# Patient Record
Sex: FEMALE | Race: WHITE | NOT HISPANIC OR LATINO | Employment: OTHER | URBAN - METROPOLITAN AREA
[De-identification: names, ages, dates, MRNs, and addresses within clinical notes are randomized per-mention and may not be internally consistent; named-entity substitution may affect disease eponyms.]

---

## 2023-04-17 LAB
ANION GAP IN SER/PLAS: 11 MMOL/L (ref 10–20)
CALCIDIOL (25 OH VITAMIN D3) (NG/ML) IN SER/PLAS: 23 NG/ML
CALCIUM (MG/DL) IN SER/PLAS: 9.4 MG/DL (ref 8.6–10.3)
CARBON DIOXIDE, TOTAL (MMOL/L) IN SER/PLAS: 30 MMOL/L (ref 21–32)
CHLORIDE (MMOL/L) IN SER/PLAS: 100 MMOL/L (ref 98–107)
CREATININE (MG/DL) IN SER/PLAS: 0.83 MG/DL (ref 0.5–1.05)
CREATININE (MG/DL) IN URINE: 53 MG/DL (ref 20–320)
GFR FEMALE: 77 ML/MIN/1.73M2
GLUCOSE (MG/DL) IN SER/PLAS: 182 MG/DL (ref 74–99)
PHOSPHATE (MG/DL) IN SER/PLAS: 3.9 MG/DL (ref 2.5–4.9)
POTASSIUM (MMOL/L) IN SER/PLAS: 4.3 MMOL/L (ref 3.5–5.3)
PROTEIN (MG/DL) IN URINE: 28 MG/DL (ref 5–24)
PROTEIN/CREATININE (MG/MG) IN URINE: 0.53 MG/MG CREAT (ref 0–0.17)
SODIUM (MMOL/L) IN SER/PLAS: 137 MMOL/L (ref 136–145)
UREA NITROGEN (MG/DL) IN SER/PLAS: 15 MG/DL (ref 6–23)

## 2023-04-18 LAB — PARATHYRIN INTACT (PG/ML) IN SER/PLAS: 101 PG/ML (ref 18.5–88)

## 2023-09-15 PROBLEM — E55.9 VITAMIN D DEFICIENCY: Status: ACTIVE | Noted: 2023-09-15

## 2023-09-15 PROBLEM — R80.9 PROTEINURIA: Status: ACTIVE | Noted: 2023-09-15

## 2023-09-15 PROBLEM — N18.2 CKD (CHRONIC KIDNEY DISEASE), STAGE II: Status: ACTIVE | Noted: 2023-09-15

## 2023-09-15 PROBLEM — E11.42 DIABETES, POLYNEUROPATHY (MULTI): Status: ACTIVE | Noted: 2023-09-15

## 2023-09-15 PROBLEM — F32.A DEPRESSION: Status: ACTIVE | Noted: 2023-09-15

## 2023-09-15 PROBLEM — E11.8 POORLY CONTROLLED TYPE 2 DIABETES MELLITUS WITH COMPLICATION (MULTI): Status: ACTIVE | Noted: 2023-09-15

## 2023-09-15 PROBLEM — B37.31 CANDIDIASIS OF VAGINA: Status: ACTIVE | Noted: 2023-09-15

## 2023-09-15 PROBLEM — E11.9 DIABETES MELLITUS (MULTI): Status: ACTIVE | Noted: 2023-09-15

## 2023-09-15 PROBLEM — Z91.199 NON-COMPLIANCE: Status: ACTIVE | Noted: 2023-09-15

## 2023-09-15 PROBLEM — I10 ESSENTIAL HYPERTENSION, BENIGN: Status: ACTIVE | Noted: 2023-09-15

## 2023-09-15 PROBLEM — R53.81 PHYSICAL DECONDITIONING: Status: ACTIVE | Noted: 2023-09-15

## 2023-09-15 PROBLEM — N18.30 CKD (CHRONIC KIDNEY DISEASE), STAGE III (MULTI): Status: ACTIVE | Noted: 2023-09-15

## 2023-09-15 PROBLEM — E66.01 MORBID OBESITY (MULTI): Status: ACTIVE | Noted: 2023-09-15

## 2023-09-15 PROBLEM — F48.9: Status: ACTIVE | Noted: 2023-09-15

## 2023-09-15 PROBLEM — E11.65 POORLY CONTROLLED TYPE 2 DIABETES MELLITUS WITH COMPLICATION (MULTI): Status: ACTIVE | Noted: 2023-09-15

## 2023-09-15 PROBLEM — M48.00 SPINAL STENOSIS, MULTILEVEL: Status: ACTIVE | Noted: 2023-09-15

## 2023-09-15 PROBLEM — E78.5 HYPERLIPIDEMIA: Status: ACTIVE | Noted: 2023-09-15

## 2023-09-15 PROBLEM — J45.909 ASTHMA (HHS-HCC): Status: ACTIVE | Noted: 2023-09-15

## 2023-09-15 PROBLEM — F31.9: Status: ACTIVE | Noted: 2023-09-15

## 2023-09-15 PROBLEM — K86.89 SECONDARY PANCREATIC INSUFFICIENCY (HHS-HCC): Status: ACTIVE | Noted: 2023-09-15

## 2023-09-15 PROBLEM — R30.0 DYSURIA: Status: ACTIVE | Noted: 2023-09-15

## 2023-09-15 RX ORDER — INSULIN LISPRO 100 [IU]/ML
15 INJECTION, SOLUTION INTRAVENOUS; SUBCUTANEOUS
COMMUNITY

## 2023-09-15 RX ORDER — SIMVASTATIN 20 MG/1
1 TABLET, FILM COATED ORAL DAILY
COMMUNITY
Start: 2022-07-18

## 2023-09-15 RX ORDER — CITALOPRAM 40 MG/1
1 TABLET, FILM COATED ORAL DAILY
COMMUNITY

## 2023-09-15 RX ORDER — LIDOCAINE 560 MG/1
PATCH PERCUTANEOUS; TOPICAL; TRANSDERMAL
COMMUNITY

## 2023-09-15 RX ORDER — MAGNESIUM CHLORIDE 64 MG
1 TABLET, DELAYED RELEASE (ENTERIC COATED) ORAL DAILY
COMMUNITY

## 2023-09-15 RX ORDER — ASPIRIN 81 MG/1
1 TABLET ORAL DAILY
COMMUNITY

## 2023-09-15 RX ORDER — PANCRELIPASE 30000; 6000; 19000 [USP'U]/1; [USP'U]/1; [USP'U]/1
2 CAPSULE, DELAYED RELEASE PELLETS ORAL
COMMUNITY

## 2023-09-15 RX ORDER — INSULIN GLARGINE 300 U/ML
90 INJECTION, SOLUTION SUBCUTANEOUS NIGHTLY
COMMUNITY

## 2023-09-15 RX ORDER — HYDROCODONE BITARTRATE AND ACETAMINOPHEN 5; 325 MG/1; MG/1
1 TABLET ORAL EVERY 6 HOURS
COMMUNITY
Start: 2020-12-04

## 2023-09-15 RX ORDER — LISINOPRIL 10 MG/1
1 TABLET ORAL DAILY
COMMUNITY

## 2023-09-15 RX ORDER — METOPROLOL TARTRATE 25 MG/1
1 TABLET, FILM COATED ORAL 2 TIMES DAILY
COMMUNITY
Start: 2021-11-30

## 2023-09-15 RX ORDER — ERGOCALCIFEROL 1.25 MG/1
1.25 CAPSULE ORAL
COMMUNITY

## 2023-09-15 RX ORDER — SPIRONOLACTONE 25 MG/1
1 TABLET ORAL DAILY
COMMUNITY
End: 2023-10-20

## 2023-10-17 ENCOUNTER — APPOINTMENT (OUTPATIENT)
Dept: NEPHROLOGY | Facility: CLINIC | Age: 68
End: 2023-10-17
Payer: MEDICARE

## 2023-10-20 DIAGNOSIS — I10 ESSENTIAL HYPERTENSION: Primary | ICD-10-CM

## 2023-10-20 RX ORDER — SPIRONOLACTONE 25 MG/1
25 TABLET ORAL DAILY
Qty: 90 TABLET | Refills: 0 | Status: SHIPPED | OUTPATIENT
Start: 2023-10-20 | End: 2024-05-10 | Stop reason: WASHOUT

## 2023-11-15 ENCOUNTER — APPOINTMENT (OUTPATIENT)
Dept: NEPHROLOGY | Facility: CLINIC | Age: 68
End: 2023-11-15
Payer: MEDICARE

## 2024-02-07 ENCOUNTER — TELEPHONE (OUTPATIENT)
Dept: NEPHROLOGY | Facility: CLINIC | Age: 69
End: 2024-02-07
Payer: MEDICARE

## 2024-02-07 DIAGNOSIS — I10 ESSENTIAL HYPERTENSION: Primary | ICD-10-CM

## 2024-02-07 DIAGNOSIS — E55.9 VITAMIN D DEFICIENCY: ICD-10-CM

## 2024-03-25 ENCOUNTER — LAB (OUTPATIENT)
Dept: LAB | Facility: LAB | Age: 69
End: 2024-03-25
Payer: MEDICARE

## 2024-03-25 DIAGNOSIS — I10 ESSENTIAL HYPERTENSION: ICD-10-CM

## 2024-03-25 DIAGNOSIS — E55.9 VITAMIN D DEFICIENCY: ICD-10-CM

## 2024-03-25 LAB
25(OH)D3 SERPL-MCNC: 13 NG/ML (ref 30–100)
ANION GAP SERPL CALC-SCNC: 16 MMOL/L (ref 10–20)
BUN SERPL-MCNC: 19 MG/DL (ref 6–23)
CALCIUM SERPL-MCNC: 9.5 MG/DL (ref 8.6–10.3)
CHLORIDE SERPL-SCNC: 99 MMOL/L (ref 98–107)
CO2 SERPL-SCNC: 26 MMOL/L (ref 21–32)
CREAT SERPL-MCNC: 1.05 MG/DL (ref 0.5–1.05)
EGFRCR SERPLBLD CKD-EPI 2021: 58 ML/MIN/1.73M*2
GLUCOSE SERPL-MCNC: 258 MG/DL (ref 74–99)
POTASSIUM SERPL-SCNC: 6.1 MMOL/L (ref 3.5–5.3)
PTH-INTACT SERPL-MCNC: 77.5 PG/ML (ref 18.5–88)
SODIUM SERPL-SCNC: 135 MMOL/L (ref 136–145)

## 2024-03-25 PROCEDURE — 83970 ASSAY OF PARATHORMONE: CPT

## 2024-03-25 PROCEDURE — 80048 BASIC METABOLIC PNL TOTAL CA: CPT

## 2024-03-25 PROCEDURE — 36415 COLL VENOUS BLD VENIPUNCTURE: CPT

## 2024-03-25 PROCEDURE — 82306 VITAMIN D 25 HYDROXY: CPT

## 2024-03-26 ENCOUNTER — TELEPHONE (OUTPATIENT)
Dept: PRIMARY CARE | Facility: CLINIC | Age: 69
End: 2024-03-26

## 2024-03-26 NOTE — TELEPHONE ENCOUNTER
----- Message from Fabian Restrepo MD sent at 3/26/2024  9:59 AM EDT -----  I was notified about high potassium level yesterday evening.  I called the patient twice and there was no answer.  Both times I left her message and I instructed her to go to emergency as soon as she gets the message and also call us if there is any question.  Please call the patient and make sure the patient got the message.

## 2024-03-26 NOTE — TELEPHONE ENCOUNTER
Spoke w/ pt and her  and advised to go to the ER, pt and  verbalized understanding and  stated he is taking her to [facility]

## 2024-03-27 ENCOUNTER — APPOINTMENT (OUTPATIENT)
Dept: CARDIOLOGY | Facility: HOSPITAL | Age: 69
End: 2024-03-27
Payer: MEDICARE

## 2024-03-27 ENCOUNTER — HOSPITAL ENCOUNTER (EMERGENCY)
Facility: HOSPITAL | Age: 69
Discharge: HOME | End: 2024-03-27
Attending: STUDENT IN AN ORGANIZED HEALTH CARE EDUCATION/TRAINING PROGRAM
Payer: MEDICARE

## 2024-03-27 VITALS
DIASTOLIC BLOOD PRESSURE: 109 MMHG | RESPIRATION RATE: 20 BRPM | TEMPERATURE: 97.5 F | SYSTOLIC BLOOD PRESSURE: 140 MMHG | WEIGHT: 260 LBS | OXYGEN SATURATION: 97 % | BODY MASS INDEX: 46.07 KG/M2 | HEART RATE: 60 BPM | HEIGHT: 63 IN

## 2024-03-27 DIAGNOSIS — N17.9 ACUTE KIDNEY INJURY (CMS-HCC): ICD-10-CM

## 2024-03-27 DIAGNOSIS — D72.829 LEUKOCYTOSIS, UNSPECIFIED TYPE: ICD-10-CM

## 2024-03-27 DIAGNOSIS — E87.1 HYPONATREMIA: ICD-10-CM

## 2024-03-27 DIAGNOSIS — E87.5 HYPERKALEMIA: Primary | ICD-10-CM

## 2024-03-27 LAB
ALBUMIN SERPL BCP-MCNC: 4 G/DL (ref 3.4–5)
ALP SERPL-CCNC: 85 U/L (ref 33–136)
ALT SERPL W P-5'-P-CCNC: 11 U/L (ref 7–45)
ANION GAP SERPL CALC-SCNC: 15 MMOL/L (ref 10–20)
AST SERPL W P-5'-P-CCNC: 13 U/L (ref 9–39)
BASOPHILS # BLD AUTO: 0.05 X10*3/UL (ref 0–0.1)
BASOPHILS NFR BLD AUTO: 0.4 %
BILIRUB SERPL-MCNC: 0.3 MG/DL (ref 0–1.2)
BUN SERPL-MCNC: 26 MG/DL (ref 6–23)
CALCIUM SERPL-MCNC: 9.4 MG/DL (ref 8.6–10.3)
CARDIAC TROPONIN I PNL SERPL HS: 5 NG/L (ref 0–13)
CARDIAC TROPONIN I PNL SERPL HS: 6 NG/L (ref 0–13)
CHLORIDE SERPL-SCNC: 98 MMOL/L (ref 98–107)
CO2 SERPL-SCNC: 23 MMOL/L (ref 21–32)
CREAT SERPL-MCNC: 1.2 MG/DL (ref 0.5–1.05)
EGFRCR SERPLBLD CKD-EPI 2021: 49 ML/MIN/1.73M*2
EOSINOPHIL # BLD AUTO: 0.39 X10*3/UL (ref 0–0.7)
EOSINOPHIL NFR BLD AUTO: 2.9 %
ERYTHROCYTE [DISTWIDTH] IN BLOOD BY AUTOMATED COUNT: 13.9 % (ref 11.5–14.5)
GLUCOSE SERPL-MCNC: 372 MG/DL (ref 74–99)
HCT VFR BLD AUTO: 42 % (ref 36–46)
HGB BLD-MCNC: 12.9 G/DL (ref 12–16)
IMM GRANULOCYTES # BLD AUTO: 0.07 X10*3/UL (ref 0–0.7)
IMM GRANULOCYTES NFR BLD AUTO: 0.5 % (ref 0–0.9)
LYMPHOCYTES # BLD AUTO: 3.16 X10*3/UL (ref 1.2–4.8)
LYMPHOCYTES NFR BLD AUTO: 23.9 %
MAGNESIUM SERPL-MCNC: 1.52 MG/DL (ref 1.6–2.4)
MCH RBC QN AUTO: 26.8 PG (ref 26–34)
MCHC RBC AUTO-ENTMCNC: 30.7 G/DL (ref 32–36)
MCV RBC AUTO: 87 FL (ref 80–100)
MONOCYTES # BLD AUTO: 0.68 X10*3/UL (ref 0.1–1)
MONOCYTES NFR BLD AUTO: 5.1 %
NEUTROPHILS # BLD AUTO: 8.89 X10*3/UL (ref 1.2–7.7)
NEUTROPHILS NFR BLD AUTO: 67.2 %
NRBC BLD-RTO: 0 /100 WBCS (ref 0–0)
PLATELET # BLD AUTO: 253 X10*3/UL (ref 150–450)
POTASSIUM SERPL-SCNC: 5.4 MMOL/L (ref 3.5–5.3)
PROT SERPL-MCNC: 7.2 G/DL (ref 6.4–8.2)
RBC # BLD AUTO: 4.81 X10*6/UL (ref 4–5.2)
SODIUM SERPL-SCNC: 131 MMOL/L (ref 136–145)
WBC # BLD AUTO: 13.2 X10*3/UL (ref 4.4–11.3)

## 2024-03-27 PROCEDURE — 83735 ASSAY OF MAGNESIUM: CPT | Performed by: STUDENT IN AN ORGANIZED HEALTH CARE EDUCATION/TRAINING PROGRAM

## 2024-03-27 PROCEDURE — 2500000004 HC RX 250 GENERAL PHARMACY W/ HCPCS (ALT 636 FOR OP/ED): Performed by: STUDENT IN AN ORGANIZED HEALTH CARE EDUCATION/TRAINING PROGRAM

## 2024-03-27 PROCEDURE — 85025 COMPLETE CBC W/AUTO DIFF WBC: CPT | Performed by: STUDENT IN AN ORGANIZED HEALTH CARE EDUCATION/TRAINING PROGRAM

## 2024-03-27 PROCEDURE — 93005 ELECTROCARDIOGRAM TRACING: CPT

## 2024-03-27 PROCEDURE — 84484 ASSAY OF TROPONIN QUANT: CPT | Performed by: STUDENT IN AN ORGANIZED HEALTH CARE EDUCATION/TRAINING PROGRAM

## 2024-03-27 PROCEDURE — 99285 EMERGENCY DEPT VISIT HI MDM: CPT | Performed by: STUDENT IN AN ORGANIZED HEALTH CARE EDUCATION/TRAINING PROGRAM

## 2024-03-27 PROCEDURE — 93010 ELECTROCARDIOGRAM REPORT: CPT | Performed by: STUDENT IN AN ORGANIZED HEALTH CARE EDUCATION/TRAINING PROGRAM

## 2024-03-27 PROCEDURE — 36415 COLL VENOUS BLD VENIPUNCTURE: CPT | Performed by: STUDENT IN AN ORGANIZED HEALTH CARE EDUCATION/TRAINING PROGRAM

## 2024-03-27 PROCEDURE — 96365 THER/PROPH/DIAG IV INF INIT: CPT

## 2024-03-27 PROCEDURE — 82374 ASSAY BLOOD CARBON DIOXIDE: CPT | Performed by: STUDENT IN AN ORGANIZED HEALTH CARE EDUCATION/TRAINING PROGRAM

## 2024-03-27 PROCEDURE — 99284 EMERGENCY DEPT VISIT MOD MDM: CPT | Mod: 25

## 2024-03-27 RX ORDER — MAGNESIUM SULFATE HEPTAHYDRATE 40 MG/ML
2 INJECTION, SOLUTION INTRAVENOUS ONCE
Status: COMPLETED | OUTPATIENT
Start: 2024-03-27 | End: 2024-03-27

## 2024-03-27 RX ADMIN — MAGNESIUM SULFATE HEPTAHYDRATE 2 G: 40 INJECTION, SOLUTION INTRAVENOUS at 18:30

## 2024-03-27 RX ADMIN — SODIUM CHLORIDE 1000 ML: 9 INJECTION, SOLUTION INTRAVENOUS at 18:30

## 2024-03-27 ASSESSMENT — PAIN - FUNCTIONAL ASSESSMENT
PAIN_FUNCTIONAL_ASSESSMENT: 0-10
PAIN_FUNCTIONAL_ASSESSMENT: 0-10

## 2024-03-27 ASSESSMENT — PAIN SCALES - GENERAL
PAINLEVEL_OUTOF10: 0 - NO PAIN

## 2024-03-27 ASSESSMENT — LIFESTYLE VARIABLES
TOTAL SCORE: 0
HAVE YOU EVER FELT YOU SHOULD CUT DOWN ON YOUR DRINKING: NO
HAVE PEOPLE ANNOYED YOU BY CRITICIZING YOUR DRINKING: NO
EVER HAD A DRINK FIRST THING IN THE MORNING TO STEADY YOUR NERVES TO GET RID OF A HANGOVER: NO
EVER FELT BAD OR GUILTY ABOUT YOUR DRINKING: NO

## 2024-03-27 ASSESSMENT — COLUMBIA-SUICIDE SEVERITY RATING SCALE - C-SSRS
1. IN THE PAST MONTH, HAVE YOU WISHED YOU WERE DEAD OR WISHED YOU COULD GO TO SLEEP AND NOT WAKE UP?: NO
6. HAVE YOU EVER DONE ANYTHING, STARTED TO DO ANYTHING, OR PREPARED TO DO ANYTHING TO END YOUR LIFE?: NO
2. HAVE YOU ACTUALLY HAD ANY THOUGHTS OF KILLING YOURSELF?: NO

## 2024-03-27 NOTE — ED PROVIDER NOTES
HPI   Chief Complaint   Patient presents with    abnormal labs       Patient is a 68-year-old female with PMH significant for OA, depression (s/p DBS), HTN, IDDM 2, asthma, bipolar, CKD 3, HLD.  Patient presents to [facility] ED for chief complaint of abnormal laboratory values where she was sent in by her PCP.  Patient stated that she is in her normal state of health and if not notified by her PCP she would not have presented to [facility] ED.   On chart review it is found that the patient's potassium on outpatient laboratory values was 6.1 and that was why she was into the ER.  She denies any fevers, chills, night sweats, chest pain, shortness breath, abdominal pain, nausea/vomiting.  States that in the past she was on spironolactone however this was discontinued a few months ago.    PMH: OA, depression (s/p DBS), HTN, IDDM 2, asthma, bipolar, CKD 3, HLD  Allergies: Adhesive, belladonna, ciprofloxacin, egg, iodinated contrast, sulfa, Tegaderm  FH: Noncontributory  SX H: DBS placement, knee surgery, cholecystectomy, hysterectomy, tonsillectomy  SH: Patient denies tobacco use, licit drug use, alcohol use, lives at home with , is retired      History provided by:  Patient   used: No                        [place] Coma Scale Score: 15                     Patient History   Past Medical History:   Diagnosis Date    Diabetes mellitus (CMS/HCC)     Hypertension      Past Surgical History:   Procedure Laterality Date    OTHER SURGICAL HISTORY  2021    Knee arthroscopy    OTHER SURGICAL HISTORY  2021    Deep brain stimulation    OTHER SURGICAL HISTORY  2021    Hysterectomy    OTHER SURGICAL HISTORY  2021    Cholecystectomy    OTHER SURGICAL HISTORY  10/14/2021    Knee surgery    OTHER SURGICAL HISTORY  10/14/2021    Tonsillectomy with adenoidectomy    OTHER SURGICAL HISTORY  10/14/2021    Vaginal sling procedure    OTHER SURGICAL HISTORY  10/19/2021     section     Family  History   Problem Relation Name Age of Onset    Diabetes Mother      Hypertension Mother      Thyroid cancer Mother      Heart attack Mother      Osteoporosis Mother      Stroke Father      Hypertension Father      Diabetes Brother      Hypertension Brother      Cancer Brother      Diabetes Other Grandparent         type 2, without complication, unspecified insulin use     Social History     Tobacco Use    Smoking status: Never    Smokeless tobacco: Never   Substance Use Topics    Alcohol use: Never    Drug use: Never       Physical Exam   ED Triage Vitals [03/27/24 1618]   Temperature Heart Rate Respirations BP   36.4 °C (97.5 °F) 68 20 134/57      Pulse Ox Temp Source Heart Rate Source Patient Position   95 % Temporal Monitor Sitting      BP Location FiO2 (%)     Right arm --       Physical Exam  Vitals and nursing note reviewed.   Constitutional:       General: She is not in acute distress.     Appearance: Normal appearance. She is not ill-appearing or toxic-appearing.   HENT:      Head: Normocephalic and atraumatic.      Nose: Nose normal.      Mouth/Throat:      Mouth: Mucous membranes are moist.      Pharynx: Oropharynx is clear.   Eyes:      Extraocular Movements: Extraocular movements intact.      Conjunctiva/sclera: Conjunctivae normal.   Cardiovascular:      Rate and Rhythm: Normal rate and regular rhythm.      Pulses: Normal pulses.      Heart sounds: Normal heart sounds.   Pulmonary:      Effort: Pulmonary effort is normal. No respiratory distress.      Breath sounds: Normal breath sounds.   Abdominal:      General: Bowel sounds are normal. There is no distension.      Palpations: Abdomen is soft.      Tenderness: There is no abdominal tenderness.   Musculoskeletal:         General: Normal range of motion.   Skin:     General: Skin is warm and dry.      Capillary Refill: Capillary refill takes less than 2 seconds.   Neurological:      General: No focal deficit present.      Mental Status: She is alert.  Mental status is at baseline.   Psychiatric:         Mood and Affect: Mood normal.         Behavior: Behavior normal.         Thought Content: Thought content normal.         Judgment: Judgment normal.         ED Course & MDM   ED Course as of 03/27/24 2023   Wed Mar 27, 2024   1817 EKG independently interpreted by attending physician    1626 hrs.: Normal sinus rhythm ventricular to 70 bpm.  QTc 440.  .  No acute injury pattern seen.    1808 hrs.: Sinus rhythm first-degree AV block with ventricular to 66 bpm.  QTc 434.  .  Nonspecific ST abnormality.  No acute injury pattern seen. [AI]      ED Course User Index  [AI] Nicol Dietz DO         Diagnoses as of 03/27/24 2023   Hyperkalemia   Acute kidney injury (CMS/HCC)   Hyponatremia   Leukocytosis, unspecified type       Medical Decision Making  Patient is a 68-year-old female who presents to [facility] ED with chief complaint of abnormal laboratory values markedly hyperkalemia.      Nontoxic appearing female, in no acute distress. To evaluate this, repeat lab values will be drawn here including CBC, CMP, troponin, magnesium, and an ECG will be obtained.  EKG showed no peaked T waves.    Lab work showed slight leukocytosis.  Patient has mild hyponatremia of 131 however in looking at electronic medical record, patient has had hyponatremia before.  Did have slight hyperkalemia with potassium 5.4.  Creatinine was slightly elevated 1.2.  Does have hyperglycemia.  I spoke with Dr. Sandoval (nephrology) to obtain her opinion and to set up outpatient appointment for the patient.  Dr. Sandoval said this is most likely due to poor glycemic control (which the patient admits to) and that she will add the patient onto the schedule for next Tuesday.  The patient will be given the office phone number to call to set up this appointment.  Of note the patient's magnesium was also mildly low so she will be given IV magnesium replacement.  Will also give the patient IVF normal  saline x 1 L.    The patient should follow-up with her PCP as soon as possible.  However, it was later discovered that the patient already has a nephrologist (Dr. Restrepo), and was sent in by the nephrologist himself not the PCP.  So the patient is to follow-up with her own nephrologist Dr. Restrepo as soon as possible at the earliest open appointment and to obtain repeat blood work.  Patient hemodynamically stable. Updated about results. All questions and concerns addressed. Patient discharged in stable condition.  Given strict return precautions.        Procedure  Procedures     Edouard Sher MD  Resident  03/27/24 1827       Edouard Sher MD  Resident  03/27/24 1859       Edouard Sher MD  Resident  03/27/24 1900    The patient was seen by the resident/fellow.  I have personally performed a substantive portion of the encounter.  I have seen and examined the patient; agree with the workup, evaluation, MDM, management and diagnosis.  The care plan has been discussed with the resident; I have reviewed the resident’s note and agree with the documented findings.           Nicol Dietz,   03/27/24 2024

## 2024-03-27 NOTE — DISCHARGE INSTRUCTIONS
Please follow-up with your primary care provider within 2 days for hospital follow-up.  Please call to make this appointment.  Please follow-up with your nephrologist Dr. Restrepo.  Please schedule an appointment within 2 days.  Please call to make this appointment.    Please take your medications as prescribed.  No new medications were added during this emergency room visit.    Please follow up with your primary care physician within 1-2 days.  If you have chest pain, difficulty breathing, or other concerning symptoms, please seek immediate medical attention and come back to the ER.    If you have a return or worsening of symptoms, please seek immediate medical attention.    If you have any new or worsening symptoms, seek medical attention.     Thank you for allowing us to participate in your medical care!    [place] [place]

## 2024-03-30 LAB
ATRIAL RATE: 66 BPM
ATRIAL RATE: 70 BPM
P AXIS: 65 DEGREES
P AXIS: 69 DEGREES
P OFFSET: 153 MS
P OFFSET: 158 MS
P ONSET: 101 MS
P ONSET: 106 MS
PR INTERVAL: 204 MS
PR INTERVAL: 216 MS
Q ONSET: 208 MS
Q ONSET: 209 MS
QRS COUNT: 11 BEATS
QRS COUNT: 11 BEATS
QRS DURATION: 110 MS
QRS DURATION: 112 MS
QT INTERVAL: 408 MS
QT INTERVAL: 414 MS
QTC CALCULATION(BAZETT): 434 MS
QTC CALCULATION(BAZETT): 440 MS
QTC FREDERICIA: 427 MS
QTC FREDERICIA: 429 MS
R AXIS: -53 DEGREES
R AXIS: -58 DEGREES
T AXIS: 2 DEGREES
T AXIS: 41 DEGREES
T OFFSET: 412 MS
T OFFSET: 416 MS
VENTRICULAR RATE: 66 BPM
VENTRICULAR RATE: 70 BPM

## 2024-04-03 ENCOUNTER — APPOINTMENT (OUTPATIENT)
Dept: NEPHROLOGY | Facility: CLINIC | Age: 69
End: 2024-04-03
Payer: MEDICARE

## 2024-05-10 ENCOUNTER — OFFICE VISIT (OUTPATIENT)
Dept: NEPHROLOGY | Facility: CLINIC | Age: 69
End: 2024-05-10
Payer: MEDICARE

## 2024-05-10 VITALS — SYSTOLIC BLOOD PRESSURE: 125 MMHG | DIASTOLIC BLOOD PRESSURE: 75 MMHG | HEART RATE: 97 BPM

## 2024-05-10 DIAGNOSIS — E08.22 DIABETES MELLITUS DUE TO UNDERLYING CONDITION WITH DIABETIC CHRONIC KIDNEY DISEASE, UNSPECIFIED CKD STAGE, UNSPECIFIED WHETHER LONG TERM INSULIN USE (MULTI): ICD-10-CM

## 2024-05-10 DIAGNOSIS — I10 ESSENTIAL HYPERTENSION: ICD-10-CM

## 2024-05-10 DIAGNOSIS — N18.31 STAGE 3A CHRONIC KIDNEY DISEASE (MULTI): Primary | ICD-10-CM

## 2024-05-10 PROCEDURE — 1159F MED LIST DOCD IN RCRD: CPT | Performed by: INTERNAL MEDICINE

## 2024-05-10 PROCEDURE — 1036F TOBACCO NON-USER: CPT | Performed by: INTERNAL MEDICINE

## 2024-05-10 PROCEDURE — 3078F DIAST BP <80 MM HG: CPT | Performed by: INTERNAL MEDICINE

## 2024-05-10 PROCEDURE — 4010F ACE/ARB THERAPY RXD/TAKEN: CPT | Performed by: INTERNAL MEDICINE

## 2024-05-10 PROCEDURE — 3074F SYST BP LT 130 MM HG: CPT | Performed by: INTERNAL MEDICINE

## 2024-05-10 PROCEDURE — 99214 OFFICE O/P EST MOD 30 MIN: CPT | Performed by: INTERNAL MEDICINE

## 2024-05-10 NOTE — PROGRESS NOTES
Kaylene Feliciano   68 y.o.    @@  [facility]/Room: [id number]/Room/bed info not found    Subjective:   The patient is being seen for a routine clinic follow-up of chronic kidney disease. Recently, the disease has been stable. Disease complications:  No hyperkalemia, no hypocalcemia, no hyperphosphatemia, no metabolic acidosis, no coagulopathy, no uremic encephalopathy, no neuropathy and no renal osteodystrophy. The patient is currently asymptomatic. No associated symptoms are reported.       Meds:   Current Outpatient Medications   Medication Sig Dispense Refill    spironolactone (Aldactone) 25 mg tablet TAKE ONE TABLET BY MOUTH DAILY 90 tablet 0    aspirin 81 mg EC tablet Take 1 tablet (81 mg) by mouth once daily.      citalopram (CeleXA) 40 mg tablet Take 1 tablet (40 mg) by mouth once daily.      CRANBERRY ORAL Take 1 capsule by mouth once daily.      ergocalciferol (Vitamin D-2) 1.25 MG (72953 UT) capsule Take 1 capsule (1,250 mcg) by mouth 1 (one) time per week.      HYDRALAZINE HCL, BULK, MISC Take by mouth.      HYDROcodone-acetaminophen (Norco) 5-325 mg tablet Take 1 tablet by mouth every 6 hours.      insulin glargine (Toujeo Max U-300 SoloStar) 300 unit/mL (3 mL) injection Inject 90 Units under the skin once daily at bedtime.      insulin lispro (HumaLOG KwikPen Insulin) 100 unit/mL injection Inject 15 Units under the skin 3 times a day with meals.      Lactobacillus acidophilus 1 billion cell capsule Take by mouth.      lidocaine 4 % patch Place on the skin.      lisinopril 10 mg tablet Take 1 tablet (10 mg) by mouth once daily.      magnesium chloride (Mag 64) 64 mg EC tablet Take 1 tablet (64 mg) by mouth once daily.      metoprolol tartrate (Lopressor) 25 mg tablet Take 1 tablet (25 mg) by mouth 2 times a day.      pancrelipase, Lip-Prot-Amyl, (Creon) 6,000-19,000 -30,000 unit capsule Take 2 capsules by mouth 3 times a day with meals.      simvastatin (Zocor) 20 mg tablet Take 1 tablet (20 mg) by mouth once  daily.       No current facility-administered medications for this visit.          ROS:  The patient is awake and oriented. No dizziness or lightheadedness. No chills and no fever. No headaches. No nausea and no vomiting. No shortness of breath. No cough. No sputum. No chest pain. No chest tightness. No abdominal pain. No diarrhea and no constipation. No hematemesis or hemoptysis. No hematuria. No rectal bleeding. No melena. No epistaxis. No urinary symptoms. No flank pain. No leg edema. No leg pain. No weakness. No itching. Overall, the rest of the review of systems is also negative.  12 point review of systems otherwise negative as stated in HPI.        Physical Examination:        Vitals:    05/10/24 1230   BP: 125/75   Pulse: 97     General: The patient is awake, oriented, and is not in any distress.  Head and Neck: Normocephalic. No periorbital edema.  Eyes: non-icteric  Respiratory: Symmetric air entry. Symmetric chest expansion.No respiratory distress.  Cardiovascular: Symmetric peripheral pulses.  Skin: No maculopapular rash.  Abdomen: soft, nt/nd  Musculoskeletal: No peripheral edema in both left and right upper extremities.  No edema in either left or right lower extremities.  Neuro Exam: Speech is fluent. Moves extremities.    Imaging:  === 06/07/21 ===    US RENAL COMPLETE    - Impression -  Mildly increased echogenicity of the renal parenchyma likely  reflective of medical renal disease. No hydronephrosis. Renal cysts  bilaterally.       Blood Labs:  No results found for this or any previous visit (from the past 24 hour(s)).   Lab Results   Component Value Date    PTH 77.5 03/25/2024    PROTUR 28 (H) 04/17/2023    PHOS 3.9 04/17/2023      Lab Results   Component Value Date    GLUCOSE 372 (H) 03/27/2024    CALCIUM 9.4 03/27/2024     (L) 03/27/2024    K 5.4 (H) 03/27/2024    CO2 23 03/27/2024    CL 98 03/27/2024    BUN 26 (H) 03/27/2024    CREATININE 1.20 (H) 03/27/2024         Assessment and  Plan:    #1 chronic kidney disease stage III. Last creatinine level is 1.2. Volume status is good. Blood pressure is under control.      2. Hypertension. Blood pressure is under control. Continue the current med.      3. Diabetes.      4. Dyslipidemia. She is on a statin and she is tolerating statin well.I will see her in about 4 months for follow-up     #5  Diabetes.  I asked for a spot urine protein to creatinine ratio.    6.  Per kalemia.  Back in March 2024 she was hyperkalemic when she presented to ED.  At that time she was taken off her spironolactone.  I asked for basic metabolic panel to evaluate her potassium and creatinine level.          I will see her in about 4 months for follow-up.         Fabian Restrepo MD  Senior Attending Physician  Director of Onco-Nephrology Program  Division of Nephrology & Hypertension  [facility]

## 2024-06-14 ENCOUNTER — HOSPITAL ENCOUNTER (INPATIENT)
Facility: HOSPITAL | Age: 69
End: 2024-06-14
Attending: STUDENT IN AN ORGANIZED HEALTH CARE EDUCATION/TRAINING PROGRAM | Admitting: INTERNAL MEDICINE
Payer: MEDICARE

## 2024-06-14 ENCOUNTER — APPOINTMENT (OUTPATIENT)
Dept: RADIOLOGY | Facility: HOSPITAL | Age: 69
End: 2024-06-14
Payer: MEDICARE

## 2024-06-14 ENCOUNTER — APPOINTMENT (OUTPATIENT)
Dept: CARDIOLOGY | Facility: HOSPITAL | Age: 69
End: 2024-06-14
Payer: MEDICARE

## 2024-06-14 DIAGNOSIS — R09.02 HYPOXIA: ICD-10-CM

## 2024-06-14 DIAGNOSIS — J44.9 CHRONIC OBSTRUCTIVE PULMONARY DISEASE, UNSPECIFIED COPD TYPE (MULTI): ICD-10-CM

## 2024-06-14 DIAGNOSIS — J96.01 ACUTE RESPIRATORY FAILURE WITH HYPOXIA (MULTI): ICD-10-CM

## 2024-06-14 DIAGNOSIS — I50.31 ACUTE DIASTOLIC CHF (CONGESTIVE HEART FAILURE) (MULTI): ICD-10-CM

## 2024-06-14 DIAGNOSIS — N18.2 CKD (CHRONIC KIDNEY DISEASE), STAGE II: ICD-10-CM

## 2024-06-14 DIAGNOSIS — R06.02 SHORTNESS OF BREATH: Primary | ICD-10-CM

## 2024-06-14 DIAGNOSIS — I10 ESSENTIAL HYPERTENSION, BENIGN: Chronic | ICD-10-CM

## 2024-06-14 LAB
ALBUMIN SERPL BCP-MCNC: 3.8 G/DL (ref 3.4–5)
ALP SERPL-CCNC: 87 U/L (ref 33–136)
ALT SERPL W P-5'-P-CCNC: 16 U/L (ref 7–45)
ANION GAP SERPL CALC-SCNC: 10 MMOL/L (ref 10–20)
AST SERPL W P-5'-P-CCNC: 16 U/L (ref 9–39)
BILIRUB SERPL-MCNC: 0.7 MG/DL (ref 0–1.2)
BNP SERPL-MCNC: 255 PG/ML (ref 0–99)
BUN SERPL-MCNC: 19 MG/DL (ref 6–23)
CALCIUM SERPL-MCNC: 9.1 MG/DL (ref 8.6–10.3)
CARDIAC TROPONIN I PNL SERPL HS: 7 NG/L (ref 0–13)
CARDIAC TROPONIN I PNL SERPL HS: 8 NG/L (ref 0–13)
CHLORIDE SERPL-SCNC: 98 MMOL/L (ref 98–107)
CO2 SERPL-SCNC: 29 MMOL/L (ref 21–32)
CREAT SERPL-MCNC: 0.88 MG/DL (ref 0.5–1.05)
D DIMER PPP FEU-MCNC: 746 NG/ML FEU
EGFRCR SERPLBLD CKD-EPI 2021: 71 ML/MIN/1.73M*2
ERYTHROCYTE [DISTWIDTH] IN BLOOD BY AUTOMATED COUNT: 14.7 % (ref 11.5–14.5)
GLUCOSE SERPL-MCNC: 173 MG/DL (ref 74–99)
HCT VFR BLD AUTO: 34.3 % (ref 36–46)
HGB BLD-MCNC: 10.6 G/DL (ref 12–16)
HOLD SPECIMEN: NORMAL
MCH RBC QN AUTO: 26.8 PG (ref 26–34)
MCHC RBC AUTO-ENTMCNC: 30.9 G/DL (ref 32–36)
MCV RBC AUTO: 87 FL (ref 80–100)
NRBC BLD-RTO: 0 /100 WBCS (ref 0–0)
PLATELET # BLD AUTO: 211 X10*3/UL (ref 150–450)
POTASSIUM SERPL-SCNC: 4.8 MMOL/L (ref 3.5–5.3)
PROT SERPL-MCNC: 7 G/DL (ref 6.4–8.2)
RBC # BLD AUTO: 3.96 X10*6/UL (ref 4–5.2)
SODIUM SERPL-SCNC: 132 MMOL/L (ref 136–145)
WBC # BLD AUTO: 14.7 X10*3/UL (ref 4.4–11.3)

## 2024-06-14 PROCEDURE — 71275 CT ANGIOGRAPHY CHEST: CPT | Mod: FOREIGN READ | Performed by: RADIOLOGY

## 2024-06-14 PROCEDURE — 2500000004 HC RX 250 GENERAL PHARMACY W/ HCPCS (ALT 636 FOR OP/ED): Performed by: STUDENT IN AN ORGANIZED HEALTH CARE EDUCATION/TRAINING PROGRAM

## 2024-06-14 PROCEDURE — 85379 FIBRIN DEGRADATION QUANT: CPT | Performed by: STUDENT IN AN ORGANIZED HEALTH CARE EDUCATION/TRAINING PROGRAM

## 2024-06-14 PROCEDURE — 84484 ASSAY OF TROPONIN QUANT: CPT | Mod: 91 | Performed by: STUDENT IN AN ORGANIZED HEALTH CARE EDUCATION/TRAINING PROGRAM

## 2024-06-14 PROCEDURE — 84484 ASSAY OF TROPONIN QUANT: CPT | Performed by: STUDENT IN AN ORGANIZED HEALTH CARE EDUCATION/TRAINING PROGRAM

## 2024-06-14 PROCEDURE — 83880 ASSAY OF NATRIURETIC PEPTIDE: CPT | Performed by: STUDENT IN AN ORGANIZED HEALTH CARE EDUCATION/TRAINING PROGRAM

## 2024-06-14 PROCEDURE — 2550000001 HC RX 255 CONTRASTS: Performed by: STUDENT IN AN ORGANIZED HEALTH CARE EDUCATION/TRAINING PROGRAM

## 2024-06-14 PROCEDURE — 2500000005 HC RX 250 GENERAL PHARMACY W/O HCPCS: Performed by: STUDENT IN AN ORGANIZED HEALTH CARE EDUCATION/TRAINING PROGRAM

## 2024-06-14 PROCEDURE — 71045 X-RAY EXAM CHEST 1 VIEW: CPT

## 2024-06-14 PROCEDURE — 80053 COMPREHEN METABOLIC PANEL: CPT | Performed by: STUDENT IN AN ORGANIZED HEALTH CARE EDUCATION/TRAINING PROGRAM

## 2024-06-14 PROCEDURE — 71275 CT ANGIOGRAPHY CHEST: CPT

## 2024-06-14 PROCEDURE — 96374 THER/PROPH/DIAG INJ IV PUSH: CPT

## 2024-06-14 PROCEDURE — 71045 X-RAY EXAM CHEST 1 VIEW: CPT | Performed by: RADIOLOGY

## 2024-06-14 PROCEDURE — 85025 COMPLETE CBC W/AUTO DIFF WBC: CPT | Performed by: STUDENT IN AN ORGANIZED HEALTH CARE EDUCATION/TRAINING PROGRAM

## 2024-06-14 PROCEDURE — 36415 COLL VENOUS BLD VENIPUNCTURE: CPT | Performed by: STUDENT IN AN ORGANIZED HEALTH CARE EDUCATION/TRAINING PROGRAM

## 2024-06-14 PROCEDURE — 93005 ELECTROCARDIOGRAM TRACING: CPT

## 2024-06-14 PROCEDURE — 2500000005 HC RX 250 GENERAL PHARMACY W/O HCPCS: Performed by: EMERGENCY MEDICINE

## 2024-06-14 PROCEDURE — 2500000004 HC RX 250 GENERAL PHARMACY W/ HCPCS (ALT 636 FOR OP/ED): Performed by: EMERGENCY MEDICINE

## 2024-06-14 PROCEDURE — 96375 TX/PRO/DX INJ NEW DRUG ADDON: CPT

## 2024-06-14 PROCEDURE — 99285 EMERGENCY DEPT VISIT HI MDM: CPT | Mod: 25

## 2024-06-14 RX ORDER — GABAPENTIN 300 MG/1
300 CAPSULE ORAL 2 TIMES DAILY
COMMUNITY

## 2024-06-14 RX ORDER — DIPHENHYDRAMINE HYDROCHLORIDE 50 MG/ML
12.5 INJECTION INTRAMUSCULAR; INTRAVENOUS ONCE
Status: COMPLETED | OUTPATIENT
Start: 2024-06-14 | End: 2024-06-14

## 2024-06-14 RX ORDER — FUROSEMIDE 10 MG/ML
40 INJECTION INTRAMUSCULAR; INTRAVENOUS ONCE
Status: COMPLETED | OUTPATIENT
Start: 2024-06-14 | End: 2024-06-14

## 2024-06-14 RX ORDER — ATORVASTATIN CALCIUM 10 MG/1
10 TABLET, FILM COATED ORAL NIGHTLY
COMMUNITY

## 2024-06-14 RX ORDER — METHOCARBAMOL 500 MG/1
500 TABLET, FILM COATED ORAL 2 TIMES DAILY
COMMUNITY

## 2024-06-14 RX ORDER — METOPROLOL SUCCINATE 25 MG/1
25 TABLET, EXTENDED RELEASE ORAL 2 TIMES DAILY
COMMUNITY

## 2024-06-14 RX ORDER — AMLODIPINE BESYLATE 5 MG/1
5 TABLET ORAL DAILY
COMMUNITY

## 2024-06-14 RX ORDER — INSULIN GLARGINE 100 [IU]/ML
50 INJECTION, SOLUTION SUBCUTANEOUS 2 TIMES DAILY
COMMUNITY

## 2024-06-14 RX ORDER — DIPHENHYDRAMINE HYDROCHLORIDE 50 MG/ML
37.5 INJECTION INTRAMUSCULAR; INTRAVENOUS ONCE
Status: COMPLETED | OUTPATIENT
Start: 2024-06-14 | End: 2024-06-14

## 2024-06-14 RX ADMIN — METHYLPREDNISOLONE SODIUM SUCCINATE 125 MG: 125 INJECTION, POWDER, FOR SOLUTION INTRAMUSCULAR; INTRAVENOUS at 18:19

## 2024-06-14 RX ADMIN — DIPHENHYDRAMINE HYDROCHLORIDE 37.5 MG: 50 INJECTION INTRAMUSCULAR; INTRAVENOUS at 21:29

## 2024-06-14 RX ADMIN — FUROSEMIDE 40 MG: 10 INJECTION, SOLUTION INTRAMUSCULAR; INTRAVENOUS at 23:00

## 2024-06-14 RX ADMIN — Medication 3 L/MIN: at 18:36

## 2024-06-14 RX ADMIN — DIPHENHYDRAMINE HYDROCHLORIDE 12.5 MG: 50 INJECTION, SOLUTION INTRAMUSCULAR; INTRAVENOUS at 18:19

## 2024-06-14 RX ADMIN — Medication 15 L/MIN: at 23:10

## 2024-06-14 RX ADMIN — IOHEXOL 60 ML: 350 INJECTION, SOLUTION INTRAVENOUS at 22:51

## 2024-06-14 RX ADMIN — Medication 3 L/MIN: at 21:40

## 2024-06-14 ASSESSMENT — PAIN - FUNCTIONAL ASSESSMENT: PAIN_FUNCTIONAL_ASSESSMENT: 0-10

## 2024-06-14 ASSESSMENT — COLUMBIA-SUICIDE SEVERITY RATING SCALE - C-SSRS
6. HAVE YOU EVER DONE ANYTHING, STARTED TO DO ANYTHING, OR PREPARED TO DO ANYTHING TO END YOUR LIFE?: NO
2. HAVE YOU ACTUALLY HAD ANY THOUGHTS OF KILLING YOURSELF?: NO
1. IN THE PAST MONTH, HAVE YOU WISHED YOU WERE DEAD OR WISHED YOU COULD GO TO SLEEP AND NOT WAKE UP?: NO

## 2024-06-14 ASSESSMENT — PAIN SCALES - GENERAL
PAINLEVEL_OUTOF10: 0 - NO PAIN
PAINLEVEL_OUTOF10: 0 - NO PAIN

## 2024-06-14 ASSESSMENT — LIFESTYLE VARIABLES
HAVE PEOPLE ANNOYED YOU BY CRITICIZING YOUR DRINKING: NO
TOTAL SCORE: 0
HAVE YOU EVER FELT YOU SHOULD CUT DOWN ON YOUR DRINKING: NO
EVER HAD A DRINK FIRST THING IN THE MORNING TO STEADY YOUR NERVES TO GET RID OF A HANGOVER: NO
EVER FELT BAD OR GUILTY ABOUT YOUR DRINKING: NO

## 2024-06-14 NOTE — ED PROVIDER NOTES
Pt Name: Kaylene Feliciano  MRN: [id number]  Birthdate 1955  Date of evaluation: 2024    Chief Complaint   Patient presents with    Shortness of Breath     SOA started X 3 days ago. Patient had oxygen at home and put herself on it. States she can not breath when laying on right side.      HPI     69-year-old female presenting for 4 days of shortness of breath, reports a history of CHF, unsure if she has had any weight gain weight loss.  She denies fevers chills productive cough chest pain pleuritic pain or any other symptoms other than difficulty laying flat.          Patient History   Past Medical History:   Diagnosis Date    Diabetes mellitus (Multi)     Hypertension      Past Surgical History:   Procedure Laterality Date    OTHER SURGICAL HISTORY  2021    Knee arthroscopy    OTHER SURGICAL HISTORY  2021    Deep brain stimulation    OTHER SURGICAL HISTORY  2021    Hysterectomy    OTHER SURGICAL HISTORY  2021    Cholecystectomy    OTHER SURGICAL HISTORY  10/14/2021    Knee surgery    OTHER SURGICAL HISTORY  10/14/2021    Tonsillectomy with adenoidectomy    OTHER SURGICAL HISTORY  10/14/2021    Vaginal sling procedure    OTHER SURGICAL HISTORY  10/19/2021     section     Family History   Problem Relation Name Age of Onset    Diabetes Mother      Hypertension Mother      Thyroid cancer Mother      Heart attack Mother      Osteoporosis Mother      Stroke Father      Hypertension Father      Diabetes Brother      Hypertension Brother      Cancer Brother      Diabetes Other Grandparent         type 2, without complication, unspecified insulin use     Social History     Tobacco Use    Smoking status: Never    Smokeless tobacco: Never   Substance Use Topics    Alcohol use: Never    Drug use: Never       Physical Exam   ED Triage Vitals [24 1508]   Temperature Heart Rate Respirations BP   36.8 °C (98.2 °F) 70 20 143/71      Pulse Ox Temp Source Heart Rate Source Patient Position    96 % Tympanic Monitor Lying      BP Location FiO2 (%)     Right arm --         Physical Exam    RESULTS:  LABS:  Labs Reviewed   CBC - Abnormal       Result Value    WBC 14.7 (*)     nRBC 0.0      RBC 3.96 (*)     Hemoglobin 10.6 (*)     Hematocrit 34.3 (*)     MCV 87      MCH 26.8      MCHC 30.9 (*)     RDW 14.7 (*)     Platelets 211     COMPREHENSIVE METABOLIC PANEL - Abnormal    Glucose 173 (*)     Sodium 132 (*)     Potassium 4.8      Chloride 98      Bicarbonate 29      Anion Gap 10      Urea Nitrogen 19      Creatinine 0.88      eGFR 71      Calcium 9.1      Albumin 3.8      Alkaline Phosphatase 87      Total Protein 7.0      AST 16      Bilirubin, Total 0.7      ALT 16     B-TYPE NATRIURETIC PEPTIDE - Abnormal     (*)     Narrative:        <100 pg/mL - Heart failure unlikely  100-299 pg/mL - Intermediate probability of acute heart                  failure exacerbation. Correlate with clinical                  context and patient history.    >=300 pg/mL - Heart Failure likely. Correlate with clinical                  context and patient history.    BNP testing is performed using different testing methodology at [facility] than at other [facility] hospitals. Direct result comparisons should only be made within the same method.      D-DIMER, NON VTE - Abnormal    D-Dimer Non VTE, Quant (ng/mL FEU) 746 (*)     Narrative:     The D-Dimer assay is reported in ng/mL Fibrinogen Equivalent Units (FEU). The results of this assay should NOT be used for the exclusion of Deep Vein Thrombosis and/or Pulmonary Embolism.   CBC WITH AUTO DIFFERENTIAL - Abnormal    WBC 14.7 (*)     nRBC 0.0      RBC 3.96 (*)     Hemoglobin 10.6 (*)     Hematocrit 34.3 (*)     MCV 87      MCH 26.8      MCHC 30.9 (*)     RDW 14.7 (*)     Platelets 211      Neutrophils % 80.7      Immature Granulocytes %, Automated 0.4      Lymphocytes % 11.6      Monocytes % 6.2      Eosinophils % 0.8      Basophils % 0.3      Neutrophils  Absolute 11.71 (*)     Immature Granulocytes Absolute, Automated 0.06      Lymphocytes Absolute 1.68      Monocytes Absolute 0.90      Eosinophils Absolute 0.11      Basophils Absolute 0.05     PROCALCITONIN - Abnormal    Procalcitonin 0.60 (*)     Narrative:     Procalcitonin (PCT) results measured serially can  aid in decision-making for antibiotic discontinuation in  patients with suspected or confirmed sepsis in conjunction  with additional clinical information. Antibiotic  discontinuation may be considered with a change in PCT of  >80% from the peak result or when PCT falls below 0.50 ng/mL.    Procalcitonin results should not be used in isolation but  should be interpreted in conjunction with additional clinical  and laboratory findings. Procalcitonin results should not be  used to guide the initiation of antibiotic therapy.    Falsely low PCT values in the presence of bacterial infection  may occur in early infection, with atypical pathogens,  localized infections, and subacute infectious endocarditis.    Falsely elevated results outside of severe bacterial  infection/sepsis may be seen in patients with renal failure  or insufficiency, severe trauma or burns, recent major  abdominal/cardiac surgery, acute multi-organ failure, rarely  in patients with medullary thyroid carcinoma and rare  neuroendocrine tumors, and non-specific interfering antibodies  (heterophile antibodies, rheumatoid factor, human anti-mouse  antibodies (HAMA), etc).    Performance of the PCT test in pediatric patients (<19yo),  pregnant women, immunocompromised patients, and patients on  immunomodulatory medications has not been evaluated.   RENAL FUNCTION PANEL - Abnormal    Glucose 374 (*)     Sodium 134 (*)     Potassium 4.8      Chloride 98      Bicarbonate 26      Anion Gap 15      Urea Nitrogen 27 (*)     Creatinine 1.04      eGFR 58 (*)     Calcium 9.0      Phosphorus 4.2      Albumin 3.7     CBC WITH AUTO DIFFERENTIAL - Abnormal     WBC 12.9 (*)     nRBC 0.0      RBC 3.98 (*)     Hemoglobin 10.6 (*)     Hematocrit 34.7 (*)     MCV 87      MCH 26.6      MCHC 30.5 (*)     RDW 14.4      Platelets 215      Neutrophils % 90.6      Immature Granulocytes %, Automated 0.9      Lymphocytes % 7.4      Monocytes % 0.9      Eosinophils % 0.0      Basophils % 0.2      Neutrophils Absolute 11.74 (*)     Immature Granulocytes Absolute, Automated 0.11      Lymphocytes Absolute 0.96 (*)     Monocytes Absolute 0.11      Eosinophils Absolute 0.00      Basophils Absolute 0.02     CBC - Abnormal    WBC 17.2 (*)     nRBC 0.0      RBC 3.84 (*)     Hemoglobin 10.1 (*)     Hematocrit 34.2 (*)     MCV 89      MCH 26.3      MCHC 29.5 (*)     RDW 14.7 (*)     Platelets 244     RENAL FUNCTION PANEL - Abnormal    Glucose 274 (*)     Sodium 133 (*)     Potassium 4.6      Chloride 97 (*)     Bicarbonate 26      Anion Gap 15      Urea Nitrogen 50 (*)     Creatinine 1.50 (*)     eGFR 38 (*)     Calcium 8.8      Phosphorus 4.6      Albumin 3.6     CBC - Abnormal    WBC 14.1 (*)     nRBC 0.0      RBC 3.79 (*)     Hemoglobin 9.9 (*)     Hematocrit 33.6 (*)     MCV 89      MCH 26.1      MCHC 29.5 (*)     RDW 14.7 (*)     Platelets 225     RENAL FUNCTION PANEL - Abnormal    Glucose 213 (*)     Sodium 135 (*)     Potassium 4.2      Chloride 100      Bicarbonate 27      Anion Gap 12      Urea Nitrogen 63 (*)     Creatinine 1.57 (*)     eGFR 36 (*)     Calcium 8.4 (*)     Phosphorus 3.9      Albumin 3.5     RENAL FUNCTION PANEL - Abnormal    Glucose 138 (*)     Sodium 136      Potassium 5.4 (*)     Chloride 101      Bicarbonate 30      Anion Gap 10      Urea Nitrogen 66 (*)     Creatinine 1.59 (*)     eGFR 35 (*)     Calcium 8.7      Phosphorus 4.3      Albumin 3.5     CBC WITH AUTO DIFFERENTIAL - Abnormal    WBC 10.3      nRBC 0.0      RBC 4.03      Hemoglobin 10.5 (*)     Hematocrit 35.8 (*)     MCV 89      MCH 26.1      MCHC 29.3 (*)     RDW 14.8 (*)     Platelets 218       Neutrophils % 74.4      Immature Granulocytes %, Automated 0.3      Lymphocytes % 12.3      Monocytes % 8.5      Eosinophils % 4.1      Basophils % 0.4      Neutrophils Absolute 7.68      Immature Granulocytes Absolute, Automated 0.03      Lymphocytes Absolute 1.27      Monocytes Absolute 0.88      Eosinophils Absolute 0.42      Basophils Absolute 0.04     RENAL FUNCTION PANEL - Abnormal    Glucose 156 (*)     Sodium 134 (*)     Potassium 4.8      Chloride 99      Bicarbonate 28      Anion Gap 12      Urea Nitrogen 67 (*)     Creatinine 1.52 (*)     eGFR 37 (*)     Calcium 8.8      Phosphorus 4.0      Albumin 3.6     CBC WITH AUTO DIFFERENTIAL - Abnormal    WBC 7.5      nRBC 0.0      RBC 3.87 (*)     Hemoglobin 10.0 (*)     Hematocrit 34.9 (*)     MCV 90      MCH 25.8 (*)     MCHC 28.7 (*)     RDW 14.7 (*)     Platelets 186      Neutrophils % 64.0      Immature Granulocytes %, Automated 0.5      Lymphocytes % 22.8      Monocytes % 11.1      Eosinophils % 1.5      Basophils % 0.1      Neutrophils Absolute 4.79      Immature Granulocytes Absolute, Automated 0.04      Lymphocytes Absolute 1.71      Monocytes Absolute 0.83      Eosinophils Absolute 0.11      Basophils Absolute 0.01     RENAL FUNCTION PANEL - Abnormal    Glucose 208 (*)     Sodium 134 (*)     Potassium 4.7      Chloride 101      Bicarbonate 24      Anion Gap 14      Urea Nitrogen 63 (*)     Creatinine 1.32 (*)     eGFR 44 (*)     Calcium 8.5 (*)     Phosphorus 3.2      Albumin 3.3 (*)    POCT GLUCOSE - Abnormal    POCT Glucose 230 (*)    POCT GLUCOSE - Abnormal    POCT Glucose 397 (*)    POCT GLUCOSE - Abnormal    POCT Glucose 382 (*)    POCT GLUCOSE - Abnormal    POCT Glucose 237 (*)    POCT GLUCOSE - Abnormal    POCT Glucose 203 (*)    POCT GLUCOSE - Abnormal    POCT Glucose 392 (*)    POCT GLUCOSE - Abnormal    POCT Glucose 379 (*)    POCT GLUCOSE - Abnormal    POCT Glucose 184 (*)    POCT GLUCOSE - Abnormal    POCT Glucose 151 (*)    POCT GLUCOSE -  Abnormal    POCT Glucose 183 (*)    POCT GLUCOSE - Abnormal    POCT Glucose 125 (*)    POCT GLUCOSE - Abnormal    POCT Glucose 174 (*)    POCT GLUCOSE - Abnormal    POCT Glucose 297 (*)    POCT GLUCOSE - Abnormal    POCT Glucose 268 (*)    POCT GLUCOSE - Abnormal    POCT Glucose 169 (*)    POCT GLUCOSE - Abnormal    POCT Glucose 150 (*)    POCT GLUCOSE - Abnormal    POCT Glucose 250 (*)    POCT GLUCOSE - Abnormal    POCT Glucose 245 (*)    STREPTOCOCCUS PNEUMONIAE ANTIGEN, URINE - Normal    Streptococcus pneumoniae Ag, Urine Negative     LEGIONELLA ANTIGEN, URINE - Normal    L. pneumophila Urine Ag Negative     SERIAL TROPONIN-INITIAL - Normal    Troponin I, High Sensitivity 8      Narrative:     Less than 99th percentile of normal range cutoff-  Female and children under 18 years old <14 ng/L; Male <21 ng/L: Negative  Repeat testing should be performed if clinically indicated.     Female and children under 18 years old 14-50 ng/L; Male 21-50 ng/L:  Consistent with possible cardiac damage and possible increased clinical   risk. Serial measurements may help to assess extent of myocardial damage.     >50 ng/L: Consistent with cardiac damage, increased clinical risk and  myocardial infarction. Serial measurements may help assess extent of   myocardial damage.      NOTE: Children less than 1 year old may have higher baseline troponin   levels and results should be interpreted in conjunction with the overall   clinical context.     NOTE: Troponin I testing is performed using a different   testing methodology at [facility] than at other   [facility]. Direct result comparisons should only   be made within the same method.   SERIAL TROPONIN, 1 HOUR - Normal    Troponin I, High Sensitivity 7      Narrative:     Less than 99th percentile of normal range cutoff-  Female and children under 18 years old <14 ng/L; Male <21 ng/L: Negative  Repeat testing should be performed if clinically indicated.     Female  and children under 18 years old 14-50 ng/L; Male 21-50 ng/L:  Consistent with possible cardiac damage and possible increased clinical   risk. Serial measurements may help to assess extent of myocardial damage.     >50 ng/L: Consistent with cardiac damage, increased clinical risk and  myocardial infarction. Serial measurements may help assess extent of   myocardial damage.      NOTE: Children less than 1 year old may have higher baseline troponin   levels and results should be interpreted in conjunction with the overall   clinical context.     NOTE: Troponin I testing is performed using a different   testing methodology at [facility] than at [facility]. Direct result comparisons should only   be made within the same method.   SARS-COV-2 PCR - Normal    Coronavirus 2019, PCR Not Detected      Narrative:     This assay has received FDA Emergency Use Authorization (EUA) and is only authorized for the duration of time that circumstances exist to justify the authorization of the emergency use of in vitro diagnostic tests for the detection of SARS-CoV-2 virus and/or diagnosis of COVID-19 infection under section 564(b)(1) of the Act, 21 U.S.C. 360bbb-3(b)(1). This assay is an in vitro diagnostic nucleic acid amplification test for the qualitative detection of SARS-CoV-2 from nasopharyngeal specimens and has been validated for use at [facility]. Negative results do not preclude COVID-19 infections and should not be used as the sole basis for diagnosis, treatment, or other management decisions.     INFLUENZA A AND B PCR - Normal    Flu A Result Not Detected      Flu B Result Not Detected      Narrative:     This assay is an in vitro diagnostic multiplex nucleic acid amplification test for the detection and discrimination of Influenza A & B from nasopharyngeal specimens, and has been validated for use at [facility]. Negative results do not preclude  Influenza A/B infections, and should not be used as the sole basis for diagnosis, treatment, or other management decisions. If Influenza A/B and RSV PCR results are negative, testing for Parainfluenza virus, Adenovirus and Metapneumovirus is routinely performed for [facility] pediatric oncology and intensive care inpatients, and is available on other patients by placing an add-on request.   MAGNESIUM - Normal    Magnesium 1.82     MAGNESIUM - Normal    Magnesium 1.93     MAGNESIUM - Normal    Magnesium 2.05     MAGNESIUM - Normal    Magnesium 2.03     POCT GLUCOSE - Normal    POCT Glucose 96     TROPONIN SERIES- (INITIAL, 1 HR)    Narrative:     The following orders were created for panel order Troponin Series, (0, 1 HR).  Procedure                               Abnormality         Status                     ---------                               -----------         ------                     Troponin I, High Sensiti...[[id number]]  Normal              Final result               Troponin, High Sensitivi...[[id number]]  Normal              Final result                 Please view results for these tests on the individual orders.     All other labs were within normal range or not returned as of this dictation.  Imaging  Transthoracic Echo (TTE) Complete   Final Result      CT angio chest for pulmonary embolism   Final Result   1. No evidence of pulmonary embolism.   2. Cardiomegaly with pulmonary vascular congestion and small left   pleural effusion.   3. Mild peripheral groundglass opacity and perihilar ill-defined   groundglass opacity. Suggest correlation for pneumonia.   Signed by Hao Esqueda MD      XR chest 1 view   Final Result   Limited study. Cardiomegaly and congestive heart failure.             Signed by: Nikolas Gonzalez 6/14/2024 4:41 PM   Dictation workstation:   [id number]           Procedures  Procedures    Medical Decision Making  Went to evaluate patient she was on 3 L, weaned her off of oxygen as staff  was unsure if she was hypoxic or not, she dropped down to 89% while sitting up, she was placed back on 2 L by me.  Low concern with PE due to her heart rate being in the 70s, suspect CHF exacerbation causing mild hypoxia but will wait for labs or come back prior to administering Lasix.        ED Course & MDM     ED Course as of 06/23/24 1248   Fri Jun 14, 2024 1952 Discussed with CT tech, due to radiology protocols will not be able to scan until 9:30 and needs 50 mg of IV benadryl.  [CR]   2014 Low concern for PE due to years criteria, discussed risk benefit with pt, will get CTA as BNP reassuring although pt laying flat causing symptoms and weight gain made me originally more suspicious for CHF exac with her reported history to me. [CR]   2303 Patient is signed out to me pending CT chest PE protocol results and admission.  Likely volume overloaded.  Will proceed with administering 40 mg of Lasix after she came back from the CT she was very short of breath and was placed on oxygen by nonrebreather.  Given the work of breathing will place the patient on BiPAP.  Will start to actively diurese the patient. [ME]   Sat Kenroy 15, 2024   0057 CT chest PE revealed no pulmonary embolism and the patient presentation is concerning for pulmonary edema.  She is on 15 L Oxymizer.  Patient is admitted to intermediate care unit to teaching service. [ME]      ED Course User Index  [CR] Carlos Schmitt MD  [ME] Sajan Chacko,          Diagnoses as of 06/23/24 1248   Shortness of breath   Acute respiratory failure with hypoxia (Multi)       ED Medications administered this visit:    Medications   oxymetazoline (Afrin) 0.05 % nasal spray 2 spray (has no administration in time range)   diphenhydrAMINE (BENADryl) injection 12.5 mg (12.5 mg intravenous Given 6/14/24 1819)   methylPREDNISolone sod succinate (SOLU-Medrol) injection 125 mg (125 mg intravenous Given 6/14/24 1819)   diphenhydrAMINE (BENADryl) injection 37.5 mg (37.5 mg  intravenous Given 6/14/24 2129)   iohexol (OMNIPaque) 350 mg iodine/mL solution 60 mL (60 mL intravenous Given 6/14/24 2251)   furosemide (Lasix) injection 40 mg (40 mg intravenous Given 6/14/24 2300)   perflutren lipid microspheres (Definity) injection 0.5-10 mL of dilution (10 mL of dilution intravenous Given 6/16/24 0834)   sodium chloride 0.9 % bolus 1,000 mL (0 mL intravenous Stopped 6/16/24 1517)   insulin lispro (HumaLOG) injection 12 Units (12 Units subcutaneous Given 6/16/24 2327)   predniSONE (Deltasone) tablet 30 mg (30 mg oral Given 6/19/24 0844)   lactated Ringer's bolus 500 mL (0 mL intravenous Stopped 6/18/24 1148)       New Prescriptions from this visit:    Discharge Medication List as of 6/19/2024  5:27 PM        START taking these medications    Details   predniSONE (Deltasone) 10 mg tablet Multiple Dosages:Starting Wed 6/19/2024, Until Wed 6/19/2024, THEN Starting Thu 6/20/2024, Until Fri 6/21/2024, THEN Starting Sat 6/22/2024, Until Sun 6/23/2024Take 3 tablets (30 mg) by mouth once daily for 1 day, THEN 2 tablets (20 mg) once daily fo r 2 days, THEN 1 tablet (10 mg) once daily for 2 days., Normal             Follow-up:  Linda Srivastava MD  [address] [facility] [address] [phone]              Final Impression:   1. Shortness of breath    2. Acute respiratory failure with hypoxia (Multi)    3. Hypoxia    4. Acute diastolic CHF (congestive heart failure) (Multi)    5. Essential hypertension, benign    6. CKD (chronic kidney disease), stage II    7. Chronic obstructive pulmonary disease, unspecified COPD type (Multi)          Carlos Schmitt MD  [facility]      Carlos Schmitt MD  06/14/24 7094       Carlos Schmitt MD  06/14/24 1646       Carlos Schmitt MD  06/23/24 7916

## 2024-06-14 NOTE — ED TRIAGE NOTES
"Patient endorses SOA x 3 days. Patient states she had oxygen at home from a previous encounter and has been using it. Patient endorses severe SOA when lying on her right side. 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

## 2024-06-15 ENCOUNTER — APPOINTMENT (OUTPATIENT)
Dept: CARDIOLOGY | Facility: HOSPITAL | Age: 69
End: 2024-06-15
Payer: MEDICARE

## 2024-06-15 PROBLEM — R09.02 HYPOXIA: Status: ACTIVE | Noted: 2024-06-15

## 2024-06-15 PROBLEM — R06.02 SHORTNESS OF BREATH: Status: ACTIVE | Noted: 2024-06-15

## 2024-06-15 PROBLEM — E11.42 DIABETES, POLYNEUROPATHY (MULTI): Chronic | Status: ACTIVE | Noted: 2023-09-15

## 2024-06-15 PROBLEM — E78.5 HYPERLIPIDEMIA: Chronic | Status: ACTIVE | Noted: 2023-09-15

## 2024-06-15 PROBLEM — E11.9 DIABETES MELLITUS (MULTI): Chronic | Status: ACTIVE | Noted: 2023-09-15

## 2024-06-15 PROBLEM — F32.A DEPRESSION: Chronic | Status: ACTIVE | Noted: 2023-09-15

## 2024-06-15 PROBLEM — I10 ESSENTIAL HYPERTENSION, BENIGN: Chronic | Status: ACTIVE | Noted: 2023-09-15

## 2024-06-15 PROBLEM — E66.01 MORBID OBESITY (MULTI): Chronic | Status: ACTIVE | Noted: 2023-09-15

## 2024-06-15 PROBLEM — J45.909 ASTHMA (HHS-HCC): Chronic | Status: ACTIVE | Noted: 2023-09-15

## 2024-06-15 PROBLEM — N18.30 CKD (CHRONIC KIDNEY DISEASE), STAGE III (MULTI): Chronic | Status: ACTIVE | Noted: 2023-09-15

## 2024-06-15 LAB
ALBUMIN SERPL BCP-MCNC: 3.7 G/DL (ref 3.4–5)
ANION GAP SERPL CALC-SCNC: 15 MMOL/L (ref 10–20)
ATRIAL RATE: 0 BPM
ATRIAL RATE: 18 BPM
BASOPHILS # BLD AUTO: 0.02 X10*3/UL (ref 0–0.1)
BASOPHILS # BLD AUTO: 0.05 X10*3/UL (ref 0–0.1)
BASOPHILS NFR BLD AUTO: 0.2 %
BASOPHILS NFR BLD AUTO: 0.3 %
BUN SERPL-MCNC: 27 MG/DL (ref 6–23)
CALCIUM SERPL-MCNC: 9 MG/DL (ref 8.6–10.3)
CHLORIDE SERPL-SCNC: 98 MMOL/L (ref 98–107)
CO2 SERPL-SCNC: 26 MMOL/L (ref 21–32)
CREAT SERPL-MCNC: 1.04 MG/DL (ref 0.5–1.05)
EGFRCR SERPLBLD CKD-EPI 2021: 58 ML/MIN/1.73M*2
EOSINOPHIL # BLD AUTO: 0 X10*3/UL (ref 0–0.7)
EOSINOPHIL # BLD AUTO: 0.11 X10*3/UL (ref 0–0.7)
EOSINOPHIL NFR BLD AUTO: 0 %
EOSINOPHIL NFR BLD AUTO: 0.8 %
ERYTHROCYTE [DISTWIDTH] IN BLOOD BY AUTOMATED COUNT: 14.4 % (ref 11.5–14.5)
ERYTHROCYTE [DISTWIDTH] IN BLOOD BY AUTOMATED COUNT: 14.7 % (ref 11.5–14.5)
FLUAV RNA RESP QL NAA+PROBE: NOT DETECTED
FLUBV RNA RESP QL NAA+PROBE: NOT DETECTED
GLUCOSE BLD MANUAL STRIP-MCNC: 230 MG/DL (ref 74–99)
GLUCOSE BLD MANUAL STRIP-MCNC: 382 MG/DL (ref 74–99)
GLUCOSE BLD MANUAL STRIP-MCNC: 397 MG/DL (ref 74–99)
GLUCOSE SERPL-MCNC: 374 MG/DL (ref 74–99)
HCT VFR BLD AUTO: 34.3 % (ref 36–46)
HCT VFR BLD AUTO: 34.7 % (ref 36–46)
HGB BLD-MCNC: 10.6 G/DL (ref 12–16)
HGB BLD-MCNC: 10.6 G/DL (ref 12–16)
IMM GRANULOCYTES # BLD AUTO: 0.06 X10*3/UL (ref 0–0.7)
IMM GRANULOCYTES # BLD AUTO: 0.11 X10*3/UL (ref 0–0.7)
IMM GRANULOCYTES NFR BLD AUTO: 0.4 % (ref 0–0.9)
IMM GRANULOCYTES NFR BLD AUTO: 0.9 % (ref 0–0.9)
LYMPHOCYTES # BLD AUTO: 0.96 X10*3/UL (ref 1.2–4.8)
LYMPHOCYTES # BLD AUTO: 1.68 X10*3/UL (ref 1.2–4.8)
LYMPHOCYTES NFR BLD AUTO: 11.6 %
LYMPHOCYTES NFR BLD AUTO: 7.4 %
MAGNESIUM SERPL-MCNC: 1.82 MG/DL (ref 1.6–2.4)
MCH RBC QN AUTO: 26.6 PG (ref 26–34)
MCH RBC QN AUTO: 26.8 PG (ref 26–34)
MCHC RBC AUTO-ENTMCNC: 30.5 G/DL (ref 32–36)
MCHC RBC AUTO-ENTMCNC: 30.9 G/DL (ref 32–36)
MCV RBC AUTO: 87 FL (ref 80–100)
MCV RBC AUTO: 87 FL (ref 80–100)
MONOCYTES # BLD AUTO: 0.11 X10*3/UL (ref 0.1–1)
MONOCYTES # BLD AUTO: 0.9 X10*3/UL (ref 0.1–1)
MONOCYTES NFR BLD AUTO: 0.9 %
MONOCYTES NFR BLD AUTO: 6.2 %
NEUTROPHILS # BLD AUTO: 11.71 X10*3/UL (ref 1.2–7.7)
NEUTROPHILS # BLD AUTO: 11.74 X10*3/UL (ref 1.2–7.7)
NEUTROPHILS NFR BLD AUTO: 80.7 %
NEUTROPHILS NFR BLD AUTO: 90.6 %
NRBC BLD-RTO: 0 /100 WBCS (ref 0–0)
NRBC BLD-RTO: 0 /100 WBCS (ref 0–0)
P OFFSET: 133 MS
P OFFSET: 135 MS
P ONSET: 113 MS
P ONSET: 115 MS
PHOSPHATE SERPL-MCNC: 4.2 MG/DL (ref 2.5–4.9)
PLATELET # BLD AUTO: 211 X10*3/UL (ref 150–450)
PLATELET # BLD AUTO: 215 X10*3/UL (ref 150–450)
POTASSIUM SERPL-SCNC: 4.8 MMOL/L (ref 3.5–5.3)
PROCALCITONIN SERPL-MCNC: 0.6 NG/ML
Q ONSET: 203 MS
Q ONSET: 209 MS
QRS COUNT: 11 BEATS
QRS COUNT: 12 BEATS
QRS DURATION: 100 MS
QRS DURATION: 104 MS
QT INTERVAL: 400 MS
QT INTERVAL: 432 MS
QTC CALCULATION(BAZETT): 438 MS
QTC CALCULATION(BAZETT): 462 MS
QTC FREDERICIA: 425 MS
QTC FREDERICIA: 452 MS
R AXIS: -49 DEGREES
R AXIS: -52 DEGREES
RBC # BLD AUTO: 3.96 X10*6/UL (ref 4–5.2)
RBC # BLD AUTO: 3.98 X10*6/UL (ref 4–5.2)
SARS-COV-2 RNA RESP QL NAA+PROBE: NOT DETECTED
SODIUM SERPL-SCNC: 134 MMOL/L (ref 136–145)
T AXIS: 19 DEGREES
T AXIS: 49 DEGREES
T OFFSET: 409 MS
T OFFSET: 419 MS
VENTRICULAR RATE: 69 BPM
VENTRICULAR RATE: 72 BPM
WBC # BLD AUTO: 12.9 X10*3/UL (ref 4.4–11.3)
WBC # BLD AUTO: 14.7 X10*3/UL (ref 4.4–11.3)

## 2024-06-15 PROCEDURE — 36415 COLL VENOUS BLD VENIPUNCTURE: CPT

## 2024-06-15 PROCEDURE — 94640 AIRWAY INHALATION TREATMENT: CPT | Mod: MUE

## 2024-06-15 PROCEDURE — 84145 PROCALCITONIN (PCT): CPT | Mod: STJLAB

## 2024-06-15 PROCEDURE — 87899 AGENT NOS ASSAY W/OPTIC: CPT | Mod: STJLAB

## 2024-06-15 PROCEDURE — 2500000002 HC RX 250 W HCPCS SELF ADMINISTERED DRUGS (ALT 637 FOR MEDICARE OP, ALT 636 FOR OP/ED)

## 2024-06-15 PROCEDURE — 2500000005 HC RX 250 GENERAL PHARMACY W/O HCPCS: Performed by: EMERGENCY MEDICINE

## 2024-06-15 PROCEDURE — 2060000001 HC INTERMEDIATE ICU ROOM DAILY

## 2024-06-15 PROCEDURE — 2500000005 HC RX 250 GENERAL PHARMACY W/O HCPCS: Performed by: INTERNAL MEDICINE

## 2024-06-15 PROCEDURE — 2500000005 HC RX 250 GENERAL PHARMACY W/O HCPCS: Performed by: STUDENT IN AN ORGANIZED HEALTH CARE EDUCATION/TRAINING PROGRAM

## 2024-06-15 PROCEDURE — 5A0935A ASSISTANCE WITH RESPIRATORY VENTILATION, LESS THAN 24 CONSECUTIVE HOURS, HIGH NASAL FLOW/VELOCITY: ICD-10-PCS | Performed by: INTERNAL MEDICINE

## 2024-06-15 PROCEDURE — 82947 ASSAY GLUCOSE BLOOD QUANT: CPT | Mod: 91

## 2024-06-15 PROCEDURE — 80069 RENAL FUNCTION PANEL: CPT

## 2024-06-15 PROCEDURE — 2500000001 HC RX 250 WO HCPCS SELF ADMINISTERED DRUGS (ALT 637 FOR MEDICARE OP)

## 2024-06-15 PROCEDURE — 2500000004 HC RX 250 GENERAL PHARMACY W/ HCPCS (ALT 636 FOR OP/ED)

## 2024-06-15 PROCEDURE — 5A09357 ASSISTANCE WITH RESPIRATORY VENTILATION, LESS THAN 24 CONSECUTIVE HOURS, CONTINUOUS POSITIVE AIRWAY PRESSURE: ICD-10-PCS | Performed by: INTERNAL MEDICINE

## 2024-06-15 PROCEDURE — 94660 CPAP INITIATION&MGMT: CPT

## 2024-06-15 PROCEDURE — 85025 COMPLETE CBC W/AUTO DIFF WBC: CPT

## 2024-06-15 PROCEDURE — 99223 1ST HOSP IP/OBS HIGH 75: CPT

## 2024-06-15 PROCEDURE — 87636 SARSCOV2 & INF A&B AMP PRB: CPT | Performed by: INTERNAL MEDICINE

## 2024-06-15 PROCEDURE — 83735 ASSAY OF MAGNESIUM: CPT

## 2024-06-15 PROCEDURE — 87449 NOS EACH ORGANISM AG IA: CPT | Mod: STJLAB

## 2024-06-15 RX ORDER — AMLODIPINE BESYLATE 5 MG/1
5 TABLET ORAL DAILY
Status: DISCONTINUED | OUTPATIENT
Start: 2024-06-15 | End: 2024-06-20 | Stop reason: HOSPADM

## 2024-06-15 RX ORDER — LISINOPRIL 40 MG/1
40 TABLET ORAL DAILY
Status: DISCONTINUED | OUTPATIENT
Start: 2024-06-15 | End: 2024-06-20 | Stop reason: HOSPADM

## 2024-06-15 RX ORDER — INSULIN GLARGINE 100 [IU]/ML
50 INJECTION, SOLUTION SUBCUTANEOUS 2 TIMES DAILY
Status: DISCONTINUED | OUTPATIENT
Start: 2024-06-15 | End: 2024-06-15

## 2024-06-15 RX ORDER — METOPROLOL SUCCINATE 25 MG/1
25 TABLET, EXTENDED RELEASE ORAL 2 TIMES DAILY
Status: DISCONTINUED | OUTPATIENT
Start: 2024-06-15 | End: 2024-06-20 | Stop reason: HOSPADM

## 2024-06-15 RX ORDER — METHOCARBAMOL 500 MG/1
500 TABLET, FILM COATED ORAL 2 TIMES DAILY
Status: DISCONTINUED | OUTPATIENT
Start: 2024-06-15 | End: 2024-06-20 | Stop reason: HOSPADM

## 2024-06-15 RX ORDER — IPRATROPIUM BROMIDE AND ALBUTEROL SULFATE 2.5; .5 MG/3ML; MG/3ML
3 SOLUTION RESPIRATORY (INHALATION) EVERY 2 HOUR PRN
Status: DISCONTINUED | OUTPATIENT
Start: 2024-06-15 | End: 2024-06-20 | Stop reason: HOSPADM

## 2024-06-15 RX ORDER — INSULIN LISPRO 100 [IU]/ML
0-20 INJECTION, SOLUTION INTRAVENOUS; SUBCUTANEOUS
Status: DISCONTINUED | OUTPATIENT
Start: 2024-06-15 | End: 2024-06-20 | Stop reason: HOSPADM

## 2024-06-15 RX ORDER — PREDNISONE 20 MG/1
40 TABLET ORAL DAILY
Status: DISCONTINUED | OUTPATIENT
Start: 2024-06-15 | End: 2024-06-17

## 2024-06-15 RX ORDER — CITALOPRAM 20 MG/1
40 TABLET, FILM COATED ORAL DAILY
Status: DISCONTINUED | OUTPATIENT
Start: 2024-06-15 | End: 2024-06-20 | Stop reason: HOSPADM

## 2024-06-15 RX ORDER — DEXTROSE 50 % IN WATER (D50W) INTRAVENOUS SYRINGE
12.5
Status: DISCONTINUED | OUTPATIENT
Start: 2024-06-15 | End: 2024-06-20 | Stop reason: HOSPADM

## 2024-06-15 RX ORDER — ASPIRIN 81 MG/1
81 TABLET ORAL DAILY
Status: DISCONTINUED | OUTPATIENT
Start: 2024-06-15 | End: 2024-06-20 | Stop reason: HOSPADM

## 2024-06-15 RX ORDER — INSULIN LISPRO 100 [IU]/ML
0-15 INJECTION, SOLUTION INTRAVENOUS; SUBCUTANEOUS
Status: DISCONTINUED | OUTPATIENT
Start: 2024-06-15 | End: 2024-06-15

## 2024-06-15 RX ORDER — GABAPENTIN 300 MG/1
300 CAPSULE ORAL 2 TIMES DAILY
Status: DISCONTINUED | OUTPATIENT
Start: 2024-06-15 | End: 2024-06-20 | Stop reason: HOSPADM

## 2024-06-15 RX ORDER — IPRATROPIUM BROMIDE AND ALBUTEROL SULFATE 2.5; .5 MG/3ML; MG/3ML
3 SOLUTION RESPIRATORY (INHALATION)
Status: DISCONTINUED | OUTPATIENT
Start: 2024-06-15 | End: 2024-06-20 | Stop reason: HOSPADM

## 2024-06-15 RX ORDER — INSULIN GLARGINE 100 [IU]/ML
40 INJECTION, SOLUTION SUBCUTANEOUS 2 TIMES DAILY
Status: DISCONTINUED | OUTPATIENT
Start: 2024-06-15 | End: 2024-06-16

## 2024-06-15 RX ORDER — FUROSEMIDE 10 MG/ML
40 INJECTION INTRAMUSCULAR; INTRAVENOUS DAILY
Status: DISCONTINUED | OUTPATIENT
Start: 2024-06-15 | End: 2024-06-16

## 2024-06-15 RX ORDER — ENOXAPARIN SODIUM 100 MG/ML
60 INJECTION SUBCUTANEOUS EVERY 12 HOURS SCHEDULED
Status: DISCONTINUED | OUTPATIENT
Start: 2024-06-15 | End: 2024-06-20 | Stop reason: HOSPADM

## 2024-06-15 RX ORDER — DEXTROSE 50 % IN WATER (D50W) INTRAVENOUS SYRINGE
25
Status: DISCONTINUED | OUTPATIENT
Start: 2024-06-15 | End: 2024-06-20 | Stop reason: HOSPADM

## 2024-06-15 RX ORDER — ATORVASTATIN CALCIUM 10 MG/1
10 TABLET, FILM COATED ORAL NIGHTLY
Status: DISCONTINUED | OUTPATIENT
Start: 2024-06-15 | End: 2024-06-20 | Stop reason: HOSPADM

## 2024-06-15 RX ORDER — ENOXAPARIN SODIUM 100 MG/ML
40 INJECTION SUBCUTANEOUS EVERY 12 HOURS SCHEDULED
Status: DISCONTINUED | OUTPATIENT
Start: 2024-06-15 | End: 2024-06-15

## 2024-06-15 RX ORDER — POLYETHYLENE GLYCOL 3350 17 G/17G
17 POWDER, FOR SOLUTION ORAL DAILY
Status: DISCONTINUED | OUTPATIENT
Start: 2024-06-15 | End: 2024-06-20 | Stop reason: HOSPADM

## 2024-06-15 RX ADMIN — INSULIN GLARGINE 50 UNITS: 100 INJECTION, SOLUTION SUBCUTANEOUS at 03:04

## 2024-06-15 RX ADMIN — INSULIN LISPRO 15 UNITS: 100 INJECTION, SOLUTION INTRAVENOUS; SUBCUTANEOUS at 07:25

## 2024-06-15 RX ADMIN — GABAPENTIN 300 MG: 300 CAPSULE ORAL at 03:06

## 2024-06-15 RX ADMIN — Medication 4 L/MIN: at 12:53

## 2024-06-15 RX ADMIN — PANCRELIPASE 1 CAPSULE: 60000; 12000; 38000 CAPSULE, DELAYED RELEASE PELLETS ORAL at 16:58

## 2024-06-15 RX ADMIN — ASPIRIN 81 MG: 81 TABLET, COATED ORAL at 11:51

## 2024-06-15 RX ADMIN — Medication 6 L/MIN: at 17:12

## 2024-06-15 RX ADMIN — IPRATROPIUM BROMIDE AND ALBUTEROL SULFATE 3 ML: 2.5; .5 SOLUTION RESPIRATORY (INHALATION) at 21:40

## 2024-06-15 RX ADMIN — Medication 40 PERCENT: at 08:00

## 2024-06-15 RX ADMIN — INSULIN GLARGINE 40 UNITS: 100 INJECTION, SOLUTION SUBCUTANEOUS at 20:55

## 2024-06-15 RX ADMIN — IPRATROPIUM BROMIDE AND ALBUTEROL SULFATE 3 ML: 2.5; .5 SOLUTION RESPIRATORY (INHALATION) at 12:53

## 2024-06-15 RX ADMIN — CEFTRIAXONE 2 G: 2 INJECTION, POWDER, FOR SOLUTION INTRAMUSCULAR; INTRAVENOUS at 03:39

## 2024-06-15 RX ADMIN — LISINOPRIL 40 MG: 40 TABLET ORAL at 11:51

## 2024-06-15 RX ADMIN — Medication 65 PERCENT: at 04:35

## 2024-06-15 RX ADMIN — IPRATROPIUM BROMIDE AND ALBUTEROL SULFATE 3 ML: 2.5; .5 SOLUTION RESPIRATORY (INHALATION) at 08:46

## 2024-06-15 RX ADMIN — IPRATROPIUM BROMIDE AND ALBUTEROL SULFATE 3 ML: 2.5; .5 SOLUTION RESPIRATORY (INHALATION) at 03:53

## 2024-06-15 RX ADMIN — ATORVASTATIN CALCIUM 10 MG: 10 TABLET, FILM COATED ORAL at 20:59

## 2024-06-15 RX ADMIN — IPRATROPIUM BROMIDE AND ALBUTEROL SULFATE 3 ML: 2.5; .5 SOLUTION RESPIRATORY (INHALATION) at 17:12

## 2024-06-15 RX ADMIN — METOPROLOL SUCCINATE 25 MG: 25 TABLET, EXTENDED RELEASE ORAL at 03:06

## 2024-06-15 RX ADMIN — ENOXAPARIN SODIUM 60 MG: 60 INJECTION SUBCUTANEOUS at 15:44

## 2024-06-15 RX ADMIN — METHOCARBAMOL 500 MG: 500 TABLET ORAL at 20:59

## 2024-06-15 RX ADMIN — ATORVASTATIN CALCIUM 10 MG: 10 TABLET, FILM COATED ORAL at 03:06

## 2024-06-15 RX ADMIN — AZITHROMYCIN MONOHYDRATE 500 MG: 500 INJECTION, POWDER, LYOPHILIZED, FOR SOLUTION INTRAVENOUS at 02:29

## 2024-06-15 RX ADMIN — PANCRELIPASE 1 CAPSULE: 60000; 12000; 38000 CAPSULE, DELAYED RELEASE PELLETS ORAL at 11:52

## 2024-06-15 RX ADMIN — ENOXAPARIN SODIUM 40 MG: 40 INJECTION SUBCUTANEOUS at 03:07

## 2024-06-15 RX ADMIN — INSULIN LISPRO 8 UNITS: 100 INJECTION, SOLUTION INTRAVENOUS; SUBCUTANEOUS at 12:54

## 2024-06-15 RX ADMIN — Medication 15 L/MIN: at 02:04

## 2024-06-15 RX ADMIN — METHOCARBAMOL 500 MG: 500 TABLET ORAL at 03:06

## 2024-06-15 RX ADMIN — PREDNISONE 40 MG: 20 TABLET ORAL at 11:52

## 2024-06-15 RX ADMIN — FUROSEMIDE 40 MG: 10 INJECTION, SOLUTION INTRAMUSCULAR; INTRAVENOUS at 08:47

## 2024-06-15 RX ADMIN — GABAPENTIN 300 MG: 300 CAPSULE ORAL at 20:59

## 2024-06-15 RX ADMIN — INSULIN LISPRO 20 UNITS: 100 INJECTION, SOLUTION INTRAVENOUS; SUBCUTANEOUS at 16:58

## 2024-06-15 RX ADMIN — CITALOPRAM HYDROBROMIDE 40 MG: 20 TABLET ORAL at 11:51

## 2024-06-15 RX ADMIN — GABAPENTIN 300 MG: 300 CAPSULE ORAL at 11:52

## 2024-06-15 RX ADMIN — AMLODIPINE BESYLATE 5 MG: 5 TABLET ORAL at 11:51

## 2024-06-15 RX ADMIN — METOPROLOL SUCCINATE 25 MG: 25 TABLET, EXTENDED RELEASE ORAL at 20:59

## 2024-06-15 SDOH — SOCIAL STABILITY: SOCIAL INSECURITY: DOES ANYONE TRY TO KEEP YOU FROM HAVING/CONTACTING OTHER FRIENDS OR DOING THINGS OUTSIDE YOUR HOME?: NO

## 2024-06-15 SDOH — SOCIAL STABILITY: SOCIAL INSECURITY: DO YOU FEEL UNSAFE GOING BACK TO THE PLACE WHERE YOU ARE LIVING?: NO

## 2024-06-15 SDOH — SOCIAL STABILITY: SOCIAL INSECURITY: HAVE YOU HAD ANY THOUGHTS OF HARMING ANYONE ELSE?: NO

## 2024-06-15 SDOH — SOCIAL STABILITY: SOCIAL INSECURITY: HAS ANYONE EVER THREATENED TO HURT YOUR FAMILY OR YOUR PETS?: NO

## 2024-06-15 SDOH — SOCIAL STABILITY: SOCIAL INSECURITY: DO YOU FEEL ANYONE HAS EXPLOITED OR TAKEN ADVANTAGE OF YOU FINANCIALLY OR OF YOUR PERSONAL PROPERTY?: NO

## 2024-06-15 SDOH — SOCIAL STABILITY: SOCIAL INSECURITY: ABUSE: ADULT

## 2024-06-15 SDOH — SOCIAL STABILITY: SOCIAL INSECURITY: ARE YOU OR HAVE YOU BEEN THREATENED OR ABUSED PHYSICALLY, EMOTIONALLY, OR SEXUALLY BY ANYONE?: NO

## 2024-06-15 SDOH — SOCIAL STABILITY: SOCIAL INSECURITY: HAVE YOU HAD THOUGHTS OF HARMING ANYONE ELSE?: NO

## 2024-06-15 SDOH — SOCIAL STABILITY: SOCIAL INSECURITY: WERE YOU ABLE TO COMPLETE ALL THE BEHAVIORAL HEALTH SCREENINGS?: YES

## 2024-06-15 SDOH — SOCIAL STABILITY: SOCIAL INSECURITY: ARE THERE ANY APPARENT SIGNS OF INJURIES/BEHAVIORS THAT COULD BE RELATED TO ABUSE/NEGLECT?: NO

## 2024-06-15 ASSESSMENT — COGNITIVE AND FUNCTIONAL STATUS - GENERAL
STANDING UP FROM CHAIR USING ARMS: A LOT
STANDING UP FROM CHAIR USING ARMS: A LITTLE
WALKING IN HOSPITAL ROOM: TOTAL
PATIENT BASELINE BEDBOUND: YES
TOILETING: A LITTLE
MOVING TO AND FROM BED TO CHAIR: A LITTLE
DAILY ACTIVITIY SCORE: 20
TURNING FROM BACK TO SIDE WHILE IN FLAT BAD: A LITTLE
DAILY ACTIVITIY SCORE: 17
DRESSING REGULAR UPPER BODY CLOTHING: A LITTLE
MOBILITY SCORE: 15
HELP NEEDED FOR BATHING: A LITTLE
DRESSING REGULAR LOWER BODY CLOTHING: TOTAL
MOVING TO AND FROM BED TO CHAIR: A LOT
TURNING FROM BACK TO SIDE WHILE IN FLAT BAD: A LITTLE
WALKING IN HOSPITAL ROOM: TOTAL
CLIMB 3 TO 5 STEPS WITH RAILING: TOTAL
MOBILITY SCORE: 13
DRESSING REGULAR LOWER BODY CLOTHING: A LITTLE
CLIMB 3 TO 5 STEPS WITH RAILING: TOTAL
HELP NEEDED FOR BATHING: A LITTLE
TOILETING: A LOT
DRESSING REGULAR UPPER BODY CLOTHING: A LITTLE

## 2024-06-15 ASSESSMENT — LIFESTYLE VARIABLES
HOW OFTEN DO YOU HAVE A DRINK CONTAINING ALCOHOL: NEVER
AUDIT-C TOTAL SCORE: 0
SKIP TO QUESTIONS 9-10: 1
HOW MANY STANDARD DRINKS CONTAINING ALCOHOL DO YOU HAVE ON A TYPICAL DAY: PATIENT DOES NOT DRINK
AUDIT-C TOTAL SCORE: 0
HOW OFTEN DO YOU HAVE 6 OR MORE DRINKS ON ONE OCCASION: NEVER

## 2024-06-15 ASSESSMENT — ACTIVITIES OF DAILY LIVING (ADL)
LACK_OF_TRANSPORTATION: NO
DRESSING YOURSELF: NEEDS ASSISTANCE
ASSISTIVE_DEVICE: OTHER (COMMENT)
TOILETING: DEPENDENT
FEEDING YOURSELF: NEEDS ASSISTANCE
PATIENT'S MEMORY ADEQUATE TO SAFELY COMPLETE DAILY ACTIVITIES?: YES
ADEQUATE_TO_COMPLETE_ADL: YES
GROOMING: NEEDS ASSISTANCE
WALKS IN HOME: DEPENDENT
ASSISTIVE_DEVICE: OTHER (COMMENT);WHEELCHAIR
HEARING - RIGHT EAR: FUNCTIONAL
JUDGMENT_ADEQUATE_SAFELY_COMPLETE_DAILY_ACTIVITIES: YES
BATHING: DEPENDENT
HEARING - LEFT EAR: FUNCTIONAL

## 2024-06-15 ASSESSMENT — PAIN SCALES - GENERAL
PAINLEVEL_OUTOF10: 0 - NO PAIN
PAINLEVEL_OUTOF10: 4
PAINLEVEL_OUTOF10: 0 - NO PAIN

## 2024-06-15 ASSESSMENT — PATIENT HEALTH QUESTIONNAIRE - PHQ9
1. LITTLE INTEREST OR PLEASURE IN DOING THINGS: NOT AT ALL
SUM OF ALL RESPONSES TO PHQ9 QUESTIONS 1 & 2: 0
2. FEELING DOWN, DEPRESSED OR HOPELESS: NOT AT ALL

## 2024-06-15 ASSESSMENT — PAIN - FUNCTIONAL ASSESSMENT
PAIN_FUNCTIONAL_ASSESSMENT: 0-10

## 2024-06-15 NOTE — CARE PLAN
Problem: Safety - Adult  Goal: Free from fall injury  Outcome: Progressing     Problem: Safety - Adult  Goal: Free from fall injury  Outcome: Progressing   The patient's goals for the shift include remain free from SOB.     The clinical goals for the shift include patient will maintain pulse ox >92% this shift

## 2024-06-15 NOTE — CARE PLAN
Problem: Diabetes  Goal: Maintain glucose levels >70mg/dl to <250mg/dl throughout shift  Outcome: Progressing     Problem: Pain - Adult  Goal: Verbalizes/displays adequate comfort level or baseline comfort level  Outcome: Progressing     Problem: Safety - Adult  Goal: Free from fall injury  Outcome: Progressing     Problem: Discharge Planning  Goal: Discharge to home or other facility with appropriate resources  Outcome: Progressing     Problem: Chronic Conditions and Co-morbidities  Goal: Patient's chronic conditions and co-morbidity symptoms are monitored and maintained or improved  Outcome: Progressing   The patient's goals for the shift include      The clinical goals for the shift include patient will maintain a pulse ox greater than 92% this shift  Patient weaned down to 6L nasal cannula.  Pulse ox remained above 92%.

## 2024-06-15 NOTE — NURSING NOTE
Pt did not wish to go on bipap. Pt placed on 15 lpm o2 via oximiser. Spo2 97% pt tolerating nasal o2 well at this time.

## 2024-06-15 NOTE — H&P
History Of Present Illness  Kaylene Feliciano is a 69 y.o. female with PMH significant for OA, ARCHIE (not on any therapy), depression (s/p DBS), HTN, IDDM 2, asthma (intermittently on 2 L via nasal cannula at home), bipolar, CKD 3 (baseline creatinine~0.9), HLD, diastolic CHF (last echo 7/2023, EF 75%, pulmonary artery hypertension 40-50 mmHg).  Patient presents to [facility] ED for chief complaint of orthopnea over the past 2 to 3 days which has worsened in severity over time.  This was preceded by flulike symptoms consisting of SOB, sore throat, runny nose, nonproductive cough.  Patient denies any sick contacts.  Patient denies nausea, vomiting, diarrhea, abdominal pain, constipation, dysuria, hematuria, melanotic stools, lightheadedness, dizziness, palpitations, chest pain.  Patient also denies missing any of her medications.  She also notes that she has been bedbound for approximately 1 year in duration after she fractured her femur.  She stated that she pulled her oxygen machine which had been on use for some time out of her bedroom and started to use 2 L via nasal cannula to help with the orthopnea.    In the ED:  -Vitals: Temp 98.2 F, HR 70, RR 20, /71, SpO2 93% on 4 L via NC-97% on 15 L via Oxymizer  -Labs: CMP: Sodium 132, , troponin 8-7, glucose 173             D-dimer: 746             CBC: WBC 14.7 (ANC 11.71), Hgb 10.6  -Imaging: CXR: Limited study.  Cardiomegaly and congestive heart failure.                   CT PE: No evidence of pulmonary embolism.  Cardiomegaly with pulmonary vascular congestion and small left pleural effusion.  Mild peripheral groundglass opacity and perihilar ill-defined groundglass opacity.  Suggest correlation for pneumonia.  -Intervention: 125 mg Solu-Medrol, Benadryl 50 mg, Lasix 40 mg    PMH: OA, ARCHIE (not on any therapy), depression (s/p DBS), HTN, IDDM 2, asthma (intermittently on 2 L via nasal cannula at home), bipolar, CKD 3 (baseline creatinine~0.9), HLD, diastolic CHF  (last echo 2023, EF 75%, pulmonary artery hypertension 40-50 mmHg  Allergies: Sulfa, belladonna, ciprofloxacin, egg, adhesive tapes, iodinated contrast, Tegaderm  FH: Noncontributory to this admission  SX H: Knee arthroscopy, deep brain stimulation, hysterectomy, cholecystectomy, knee surgery, tonsillectomy with adenoidectomy, vaginal sling procedure,   SH: Patient denies tobacco use, illicit drug use, endorses social EtOH use, lives at home with .    CODE STATUS: Full code      Past Medical History  Past Medical History:   Diagnosis Date    Diabetes mellitus (Multi)     Hypertension        Surgical History  Past Surgical History:   Procedure Laterality Date    OTHER SURGICAL HISTORY  2021    Knee arthroscopy    OTHER SURGICAL HISTORY  2021    Deep brain stimulation    OTHER SURGICAL HISTORY  2021    Hysterectomy    OTHER SURGICAL HISTORY  2021    Cholecystectomy    OTHER SURGICAL HISTORY  10/14/2021    Knee surgery    OTHER SURGICAL HISTORY  10/14/2021    Tonsillectomy with adenoidectomy    OTHER SURGICAL HISTORY  10/14/2021    Vaginal sling procedure    OTHER SURGICAL HISTORY  10/19/2021     section        Social History  She reports that she has never smoked. She has never used smokeless tobacco. She reports that she does not drink alcohol and does not use drugs.    Family History  Family History   Problem Relation Name Age of Onset    Diabetes Mother      Hypertension Mother      Thyroid cancer Mother      Heart attack Mother      Osteoporosis Mother      Stroke Father      Hypertension Father      Diabetes Brother      Hypertension Brother      Cancer Brother      Diabetes Other Grandparent         type 2, without complication, unspecified insulin use        Allergies  Sulfa (sulfonamide antibiotics), Belladonna, Ciprofloxacin, Egg, Adhesive tape-silicones, Iodinated contrast media, and Tegaderm ag mesh [silver]    Review of Systems  Patient denies all  "symptomatology pertaining to a 12 point ROS with exception of those listed in above HPI.    Physical Exam  General: Not in acute distress, A&O x 3, alert, cooperative, obese  HEENT: Normocephalic, atraumatic, EOMI, moist mucous membranes, Oxymizer in place  Neck: Neck supple, trachea midline, no evidence of trauma  Cardiovascular: RRR, S1 and S2 appreciated, no murmurs rubs gallops appreciated, distal pulses 2+ bilaterally (radial and dorsalis pedis)  Respiratory: Difficult to appreciate breath sounds due to body habitus, but small tidal volumes bilaterally, mild bibasilar crackles appreciated appreciated, mildly increased work of breathing on 15 L via Oxymizer, using accessory muscles  GI: Abdomen soft, nondistended, protuberant, nontender to palpation, bowel sounds present  Extremities: No edema appreciated in lower extremities bilaterally, no cyanosis  Neuro: A&O X3, no focal deficits, strength and sensation intact bilaterally  Skin: Warm and dry, without lesions or rashes    Last Recorded Vitals  Blood pressure 166/75, pulse 80, temperature 36.8 °C (98.2 °F), temperature source Tympanic, resp. rate 20, height 1.6 m (5' 3\"), weight 118 kg (260 lb), SpO2 97%.    Relevant Results  Scheduled medications  azithromycin, 500 mg, intravenous, q24h  cefTRIAXone, 2 g, intravenous, q24h  furosemide, 40 mg, intravenous, Daily  oxygen, , inhalation, Continuous - Inhalation      Continuous medications     PRN medications  PRN medications: oxygen  CT angio chest for pulmonary embolism    Result Date: 6/15/2024  STUDY: CT Angiogram of the Chest; 6/14/2024 at 10:52 p.m. INDICATION: Elevated D-Dimer.  Hypoxia. COMPARISON: None Available. ACCESSION NUMBER(S): [id number] ORDERING CLINICIAN: MARIANELA HARDWICK TECHNIQUE:  CTA of the chest was performed with intravenous contrast. Images are reviewed and processed at a workstation according to the CT angiogram protocol with 3-D and/or MIP post processing imaging generated.  Omnipaque 350 " 60 mL was administered intravenously. Automated mA/kV exposure control was utilized and patient examination was performed in strict accordance with principles of ALARA. FINDINGS: Pulmonary arteries are adequately opacified without acute or chronic filling defects.  The thoracic aorta is normal in course and caliber without dissection or aneurysm. There is mild cardiomegaly without pericardial effusion.  Thoracic lymph nodes are not enlarged. Small left pleural effusion seen.  There is no pleural thickening or pneumothorax.  The airways are patent. There is some subsegmental atelectasis of the left lower lobe.  There is pulmonary vascular congestion.  There is mild peripheral groundglass opacity and perihilar ill-defined groundglass opacity. Upper abdomen demonstrates no acute pathology. There are no acute fractures.  No suspicious bony lesions.    1. No evidence of pulmonary embolism. 2. Cardiomegaly with pulmonary vascular congestion and small left pleural effusion. 3. Mild peripheral groundglass opacity and perihilar ill-defined groundglass opacity. Suggest correlation for pneumonia. Signed by Hao Esqueda MD    XR chest 1 view    Result Date: 6/14/2024  Interpreted By:  Nikolas Gonzalez, STUDY: XR CHEST 1 VIEW; 6/14/2024 4:35 pm   INDICATION: Signs/Symptoms:Shortness of breath, pulmonary edema?  Focal infiltrate?   COMPARISON: None.   ACCESSION NUMBER(S): [id number]   ORDERING CLINICIAN: MARIANELA HARDWICK   TECHNIQUE: Number of films: 2 AP erect portable views were obtained   FINDINGS: There is limitation due to rotation, respiratory motion and overlying artifacts obscuring some detail.   The cardiac silhouette is enlarged, exaggerated by the technique. Bilateral interstitial and mild alveolar perihilar infiltrates are present. There are small effusions. No pneumothorax is seen. Degenerative changes involve the spine and shoulders. Two electronic devices are seen over the upper chest, with the wires directed  superiorly in the neck.       Limited study. Cardiomegaly and congestive heart failure.     Signed by: Nikolas Gonzalez 6/14/2024 4:41 PM Dictation workstation:   [id number]   Results for orders placed or performed during the hospital encounter of 06/14/24 (from the past 24 hour(s))   CBC   Result Value Ref Range    WBC 14.7 (H) 4.4 - 11.3 x10*3/uL    nRBC 0.0 0.0 - 0.0 /100 WBCs    RBC 3.96 (L) 4.00 - 5.20 x10*6/uL    Hemoglobin 10.6 (L) 12.0 - 16.0 g/dL    Hematocrit 34.3 (L) 36.0 - 46.0 %    MCV 87 80 - 100 fL    MCH 26.8 26.0 - 34.0 pg    MCHC 30.9 (L) 32.0 - 36.0 g/dL    RDW 14.7 (H) 11.5 - 14.5 %    Platelets 211 150 - 450 x10*3/uL   Comprehensive metabolic panel   Result Value Ref Range    Glucose 173 (H) 74 - 99 mg/dL    Sodium 132 (L) 136 - 145 mmol/L    Potassium 4.8 3.5 - 5.3 mmol/L    Chloride 98 98 - 107 mmol/L    Bicarbonate 29 21 - 32 mmol/L    Anion Gap 10 10 - 20 mmol/L    Urea Nitrogen 19 6 - 23 mg/dL    Creatinine 0.88 0.50 - 1.05 mg/dL    eGFR 71 >60 mL/min/1.73m*2    Calcium 9.1 8.6 - 10.3 mg/dL    Albumin 3.8 3.4 - 5.0 g/dL    Alkaline Phosphatase 87 33 - 136 U/L    Total Protein 7.0 6.4 - 8.2 g/dL    AST 16 9 - 39 U/L    Bilirubin, Total 0.7 0.0 - 1.2 mg/dL    ALT 16 7 - 45 U/L   B-type natriuretic peptide   Result Value Ref Range     (H) 0 - 99 pg/mL   Troponin I, High Sensitivity, Initial   Result Value Ref Range    Troponin I, High Sensitivity 8 0 - 13 ng/L   D-dimer, quantitative   Result Value Ref Range    D-Dimer Non VTE, Quant (ng/mL FEU) 746 (H) <=500 ng/mL FEU   Light Blue Top   Result Value Ref Range    Extra Tube Hold for add-ons.    CBC and Auto Differential   Result Value Ref Range    WBC 14.7 (H) 4.4 - 11.3 x10*3/uL    nRBC 0.0 0.0 - 0.0 /100 WBCs    RBC 3.96 (L) 4.00 - 5.20 x10*6/uL    Hemoglobin 10.6 (L) 12.0 - 16.0 g/dL    Hematocrit 34.3 (L) 36.0 - 46.0 %    MCV 87 80 - 100 fL    MCH 26.8 26.0 - 34.0 pg    MCHC 30.9 (L) 32.0 - 36.0 g/dL    RDW 14.7 (H) 11.5 - 14.5 %     Platelets 211 150 - 450 x10*3/uL    Neutrophils % 80.7 40.0 - 80.0 %    Immature Granulocytes %, Automated 0.4 0.0 - 0.9 %    Lymphocytes % 11.6 13.0 - 44.0 %    Monocytes % 6.2 2.0 - 10.0 %    Eosinophils % 0.8 0.0 - 6.0 %    Basophils % 0.3 0.0 - 2.0 %    Neutrophils Absolute 11.71 (H) 1.20 - 7.70 x10*3/uL    Immature Granulocytes Absolute, Automated 0.06 0.00 - 0.70 x10*3/uL    Lymphocytes Absolute 1.68 1.20 - 4.80 x10*3/uL    Monocytes Absolute 0.90 0.10 - 1.00 x10*3/uL    Eosinophils Absolute 0.11 0.00 - 0.70 x10*3/uL    Basophils Absolute 0.05 0.00 - 0.10 x10*3/uL   Troponin, High Sensitivity, 1 Hour   Result Value Ref Range    Troponin I, High Sensitivity 7 0 - 13 ng/L          Assessment/Plan   Principal Problem:    Hypoxia  Active Problems:    Asthma (HHS-HCC)    Depression    CKD (chronic kidney disease), stage III (Multi)    Diabetes, polyneuropathy (Multi)    Essential hypertension, benign    Hyperlipidemia    Morbid obesity (Multi)    Diabetes mellitus (Multi)    Shortness of breath  Patient is a 69-year-old female with PMH significant for OA, ARCHIE (not on any therapy), depression (s/p DBS), HTN, IDDM 2, asthma (intermittently on 2 L via nasal cannula at home), bipolar, CKD 3 (baseline creatinine~0.9), HLD, diastolic CHF (last echo 7/2023, EF 75%, pulmonary artery hypertension 40-50 mmHg).  Patient presents to [facility] ED for chief complaint of orthopnea over the past 2 to 3 days which has worsened in severity over time.  This was preceded by flulike symptoms.  Patient developed increasing oxygen needs in the emergency department up to 15 L via Oxymizer.  Patient was admitted to the internal medicine service for further evaluation and management of orthopnea with hypoxia.    AHRF 2/2 CHF exacerbation versus PNA causing COPD exacerbation (likely undiagnosed COPD in the setting of chronic asthma)  Chronic asthma requiring intermittent 2 L via nasal cannula (noncompliant)  Diastolic CHF: Last echo 07/2023: EF  75%, PAH 40-50 mmHg  ARCHIE, and OHS  Patient presents to [facility] for chief complaint of orthopnea preceded by flulike symptoms.  -Hypoxic on presentation requiring 4 L of nasal cannula  -CXR illustrating cardiomegaly and CHF  -CT PE illustrating mild peripheral groundglass opacities and perihilar ill-defined groundglass opacity suggest correlation for pneumonia.  Cardiomegaly with pulmonary vascular congestion and small left pleural effusion.  -Strep pneumo, and Legionella antigens ordered  -Procalcitonin ordered  -COVID, influenza PCR is ordered  -S/p 40 mg IV Lasix in the emergency department  --> Continue 40 mg Lasix daily as crackles were appreciated on physical exam  -WBC 14.7 (ANC 11.71)  --> In the setting of imaging findings being indeterminate we will begin ceftriaxone 2 g daily, azithromycin 500 mg to cover for pneumonia until procalcitonin returns  -Continue supplemental oxygen wean as tolerated currently on 15 L via Oxymizer  -Recommend NIPPV nightly  -S/p 125 mg Solu-Medrol in the emergency department, will defer steroids to day team  -Begin DuoNebs every 4 hours via RT, every 2 hours as needed    5.  IDDM 2 with neuropathy:  -Continue home glargine 50 units twice daily  -SSI #3  -Hypoglycemic protocol in place  -Continue home gabapentin 300 mg twice daily, home Robaxin 500 mg twice daily  -Continue home pancrelipase    HTN  HLD  Depression  -Continue home amlodipine, aspirin, atorvastatin, citalopram, lisinopril, metoprolol succinate    IVF: None at this time  Diet: Cardiac + carb controlled  DVT prophylaxis: Lovenox  Dispo: Anticipate 3 to 4 days hospital stay for evaluation of hypoxia diuresis versus antibiotic therapy  Consults: PT/OT    CODE STATUS: Full code    Patient to be staffed with attending physician.    Edouard Sher MD    Dayteam Addendum:  In short, patient is a 68 y/o F with PMHx significant for ARCHIE without CPAP, asthma with sporadic 2 L NC use, diastolic CHF (last ECHO 7/2023 showing EF  75%), HTN, IDDM2, CKD3, who initially presented with orthopnea and shortness of breath. Suspect that this is multifactorial in the setting of acute exacerbation of CHF as well as exacerbation of asthma with likely underlying COPD and possible obesity hypoventilation syndrome. Leukocytosis appears to be chronic and is downtrending even after steroids. Covid and flu swabs negative. Patient without productive cough or other typical symptoms of PNA. Will discontinue antibiotics at this time and resume if there are concerning signs/symptoms and procalcitonin returns as elevated. Continue 40 mg IV lasix with strict I/Os. Only 450 ml of urine was recorded after last lasix dose, will order bladder scan. Maintain BiPAP/CPAP at night as needed. Continue steroid burst with 40 mg prednisone for 4 additional days. Given steroids and underlying IDDM2, will reduce home lantus to 40 units BID and increase SSI from 0-15 to 0-20 but will aggressively titrate as needed. ECHO ordered to reassess heart function. Per the , her oxygen saturation typically does not increase above 90 even when wearing oxygen at home. Currently being weaned from 10 L oxymizer, continue to wean as tolerated to maintain SpO2 >90%.  -Travis Pimentel, DO PGY-1

## 2024-06-15 NOTE — CARE PLAN
The patient's goals for the shift include to remain free from sob.    The clinical goals for the shift include patient will maintain pulse ox >92% this shift

## 2024-06-16 ENCOUNTER — APPOINTMENT (OUTPATIENT)
Dept: CARDIOLOGY | Facility: HOSPITAL | Age: 69
End: 2024-06-16
Payer: MEDICARE

## 2024-06-16 VITALS
HEIGHT: 63 IN | RESPIRATION RATE: 24 BRPM | SYSTOLIC BLOOD PRESSURE: 121 MMHG | WEIGHT: 293 LBS | OXYGEN SATURATION: 94 % | DIASTOLIC BLOOD PRESSURE: 55 MMHG | BODY MASS INDEX: 51.91 KG/M2 | TEMPERATURE: 97.9 F | HEART RATE: 78 BPM

## 2024-06-16 LAB
ALBUMIN SERPL BCP-MCNC: 3.6 G/DL (ref 3.4–5)
ANION GAP SERPL CALC-SCNC: 15 MMOL/L (ref 10–20)
BODY SURFACE AREA: 2.48 M2
BUN SERPL-MCNC: 50 MG/DL (ref 6–23)
CALCIUM SERPL-MCNC: 8.8 MG/DL (ref 8.6–10.3)
CHLORIDE SERPL-SCNC: 97 MMOL/L (ref 98–107)
CO2 SERPL-SCNC: 26 MMOL/L (ref 21–32)
CREAT SERPL-MCNC: 1.5 MG/DL (ref 0.5–1.05)
EGFRCR SERPLBLD CKD-EPI 2021: 38 ML/MIN/1.73M*2
ERYTHROCYTE [DISTWIDTH] IN BLOOD BY AUTOMATED COUNT: 14.7 % (ref 11.5–14.5)
GLUCOSE BLD MANUAL STRIP-MCNC: 203 MG/DL (ref 74–99)
GLUCOSE BLD MANUAL STRIP-MCNC: 237 MG/DL (ref 74–99)
GLUCOSE BLD MANUAL STRIP-MCNC: 379 MG/DL (ref 74–99)
GLUCOSE BLD MANUAL STRIP-MCNC: 392 MG/DL (ref 74–99)
GLUCOSE SERPL-MCNC: 274 MG/DL (ref 74–99)
HCT VFR BLD AUTO: 34.2 % (ref 36–46)
HGB BLD-MCNC: 10.1 G/DL (ref 12–16)
LEGIONELLA AG UR QL: NEGATIVE
MAGNESIUM SERPL-MCNC: 1.93 MG/DL (ref 1.6–2.4)
MCH RBC QN AUTO: 26.3 PG (ref 26–34)
MCHC RBC AUTO-ENTMCNC: 29.5 G/DL (ref 32–36)
MCV RBC AUTO: 89 FL (ref 80–100)
NRBC BLD-RTO: 0 /100 WBCS (ref 0–0)
PHOSPHATE SERPL-MCNC: 4.6 MG/DL (ref 2.5–4.9)
PLATELET # BLD AUTO: 244 X10*3/UL (ref 150–450)
POTASSIUM SERPL-SCNC: 4.6 MMOL/L (ref 3.5–5.3)
RBC # BLD AUTO: 3.84 X10*6/UL (ref 4–5.2)
S PNEUM AG UR QL: NEGATIVE
SODIUM SERPL-SCNC: 133 MMOL/L (ref 136–145)
WBC # BLD AUTO: 17.2 X10*3/UL (ref 4.4–11.3)

## 2024-06-16 PROCEDURE — 2500000005 HC RX 250 GENERAL PHARMACY W/O HCPCS: Performed by: INTERNAL MEDICINE

## 2024-06-16 PROCEDURE — 85027 COMPLETE CBC AUTOMATED: CPT

## 2024-06-16 PROCEDURE — 93306 TTE W/DOPPLER COMPLETE: CPT

## 2024-06-16 PROCEDURE — 2500000004 HC RX 250 GENERAL PHARMACY W/ HCPCS (ALT 636 FOR OP/ED)

## 2024-06-16 PROCEDURE — 82947 ASSAY GLUCOSE BLOOD QUANT: CPT | Mod: 91

## 2024-06-16 PROCEDURE — 2060000001 HC INTERMEDIATE ICU ROOM DAILY

## 2024-06-16 PROCEDURE — 2500000002 HC RX 250 W HCPCS SELF ADMINISTERED DRUGS (ALT 637 FOR MEDICARE OP, ALT 636 FOR OP/ED)

## 2024-06-16 PROCEDURE — 83735 ASSAY OF MAGNESIUM: CPT

## 2024-06-16 PROCEDURE — 36415 COLL VENOUS BLD VENIPUNCTURE: CPT

## 2024-06-16 PROCEDURE — 82435 ASSAY OF BLOOD CHLORIDE: CPT

## 2024-06-16 PROCEDURE — 99233 SBSQ HOSP IP/OBS HIGH 50: CPT

## 2024-06-16 PROCEDURE — 94640 AIRWAY INHALATION TREATMENT: CPT | Mod: MUE

## 2024-06-16 PROCEDURE — 2500000001 HC RX 250 WO HCPCS SELF ADMINISTERED DRUGS (ALT 637 FOR MEDICARE OP)

## 2024-06-16 RX ORDER — INSULIN LISPRO 100 [IU]/ML
12 INJECTION, SOLUTION INTRAVENOUS; SUBCUTANEOUS ONCE
Status: COMPLETED | OUTPATIENT
Start: 2024-06-16 | End: 2024-06-16

## 2024-06-16 RX ORDER — FLUTICASONE PROPIONATE 50 MCG
2 SPRAY, SUSPENSION (ML) NASAL AS NEEDED
Status: DISCONTINUED | OUTPATIENT
Start: 2024-06-16 | End: 2024-06-20 | Stop reason: HOSPADM

## 2024-06-16 RX ORDER — INSULIN GLARGINE 100 [IU]/ML
50 INJECTION, SOLUTION SUBCUTANEOUS 2 TIMES DAILY
Status: DISCONTINUED | OUTPATIENT
Start: 2024-06-16 | End: 2024-06-17

## 2024-06-16 RX ORDER — INSULIN LISPRO 100 [IU]/ML
10 INJECTION, SOLUTION INTRAVENOUS; SUBCUTANEOUS
Status: DISCONTINUED | OUTPATIENT
Start: 2024-06-16 | End: 2024-06-17

## 2024-06-16 RX ADMIN — INSULIN LISPRO 12 UNITS: 100 INJECTION, SOLUTION INTRAVENOUS; SUBCUTANEOUS at 23:27

## 2024-06-16 RX ADMIN — IPRATROPIUM BROMIDE AND ALBUTEROL SULFATE 3 ML: 2.5; .5 SOLUTION RESPIRATORY (INHALATION) at 17:01

## 2024-06-16 RX ADMIN — INSULIN LISPRO 10 UNITS: 100 INJECTION, SOLUTION INTRAVENOUS; SUBCUTANEOUS at 13:51

## 2024-06-16 RX ADMIN — METHOCARBAMOL 500 MG: 500 TABLET ORAL at 21:36

## 2024-06-16 RX ADMIN — METOPROLOL SUCCINATE 25 MG: 25 TABLET, EXTENDED RELEASE ORAL at 09:20

## 2024-06-16 RX ADMIN — INSULIN LISPRO 10 UNITS: 100 INJECTION, SOLUTION INTRAVENOUS; SUBCUTANEOUS at 17:38

## 2024-06-16 RX ADMIN — ASPIRIN 81 MG: 81 TABLET, COATED ORAL at 09:23

## 2024-06-16 RX ADMIN — ENOXAPARIN SODIUM 60 MG: 60 INJECTION SUBCUTANEOUS at 17:37

## 2024-06-16 RX ADMIN — GABAPENTIN 300 MG: 300 CAPSULE ORAL at 09:21

## 2024-06-16 RX ADMIN — PANCRELIPASE 1 CAPSULE: 60000; 12000; 38000 CAPSULE, DELAYED RELEASE PELLETS ORAL at 17:37

## 2024-06-16 RX ADMIN — PREDNISONE 40 MG: 20 TABLET ORAL at 09:22

## 2024-06-16 RX ADMIN — INSULIN GLARGINE 50 UNITS: 100 INJECTION, SOLUTION SUBCUTANEOUS at 09:23

## 2024-06-16 RX ADMIN — SODIUM CHLORIDE 1000 ML: 9 INJECTION, SOLUTION INTRAVENOUS at 11:17

## 2024-06-16 RX ADMIN — ENOXAPARIN SODIUM 60 MG: 60 INJECTION SUBCUTANEOUS at 03:17

## 2024-06-16 RX ADMIN — CITALOPRAM HYDROBROMIDE 40 MG: 20 TABLET ORAL at 09:21

## 2024-06-16 RX ADMIN — ATORVASTATIN CALCIUM 10 MG: 10 TABLET, FILM COATED ORAL at 21:36

## 2024-06-16 RX ADMIN — INSULIN LISPRO 8 UNITS: 100 INJECTION, SOLUTION INTRAVENOUS; SUBCUTANEOUS at 09:23

## 2024-06-16 RX ADMIN — INSULIN LISPRO 20 UNITS: 100 INJECTION, SOLUTION INTRAVENOUS; SUBCUTANEOUS at 17:40

## 2024-06-16 RX ADMIN — INSULIN GLARGINE 50 UNITS: 100 INJECTION, SOLUTION SUBCUTANEOUS at 21:36

## 2024-06-16 RX ADMIN — METHOCARBAMOL 500 MG: 500 TABLET ORAL at 09:22

## 2024-06-16 RX ADMIN — PANCRELIPASE 1 CAPSULE: 60000; 12000; 38000 CAPSULE, DELAYED RELEASE PELLETS ORAL at 09:21

## 2024-06-16 RX ADMIN — Medication 4 L/MIN: at 21:07

## 2024-06-16 RX ADMIN — AMLODIPINE BESYLATE 5 MG: 5 TABLET ORAL at 09:22

## 2024-06-16 RX ADMIN — IPRATROPIUM BROMIDE AND ALBUTEROL SULFATE 3 ML: 2.5; .5 SOLUTION RESPIRATORY (INHALATION) at 12:29

## 2024-06-16 RX ADMIN — PERFLUTREN 10 ML OF DILUTION: 6.52 INJECTION, SUSPENSION INTRAVENOUS at 08:34

## 2024-06-16 RX ADMIN — IPRATROPIUM BROMIDE AND ALBUTEROL SULFATE 3 ML: 2.5; .5 SOLUTION RESPIRATORY (INHALATION) at 20:48

## 2024-06-16 RX ADMIN — IPRATROPIUM BROMIDE AND ALBUTEROL SULFATE 3 ML: 2.5; .5 SOLUTION RESPIRATORY (INHALATION) at 03:07

## 2024-06-16 RX ADMIN — METOPROLOL SUCCINATE 25 MG: 25 TABLET, EXTENDED RELEASE ORAL at 21:36

## 2024-06-16 RX ADMIN — GABAPENTIN 300 MG: 300 CAPSULE ORAL at 21:36

## 2024-06-16 RX ADMIN — PANCRELIPASE 1 CAPSULE: 60000; 12000; 38000 CAPSULE, DELAYED RELEASE PELLETS ORAL at 13:51

## 2024-06-16 RX ADMIN — INSULIN LISPRO 8 UNITS: 100 INJECTION, SOLUTION INTRAVENOUS; SUBCUTANEOUS at 13:54

## 2024-06-16 RX ADMIN — FLUTICASONE PROPIONATE 2 SPRAY: 50 SPRAY, METERED NASAL at 11:18

## 2024-06-16 ASSESSMENT — COGNITIVE AND FUNCTIONAL STATUS - GENERAL
HELP NEEDED FOR BATHING: A LITTLE
TURNING FROM BACK TO SIDE WHILE IN FLAT BAD: A LITTLE
DRESSING REGULAR UPPER BODY CLOTHING: A LOT
CLIMB 3 TO 5 STEPS WITH RAILING: TOTAL
WALKING IN HOSPITAL ROOM: TOTAL
MOBILITY SCORE: 13
DRESSING REGULAR LOWER BODY CLOTHING: A LOT
DRESSING REGULAR LOWER BODY CLOTHING: A LOT
MOBILITY SCORE: 13
TOILETING: A LOT
TURNING FROM BACK TO SIDE WHILE IN FLAT BAD: A LITTLE
MOVING TO AND FROM BED TO CHAIR: A LOT
STANDING UP FROM CHAIR USING ARMS: A LOT
STANDING UP FROM CHAIR USING ARMS: A LOT
DAILY ACTIVITIY SCORE: 17
WALKING IN HOSPITAL ROOM: TOTAL
DAILY ACTIVITIY SCORE: 17
DRESSING REGULAR UPPER BODY CLOTHING: A LOT
MOVING TO AND FROM BED TO CHAIR: A LOT
HELP NEEDED FOR BATHING: A LITTLE
TOILETING: A LOT
CLIMB 3 TO 5 STEPS WITH RAILING: TOTAL

## 2024-06-16 ASSESSMENT — PAIN - FUNCTIONAL ASSESSMENT
PAIN_FUNCTIONAL_ASSESSMENT: 0-10

## 2024-06-16 ASSESSMENT — PAIN SCALES - GENERAL
PAINLEVEL_OUTOF10: 0 - NO PAIN

## 2024-06-16 NOTE — PROGRESS NOTES
Kaylene Feliciano is a 69 y.o. female on day 1 of admission presenting with Hypoxia.    Subjective   NAEON. Patient seen and evaluated at bedside. She reports feeling slightly better than yesterday, and that breathing is improving. Denies any chest pain, fevers, chills, dysuria, or other complaints.    Objective     Last Recorded Vitals  /58 (BP Location: Right arm, Patient Position: Lying)   Pulse 64   Temp 36.1 °C (97 °F) (Temporal)   Resp 20   Wt 138 kg (304 lb 14.3 oz)   SpO2 93%   Intake/Output last 3 Shifts:    Intake/Output Summary (Last 24 hours) at 6/16/2024 1155  Last data filed at 6/16/2024 0600  Gross per 24 hour   Intake 720 ml   Output 100 ml   Net 620 ml     Admission Weight  Weight: 118 kg (260 lb) (06/14/24 1508)    Daily Weight  06/15/24 : 138 kg (304 lb 14.3 oz)    Image Results  Electrocardiogram, 12-lead PRN ACS symptoms  Accelerated Junctional rhythm  Left axis deviation  ST elevation, consider lateral injury or acute infarct  ** ** ACUTE MI ** **  Abnormal ECG  When compared with ECG of 14-JUN-2024 17:38, (unconfirmed)  ST no longer elevated in Lateral leads  ECG 12 lead  Accelerated Junctional rhythm  Left axis deviation  ST elevation, consider lateral injury or acute infarct  ** ** ACUTE MI ** **  Abnormal ECG  When compared with ECG of 27-MAR-2024 18:08,  Junctional rhythm has replaced Sinus rhythm  Left anterior fascicular block is no longer Present  ST more depressed in Inferior leads  ST elevation now present in Lateral leads  CT angio chest for pulmonary embolism  Narrative: STUDY:  CT Angiogram of the Chest; 6/14/2024 at 10:52 p.m.  INDICATION:  Elevated D-Dimer.  Hypoxia.  COMPARISON:  None Available.  ACCESSION NUMBER(S):  [id number]  ORDERING CLINICIAN:  MARIANELA HARDWICK  TECHNIQUE:  CTA of the chest was performed with intravenous contrast.   Images are reviewed and processed at a workstation according to the CT  angiogram protocol with 3-D and/or MIP post processing  imaging  generated.  Omnipaque 350 60 mL was administered intravenously.  Automated mA/kV exposure control was utilized and patient examination  was performed in strict accordance with principles of ALARA.  FINDINGS:  Pulmonary arteries are adequately opacified without acute or chronic  filling defects.  The thoracic aorta is normal in course and caliber  without dissection or aneurysm.  There is mild cardiomegaly without pericardial effusion.  Thoracic  lymph nodes are not enlarged.  Small left pleural effusion seen.  There is no pleural thickening or  pneumothorax.  The airways are patent.  There is some subsegmental atelectasis of the left lower lobe.  There  is pulmonary vascular congestion.  There is mild peripheral  groundglass opacity and perihilar ill-defined groundglass opacity.   Upper abdomen demonstrates no acute pathology.  There are no acute fractures.  No suspicious bony lesions.  Impression: 1. No evidence of pulmonary embolism.  2. Cardiomegaly with pulmonary vascular congestion and small left  pleural effusion.  3. Mild peripheral groundglass opacity and perihilar ill-defined  groundglass opacity. Suggest correlation for pneumonia.  Signed by Hao Esqueda MD    Physical Exam  Constitutional:       General: She is not in acute distress.     Appearance: She is obese. She is not toxic-appearing.   HENT:      Head: Normocephalic and atraumatic.   Eyes:      General: No scleral icterus.     Extraocular Movements: Extraocular movements intact.   Cardiovascular:      Rate and Rhythm: Normal rate and regular rhythm.      Pulses: Normal pulses.      Heart sounds: No murmur heard.     No gallop.   Pulmonary:      Effort: No respiratory distress.      Breath sounds: Wheezing (mild expiratory) present. No rales.   Abdominal:      General: Abdomen is flat. There is no distension.      Palpations: Abdomen is soft.      Tenderness: There is no abdominal tenderness. There is no guarding.   Musculoskeletal:          General: No swelling or tenderness.   Skin:     General: Skin is warm and dry.   Neurological:      Mental Status: She is alert and oriented to person, place, and time.   Psychiatric:         Mood and Affect: Mood normal.     Relevant Results  Scheduled medications  amLODIPine, 5 mg, oral, Daily  aspirin, 81 mg, oral, Daily  atorvastatin, 10 mg, oral, Nightly  citalopram, 40 mg, oral, Daily  enoxaparin, 60 mg, subcutaneous, q12h YARELI  gabapentin, 300 mg, oral, BID  insulin glargine, 50 Units, subcutaneous, BID  insulin lispro, 0-20 Units, subcutaneous, TID  insulin lispro, 10 Units, subcutaneous, TID  ipratropium-albuteroL, 3 mL, nebulization, q4h  [Held by provider] lisinopril, 40 mg, oral, Daily  methocarbamol, 500 mg, oral, BID  metoprolol succinate XL, 25 mg, oral, BID  pancrelipase (Lip-Prot-Amyl), 1 capsule, oral, TID  perflutren protein A microsphere, 0.5 mL, intravenous, Once in imaging  polyethylene glycol, 17 g, oral, Daily  predniSONE, 40 mg, oral, Daily  sodium chloride, 1,000 mL, intravenous, Once  sulfur hexafluoride microsphr, 2 mL, intravenous, Once in imaging    Continuous medications     PRN medications  PRN medications: dextrose, dextrose, fluticasone, glucagon, glucagon, ipratropium-albuteroL, oxygen, oxygen  Results for orders placed or performed during the hospital encounter of 06/14/24 (from the past 24 hour(s))   POCT GLUCOSE   Result Value Ref Range    POCT Glucose 230 (H) 74 - 99 mg/dL   POCT GLUCOSE   Result Value Ref Range    POCT Glucose 397 (H) 74 - 99 mg/dL   POCT GLUCOSE   Result Value Ref Range    POCT Glucose 382 (H) 74 - 99 mg/dL   CBC   Result Value Ref Range    WBC 17.2 (H) 4.4 - 11.3 x10*3/uL    nRBC 0.0 0.0 - 0.0 /100 WBCs    RBC 3.84 (L) 4.00 - 5.20 x10*6/uL    Hemoglobin 10.1 (L) 12.0 - 16.0 g/dL    Hematocrit 34.2 (L) 36.0 - 46.0 %    MCV 89 80 - 100 fL    MCH 26.3 26.0 - 34.0 pg    MCHC 29.5 (L) 32.0 - 36.0 g/dL    RDW 14.7 (H) 11.5 - 14.5 %    Platelets 244 150 - 450  x10*3/uL   Renal Function Panel   Result Value Ref Range    Glucose 274 (H) 74 - 99 mg/dL    Sodium 133 (L) 136 - 145 mmol/L    Potassium 4.6 3.5 - 5.3 mmol/L    Chloride 97 (L) 98 - 107 mmol/L    Bicarbonate 26 21 - 32 mmol/L    Anion Gap 15 10 - 20 mmol/L    Urea Nitrogen 50 (H) 6 - 23 mg/dL    Creatinine 1.50 (H) 0.50 - 1.05 mg/dL    eGFR 38 (L) >60 mL/min/1.73m*2    Calcium 8.8 8.6 - 10.3 mg/dL    Phosphorus 4.6 2.5 - 4.9 mg/dL    Albumin 3.6 3.4 - 5.0 g/dL   Magnesium   Result Value Ref Range    Magnesium 1.93 1.60 - 2.40 mg/dL   POCT GLUCOSE   Result Value Ref Range    POCT Glucose 237 (H) 74 - 99 mg/dL     Electrocardiogram, 12-lead PRN ACS symptoms    Result Date: 6/15/2024  Accelerated Junctional rhythm Left axis deviation ST elevation, consider lateral injury or acute infarct ** ** ACUTE MI ** ** Abnormal ECG When compared with ECG of 14-JUN-2024 17:38, (unconfirmed) ST no longer elevated in Lateral leads    ECG 12 lead    Result Date: 6/15/2024  Accelerated Junctional rhythm Left axis deviation ST elevation, consider lateral injury or acute infarct ** ** ACUTE MI ** ** Abnormal ECG When compared with ECG of 27-MAR-2024 18:08, Junctional rhythm has replaced Sinus rhythm Left anterior fascicular block is no longer Present ST more depressed in Inferior leads ST elevation now present in Lateral leads    CT angio chest for pulmonary embolism    Result Date: 6/15/2024  STUDY: CT Angiogram of the Chest; 6/14/2024 at 10:52 p.m. INDICATION: Elevated D-Dimer.  Hypoxia. COMPARISON: None Available. ACCESSION NUMBER(S): [id number] ORDERING CLINICIAN: MARIANELA HARDWICK TECHNIQUE:  CTA of the chest was performed with intravenous contrast. Images are reviewed and processed at a workstation according to the CT angiogram protocol with 3-D and/or MIP post processing imaging generated.  Omnipaque 350 60 mL was administered intravenously. Automated mA/kV exposure control was utilized and patient examination was performed in  strict accordance with principles of ALARA. FINDINGS: Pulmonary arteries are adequately opacified without acute or chronic filling defects.  The thoracic aorta is normal in course and caliber without dissection or aneurysm. There is mild cardiomegaly without pericardial effusion.  Thoracic lymph nodes are not enlarged. Small left pleural effusion seen.  There is no pleural thickening or pneumothorax.  The airways are patent. There is some subsegmental atelectasis of the left lower lobe.  There is pulmonary vascular congestion.  There is mild peripheral groundglass opacity and perihilar ill-defined groundglass opacity. Upper abdomen demonstrates no acute pathology. There are no acute fractures.  No suspicious bony lesions.    1. No evidence of pulmonary embolism. 2. Cardiomegaly with pulmonary vascular congestion and small left pleural effusion. 3. Mild peripheral groundglass opacity and perihilar ill-defined groundglass opacity. Suggest correlation for pneumonia. Signed by Hao Esqueda MD    XR chest 1 view    Result Date: 6/14/2024  Interpreted By:  Nikolas Gonzalez, STUDY: XR CHEST 1 VIEW; 6/14/2024 4:35 pm   INDICATION: Signs/Symptoms:Shortness of breath, pulmonary edema?  Focal infiltrate?   COMPARISON: None.   ACCESSION NUMBER(S): [id number]   ORDERING CLINICIAN: MARIANELA HARDWICK   TECHNIQUE: Number of films: 2 AP erect portable views were obtained   FINDINGS: There is limitation due to rotation, respiratory motion and overlying artifacts obscuring some detail.   The cardiac silhouette is enlarged, exaggerated by the technique. Bilateral interstitial and mild alveolar perihilar infiltrates are present. There are small effusions. No pneumothorax is seen. Degenerative changes involve the spine and shoulders. Two electronic devices are seen over the upper chest, with the wires directed superiorly in the neck.       Limited study. Cardiomegaly and congestive heart failure.     Signed by: Nikolas Gonzalez 6/14/2024  4:41 PM Dictation workstation:   [id number]     Assessment/Plan   Principal Problem:    Hypoxia  Active Problems:    Asthma (HHS-HCC)    Depression    CKD (chronic kidney disease), stage III (Multi)    Diabetes, polyneuropathy (Multi)    Essential hypertension, benign    Hyperlipidemia    Morbid obesity (Multi)    Diabetes mellitus (Multi)    Shortness of breath    Patient is a 68 y/o F with PMHx significant for OA, ARCHIE (not on any therapy), depression (s/p DBS), HTN, IDDM 2, asthma (intermittently on 2 L via nasal cannula at home), bipolar, CKD 3 (baseline creatinine~0.9), HLD, diastolic CHF (last echo 7/2023, EF 75%, pulmonary artery hypertension 40-50 mmHg), who initially presented for orthopnea.  Patient developed increasing oxygen needs in the emergency department up to 15 L via oxymizer, and was admitted to the general medicine service for further evaluation and management of orthopnea with hypoxia. Initially there was some concern for CHF exacerbation vs. COPD exacerbation, and both lasix and steroids were started. Clinically the patient is without obvious pitting edema, suspect that this is more likely asthma/COPD exacerbation and obesity hypoventilation syndrome.     # AHRF 2/2 COPD exacerbation (likely undiagnosed COPD in the setting of chronic asthma)  # ARCHIE and OHS  # Chronic asthma requiring intermittent 2 L via nasal cannula (noncompliant)  # Leukocytosis  -WBC on admission 14.7, suspect further leukocytosis is due to steroids  -CT PE illustrating mild peripheral groundglass opacities and perihilar ill-defined groundglass opacity. Cardiomegaly with pulmonary vascular congestion and small left pleural effusion.  -Procalcitonin 0.60, however there are a lack of clinical signs of pneumonia. Will hold antibiotics at this time.  -COVID and influenza PCR negative  -Steroid burst for 5 total days, s/p 125 mg solu medrol will continue prednisone 40 mg daily  -q4 Duonebs with q2 duonebs as needed  -Continue  supplemental to maintain SpO2 >90%, wean as tolerated currently on 6 L NC  -BiPAP at night  -Incentive spirometer  -Continuous telemetry  -PT/OT    # Diastolic CHF  # Orthopnea  Orthopnea may have initially been partially due to CHF exacerbation, however the patient was clinically euvolemic after 40 mg lasix in the ED. Additional 40 mg of lasix were administered without significant diuresis. Patient then developed CRISTOFER with significant increase in BUN likely in the setting of overdiuresis. Initially there was only 450 ml of urine recorded as output, however per nursing there was significant incontinence and bladder scan did not show urinary retention.  -S/p 40 mg IV Lasix in the emergency department, additional 40 mg IV lasix administered  -ECHO pending    # CRISTOFER likely 2/2 overdiuresis in the setting of above  -Will hold further diuresis and hold home lisinopril  -Avoid nephrotoxic agents  -1 L of LR ordered     # IDDM 2 c/b neuropathy  There is significant difficulty with hyperglycemia, likely exacerbated by steroid use. Will aggressively titrate lispro. Patient may ultimately benefit from transition from U100 to U500 to decrease need for refills of medications.  -Continue home glargine 50 units twice daily  -Aggressive SSI with 10 units lispro qAC  -Continue home gabapentin 300 mg twice daily, home Robaxin 500 mg twice daily  -Continue home pancrelipase     HTN  HLD  Depression  -Continue home amlodipine, aspirin, atorvastatin, citalopram, lisinopril, metoprolol succinate     Diet: Cardiac + carb controlled  Consults: PT/OT  DVT: Lovenox    Dispo: Improving with steroids, anticipate 2 additional midnights. Patient will most likely need SNF placement for strengthening.    The assessment and plan were discussed with the attending physician,  Travis Pimentel,  PGY-1

## 2024-06-17 LAB
ALBUMIN SERPL BCP-MCNC: 3.5 G/DL (ref 3.4–5)
ANION GAP SERPL CALC-SCNC: 12 MMOL/L (ref 10–20)
AORTIC VALVE MEAN GRADIENT: 8 MMHG
AORTIC VALVE PEAK VELOCITY: 2.05 M/S
AV PEAK GRADIENT: 16.8 MMHG
AVA (PEAK VEL): 3.4 CM2
AVA (VTI): 4.12 CM2
BUN SERPL-MCNC: 63 MG/DL (ref 6–23)
CALCIUM SERPL-MCNC: 8.4 MG/DL (ref 8.6–10.3)
CHLORIDE SERPL-SCNC: 100 MMOL/L (ref 98–107)
CO2 SERPL-SCNC: 27 MMOL/L (ref 21–32)
CREAT SERPL-MCNC: 1.57 MG/DL (ref 0.5–1.05)
EGFRCR SERPLBLD CKD-EPI 2021: 36 ML/MIN/1.73M*2
EJECTION FRACTION APICAL 4 CHAMBER: 52.9
ERYTHROCYTE [DISTWIDTH] IN BLOOD BY AUTOMATED COUNT: 14.7 % (ref 11.5–14.5)
GLUCOSE BLD MANUAL STRIP-MCNC: 151 MG/DL (ref 74–99)
GLUCOSE BLD MANUAL STRIP-MCNC: 183 MG/DL (ref 74–99)
GLUCOSE BLD MANUAL STRIP-MCNC: 184 MG/DL (ref 74–99)
GLUCOSE BLD MANUAL STRIP-MCNC: 96 MG/DL (ref 74–99)
GLUCOSE SERPL-MCNC: 213 MG/DL (ref 74–99)
HCT VFR BLD AUTO: 33.6 % (ref 36–46)
HGB BLD-MCNC: 9.9 G/DL (ref 12–16)
LEFT VENTRICLE INTERNAL DIMENSION DIASTOLE: 3.8 CM (ref 3.5–6)
LEFT VENTRICULAR OUTFLOW TRACT DIAMETER: 2.5 CM
MAGNESIUM SERPL-MCNC: 2.05 MG/DL (ref 1.6–2.4)
MCH RBC QN AUTO: 26.1 PG (ref 26–34)
MCHC RBC AUTO-ENTMCNC: 29.5 G/DL (ref 32–36)
MCV RBC AUTO: 89 FL (ref 80–100)
MITRAL VALVE E/A RATIO: 1.28
MITRAL VALVE E/E' RATIO: 21.23
NRBC BLD-RTO: 0 /100 WBCS (ref 0–0)
PHOSPHATE SERPL-MCNC: 3.9 MG/DL (ref 2.5–4.9)
PLATELET # BLD AUTO: 225 X10*3/UL (ref 150–450)
POTASSIUM SERPL-SCNC: 4.2 MMOL/L (ref 3.5–5.3)
RBC # BLD AUTO: 3.79 X10*6/UL (ref 4–5.2)
RIGHT VENTRICLE FREE WALL PEAK S': 12.8 CM/S
RIGHT VENTRICLE PEAK SYSTOLIC PRESSURE: 17 MMHG
SODIUM SERPL-SCNC: 135 MMOL/L (ref 136–145)
TRICUSPID ANNULAR PLANE SYSTOLIC EXCURSION: 1.8 CM
WBC # BLD AUTO: 14.1 X10*3/UL (ref 4.4–11.3)

## 2024-06-17 PROCEDURE — 97530 THERAPEUTIC ACTIVITIES: CPT | Mod: GP

## 2024-06-17 PROCEDURE — 2500000002 HC RX 250 W HCPCS SELF ADMINISTERED DRUGS (ALT 637 FOR MEDICARE OP, ALT 636 FOR OP/ED)

## 2024-06-17 PROCEDURE — 2500000004 HC RX 250 GENERAL PHARMACY W/ HCPCS (ALT 636 FOR OP/ED)

## 2024-06-17 PROCEDURE — 80069 RENAL FUNCTION PANEL: CPT

## 2024-06-17 PROCEDURE — 99233 SBSQ HOSP IP/OBS HIGH 50: CPT | Performed by: INTERNAL MEDICINE

## 2024-06-17 PROCEDURE — 85027 COMPLETE CBC AUTOMATED: CPT

## 2024-06-17 PROCEDURE — 94660 CPAP INITIATION&MGMT: CPT

## 2024-06-17 PROCEDURE — 82947 ASSAY GLUCOSE BLOOD QUANT: CPT | Mod: 91

## 2024-06-17 PROCEDURE — 83735 ASSAY OF MAGNESIUM: CPT

## 2024-06-17 PROCEDURE — 36415 COLL VENOUS BLD VENIPUNCTURE: CPT

## 2024-06-17 PROCEDURE — 97162 PT EVAL MOD COMPLEX 30 MIN: CPT | Mod: GP

## 2024-06-17 PROCEDURE — 2060000001 HC INTERMEDIATE ICU ROOM DAILY

## 2024-06-17 PROCEDURE — 94640 AIRWAY INHALATION TREATMENT: CPT | Mod: MUE

## 2024-06-17 PROCEDURE — 2500000005 HC RX 250 GENERAL PHARMACY W/O HCPCS: Performed by: INTERNAL MEDICINE

## 2024-06-17 PROCEDURE — 2500000001 HC RX 250 WO HCPCS SELF ADMINISTERED DRUGS (ALT 637 FOR MEDICARE OP)

## 2024-06-17 RX ORDER — OXYMETAZOLINE HCL 0.05 %
2 SPRAY, NON-AEROSOL (ML) NASAL EVERY 12 HOURS PRN
Status: ACTIVE | OUTPATIENT
Start: 2024-06-17 | End: 2024-06-19

## 2024-06-17 RX ORDER — INSULIN LISPRO 100 [IU]/ML
15 INJECTION, SOLUTION INTRAVENOUS; SUBCUTANEOUS
Status: DISCONTINUED | OUTPATIENT
Start: 2024-06-17 | End: 2024-06-20 | Stop reason: HOSPADM

## 2024-06-17 RX ORDER — INSULIN GLARGINE 100 [IU]/ML
60 INJECTION, SOLUTION SUBCUTANEOUS 2 TIMES DAILY
Status: DISCONTINUED | OUTPATIENT
Start: 2024-06-17 | End: 2024-06-20 | Stop reason: HOSPADM

## 2024-06-17 RX ORDER — GUAIFENESIN 600 MG/1
600 TABLET, EXTENDED RELEASE ORAL 2 TIMES DAILY PRN
Status: DISCONTINUED | OUTPATIENT
Start: 2024-06-17 | End: 2024-06-20 | Stop reason: HOSPADM

## 2024-06-17 RX ORDER — INSULIN GLARGINE 100 [IU]/ML
75 INJECTION, SOLUTION SUBCUTANEOUS 2 TIMES DAILY
Status: DISCONTINUED | OUTPATIENT
Start: 2024-06-17 | End: 2024-06-17

## 2024-06-17 RX ADMIN — ATORVASTATIN CALCIUM 10 MG: 10 TABLET, FILM COATED ORAL at 20:55

## 2024-06-17 RX ADMIN — IPRATROPIUM BROMIDE AND ALBUTEROL SULFATE 3 ML: 2.5; .5 SOLUTION RESPIRATORY (INHALATION) at 12:38

## 2024-06-17 RX ADMIN — PANCRELIPASE 1 CAPSULE: 60000; 12000; 38000 CAPSULE, DELAYED RELEASE PELLETS ORAL at 12:13

## 2024-06-17 RX ADMIN — CITALOPRAM HYDROBROMIDE 40 MG: 20 TABLET ORAL at 12:11

## 2024-06-17 RX ADMIN — INSULIN GLARGINE 60 UNITS: 100 INJECTION, SOLUTION SUBCUTANEOUS at 20:56

## 2024-06-17 RX ADMIN — Medication 40 PERCENT: at 05:55

## 2024-06-17 RX ADMIN — IPRATROPIUM BROMIDE AND ALBUTEROL SULFATE 3 ML: 2.5; .5 SOLUTION RESPIRATORY (INHALATION) at 16:48

## 2024-06-17 RX ADMIN — ASPIRIN 81 MG: 81 TABLET, COATED ORAL at 12:12

## 2024-06-17 RX ADMIN — GABAPENTIN 300 MG: 300 CAPSULE ORAL at 12:13

## 2024-06-17 RX ADMIN — IPRATROPIUM BROMIDE AND ALBUTEROL SULFATE 3 ML: 2.5; .5 SOLUTION RESPIRATORY (INHALATION) at 20:23

## 2024-06-17 RX ADMIN — Medication 40 PERCENT: at 04:41

## 2024-06-17 RX ADMIN — IPRATROPIUM BROMIDE AND ALBUTEROL SULFATE 3 ML: 2.5; .5 SOLUTION RESPIRATORY (INHALATION) at 02:01

## 2024-06-17 RX ADMIN — METOPROLOL SUCCINATE 25 MG: 25 TABLET, EXTENDED RELEASE ORAL at 20:56

## 2024-06-17 RX ADMIN — METHOCARBAMOL 500 MG: 500 TABLET ORAL at 12:14

## 2024-06-17 RX ADMIN — GABAPENTIN 300 MG: 300 CAPSULE ORAL at 20:55

## 2024-06-17 RX ADMIN — PANCRELIPASE 1 CAPSULE: 60000; 12000; 38000 CAPSULE, DELAYED RELEASE PELLETS ORAL at 18:01

## 2024-06-17 RX ADMIN — IPRATROPIUM BROMIDE AND ALBUTEROL SULFATE 3 ML: 2.5; .5 SOLUTION RESPIRATORY (INHALATION) at 08:34

## 2024-06-17 RX ADMIN — ENOXAPARIN SODIUM 60 MG: 60 INJECTION SUBCUTANEOUS at 04:35

## 2024-06-17 RX ADMIN — METOPROLOL SUCCINATE 25 MG: 25 TABLET, EXTENDED RELEASE ORAL at 12:12

## 2024-06-17 RX ADMIN — Medication 6 L/MIN: at 20:23

## 2024-06-17 RX ADMIN — Medication 6 L/MIN: at 16:48

## 2024-06-17 RX ADMIN — ENOXAPARIN SODIUM 60 MG: 60 INJECTION SUBCUTANEOUS at 15:20

## 2024-06-17 RX ADMIN — GUAIFENESIN 600 MG: 600 TABLET ORAL at 15:21

## 2024-06-17 RX ADMIN — AMLODIPINE BESYLATE 5 MG: 5 TABLET ORAL at 12:12

## 2024-06-17 RX ADMIN — INSULIN LISPRO 15 UNITS: 100 INJECTION, SOLUTION INTRAVENOUS; SUBCUTANEOUS at 18:02

## 2024-06-17 RX ADMIN — INSULIN LISPRO 15 UNITS: 100 INJECTION, SOLUTION INTRAVENOUS; SUBCUTANEOUS at 12:14

## 2024-06-17 RX ADMIN — METHOCARBAMOL 500 MG: 500 TABLET ORAL at 20:56

## 2024-06-17 RX ADMIN — IPRATROPIUM BROMIDE AND ALBUTEROL SULFATE 3 ML: 2.5; .5 SOLUTION RESPIRATORY (INHALATION) at 05:54

## 2024-06-17 RX ADMIN — Medication 40 PERCENT: at 01:55

## 2024-06-17 RX ADMIN — FLUTICASONE PROPIONATE 2 SPRAY: 50 SPRAY, METERED NASAL at 12:11

## 2024-06-17 ASSESSMENT — COGNITIVE AND FUNCTIONAL STATUS - GENERAL
WALKING IN HOSPITAL ROOM: TOTAL
DAILY ACTIVITIY SCORE: 15
DRESSING REGULAR UPPER BODY CLOTHING: A LOT
HELP NEEDED FOR BATHING: A LOT
WALKING IN HOSPITAL ROOM: TOTAL
MOBILITY SCORE: 12
MOVING TO AND FROM BED TO CHAIR: TOTAL
TURNING FROM BACK TO SIDE WHILE IN FLAT BAD: A LOT
CLIMB 3 TO 5 STEPS WITH RAILING: TOTAL
STANDING UP FROM CHAIR USING ARMS: A LOT
TURNING FROM BACK TO SIDE WHILE IN FLAT BAD: A LOT
MOVING TO AND FROM BED TO CHAIR: A LOT
MOBILITY SCORE: 12
TOILETING: A LOT
STANDING UP FROM CHAIR USING ARMS: A LITTLE
PERSONAL GROOMING: A LITTLE
DRESSING REGULAR LOWER BODY CLOTHING: A LOT
CLIMB 3 TO 5 STEPS WITH RAILING: TOTAL

## 2024-06-17 ASSESSMENT — PAIN - FUNCTIONAL ASSESSMENT
PAIN_FUNCTIONAL_ASSESSMENT: 0-10

## 2024-06-17 ASSESSMENT — PAIN SCALES - GENERAL
PAINLEVEL_OUTOF10: 0 - NO PAIN

## 2024-06-17 NOTE — CARE PLAN
Problem: Diabetes  Goal: Maintain glucose levels >70mg/dl to <250mg/dl throughout shift  Outcome: Progressing     Problem: Pain - Adult  Goal: Verbalizes/displays adequate comfort level or baseline comfort level  Outcome: Progressing     Problem: Safety - Adult  Goal: Free from fall injury  Outcome: Progressing     Problem: Discharge Planning  Goal: Discharge to home or other facility with appropriate resources  Outcome: Progressing     Problem: Chronic Conditions and Co-morbidities  Goal: Patient's chronic conditions and co-morbidity symptoms are monitored and maintained or improved  Outcome: Progressing     Problem: Respiratory  Goal: Clear secretions with interventions this shift  Outcome: Progressing  Goal: Minimize anxiety/maximize coping throughout shift  Outcome: Progressing  Goal: Minimal/no exertional discomfort or dyspnea this shift  Outcome: Progressing  Goal: No signs of respiratory distress (eg. Use of accessory muscles. Peds grunting)  Outcome: Progressing  Goal: Patent airway maintained this shift  Outcome: Progressing  Goal: Tolerate mechanical ventilation evidenced by VS/agitation level this shift  Outcome: Progressing  Goal: Tolerate pulmonary toileting this shift  Outcome: Progressing  Goal: Verbalize decreased shortness of breath this shift  Outcome: Progressing  Goal: Wean oxygen to maintain O2 saturation per order/standard this shift  Outcome: Progressing  Goal: Increase self care and/or family involvement in next 24 hours  Outcome: Progressing   The patient's goals for the shift include      The clinical goals for the shift include patient will maintain a pulse ox greater than 92% while weaning oxygen.    Over the shift, the patient did not make progress toward the following goals. Barriers to progression include ***. Recommendations to address these barriers include ***.

## 2024-06-17 NOTE — NURSING NOTE
Patient transitioned to 4L oximizer from bipap.  Bipap for HS.  Pulse ox 72-75% oxygen to 10L.  Pulse ox increased to 97%.  Will wean back down as tolerated.

## 2024-06-17 NOTE — PROGRESS NOTES
"ID:  Kaylene Feliciano is a 69 y.o. female on hospital day 2 of admission presenting with Hypoxia.    Subjective   The patient seen and examined this morning at bedside. No overnight events.  She tolerated overnight.  Also, she reported feeling congested but was not able to bring the phlegm up.  She denied chest pain, shortness of breath, nausea/vomiting or abdominal pain.  She reported 60% improvement in her symptoms since admission.      Objective     Visit Vitals  /77 (BP Location: Right arm, Patient Position: Lying)   Pulse 64   Temp 36.2 °C (97.2 °F) (Temporal)   Resp 26   Ht 1.6 m (5' 3\")   Wt 141 kg (311 lb 15.2 oz)   SpO2 97%   BMI 55.26 kg/m²   OB Status Postmenopausal   Smoking Status Never   BSA 2.51 m²      Physical Examination:  General: Morbidly obese, well nourished, A&Ox4, conversational, lying comfortably in bed, not in pain or distress.  10 L nasal cannula, mouth breather.  HEENT: Mucous membranes moist.  Head/Neck: Atraumatic, Normocephalic. Neck supple.   Respiratory: Limited due to her body habitus but no wheezing/crackles appreciated..  Cardiovascular: regular rhythm, normal S1 and S2. No added sounds or murmurs,  Gastrointestinal: soft, non-tender abdomen, bowel sounds present, no guarding or rebound.   Extremities: No edema in lower extremities.  Neurological:  no focal neurologic deficits.  Psychological: Appropriate mood, normal affect.      Laboratory Data:    Results from last 7 days   Lab Units 06/17/24  0449 06/16/24  0457 06/15/24  0412   WBC AUTO x10*3/uL 14.1* 17.2* 12.9*   RBC AUTO x10*6/uL 3.79* 3.84* 3.98*   HEMOGLOBIN g/dL 9.9* 10.1* 10.6*     Results from last 7 days   Lab Units 06/17/24  0449 06/16/24  0457 06/15/24  0412 06/14/24  1557   SODIUM mmol/L 135* 133* 134* 132*   POTASSIUM mmol/L 4.2 4.6 4.8 4.8   CHLORIDE mmol/L 100 97* 98 98   CO2 mmol/L 27 26 26 29   BUN mg/dL 63* 50* 27* 19   CREATININE mg/dL 1.57* 1.50* 1.04 0.88   CALCIUM mg/dL 8.4* 8.8 9.0 9.1   PHOSPHORUS " mg/dL 3.9 4.6 4.2  --    MAGNESIUM mg/dL 2.05 1.93 1.82  --    BILIRUBIN TOTAL mg/dL  --   --   --  0.7   ALT U/L  --   --   --  16   AST U/L  --   --   --  16       Imaging:  Transthoracic Echo (TTE) Complete    Result Date: 6/17/2024            [facility] [place] [id number] [facility] [id number]    Tel [phone] Fax [phone] TRANSTHORACIC ECHOCARDIOGRAM REPORT  Patient Name:      TEJINDER GONZALES          Pal Physician:    [id number] Jack Luna MD Study Date:        6/16/2024             Ordering Provider:    [id number] AMEENA JOHNSTON MRN/PID:           [id number]              Fellow: Accession#:        [id number]          Nurse:                She KOENIG Date of Birth/Age: 1955 / 69 years  Sonographer:          Marcella Person RDCS Gender:            F                     Additional Staff: Height:            160.00 cm             Admit Date: Weight:            137.89 kg             Admission Status:     Inpatient -                                                                Routine BSA / BMI:         2.31 m2 / 53.86 kg/m2 Department Location:  [facility] Blood Pressure: 156 /79 mmHg Study Type:    TRANSTHORACIC ECHO (TTE) COMPLETE Diagnosis/ICD: Hypoxemia-R09.02; Acute diastolic (congestive) heart failure                (CHF)-I50.31 Indication:    hypoxia; Acute diastolic CHF Patient History: Pertinent History: HTN, Hyperlipidemia and CHF. Study Detail: The following Echo studies were performed: 2D, M-Mode, Doppler and               color flow. Definity used as a contrast agent for endocardial               border definition. Total contrast used for this procedure was 2 mL               via IV push.  PHYSICIAN INTERPRETATION: Left Ventricle: Left ventricular systolic function is normal. There are no regional  wall motion abnormalities. The left ventricular cavity size is normal. There is mild concentric left ventricular hypertrophy. Spectral Doppler shows an impaired relaxation pattern of left ventricular diastolic filling. Left Atrium: The left atrium is mildly dilated. Right Ventricle: The right ventricle is normal in size. There is normal right ventricular global systolic function. Right Atrium: The right atrium is upper limits of normal in size. Aortic Valve: The aortic valve appears structurally normal. There is no evidence of aortic valve regurgitation. The peak instantaneous gradient of the aortic valve is 16.8 mmHg. The mean gradient of the aortic valve is 8.0 mmHg. Mitral Valve: The mitral valve is normal in structure. There is trace to mild mitral valve regurgitation. Tricuspid Valve: The tricuspid valve is structurally normal. There is trace tricuspid regurgitation. Pulmonic Valve: The pulmonic valve was not assessed. The pulmonic valve regurgitation was not assessed. Pericardium: There is no pericardial effusion noted. Aorta: The aortic root is normal.  CONCLUSIONS:  1. Left ventricular systolic function is normal.  2. Spectral Doppler shows an impaired relaxation pattern of left ventricular diastolic filling.  3. Mild LVH with normal EF of 55-60%.  4. Mildly increased pressure gradient LVOT and aortic valve, within acceptable limits at this time.  5. Diastolic dysfunction with mildly dilated atria. QUANTITATIVE DATA SUMMARY: 2D MEASUREMENTS:                          Normal Ranges: LAs:           4.10 cm   (2.7-4.0cm) IVSd:          1.50 cm   (0.6-1.1cm) LVPWd:         1.30 cm   (0.6-1.1cm) LVIDd:         3.80 cm   (3.9-5.9cm) LVIDs:         2.70 cm LV Mass Index: 84.0 g/m2 LV % FS        28.9 % LA VOLUME:                             Normal Ranges: LA Area A4C:     24.0 cm2 LA Area A2C:     21.0 cm2 LA Volume Index: 28.0 ml/m2 AORTA MEASUREMENTS:                    Normal Ranges: Asc Ao, d: 3.60 cm  (2.1-3.4cm) LV SYSTOLIC FUNCTION BY 2D PLANIMETRY (MOD):                     Normal Ranges: EF-A4C View: 52.9 % (>=55%) LV DIASTOLIC FUNCTION:                               Normal Ranges: MV Peak E:        1.38 m/s    (0.7-1.2 m/s) MV Peak A:        1.08 m/s    (0.42-0.7 m/s) E/A Ratio:        1.28        (1.0-2.2) MV e'             0.06 m/s    (>8.0) MV lateral e'     0.07 m/s MV medial e'      0.06 m/s E/e' Ratio:       21.23       (<8.0) PulmV Sys Rojelio:    72.30 cm/s PulmV Graham Rojelio:   58.80 cm/s PulmV S/D Rojelio:    1.20 PulmV A Revs Rojelio: 28.30 cm/s PulmV A Revs Dur: 169.00 msec MITRAL VALVE:                 Normal Ranges: MV DT: 363 msec (150-240msec) AORTIC VALVE:                                    Normal Ranges: AoV Vmax:                2.05 m/s  (<=1.7m/s) AoV Peak P.8 mmHg (<20mmHg) AoV Mean P.0 mmHg  (1.7-11.5mmHg) LVOT Max Rojelio:            1.42 m/s  (<=1.1m/s) AoV VTI:                 46.70 cm  (18-25cm) LVOT VTI:                39.20 cm LVOT Diameter:           2.50 cm   (1.8-2.4cm) AoV Area, VTI:           4.12 cm2  (2.5-5.5cm2) AoV Area,Vmax:           3.40 cm2  (2.5-4.5cm2) AoV Dimensionless Index: 0.84  RIGHT VENTRICLE: RV Basal 3.10 cm RV Mid   2.20 cm RV Major 7.3 cm TAPSE:   18.3 mm RV s'    0.13 m/s TRICUSPID VALVE/RVSP:                             Normal Ranges: Peak TR Velocity: 1.87 m/s RV Syst Pressure: 17.0 mmHg (< 30mmHg) Pulmonary Veins: PulmV A Revs Dur: 169.00 msec PulmV A Revs Rojelio: 28.30 cm/s PulmV Graham Rojelio:   58.80 cm/s PulmV S/D Rojelio:    1.20 PulmV Sys Rojelio:    72.30 cm/s  [id number] Jack Luna MD Electronically signed on 2024 at 7:49:29 AM  ** Final **       Medications:  Scheduled medications  amLODIPine, 5 mg, oral, Daily  aspirin, 81 mg, oral, Daily  atorvastatin, 10 mg, oral, Nightly  citalopram, 40 mg, oral, Daily  enoxaparin, 60 mg, subcutaneous, q12h YARELI  gabapentin, 300 mg, oral, BID  insulin glargine, 60 Units, subcutaneous, BID  insulin lispro,  0-20 Units, subcutaneous, TID  insulin lispro, 15 Units, subcutaneous, TID  ipratropium-albuteroL, 3 mL, nebulization, q4h  [Held by provider] lisinopril, 40 mg, oral, Daily  methocarbamol, 500 mg, oral, BID  metoprolol succinate XL, 25 mg, oral, BID  pancrelipase (Lip-Prot-Amyl), 1 capsule, oral, TID  perflutren protein A microsphere, 0.5 mL, intravenous, Once in imaging  polyethylene glycol, 17 g, oral, Daily  [START ON 6/18/2024] predniSONE, 30 mg, oral, Daily  sulfur hexafluoride microsphr, 2 mL, intravenous, Once in imaging      Continuous medications     PRN medications  PRN medications: dextrose, dextrose, fluticasone, glucagon, glucagon, guaiFENesin, ipratropium-albuteroL, oxygen, oxygen, oxymetazoline    Assessment    Assessment & Plan   Ms. Kaylene Feliciano is a 69 y.o. female with PMHx significant for OA, ARCHIE (not on any therapy), depression (s/p DBS), HTN, IDDM 2, asthma (intermittently on 2 L via nasal cannula at home), bipolar, CKD 3 (baseline creatinine~0.9), HLD, diastolic CHF (last echo 7/2023, EF 75%, pulmonary artery hypertension 40-50 mmHg), who initially presented for orthopnea.  Patient developed increasing oxygen needs in the emergency department up to 15 L via oxymizer, and was admitted to the general medicine service for further evaluation and management of orthopnea with hypoxia. Initially there was some concern for CHF exacerbation vs. COPD exacerbation, and both lasix and steroids were started. Clinically the patient is without obvious pitting edema, suspect that this is more likely asthma/COPD exacerbation and obesity hypoventilation syndrome.    Principal Problem:    Hypoxia  Active Problems:    Asthma (HHS-HCC)    Depression    CKD (chronic kidney disease), stage III (Multi)    Diabetes, polyneuropathy (Multi)    Essential hypertension, benign    Hyperlipidemia    Morbid obesity (Multi)    Diabetes mellitus (Multi)    Shortness of breath     # AHRF 2/2 COPD exacerbation   # ARCHIE and OHS  #  Chronic asthma requiring intermittent 2 L via nasal cannula (noncompliant)  # Leukocytosis; downtrending  -WBC on admission 14.7, suspect further leukocytosis is due to steroids  -CT PE illustrating mild peripheral groundglass opacities and perihilar ill-defined groundglass opacity. Cardiomegaly with pulmonary vascular congestion and small left pleural effusion.  -Procalcitonin 0.60, however there are a lack of clinical signs of pneumonia. Will hold antibiotics at this time.  -COVID and influenza PCR negative  -Given subjective improvement and no wheezing on examination, will decrease prednisone from 40 mg to 30 mg daily with tapering over a week.  -q4 Duonebs with q2 duonebs as needed  -Continue supplemental to maintain SpO2 >90%, wean as tolerated currently on 10 L NC  -BiPAP at night  -Ended Afrin (oxymetazoline) as needed for 2 days.  -And Mucinex for congestion.  -Incentive spirometer  -Bronchial hygiene per RT  -Continuous telemetry  -PT/OT     # Diastolic CHF  # Orthopnea  -Orthopnea may have initially been partially due to CHF exacerbation, however the patient was clinically euvolemic after 40 mg lasix in the ED. Additional 40 mg of lasix were administered without significant diuresis. Patient then developed CRISTOFER with significant increase in BUN likely in the setting of overdiuresis. Initially there was only 450 ml of urine recorded as output, however per nursing there was significant incontinence and bladder scan did not show urinary retention.  -S/p 40 mg IV Lasix in the emergency department, additional 40 mg IV lasix administered  -ECHO did show LVEF 55-60% with diastolic dysfunction (E/e' Ratio: 21).     # CRISTOFER likely 2/2 overdiuresis in the setting of above  -Baseline creatinine around 1.0.  Today's creatinine 1.57.  -Continue holding hold further diuresis and hold home lisinopril  -Avoid nephrotoxic agents  - s/p 1 L of LR yesterday  -Encourage increased oral intake     # IDDM 2 c/b neuropathy  -There is  significant difficulty with hyperglycemia, likely exacerbated by steroid use. Will aggressively titrate lispro.  Her inpatient glucose 200s to 300s.  -Patient may ultimately benefit from transition from U100 to U500 to decrease need for refills of medications.  -Increase home dose of glargine from 50 units to 60 units twice daily  -Increase lispro with meals from 10 units to 15 units to decrease the need for sliding scale.  -Continue sliding scale.  -Continue home gabapentin 300 mg twice daily, home Robaxin 500 mg twice daily  -Continue home pancrelipase     HTN  HLD  Depression  -Continue home amlodipine, aspirin, atorvastatin, citalopram, lisinopril, metoprolol succinate    Global Plan of Care  Fluid: PRN  Electrolytes: PRN  Nutrition: Cardiac  DVT Prophylaxis: Lovenox  GI Prophylaxis: None  Disposition: Anticipate discharge to SNF likely tomorrow pending improvement in respiratory status, PT/OT evaluation.  Code Status: Full Code     Emergency Contact: Extended Emergency Contact Information  Primary Emergency Contact: Travis Feliciano Phone: [phone]  Relation: Spouse     Patient seen and discussed with the attending.  Signature: Paradise Petersen MD, PGY II Internal medicine  Date: June 17, 2024     Please excuse any misspellings or unintended errors related to the Dragon speech recognition software used to dictate this note.        Agree with above.  Patient seen and examined.  Patient doing better from a breathing standpoint.  Plan for PT and OT to see her and placement in skilled nursing facility.  Patient seems to be a mouth breather.  Will ask for respiratory therapy to place her on a Ventimask to see how much oxygen she is requiring.  I highly doubt she is actually requiring 10 L currently.  Can also offer her Afrin as part of this has to do with her nose being congested.  Her blood glucose continues to remain elevated.  Will increase glargine as mentioned above from 50 units to 60 units twice  daily.  Will also increase Humalog from 10 units to 15 units 3 times daily AC.  Patient may be ready to be discharged in the next day or 2 depending on her actual oxygen requirement and blood glucose control.

## 2024-06-17 NOTE — DOCUMENTATION CLARIFICATION NOTE
"    PATIENT:               TEJINDER GONZALES  ACCT #:                  [id number]  MRN:                       [id number]  :                       1955  ADMIT DATE:       2024 2:59 PM  DISCH DATE:  RESPONDING PROVIDER #:        [id number]          PROVIDER RESPONSE TEXT:    Chronic Diastolic Congestive Heart Failure    CDI QUERY TEXT:    Clarification    Instruction:    Based on your assessment of the patient and the clinical information, please provide the requested documentation by clicking on the appropriate radio button and enter any additional information if prompted.    Question: Please further clarify the type and acuity of congestive heart failure    When answering this query, please exercise your independent professional judgment. The fact that a question is being asked, does not imply that any particular answer is desired or expected.    The patient's clinical indicators include:  Clinical Information: 68 y/o F admitted with COPD exacerbation and acute on chronic respiratory failure    Clinical Indicators:    BNP : 255    ED note  Dr. Schmitt: \"presenting for 4 days of shortness of breath, reports a history of CHF, unsure if she has had any weight gain weight loss. she was on 3 L, weaned her off of oxygen as staff was unsure if she was hypoxic or not, she dropped down to 89% while sitting up, she was placed back on 2 L by me.  suspect CHF exacerbation causing mild hypoxia.  SOB, acute respiratory failure with hypoxia\"    hospital course 6/15 Dr. Escudero: \"?New CHF,  but BMI 46..given 40IV lasix-CXR: congestion and sm L effusion.  picture fits more with CHF\"    H/P 6/15 Dr. Pimentel/Sherie: \"diastolic CHF (EF 75%), ARCHIE, OHS.  She has not diuresed much since she was given more Lasix this morning.  She is certainly not fluid overloaded on physical exam.  She also denies a productive cough.  This makes pneumonia and heart failure less likely and reactive airway disease is the most likely cause " "of her acute on chronic respiratory failure with hypoxia\"    MD PN 6/16 Dr. Pimentel/Sherie: \"Diastolic CHF, orthopnea.  Orthopnea may have initially been partially due to CHF exacerbation, however the patient was clinically euvolemic after 40 mg lasix in the ED. Additional 40 mg of lasix were administered without significant diuresis. \"    Treatment: BNP, supplemental oxygen, CXR, echocardiogram, IV lasix 40mg x1 in ED, IV lasix 40mg daily    Risk Factors: SOB, hypoxia, acute on chronic hypoxic respiratory failure, h/o diastolic CHF  Options provided:  -- Acute Diastolic Congestive Heart Failure  -- Acute on Chronic Diastolic Congestive Heart Failure  -- Chronic Diastolic Congestive Heart Failure  -- Other - I will add my own diagnosis  -- Refer to Clinical Documentation Reviewer    Query created by: Elsa Cunha on 6/17/2024 10:19 AM      Electronically signed by:  AMEENA JOHNSTON MD 6/17/2024 10:22 AM          "

## 2024-06-17 NOTE — CARE PLAN
The patient's goals for the shift include  to be able to breath better    The clinical goals for the shift include The patient will report feeling less short of breath and maintain a pulse ox of 94% or greater on 4 liters n/c by 6/17 0700        Problem: Respiratory  Goal: No signs of respiratory distress (eg. Use of accessory muscles. Peds grunting)  Outcome: Progressing     Problem: Respiratory  Goal: Tolerate mechanical ventilation evidenced by VS/agitation level this shift  Outcome: Progressing     Problem: Respiratory  Goal: Verbalize decreased shortness of breath this shift  Outcome: Progressing

## 2024-06-17 NOTE — PROGRESS NOTES
"Physical Therapy    Physical Therapy Evaluation & Treatment    Patient Name: Kaylene Feliciano  MRN: [id number]  Today's Date: 6/17/2024   Time Calculation  Start Time: 1053  Stop Time: 1117  Time Calculation (min): 24 min    Assessment/Plan   PT Assessment  PT Assessment Results: Decreased strength, Decreased range of motion, Decreased endurance, Impaired balance, Decreased mobility, Pain, Orthopedic restrictions  Rehab Prognosis: Good  Evaluation/Treatment Tolerance: Patient limited by fatigue  Medical Staff Made Aware: Yes  End of Session Communication: Bedside nurse  Assessment Comment: Pt's impairments include generalized weakness, impaired balance, RLE weakness, decreased ROM R knee flex/extension, and decreased activity tolerance. Pt's functional limitations include bed mobility, transfers, gait and elevations. Pt would benefit from continued acute care PT during hospital LOS and upon discharge at a high or moderate level intensity.  End of Session Patient Position: Up in chair, Alarm off, caregiver present (Nursing aware.)   IP OR SWING BED PT PLAN  Inpatient or Swing Bed: Inpatient  PT Plan  Treatment/Interventions: Bed mobility, Transfer training, Gait training, Stair training, Balance training, Neuromuscular re-education, Strengthening, Endurance training, Range of motion, Therapeutic exercise, Therapeutic activity, Home exercise program, Positioning, Postural re-education  PT Plan: Ongoing PT  PT Frequency: 3 times per week  PT Discharge Recommendations: High intensity level of continued care, Moderate intensity level of continued care  PT Recommended Transfer Status: Assist x2, Other (comment) (Milly Steady bed to chair)  PT - OK to Discharge: Yes (Pt is ok to discharge from acute care PT to next level of care once cleared by medical team.)      Subjective     General Visit Information:  General  Reason for Referral: Per physician note, \"69-year-old female presenting for 4 days of shortness of breath, reports a " "history of CHF, unsure if she has had any weight gain weight loss.  She denies fevers chills productive cough chest pain pleuritic pain or any other symptoms other than difficulty laying flat.\"  Referred By: Patrick Rehman MD  Past Medical History Relevant to Rehab:   Past Medical History:   Diagnosis Date    Diabetes mellitus (Multi)     Hypertension      Family/Caregiver Present: No  Prior to Session Communication: Bedside nurse  Patient Position Received: Bed, 3 rail up, Alarm on  Preferred Learning Style: verbal  Home Living:  Home Living  Type of Home: Apartment  Lives With: Spouse  Home Adaptive Equipment: Walker rolling or standard, Wheelchair-manual, Wheelchair-power (Slide board; drop arm wheelchairs)  Home Layout: One level (19th floor with elevator access, no RUSLAN building.)  Bathroom Shower/Tub: Tub/shower unit  Bathroom Equipment: Tub transfer bench  Prior Level of Function:  Prior Function Per Pt/Caregiver Report  Prior Function Comments: Pt reports spouse provides assistance as necessary for mobility and ADLs. Pt reports some assistance to exit bed. Pt reports she is able t ostand with walker x30 seconds. Spouse places slide board and holds slide board in place for transfers to wheelchair. Pt reports she is able to scoot on slide board without assist from spouse. Pt is IND with upper body dressing, assist for lower body dressing. Pt has been non-ambulatory since fall/RLE femur fracture in July 2023.  Precautions:  Precautions  Medical Precautions: Fall precautions (RLE femur fracture from 7/2023, deep brain stimulator)  Vital Signs:  Vital Signs  Heart Rate: 68  Heart Rate Source: Monitor  Patient Position: Sitting    Objective   Pain:  Pain Assessment  Pain Assessment: 0-10  Pain Score: 0 - No pain  Cognition:  Cognition  Overall Cognitive Status: Within Functional Limits (AOx4)    General Assessments:  Activity Tolerance  Endurance: Decreased tolerance for upright activites (SOB observed after " each task. Pt requires 1-2 min to recover. Cues for pursed lip breathing. Pt remains on non-rebreather supp O2 mask during mobility.)    Static Sitting Balance  Static Sitting-Comment/Number of Minutes: SBA at EOB, midline posture  Dynamic Sitting Balance  Dynamic Sitting-Comments: SBA to scoot fowards and to R x1-2', SBAx1.    Static Standing Balance  Static Standing-Comment/Number of Minutes: CGA-MinAx1 with FWW, heavy UE support on walker frame. Pt tolerates standing x30 seconds with walker.  Functional Assessments:  Bed Mobility  Bed Mobility: Yes  Bed Mobility 1  Bed Mobility 1: Supine to sitting  Level of Assistance 1: Contact guard  Bed Mobility Comments 1: Increased time and effort to perform. Minimal cues for safety. Pt able to scoot laterally to R, SBAx1. Pt appears SOB but recovers within 1 min.    Transfers  Transfer: Yes  Transfer 1  Technique 1: Sit to stand, Stand to sit  Transfer Device 1: Walker  Transfer Level of Assistance 1: Minimum assistance  Trials/Comments 1: x1 trial STS from EOB, MinAx1 to steady with FWW. Cues for hand placement and safety. Pt pulls on walker to rise to stand, therapist stabilizes. Pt able to remain standing SBA-CGAx1 with FWW x30 seconds. Pt denies dizziness but reports fatigue and needs to return to sitting. Pt appears SOB on non-rebreather mask to supp O2. Pt recovers within 1-2 min seated rest at EOB.  Transfers 2  Transfer From 2: Bed to  Transfer to 2: Chair with arms  Transfer Device 2: Milly Ware  Transfer Level of Assistance 2: Minimum assistance  Trials/Comments 2: Verbal cues for hand placement, anterior weight shift and upright posture until seat is in place. Cues for upright posture/full stand to remove seat. Cues for hand placement and controlled descent to chair. Pt appears SOB after transfer. Pt remained on non-rebreather supp O2. Pt recovers within 1-2 minutes seated break.  Extremity/Trunk Assessments:  RLE   RLE : Exceptions to WFL (R knee flexion  limited to ~80 degrees, R knee extension limited ~40 degrees.)  Strength RLE  R Hip Flexion: 3/5  R Knee Flexion: 2+/5  R Knee Extension: 2+/5  R Ankle Dorsiflexion: 5/5  LLE   LLE : Exceptions to WFL  Strength LLE  L Hip Flexion: 3+/5  L Knee Extension: 3+/5  L Ankle Dorsiflexion: 5/5    Outcome Measures:  [facility] Basic Mobility  Turning from your back to your side while in a flat bed without using bedrails: None  Moving from lying on your back to sitting on the side of a flat bed without using bedrails: A lot  Moving to and from bed to chair (including a wheelchair): Total  Standing up from a chair using your arms (e.g. wheelchair or bedside chair): A little  To walk in hospital room: Total  Climbing 3-5 steps with railing: Total  Basic Mobility - Total Score: 12    Encounter Problems       Encounter Problems (Active)       PT Problem       Bed mobility (Progressing)       Start:  06/17/24    Expected End:  07/08/24       Pt will perform supine<>sit with HOB flat, EDWIN.           Transfers (Progressing)       Start:  06/17/24    Expected End:  07/08/24       Pt will perform all transfers with LRAD, SBAx1.            Gait (Progressing)       Start:  06/17/24    Expected End:  07/08/24       Pt will amb 25' with LRAD, ModAx1 with chair follow, with reciprocal gait, upright posture and improved activity tolerance as demonstrated by vitals.           Balance (Progressing)       Start:  06/17/24    Expected End:  07/08/24       Pt will tolerate standing x1 min with BUE support, SBAx1.         Strength (Progressing)       Start:  06/17/24    Expected End:  07/08/24       Pt will improve gross RLE strength to 3+/5.            Pain - Adult              Education Documentation  Mobility Training, taught by Nargis Tellez, PT at 6/17/2024  1:32 PM.  Learner: Patient  Readiness: Acceptance  Method: Explanation  Response: Verbalizes Understanding, Demonstrated Understanding    Education Comments  No comments found.

## 2024-06-17 NOTE — PROGRESS NOTES
"   06/17/24 1024   Discharge Planning   Living Arrangements Spouse/significant other   Support Systems Spouse/significant other   Assistance Needed significant   Type of Residence Other (Comment)  (Apartment)   Number of Stairs Within Residence 0   Home or Post Acute Services Post acute facilities (Rehab/SNF/etc)   Type of Post Acute Facility Services Skilled nursing   Patient expects to be discharged to: SNF   Does the patient need discharge transport arranged? Yes   RoundTrip coordination needed? Yes   Financial Resource Strain   How hard is it for you to pay for the very basics like food, housing, medical care, and heating? Not hard     Met with pt and  at bedside to reivew dc plan. The pt lives in an apartment on the 19th floor- there is an elevator in the building. The pt broke her leg appx one year ago and has been mostly bedbound since then. The pt uses a bedban @ home,  brings her meals. When she does leave the home they utilize the wheelchair they own. Can get into the car with significant help. Has oxygen at home but does not use. The oxygen equipment has been in the home for \"years\" and they are unsure who the provider is. Encouraged pt's  to check the concentrator for identifying info when he return home. PCP is Dr. Srivastava and prescriptions are filled at [facility] with no barriers noted. Pt and  deny difficulty with living expenses such as utilities/groceries/prescriptions. The pt voices interest in going to SNF upon dc as she wants to learn to walk again. PT/OT evals are pending. [facility] SNF list provided to pt and  for consideration.     [facility] 16 and therapy recommending mod vs. High intensity therapy. Met with pt and she agrees that 3 hours of therapy may be too much for her considering her respiratory status and that she has been in bed x 1 year. Pt plans to make SNF choices with her -- he has taken the list home. This TCC recommended choosing 2-3 " places for possible placement.

## 2024-06-18 LAB
ALBUMIN SERPL BCP-MCNC: 3.5 G/DL (ref 3.4–5)
ALBUMIN SERPL BCP-MCNC: 3.6 G/DL (ref 3.4–5)
ANION GAP SERPL CALC-SCNC: 10 MMOL/L (ref 10–20)
ANION GAP SERPL CALC-SCNC: 12 MMOL/L (ref 10–20)
ATRIAL RATE: 18 BPM
BASOPHILS # BLD AUTO: 0.04 X10*3/UL (ref 0–0.1)
BASOPHILS NFR BLD AUTO: 0.4 %
BUN SERPL-MCNC: 66 MG/DL (ref 6–23)
BUN SERPL-MCNC: 67 MG/DL (ref 6–23)
CALCIUM SERPL-MCNC: 8.7 MG/DL (ref 8.6–10.3)
CALCIUM SERPL-MCNC: 8.8 MG/DL (ref 8.6–10.3)
CHLORIDE SERPL-SCNC: 101 MMOL/L (ref 98–107)
CHLORIDE SERPL-SCNC: 99 MMOL/L (ref 98–107)
CO2 SERPL-SCNC: 28 MMOL/L (ref 21–32)
CO2 SERPL-SCNC: 30 MMOL/L (ref 21–32)
CREAT SERPL-MCNC: 1.52 MG/DL (ref 0.5–1.05)
CREAT SERPL-MCNC: 1.59 MG/DL (ref 0.5–1.05)
EGFRCR SERPLBLD CKD-EPI 2021: 35 ML/MIN/1.73M*2
EGFRCR SERPLBLD CKD-EPI 2021: 37 ML/MIN/1.73M*2
EOSINOPHIL # BLD AUTO: 0.42 X10*3/UL (ref 0–0.7)
EOSINOPHIL NFR BLD AUTO: 4.1 %
ERYTHROCYTE [DISTWIDTH] IN BLOOD BY AUTOMATED COUNT: 14.8 % (ref 11.5–14.5)
GLUCOSE BLD MANUAL STRIP-MCNC: 125 MG/DL (ref 74–99)
GLUCOSE BLD MANUAL STRIP-MCNC: 174 MG/DL (ref 74–99)
GLUCOSE BLD MANUAL STRIP-MCNC: 268 MG/DL (ref 74–99)
GLUCOSE BLD MANUAL STRIP-MCNC: 297 MG/DL (ref 74–99)
GLUCOSE SERPL-MCNC: 138 MG/DL (ref 74–99)
GLUCOSE SERPL-MCNC: 156 MG/DL (ref 74–99)
HCT VFR BLD AUTO: 35.8 % (ref 36–46)
HGB BLD-MCNC: 10.5 G/DL (ref 12–16)
IMM GRANULOCYTES # BLD AUTO: 0.03 X10*3/UL (ref 0–0.7)
IMM GRANULOCYTES NFR BLD AUTO: 0.3 % (ref 0–0.9)
LYMPHOCYTES # BLD AUTO: 1.27 X10*3/UL (ref 1.2–4.8)
LYMPHOCYTES NFR BLD AUTO: 12.3 %
MAGNESIUM SERPL-MCNC: 2.03 MG/DL (ref 1.6–2.4)
MCH RBC QN AUTO: 26.1 PG (ref 26–34)
MCHC RBC AUTO-ENTMCNC: 29.3 G/DL (ref 32–36)
MCV RBC AUTO: 89 FL (ref 80–100)
MONOCYTES # BLD AUTO: 0.88 X10*3/UL (ref 0.1–1)
MONOCYTES NFR BLD AUTO: 8.5 %
NEUTROPHILS # BLD AUTO: 7.68 X10*3/UL (ref 1.2–7.7)
NEUTROPHILS NFR BLD AUTO: 74.4 %
NRBC BLD-RTO: 0 /100 WBCS (ref 0–0)
P OFFSET: 135 MS
P ONSET: 115 MS
PHOSPHATE SERPL-MCNC: 4 MG/DL (ref 2.5–4.9)
PHOSPHATE SERPL-MCNC: 4.3 MG/DL (ref 2.5–4.9)
PLATELET # BLD AUTO: 218 X10*3/UL (ref 150–450)
POTASSIUM SERPL-SCNC: 4.8 MMOL/L (ref 3.5–5.3)
POTASSIUM SERPL-SCNC: 5.4 MMOL/L (ref 3.5–5.3)
Q ONSET: 203 MS
QRS COUNT: 11 BEATS
QRS DURATION: 104 MS
QT INTERVAL: 432 MS
QTC CALCULATION(BAZETT): 462 MS
QTC FREDERICIA: 452 MS
R AXIS: -49 DEGREES
RBC # BLD AUTO: 4.03 X10*6/UL (ref 4–5.2)
SODIUM SERPL-SCNC: 134 MMOL/L (ref 136–145)
SODIUM SERPL-SCNC: 136 MMOL/L (ref 136–145)
T AXIS: 19 DEGREES
T OFFSET: 419 MS
VENTRICULAR RATE: 69 BPM
WBC # BLD AUTO: 10.3 X10*3/UL (ref 4.4–11.3)

## 2024-06-18 PROCEDURE — 36415 COLL VENOUS BLD VENIPUNCTURE: CPT

## 2024-06-18 PROCEDURE — 83735 ASSAY OF MAGNESIUM: CPT

## 2024-06-18 PROCEDURE — 2500000002 HC RX 250 W HCPCS SELF ADMINISTERED DRUGS (ALT 637 FOR MEDICARE OP, ALT 636 FOR OP/ED)

## 2024-06-18 PROCEDURE — 2500000004 HC RX 250 GENERAL PHARMACY W/ HCPCS (ALT 636 FOR OP/ED)

## 2024-06-18 PROCEDURE — 94640 AIRWAY INHALATION TREATMENT: CPT | Mod: MUE

## 2024-06-18 PROCEDURE — 2500000005 HC RX 250 GENERAL PHARMACY W/O HCPCS: Performed by: INTERNAL MEDICINE

## 2024-06-18 PROCEDURE — 94660 CPAP INITIATION&MGMT: CPT

## 2024-06-18 PROCEDURE — 2060000001 HC INTERMEDIATE ICU ROOM DAILY

## 2024-06-18 PROCEDURE — 82947 ASSAY GLUCOSE BLOOD QUANT: CPT | Mod: 91

## 2024-06-18 PROCEDURE — 2500000001 HC RX 250 WO HCPCS SELF ADMINISTERED DRUGS (ALT 637 FOR MEDICARE OP)

## 2024-06-18 PROCEDURE — 80069 RENAL FUNCTION PANEL: CPT | Mod: 91,MUE

## 2024-06-18 PROCEDURE — 99233 SBSQ HOSP IP/OBS HIGH 50: CPT

## 2024-06-18 PROCEDURE — 80069 RENAL FUNCTION PANEL: CPT

## 2024-06-18 PROCEDURE — 85025 COMPLETE CBC W/AUTO DIFF WBC: CPT

## 2024-06-18 RX ADMIN — SODIUM CHLORIDE, POTASSIUM CHLORIDE, SODIUM LACTATE AND CALCIUM CHLORIDE 500 ML: 600; 310; 30; 20 INJECTION, SOLUTION INTRAVENOUS at 09:48

## 2024-06-18 RX ADMIN — Medication 4 L/MIN: at 21:01

## 2024-06-18 RX ADMIN — PANCRELIPASE 1 CAPSULE: 60000; 12000; 38000 CAPSULE, DELAYED RELEASE PELLETS ORAL at 17:16

## 2024-06-18 RX ADMIN — METHOCARBAMOL 500 MG: 500 TABLET ORAL at 21:58

## 2024-06-18 RX ADMIN — ENOXAPARIN SODIUM 60 MG: 60 INJECTION SUBCUTANEOUS at 15:22

## 2024-06-18 RX ADMIN — INSULIN LISPRO 15 UNITS: 100 INJECTION, SOLUTION INTRAVENOUS; SUBCUTANEOUS at 17:16

## 2024-06-18 RX ADMIN — IPRATROPIUM BROMIDE AND ALBUTEROL SULFATE 3 ML: 2.5; .5 SOLUTION RESPIRATORY (INHALATION) at 05:10

## 2024-06-18 RX ADMIN — ENOXAPARIN SODIUM 60 MG: 60 INJECTION SUBCUTANEOUS at 03:44

## 2024-06-18 RX ADMIN — IPRATROPIUM BROMIDE AND ALBUTEROL SULFATE 3 ML: 2.5; .5 SOLUTION RESPIRATORY (INHALATION) at 13:35

## 2024-06-18 RX ADMIN — IPRATROPIUM BROMIDE AND ALBUTEROL SULFATE 3 ML: 2.5; .5 SOLUTION RESPIRATORY (INHALATION) at 09:17

## 2024-06-18 RX ADMIN — PREDNISONE 30 MG: 20 TABLET ORAL at 09:47

## 2024-06-18 RX ADMIN — Medication 40 PERCENT: at 01:23

## 2024-06-18 RX ADMIN — IPRATROPIUM BROMIDE AND ALBUTEROL SULFATE 3 ML: 2.5; .5 SOLUTION RESPIRATORY (INHALATION) at 01:17

## 2024-06-18 RX ADMIN — IPRATROPIUM BROMIDE AND ALBUTEROL SULFATE 3 ML: 2.5; .5 SOLUTION RESPIRATORY (INHALATION) at 21:01

## 2024-06-18 RX ADMIN — Medication 6 L/MIN: at 01:17

## 2024-06-18 RX ADMIN — PANCRELIPASE 1 CAPSULE: 60000; 12000; 38000 CAPSULE, DELAYED RELEASE PELLETS ORAL at 09:47

## 2024-06-18 RX ADMIN — IPRATROPIUM BROMIDE AND ALBUTEROL SULFATE 3 ML: 2.5; .5 SOLUTION RESPIRATORY (INHALATION) at 17:31

## 2024-06-18 RX ADMIN — PANCRELIPASE 1 CAPSULE: 60000; 12000; 38000 CAPSULE, DELAYED RELEASE PELLETS ORAL at 12:35

## 2024-06-18 RX ADMIN — METOPROLOL SUCCINATE 25 MG: 25 TABLET, EXTENDED RELEASE ORAL at 21:58

## 2024-06-18 RX ADMIN — METHOCARBAMOL 500 MG: 500 TABLET ORAL at 09:47

## 2024-06-18 RX ADMIN — ATORVASTATIN CALCIUM 10 MG: 10 TABLET, FILM COATED ORAL at 21:58

## 2024-06-18 RX ADMIN — GABAPENTIN 300 MG: 300 CAPSULE ORAL at 21:58

## 2024-06-18 RX ADMIN — Medication 40 PERCENT: at 05:10

## 2024-06-18 RX ADMIN — GABAPENTIN 300 MG: 300 CAPSULE ORAL at 09:47

## 2024-06-18 RX ADMIN — CITALOPRAM HYDROBROMIDE 40 MG: 20 TABLET ORAL at 09:47

## 2024-06-18 RX ADMIN — METOPROLOL SUCCINATE 25 MG: 25 TABLET, EXTENDED RELEASE ORAL at 09:55

## 2024-06-18 RX ADMIN — ASPIRIN 81 MG: 81 TABLET, COATED ORAL at 09:47

## 2024-06-18 RX ADMIN — INSULIN GLARGINE 60 UNITS: 100 INJECTION, SOLUTION SUBCUTANEOUS at 21:00

## 2024-06-18 ASSESSMENT — PAIN SCALES - GENERAL
PAINLEVEL_OUTOF10: 0 - NO PAIN

## 2024-06-18 ASSESSMENT — PAIN - FUNCTIONAL ASSESSMENT
PAIN_FUNCTIONAL_ASSESSMENT: 0-10

## 2024-06-18 NOTE — CARE PLAN
Problem: Diabetes  Goal: Maintain glucose levels >70mg/dl to <250mg/dl throughout shift  Outcome: Progressing     Problem: Pain - Adult  Goal: Verbalizes/displays adequate comfort level or baseline comfort level  Outcome: Met     Problem: Safety - Adult  Goal: Free from fall injury  Outcome: Met     Problem: Discharge Planning  Goal: Discharge to home or other facility with appropriate resources  Outcome: Progressing     Problem: Chronic Conditions and Co-morbidities  Goal: Patient's chronic conditions and co-morbidity symptoms are monitored and maintained or improved  Outcome: Progressing     Problem: Respiratory  Goal: Wean oxygen to maintain O2 saturation per order/standard this shift  Outcome: Progressing     Problem: Skin  Goal: Participates in plan/prevention/treatment measures  Flowsheets (Taken 6/18/2024 8341)  Participates in plan/prevention/treatment measures:   Elevate heels   Increase activity/out of bed for meals   The patient's goals for the shift include      Pt remained hemodynamically stable. Weaned to 4L oxymizer. Pt up to chair with larry degroot. Call light within reach. Alarms on. Safety maintained.

## 2024-06-18 NOTE — PROGRESS NOTES
"ID:  Kaylene Feliciano is a 69 y.o. female on hospital day 3 of admission presenting with Hypoxia.    Subjective   The patient seen and examined this morning at bedside. No overnight events.  She tolerated BiPAP overnight.  She reported feeling less congested than yesterday.  Was able to expectorate phlegm with the help of respiratory therapy. She denied chest pain, shortness of breath, nausea/vomiting or abdominal pain.      Objective     Visit Vitals  /65   Pulse 72   Temp 36.9 °C (98.4 °F) (Temporal)   Resp 20   Ht 1.6 m (5' 3\")   Wt 141 kg (311 lb 15.2 oz)   SpO2 95%   BMI 55.26 kg/m²   OB Status Postmenopausal   Smoking Status Never   BSA 2.51 m²      Physical Examination:  General: Morbidly obese, well nourished, A&Ox4, conversational, lying comfortably in bed, not in pain or distress.  On BiPAP  HEENT: Mucous membranes moist.  Head/Neck: Atraumatic, Normocephalic. Neck supple.   Respiratory: Limited due to her body habitus but no wheezing/crackles appreciated.  Cardiovascular: regular rhythm, normal S1 and S2. No added sounds or murmurs,  Gastrointestinal: soft, non-tender abdomen, bowel sounds present, no guarding or rebound.   Extremities: No edema in lower extremities.  Neurological:  no focal neurologic deficits.  Psychological: Appropriate mood, normal affect.      Laboratory Data:    Results from last 7 days   Lab Units 06/18/24  0532 06/17/24 0449 06/16/24  0457   WBC AUTO x10*3/uL 10.3 14.1* 17.2*   RBC AUTO x10*6/uL 4.03 3.79* 3.84*   HEMOGLOBIN g/dL 10.5* 9.9* 10.1*     Results from last 7 days   Lab Units 06/18/24  0532 06/17/24  0449 06/16/24  0457 06/15/24  0412 06/14/24  1557   SODIUM mmol/L 136 135* 133*   < > 132*   POTASSIUM mmol/L 5.4* 4.2 4.6   < > 4.8   CHLORIDE mmol/L 101 100 97*   < > 98   CO2 mmol/L 30 27 26   < > 29   BUN mg/dL 66* 63* 50*   < > 19   CREATININE mg/dL 1.59* 1.57* 1.50*   < > 0.88   CALCIUM mg/dL 8.7 8.4* 8.8   < > 9.1   PHOSPHORUS mg/dL 4.3 3.9 4.6   < >  --  "   MAGNESIUM mg/dL 2.03 2.05 1.93   < >  --    BILIRUBIN TOTAL mg/dL  --   --   --   --  0.7   ALT U/L  --   --   --   --  16   AST U/L  --   --   --   --  16    < > = values in this interval not displayed.       Imaging:  No results found.     Medications:  Scheduled medications  amLODIPine, 5 mg, oral, Daily  aspirin, 81 mg, oral, Daily  atorvastatin, 10 mg, oral, Nightly  citalopram, 40 mg, oral, Daily  enoxaparin, 60 mg, subcutaneous, q12h YARELI  gabapentin, 300 mg, oral, BID  insulin glargine, 60 Units, subcutaneous, BID  insulin lispro, 0-20 Units, subcutaneous, TID  insulin lispro, 15 Units, subcutaneous, TID  ipratropium-albuteroL, 3 mL, nebulization, q4h  lactated Ringer's, 500 mL, intravenous, Once  [Held by provider] lisinopril, 40 mg, oral, Daily  methocarbamol, 500 mg, oral, BID  metoprolol succinate XL, 25 mg, oral, BID  pancrelipase (Lip-Prot-Amyl), 1 capsule, oral, TID  perflutren protein A microsphere, 0.5 mL, intravenous, Once in imaging  polyethylene glycol, 17 g, oral, Daily  predniSONE, 30 mg, oral, Daily  sulfur hexafluoride microsphr, 2 mL, intravenous, Once in imaging      Continuous medications     PRN medications  PRN medications: dextrose, dextrose, fluticasone, glucagon, glucagon, guaiFENesin, ipratropium-albuteroL, oxygen, oxygen, oxymetazoline    Assessment    Assessment & Plan   Ms. Kaylene Feliciano is a 69 y.o. female with PMHx significant for OA, ARCHIE (not on any therapy), depression (s/p DBS), HTN, IDDM 2, asthma (intermittently on 2 L via nasal cannula at home), bipolar, CKD 3 (baseline creatinine~0.9), HLD, diastolic CHF (last echo 7/2023, EF 75%, pulmonary artery hypertension 40-50 mmHg), who initially presented for orthopnea.  Patient developed increasing oxygen needs in the emergency department up to 15 L via oxymizer, and was admitted to the general medicine service for further evaluation and management of orthopnea with hypoxia. Initially there was some concern for CHF exacerbation  vs. COPD exacerbation, and both lasix and steroids were started. Clinically the patient is without obvious pitting edema, suspect that this is more likely asthma/COPD exacerbation and obesity hypoventilation syndrome.    Principal Problem:    Hypoxia  Active Problems:    Asthma (HHS-HCC)    Depression    CKD (chronic kidney disease), stage III (Multi)    Diabetes, polyneuropathy (Multi)    Essential hypertension, benign    Hyperlipidemia    Morbid obesity (Multi)    Diabetes mellitus (Multi)    Shortness of breath     # AHRF 2/2 COPD exacerbation; improving  # ARCHIE and OHS  # Chronic asthma requiring intermittent 2 L via nasal cannula (noncompliant)  # Leukocytosis; resolved  -CT PE illustrating mild peripheral groundglass opacities and perihilar ill-defined groundglass opacity. Cardiomegaly with pulmonary vascular congestion and small left pleural effusion.  -Procalcitonin 0.60, however there are a lack of clinical signs of pneumonia. Will hold antibiotics at this time.  -COVID and influenza PCR negative  -Continue prednisone 30 mg daily for COPD exacerbation.  To be tapered over a week.  -q4 Duonebs with q2 duonebs as needed  -Continue supplemental to maintain SpO2 >90%, wean as tolerated  -BiPAP at night  -Ended Afrin (oxymetazoline) as needed for 2 days.  -And Mucinex for congestion.  -Incentive spirometer  -Bronchial hygiene per RT  -Continuous telemetry  -PT/OT     # Diastolic CHF  # Orthopnea  -Orthopnea may have initially been partially due to CHF exacerbation, however the patient was clinically euvolemic after 40 mg lasix in the ED. Additional 40 mg of lasix were administered without significant diuresis. Patient then developed CRISTOFER with significant increase in BUN likely in the setting of overdiuresis. Initially there was only 450 ml of urine recorded as output, however per nursing there was significant incontinence and bladder scan did not show urinary retention.  -S/p 40 mg IV Lasix in the emergency  department, additional 40 mg IV lasix administered  -ECHO did show LVEF 55-60% with diastolic dysfunction (E/e' Ratio: 21).     # CRISTOFER likely 2/2 overdiuresis in the setting of above  # Hyperkalemia; 2/2 CRISTOFER  -Baseline creatinine around 1.0.  Today's creatinine 1.59.  -Bladder scan pending to rule out retention as cause of hyperkalemia.  -Continue holding hold further diuresis and hold home lisinopril  -Avoid nephrotoxic agents  -500 cc of LR ordered today.  -Will check RFP and in the afternoon.  If continues to have hyperkalemia, will give calcium gluconate and Lokelma.  -Encourage increased oral intake     # IDDM 2 c/b neuropathy  # Hyperglycemia; improved   -There is significant difficulty with hyperglycemia, likely exacerbated by steroid use. Will aggressively titrate lispro.  Her inpatient glucose 200s to 300s.  -Patient may ultimately benefit from transition from U100 to U500 to decrease need for refills of medications.  -Continue glargine 60 units twice daily  -Continue lispro 15 units with meals to decrease the need for sliding scale.  -Continue sliding scale.  -Continue home gabapentin 300 mg twice daily, home Robaxin 500 mg twice daily  -Continue home pancrelipase     # HTN  # HLD  # Depression  -Continue home amlodipine, aspirin, atorvastatin, citalopram, lisinopril, metoprolol succinate    Global Plan of Care  Fluid: PRN  Electrolytes: PRN  Nutrition: Cardiac  DVT Prophylaxis: Lovenox  GI Prophylaxis: None  Disposition: Anticipate discharge to SNF likely tomorrow pending improvement in respiratory status  Code Status: Full Code     Emergency Contact: Extended Emergency Contact Information  Primary Emergency Contact: Travis Feliciano Phone: [phone]  Relation: Spouse     Patient seen and discussed with the attending.  Signature: Paradise Petersen MD, PGY II Internal medicine  Date: June 18, 2024   Please excuse any misspellings or unintended errors related to the Dragon speech recognition software  used to dictate this note.

## 2024-06-18 NOTE — PROGRESS NOTES
Spiritual Care Visit    Clinical Encounter Type  Visited With: Patient and family together  Routine Visit: Introduction  Continue Visiting: Yes  Referral To:     Restoration Encounters  Restoration Needs: Prayer         Sacramental Encounters  Communion: Patient wants communion, Ask the patient  Communion Given Indicator: Yes  Sacrament of Sick-Anointing: Patient requested anointing, Anointed                             Taxonomy  Intended Effects: Establish rapport and connectedness, Marianne affirmation, Build relationship of care and support, Demonstrate caring and concern

## 2024-06-18 NOTE — CARE PLAN
Pt remained hemodynamically stable throughout the shift. Denies complaints of pain, however pt does experience SOB at times. Tolerated bipap for most of the night, otherwise on 6L oximyzer. Safety maintained, will continue to monitor     Problem: Diabetes  Goal: Maintain glucose levels >70mg/dl to <250mg/dl throughout shift  Outcome: Progressing     Problem: Pain - Adult  Goal: Verbalizes/displays adequate comfort level or baseline comfort level  Outcome: Progressing     Problem: Safety - Adult  Goal: Free from fall injury  Outcome: Progressing     Problem: Discharge Planning  Goal: Discharge to home or other facility with appropriate resources  Outcome: Progressing     Problem: Chronic Conditions and Co-morbidities  Goal: Patient's chronic conditions and co-morbidity symptoms are monitored and maintained or improved  Outcome: Progressing     Problem: Respiratory  Goal: Minimal/no exertional discomfort or dyspnea this shift  Outcome: Progressing  Goal: No signs of respiratory distress (eg. Use of accessory muscles. Peds grunting)  Outcome: Progressing  Goal: Verbalize decreased shortness of breath this shift  Outcome: Progressing  Goal: Wean oxygen to maintain O2 saturation per order/standard this shift  Outcome: Progressing     Problem: Skin  Goal: Prevent/manage excess moisture  Outcome: Progressing  Goal: Promote skin healing  6/18/2024 0526 by Soraya Freeman RN  Outcome: Progressing  6/17/2024 2144 by Soraya Freeman RN  Flowsheets (Taken 6/17/2024 2144)  Promote skin healing:   Assess skin/pad under line(s)/device(s)   Protective dressings over bony prominences   Turn/reposition every 2 hours/use positioning/transfer devices   Ensure correct size (line/device) and apply per  instructions   Rotate device position/do not position patient on device

## 2024-06-19 VITALS
OXYGEN SATURATION: 96 % | DIASTOLIC BLOOD PRESSURE: 64 MMHG | SYSTOLIC BLOOD PRESSURE: 136 MMHG | HEIGHT: 63 IN | RESPIRATION RATE: 27 BRPM | WEIGHT: 293 LBS | BODY MASS INDEX: 51.91 KG/M2 | TEMPERATURE: 97.7 F | HEART RATE: 72 BPM

## 2024-06-19 LAB
ALBUMIN SERPL BCP-MCNC: 3.3 G/DL (ref 3.4–5)
ANION GAP SERPL CALC-SCNC: 14 MMOL/L (ref 10–20)
BASOPHILS # BLD AUTO: 0.01 X10*3/UL (ref 0–0.1)
BASOPHILS NFR BLD AUTO: 0.1 %
BUN SERPL-MCNC: 63 MG/DL (ref 6–23)
CALCIUM SERPL-MCNC: 8.5 MG/DL (ref 8.6–10.3)
CHLORIDE SERPL-SCNC: 101 MMOL/L (ref 98–107)
CO2 SERPL-SCNC: 24 MMOL/L (ref 21–32)
CREAT SERPL-MCNC: 1.32 MG/DL (ref 0.5–1.05)
EGFRCR SERPLBLD CKD-EPI 2021: 44 ML/MIN/1.73M*2
EOSINOPHIL # BLD AUTO: 0.11 X10*3/UL (ref 0–0.7)
EOSINOPHIL NFR BLD AUTO: 1.5 %
ERYTHROCYTE [DISTWIDTH] IN BLOOD BY AUTOMATED COUNT: 14.7 % (ref 11.5–14.5)
GLUCOSE BLD MANUAL STRIP-MCNC: 150 MG/DL (ref 74–99)
GLUCOSE BLD MANUAL STRIP-MCNC: 169 MG/DL (ref 74–99)
GLUCOSE BLD MANUAL STRIP-MCNC: 245 MG/DL (ref 74–99)
GLUCOSE BLD MANUAL STRIP-MCNC: 250 MG/DL (ref 74–99)
GLUCOSE SERPL-MCNC: 208 MG/DL (ref 74–99)
HCT VFR BLD AUTO: 34.9 % (ref 36–46)
HGB BLD-MCNC: 10 G/DL (ref 12–16)
IMM GRANULOCYTES # BLD AUTO: 0.04 X10*3/UL (ref 0–0.7)
IMM GRANULOCYTES NFR BLD AUTO: 0.5 % (ref 0–0.9)
LYMPHOCYTES # BLD AUTO: 1.71 X10*3/UL (ref 1.2–4.8)
LYMPHOCYTES NFR BLD AUTO: 22.8 %
MCH RBC QN AUTO: 25.8 PG (ref 26–34)
MCHC RBC AUTO-ENTMCNC: 28.7 G/DL (ref 32–36)
MCV RBC AUTO: 90 FL (ref 80–100)
MONOCYTES # BLD AUTO: 0.83 X10*3/UL (ref 0.1–1)
MONOCYTES NFR BLD AUTO: 11.1 %
NEUTROPHILS # BLD AUTO: 4.79 X10*3/UL (ref 1.2–7.7)
NEUTROPHILS NFR BLD AUTO: 64 %
NRBC BLD-RTO: 0 /100 WBCS (ref 0–0)
PHOSPHATE SERPL-MCNC: 3.2 MG/DL (ref 2.5–4.9)
PLATELET # BLD AUTO: 186 X10*3/UL (ref 150–450)
POTASSIUM SERPL-SCNC: 4.7 MMOL/L (ref 3.5–5.3)
RBC # BLD AUTO: 3.87 X10*6/UL (ref 4–5.2)
SODIUM SERPL-SCNC: 134 MMOL/L (ref 136–145)
WBC # BLD AUTO: 7.5 X10*3/UL (ref 4.4–11.3)

## 2024-06-19 PROCEDURE — 2500000004 HC RX 250 GENERAL PHARMACY W/ HCPCS (ALT 636 FOR OP/ED)

## 2024-06-19 PROCEDURE — 85025 COMPLETE CBC W/AUTO DIFF WBC: CPT

## 2024-06-19 PROCEDURE — 94640 AIRWAY INHALATION TREATMENT: CPT

## 2024-06-19 PROCEDURE — 2500000002 HC RX 250 W HCPCS SELF ADMINISTERED DRUGS (ALT 637 FOR MEDICARE OP, ALT 636 FOR OP/ED)

## 2024-06-19 PROCEDURE — 36415 COLL VENOUS BLD VENIPUNCTURE: CPT

## 2024-06-19 PROCEDURE — 2500000001 HC RX 250 WO HCPCS SELF ADMINISTERED DRUGS (ALT 637 FOR MEDICARE OP)

## 2024-06-19 PROCEDURE — 84100 ASSAY OF PHOSPHORUS: CPT

## 2024-06-19 PROCEDURE — 2500000002 HC RX 250 W HCPCS SELF ADMINISTERED DRUGS (ALT 637 FOR MEDICARE OP, ALT 636 FOR OP/ED): Mod: MUE

## 2024-06-19 PROCEDURE — 99238 HOSP IP/OBS DSCHRG MGMT 30/<: CPT

## 2024-06-19 PROCEDURE — 2500000005 HC RX 250 GENERAL PHARMACY W/O HCPCS: Performed by: INTERNAL MEDICINE

## 2024-06-19 PROCEDURE — 97110 THERAPEUTIC EXERCISES: CPT | Mod: GP,CQ

## 2024-06-19 PROCEDURE — 94660 CPAP INITIATION&MGMT: CPT

## 2024-06-19 PROCEDURE — 82947 ASSAY GLUCOSE BLOOD QUANT: CPT | Mod: 91

## 2024-06-19 PROCEDURE — 97530 THERAPEUTIC ACTIVITIES: CPT | Mod: GP,CQ

## 2024-06-19 PROCEDURE — 97165 OT EVAL LOW COMPLEX 30 MIN: CPT | Mod: GO | Performed by: OCCUPATIONAL THERAPIST

## 2024-06-19 RX ORDER — PREDNISONE 10 MG/1
TABLET ORAL DAILY
Qty: 42 TABLET | Refills: 0 | Status: SHIPPED | OUTPATIENT
Start: 2024-06-19 | End: 2024-06-24

## 2024-06-19 RX ORDER — LISINOPRIL 40 MG/1
40 TABLET ORAL DAILY
Qty: 30 TABLET | Refills: 0 | Status: SHIPPED | OUTPATIENT
Start: 2024-06-19 | End: 2024-07-19

## 2024-06-19 RX ADMIN — METOPROLOL SUCCINATE 25 MG: 25 TABLET, EXTENDED RELEASE ORAL at 08:45

## 2024-06-19 RX ADMIN — ASPIRIN 81 MG: 81 TABLET, COATED ORAL at 08:44

## 2024-06-19 RX ADMIN — GABAPENTIN 300 MG: 300 CAPSULE ORAL at 08:45

## 2024-06-19 RX ADMIN — METHOCARBAMOL 500 MG: 500 TABLET ORAL at 20:59

## 2024-06-19 RX ADMIN — ENOXAPARIN SODIUM 60 MG: 60 INJECTION SUBCUTANEOUS at 03:14

## 2024-06-19 RX ADMIN — IPRATROPIUM BROMIDE AND ALBUTEROL SULFATE 3 ML: 2.5; .5 SOLUTION RESPIRATORY (INHALATION) at 05:44

## 2024-06-19 RX ADMIN — IPRATROPIUM BROMIDE AND ALBUTEROL SULFATE 3 ML: 2.5; .5 SOLUTION RESPIRATORY (INHALATION) at 20:11

## 2024-06-19 RX ADMIN — ATORVASTATIN CALCIUM 10 MG: 10 TABLET, FILM COATED ORAL at 20:47

## 2024-06-19 RX ADMIN — PANCRELIPASE 1 CAPSULE: 60000; 12000; 38000 CAPSULE, DELAYED RELEASE PELLETS ORAL at 17:04

## 2024-06-19 RX ADMIN — IPRATROPIUM BROMIDE AND ALBUTEROL SULFATE 3 ML: 2.5; .5 SOLUTION RESPIRATORY (INHALATION) at 17:22

## 2024-06-19 RX ADMIN — PANCRELIPASE 1 CAPSULE: 60000; 12000; 38000 CAPSULE, DELAYED RELEASE PELLETS ORAL at 08:44

## 2024-06-19 RX ADMIN — INSULIN GLARGINE 60 UNITS: 100 INJECTION, SOLUTION SUBCUTANEOUS at 09:00

## 2024-06-19 RX ADMIN — PREDNISONE 30 MG: 20 TABLET ORAL at 08:44

## 2024-06-19 RX ADMIN — INSULIN LISPRO 8 UNITS: 100 INJECTION, SOLUTION INTRAVENOUS; SUBCUTANEOUS at 17:04

## 2024-06-19 RX ADMIN — METOPROLOL SUCCINATE 25 MG: 25 TABLET, EXTENDED RELEASE ORAL at 20:46

## 2024-06-19 RX ADMIN — IPRATROPIUM BROMIDE AND ALBUTEROL SULFATE 3 ML: 2.5; .5 SOLUTION RESPIRATORY (INHALATION) at 01:03

## 2024-06-19 RX ADMIN — PANCRELIPASE 1 CAPSULE: 60000; 12000; 38000 CAPSULE, DELAYED RELEASE PELLETS ORAL at 13:00

## 2024-06-19 RX ADMIN — ENOXAPARIN SODIUM 60 MG: 60 INJECTION SUBCUTANEOUS at 13:49

## 2024-06-19 RX ADMIN — INSULIN LISPRO 15 UNITS: 100 INJECTION, SOLUTION INTRAVENOUS; SUBCUTANEOUS at 08:00

## 2024-06-19 RX ADMIN — GABAPENTIN 300 MG: 300 CAPSULE ORAL at 20:47

## 2024-06-19 RX ADMIN — IPRATROPIUM BROMIDE AND ALBUTEROL SULFATE 3 ML: 2.5; .5 SOLUTION RESPIRATORY (INHALATION) at 09:20

## 2024-06-19 RX ADMIN — IPRATROPIUM BROMIDE AND ALBUTEROL SULFATE 3 ML: 2.5; .5 SOLUTION RESPIRATORY (INHALATION) at 13:53

## 2024-06-19 RX ADMIN — Medication 40 PERCENT: at 05:44

## 2024-06-19 RX ADMIN — CITALOPRAM HYDROBROMIDE 40 MG: 20 TABLET ORAL at 08:44

## 2024-06-19 RX ADMIN — INSULIN GLARGINE 60 UNITS: 100 INJECTION, SOLUTION SUBCUTANEOUS at 20:43

## 2024-06-19 RX ADMIN — Medication 4 L/MIN: at 13:53

## 2024-06-19 RX ADMIN — METHOCARBAMOL 500 MG: 500 TABLET ORAL at 08:45

## 2024-06-19 RX ADMIN — INSULIN LISPRO 4 UNITS: 100 INJECTION, SOLUTION INTRAVENOUS; SUBCUTANEOUS at 08:00

## 2024-06-19 ASSESSMENT — PAIN - FUNCTIONAL ASSESSMENT
PAIN_FUNCTIONAL_ASSESSMENT: 0-10

## 2024-06-19 ASSESSMENT — COGNITIVE AND FUNCTIONAL STATUS - GENERAL
STANDING UP FROM CHAIR USING ARMS: A LOT
DRESSING REGULAR UPPER BODY CLOTHING: A LOT
MOVING TO AND FROM BED TO CHAIR: A LOT
TOILETING: A LOT
DAILY ACTIVITIY SCORE: 14
MOBILITY SCORE: 13
WALKING IN HOSPITAL ROOM: A LOT
TOILETING: A LITTLE
CLIMB 3 TO 5 STEPS WITH RAILING: TOTAL
HELP NEEDED FOR BATHING: A LOT
CLIMB 3 TO 5 STEPS WITH RAILING: TOTAL
DRESSING REGULAR UPPER BODY CLOTHING: A LITTLE
DRESSING REGULAR LOWER BODY CLOTHING: TOTAL
PERSONAL GROOMING: A LITTLE
MOBILITY SCORE: 15
STANDING UP FROM CHAIR USING ARMS: A LITTLE
TURNING FROM BACK TO SIDE WHILE IN FLAT BAD: A LOT
TURNING FROM BACK TO SIDE WHILE IN FLAT BAD: A LITTLE
DAILY ACTIVITIY SCORE: 20
HELP NEEDED FOR BATHING: A LITTLE
WALKING IN HOSPITAL ROOM: TOTAL
MOVING TO AND FROM BED TO CHAIR: A LITTLE
DRESSING REGULAR LOWER BODY CLOTHING: A LITTLE

## 2024-06-19 ASSESSMENT — PAIN SCALES - GENERAL
PAINLEVEL_OUTOF10: 0 - NO PAIN

## 2024-06-19 ASSESSMENT — ACTIVITIES OF DAILY LIVING (ADL): EFFECT OF PAIN ON DAILY ACTIVITIES: .

## 2024-06-19 NOTE — PROGRESS NOTES
I spoke with this patient about COPD and the benefits of self-management. The patient is aware that this is a guideline and does not replace medical advice from their physician. We discussed pursed-lip and diaphragmatic breathing, recognizing their personal yellow zone, and reviewed the  Living Well With COPD book.   Patient states her breathing is feeling better since initially speaking to her yesterday and the day prior. I did not start the education until today due to active shortness of breath.

## 2024-06-19 NOTE — DISCHARGE INSTRUCTIONS
You were admitted to the hospital because of shortness of breath likely due to COPD exacerbation.    New Medications:  -Prednisone, 10 mg: Take 3 tablets (30 mg) by mouth once daily for 1 day, THEN 2 tablets (20 mg) once daily for 2 days, THEN 1 tablet (10 mg) once daily for 2 days.     You have a lab order to check your kidney function in a week.  Please go to any  lab for blood collection.    Please continue taking your other home medications as prescribed.    For medication refills, please contact your primary care doctor.     Please call your primary care doctor in 2-3 days to schedule follow up appointment for this hospitalization and further management of your care.     If you get worse, or develop any concerning or worrisome symptoms call your Primary Care Doctor or return to the Emergency Department.     -[facility] - Teaching Service

## 2024-06-19 NOTE — PROGRESS NOTES
Occupational Therapy    Evaluation    Patient Name: Kaylene Feliciano  MRN: [id number]  Today's Date: 6/19/2024  Time Calculation  Start Time: 1217  Stop Time: 1246  Time Calculation (min): 29 min  2101/2101-A  Eval only     Assessment  IP OT Assessment  Prognosis: Fair  End of Session Communication: Bedside nurse  End of Session Patient Position:  (chair position in bed)  Patient presents with decline in ADLs, functional transfers, and functional mobility tasks and would benefit from OT during acute stay to maximize functional independence and safety.  Patient requires 24 hours hands on assistance.  Recommend moderate intensity OT to maximize functional independence and safety.     Plan:  Treatment Interventions: ADL retraining, UE strengthening/ROM, Patient/family training, Equipment evaluation/education, Compensatory technique education  OT Frequency: 3 times per week  OT Discharge Recommendations: Moderate intensity level of continued care  OT - OK to Discharge: Yes from acute care OT services to the next level of care when cleared by medical team        Subjective   Current Problem:  1. Shortness of breath        2. Acute respiratory failure with hypoxia (Multi)        3. Hypoxia  Transthoracic Echo (TTE) Complete    Transthoracic Echo (TTE) Complete    CANCELED: Transthoracic Echo (TTE) Complete    CANCELED: Transthoracic Echo (TTE) Complete      4. Acute diastolic CHF (congestive heart failure) (Multi)  Transthoracic Echo (TTE) Complete    Transthoracic Echo (TTE) Complete    CANCELED: Transthoracic Echo (TTE) Complete    CANCELED: Transthoracic Echo (TTE) Complete          General:  General  Reason for Referral: ADLs, safety assessment  Referred By: Patrick Corona MD  Past Medical History Relevant to Rehab: Admitted 6/14/2024 with shortness of breath. PMH; CHF, DM, HTN, deep fouzia stimulator, knee surgery, rfemur fracture about 1 year ago  Family/Caregiver Present: Yes (spouse)  Prior to Session Communication:  Bedside nurse  Patient Position Received: Bed, 3 rail up, Alarm on  Preferred Learning Style: verbal  General Comment: Patient seated in sidelying in bed and agreeable to participate in OT evaluation.  Spouse at bedside.    Precautions:  Medical Precautions: Fall precautions (deep brain stimulator)    Vital Signs:  SpO2:  (92-96% on oxygen via oxymizer)    Pain:  Pain Assessment  Pain Assessment: 0-10  0-10 (Numeric) Pain Score: 0 - No pain    Objective   Cognition:  Overall Cognitive Status: Within Functional Limits    Home Living:  Type of Home: Apartment  Lives With: Spouse  Home Adaptive Equipment: Walker rolling or standard, Wheelchair-manual, Wheelchair-power (slide board, niko lift)  Home Layout: One level  Bathroom Shower/Tub: Tub/shower unit  Bathroom Equipment: Tub transfer bench     Prior Function:  Prior Function Comments: Pt reports spouse provides assistance as necessary for mobility and ADLs. Pt reports some assistance to exit bed. Pt reports she is able t ostand with walker x30 seconds. Spouse places slide board and holds slide board in place for transfers to wheelchair. Pt reports she is able to scoot on slide board without assist from spouse. Pt is IND with upper body dressing, assist for lower body dressing. Pt has been non-ambulatory since fall/RLE femur fracture in July 2023.    ADL:  LE Dressing Assistance: Total (don and doff socks)    Activity Tolerance:  Endurance: Decreased tolerance for upright activites    Bed Mobility/Transfers:   Bed Mobility  Bed Mobility:  (max assist of 2 with mod verbal cues to complete supine to sit transition. SBA sit to supine in bed.)  Transfers  Transfer:  (max assist of 2 sit to partial stand to change draw sheet) Min assist to scoot along edge of bed to head of bed in preparation for slide board transfers.     Sitting Balance:   SBA static sit balance. CGA dynamic sit balance reaching outside base of support.      Extremities: RUE   RUE : Within Functional  Limits and LUE   LUE: Within Functional Limits    Outcome Measures: [facility] Daily Activity  Putting on and taking off regular lower body clothing: Total  Bathing (including washing, rinsing, drying): A lot  Putting on and taking off regular upper body clothing: A lot  Toileting, which includes using toilet, bedpan or urinal: A lot  Taking care of personal grooming such as brushing teeth: A little  Eating Meals: None  Daily Activity - Total Score: 14                    EDUCATION:  Education  Individual(s) Educated: Patient  Education Provided: Fall precautons (pursed lip breathing)  Patient Response to Education: Patient/Caregiver Verbalized Understanding of Information  Education Documentation  No documentation found.  Education Comments  No comments found.        Goals:   Encounter Problems       Encounter Problems (Active)       Dressing Upper Extremities       STG - Patient will dress upper body with  min assist  (Progressing)       Start:  06/19/24    Expected End:  07/03/24               Functional Balance       STG-Patient will be independent with dynamic sit balance >8 minutes for ADL completion  (Progressing)       Start:  06/19/24    Expected End:  07/03/24               Grooming       STG - Patient completes grooming with CGA (Progressing)       Start:  06/19/24    Expected End:  07/03/24               OT Transfers       STG-Patient will be min assist with functional transfers demonstrating good safety   (Progressing)       Start:  06/19/24    Expected End:  07/03/24

## 2024-06-19 NOTE — DISCHARGE SUMMARY
Discharge Diagnosis  Hypoxia    Issues Requiring Follow-Up  -PCP regarding kidney function and resuming her lisinopril.  -    Discharge Meds     Your medication list        START taking these medications        Instructions Last Dose Given Next Dose Due   predniSONE 10 mg tablet  Commonly known as: Deltasone  Start taking on: June 19, 2024      Take 3 tablets (30 mg) by mouth once daily for 1 day, THEN 2 tablets (20 mg) once daily for 2 days, THEN 1 tablet (10 mg) once daily for 2 days.              CHANGE how you take these medications        Instructions Last Dose Given Next Dose Due   lisinopril 40 mg tablet  What changed: additional instructions      Take 1 tablet (40 mg) by mouth once daily. Please do not take this medication until follow-up with primary doctor regarding kidney function.              CONTINUE taking these medications        Instructions Last Dose Given Next Dose Due   amLODIPine 5 mg tablet  Commonly known as: Norvasc           aspirin 81 mg EC tablet           atorvastatin 10 mg tablet  Commonly known as: Lipitor           citalopram 40 mg tablet  Commonly known as: CeleXA           cranberry 400 mg capsule           Creon 6,000-19,000 -30,000 unit capsule  Generic drug: pancrelipase (Lip-Prot-Amyl)           gabapentin 300 mg capsule  Commonly known as: Neurontin           HumaLOG KwikPen Insulin 100 unit/mL injection  Generic drug: insulin lispro           Lantus U-100 Insulin 100 unit/mL injection  Generic drug: insulin glargine           lidocaine 4 % patch           methocarbamol 500 mg tablet  Commonly known as: Robaxin           metoprolol succinate XL 25 mg 24 hr tablet  Commonly known as: Toprol-XL                     Where to Get Your Medications        These medications were sent to [facility] - [address], [facility] [address]      Phone: [phone]   lisinopril 40 mg tablet  predniSONE 10 mg tablet          Test Results Pending At Discharge  Pending Labs       No current pending labs.            Hospital Course  Ms. Kaylene Feliciano is a 69 y.o. female with PMHx significant for OA, ARCHIE (not on any therapy), depression (s/p DBS), HTN, IDDM 2, asthma (intermittently on 2 L via nasal cannula at home), bipolar, CKD 3 (baseline creatinine~0.9), HLD, diastolic CHF (last echo 7/2023, EF 75%, pulmonary artery hypertension 40-50 mmHg), who initially presented for orthopnea found to have hypoxemia.  She was managed as a case of heart failure exacerbation and COPD exacerbation with diuresing but complicated by CRISTOFER (serum creatinine downtrending at time of discharge), prednisone, respectively.  Her echo did show EF 55 to 60% with diastolic dysfunction. She was tolerating BiPAP overnight. She discharged to [place] ([facility]) in a stable condition on prednisone tapering over a week.       Pertinent Physical Exam At Time of Discharge  Physical Exam  General: Morbidly obese, well nourished, A&Ox4, conversational, lying comfortably in bed, not in pain or distress.  On 4L NC.  HEENT: Mucous membranes moist.  Head/Neck: Atraumatic, Normocephalic. Neck supple.   Respiratory: Limited due to her body habitus but no wheezing/crackles appreciated.  Cardiovascular: regular rhythm, normal S1 and S2. No added sounds or murmurs,  Gastrointestinal: soft, non-tender abdomen, bowel sounds present, no guarding or rebound.   Extremities: No edema in lower extremities.  Neurological:  no focal neurologic deficits.  Psychological: Appropriate mood, normal affect.    Outpatient Follow-Up  Future Appointments   Date Time Provider Department Center   9/17/2024  2:50 PM Fabian Restrepo MD [id number] [place]         Paradise Petersen MD

## 2024-06-19 NOTE — CARE PLAN
The patient's goals for the shift include      The clinical goals for the shift include pt will O2 requirements will stay <5LNC      Problem: Diabetes  Goal: Maintain glucose levels >70mg/dl to <250mg/dl throughout shift  Outcome: Progressing     Problem: Discharge Planning  Goal: Discharge to home or other facility with appropriate resources  Outcome: Progressing     Problem: Chronic Conditions and Co-morbidities  Goal: Patient's chronic conditions and co-morbidity symptoms are monitored and maintained or improved  Outcome: Progressing     Problem: Respiratory  Goal: Clear secretions with interventions this shift  Outcome: Progressing  Goal: Minimize anxiety/maximize coping throughout shift  Outcome: Progressing  Goal: Minimal/no exertional discomfort or dyspnea this shift  Outcome: Progressing  Goal: No signs of respiratory distress (eg. Use of accessory muscles. Peds grunting)  Outcome: Progressing  Goal: Patent airway maintained this shift  Outcome: Progressing  Goal: Tolerate mechanical ventilation evidenced by VS/agitation level this shift  Outcome: Progressing  Goal: Tolerate pulmonary toileting this shift  Outcome: Progressing  Goal: Verbalize decreased shortness of breath this shift  Outcome: Progressing  Goal: Wean oxygen to maintain O2 saturation per order/standard this shift  Outcome: Progressing  Goal: Increase self care and/or family involvement in next 24 hours  Outcome: Progressing     Problem: Skin  Goal: Decreased wound size/increased tissue granulation at next dressing change  Outcome: Progressing  Flowsheets (Taken 6/19/2024 1109)  Decreased wound size/increased tissue granulation at next dressing change:   Promote sleep for wound healing   Utilize specialty bed per algorithm   Protective dressings over bony prominences  Goal: Participates in plan/prevention/treatment measures  Outcome: Progressing  Goal: Prevent/manage excess moisture  Outcome: Progressing  Goal: Prevent/minimize sheer/friction  injuries  Outcome: Progressing  Goal: Promote/optimize nutrition  Outcome: Progressing  Goal: Promote skin healing  Outcome: Progressing

## 2024-06-19 NOTE — PROGRESS NOTES
"Physical Therapy    Physical Therapy    Physical Therapy Treatment    Patient Name: Kaylene Feliciano  MRN: [id number]  Today's Date: 6/19/2024  Time Calculation  Start Time: 1218  Stop Time: 1246  Time Calculation (min): 28 min     2101/2101-A       06/19/24 1218   PT  Visit   PT Received On 06/19/24   Response to Previous Treatment Patient with no complaints from previous session.   General   Reason for Referral Per physician note, \"69-year-old female presenting for 4 days of shortness of breath, reports a history of CHF, unsure if she has had any weight gain weight loss.  She denies fevers chills productive cough chest pain pleuritic pain or any other symptoms other than difficulty laying flat.\"   Referred By Patrick Rehman MD   Prior to Session Communication Bedside nurse   Patient Position Received Bed, 3 rail up;Alarm on   General Comment Pt agreeable to therapy and cleared for participation   Precautions   Medical Precautions Fall precautions  (deep brain stimulator)   Vital Signs   SpO2   (92-96)   Pain Assessment   Pain Assessment 0-10   0-10 (Numeric) Pain Score 0 - No pain   Effect of Pain on Daily Activities .   Cognition   Overall Cognitive Status WFL   Therapeutic Exercise   Therapeutic Exercise Performed Yes   Therapeutic Exercise Activity 1 AP/LAQ/marching/abd with manual resistance/add x10   Bed Mobility   Bed Mobility Yes   Bed Mobility 1   Bed Mobility 1 Supine to sitting   Level of Assistance 1 Maximum assistance;Moderate verbal cues;+2   Bed Mobility Comments 1 Increased time and effort to complete   Bed Mobility 2   Bed Mobility  2 Sitting to supine   Level of Assistance 2 Contact guard;Minimal verbal cues   Bed Mobility 3   Bed Mobility 3 Scooting   Level of Assistance 3 Close supervision   Transfers   Transfer Yes   Transfer 1   Transfer From 1 Bed to   Transfer to 1 Sit;Stand   Technique 1 Sit to stand;Stand to sit   Transfer Level of Assistance 1 Maximum assistance;Maximum verbal cues;+2 "   Trials/Comments 1 x3 trials for mini STS transfers. Good effort by pt on third trial to change wet pad underneath pt.   Other Activity   Other Activity Performed Yes   Other Activity 1 seated activities including reaching outsie JENY, close SBA for safety. Pt seated EOB 15+  minutes with SBA.   Activity Tolerance   Endurance Tolerates 10 - 20 min exercise with multiple rests   PT Assessment   PT Assessment Results Decreased strength;Decreased endurance;Impaired balance;Decreased mobility   Rehab Prognosis Good   End of Session Communication Bedside nurse   Assessment Comment Pt puts forth good effort. MaxAx2 for sup>sit and mini stands. CGA for sup>sit. Pt able to sit increased amount of time at EOB with SBA.   End of Session Patient Position Up in chair;Alarm on  (bed in chair mode)       Outcome Measures:  [facility] Basic Mobility  Turning from your back to your side while in a flat bed without using bedrails: None  Moving from lying on your back to sitting on the side of a flat bed without using bedrails: A lot  Moving to and from bed to chair (including a wheelchair): A lot  Standing up from a chair using your arms (e.g. wheelchair or bedside chair): A lot  To walk in hospital room: A lot  Climbing 3-5 steps with railing: Total  Basic Mobility - Total Score: 13                             EDUCATION:  Outpatient Education  Individual(s) Educated: Patient  Education Provided: Fall Risk  Education Documentation  Mobility Training, taught by Ce Malin PTA at 6/19/2024  1:05 PM.  Learner: Family, Patient  Readiness: Acceptance  Method: Explanation, Demonstration  Response: Verbalizes Understanding, Demonstrated Understanding, Needs Reinforcement    Education Comments  No comments found.        GOALS:  Encounter Problems       Encounter Problems (Active)       PT Problem       Bed mobility (Progressing)       Start:  06/17/24    Expected End:  07/08/24       Pt will perform supine<>sit with HOB flat, EDWIN.            Transfers (Progressing)       Start:  06/17/24    Expected End:  07/08/24       Pt will perform all transfers with LRAD, SBAx1.            Gait (Progressing)       Start:  06/17/24    Expected End:  07/08/24       Pt will amb 25' with LRAD, ModAx1 with chair follow, with reciprocal gait, upright posture and improved activity tolerance as demonstrated by vitals.           Balance (Progressing)       Start:  06/17/24    Expected End:  07/08/24       Pt will tolerate standing x1 min with BUE support, SBAx1.         Strength (Progressing)       Start:  06/17/24    Expected End:  07/08/24       Pt will improve gross RLE strength to 3+/5.            Pain - Adult

## 2024-06-20 ENCOUNTER — APPOINTMENT (OUTPATIENT)
Dept: CARDIOLOGY | Facility: HOSPITAL | Age: 69
End: 2024-06-20
Payer: MEDICARE

## 2024-06-20 ENCOUNTER — HOSPITAL ENCOUNTER (EMERGENCY)
Facility: HOSPITAL | Age: 69
Discharge: HOME | End: 2024-06-20
Attending: EMERGENCY MEDICINE
Payer: MEDICARE

## 2024-06-20 ENCOUNTER — APPOINTMENT (OUTPATIENT)
Dept: RADIOLOGY | Facility: HOSPITAL | Age: 69
End: 2024-06-20
Payer: MEDICARE

## 2024-06-20 VITALS
SYSTOLIC BLOOD PRESSURE: 142 MMHG | WEIGHT: 293 LBS | BODY MASS INDEX: 51.91 KG/M2 | RESPIRATION RATE: 14 BRPM | HEART RATE: 60 BPM | TEMPERATURE: 97.6 F | OXYGEN SATURATION: 99 % | DIASTOLIC BLOOD PRESSURE: 60 MMHG | HEIGHT: 63 IN

## 2024-06-20 DIAGNOSIS — R06.02 SHORTNESS OF BREATH: Primary | ICD-10-CM

## 2024-06-20 LAB
ALBUMIN SERPL BCP-MCNC: 3.6 G/DL (ref 3.4–5)
ALP SERPL-CCNC: 94 U/L (ref 33–136)
ALT SERPL W P-5'-P-CCNC: 86 U/L (ref 7–45)
ANION GAP SERPL CALC-SCNC: 11 MMOL/L (ref 10–20)
AST SERPL W P-5'-P-CCNC: 98 U/L (ref 9–39)
ATRIAL RATE: 63 BPM
BASOPHILS # BLD AUTO: 0.02 X10*3/UL (ref 0–0.1)
BASOPHILS NFR BLD AUTO: 0.2 %
BILIRUB SERPL-MCNC: 0.5 MG/DL (ref 0–1.2)
BNP SERPL-MCNC: 465 PG/ML (ref 0–99)
BUN SERPL-MCNC: 44 MG/DL (ref 6–23)
CALCIUM SERPL-MCNC: 8.9 MG/DL (ref 8.6–10.3)
CARDIAC TROPONIN I PNL SERPL HS: 8 NG/L (ref 0–13)
CARDIAC TROPONIN I PNL SERPL HS: 8 NG/L (ref 0–13)
CHLORIDE SERPL-SCNC: 101 MMOL/L (ref 98–107)
CO2 SERPL-SCNC: 29 MMOL/L (ref 21–32)
CREAT SERPL-MCNC: 1.04 MG/DL (ref 0.5–1.05)
EGFRCR SERPLBLD CKD-EPI 2021: 58 ML/MIN/1.73M*2
EOSINOPHIL # BLD AUTO: 0.15 X10*3/UL (ref 0–0.7)
EOSINOPHIL NFR BLD AUTO: 1.8 %
ERYTHROCYTE [DISTWIDTH] IN BLOOD BY AUTOMATED COUNT: 14.6 % (ref 11.5–14.5)
GLUCOSE SERPL-MCNC: 201 MG/DL (ref 74–99)
HCT VFR BLD AUTO: 35.2 % (ref 36–46)
HGB BLD-MCNC: 10.5 G/DL (ref 12–16)
IMM GRANULOCYTES # BLD AUTO: 0.06 X10*3/UL (ref 0–0.7)
IMM GRANULOCYTES NFR BLD AUTO: 0.7 % (ref 0–0.9)
LYMPHOCYTES # BLD AUTO: 1.31 X10*3/UL (ref 1.2–4.8)
LYMPHOCYTES NFR BLD AUTO: 15.4 %
MAGNESIUM SERPL-MCNC: 2.27 MG/DL (ref 1.6–2.4)
MCH RBC QN AUTO: 26.4 PG (ref 26–34)
MCHC RBC AUTO-ENTMCNC: 29.8 G/DL (ref 32–36)
MCV RBC AUTO: 89 FL (ref 80–100)
MONOCYTES # BLD AUTO: 0.37 X10*3/UL (ref 0.1–1)
MONOCYTES NFR BLD AUTO: 4.4 %
NEUTROPHILS # BLD AUTO: 6.57 X10*3/UL (ref 1.2–7.7)
NEUTROPHILS NFR BLD AUTO: 77.5 %
NRBC BLD-RTO: 0 /100 WBCS (ref 0–0)
P AXIS: -15 DEGREES
P OFFSET: 152 MS
P ONSET: 132 MS
PLATELET # BLD AUTO: 214 X10*3/UL (ref 150–450)
POTASSIUM SERPL-SCNC: 5.4 MMOL/L (ref 3.5–5.3)
POTASSIUM SERPL-SCNC: 5.8 MMOL/L (ref 3.5–5.3)
PR INTERVAL: 208 MS
PROT SERPL-MCNC: 6.8 G/DL (ref 6.4–8.2)
Q ONSET: 218 MS
QRS COUNT: 10 BEATS
QRS DURATION: 104 MS
QT INTERVAL: 412 MS
QTC CALCULATION(BAZETT): 421 MS
QTC FREDERICIA: 418 MS
R AXIS: -26 DEGREES
RBC # BLD AUTO: 3.97 X10*6/UL (ref 4–5.2)
SARS-COV-2 RNA RESP QL NAA+PROBE: NOT DETECTED
SODIUM SERPL-SCNC: 135 MMOL/L (ref 136–145)
T AXIS: 10 DEGREES
T OFFSET: 424 MS
VENTRICULAR RATE: 63 BPM
WBC # BLD AUTO: 8.5 X10*3/UL (ref 4.4–11.3)

## 2024-06-20 PROCEDURE — 84075 ASSAY ALKALINE PHOSPHATASE: CPT | Performed by: EMERGENCY MEDICINE

## 2024-06-20 PROCEDURE — 84484 ASSAY OF TROPONIN QUANT: CPT | Performed by: EMERGENCY MEDICINE

## 2024-06-20 PROCEDURE — 94640 AIRWAY INHALATION TREATMENT: CPT

## 2024-06-20 PROCEDURE — 84132 ASSAY OF SERUM POTASSIUM: CPT | Mod: 59 | Performed by: EMERGENCY MEDICINE

## 2024-06-20 PROCEDURE — 93005 ELECTROCARDIOGRAM TRACING: CPT

## 2024-06-20 PROCEDURE — 71045 X-RAY EXAM CHEST 1 VIEW: CPT

## 2024-06-20 PROCEDURE — 83735 ASSAY OF MAGNESIUM: CPT | Performed by: EMERGENCY MEDICINE

## 2024-06-20 PROCEDURE — 2500000002 HC RX 250 W HCPCS SELF ADMINISTERED DRUGS (ALT 637 FOR MEDICARE OP, ALT 636 FOR OP/ED): Mod: MUE | Performed by: EMERGENCY MEDICINE

## 2024-06-20 PROCEDURE — 99284 EMERGENCY DEPT VISIT MOD MDM: CPT | Mod: 25,CS

## 2024-06-20 PROCEDURE — 36415 COLL VENOUS BLD VENIPUNCTURE: CPT | Performed by: EMERGENCY MEDICINE

## 2024-06-20 PROCEDURE — 83880 ASSAY OF NATRIURETIC PEPTIDE: CPT | Performed by: EMERGENCY MEDICINE

## 2024-06-20 PROCEDURE — 2500000005 HC RX 250 GENERAL PHARMACY W/O HCPCS: Performed by: EMERGENCY MEDICINE

## 2024-06-20 PROCEDURE — 87635 SARS-COV-2 COVID-19 AMP PRB: CPT | Performed by: EMERGENCY MEDICINE

## 2024-06-20 PROCEDURE — 71045 X-RAY EXAM CHEST 1 VIEW: CPT | Performed by: RADIOLOGY

## 2024-06-20 PROCEDURE — 99285 EMERGENCY DEPT VISIT HI MDM: CPT | Performed by: EMERGENCY MEDICINE

## 2024-06-20 PROCEDURE — 85025 COMPLETE CBC W/AUTO DIFF WBC: CPT | Performed by: EMERGENCY MEDICINE

## 2024-06-20 PROCEDURE — 84484 ASSAY OF TROPONIN QUANT: CPT | Mod: 91 | Performed by: EMERGENCY MEDICINE

## 2024-06-20 RX ORDER — FLUTICASONE PROPIONATE 50 MCG
2 SPRAY, SUSPENSION (ML) NASAL DAILY
Status: DISCONTINUED | OUTPATIENT
Start: 2024-06-20 | End: 2024-06-21 | Stop reason: HOSPADM

## 2024-06-20 RX ORDER — IPRATROPIUM BROMIDE AND ALBUTEROL SULFATE 2.5; .5 MG/3ML; MG/3ML
3 SOLUTION RESPIRATORY (INHALATION) EVERY 4 HOURS
Status: DISCONTINUED | OUTPATIENT
Start: 2024-06-20 | End: 2024-06-21 | Stop reason: HOSPADM

## 2024-06-20 ASSESSMENT — PAIN - FUNCTIONAL ASSESSMENT
PAIN_FUNCTIONAL_ASSESSMENT: 0-10
PAIN_FUNCTIONAL_ASSESSMENT: 0-10

## 2024-06-20 ASSESSMENT — LIFESTYLE VARIABLES
HAVE PEOPLE ANNOYED YOU BY CRITICIZING YOUR DRINKING: NO
TOTAL SCORE: 0
EVER HAD A DRINK FIRST THING IN THE MORNING TO STEADY YOUR NERVES TO GET RID OF A HANGOVER: NO
HAVE YOU EVER FELT YOU SHOULD CUT DOWN ON YOUR DRINKING: NO
EVER FELT BAD OR GUILTY ABOUT YOUR DRINKING: NO

## 2024-06-20 ASSESSMENT — PAIN SCALES - GENERAL
PAINLEVEL_OUTOF10: 0 - NO PAIN
PAINLEVEL_OUTOF10: 0 - NO PAIN

## 2024-06-20 NOTE — DISCHARGE INSTRUCTIONS
Follow up with your doctor(s) in one to two days.  Follow up and review all labs and imaging results with your doctor(s)    Please return to this or the nearest Emergency Medical facility for any new or worsening symptoms.    If you were given a prescription medication, you should review all risks and side effects on the package insert and discuss these and the medication with your pharmacist    Please make sure the patient is utilizing DuoNeb every 4 hours as ordered and needs BiPAP nightly.  He may utilize supportive care measures for runny nose.  Please monitor for any signs of hypoxia.

## 2024-06-20 NOTE — ED PROVIDER NOTES
HPI   Chief Complaint   Patient presents with    Shortness of Breath     Patient at [place]. Wears 4L home 02. 02 fell off and 02 sat dropped into 70s. NC placed back on patient and 02 sats came back up to normal limits       69-year-old female presented to the emergency room for evaluation of shortness of breath after her O2 nasal cannula accidentally was removed when she was attempting to wipe her nose that she had a runny nose earlier today.  States it was off approximately 5 minutes and called for help at her nursing facility.  They noted her SpO2 did drop into the high 70s/low 80s on SpO2.  It was reapplied and initially turned to 10 L with rapid improvement of her shortness of breath complaint and hypoxia.  She has since been returned back to her home 4 L which is her baseline currently.  She was recently hospitalization and discharged yesterday for volume overload, COPD exacerbation.  Currently on a prednisone wean.  She denies any other shortness of breath or last 24 hours, change in cough, fever or chills.  Denies any chest pain, vomiting or diarrhea.  Denies any abdominal pain.  No other intervention such as beta agonist breathing treatments applied or attempted.  Patient now states that she feels at her baseline with no acute complaints.                          [place] Coma Scale Score: 15                     Patient History   Past Medical History:   Diagnosis Date    Diabetes mellitus (Multi)     Hypertension      Past Surgical History:   Procedure Laterality Date    OTHER SURGICAL HISTORY  01/29/2021    Knee arthroscopy    OTHER SURGICAL HISTORY  01/29/2021    Deep brain stimulation    OTHER SURGICAL HISTORY  01/29/2021    Hysterectomy    OTHER SURGICAL HISTORY  01/29/2021    Cholecystectomy    OTHER SURGICAL HISTORY  10/14/2021    Knee surgery    OTHER SURGICAL HISTORY  10/14/2021    Tonsillectomy with adenoidectomy    OTHER SURGICAL HISTORY  10/14/2021    Vaginal sling procedure    OTHER SURGICAL  HISTORY  10/19/2021     section     Family History   Problem Relation Name Age of Onset    Diabetes Mother      Hypertension Mother      Thyroid cancer Mother      Heart attack Mother      Osteoporosis Mother      Stroke Father      Hypertension Father      Diabetes Brother      Hypertension Brother      Cancer Brother      Diabetes Other Grandparent         type 2, without complication, unspecified insulin use     Social History     Tobacco Use    Smoking status: Never    Smokeless tobacco: Never   Substance Use Topics    Alcohol use: Never    Drug use: Never       Physical Exam   ED Triage Vitals [24 1325]   Temperature Heart Rate Respirations BP   36.8 °C (98.2 °F) 68 20 152/57      Pulse Ox Temp Source Heart Rate Source Patient Position   97 % Tympanic Monitor Lying      BP Location FiO2 (%)     Right arm --       Physical Exam  Vitals and nursing note reviewed.   Constitutional:       General: She is not in acute distress.     Appearance: She is well-developed. She is obese. She is not ill-appearing.   HENT:      Head: Normocephalic and atraumatic.      Nose: No congestion or rhinorrhea.      Mouth/Throat:      Mouth: Mucous membranes are moist.      Pharynx: No oropharyngeal exudate or posterior oropharyngeal erythema.   Eyes:      Conjunctiva/sclera: Conjunctivae normal.   Cardiovascular:      Rate and Rhythm: Normal rate and regular rhythm.      Pulses: Normal pulses.      Heart sounds: No murmur heard.     No gallop.   Pulmonary:      Effort: Pulmonary effort is normal. No respiratory distress.      Breath sounds: Normal breath sounds. No stridor. No wheezing, rhonchi or rales.   Abdominal:      General: Bowel sounds are normal. There is no distension.      Palpations: Abdomen is soft.      Tenderness: There is no abdominal tenderness. There is no guarding or rebound.   Musculoskeletal:         General: No swelling.      Cervical back: Neck supple.   Skin:     General: Skin is warm and dry.       Capillary Refill: Capillary refill takes less than 2 seconds.      Findings: No rash.   Neurological:      General: No focal deficit present.      Mental Status: She is alert and oriented to person, place, and time.      Cranial Nerves: No cranial nerve deficit.      Sensory: No sensory deficit.      Gait: Gait normal.   Psychiatric:         Mood and Affect: Mood normal.         Behavior: Behavior normal.         Thought Content: Thought content normal.         ED Course & [facility]   ED Course as of 06/20/24 2040   Thu Jun 20, 2024   1432 Brain atretic peptide is significantly increased compared to prior.  Patient with elevated potassium although mild hemolysis detected with fairly reassuring kidney function.  Will send repeat potassium level at this time. [TL]   1432 Mild stable anemia appreciated. [TL]   1447 IMPRESSION:  1.  Probable slight improvement of left mid and lower lung airspace  disease, and mild congestion.   [TL]   1449 Patient appears hemodynamically stable with negative troponin.  Will consult the internal medicine team will discharge patient in the last 24 hours to get their impression on whether patient appears clinically stable from time of discharge or if she appears to be clinically worsening requiring hospitalization.  Given there multiple day course of taking care of her this will help determine best disposition for this patient. [TL]   1457 Spoke to internal medicine team at this time will call back once they have had a chance to review the patient chart to discuss patient consult. [TL]   1535 Per IM consultant patient actually looks improved on examination and laboratory evaluation compared to time of discharge.  Will order DuoNeb every 4 hours while here.  Flonase to be ordered as well for rhinorrhea.  Plan for discharge in stable condition at this time. [TL]      ED Course User Index  [TL] Christophe Snider DO         Diagnoses as of 06/20/24 2040   Shortness of breath       Medical Decision  Making  69-year-old female presenting to the emergency department for evaluation of shortness of breath that occurred after accidentally removing her pulse oximetry.  Due to body habitus she was not able to reapply it and had to call for help from her nursing facility aides.  They noticed her to be moderately hypoxic and reapplied nasal cannula with improvement in symptoms and oxygenation.  Her lungs are clear for this provider.  Based on report from EMS and patient does not seem that there was a prolonged period of hypoxia more than a few minutes.  However will assess for any sign of endorgan damage given recent hospitalization with significant endorgan damage including CRISTOFER.  Recent echocardiogram noted with diastolic dysfunction, patient with prior need for BiPAP.  Currently saturating well on home 4 L with no increased work of breathing or respiratory complaints.  She has no conversational dyspnea.  No wheezing to suggest acute COPD exacerbation in relation to today's events.  Will obtain COVID swab, basic laboratory studies, brain atretic peptide and serial troponin.  EKG to be obtained.  Chest x-ray ordered for comparison from prior.        Procedure  Procedures     Christophe Snider DO  06/20/24 2040

## 2024-06-20 NOTE — SIGNIFICANT EVENT
Called by ED given patient re-presented to [facility]. Patient removed nasal canula and desaturated. Evaluated patient in the ED and patient seems to be at her baseline and actually improved when she was seen in the hospital. She feels her respiratory status is improving each day. States nose is still clogged and that is why she removed her O2 temporarily and desaturated. O2 came up with re-application of nasal canula. Can offer her fluticasone, sudafed, nasal saline and nasal suction to help with O2 compliance. Otherwise no changes in medical management nor need for observation or admission here. Would reinforce with facility if possible she should be wearing BiPAP nocturnally and getting breathing treatments QID scheduled.

## 2024-06-21 ENCOUNTER — NURSING HOME VISIT (OUTPATIENT)
Dept: POST ACUTE CARE | Facility: EXTERNAL LOCATION | Age: 69
End: 2024-06-21
Payer: MEDICARE

## 2024-06-21 VITALS
SYSTOLIC BLOOD PRESSURE: 151 MMHG | RESPIRATION RATE: 16 BRPM | HEART RATE: 68 BPM | TEMPERATURE: 97.9 F | DIASTOLIC BLOOD PRESSURE: 76 MMHG | OXYGEN SATURATION: 96 %

## 2024-06-21 DIAGNOSIS — R53.81 PHYSICAL DECONDITIONING: ICD-10-CM

## 2024-06-21 DIAGNOSIS — M48.00 SPINAL STENOSIS, MULTILEVEL: ICD-10-CM

## 2024-06-21 DIAGNOSIS — F31.9 CHRONIC BIPOLAR AFFECTIVE DISORDER (MULTI): ICD-10-CM

## 2024-06-21 DIAGNOSIS — E11.42 DIABETIC POLYNEUROPATHY ASSOCIATED WITH TYPE 2 DIABETES MELLITUS (MULTI): Chronic | ICD-10-CM

## 2024-06-21 DIAGNOSIS — R09.02 HYPOXIA: ICD-10-CM

## 2024-06-21 DIAGNOSIS — R06.02 SHORTNESS OF BREATH: Primary | ICD-10-CM

## 2024-06-21 DIAGNOSIS — E66.01 MORBID OBESITY (MULTI): Chronic | ICD-10-CM

## 2024-06-21 DIAGNOSIS — E11.65 POORLY CONTROLLED TYPE 2 DIABETES MELLITUS WITH COMPLICATION (MULTI): ICD-10-CM

## 2024-06-21 DIAGNOSIS — E11.8 POORLY CONTROLLED TYPE 2 DIABETES MELLITUS WITH COMPLICATION (MULTI): ICD-10-CM

## 2024-06-21 LAB
ATRIAL RATE: 44 BPM
P OFFSET: 157 MS
P ONSET: 137 MS
Q ONSET: 184 MS
QRS COUNT: 10 BEATS
QRS DURATION: 162 MS
QT INTERVAL: 490 MS
QTC CALCULATION(BAZETT): 505 MS
QTC FREDERICIA: 500 MS
R AXIS: 160 DEGREES
T AXIS: 126 DEGREES
T OFFSET: 429 MS
VENTRICULAR RATE: 64 BPM

## 2024-06-21 PROCEDURE — 99306 1ST NF CARE HIGH MDM 50: CPT | Performed by: INTERNAL MEDICINE

## 2024-06-21 NOTE — LETTER
Patient: Kaylene Feliciano  : 1955    Encounter Date: 2024    Subjective  Patient ID: Kaylene Feliciano is a 69 y.o. female who is acute skilled care being seen and evaluated for multiple medical problems.    HPI   This 69-year-old female patient has history of heart failure preserved ejection fraction, diabetes mellitus, hypertension, pulmonary hypertension, schizoaffective disorder bipolar type, depression, sleep apnea not on therapy, degenerative joint disease, chronic kidney disease stage III, morbid obesity, and chronic hypoxic respiratory failure.  The patient presented to the hospital with orthopnea.  She is apparently been bedbound since femur fracture 1 year ago.  The patient was placed on BiPAP in the hospital and improved dramatically.  She is discharged from the hospital without BiPAP and on nasal cannula alone.  The patient continues to complain of shortness of breath especially while lying flat.    Current medications:  Amlodipine  Aspirin  Lipitor  Celexa  Gabapentin  Insulin  Robaxin  Lisinopril  DuoNeb  Metoprolol  Oxygen  Creon  Prednisone taper    Laboratory examination from 2024:    Magnesium 2.27  Potassium 5 point 8 repeat 5.4  Bicarbonate 29  Creatinine 1.04  Albumin 3.6  AST 98  ALT 86  Bilirubin 0.5  Alkaline phosphatase 94  White blood cell 8.5  Hemoglobin 10.5  Platelets 214    Last ABG in the system was from 2023 showing 7.3 2/35/120 with serum bicarbonate 18 consistent with metabolic acidosis and incomplete respiratory compensation    Incredibly an ABG was not done this hospitalization despite the patient requiring BiPAP therapy in the hospital.    Review of Systems   Constitutional:  Negative for chills, diaphoresis and fever.   Respiratory:  Negative for cough and shortness of breath.         Per HPI   Cardiovascular:  Negative for chest pain and leg swelling.   Gastrointestinal:  Negative for constipation, diarrhea, nausea and vomiting.   Musculoskeletal:   Negative for joint swelling and myalgias.       Objective  /76   Pulse 68   Temp 36.6 °C (97.9 °F)   Resp 16   SpO2 96%     Physical Exam  Vitals reviewed.   Constitutional:       General: She is not in acute distress.     Appearance: She is obese. She is not ill-appearing.   Cardiovascular:      Rate and Rhythm: Normal rate and regular rhythm.      Pulses: Normal pulses.      Heart sounds:      No gallop.   Pulmonary:      Breath sounds: Normal breath sounds. No wheezing, rhonchi or rales.   Abdominal:      General: Abdomen is flat. Bowel sounds are normal.      Palpations: Abdomen is soft.      Tenderness: There is no guarding or rebound.   Musculoskeletal:      Right lower leg: No edema.      Left lower leg: No edema.         Assessment/Plan  Problem List Items Addressed This Visit             ICD-10-CM    Chronic bipolar affective disorder (Multi) F31.9    Diabetes, polyneuropathy (Multi) (Chronic) E11.42    Morbid obesity (Multi) (Chronic) E66.01    Physical deconditioning R53.81    Poorly controlled type 2 diabetes mellitus with complication (Multi) E11.8, E11.65    Spinal stenosis, multilevel M48.00    Hypoxia R09.02    Shortness of breath - Primary R06.02     8.  We will continue with rehabilitative restorative and supportive care as the patient tolerates    B.  Will ask for urgent pulmonary evaluation so that we may start BiPAP on the patient here in the facility as the wait for sleep study will likely be months.  She will smoke certainly in the back in the hospital if BiPAP is not reinstituted at least at night here in the facility    C.  Laboratory examinations will continue to be monitored on an ongoing as-needed basis and she should have workup for anemia if it has not been done in the recent past    D.  Disposition will be determined pending response to rehabilitation overall assessment patient safety awareness    E.  The patient's prognosis is guarded.        Electronically Signed By:  Mohamud Vazquez MD   6/25/24 10:51 AM

## 2024-06-21 NOTE — PROGRESS NOTES
Subjective   Patient ID: Kaylene Feliciano is a 69 y.o. female who is acute skilled care being seen and evaluated for multiple medical problems.    HPI   This 69-year-old female patient has history of heart failure preserved ejection fraction, diabetes mellitus, hypertension, pulmonary hypertension, schizoaffective disorder bipolar type, depression, sleep apnea not on therapy, degenerative joint disease, chronic kidney disease stage III, morbid obesity, and chronic hypoxic respiratory failure.  The patient presented to the hospital with orthopnea.  She is apparently been bedbound since femur fracture 1 year ago.  The patient was placed on BiPAP in the hospital and improved dramatically.  She is discharged from the hospital without BiPAP and on nasal cannula alone.  The patient continues to complain of shortness of breath especially while lying flat.    Current medications:  Amlodipine  Aspirin  Lipitor  Celexa  Gabapentin  Insulin  Robaxin  Lisinopril  DuoNeb  Metoprolol  Oxygen  Creon  Prednisone taper    Laboratory examination from January 20, 2024:    Magnesium 2.27  Potassium 5 point 8 repeat 5.4  Bicarbonate 29  Creatinine 1.04  Albumin 3.6  AST 98  ALT 86  Bilirubin 0.5  Alkaline phosphatase 94  White blood cell 8.5  Hemoglobin 10.5  Platelets 214    Last ABG in the system was from July 2023 showing 7.3 2/35/120 with serum bicarbonate 18 consistent with metabolic acidosis and incomplete respiratory compensation    Incredibly an ABG was not done this hospitalization despite the patient requiring BiPAP therapy in the hospital.    Review of Systems   Constitutional:  Negative for chills, diaphoresis and fever.   Respiratory:  Negative for cough and shortness of breath.         Per HPI   Cardiovascular:  Negative for chest pain and leg swelling.   Gastrointestinal:  Negative for constipation, diarrhea, nausea and vomiting.   Musculoskeletal:  Negative for joint swelling and myalgias.       Objective   /76    Pulse 68   Temp 36.6 °C (97.9 °F)   Resp 16   SpO2 96%     Physical Exam  Vitals reviewed.   Constitutional:       General: She is not in acute distress.     Appearance: She is obese. She is not ill-appearing.   Cardiovascular:      Rate and Rhythm: Normal rate and regular rhythm.      Pulses: Normal pulses.      Heart sounds:      No gallop.   Pulmonary:      Breath sounds: Normal breath sounds. No wheezing, rhonchi or rales.   Abdominal:      General: Abdomen is flat. Bowel sounds are normal.      Palpations: Abdomen is soft.      Tenderness: There is no guarding or rebound.   Musculoskeletal:      Right lower leg: No edema.      Left lower leg: No edema.         Assessment/Plan   Problem List Items Addressed This Visit             ICD-10-CM    Chronic bipolar affective disorder (Multi) F31.9    Diabetes, polyneuropathy (Multi) (Chronic) E11.42    Morbid obesity (Multi) (Chronic) E66.01    Physical deconditioning R53.81    Poorly controlled type 2 diabetes mellitus with complication (Multi) E11.8, E11.65    Spinal stenosis, multilevel M48.00    Hypoxia R09.02    Shortness of breath - Primary R06.02     8.  We will continue with rehabilitative restorative and supportive care as the patient tolerates    B.  Will ask for urgent pulmonary evaluation so that we may start BiPAP on the patient here in the facility as the wait for sleep study will likely be months.  She will smoke certainly in the back in the hospital if BiPAP is not reinstituted at least at night here in the facility    C.  Laboratory examinations will continue to be monitored on an ongoing as-needed basis and she should have workup for anemia if it has not been done in the recent past    D.  Disposition will be determined pending response to rehabilitation overall assessment patient safety awareness    E.  The patient's prognosis is guarded.

## 2024-06-25 ENCOUNTER — NURSING HOME VISIT (OUTPATIENT)
Dept: POST ACUTE CARE | Facility: EXTERNAL LOCATION | Age: 69
End: 2024-06-25
Payer: MEDICARE

## 2024-06-25 VITALS
RESPIRATION RATE: 16 BRPM | BODY MASS INDEX: 52.79 KG/M2 | SYSTOLIC BLOOD PRESSURE: 112 MMHG | DIASTOLIC BLOOD PRESSURE: 70 MMHG | HEART RATE: 64 BPM | WEIGHT: 293 LBS | OXYGEN SATURATION: 93 % | TEMPERATURE: 98.2 F

## 2024-06-25 DIAGNOSIS — R53.81 PHYSICAL DECONDITIONING: ICD-10-CM

## 2024-06-25 DIAGNOSIS — F32.5 MAJOR DEPRESSIVE DISORDER IN REMISSION, UNSPECIFIED WHETHER RECURRENT (CMS-HCC): Chronic | ICD-10-CM

## 2024-06-25 DIAGNOSIS — I10 ESSENTIAL HYPERTENSION, BENIGN: Chronic | ICD-10-CM

## 2024-06-25 DIAGNOSIS — N18.31 TYPE 2 DIABETES MELLITUS WITH STAGE 3A CHRONIC KIDNEY DISEASE, WITH LONG-TERM CURRENT USE OF INSULIN (MULTI): Chronic | ICD-10-CM

## 2024-06-25 DIAGNOSIS — E11.22 TYPE 2 DIABETES MELLITUS WITH STAGE 3A CHRONIC KIDNEY DISEASE, WITH LONG-TERM CURRENT USE OF INSULIN (MULTI): Chronic | ICD-10-CM

## 2024-06-25 DIAGNOSIS — E66.2 OBESITY HYPOVENTILATION SYNDROME (MULTI): Primary | ICD-10-CM

## 2024-06-25 DIAGNOSIS — Z79.4 TYPE 2 DIABETES MELLITUS WITH STAGE 3A CHRONIC KIDNEY DISEASE, WITH LONG-TERM CURRENT USE OF INSULIN (MULTI): Chronic | ICD-10-CM

## 2024-06-25 PROCEDURE — 99309 SBSQ NF CARE MODERATE MDM 30: CPT | Performed by: NURSE PRACTITIONER

## 2024-06-25 ASSESSMENT — ENCOUNTER SYMPTOMS
MYALGIAS: 0
COUGH: 0
DIAPHORESIS: 0
SHORTNESS OF BREATH: 0
COUGH: 0
SHORTNESS OF BREATH: 0
NAUSEA: 0
CHILLS: 0
JOINT SWELLING: 0
VOMITING: 0
MYALGIAS: 0
VOMITING: 0
NAUSEA: 0
CHILLS: 0
JOINT SWELLING: 0
FEVER: 0
CONSTIPATION: 0
FEVER: 0
DIAPHORESIS: 0
DIARRHEA: 0
CONSTIPATION: 0
DIARRHEA: 0

## 2024-06-25 NOTE — ASSESSMENT & PLAN NOTE
On metoprolol and amlodipine.  She had 1 elevated reading this morning, all others have been acceptable, repeat reading this morning was improved.  Continue to monitor and adjust meds based on clinical course.

## 2024-06-25 NOTE — LETTER
Patient: Kaylene Feliciano  : 1955    Encounter Date: 2024    Subjective  Kaylene Feliciano is a 69 y.o. female Here for weekly skilled visit.  HPI   Health problems reviewed and no acute concerns.  Participating in therapy as able, remains weak and deconditioned.  Respiratory status has been stable on oxygen and has an appt with pulmonary  for evaluation of bipap.  She has been having frequent hypoglycemia episodes per staff, a few documented 60-70's.   Eating and drinking well.  she denies any complaints of pain.  No concerns per staff.       Review of Systems   Constitutional:  Negative for chills, diaphoresis and fever.   Respiratory:  Negative for cough and shortness of breath.         Becomes sob with exertion, recovers fairly quickly   Cardiovascular:  Negative for chest pain and leg swelling.   Gastrointestinal:  Negative for constipation, diarrhea, nausea and vomiting.   Musculoskeletal:  Negative for joint swelling and myalgias.       Objective  /70   Pulse 64   Temp 36.8 °C (98.2 °F)   Resp 16   Wt 135 kg (298 lb)   SpO2 93%   BMI 52.79 kg/m²     Physical Exam  Vitals reviewed.   Constitutional:       General: She is not in acute distress.     Appearance: She is obese. She is not ill-appearing.   Cardiovascular:      Rate and Rhythm: Normal rate and regular rhythm.      Pulses: Normal pulses.      Heart sounds:      No gallop.   Pulmonary:      Breath sounds: Normal breath sounds. No wheezing, rhonchi or rales.   Abdominal:      General: Abdomen is flat. Bowel sounds are normal.      Palpations: Abdomen is soft.      Tenderness: There is no guarding or rebound.   Musculoskeletal:      Right lower leg: No edema.      Left lower leg: No edema.         Assessment/Plan  Problem List Items Addressed This Visit       Depression (Chronic)     Has deep brain stimulator and states she is well controlled with current regimen. Also takes celexa.          Essential hypertension, benign (Chronic)      On metoprolol and amlodipine.  She had 1 elevated reading this morning, all others have been acceptable, repeat reading this morning was improved.  Continue to monitor and adjust meds based on clinical course.           Physical deconditioning     She was minimally ambulatory prior to admission,  is primary caregiver.  Her goal is to regain enough strength to use walker in the apartment safely.  continue with therapy as able.           Diabetes mellitus (Multi) (Chronic)     She has had a few hypoglycemic episodes, 60-70's and symptomatic.  Her lantus was increased from 50 units BID to 60 units BID,  will decreased to 55 units BID and will continue to monitor.   She is also on routine premeal and ss coverage.          Obesity hypoventilation syndrome (Multi) - Primary     She is scheduled to see pulmonary 7/18 and likely needs Bipap at night.  Will await further determination by pulmonary        labs/meds/orders reviewed  staff to monitor and notify for any changes.  continue with therapy as able.  Recheck BMP 6/27 per discharge orders  Staff to closely monitor respiratory status  Decrease lantus due to hypoglycemia      Electronically Signed By: KELLI Slaughter   6/25/24 11:33 AM

## 2024-06-25 NOTE — ASSESSMENT & PLAN NOTE
She has had a few hypoglycemic episodes, 60-70's and symptomatic.  Her lantus was increased from 50 units BID to 60 units BID,  will decreased to 55 units BID and will continue to monitor.   She is also on routine premeal and ss coverage.

## 2024-06-25 NOTE — PROGRESS NOTES
Subjective   Kaylene Feliciano is a 69 y.o. female Here for weekly skilled visit.  HPI   Health problems reviewed and no acute concerns.  Participating in therapy as able, remains weak and deconditioned.  Respiratory status has been stable on oxygen and has an appt with pulmonary 7/18 for evaluation of bipap.  She has been having frequent hypoglycemia episodes per staff, a few documented 60-70's.   Eating and drinking well.  she denies any complaints of pain.  No concerns per staff.       Review of Systems   Constitutional:  Negative for chills, diaphoresis and fever.   Respiratory:  Negative for cough and shortness of breath.         Becomes sob with exertion, recovers fairly quickly   Cardiovascular:  Negative for chest pain and leg swelling.   Gastrointestinal:  Negative for constipation, diarrhea, nausea and vomiting.   Musculoskeletal:  Negative for joint swelling and myalgias.       Objective   /70   Pulse 64   Temp 36.8 °C (98.2 °F)   Resp 16   Wt 135 kg (298 lb)   SpO2 93%   BMI 52.79 kg/m²     Physical Exam  Vitals reviewed.   Constitutional:       General: She is not in acute distress.     Appearance: She is obese. She is not ill-appearing.   Cardiovascular:      Rate and Rhythm: Normal rate and regular rhythm.      Pulses: Normal pulses.      Heart sounds:      No gallop.   Pulmonary:      Breath sounds: Normal breath sounds. No wheezing, rhonchi or rales.   Abdominal:      General: Abdomen is flat. Bowel sounds are normal.      Palpations: Abdomen is soft.      Tenderness: There is no guarding or rebound.   Musculoskeletal:      Right lower leg: No edema.      Left lower leg: No edema.         Assessment/Plan   Problem List Items Addressed This Visit       Depression (Chronic)     Has deep brain stimulator and states she is well controlled with current regimen. Also takes celexa.          Essential hypertension, benign (Chronic)     On metoprolol and amlodipine.  She had 1 elevated reading this  morning, all others have been acceptable, repeat reading this morning was improved.  Continue to monitor and adjust meds based on clinical course.           Physical deconditioning     She was minimally ambulatory prior to admission,  is primary caregiver.  Her goal is to regain enough strength to use walker in the apartment safely.  continue with therapy as able.           Diabetes mellitus (Multi) (Chronic)     She has had a few hypoglycemic episodes, 60-70's and symptomatic.  Her lantus was increased from 50 units BID to 60 units BID,  will decreased to 55 units BID and will continue to monitor.   She is also on routine premeal and ss coverage.          Obesity hypoventilation syndrome (Multi) - Primary     She is scheduled to see pulmonary 7/18 and likely needs Bipap at night.  Will await further determination by pulmonary        labs/meds/orders reviewed  staff to monitor and notify for any changes.  continue with therapy as able.  Recheck BMP 6/27 per discharge orders  Staff to closely monitor respiratory status  Decrease lantus due to hypoglycemia

## 2024-06-25 NOTE — ASSESSMENT & PLAN NOTE
She is scheduled to see pulmonary 7/18 and likely needs Bipap at night.  Will await further determination by pulmonary

## 2024-06-25 NOTE — ASSESSMENT & PLAN NOTE
Has deep brain stimulator and states she is well controlled with current regimen. Also takes celexa.

## 2024-06-25 NOTE — ASSESSMENT & PLAN NOTE
She was minimally ambulatory prior to admission,  is primary caregiver.  Her goal is to regain enough strength to use walker in the apartment safely.  continue with therapy as able.

## 2024-07-01 ENCOUNTER — APPOINTMENT (OUTPATIENT)
Dept: RADIOLOGY | Facility: HOSPITAL | Age: 69
DRG: 871 | End: 2024-07-01
Payer: MEDICARE

## 2024-07-01 ENCOUNTER — APPOINTMENT (OUTPATIENT)
Dept: CARDIOLOGY | Facility: HOSPITAL | Age: 69
DRG: 871 | End: 2024-07-01
Payer: MEDICARE

## 2024-07-01 ENCOUNTER — HOSPITAL ENCOUNTER (INPATIENT)
Facility: HOSPITAL | Age: 69
DRG: 871 | End: 2024-07-01
Attending: STUDENT IN AN ORGANIZED HEALTH CARE EDUCATION/TRAINING PROGRAM | Admitting: INTERNAL MEDICINE
Payer: MEDICARE

## 2024-07-01 DIAGNOSIS — J96.22 ACUTE ON CHRONIC RESPIRATORY FAILURE WITH HYPOXIA AND HYPERCAPNIA (MULTI): Primary | ICD-10-CM

## 2024-07-01 DIAGNOSIS — I10 ESSENTIAL HYPERTENSION, BENIGN: Chronic | ICD-10-CM

## 2024-07-01 DIAGNOSIS — J96.21 ACUTE ON CHRONIC RESPIRATORY FAILURE WITH HYPOXIA AND HYPERCAPNIA (MULTI): Primary | ICD-10-CM

## 2024-07-01 DIAGNOSIS — E66.2 OBESITY HYPOVENTILATION SYNDROME (MULTI): ICD-10-CM

## 2024-07-01 DIAGNOSIS — E66.01 MORBID OBESITY (MULTI): Chronic | ICD-10-CM

## 2024-07-01 LAB
ALBUMIN SERPL BCP-MCNC: 3.6 G/DL (ref 3.4–5)
ALBUMIN SERPL BCP-MCNC: 3.8 G/DL (ref 3.4–5)
ALBUMIN SERPL BCP-MCNC: 3.9 G/DL (ref 3.4–5)
ALP SERPL-CCNC: 163 U/L (ref 33–136)
ALP SERPL-CCNC: 166 U/L (ref 33–136)
ALT SERPL W P-5'-P-CCNC: 1132 U/L (ref 7–45)
ALT SERPL W P-5'-P-CCNC: 863 U/L (ref 7–45)
ANION GAP BLDA CALCULATED.4IONS-SCNC: 8 MMO/L (ref 10–25)
ANION GAP SERPL CALC-SCNC: 11 MMOL/L (ref 10–20)
ANION GAP SERPL CALC-SCNC: 12 MMOL/L (ref 10–20)
ANION GAP SERPL CALC-SCNC: 12 MMOL/L (ref 10–20)
APPARATUS: ABNORMAL
AST SERPL W P-5'-P-CCNC: 1367 U/L (ref 9–39)
AST SERPL W P-5'-P-CCNC: 915 U/L (ref 9–39)
ATRIAL RATE: 42 BPM
BASE EXCESS BLDA CALC-SCNC: 2.1 MMOL/L (ref -2–3)
BASE EXCESS BLDA CALC-SCNC: 2.2 MMOL/L (ref -2–3)
BASE EXCESS BLDA CALC-SCNC: 4.6 MMOL/L (ref -2–3)
BASOPHILS # BLD AUTO: 0.08 X10*3/UL (ref 0–0.1)
BASOPHILS NFR BLD AUTO: 0.4 %
BILIRUB SERPL-MCNC: 1.2 MG/DL (ref 0–1.2)
BILIRUB SERPL-MCNC: 1.5 MG/DL (ref 0–1.2)
BNP SERPL-MCNC: 556 PG/ML (ref 0–99)
BODY TEMPERATURE: ABNORMAL
BUN SERPL-MCNC: 24 MG/DL (ref 6–23)
BUN SERPL-MCNC: 25 MG/DL (ref 6–23)
BUN SERPL-MCNC: 32 MG/DL (ref 6–23)
CA-I BLDA-SCNC: 1.13 MMOL/L (ref 1.1–1.33)
CA-I BLDA-SCNC: 1.14 MMOL/L (ref 1.1–1.33)
CA-I BLDA-SCNC: 1.2 MMOL/L (ref 1.1–1.33)
CALCIUM SERPL-MCNC: 8.7 MG/DL (ref 8.6–10.3)
CALCIUM SERPL-MCNC: 8.8 MG/DL (ref 8.6–10.3)
CALCIUM SERPL-MCNC: 9.2 MG/DL (ref 8.6–10.3)
CARDIAC TROPONIN I PNL SERPL HS: 64 NG/L (ref 0–13)
CARDIAC TROPONIN I PNL SERPL HS: 81 NG/L (ref 0–13)
CHLORIDE BLDA-SCNC: 98 MMOL/L (ref 98–107)
CHLORIDE SERPL-SCNC: 94 MMOL/L (ref 98–107)
CHLORIDE SERPL-SCNC: 94 MMOL/L (ref 98–107)
CHLORIDE SERPL-SCNC: 95 MMOL/L (ref 98–107)
CO2 SERPL-SCNC: 32 MMOL/L (ref 21–32)
CO2 SERPL-SCNC: 33 MMOL/L (ref 21–32)
CO2 SERPL-SCNC: 33 MMOL/L (ref 21–32)
CREAT SERPL-MCNC: 2.21 MG/DL (ref 0.5–1.05)
CREAT SERPL-MCNC: 2.46 MG/DL (ref 0.5–1.05)
CREAT SERPL-MCNC: 2.49 MG/DL (ref 0.5–1.05)
CRITICAL CALL TIME: 1427
CRITICAL CALL TIME: 935
CRITICAL CALLED BY: ABNORMAL
CRITICAL CALLED BY: ABNORMAL
CRITICAL CALLED TO: ABNORMAL
CRITICAL CALLED TO: ABNORMAL
CRITICAL READ BACK: ABNORMAL
CRITICAL READ BACK: ABNORMAL
EGFRCR SERPLBLD CKD-EPI 2021: 20 ML/MIN/1.73M*2
EGFRCR SERPLBLD CKD-EPI 2021: 21 ML/MIN/1.73M*2
EGFRCR SERPLBLD CKD-EPI 2021: 24 ML/MIN/1.73M*2
EOSINOPHIL # BLD AUTO: 0.01 X10*3/UL (ref 0–0.7)
EOSINOPHIL NFR BLD AUTO: 0.1 %
EPAP CMH2O: 8 CM H2O
EPAP CMH2O: 8 CM H2O
ERYTHROCYTE [DISTWIDTH] IN BLOOD BY AUTOMATED COUNT: 15 % (ref 11.5–14.5)
GLUCOSE BLD MANUAL STRIP-MCNC: 195 MG/DL (ref 74–99)
GLUCOSE BLD MANUAL STRIP-MCNC: 202 MG/DL (ref 74–99)
GLUCOSE BLDA-MCNC: 148 MG/DL (ref 74–99)
GLUCOSE BLDA-MCNC: 176 MG/DL (ref 74–99)
GLUCOSE BLDA-MCNC: 224 MG/DL (ref 74–99)
GLUCOSE SERPL-MCNC: 147 MG/DL (ref 74–99)
GLUCOSE SERPL-MCNC: 164 MG/DL (ref 74–99)
GLUCOSE SERPL-MCNC: 212 MG/DL (ref 74–99)
HCO3 BLDA-SCNC: 30.1 MMOL/L (ref 22–26)
HCO3 BLDA-SCNC: 30.3 MMOL/L (ref 22–26)
HCO3 BLDA-SCNC: 31.1 MMOL/L (ref 22–26)
HCT VFR BLD AUTO: 36.3 % (ref 36–46)
HCT VFR BLD EST: 32 % (ref 36–46)
HCT VFR BLD EST: 32 % (ref 36–46)
HCT VFR BLD EST: 33 % (ref 36–46)
HGB BLD-MCNC: 10.6 G/DL (ref 12–16)
HGB BLDA-MCNC: 10.6 G/DL (ref 12–16)
HGB BLDA-MCNC: 10.7 G/DL (ref 12–16)
HGB BLDA-MCNC: 10.9 G/DL (ref 12–16)
IMM GRANULOCYTES # BLD AUTO: 0.43 X10*3/UL (ref 0–0.7)
IMM GRANULOCYTES NFR BLD AUTO: 2.2 % (ref 0–0.9)
INHALED O2 CONCENTRATION: 100 %
INHALED O2 CONCENTRATION: 40 %
INHALED O2 CONCENTRATION: 40 %
INR PPP: 1.4 (ref 0.9–1.1)
IPAP CMH2O: 16 CM H2O
IPAP CMH2O: 16 CM H2O
LACTATE BLDA-SCNC: 0.8 MMOL/L (ref 0.4–2)
LACTATE BLDA-SCNC: 1 MMOL/L (ref 0.4–2)
LACTATE BLDA-SCNC: 1 MMOL/L (ref 0.4–2)
LACTATE SERPL-SCNC: 1.2 MMOL/L (ref 0.4–2)
LYMPHOCYTES # BLD AUTO: 2.21 X10*3/UL (ref 1.2–4.8)
LYMPHOCYTES NFR BLD AUTO: 11.4 %
MAGNESIUM SERPL-MCNC: 1.72 MG/DL (ref 1.6–2.4)
MAGNESIUM SERPL-MCNC: 1.97 MG/DL (ref 1.6–2.4)
MCH RBC QN AUTO: 26.3 PG (ref 26–34)
MCHC RBC AUTO-ENTMCNC: 29.2 G/DL (ref 32–36)
MCV RBC AUTO: 90 FL (ref 80–100)
MONOCYTES # BLD AUTO: 1.68 X10*3/UL (ref 0.1–1)
MONOCYTES NFR BLD AUTO: 8.6 %
MRSA DNA SPEC QL NAA+PROBE: NOT DETECTED
NEUTROPHILS # BLD AUTO: 15.02 X10*3/UL (ref 1.2–7.7)
NEUTROPHILS NFR BLD AUTO: 77.3 %
NRBC BLD-RTO: 0.6 /100 WBCS (ref 0–0)
OXYHGB MFR BLDA: 94.2 % (ref 94–98)
OXYHGB MFR BLDA: 96.7 % (ref 94–98)
OXYHGB MFR BLDA: 97.6 % (ref 94–98)
P OFFSET: 136 MS
P ONSET: 116 MS
PCO2 BLDA: 55 MM HG (ref 38–42)
PCO2 BLDA: 64 MM HG (ref 38–42)
PCO2 BLDA: 66 MM HG (ref 38–42)
PH BLDA: 7.27 PH (ref 7.38–7.42)
PH BLDA: 7.28 PH (ref 7.38–7.42)
PH BLDA: 7.36 PH (ref 7.38–7.42)
PHOSPHATE SERPL-MCNC: 3.8 MG/DL (ref 2.5–4.9)
PLATELET # BLD AUTO: 237 X10*3/UL (ref 150–450)
PO2 BLDA: 107 MM HG (ref 85–95)
PO2 BLDA: 133 MM HG (ref 85–95)
PO2 BLDA: 78 MM HG (ref 85–95)
POTASSIUM BLDA-SCNC: 5.6 MMOL/L (ref 3.5–5.3)
POTASSIUM BLDA-SCNC: 6.1 MMOL/L (ref 3.5–5.3)
POTASSIUM BLDA-SCNC: 6.2 MMOL/L (ref 3.5–5.3)
POTASSIUM SERPL-SCNC: 5.5 MMOL/L (ref 3.5–5.3)
POTASSIUM SERPL-SCNC: 6 MMOL/L (ref 3.5–5.3)
POTASSIUM SERPL-SCNC: 7.5 MMOL/L (ref 3.5–5.3)
PROT SERPL-MCNC: 6.8 G/DL (ref 6.4–8.2)
PROT SERPL-MCNC: 7 G/DL (ref 6.4–8.2)
PROTHROMBIN TIME: 16 SECONDS (ref 9.8–12.8)
Q ONSET: 159 MS
QRS COUNT: 12 BEATS
QRS DURATION: 180 MS
QT INTERVAL: 496 MS
QTC CALCULATION(BAZETT): 531 MS
QTC FREDERICIA: 519 MS
R AXIS: -81 DEGREES
RBC # BLD AUTO: 4.03 X10*6/UL (ref 4–5.2)
SAO2 % BLDA: 100 % (ref 94–100)
SAO2 % BLDA: 96 % (ref 94–100)
SAO2 % BLDA: 99 % (ref 94–100)
SODIUM BLDA-SCNC: 130 MMOL/L (ref 136–145)
SODIUM BLDA-SCNC: 130 MMOL/L (ref 136–145)
SODIUM BLDA-SCNC: 131 MMOL/L (ref 136–145)
SODIUM SERPL-SCNC: 132 MMOL/L (ref 136–145)
T AXIS: 138 DEGREES
T OFFSET: 407 MS
TEST COMMENT: ABNORMAL
VENTILATOR MODE: ABNORMAL
VENTRICULAR RATE: 69 BPM
WBC # BLD AUTO: 19.4 X10*3/UL (ref 4.4–11.3)

## 2024-07-01 PROCEDURE — 36415 COLL VENOUS BLD VENIPUNCTURE: CPT | Performed by: STUDENT IN AN ORGANIZED HEALTH CARE EDUCATION/TRAINING PROGRAM

## 2024-07-01 PROCEDURE — 2500000005 HC RX 250 GENERAL PHARMACY W/O HCPCS

## 2024-07-01 PROCEDURE — 83735 ASSAY OF MAGNESIUM: CPT

## 2024-07-01 PROCEDURE — 94640 AIRWAY INHALATION TREATMENT: CPT

## 2024-07-01 PROCEDURE — 2500000004 HC RX 250 GENERAL PHARMACY W/ HCPCS (ALT 636 FOR OP/ED): Performed by: STUDENT IN AN ORGANIZED HEALTH CARE EDUCATION/TRAINING PROGRAM

## 2024-07-01 PROCEDURE — 5A09357 ASSISTANCE WITH RESPIRATORY VENTILATION, LESS THAN 24 CONSECUTIVE HOURS, CONTINUOUS POSITIVE AIRWAY PRESSURE: ICD-10-PCS

## 2024-07-01 PROCEDURE — 94660 CPAP INITIATION&MGMT: CPT

## 2024-07-01 PROCEDURE — 2500000005 HC RX 250 GENERAL PHARMACY W/O HCPCS: Performed by: INTERNAL MEDICINE

## 2024-07-01 PROCEDURE — 36600 WITHDRAWAL OF ARTERIAL BLOOD: CPT

## 2024-07-01 PROCEDURE — 2020000001 HC ICU ROOM DAILY

## 2024-07-01 PROCEDURE — 96375 TX/PRO/DX INJ NEW DRUG ADDON: CPT

## 2024-07-01 PROCEDURE — 93005 ELECTROCARDIOGRAM TRACING: CPT

## 2024-07-01 PROCEDURE — 2500000002 HC RX 250 W HCPCS SELF ADMINISTERED DRUGS (ALT 637 FOR MEDICARE OP, ALT 636 FOR OP/ED): Performed by: STUDENT IN AN ORGANIZED HEALTH CARE EDUCATION/TRAINING PROGRAM

## 2024-07-01 PROCEDURE — 2500000004 HC RX 250 GENERAL PHARMACY W/ HCPCS (ALT 636 FOR OP/ED)

## 2024-07-01 PROCEDURE — 71045 X-RAY EXAM CHEST 1 VIEW: CPT | Performed by: RADIOLOGY

## 2024-07-01 PROCEDURE — 96365 THER/PROPH/DIAG IV INF INIT: CPT

## 2024-07-01 PROCEDURE — 84132 ASSAY OF SERUM POTASSIUM: CPT

## 2024-07-01 PROCEDURE — 96367 TX/PROPH/DG ADDL SEQ IV INF: CPT

## 2024-07-01 PROCEDURE — 85610 PROTHROMBIN TIME: CPT | Performed by: STUDENT IN AN ORGANIZED HEALTH CARE EDUCATION/TRAINING PROGRAM

## 2024-07-01 PROCEDURE — 84484 ASSAY OF TROPONIN QUANT: CPT | Performed by: STUDENT IN AN ORGANIZED HEALTH CARE EDUCATION/TRAINING PROGRAM

## 2024-07-01 PROCEDURE — 99291 CRITICAL CARE FIRST HOUR: CPT | Performed by: STUDENT IN AN ORGANIZED HEALTH CARE EDUCATION/TRAINING PROGRAM

## 2024-07-01 PROCEDURE — 71250 CT THORAX DX C-: CPT | Performed by: RADIOLOGY

## 2024-07-01 PROCEDURE — 86381 MITOCHONDRIAL ANTIBODY EACH: CPT | Mod: STJLAB | Performed by: NURSE PRACTITIONER

## 2024-07-01 PROCEDURE — 82947 ASSAY GLUCOSE BLOOD QUANT: CPT

## 2024-07-01 PROCEDURE — 71045 X-RAY EXAM CHEST 1 VIEW: CPT

## 2024-07-01 PROCEDURE — 84132 ASSAY OF SERUM POTASSIUM: CPT | Performed by: STUDENT IN AN ORGANIZED HEALTH CARE EDUCATION/TRAINING PROGRAM

## 2024-07-01 PROCEDURE — 83735 ASSAY OF MAGNESIUM: CPT | Performed by: STUDENT IN AN ORGANIZED HEALTH CARE EDUCATION/TRAINING PROGRAM

## 2024-07-01 PROCEDURE — 87040 BLOOD CULTURE FOR BACTERIA: CPT | Mod: STJLAB | Performed by: STUDENT IN AN ORGANIZED HEALTH CARE EDUCATION/TRAINING PROGRAM

## 2024-07-01 PROCEDURE — 83605 ASSAY OF LACTIC ACID: CPT | Performed by: STUDENT IN AN ORGANIZED HEALTH CARE EDUCATION/TRAINING PROGRAM

## 2024-07-01 PROCEDURE — 94799 UNLISTED PULMONARY SVC/PX: CPT

## 2024-07-01 PROCEDURE — 2500000002 HC RX 250 W HCPCS SELF ADMINISTERED DRUGS (ALT 637 FOR MEDICARE OP, ALT 636 FOR OP/ED)

## 2024-07-01 PROCEDURE — 71250 CT THORAX DX C-: CPT

## 2024-07-01 PROCEDURE — 2500000001 HC RX 250 WO HCPCS SELF ADMINISTERED DRUGS (ALT 637 FOR MEDICARE OP)

## 2024-07-01 PROCEDURE — 99291 CRITICAL CARE FIRST HOUR: CPT

## 2024-07-01 PROCEDURE — 82103 ALPHA-1-ANTITRYPSIN TOTAL: CPT | Mod: STJLAB | Performed by: NURSE PRACTITIONER

## 2024-07-01 PROCEDURE — 96361 HYDRATE IV INFUSION ADD-ON: CPT

## 2024-07-01 PROCEDURE — 2500000005 HC RX 250 GENERAL PHARMACY W/O HCPCS: Performed by: STUDENT IN AN ORGANIZED HEALTH CARE EDUCATION/TRAINING PROGRAM

## 2024-07-01 PROCEDURE — 87641 MR-STAPH DNA AMP PROBE: CPT | Performed by: STUDENT IN AN ORGANIZED HEALTH CARE EDUCATION/TRAINING PROGRAM

## 2024-07-01 PROCEDURE — 99292 CRITICAL CARE ADDL 30 MIN: CPT | Performed by: STUDENT IN AN ORGANIZED HEALTH CARE EDUCATION/TRAINING PROGRAM

## 2024-07-01 PROCEDURE — 85025 COMPLETE CBC W/AUTO DIFF WBC: CPT | Performed by: STUDENT IN AN ORGANIZED HEALTH CARE EDUCATION/TRAINING PROGRAM

## 2024-07-01 PROCEDURE — 86705 HEP B CORE ANTIBODY IGM: CPT | Mod: STJLAB

## 2024-07-01 PROCEDURE — 86015 ACTIN ANTIBODY EACH: CPT | Mod: STJLAB | Performed by: NURSE PRACTITIONER

## 2024-07-01 PROCEDURE — 83880 ASSAY OF NATRIURETIC PEPTIDE: CPT | Performed by: STUDENT IN AN ORGANIZED HEALTH CARE EDUCATION/TRAINING PROGRAM

## 2024-07-01 RX ORDER — MAGNESIUM SULFATE HEPTAHYDRATE 40 MG/ML
2 INJECTION, SOLUTION INTRAVENOUS EVERY 6 HOURS PRN
Status: DISCONTINUED | OUTPATIENT
Start: 2024-07-01 | End: 2024-07-03

## 2024-07-01 RX ORDER — NAPROXEN SODIUM 220 MG/1
81 TABLET, FILM COATED ORAL DAILY
Status: DISCONTINUED | OUTPATIENT
Start: 2024-07-01 | End: 2024-07-08 | Stop reason: HOSPADM

## 2024-07-01 RX ORDER — NYSTATIN 100000 [USP'U]/G
1 POWDER TOPICAL 2 TIMES DAILY
Status: DISCONTINUED | OUTPATIENT
Start: 2024-07-01 | End: 2024-07-08 | Stop reason: HOSPADM

## 2024-07-01 RX ORDER — IPRATROPIUM BROMIDE AND ALBUTEROL SULFATE 2.5; .5 MG/3ML; MG/3ML
9 SOLUTION RESPIRATORY (INHALATION) ONCE
Status: COMPLETED | OUTPATIENT
Start: 2024-07-01 | End: 2024-07-01

## 2024-07-01 RX ORDER — HEPARIN SODIUM 5000 [USP'U]/ML
7500 INJECTION, SOLUTION INTRAVENOUS; SUBCUTANEOUS EVERY 8 HOURS SCHEDULED
Status: DISCONTINUED | OUTPATIENT
Start: 2024-07-01 | End: 2024-07-08 | Stop reason: HOSPADM

## 2024-07-01 RX ORDER — INSULIN LISPRO 100 [IU]/ML
0-5 INJECTION, SOLUTION INTRAVENOUS; SUBCUTANEOUS EVERY 4 HOURS
Status: DISCONTINUED | OUTPATIENT
Start: 2024-07-01 | End: 2024-07-02

## 2024-07-01 RX ORDER — NYSTATIN 100000 [USP'U]/G
1 POWDER TOPICAL 2 TIMES DAILY
COMMUNITY
End: 2024-07-08 | Stop reason: HOSPADM

## 2024-07-01 RX ORDER — DEXTROSE 50 % IN WATER (D50W) INTRAVENOUS SYRINGE
12.5
Status: DISCONTINUED | OUTPATIENT
Start: 2024-07-01 | End: 2024-07-08 | Stop reason: HOSPADM

## 2024-07-01 RX ORDER — MAGNESIUM SULFATE HEPTAHYDRATE 40 MG/ML
4 INJECTION, SOLUTION INTRAVENOUS EVERY 6 HOURS PRN
Status: DISCONTINUED | OUTPATIENT
Start: 2024-07-01 | End: 2024-07-03

## 2024-07-01 RX ORDER — IPRATROPIUM BROMIDE AND ALBUTEROL SULFATE 2.5; .5 MG/3ML; MG/3ML
3 SOLUTION RESPIRATORY (INHALATION)
Status: DISCONTINUED | OUTPATIENT
Start: 2024-07-01 | End: 2024-07-01

## 2024-07-01 RX ORDER — SODIUM BICARBONATE 1 MEQ/ML
50 SYRINGE (ML) INTRAVENOUS ONCE
Status: COMPLETED | OUTPATIENT
Start: 2024-07-01 | End: 2024-07-01

## 2024-07-01 RX ORDER — VANCOMYCIN 2 GRAM/500 ML IN 0.9 % SODIUM CHLORIDE INTRAVENOUS
2 ONCE
Status: DISCONTINUED | OUTPATIENT
Start: 2024-07-01 | End: 2024-07-01

## 2024-07-01 RX ORDER — ASPIRIN 325 MG
325 TABLET ORAL ONCE
Status: DISCONTINUED | OUTPATIENT
Start: 2024-07-01 | End: 2024-07-01

## 2024-07-01 RX ORDER — ALBUTEROL SULFATE 0.83 MG/ML
2.5 SOLUTION RESPIRATORY (INHALATION) EVERY 4 HOURS PRN
Status: DISCONTINUED | OUTPATIENT
Start: 2024-07-01 | End: 2024-07-08 | Stop reason: HOSPADM

## 2024-07-01 RX ORDER — DEXTROSE 50 % IN WATER (D50W) INTRAVENOUS SYRINGE
25 ONCE
Status: COMPLETED | OUTPATIENT
Start: 2024-07-01 | End: 2024-07-01

## 2024-07-01 RX ORDER — IPRATROPIUM BROMIDE AND ALBUTEROL SULFATE 2.5; .5 MG/3ML; MG/3ML
3 SOLUTION RESPIRATORY (INHALATION)
COMMUNITY

## 2024-07-01 RX ORDER — IPRATROPIUM BROMIDE AND ALBUTEROL SULFATE 2.5; .5 MG/3ML; MG/3ML
3 SOLUTION RESPIRATORY (INHALATION)
Status: DISCONTINUED | OUTPATIENT
Start: 2024-07-02 | End: 2024-07-04

## 2024-07-01 RX ORDER — DEXTROSE 50 % IN WATER (D50W) INTRAVENOUS SYRINGE
25
Status: DISCONTINUED | OUTPATIENT
Start: 2024-07-01 | End: 2024-07-08 | Stop reason: HOSPADM

## 2024-07-01 RX ORDER — PANTOPRAZOLE SODIUM 40 MG/1
40 TABLET, DELAYED RELEASE ORAL DAILY
Status: DISCONTINUED | OUTPATIENT
Start: 2024-07-01 | End: 2024-07-08 | Stop reason: HOSPADM

## 2024-07-01 RX ORDER — HYDRALAZINE HYDROCHLORIDE 20 MG/ML
10 INJECTION INTRAMUSCULAR; INTRAVENOUS EVERY 6 HOURS PRN
Status: DISCONTINUED | OUTPATIENT
Start: 2024-07-01 | End: 2024-07-03

## 2024-07-01 RX ORDER — INSULIN LISPRO 100 [IU]/ML
3-15 INJECTION, SOLUTION INTRAVENOUS; SUBCUTANEOUS
COMMUNITY
End: 2024-07-01 | Stop reason: WASHOUT

## 2024-07-01 RX ORDER — LABETALOL HYDROCHLORIDE 5 MG/ML
20 INJECTION, SOLUTION INTRAVENOUS EVERY 6 HOURS PRN
Status: DISCONTINUED | OUTPATIENT
Start: 2024-07-01 | End: 2024-07-03

## 2024-07-01 RX ORDER — POLYETHYLENE GLYCOL 3350 17 G/17G
17 POWDER, FOR SOLUTION ORAL DAILY
Status: DISCONTINUED | OUTPATIENT
Start: 2024-07-02 | End: 2024-07-08 | Stop reason: HOSPADM

## 2024-07-01 RX ORDER — ALBUTEROL SULFATE 0.83 MG/ML
10 SOLUTION RESPIRATORY (INHALATION) ONCE
Status: DISCONTINUED | OUTPATIENT
Start: 2024-07-01 | End: 2024-07-01

## 2024-07-01 RX ORDER — CALCIUM GLUCONATE 20 MG/ML
2 INJECTION, SOLUTION INTRAVENOUS ONCE
Status: COMPLETED | OUTPATIENT
Start: 2024-07-01 | End: 2024-07-01

## 2024-07-01 RX ORDER — ESOMEPRAZOLE MAGNESIUM 40 MG/1
40 GRANULE, DELAYED RELEASE ORAL DAILY
Status: DISCONTINUED | OUTPATIENT
Start: 2024-07-01 | End: 2024-07-03

## 2024-07-01 RX ORDER — PANTOPRAZOLE SODIUM 40 MG/10ML
40 INJECTION, POWDER, LYOPHILIZED, FOR SOLUTION INTRAVENOUS DAILY
Status: DISCONTINUED | OUTPATIENT
Start: 2024-07-01 | End: 2024-07-03

## 2024-07-01 RX ORDER — FENTANYL CITRATE 50 UG/ML
25 INJECTION, SOLUTION INTRAMUSCULAR; INTRAVENOUS ONCE
Status: COMPLETED | OUTPATIENT
Start: 2024-07-01 | End: 2024-07-01

## 2024-07-01 SDOH — SOCIAL STABILITY: SOCIAL INSECURITY: ABUSE: ADULT

## 2024-07-01 SDOH — SOCIAL STABILITY: SOCIAL NETWORK: HOW OFTEN DO YOU GET TOGETHER WITH FRIENDS OR RELATIVES?: PATIENT DECLINED

## 2024-07-01 SDOH — ECONOMIC STABILITY: HOUSING INSECURITY
IN THE LAST 12 MONTHS, WAS THERE A TIME WHEN YOU DID NOT HAVE A STEADY PLACE TO SLEEP OR SLEPT IN A SHELTER (INCLUDING NOW)?: NO

## 2024-07-01 SDOH — ECONOMIC STABILITY: FOOD INSECURITY: WITHIN THE PAST 12 MONTHS, YOU WORRIED THAT YOUR FOOD WOULD RUN OUT BEFORE YOU GOT MONEY TO BUY MORE.: NEVER TRUE

## 2024-07-01 SDOH — HEALTH STABILITY: MENTAL HEALTH: HOW OFTEN DO YOU HAVE A DRINK CONTAINING ALCOHOL?: NEVER

## 2024-07-01 SDOH — ECONOMIC STABILITY: FOOD INSECURITY: WITHIN THE PAST 12 MONTHS, THE FOOD YOU BOUGHT JUST DIDN'T LAST AND YOU DIDN'T HAVE MONEY TO GET MORE.: NEVER TRUE

## 2024-07-01 SDOH — SOCIAL STABILITY: SOCIAL NETWORK: HOW OFTEN DO YOU ATTENT MEETINGS OF THE CLUB OR ORGANIZATION YOU BELONG TO?: PATIENT DECLINED

## 2024-07-01 SDOH — HEALTH STABILITY: MENTAL HEALTH: HOW OFTEN DO YOU HAVE 6 OR MORE DRINKS ON ONE OCCASION?: NEVER

## 2024-07-01 SDOH — SOCIAL STABILITY: SOCIAL INSECURITY: ARE YOU OR HAVE YOU BEEN THREATENED OR ABUSED PHYSICALLY, EMOTIONALLY, OR SEXUALLY BY ANYONE?: NO

## 2024-07-01 SDOH — SOCIAL STABILITY: SOCIAL INSECURITY: ARE THERE ANY APPARENT SIGNS OF INJURIES/BEHAVIORS THAT COULD BE RELATED TO ABUSE/NEGLECT?: NO

## 2024-07-01 SDOH — SOCIAL STABILITY: SOCIAL INSECURITY: WERE YOU ABLE TO COMPLETE ALL THE BEHAVIORAL HEALTH SCREENINGS?: YES

## 2024-07-01 SDOH — ECONOMIC STABILITY: INCOME INSECURITY: IN THE LAST 12 MONTHS, WAS THERE A TIME WHEN YOU WERE NOT ABLE TO PAY THE MORTGAGE OR RENT ON TIME?: NO

## 2024-07-01 SDOH — ECONOMIC STABILITY: HOUSING INSECURITY: IN THE LAST 12 MONTHS, HOW MANY PLACES HAVE YOU LIVED?: 1

## 2024-07-01 SDOH — ECONOMIC STABILITY: INCOME INSECURITY: IN THE PAST 12 MONTHS, HAS THE ELECTRIC, GAS, OIL, OR WATER COMPANY THREATENED TO SHUT OFF SERVICE IN YOUR HOME?: NO

## 2024-07-01 SDOH — ECONOMIC STABILITY: TRANSPORTATION INSECURITY
IN THE PAST 12 MONTHS, HAS LACK OF TRANSPORTATION KEPT YOU FROM MEETINGS, WORK, OR FROM GETTING THINGS NEEDED FOR DAILY LIVING?: NO

## 2024-07-01 SDOH — SOCIAL STABILITY: SOCIAL INSECURITY: DO YOU FEEL UNSAFE GOING BACK TO THE PLACE WHERE YOU ARE LIVING?: NO

## 2024-07-01 SDOH — SOCIAL STABILITY: SOCIAL INSECURITY: HAVE YOU HAD ANY THOUGHTS OF HARMING ANYONE ELSE?: NO

## 2024-07-01 SDOH — ECONOMIC STABILITY: TRANSPORTATION INSECURITY
IN THE PAST 12 MONTHS, HAS THE LACK OF TRANSPORTATION KEPT YOU FROM MEDICAL APPOINTMENTS OR FROM GETTING MEDICATIONS?: NO

## 2024-07-01 SDOH — HEALTH STABILITY: MENTAL HEALTH: HOW MANY STANDARD DRINKS CONTAINING ALCOHOL DO YOU HAVE ON A TYPICAL DAY?: PATIENT DOES NOT DRINK

## 2024-07-01 SDOH — SOCIAL STABILITY: SOCIAL INSECURITY: DOES ANYONE TRY TO KEEP YOU FROM HAVING/CONTACTING OTHER FRIENDS OR DOING THINGS OUTSIDE YOUR HOME?: NO

## 2024-07-01 SDOH — SOCIAL STABILITY: SOCIAL NETWORK: HOW OFTEN DO YOU ATTEND CHURCH OR RELIGIOUS SERVICES?: PATIENT DECLINED

## 2024-07-01 SDOH — SOCIAL STABILITY: SOCIAL INSECURITY: HAS ANYONE EVER THREATENED TO HURT YOUR FAMILY OR YOUR PETS?: NO

## 2024-07-01 SDOH — HEALTH STABILITY: PHYSICAL HEALTH: ON AVERAGE, HOW MANY DAYS PER WEEK DO YOU ENGAGE IN MODERATE TO STRENUOUS EXERCISE (LIKE A BRISK WALK)?: 0 DAYS

## 2024-07-01 SDOH — SOCIAL STABILITY: SOCIAL NETWORK: IN A TYPICAL WEEK, HOW MANY TIMES DO YOU TALK ON THE PHONE WITH FAMILY, FRIENDS, OR NEIGHBORS?: PATIENT DECLINED

## 2024-07-01 SDOH — ECONOMIC STABILITY: INCOME INSECURITY: HOW HARD IS IT FOR YOU TO PAY FOR THE VERY BASICS LIKE FOOD, HOUSING, MEDICAL CARE, AND HEATING?: NOT HARD AT ALL

## 2024-07-01 SDOH — SOCIAL STABILITY: SOCIAL INSECURITY: HAVE YOU HAD THOUGHTS OF HARMING ANYONE ELSE?: NO

## 2024-07-01 SDOH — SOCIAL STABILITY: SOCIAL NETWORK
DO YOU BELONG TO ANY CLUBS OR ORGANIZATIONS SUCH AS CHURCH GROUPS UNIONS, FRATERNAL OR ATHLETIC GROUPS, OR SCHOOL GROUPS?: PATIENT DECLINED

## 2024-07-01 SDOH — HEALTH STABILITY: MENTAL HEALTH
STRESS IS WHEN SOMEONE FEELS TENSE, NERVOUS, ANXIOUS, OR CAN'T SLEEP AT NIGHT BECAUSE THEIR MIND IS TROUBLED. HOW STRESSED ARE YOU?: PATIENT DECLINED

## 2024-07-01 SDOH — SOCIAL STABILITY: SOCIAL INSECURITY: DO YOU FEEL ANYONE HAS EXPLOITED OR TAKEN ADVANTAGE OF YOU FINANCIALLY OR OF YOUR PERSONAL PROPERTY?: NO

## 2024-07-01 SDOH — SOCIAL STABILITY: SOCIAL NETWORK: ARE YOU MARRIED, WIDOWED, DIVORCED, SEPARATED, NEVER MARRIED, OR LIVING WITH A PARTNER?: PATIENT DECLINED

## 2024-07-01 ASSESSMENT — LIFESTYLE VARIABLES
SKIP TO QUESTIONS 9-10: 1
SUBSTANCE_ABUSE_PAST_12_MONTHS: NO
HAVE PEOPLE ANNOYED YOU BY CRITICIZING YOUR DRINKING: NO
PRESCIPTION_ABUSE_PAST_12_MONTHS: NO
AUDIT-C TOTAL SCORE: 0
EVER FELT BAD OR GUILTY ABOUT YOUR DRINKING: NO
EVER HAD A DRINK FIRST THING IN THE MORNING TO STEADY YOUR NERVES TO GET RID OF A HANGOVER: NO
HAVE YOU EVER FELT YOU SHOULD CUT DOWN ON YOUR DRINKING: NO
TOTAL SCORE: 0

## 2024-07-01 ASSESSMENT — COGNITIVE AND FUNCTIONAL STATUS - GENERAL
STANDING UP FROM CHAIR USING ARMS: A LOT
DRESSING REGULAR UPPER BODY CLOTHING: A LOT
TOILETING: A LOT
WALKING IN HOSPITAL ROOM: A LOT
DAILY ACTIVITIY SCORE: 12
MOVING FROM LYING ON BACK TO SITTING ON SIDE OF FLAT BED WITH BEDRAILS: A LOT
EATING MEALS: A LOT
HELP NEEDED FOR BATHING: A LOT
TURNING FROM BACK TO SIDE WHILE IN FLAT BAD: A LOT
PATIENT BASELINE BEDBOUND: NO
MOVING TO AND FROM BED TO CHAIR: A LOT
MOBILITY SCORE: 12
DRESSING REGULAR LOWER BODY CLOTHING: A LOT
CLIMB 3 TO 5 STEPS WITH RAILING: A LOT
PERSONAL GROOMING: A LOT

## 2024-07-01 ASSESSMENT — ACTIVITIES OF DAILY LIVING (ADL)
ASSISTIVE_DEVICE: OTHER (COMMENT);WHEELCHAIR
HEARING - LEFT EAR: FUNCTIONAL
ADEQUATE_TO_COMPLETE_ADL: YES
JUDGMENT_ADEQUATE_SAFELY_COMPLETE_DAILY_ACTIVITIES: YES
GROOMING: NEEDS ASSISTANCE
TOILETING: DEPENDENT
WALKS IN HOME: DEPENDENT
BATHING: DEPENDENT
DRESSING YOURSELF: NEEDS ASSISTANCE
PATIENT'S MEMORY ADEQUATE TO SAFELY COMPLETE DAILY ACTIVITIES?: YES
FEEDING YOURSELF: NEEDS ASSISTANCE
HEARING - RIGHT EAR: FUNCTIONAL

## 2024-07-01 ASSESSMENT — PATIENT HEALTH QUESTIONNAIRE - PHQ9
2. FEELING DOWN, DEPRESSED OR HOPELESS: NOT AT ALL
1. LITTLE INTEREST OR PLEASURE IN DOING THINGS: NOT AT ALL
SUM OF ALL RESPONSES TO PHQ9 QUESTIONS 1 & 2: 0

## 2024-07-01 ASSESSMENT — PAIN SCALES - GENERAL
PAINLEVEL_OUTOF10: 3
PAINLEVEL_OUTOF10: 0 - NO PAIN
PAINLEVEL_OUTOF10: 3
PAINLEVEL_OUTOF10: 0 - NO PAIN
PAINLEVEL_OUTOF10: 6
PAINLEVEL_OUTOF10: 0 - NO PAIN

## 2024-07-01 ASSESSMENT — PAIN - FUNCTIONAL ASSESSMENT
PAIN_FUNCTIONAL_ASSESSMENT: 0-10

## 2024-07-01 NOTE — NURSING NOTE
1730: Patient admitted to the ICU for acute on chronic respiratory failure. Patient A&O x4 on arrival and on bipap. Patient comes from [facility] where she has been since 6/20 for rehab. She had previously been living at home. Patients  Jose A ([phone]) called and updated on patients condition.

## 2024-07-01 NOTE — H&P
Critical Care Medicine History and Physical        Subjective   Patient is a 69 y.o. female admitted on 7/1/2024  9:19 AM to the ICU for Acute on chronic hypercapneic respiratory failure.    Kaylene Feliciano is a 69 y.o. female presenting with hx significant for OA, ARCHIE (not on any therapy), femur fracture, depression (s/p DBS), HTN, IDDM 2, asthma, CKD 3, HLD, diastolic CHF (last echo 7/2023, EF 75%, pulmonary artery hypertension 40-50 mmHg).  Presents to emergency department from nursing facility with shortness of breath, hypoxic with altered mental status.      Presenting vital signs: Temp 36.6, pulse 74, respiratory rate 24, /71, SpO2 100% on nonrebreather mask.  Pertinent labs remarkable for potassium 7.5 (hemolyzed), sodium 132, BUN 24, creatinine 2.49, alk phos 163, ALT 1132, AST 1367, bilirubin 1.5, , troponin 64, WBC 19.4.  ABG 7.28/64/133/30.1.  Patient was placed on BiPAP. CXR reveals enlarged heart, marked progression of disease with near-complete opacification of the left hemithorax. Reticulonodular interstitial pattern right lung. 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 Chest CT without IV contrast shows persistent dense consolidation at the left lung base.  She was treated with albuterol nebulizer, hyperkalemia cocktail, doxycycline, Zosyn and methylprednisolone.     Upon presentation to the ICU, she is awake and alert.  She is oriented x 3.  Denies pain/chest pain.  States her breathing feels much improved since admission.  States she does not wear BiPAP at the facility but has an upcoming appointment with pulmonologist regarding sleep study.      Past Medical History:   Diagnosis Date    Diabetes mellitus (Multi)     Hypertension      Past Surgical History:   Procedure Laterality Date    OTHER SURGICAL HISTORY  01/29/2021    Knee arthroscopy    OTHER SURGICAL HISTORY  01/29/2021    Deep brain stimulation    OTHER SURGICAL HISTORY  01/29/2021    Hysterectomy     OTHER SURGICAL HISTORY  2021    Cholecystectomy    OTHER SURGICAL HISTORY  10/14/2021    Knee surgery    OTHER SURGICAL HISTORY  10/14/2021    Tonsillectomy with adenoidectomy    OTHER SURGICAL HISTORY  10/14/2021    Vaginal sling procedure    OTHER SURGICAL HISTORY  10/19/2021     section     Medications Prior to Admission   Medication Sig Dispense Refill Last Dose    amLODIPine (Norvasc) 5 mg tablet Take 1 tablet (5 mg) by mouth once daily.   2024    aspirin 81 mg chewable tablet Chew 1 tablet (81 mg) once daily.   2024    atorvastatin (Lipitor) 10 mg tablet Take 1 tablet (10 mg) by mouth once daily at bedtime.   2024    citalopram (CeleXA) 40 mg tablet Take 1 tablet (40 mg) by mouth once daily.   2024    cranberry 400 mg capsule Take 1 capsule by mouth once daily.   2024    gabapentin (Neurontin) 100 mg capsule Take 3 capsules (300 mg) by mouth 2 times a day.   2024    insulin glargine (Lantus U-100 Insulin) 100 unit/mL injection Inject 55 Units under the skin 2 times a day.   2024    ipratropium-albuteroL (Duo-Neb) 0.5-2.5 mg/3 mL nebulizer solution Take 3 mL by nebulization every 4 hours.   2024    lidocaine 4 % patch Place 1 patch on the skin once daily as needed for moderate pain (4 - 6).   2024    methocarbamol (Robaxin) 500 mg tablet Take 1 tablet (500 mg) by mouth 2 times a day.   2024    metoprolol succinate XL (Toprol-XL) 25 mg 24 hr tablet Take 1 tablet (25 mg) by mouth 2 times a day. Hold for SBP<110 or HR <70   2024    nystatin (Mycostatin) 100,000 unit/gram powder Apply 1 Application topically 2 times a day. Left arm pit   2024    pancrelipase, Lip-Prot-Amyl, (Creon) 6,000-19,000 -30,000 unit capsule Take 2 capsules by mouth 3 times a day before meals.   2024    lisinopril 40 mg tablet Take 1 tablet (40 mg) by mouth once daily. Please do not take this medication until follow-up with primary doctor regarding kidney  function. 30 tablet 0     predniSONE (Deltasone) 10 mg tablet Take 3 tablets (30 mg) by mouth once daily for 1 day, THEN 2 tablets (20 mg) once daily for 2 days, THEN 1 tablet (10 mg) once daily for 2 days. 42 tablet 0      Sulfa (sulfonamide antibiotics), Belladonna, Ciprofloxacin, Egg, Adhesive tape-silicones, Iodinated contrast media, and Tegaderm ag mesh [silver]  Social History     Tobacco Use    Smoking status: Never    Smokeless tobacco: Never   Substance Use Topics    Alcohol use: Never    Drug use: Never     Family History   Problem Relation Name Age of Onset    Diabetes Mother      Hypertension Mother      Thyroid cancer Mother      Heart attack Mother      Osteoporosis Mother      Stroke Father      Hypertension Father      Diabetes Brother      Hypertension Brother      Cancer Brother      Diabetes Other Grandparent         type 2, without complication, unspecified insulin use       Scheduled Medications:   aspirin, 81 mg, oral, Daily  [START ON 7/2/2024] doxycycline, 100 mg, intravenous, q12h  pantoprazole, 40 mg, oral, Daily   Or  esomeprazole, 40 mg, nasoduodenal tube, Daily   Or  pantoprazole, 40 mg, intravenous, Daily  heparin (porcine), 7,500 Units, subcutaneous, q8h YARELI  insulin lispro, 0-5 Units, subcutaneous, q4h  [START ON 7/2/2024] ipratropium-albuteroL, 3 mL, nebulization, q6h  methylPREDNISolone sodium succinate (PF), 40 mg, intravenous, q8h  nystatin, 1 Application, Topical, BID  oxygen, , inhalation, Continuous - Inhalation  [START ON 7/2/2024] pancrelipase (Lip-Prot-Amyl), 1 capsule, oral, TID AC  piperacillin-tazobactam, 3.375 g, intravenous, q8h  [START ON 7/2/2024] polyethylene glycol, 17 g, oral, Daily         Continuous Medications:         PRN Medications:   PRN medications: albuterol, calcium chloride, calcium chloride, dextrose, dextrose, glucagon, glucagon, hydrALAZINE, labetaloL, magnesium sulfate, magnesium sulfate, oxygen    Constitutional: negative  Eyes: negative  Ears,  nose, mouth, throat, and face: negative  Respiratory: positive for asthma, pneumonia, and wheezing  Cardiovascular: negative  Integument/breasts: negative  Gastrointestinal: positive for constipation  Genitourinary: oliguria  Musculoskeletal:negative  Neurological: negative  Behavioral/Psych: negative  Hematologic/lymphatic: negative  Endocrine: negative  Allergic/Immunologic: negative    Objective   Vitals:  24hr Min/Max:  Temp  Min: 36.6 °C (97.9 °F)  Max: 36.7 °C (98.1 °F)  Pulse  Min: 63  Max: 74  BP  Min: 118/56  Max: 172/77  Resp  Min: 15  Max: 30  SpO2  Min: 95 %  Max: 100 %    Physical exam:    Physical Exam  Constitutional:       General: She is awake.      Appearance: She is obese.   HENT:      Head: Normocephalic and atraumatic.   Eyes:      Extraocular Movements: Extraocular movements intact.      Pupils: Pupils are equal, round, and reactive to light.   Cardiovascular:      Rate and Rhythm: Normal rate and regular rhythm.      Pulses: Normal pulses.      Heart sounds: Normal heart sounds.   Pulmonary:      Effort: Pulmonary effort is normal.      Breath sounds: Normal breath sounds. Decreased air movement present.   Abdominal:      General: Bowel sounds are normal.      Palpations: Abdomen is soft.      Tenderness: There is abdominal tenderness in the right upper quadrant.   Musculoskeletal:         General: Normal range of motion.      Cervical back: Normal range of motion and neck supple.      Right lower leg: No edema.      Left lower leg: No edema.   Skin:     General: Skin is warm and dry.   Neurological:      Mental Status: She is alert, oriented to person, place, and time and easily aroused.      Sensory: Sensation is intact.   Psychiatric:         Attention and Perception: Attention and perception normal.         Mood and Affect: Mood and affect normal.         Behavior: Behavior normal. Behavior is cooperative.         Thought Content: Thought content normal.         Judgment: Judgment normal.        Assessment/Plan  #Acute on chronic hypercapnic respiratory failure.  #Elevated liver enzymes  #CRISTOFER on CKD 3  #DM  #Possible pneumonia    Neuro:   -No acute issues  -Promote sleep/wake cycle.  - History: Anxiety, bipolar  - Home meds: Celexa, gabapentin  - Pain: 2/10  - Sedation: none  - CAM ICU    Cardiovascular:   -No acute issues  -Continuous cardiac monitoring.  -Vitals per protocol.  -PRN antihypertensives on profile.  - History: Essential HTN, HLD   - Goals: MAP>65  - Home meds: norvasc, asa, metoprolol succinate xl  - Last echo: Last echo 7/2023, EF 75%, pulmonary artery hypertension 40-50 mmHg)     Pulmonary:   #Acute on chronic hypercapnic respiratory failure.  -Patient placed on Bipap secondary to elevated C02.  FiO2 (%):  [40 %-100 %] 40 %  S RR:  [12] 12   - History: Asthma  - Home meds: Albuterol  -Continuous pulse oximetry   -O2 PRN to maintain SpO2 > 92%, wean as tolerated  -Breathing treatments scheduled and PRN ordered.  -Wean 02 as tolearated.  - Imaging:    -Chest CT 7/1:  Impression:     Limited resolution.      Persistent dense consolidation at the left lung base. Follow-up to  resolution is suggested.      Heart appears somewhat enlarged       Gastrointestinal:   #Elevated liver enzymes  -Pending hepatitis panel/liver US/Urine toxicology.  Results from last 7 days   Lab Units 07/01/24  1320 07/01/24  0944   ALK PHOS U/L 163* 166*   AST U/L 1,367* 915*   ALT U/L 1,132* 863*   -History: Pancreatic insufficiency  -Home meds: creon  - Diet: npo  - Prophylaxis: protonix  - Bowel regimen: miralax  - Last BM:      Renal:   #CRISTOFER on CKD 3  #Hyperkalemia  -Urine electrolytes ordered for further workup of CRISTOFER.  -K-cocktail administered in the ED.  Continue to trend RFP's.  Results from last 7 days   Lab Units 07/01/24  1320 07/01/24  0944   SODIUM mmol/L 132* 132*   POTASSIUM mmol/L 6.0* 7.5*   MAGNESIUM mg/dL  --  1.97   BUN mg/dL 25* 24*   CREATININE mg/dL 2.46* 2.49*    Net IO Since Admission: 50  mL [24]  - Daily CMP,Mg,Phos  -I/O's per ICU protocol.  - History: CKD3  - Home meds: none  - Electrolytes: Replete per protocol, goal K>4 Phos >3 Mg >2    Endocrine:   #DM  Results from last 7 days   Lab Units 24  1756 24  1320 24  0944   POCT GLUCOSE mg/dL 195*  --   --    GLUCOSE mg/dL  --  164* 147*    -History: DM  -Home meds: Lantus 55 units BID  -SSI and glucose checks per protocol.  -BG < 180 goal    Hematology:   -No acute issues  Results from last 7 days   Lab Units 24  0944   HEMOGLOBIN g/dL 10.6*   HEMATOCRIT % 36.3   PLATELETS AUTO x10*3/uL 237   - Daily CBC  -History: vitamin D deficiency  -Home meds: none  - DVT Prophylaxis: heparin and scd's.    Infectious Disease:   #Possible pneumonia  Results from last 7 days   Lab Units 24  0944   WBC AUTO x10*3/uL 19.4*   Temp (24hrs), Av.7 °C (98 °F), Min:36.6 °C (97.9 °F), Max:36.7 °C (98.1 °F)  -History: none  -Home meds: none  -Cultures:   Collected Updated Procedure Result Status    2024 1201 2024 1339 MRSA Surveillance for Vancomycin De-escalation, PCR [[id number]]    Swab from Anterior Nares    Final result Component Value   MRSA PCR Not Detected          2024 1040 2024 1901 Blood Culture [[id number]]   Blood culture from Peripheral Venipuncture    Preliminary result Component Value   Blood Culture Loaded on Instrument - Culture in progress P          2024 1040 2024 1901 Blood Culture [[id number]]   Blood culture from Peripheral Venipuncture    Preliminary result Component Value   Blood Culture Loaded on Instrument - Culture in progress P          06/15/2024 0312 2024 1131 Streptococcus pneumoniae Antigen, Urine [[id number]]   Urine    Final result Component Value   Streptococcus pneumoniae Ag, Urine Negative          06/15/2024 0312 2024 1131 Legionella Antigen, Urine [[id number]]   Urine    Final result Component Value   L. pneumophila Urine Ag Negative           -Abx: Doxy and zosyn    Musculoskeletal:   -No acute issues  -History: muscle spasms  -Home meds: Robaxin  - PT to see   - OT to see     Integumentary:   -No acute issues  -History: none  -Home meds: none    Lines/Drains:PIV         CODE STATUS: Full Code    Disposition: Will remain in the ICU      ABCDEF Checklist  Analgesia: Spontaneous awakening trial to be pursued if clinically appropriate. RASS goal reviewed  Breathing: Spontaneous breathing trial to be pursued if clinically appropriate. Mechanical power of assisted ventilation reviewed  Choice of analgesia/sedation: Analgesic and sedative agents adjusted per clinical context.   Delirium assessed by CAM, will avoid exacerbating factors  Early mobility and exercise: Physical and occupational therapy engaged  Family: Plan of care, overall trajectory of patient shared with family. Questions elicited and answered as appropriate.     Due to the high probability of life threatening clinical decompensation, the patient required critical care time evaluating and managing this patient.  Critical care time included obtaining a history, examining the patient, ordering and reviewing studies, discussing, developing, and implementing a management plan, evaluating the patient's response to treatment, and discussion with other care team providers. I saw and evaluated the patient myself.  Critical care time was performed exclusive of billable procedures.    Critical care time: 45      Zafar Hyde, MELQUIADES-CNP

## 2024-07-01 NOTE — ED PROVIDER NOTES
HPI   Chief Complaint   Patient presents with    Shortness of Breath       Patient is a 69-year-old female with history of CKD, COPD, type 2 diabetes, hypertension, obesity hypoventilation syndrome presenting to [facility] ED by EMS for shortness of breath.  Patient was brought in from facility requiring 15 L O2 nonrebreather.  Patient had slight altered mentation on arrival, was not following commands, however was awake.  GCS of 14.  Remaining review of systems, limited due to initial mental status.                          Dee Dee Coma Scale Score: 14                     Patient History   Past Medical History:   Diagnosis Date    Diabetes mellitus (Multi)     Hypertension      Past Surgical History:   Procedure Laterality Date    OTHER SURGICAL HISTORY  2021    Knee arthroscopy    OTHER SURGICAL HISTORY  2021    Deep brain stimulation    OTHER SURGICAL HISTORY  2021    Hysterectomy    OTHER SURGICAL HISTORY  2021    Cholecystectomy    OTHER SURGICAL HISTORY  10/14/2021    Knee surgery    OTHER SURGICAL HISTORY  10/14/2021    Tonsillectomy with adenoidectomy    OTHER SURGICAL HISTORY  10/14/2021    Vaginal sling procedure    OTHER SURGICAL HISTORY  10/19/2021     section     Family History   Problem Relation Name Age of Onset    Diabetes Mother      Hypertension Mother      Thyroid cancer Mother      Heart attack Mother      Osteoporosis Mother      Stroke Father      Hypertension Father      Diabetes Brother      Hypertension Brother      Cancer Brother      Diabetes Other Grandparent         type 2, without complication, unspecified insulin use     Social History     Tobacco Use    Smoking status: Never    Smokeless tobacco: Never   Substance Use Topics    Alcohol use: Never    Drug use: Never       Physical Exam   ED Triage Vitals [24 0926]   Temperature Heart Rate Respirations BP   36.6 °C (97.9 °F) 74 (!) 24 146/71      Pulse Ox Temp src Heart Rate Source Patient Position   100  % -- -- --      BP Location FiO2 (%)     -- --       Physical Exam  Constitutional:       Appearance: Normal appearance. She is normal weight.   HENT:      Head: Normocephalic and atraumatic.      Nose: Nose normal.      Mouth/Throat:      Mouth: Mucous membranes are moist.      Pharynx: Oropharynx is clear.   Eyes:      Extraocular Movements: Extraocular movements intact.      Conjunctiva/sclera: Conjunctivae normal.      Pupils: Pupils are equal, round, and reactive to light.   Cardiovascular:      Rate and Rhythm: Normal rate and regular rhythm.      Pulses: Normal pulses.      Heart sounds: Normal heart sounds.   Pulmonary:      Effort: Pulmonary effort is normal.      Breath sounds: Examination of the right-upper field reveals rales. Examination of the left-upper field reveals decreased breath sounds and rales. Examination of the right-middle field reveals rales. Examination of the left-middle field reveals decreased breath sounds and rales. Examination of the right-lower field reveals rales. Examination of the left-lower field reveals decreased breath sounds and rales. Decreased breath sounds and rales present.   Abdominal:      General: Abdomen is flat. Bowel sounds are normal.      Palpations: Abdomen is soft.   Musculoskeletal:         General: Normal range of motion.      Cervical back: Normal range of motion and neck supple.   Skin:     General: Skin is warm and dry.      Capillary Refill: Capillary refill takes less than 2 seconds.   Neurological:      General: No focal deficit present.      Mental Status: She is alert and oriented to person, place, and time. Mental status is at baseline.   Psychiatric:         Mood and Affect: Mood normal.         Behavior: Behavior normal.         ED Course & [facility]   ED Course as of 07/01/24 1537   Mon Jul 01, 2024   0925 Upon arrival patient is ill-appearing.  On 15 L saturating 100% with very poor respiratory effort, poor lung volumes, poor mental status.  Respiratory  called stat to bedside.  IV O2 monitor pulse ox, breathing treatments steroids.  Suspect hypercapnic respiratory failure given her obesity hypoventilation, COPD, poor mentation. [JK]   1027 Patient with a leukocytosis of 20, tachypnea, meeting SIRS criteria, empiric antibiotics ordered at this time. [JK]      ED Course User Index  [JK] Jaycob Cortes DO         Diagnoses as of 07/01/24 1537   Acute on chronic respiratory failure with hypoxia and hypercapnia (Multi)       Medical Decision Making  Patient is a 69 y.o. female who presents to [facility] ED for Shortness of Breath. On initial ED evaluation, patient found to be in no acute distress. Per HPI, concern to evaluate and treat for possible COPD exacerbation, CHF, pneumonia, aspiration.  Obtaining septic lab workup.  Obtain chest x-ray.  Initial VBG was notable for acidosis 7.27, CO2 of 66, leukocytosis of 19.4.  Patient started on BiPAP.  Chest x-ray was notable for left-sided consolidation.  Patient treated for pneumonia with IV Zosyn and doxycycline.  Patient CMP also notable for CRISTOFER, patient bolused with 1 L IV LR fluids.  CMP also notable for initial potassium of 6.0 with breathing status, patient also given breathing treatments and methylprednisolone.  Repeat blood gas showed similar respiratory acidosis of 7.28, pCO2 of 64.  With continuous BiPAP needs, patient to be admitted to ICU for further evaluation and management acute on chronic hypercapnic, hypoxic respiratory failure.              Procedure  Procedures     Xochitl Hyde MD  Resident  07/01/24 1821       Xochitl Hyde MD  Resident  07/01/24 6705

## 2024-07-02 ENCOUNTER — APPOINTMENT (OUTPATIENT)
Dept: RADIOLOGY | Facility: HOSPITAL | Age: 69
DRG: 871 | End: 2024-07-02
Payer: MEDICARE

## 2024-07-02 LAB
A1AT SERPL NEPH-MCNC: 219 MG/DL (ref 84–218)
ALBUMIN SERPL BCP-MCNC: 3.3 G/DL (ref 3.4–5)
ALBUMIN SERPL BCP-MCNC: 3.4 G/DL (ref 3.4–5)
ALP SERPL-CCNC: 144 U/L (ref 33–136)
ALT SERPL W P-5'-P-CCNC: 921 U/L (ref 7–45)
AMPHETAMINES UR QL SCN: NORMAL
ANION GAP SERPL CALC-SCNC: 14 MMOL/L (ref 10–20)
APAP SERPL-MCNC: <10 UG/ML
APPEARANCE UR: CLEAR
AST SERPL W P-5'-P-CCNC: 792 U/L (ref 9–39)
ATRIAL RATE: 0 BPM
BARBITURATES UR QL SCN: NORMAL
BASOPHILS # BLD AUTO: 0.02 X10*3/UL (ref 0–0.1)
BASOPHILS NFR BLD AUTO: 0.1 %
BENZODIAZ UR QL SCN: NORMAL
BILIRUB DIRECT SERPL-MCNC: 0.3 MG/DL (ref 0–0.3)
BILIRUB SERPL-MCNC: 0.7 MG/DL (ref 0–1.2)
BILIRUB UR STRIP.AUTO-MCNC: NEGATIVE MG/DL
BUN SERPL-MCNC: 37 MG/DL (ref 6–23)
BZE UR QL SCN: NORMAL
CALCIUM SERPL-MCNC: 8.8 MG/DL (ref 8.6–10.3)
CANNABINOIDS UR QL SCN: NORMAL
CHLORIDE SERPL-SCNC: 95 MMOL/L (ref 98–107)
CHLORIDE UR-SCNC: 45 MMOL/L
CHLORIDE/CREATININE (MMOL/G) IN URINE: 23 MMOL/G CREAT (ref 38–318)
CO2 SERPL-SCNC: 29 MMOL/L (ref 21–32)
COLOR UR: YELLOW
CREAT SERPL-MCNC: 2.04 MG/DL (ref 0.5–1.05)
CREAT UR-MCNC: 193.2 MG/DL (ref 20–320)
EGFRCR SERPLBLD CKD-EPI 2021: 26 ML/MIN/1.73M*2
EOSINOPHIL # BLD AUTO: 0.01 X10*3/UL (ref 0–0.7)
EOSINOPHIL NFR BLD AUTO: 0.1 %
ERYTHROCYTE [DISTWIDTH] IN BLOOD BY AUTOMATED COUNT: 14.6 % (ref 11.5–14.5)
FENTANYL+NORFENTANYL UR QL SCN: NORMAL
FERRITIN SERPL-MCNC: 2605 NG/ML (ref 8–150)
GLUCOSE BLD MANUAL STRIP-MCNC: 211 MG/DL (ref 74–99)
GLUCOSE BLD MANUAL STRIP-MCNC: 249 MG/DL (ref 74–99)
GLUCOSE BLD MANUAL STRIP-MCNC: 278 MG/DL (ref 74–99)
GLUCOSE BLD MANUAL STRIP-MCNC: 314 MG/DL (ref 74–99)
GLUCOSE SERPL-MCNC: 209 MG/DL (ref 74–99)
GLUCOSE UR STRIP.AUTO-MCNC: NORMAL MG/DL
HAV IGM SER QL: NONREACTIVE
HBV CORE IGM SER QL: NONREACTIVE
HBV SURFACE AG SERPL QL IA: NONREACTIVE
HCT VFR BLD AUTO: 31.3 % (ref 36–46)
HCV AB SER QL: NONREACTIVE
HGB BLD-MCNC: 9.6 G/DL (ref 12–16)
HOLD SPECIMEN: NORMAL
HYALINE CASTS #/AREA URNS AUTO: ABNORMAL /LPF
IMM GRANULOCYTES # BLD AUTO: 0.32 X10*3/UL (ref 0–0.7)
IMM GRANULOCYTES NFR BLD AUTO: 1.8 % (ref 0–0.9)
IRON SATN MFR SERPL: 23 % (ref 25–45)
IRON SERPL-MCNC: 54 UG/DL (ref 35–150)
KETONES UR STRIP.AUTO-MCNC: ABNORMAL MG/DL
LEUKOCYTE ESTERASE UR QL STRIP.AUTO: NEGATIVE
LYMPHOCYTES # BLD AUTO: 1.91 X10*3/UL (ref 1.2–4.8)
LYMPHOCYTES NFR BLD AUTO: 10.8 %
MAGNESIUM SERPL-MCNC: 2.08 MG/DL (ref 1.6–2.4)
MCH RBC QN AUTO: 26.3 PG (ref 26–34)
MCHC RBC AUTO-ENTMCNC: 30.7 G/DL (ref 32–36)
MCV RBC AUTO: 86 FL (ref 80–100)
METHADONE UR QL SCN: NORMAL
MONOCYTES # BLD AUTO: 0.48 X10*3/UL (ref 0.1–1)
MONOCYTES NFR BLD AUTO: 2.7 %
MUCOUS THREADS #/AREA URNS AUTO: ABNORMAL /LPF
NEUTROPHILS # BLD AUTO: 14.87 X10*3/UL (ref 1.2–7.7)
NEUTROPHILS NFR BLD AUTO: 84.5 %
NITRITE UR QL STRIP.AUTO: NEGATIVE
NRBC BLD-RTO: 0.3 /100 WBCS (ref 0–0)
OPIATES UR QL SCN: NORMAL
OXYCODONE+OXYMORPHONE UR QL SCN: NORMAL
P OFFSET: 128 MS
P ONSET: 108 MS
PCP UR QL SCN: NORMAL
PH UR STRIP.AUTO: 5.5 [PH]
PHOSPHATE SERPL-MCNC: 3.5 MG/DL (ref 2.5–4.9)
PLATELET # BLD AUTO: 200 X10*3/UL (ref 150–450)
POTASSIUM SERPL-SCNC: 5.1 MMOL/L (ref 3.5–5.3)
POTASSIUM UR-SCNC: 64 MMOL/L
POTASSIUM/CREAT UR-RTO: 33 MMOL/G CREAT
PROT SERPL-MCNC: 5.9 G/DL (ref 6.4–8.2)
PROT UR STRIP.AUTO-MCNC: ABNORMAL MG/DL
Q ONSET: 206 MS
QRS COUNT: 12 BEATS
QRS DURATION: 108 MS
QT INTERVAL: 396 MS
QTC CALCULATION(BAZETT): 433 MS
QTC FREDERICIA: 421 MS
R AXIS: -82 DEGREES
RBC # BLD AUTO: 3.65 X10*6/UL (ref 4–5.2)
RBC # UR STRIP.AUTO: NEGATIVE /UL
RBC #/AREA URNS AUTO: ABNORMAL /HPF
SODIUM SERPL-SCNC: 133 MMOL/L (ref 136–145)
SODIUM UR-SCNC: 34 MMOL/L
SODIUM/CREAT UR-RTO: 18 MMOL/G CREAT
SP GR UR STRIP.AUTO: 1.02
T AXIS: 146 DEGREES
T OFFSET: 404 MS
TIBC SERPL-MCNC: 240 UG/DL (ref 240–445)
UIBC SERPL-MCNC: 186 UG/DL (ref 110–370)
UROBILINOGEN UR STRIP.AUTO-MCNC: NORMAL MG/DL
VENTRICULAR RATE: 72 BPM
WBC # BLD AUTO: 17.6 X10*3/UL (ref 4.4–11.3)
WBC #/AREA URNS AUTO: ABNORMAL /HPF

## 2024-07-02 PROCEDURE — 2500000005 HC RX 250 GENERAL PHARMACY W/O HCPCS: Performed by: INTERNAL MEDICINE

## 2024-07-02 PROCEDURE — 36415 COLL VENOUS BLD VENIPUNCTURE: CPT

## 2024-07-02 PROCEDURE — 76705 ECHO EXAM OF ABDOMEN: CPT | Performed by: RADIOLOGY

## 2024-07-02 PROCEDURE — 2500000005 HC RX 250 GENERAL PHARMACY W/O HCPCS

## 2024-07-02 PROCEDURE — 82728 ASSAY OF FERRITIN: CPT | Performed by: NURSE PRACTITIONER

## 2024-07-02 PROCEDURE — 82436 ASSAY OF URINE CHLORIDE: CPT

## 2024-07-02 PROCEDURE — 2500000001 HC RX 250 WO HCPCS SELF ADMINISTERED DRUGS (ALT 637 FOR MEDICARE OP)

## 2024-07-02 PROCEDURE — 2060000001 HC INTERMEDIATE ICU ROOM DAILY

## 2024-07-02 PROCEDURE — 51701 INSERT BLADDER CATHETER: CPT

## 2024-07-02 PROCEDURE — 83540 ASSAY OF IRON: CPT | Performed by: NURSE PRACTITIONER

## 2024-07-02 PROCEDURE — 97166 OT EVAL MOD COMPLEX 45 MIN: CPT | Mod: GO | Performed by: OCCUPATIONAL THERAPIST

## 2024-07-02 PROCEDURE — 80307 DRUG TEST PRSMV CHEM ANLYZR: CPT

## 2024-07-02 PROCEDURE — 94660 CPAP INITIATION&MGMT: CPT

## 2024-07-02 PROCEDURE — 2500000004 HC RX 250 GENERAL PHARMACY W/ HCPCS (ALT 636 FOR OP/ED)

## 2024-07-02 PROCEDURE — 84075 ASSAY ALKALINE PHOSPHATASE: CPT

## 2024-07-02 PROCEDURE — 97162 PT EVAL MOD COMPLEX 30 MIN: CPT | Mod: GP

## 2024-07-02 PROCEDURE — 94640 AIRWAY INHALATION TREATMENT: CPT

## 2024-07-02 PROCEDURE — 82947 ASSAY GLUCOSE BLOOD QUANT: CPT

## 2024-07-02 PROCEDURE — 99291 CRITICAL CARE FIRST HOUR: CPT

## 2024-07-02 PROCEDURE — 83735 ASSAY OF MAGNESIUM: CPT

## 2024-07-02 PROCEDURE — 87449 NOS EACH ORGANISM AG IA: CPT | Mod: STJLAB | Performed by: INTERNAL MEDICINE

## 2024-07-02 PROCEDURE — 99222 1ST HOSP IP/OBS MODERATE 55: CPT | Performed by: NURSE PRACTITIONER

## 2024-07-02 PROCEDURE — 80143 DRUG ASSAY ACETAMINOPHEN: CPT | Performed by: NURSE PRACTITIONER

## 2024-07-02 PROCEDURE — 76705 ECHO EXAM OF ABDOMEN: CPT

## 2024-07-02 PROCEDURE — 81001 URINALYSIS AUTO W/SCOPE: CPT | Performed by: STUDENT IN AN ORGANIZED HEALTH CARE EDUCATION/TRAINING PROGRAM

## 2024-07-02 PROCEDURE — 80069 RENAL FUNCTION PANEL: CPT

## 2024-07-02 PROCEDURE — 83615 LACTATE (LD) (LDH) ENZYME: CPT

## 2024-07-02 PROCEDURE — 85025 COMPLETE CBC W/AUTO DIFF WBC: CPT

## 2024-07-02 PROCEDURE — 2500000002 HC RX 250 W HCPCS SELF ADMINISTERED DRUGS (ALT 637 FOR MEDICARE OP, ALT 636 FOR OP/ED)

## 2024-07-02 PROCEDURE — 87899 AGENT NOS ASSAY W/OPTIC: CPT | Mod: STJLAB | Performed by: INTERNAL MEDICINE

## 2024-07-02 RX ORDER — INSULIN LISPRO 100 [IU]/ML
0-10 INJECTION, SOLUTION INTRAVENOUS; SUBCUTANEOUS EVERY 4 HOURS
Status: DISCONTINUED | OUTPATIENT
Start: 2024-07-02 | End: 2024-07-06

## 2024-07-02 ASSESSMENT — PAIN SCALES - GENERAL
PAINLEVEL_OUTOF10: 2
PAINLEVEL_OUTOF10: 0 - NO PAIN

## 2024-07-02 ASSESSMENT — COGNITIVE AND FUNCTIONAL STATUS - GENERAL
CLIMB 3 TO 5 STEPS WITH RAILING: TOTAL
STANDING UP FROM CHAIR USING ARMS: A LOT
EATING MEALS: A LOT
DAILY ACTIVITIY SCORE: 12
MOVING TO AND FROM BED TO CHAIR: A LOT
DRESSING REGULAR UPPER BODY CLOTHING: A LOT
MOBILITY SCORE: 11
TURNING FROM BACK TO SIDE WHILE IN FLAT BAD: A LOT
WALKING IN HOSPITAL ROOM: TOTAL
MOVING FROM LYING ON BACK TO SITTING ON SIDE OF FLAT BED WITH BEDRAILS: A LOT
CLIMB 3 TO 5 STEPS WITH RAILING: A LOT
STANDING UP FROM CHAIR USING ARMS: A LOT
TOILETING: A LOT
HELP NEEDED FOR BATHING: A LOT
WALKING IN HOSPITAL ROOM: A LOT
MOVING TO AND FROM BED TO CHAIR: TOTAL
TOILETING: A LOT
DAILY ACTIVITIY SCORE: 15
MOBILITY SCORE: 12
DRESSING REGULAR UPPER BODY CLOTHING: A LOT
MOVING FROM LYING ON BACK TO SITTING ON SIDE OF FLAT BED WITH BEDRAILS: A LITTLE
DRESSING REGULAR LOWER BODY CLOTHING: A LOT
TURNING FROM BACK TO SIDE WHILE IN FLAT BAD: A LITTLE
PERSONAL GROOMING: A LOT
PERSONAL GROOMING: A LITTLE
HELP NEEDED FOR BATHING: A LOT
DRESSING REGULAR LOWER BODY CLOTHING: A LOT

## 2024-07-02 ASSESSMENT — ACTIVITIES OF DAILY LIVING (ADL): ADL_ASSISTANCE: NEEDS ASSISTANCE

## 2024-07-02 ASSESSMENT — PAIN - FUNCTIONAL ASSESSMENT
PAIN_FUNCTIONAL_ASSESSMENT: 0-10

## 2024-07-02 NOTE — PROGRESS NOTES
Spiritual Care Visit    Clinical Encounter Type  Visited With: Patient  Routine Visit: Introduction  Continue Visiting: Yes    Sikhism Encounters  Sikhism Needs: Prayer, Literature         Sacramental Encounters  Communion: Patient wants communion  Communion Given Indicator: No  Sacrament of Sick-Anointing: Anointed, Patient requested anointing                             Taxonomy  Intended Effects: Establish rapport and connectedness, Marianne affirmation, Build relationship of care and support, Demonstrate caring and concern

## 2024-07-02 NOTE — SIGNIFICANT EVENT
Patient is a 69-year-old female with PMH significant for asthma (on 4 L via nasal cannula at home) OA, ARCHIE (not on any therapy), femur fracture, depression (s/p DBS), HTN, IDDM 2, asthma, CKD 3, HLD, diastolic CHF (last echo 7/2023, EF 75%, pulmonary artery hypertension 40-50 mmHg).  Patient presented to [facility] emergency department from nursing facility on 7/1/2024 with shortness of breath, hypoxic with altered mental status.  Patient arrived to the emergency department on 15 L via Oxymizer.  In the emergency department patient had been vitally stable, labs are significant for potassium of 7.5 (hemolyzed), sodium 132, BUN 24, creatinine 2.49, alk phos 163, ALT 1132, AST 1367, bilirubin 1.5, BNP for 56, troponin 64, WBC 19.4, initial ABG 7.28/64/133/30.1 with repeat 7.27/66/78/30.3 after being on BiPAP for approximately 6 hours in duration.  Imaging at that time was significant for marked progression of disease with near complete opacification of left hemithorax, reticulonodular pattern in the right lung, findings consistent with mixed alveolar interstitial pneumonia.  CT imaging at that time showed persistent dense consolidation at the left lung base.  She was treated with albuterol nebulizer hyperkalemia cocktail, doxycycline and Zosyn as well as methylprednisolone.  At this time patient was admitted to the ICU for significant acidosis and acute hypoxemic respiratory on chronic hypoxemic hypercapnic failure on continuous BiPAP.  While in the ICU, patient was subsequently weaned down to 3 L via nasal cannula today.  GI was consulted for elevated LFTs, and recommended continue supportive care diet as tolerated and continue to trend liver enzymes while avoiding hepatotoxic drugs.    General: Not in acute distress, A&O x 3, alert, cooperative, obese  HEENT: Normocephalic, atraumatic, EOMI, moist mucous membranes, NC in place  Neck: Neck supple, trachea midline, no evidence of trauma  Cardiovascular: RRR, S1 and S2  appreciated, no murmurs rubs gallops appreciated, distal pulses 2+ bilaterally (radial and dorsalis pedis)  Respiratory: Diminished breath sounds appreciated bilaterally, bibasilar crackles and rhonchi appreciated throughout left lung, no increased work of breathing, on 3 L via NC  GI: Abdomen soft, nondistended, nontender to palpation, bowel sounds present  Extremities: No edema appreciated in lower extremities bilaterally, no cyanosis  Neuro: A&O X3, no focal deficits, strength and sensation intact bilaterally  Skin: Warm and dry, without lesions or rashes    Labs today significant for: Sodium 133, potassium 5.1, creatinine 2.04 from 2.21, alk phos 144 from 163, ALT//792 from 1132/1361, ferritin 2605, WBC 17.6 (ANC 14.87) from 19.4 (ANC 15.02), hemoglobin 9.6 from 10.6, hepatitis panel nonreactive, most recent ABG 7.36/55/107/31.1.    Patient will remain under ICU level of care for additional 24 hours from 3:50 PM or until patient physically leaves the intensive care unit.    Plan:    Sepsis  2/2 pneumonia with endorgan dysfunction consistent with elevated troponin, elevated transaminases, CRISTOFER on CKD3  AHRF on chronic hypoxemic hypercapnic respiratory failure 2/2 pneumonia:  Leukocytosis  Diastolic CHF with PAH  Patient [facility] to [facility] for chief complaint of hypoxia and altered mental status.  -CXR: Marked progression of disease with near complete opacification of left hemithorax, reticulonodular pattern in the right lung, findings consistent with mixed alveolar interstitial pneumonia.  CT imaging at that time showed persistent dense consolidation at the left lung base.  -Last echo 7/2023, EF 75%, pulmonary artery hypertension 40-50 mmHg)   -Continue supplemental oxygen, wean as seen fit  -Continue NIPPV nightly and as needed  -Pulmonology consult placed for obtaining NIPPV outpatient on discharge  -Continue Solu-Medrol 40 mg every 24 hours  -Continue doxycycline and Zosyn for antibiotic coverage of  pneumonia.  -Continue scheduled DuoNebs as well as as needed  -Creatinine today 2.04 from 2.21 yesterday, will continue to monitor with RFP  -Transaminases today ALT//792 from 1132/1361  --> GI consulted continue to appreciate recommendations, will continue to trend  -Liver ultrasound: Unable to identify the pancreas, previous cholecystectomy, Pado megaly, fatty infiltration, trace RUQ ascites    5.  IDDM 2  -Continue sliding scale  -Hypoglycemia protocol in place  -Insulin glargine 55 units twice daily not ordered, can reassess gluclose control once under our service    Bipolar disorder  Anxiety  HTN  HLD  Depression (s/p DBS)  -Continue home ASA 81, nystatin, pancrelipase  -Home meds not ordered: Prednisone 10 mg, lisinopril 40 mg, metoprolol succinate 25 mg, atorvastatin 10 mg, citalopram 40 mg, gabapentin 100 mg, insulin glargine 55 units twice daily, amlodipine 5 mg    IVF: None at this time  DVT prophylaxis: Heparin  Diet: Carb controlled  Dispo: Anticipate additional 3 to 4 days hospital stay for resolution of symptoms, tapering of antibiotics, and placement  Consults: GI, pulmonology    CODE STATUS: Full code    Patient to be staffed with attending physician.    Edouard Sher M.D.  Internal Medicine PGY-2

## 2024-07-02 NOTE — PROGRESS NOTES
"Nutrition Initial Assessment:   Nutrition Assessment    Reason for Assessment: Provider consult order  Nutrition Note:  Kaylene Feliciano is a 69 y.o. female presenting from [facility], 7/1, SOB and AMS; work up revealing acute on chronic hypercapnic respiratory failure, likely COPD exac, with elevated liver enzymes, and CRISTOFER on CKD3.  GI consulted due to elevated LFTs.     Past Medical History  6/14-6/20 [facility] stay with COPD and CHF exacerbation complicated by CRISTOFER.  OA, ARCHIE (not on any therapy), femur fracture (2023), depression (s/p DBS), HTN, IDDM 2, asthma, CKD 3, HLD, diastolic CHF (last echo 7/2023, EF 75%, pulmonary artery hypertension 40-50 mmHg).   Surgical History   has a past surgical history that includes Other surgical history (01/29/2021); Other surgical history (01/29/2021); Other surgical history (01/29/2021); Other surgical history (01/29/2021); Other surgical history (10/14/2021); Other surgical history (10/14/2021); Other surgical history (10/14/2021); and Other surgical history (10/19/2021).     Nutrition History:  Energy Intake: Good > 75 %  Food and Nutrient History: 7/2: Pt from [facility]--there x2 weeks otherwise from home with . Diet per [facility] as no added salt/no concentrated sweets with regular foods and thin liquids. Per Marce at [facility], pt tolerated diet well and usually ate % of all meals; pt fed self.  Vitamin/Herbal Supplement Use: home meds include cranberry, Lantus, creon with meals, prednisone  Food Allergies/Intolerances:   pt states intolerance to 'certain types of eggs cause diarrhea otherwise \"I can eat eggs cooked in foods; I just can't eat certain types/brands of egg without having diarrhea.\"  GI Symptoms: None  Oral Problems: None       Anthropometrics:  Height: 161 cm (5' 3.39\")   Weight: 139 kg (306 lb)   BMI (Calculated): 53.55  Admit measured weight 140.5kg  IBW:  52.3kg         0-10 (Numeric) Pain Score: 0 - No pain     Weight History:   6/26/2024 weight at [facility] " 136kg  6/15/2024: 138kg  3/2024: 118kg ? Accuracy  7/2023: 132.4kg  Weight Change %:   0-10 (Numeric) Pain Score: 0 - No pain     Nutrition Focused Physical Exam Findings:  defer: pt appears WFL  Subcutaneous Fat Loss:      Muscle Wasting:     Edema:  Edema: none  Physical Findings:  Skin: Positive (pale; buttocks stage II)    Nutrition Significant Labs:  BMP Trend:   Results from last 7 days   Lab Units 07/02/24 0320 07/01/24 2153 07/01/24 1320 07/01/24  0944   GLUCOSE mg/dL 209* 212* 164* 147*   CALCIUM mg/dL 8.8 9.2 8.7 8.8   SODIUM mmol/L 133* 132* 132* 132*   POTASSIUM mmol/L 5.1 5.5* 6.0* 7.5*   CO2 mmol/L 29 32 33* 33*   CHLORIDE mmol/L 95* 94* 94* 95*   BUN mg/dL 37* 32* 25* 24*   CREATININE mg/dL 2.04* 2.21* 2.46* 2.49*    , A1C:  Lab Results   Component Value Date    HGBA1C 8.8 (H) 08/07/2023   , BG POCT trend:   Results from last 7 days   Lab Units 07/02/24  1134 07/02/24  0735 07/02/24  0455 07/02/24  0005 07/01/24 2007   POCT GLUCOSE mg/dL 211* 211* 249* 211* 202*    , Liver Function Trend:   Results from last 7 days   Lab Units 07/02/24 0320 07/01/24 1320 07/01/24  0944   ALK PHOS U/L 144* 163* 166*   AST U/L 792* 1,367* 915*   ALT U/L 921* 1,132* 863*   BILIRUBIN TOTAL mg/dL 0.7 1.2 1.5*    , Renal Lab Trend:   Results from last 7 days   Lab Units 07/02/24 0320 07/01/24 2153 07/01/24 1320 07/01/24  1320 07/01/24  0944   POTASSIUM mmol/L 5.1 5.5*  --  6.0* 7.5*   PHOSPHORUS mg/dL 3.5 3.8   < >  --   --    SODIUM mmol/L 133* 132*  --  132* 132*   MAGNESIUM mg/dL 2.08 1.72  --   --  1.97   EGFR mL/min/1.73m*2 26* 24*  --  21* 20*   BUN mg/dL 37* 32*  --  25* 24*   CREATININE mg/dL 2.04* 2.21*  --  2.46* 2.49*    < > = values in this interval not displayed.    , Lipid Panel:   Lab Results   Component Value Date    CHOL 145 10/15/2021    HDL 36.0 (A) 10/15/2021    CHHDL 4.0 10/15/2021    LDLF 43 10/15/2021    VLDL 66 (H) 10/15/2021    TRIG 331 (H) 10/15/2021    , Vit D:   Lab Results   Component  Value Date    VITD25 13 (L) 03/25/2024    , Vit B12:   Lab Results   Component Value Date    SUTJMIYL10 452 05/11/2021    , Iron Panel:   Lab Results   Component Value Date    IRON 23 (L) 12/11/2020    TIBC 225 (L) 12/11/2020    FERRITIN 256 (H) 12/11/2020        Nutrition Specific Medications:  Scheduled medications  aspirin, 81 mg, oral, Daily  doxycycline, 100 mg, intravenous, q12h  pantoprazole, 40 mg, oral, Daily   Or  esomeprazole, 40 mg, nasoduodenal tube, Daily   Or  pantoprazole, 40 mg, intravenous, Daily  heparin (porcine), 7,500 Units, subcutaneous, q8h YARELI  insulin lispro, 0-10 Units, subcutaneous, q4h  ipratropium-albuteroL, 3 mL, nebulization, q6h  [START ON 7/3/2024] methylPREDNISolone sodium succinate (PF), 40 mg, intravenous, q24h  nystatin, 1 Application, Topical, BID  oxygen, , inhalation, Continuous - Inhalation  pancrelipase (Lip-Prot-Amyl), 1 capsule, oral, TID AC  piperacillin-tazobactam, 4.5 g, intravenous, q8h  polyethylene glycol, 17 g, oral, Daily      Continuous medications     PRN medications  PRN medications: albuterol, calcium chloride, calcium chloride, dextrose, dextrose, glucagon, glucagon, hydrALAZINE, labetaloL, magnesium sulfate, magnesium sulfate, oxygen     I/O:   Last BM Date: 06/30/24;      Dietary Orders (From admission, onward)       Start     Ordered    07/02/24 1148  Adult diet Regular, Carb Controlled; 60 gram carb/meal, 30 gram Carb evening snack  Diet effective now        Question Answer Comment   Diet type Regular    Diet type Carb Controlled    Carb diet selection: 60 gram carb/meal, 30 gram Carb evening snack        07/02/24 1149                     Estimated Needs:   Total Energy Estimated Needs (kCal):  (1500-1900kcal (11-14kcal/kg of SNF wt of 136kg))     Total Protein Estimated Needs (g):  (73-95g (1.4-1.8g/kg of IBW 52.3kg))     Total Fluid Estimated Needs (mL):  (1mL/kcal/d or as per physician)           Nutrition Diagnosis   Malnutrition Diagnosis  Patient  "has Malnutrition Diagnosis: No    Nutrition Diagnosis  Patient has Nutrition Diagnosis: Yes  Diagnosis Status (1): New  Nutrition Diagnosis 1: Increased nutrient needs  Related to (1): acute on chronic metabolic stress  As Evidenced by (1): acute on chronic COPD with reshospitalization 6/14-6/20/2024.  Additional Assessment Information (1): 7/2: Case discussed with nurse. Pt usually from home  with  whom is pt's primary however at SNF for the past 2 weeks. Diet advanced during CCM rounds and pt ordering own meals.       Nutrition Interventions/Recommendations         Nutrition Prescription:  Individualized Nutrition Prescription Provided for : consistent 60g CHO.        Nutrition Interventions:   Interventions: Meals and snacks  Meals and Snacks: Carbohydrate-modified diet  Goal: consistent 60g CHO.    Collaboration and Referral of Nutrition Care: Collaboration by nutrition professional with other providers, Referral by nutrition professional to other providers  Goal: RN Tin and CCM team during pt care rounds.    Nutrition Education:   7/2: Met with pt at bedside. Pt endorses facility prepares all of her meals and NFKA. Pt denies need for further MNT intervention at present time. Clarified egg allergy with pt--relates pt more intolerant to \"certain types of egg (I.e, no eggs to eat otherwise it upsets my stomach;  I can tolerate eggs cooked in foods like pancakes, cookies, muffins just no eggs to eat.\" Pt agreeable to have this writer discontinue the egg allergy in the system.       Nutrition Monitoring and Evaluation   Food/Nutrient Related History Monitoring  Monitoring and Evaluation Plan: Energy intake  Energy Intake: Estimated energy intake  Criteria: >75% of meals and PRN snacks.    Body Composition/Growth/Weight History  Monitoring and Evaluation Plan: Weight  Weight: Measured weight  Criteria: daily weight    Biochemical Data, Medical Tests and Procedures  Monitoring and Evaluation Plan: " Electrolyte/renal panel, Glucose/endocrine profile  Electrolyte and Renal Panel: BUN, Creatinine, Magnesium, Phosphorus, Potassium, Sodium  Criteria: labs WNR  Glucose/Endocrine Profile: Glucose, fasting, Glucose, casual  Criteria: BG 70-180mg/dL              Time Spent (min): 60 minutes

## 2024-07-02 NOTE — PROGRESS NOTES
Physical Therapy    Physical Therapy Evaluation    Patient Name: Kaylene Feliciano  MRN: [id number]  Today's Date: 7/2/2024   Time Calculation  Start Time: 1042  Stop Time: 1102  Time Calculation (min): 20 min  2124/2124-A    Assessment/Plan   PT Assessment  PT Assessment Results: Decreased strength, Decreased range of motion, Decreased endurance, Impaired balance, Decreased mobility  Rehab Prognosis: Good  Evaluation/Treatment Tolerance: Patient tolerated treatment well  Medical Staff Made Aware: Yes  Strengths: Access to adaptive/assistive products, Housing layout, Support of Caregivers, Support and attitude of living partners, Support of extended family/friends  End of Session Communication: Bedside nurse  Assessment Comment: Pt is a 68 y/o female admitted for acute on chronic respiratory failure with hypoxia and hypercapnia. Pt presents with decreased strength, endurance and balance. Pt able to tolerate bed mobility and transfers with larry degroot. She is functioning below baseline level of function and will benefit from skilled therapy during stay to improve overall functional mobility and safety awareness. Upon discharge pt will benefit from moderate intensity therapy for continued improvement in functional mobility.     End of Session Patient Position: Up in chair, Alarm on (call light within reach)  IP OR SWING BED PT PLAN  Inpatient or Swing Bed: Inpatient  PT Plan  Treatment/Interventions: Bed mobility, Transfer training, Gait training, Stair training, Balance training, Neuromuscular re-education, Strengthening, Endurance training, Range of motion, Therapeutic exercise, Therapeutic activity, Home exercise program, Positioning, Postural re-education, Wheelchair management  PT Plan: Ongoing PT  PT Frequency: 3 times per week  PT Discharge Recommendations: Moderate intensity level of continued care  PT Recommended Transfer Status: Assist x2 (mod A with larry degroot)  PT - OK to Discharge: Yes (Once medically cleared to  next level of care.)    Subjective     Current Problem:  1. Acute on chronic respiratory failure with hypoxia and hypercapnia (Multi)          Patient Active Problem List   Diagnosis    Asthma (HHS-HCC)    Candidiasis of vagina    Chronic bipolar affective disorder (Multi)    Depression    CKD (chronic kidney disease), stage III (Multi)    Diabetes, polyneuropathy (Multi)    Dysuria    Essential hypertension, benign    Hyperlipidemia    Mental problems    Morbid obesity (Multi)    Non-compliance    Physical deconditioning    Diabetes mellitus (Multi)    Poorly controlled type 2 diabetes mellitus with complication (Multi)    Proteinuria    Secondary pancreatic insufficiency (HHS-HCC)    Spinal stenosis, multilevel    Vitamin D deficiency    BMI 45.0-49.9, adult (Multi)    CKD (chronic kidney disease), stage II    Hypoxia    Shortness of breath    Obesity hypoventilation syndrome (Multi)    Acute on chronic respiratory failure with hypoxia and hypercapnia (Multi)     General Visit Information:  General  Reason for Referral: P/w SOB and not following commands. Pt admitted for acute on chronic respiratory failure with hypoxia and hypercapnia.  Referred By: Dr. Lai  Past Medical History Relevant to Rehab: CKD, COPD, obesity hypoventilation syndrome, OA, ARCHIE (not on any therapy), femur fracture, depression (s/p DBS), HTN, IDDM 2, asthma, CKD 3, HLD, diastolic CHF (last echo 7/2023, EF 75%, pulmonary artery hypertension 40-50 mmHg).  Family/Caregiver Present: No  Co-Treatment: OT  Co-Treatment Reason: To maximize pt safety with functional mobility and for discharge planning.  Prior to Session Communication: Bedside nurse  Patient Position Received: Bed, 3 rail up, Alarm on  Preferred Learning Style: verbal, visual  General Comment: Pt pleasant and agreeable to work with therapy. Pt with no complaints at this time.    Home Living:  Home Living  Type of Home: Apartment (19th floor with elevator access, no  stairs)  Lives With: Spouse  Home Adaptive Equipment: Walker rolling or standard, Wheelchair-manual, Wheelchair-power, Hospital bed (manual niko lift, slide board)  Home Layout: One level  Home Access: Elevator, Level entry  Bathroom Shower/Tub: Tub/shower unit  Bathroom Equipment: Grab bars in shower, Tub transfer bench  Home Living Comments: Pt admitted from [facility] and has been there since 6/20 for rehab. Pt reports she was able to  parallel bars with assistance and stood for ~40 sec.    Prior Level of Function:  Prior Function Per Pt/Caregiver Report  Level of [place]: Needs assistance with homemaking, Needs assistance with functional transfers  Receives Help From: Family  ADL Assistance: Needs assistance (Required assist with stand pivot transfers with use of slide board)  Homemaking Assistance: Needs assistance  Ambulatory Assistance:  (Pt has been non-ambulatory since femur fx July 2023)  Prior Function Comments: Pt reports one fall at home where pt's  had to use manual niko lift to pick pt off floor    Precautions:  Precautions  Medical Precautions: Fall precautions, Oxygen therapy device and L/min (3L O2 via NC)    Vital Signs:  Vital Signs  Heart Rate: 68 (Post tx: 72)  Heart Rate Source: Monitor  Resp: (!) 30 (Post tx: 30)  SpO2: 95 % (Post tx: 97% on 3L O2 via NC)  BP: (!) 119/48 (Post tx: 146/61 sitting)  BP Location: Right arm  BP Method: Automatic  Patient Position: Lying  Objective     Pain:  Pain Assessment  Pain Assessment: 0-10  0-10 (Numeric) Pain Score: 0 - No pain    Cognition:  Cognition  Overall Cognitive Status: Within Functional Limits  Orientation Level: Oriented X4    General Assessments:      Activity Tolerance  Endurance: Tolerates 10 - 20 min exercise with multiple rests  Sensation  Light Touch: No apparent deficits  Sensation Comment: reports n/t in BL feet, neuropathy at baseline     Static Sitting Balance  Static Sitting-Balance Support: Bilateral upper  extremity supported, Feet supported  Static Sitting-Level of Assistance: Contact guard  Dynamic Sitting Balance  Dynamic Sitting-Balance Support: Bilateral upper extremity supported, Feet supported  Dynamic Sitting-Comments: CGA scooting glutes towards EOB  Static Standing Balance  Static Standing-Balance Support: Bilateral upper extremity supported  Static Standing-Level of Assistance: Moderate assistance (x2)  Static Standing-Comment/Number of Minutes: Pt able to  larry degroot with BUE support achieving only 50% of stand; signficantly flexed at trunk/hip    Functional Assessments:     Bed Mobility  Bed Mobility: Yes  Bed Mobility 1  Bed Mobility 1: Supine to sitting, Scooting  Level of Assistance 1: Contact guard  Bed Mobility Comments 1: HOB elevated and use of bed HR.    Transfers  Transfer: Yes  Transfer 1  Transfer From 1: Bed to  Transfer to 1: Stand  Technique 1: Sit to stand  Transfer Device 1: Larry Stedy, Gait belt  Transfer Level of Assistance 1: Moderate assistance, Moderate verbal cues, +2  Trials/Comments 1: VCs for hand placement on larry stedy  Transfers 2  Transfer Device 2: Larry Stedy, Gait belt  Transfer Level of Assistance 2: Dependent  Trials/Comments 2: dependent transfer in larry degroot from bed to chair  Transfers 3  Transfer From 3: Stand to  Transfer to 3: Chair with arms  Technique 3: Stand to sit  Transfer Device 3: Larry Stedy, Gait belt  Transfer Level of Assistance 3: Moderate assistance, Moderate verbal cues, +2    Ambulation/Gait Training  Ambulation/Gait Training Performed: No (Pt has been non-ambulatory since July of 2023)     Extremity/Trunk Assessments:     RLE   RLE :  (Limited hip/knee AROM (could be 2/2 body habitus); strength not formally assessed this date)  LLE   LLE :  (Limited hip/knee AROM (could be 2/2 body habitus); strength not formally assessed this date)    Outcome Measures:     [facility] Basic Mobility  Turning from your back to your side while in a flat bed without  using bedrails: A little  Moving from lying on your back to sitting on the side of a flat bed without using bedrails: A little  Moving to and from bed to chair (including a wheelchair): Total  Standing up from a chair using your arms (e.g. wheelchair or bedside chair): A lot  To walk in hospital room: Total  Climbing 3-5 steps with railing: Total  Basic Mobility - Total Score: 11     Goals:  Encounter Problems       Encounter Problems (Active)       Balance       Pt will demonstrate static/dynamic standing balance for >/= 3 min SBA without evidence of instability or LOB with functional mobility tasks.         Start:  07/02/24    Expected End:  07/16/24               Mobility       Pt will complete supine, seated and standing exercises to maintain/improve overall strength with minimal verbal cues.         Start:  07/02/24    Expected End:  07/16/24               PT Transfers       Pt will be able to complete all bed mobility tasks with use of bed HR mod I.        Start:  07/02/24    Expected End:  07/16/24            Pt will be able to complete all transfers with least restrictive device min A demonstrating good safety awareness and proper body mechanics.        Start:  07/02/24    Expected End:  07/16/24                 Education Documentation  Precautions, taught by Jacquelyn Abrams PT at 7/2/2024  2:04 PM.  Learner: Patient  Readiness: Acceptance  Method: Explanation  Response: Verbalizes Understanding, Demonstrated Understanding, Needs Reinforcement    Body Mechanics, taught by Jacquelyn Abrams PT at 7/2/2024  2:04 PM.  Learner: Patient  Readiness: Acceptance  Method: Explanation  Response: Verbalizes Understanding, Demonstrated Understanding, Needs Reinforcement    Home Exercise Program, taught by Jacquelyn Abrams PT at 7/2/2024  2:04 PM.  Learner: Patient  Readiness: Acceptance  Method: Explanation  Response: Verbalizes Understanding, Demonstrated Understanding, Needs Reinforcement    Mobility  Training, taught by Jacquelyn Abrams, PT at 7/2/2024  2:04 PM.  Learner: Patient  Readiness: Acceptance  Method: Explanation  Response: Verbalizes Understanding, Demonstrated Understanding, Needs Reinforcement    Education Comments  No comments found.

## 2024-07-02 NOTE — PROGRESS NOTES
07/02/24 1514   Discharge Planning   Living Arrangements Spouse/significant other   Support Systems Spouse/significant other   Assistance Needed Needs help with most ADL's   Type of Residence Private residence   Number of Stairs to Enter Residence 0   Number of Stairs Within Residence 0   Do you have animals or pets at home? No   Who is requesting discharge planning? Provider   Home or Post Acute Services Post acute facilities (Rehab/SNF/etc)   Type of Post Acute Facility Services Skilled nursing   Patient expects to be discharged to: Would like to return to [facility] for continued therapy   Does the patient need discharge transport arranged? Yes   RoundTrip coordination needed? Yes   Has discharge transport been arranged? No     Met with patient at bedside. Verified address and PCP. Uses an electric wheelchair to get around due to not being able to ambulate after a fx'd leg. When not at SNF, patient is able to manage own medications and can shower with some assistance. Spouse does transport patient when needed, cook meals together and patient needs some assistance with ADL's. Patient would like to return to [facility] for continued rehab. Will have [facility] place referral for patient to return. TCC team will follow patient for all discharge needs.

## 2024-07-02 NOTE — CONSULTS
Reason for consult  Elevated LFTs    HPI  Kaylene Feliciano is a 69 y.o. female presenting with SOB.  PMH of HTN, CKD, COPD, T2DM, obesity hypoventilation syndrome (BMI 53.5).  Patient presenting from nursing facility with increased O2 requirements to 15 L nonrebreather with change in mental status.  She also had an ER visit 6/20/2022 for orthopnea which was treated with Sudafed, fluticasone.  She also had been started on a prednisone taper.  Patient is unaware of any other new medications, supplements, antibiotics.  She is currently admitted for acute on chronic hypercapnic respiratory failure and is noted to have elevated liver enzymes.  ALT and AST initially >1 K though downtrending.  Bilirubin WNL.  INR 1.4.  Leukocytosis improved to 17.6.  Acetaminophen <10.  Acute hepatitis panel nonreactive.  At time of consult, patient ambulating in her room with physical therapy.  Mental status is back to baseline.  Oxygen requirements improved to 3 L N/C.  She does not recall previous liver issues.  Patient has no abdominal pain, nausea or vomiting though she does endorse intermittent mid abdominal discomfort across her belly.  She reports that she has pancreatic deficiency secondary to her diabetes for which she takes Creon.  In review of records, values began rising on 6/20/2024 labs.  She was hospitalized at that time for orthopnea.  Current RUQ US noting no biliary dilation.     PMH  HTN, CKD, COPD, T2DM with neuropathy, HFpEF, pulmonary HTN obesity hypoventilation syndrome (BMI 53.5), bipolar disorder, schizoaffective disorder, spinal stenosis, DJD    PSH  Cholecystectomy    Family  Family History   Problem Relation Name Age of Onset    Diabetes Mother      Hypertension Mother      Thyroid cancer Mother      Heart attack Mother      Osteoporosis Mother      Stroke Father      Hypertension Father      Diabetes Brother      Hypertension Brother      Cancer Brother      Diabetes Other Grandparent         type 2, without  "complication, unspecified insulin use       Social  She reports that she has never smoked. She has never used smokeless tobacco. She reports that she does not drink alcohol and does not use drugs.      Review of Systems  ROS negative unless stated otherwise in HPI     Objective  /62   Pulse 66   Temp 36.8 °C (98.2 °F)   Resp 18   Ht 1.61 m (5' 3.39\")   Wt 139 kg (306 lb)   SpO2 97%   BMI 53.55 kg/m²     Physical Exam  Constitutional: Alert, pleasant and interactive morbidly obese female, , in NAD  Eyes: PERRL, sclera clear, no conjunctival injection  Skin: Warm and dry, no rash or ecchymosis  ENMT: Mucous membranes moist, no lesions noted  Resp: CTAB, even and unlabored  CV: RRR, normal S1, S2, no m,r,g  GI: +BS, soft, round, NT, no rebound tenderness or guarding, no palpable masses or organomegaly  Extremities: Extremities warm, no edema, contusions, wounds or cyanosis  Neuro: Alert and oriented x3  Psych: Appropriate mood and behavior    Medications  Scheduled medications  aspirin, 81 mg, oral, Daily  doxycycline, 100 mg, intravenous, q12h  pantoprazole, 40 mg, oral, Daily   Or  esomeprazole, 40 mg, nasoduodenal tube, Daily   Or  pantoprazole, 40 mg, intravenous, Daily  heparin (porcine), 7,500 Units, subcutaneous, q8h YARELI  insulin lispro, 0-10 Units, subcutaneous, q4h  ipratropium-albuteroL, 3 mL, nebulization, q6h  methylPREDNISolone sodium succinate (PF), 40 mg, intravenous, q8h  nystatin, 1 Application, Topical, BID  oxygen, , inhalation, Continuous - Inhalation  pancrelipase (Lip-Prot-Amyl), 1 capsule, oral, TID AC  piperacillin-tazobactam, 3.375 g, intravenous, q8h  polyethylene glycol, 17 g, oral, Daily      Continuous medications     PRN medications  PRN medications: albuterol, calcium chloride, calcium chloride, dextrose, dextrose, glucagon, glucagon, hydrALAZINE, labetaloL, magnesium sulfate, magnesium sulfate, oxygen     Labs  Lab Results   Component Value Date    WBC 17.6 (H) 07/02/2024    " HGB 9.6 (L) 07/02/2024    HCT 31.3 (L) 07/02/2024    MCV 86 07/02/2024     07/02/2024     Lab Results   Component Value Date    GLUCOSE 209 (H) 07/02/2024    CALCIUM 8.8 07/02/2024     (L) 07/02/2024    K 5.1 07/02/2024    CO2 29 07/02/2024    CL 95 (L) 07/02/2024    BUN 37 (H) 07/02/2024    CREATININE 2.04 (H) 07/02/2024     Lab Results   Component Value Date     (H) 07/02/2024     (H) 07/02/2024    ALKPHOS 144 (H) 07/02/2024    BILITOT 0.7 07/02/2024     Lab Results   Component Value Date    IRON 23 (L) 12/11/2020    TIBC 225 (L) 12/11/2020    FERRITIN 256 (H) 12/11/2020     Lab Results   Component Value Date    INR 1.4 (H) 07/01/2024    PROTIME 16.0 (H) 07/01/2024       Radiology  Abdominal US 7/2/2024 noting:  IMPRESSION:  Unable to identify the pancreas in this exam. Previous  cholecystectomy.      Hepatomegaly.  Nonspecific increased echogenicity throughout the  liver, most likely fatty infiltration. .      Mid to lower pole right renal cyst.      Trace right upper quadrant ascites.      Signed by: Elmer So 7/2/2024 9:06 AM  Dictation workstation:   [id number]    Assessment:  Kaylene Feliciano is a 69 y.o. female presenting with SOB.  PMH of COPD, CKD, obesity hypoventilation syndrome.  Recent admission 10 days ago for orthopnea for which she was stated treated with steroids, Sudafed.  Liver enzymes now noted to be elevated since previous admission.  ALT/ALT >1 K though downtrending significantly.  Bilirubin normal.  Acetaminophen level <10.  No acute findings on US.  Acute hepatitis panel nonreactive.    # transaminitis DILI v infection v ischemia- downtrending- suspect ischemia  # leukocytosis  # CKD  # acute on chronic hypercapnic respiratory failure    Plan:  - continue supportive care  - diet as tolerated  - continue to trend liver enzymes daily  - avoid hepatotoxic drugs  - follow up liver work up    Plan has been discussed with Dr. Ugarte. GI will continue to follow.      Terri Cotton, APRN/CNP

## 2024-07-02 NOTE — ED PROCEDURE NOTE
Procedure  Critical Care    Performed by: Jaycob Cortes DO  Authorized by: Jaycob Cortes DO    Critical care provider statement:     Critical care time (minutes):  90    Critical care was necessary to treat or prevent imminent or life-threatening deterioration of the following conditions:  CNS failure or compromise and respiratory failure    Critical care was time spent personally by me on the following activities:  Blood draw for specimens, evaluation of patient's response to treatment, examination of patient, ordering and performing treatments and interventions, ordering and review of laboratory studies, ordering and review of radiographic studies, pulse oximetry, re-evaluation of patient's condition and review of old charts    Care discussed with: admitting provider                 Jaycob Cortes DO  07/02/24 0793

## 2024-07-02 NOTE — PROGRESS NOTES
Occupational Therapy  Evaluation    Patient Name: Kaylene Feliciano  MRN: [id number]  Today's Date: 7/2/2024  Time Calculation  Start Time: 1042  Stop Time: 1103  Time Calculation (min): 21 min  2124/2124-A    Assessment  IP OT Assessment  OT Assessment: Patient demonstrates decreased independence with self care, decreased independence with functional transfers, decreased endurance, decreased balance and decreased strength.  Patient will benefit from skilled OT to address deficits.  Anticipate patient will benefit from moderate intensity therapy post hospital stay.  Prognosis: Good  Barriers to Discharge: None  Evaluation/Treatment Tolerance: Patient tolerated treatment well  Medical Staff Made Aware: Yes  End of Session Communication: Bedside nurse  End of Session Patient Position: Up in chair, Alarm on    Plan:  Treatment Interventions: ADL retraining, Functional transfer training, UE strengthening/ROM, Endurance training, Patient/family training  OT Frequency: 3 times per week  OT Discharge Recommendations: Moderate intensity level of continued care  Equipment Recommended upon Discharge: Wheeled walker, Wheelchair (larry zane)  OT Recommended Transfer Status: Assist of 2, Moderate assist, Assistive equipment (Comment) (larry degroot)  OT - OK to Discharge: Yes (to next level of care when medically cleared by physician)    Subjective     Current Problem:  1. Acute on chronic respiratory failure with hypoxia and hypercapnia (Multi)            General:  General  Reason for Referral: impaired self care  Referred By: Dr. Lai  Past Medical History Relevant to Rehab: OA, ARCHIE, femur fracture, CKD, depression, HTN, DM, asthma, CKD, HLD, CHF  Family/Caregiver Present: No  Co-Treatment: PT  Co-Treatment Reason: for discharge planning  Prior to Session Communication: Bedside nurse  Patient Position Received: Bed, 3 rail up, Alarm on  Preferred Learning Style: verbal, visual  General Comment: Patient pleasant and cooperative.   Patient agreeable to therapy.    Precautions:  Medical Precautions: Fall precautions      Pain:  Pain Assessment  Pain Assessment: 0-10  0-10 (Numeric) Pain Score: 0 - No pain    Objective     Cognition:  Orientation Level: Oriented X4        Home Living:  Type of Home: Apartment (19th floor)  Lives With: Spouse  Home Adaptive Equipment: Hospital bed, Walker rolling or standard, Wheelchair-manual  Home Layout: One level  Home Access: Elevator  Bathroom Shower/Tub: Tub/shower unit     Prior Function:  Level of [place]: Needs assistance with ADLs, Needs assistance with functional transfers, Needs assistance with homemaking  Prior Function Comments: Patient most recently working on standing in parallel bars with therapy.  Has niko lift at home if needed as well as hospital bed.      ADL:  LE Dressing Assistance: Maximal    Activity Tolerance:  Endurance: Tolerates 10 - 20 min exercise with multiple rests    Bed Mobility/Transfers:   Bed Mobility  Bed Mobility: Yes  Bed Mobility 1  Bed Mobility 1: Supine to sitting  Level of Assistance 1: Contact guard  Transfers  Transfer: Yes  Transfer 1  Transfer From 1: Sit to  Transfer to 1: Stand  Technique 1: Sit to stand, Stand to sit  Transfer Device 1: Milly Stedy  Transfer Level of Assistance 1: +2, Moderate assistance       Outcome Measures: [facility] Daily Activity  Putting on and taking off regular lower body clothing: A lot  Bathing (including washing, rinsing, drying): A lot  Putting on and taking off regular upper body clothing: A lot  Toileting, which includes using toilet, bedpan or urinal: A lot  Taking care of personal grooming such as brushing teeth: A little  Eating Meals: None  Daily Activity - Total Score: 15        EDUCATION:  Education  Individual(s) Educated: Patient  Education Provided: Fall precautons, Risk and benefits of OT discussed with patient or other  Plan of Care Discussed and Agreed Upon: yes  Patient Response to Education: Patient/Caregiver  Verbalized Understanding of Information    Goals:   Encounter Problems       Encounter Problems (Active)       OT Goals       Patient will complete functional transfers with min A using larry degroot. (Progressing)       Start:  07/02/24    Expected End:  07/16/24            Patient will complete toileting with mod A. (Progressing)       Start:  07/02/24    Expected End:  07/16/24            Patient will complete grooming seated with set up. (Progressing)       Start:  07/02/24    Expected End:  07/16/24

## 2024-07-02 NOTE — PROGRESS NOTES
Critical Care Daily Progress Note        Subjective   Patient is a 69 y.o. female admitted on 7/1/2024  9:19 AM with the following indication(s) for ICU care acute on chronic hypercapnic respiratory failure.     Kaylene Feliciano is a 69 y.o. female presenting with hx significant for OA, ARCHIE (not on any therapy), femur fracture, depression (s/p DBS), HTN, IDDM 2, asthma, CKD 3, HLD, diastolic CHF (last echo 7/2023, EF 75%, pulmonary artery hypertension 40-50 mmHg). Presents to emergency department from nursing facility with shortness of breath, hypoxic with altered mental status.     Interval History: Patient weaned off of BiPAP to 3L NC this am. Potassium levels normalized overnight, currently k=5.1.  SSI advanced to scale #2 and diet advanced.  Renal function has improved overnight with urine qettrk=618.  Improvement seen on hepatic panel this am.  GI on consult.      Complaints: has none..     Scheduled Medications:   aspirin, 81 mg, oral, Daily  doxycycline, 100 mg, intravenous, q12h  pantoprazole, 40 mg, oral, Daily   Or  esomeprazole, 40 mg, nasoduodenal tube, Daily   Or  pantoprazole, 40 mg, intravenous, Daily  heparin (porcine), 7,500 Units, subcutaneous, q8h YARELI  insulin lispro, 0-10 Units, subcutaneous, q4h  ipratropium-albuteroL, 3 mL, nebulization, q6h  [START ON 7/3/2024] methylPREDNISolone sodium succinate (PF), 40 mg, intravenous, q24h  nystatin, 1 Application, Topical, BID  oxygen, , inhalation, Continuous - Inhalation  pancrelipase (Lip-Prot-Amyl), 1 capsule, oral, TID AC  piperacillin-tazobactam, 4.5 g, intravenous, q8h  polyethylene glycol, 17 g, oral, Daily         Continuous Medications:         PRN Medications:   PRN medications: albuterol, calcium chloride, calcium chloride, dextrose, dextrose, glucagon, glucagon, hydrALAZINE, labetaloL, magnesium sulfate, magnesium sulfate, oxygen    Objective   Vitals:  Most Recent:  Vitals:    07/02/24 1000   BP: (!) 119/48   Pulse: 68   Resp: (!) 27   Temp:     SpO2: 95%       24hr Min/Max:  Temp  Min: 36.5 °C (97.7 °F)  Max: 36.8 °C (98.2 °F)  Pulse  Min: 60  Max: 70  BP  Min: 110/62  Max: 172/77  Resp  Min: 14  Max: 30  SpO2  Min: 94 %  Max: 100 %    LDA:  External Urinary Catheter Female (Active)   Placement Date/Time: 07/02/24 0236   Placed by: Zee oliveira RN  Hand Hygiene Completed: Yes  External Catheter Type: Female   Number of days: 0         Vent settings:  FiO2 (%):  [40 %] 40 %  S RR:  [12] 12    Hemodynamic parameters for last 24 hours:       I/O:    Intake/Output Summary (Last 24 hours) at 7/2/2024 1151  Last data filed at 7/2/2024 0644  Gross per 24 hour   Intake 200 ml   Output 700 ml   Net -500 ml     Physical Exam  Constitutional:       General: She is awake.      Appearance: She is obese.   HENT:      Head: Normocephalic and atraumatic.   Eyes:      Extraocular Movements: Extraocular movements intact.      Pupils: Pupils are equal, round, and reactive to light.   Cardiovascular:      Rate and Rhythm: Normal rate and regular rhythm.      Pulses: Normal pulses.      Heart sounds: Normal heart sounds.   Pulmonary:      Effort: Pulmonary effort is normal.      Breath sounds: Examination of the right-upper field reveals wheezing. Examination of the left-upper field reveals wheezing. Examination of the right-middle field reveals wheezing. Examination of the left-middle field reveals wheezing. Wheezing present.   Abdominal:      Tenderness: There is abdominal tenderness in the right upper quadrant.   Musculoskeletal:      Cervical back: Normal range of motion.      Right lower leg: No edema.      Left lower leg: No edema.   Skin:     General: Skin is warm and dry.   Neurological:      Mental Status: She is alert, oriented to person, place, and time and easily aroused.   Psychiatric:         Attention and Perception: Attention and perception normal.         Mood and Affect: Mood and affect normal.         Speech: Speech normal.         Behavior: Behavior  normal. Behavior is cooperative.         Thought Content: Thought content normal.         Cognition and Memory: Cognition and memory normal.         Judgment: Judgment normal.         Lab/Radiology/Diagnostic Review:  Results for orders placed or performed during the hospital encounter of 07/01/24 (from the past 24 hour(s))   MRSA Surveillance for Vancomycin De-escalation, PCR    Specimen: Anterior Nares; Swab   Result Value Ref Range    MRSA PCR Not Detected Not Detected   Comprehensive metabolic panel   Result Value Ref Range    Glucose 164 (H) 74 - 99 mg/dL    Sodium 132 (L) 136 - 145 mmol/L    Potassium 6.0 (H) 3.5 - 5.3 mmol/L    Chloride 94 (L) 98 - 107 mmol/L    Bicarbonate 33 (H) 21 - 32 mmol/L    Anion Gap 11 10 - 20 mmol/L    Urea Nitrogen 25 (H) 6 - 23 mg/dL    Creatinine 2.46 (H) 0.50 - 1.05 mg/dL    eGFR 21 (L) >60 mL/min/1.73m*2    Calcium 8.7 8.6 - 10.3 mg/dL    Albumin 3.9 3.4 - 5.0 g/dL    Alkaline Phosphatase 163 (H) 33 - 136 U/L    Total Protein 6.8 6.4 - 8.2 g/dL    AST 1,367 (H) 9 - 39 U/L    Bilirubin, Total 1.2 0.0 - 1.2 mg/dL    ALT 1,132 (H) 7 - 45 U/L   BLOOD GAS ARTERIAL FULL PANEL   Result Value Ref Range    POCT pH, Arterial 7.27 (L) 7.38 - 7.42 pH    POCT pCO2, Arterial 66 (H) 38 - 42 mm Hg    POCT pO2, Arterial 78 (L) 85 - 95 mm Hg    POCT SO2, Arterial 96 94 - 100 %    POCT Oxy Hemoglobin, Arterial 94.2 94.0 - 98.0 %    POCT Hematocrit Calculated, Arterial 33.0 (L) 36.0 - 46.0 %    POCT Sodium, Arterial 130 (L) 136 - 145 mmol/L    POCT Potassium, Arterial 6.1 (HH) 3.5 - 5.3 mmol/L    POCT Chloride, Arterial 98 98 - 107 mmol/L    POCT Ionized Calcium, Arterial 1.13 1.10 - 1.33 mmol/L    POCT Glucose, Arterial 176 (H) 74 - 99 mg/dL    POCT Lactate, Arterial 0.8 0.4 - 2.0 mmol/L    POCT Base Excess, Arterial 2.1 -2.0 - 3.0 mmol/L    POCT HCO3 Calculated, Arterial 30.3 (H) 22.0 - 26.0 mmol/L    POCT Hemoglobin, Arterial 10.9 (L) 12.0 - 16.0 g/dL    POCT Anion Gap, Arterial 8 (L) 10 - 25  mmo/L    Patient Temperature      FiO2 40 %    Ipap CMH2O 16.0 cm H2O    Epap CMH2O 8.0 cm H2O    Critical Called By LUCA     Critical Called To DR MEJIA     Critical Call Time 1427     Critical Read Back Y    POCT GLUCOSE   Result Value Ref Range    POCT Glucose 195 (H) 74 - 99 mg/dL   Hepatitis panel, acute   Result Value Ref Range    Hepatitis B Surface AG Nonreactive Nonreactive    Hepatitis A  AB- IgM Nonreactive Nonreactive    Hepatitis B Core AB; IgM Nonreactive Nonreactive    Hepatitis C AB Nonreactive Nonreactive   POCT GLUCOSE   Result Value Ref Range    POCT Glucose 202 (H) 74 - 99 mg/dL   Blood Gas Arterial Full Panel   Result Value Ref Range    POCT pH, Arterial 7.36 (L) 7.38 - 7.42 pH    POCT pCO2, Arterial 55 (H) 38 - 42 mm Hg    POCT pO2, Arterial 107 (H) 85 - 95 mm Hg    POCT SO2, Arterial 99 94 - 100 %    POCT Oxy Hemoglobin, Arterial 96.7 94.0 - 98.0 %    POCT Hematocrit Calculated, Arterial 32.0 (L) 36.0 - 46.0 %    POCT Sodium, Arterial 131 (L) 136 - 145 mmol/L    POCT Potassium, Arterial 5.6 (H) 3.5 - 5.3 mmol/L    POCT Chloride, Arterial 98 98 - 107 mmol/L    POCT Ionized Calcium, Arterial 1.20 1.10 - 1.33 mmol/L    POCT Glucose, Arterial 224 (H) 74 - 99 mg/dL    POCT Lactate, Arterial 1.0 0.4 - 2.0 mmol/L    POCT Base Excess, Arterial 4.6 (H) -2.0 - 3.0 mmol/L    POCT HCO3 Calculated, Arterial 31.1 (H) 22.0 - 26.0 mmol/L    POCT Hemoglobin, Arterial 10.7 (L) 12.0 - 16.0 g/dL    POCT Anion Gap, Arterial 8 (L) 10 - 25 mmo/L    Patient Temperature      FiO2 40 %    Ventilator Mode BiPAP     Ipap CMH2O 16.0 cm H2O    Epap CMH2O 8.0 cm H2O   Renal function panel   Result Value Ref Range    Glucose 212 (H) 74 - 99 mg/dL    Sodium 132 (L) 136 - 145 mmol/L    Potassium 5.5 (H) 3.5 - 5.3 mmol/L    Chloride 94 (L) 98 - 107 mmol/L    Bicarbonate 32 21 - 32 mmol/L    Anion Gap 12 10 - 20 mmol/L    Urea Nitrogen 32 (H) 6 - 23 mg/dL    Creatinine 2.21 (H) 0.50 - 1.05 mg/dL    eGFR 24 (L) >60 mL/min/1.73m*2     Calcium 9.2 8.6 - 10.3 mg/dL    Phosphorus 3.8 2.5 - 4.9 mg/dL    Albumin 3.6 3.4 - 5.0 g/dL   Magnesium   Result Value Ref Range    Magnesium 1.72 1.60 - 2.40 mg/dL   Protime-INR   Result Value Ref Range    Protime 16.0 (H) 9.8 - 12.8 seconds    INR 1.4 (H) 0.9 - 1.1   POCT GLUCOSE   Result Value Ref Range    POCT Glucose 211 (H) 74 - 99 mg/dL   Urinalysis with Reflex Culture and Microscopic   Result Value Ref Range    Color, Urine Yellow Light-Yellow, Yellow, Dark-Yellow    Appearance, Urine Clear Clear    Specific Gravity, Urine 1.022 1.005 - 1.035    pH, Urine 5.5 5.0, 5.5, 6.0, 6.5, 7.0, 7.5, 8.0    Protein, Urine 70 (1+) (A) NEGATIVE, 10 (TRACE), 20 (TRACE) mg/dL    Glucose, Urine Normal Normal mg/dL    Blood, Urine NEGATIVE NEGATIVE    Ketones, Urine TRACE (A) NEGATIVE mg/dL    Bilirubin, Urine NEGATIVE NEGATIVE    Urobilinogen, Urine Normal Normal mg/dL    Nitrite, Urine NEGATIVE NEGATIVE    Leukocyte Esterase, Urine NEGATIVE NEGATIVE   Drug Screen, Urine   Result Value Ref Range    Amphetamine Screen, Urine Presumptive Negative Presumptive Negative    Barbiturate Screen, Urine Presumptive Negative Presumptive Negative    Benzodiazepines Screen, Urine Presumptive Negative Presumptive Negative    Cannabinoid Screen, Urine Presumptive Negative Presumptive Negative    Cocaine Metabolite Screen, Urine Presumptive Negative Presumptive Negative    Fentanyl Screen, Urine Presumptive Negative Presumptive Negative    Opiate Screen, Urine Presumptive Negative Presumptive Negative    Oxycodone Screen, Urine Presumptive Negative Presumptive Negative    PCP Screen, Urine Presumptive Negative Presumptive Negative    Methadone Screen, Urine Presumptive Negative Presumptive Negative   Urine electrolytes   Result Value Ref Range    Sodium, Urine Random 34 mmol/L    Sodium/Creatinine Ratio 18 Not established. mmol/g Creat    Potassium, Urine Random 64 mmol/L    Potassium/Creatinine Ratio 33 Not established mmol/g Creat     Chloride, Urine Random 45 mmol/L    Chloride/Creatinine Ratio 23 (L) 38 - 318 mmol/g creat    Creatinine, Urine Random 193.2 20.0 - 320.0 mg/dL   Urinalysis Microscopic   Result Value Ref Range    WBC, Urine 1-5 1-5, NONE /HPF    RBC, Urine 3-5 NONE, 1-2, 3-5 /HPF    Mucus, Urine FEW Reference range not established. /LPF    Hyaline Casts, Urine 2+ (A) NONE /LPF   CBC and Auto Differential   Result Value Ref Range    WBC 17.6 (H) 4.4 - 11.3 x10*3/uL    nRBC 0.3 (H) 0.0 - 0.0 /100 WBCs    RBC 3.65 (L) 4.00 - 5.20 x10*6/uL    Hemoglobin 9.6 (L) 12.0 - 16.0 g/dL    Hematocrit 31.3 (L) 36.0 - 46.0 %    MCV 86 80 - 100 fL    MCH 26.3 26.0 - 34.0 pg    MCHC 30.7 (L) 32.0 - 36.0 g/dL    RDW 14.6 (H) 11.5 - 14.5 %    Platelets 200 150 - 450 x10*3/uL    Neutrophils % 84.5 40.0 - 80.0 %    Immature Granulocytes %, Automated 1.8 (H) 0.0 - 0.9 %    Lymphocytes % 10.8 13.0 - 44.0 %    Monocytes % 2.7 2.0 - 10.0 %    Eosinophils % 0.1 0.0 - 6.0 %    Basophils % 0.1 0.0 - 2.0 %    Neutrophils Absolute 14.87 (H) 1.20 - 7.70 x10*3/uL    Immature Granulocytes Absolute, Automated 0.32 0.00 - 0.70 x10*3/uL    Lymphocytes Absolute 1.91 1.20 - 4.80 x10*3/uL    Monocytes Absolute 0.48 0.10 - 1.00 x10*3/uL    Eosinophils Absolute 0.01 0.00 - 0.70 x10*3/uL    Basophils Absolute 0.02 0.00 - 0.10 x10*3/uL   Renal Function Panel   Result Value Ref Range    Glucose 209 (H) 74 - 99 mg/dL    Sodium 133 (L) 136 - 145 mmol/L    Potassium 5.1 3.5 - 5.3 mmol/L    Chloride 95 (L) 98 - 107 mmol/L    Bicarbonate 29 21 - 32 mmol/L    Anion Gap 14 10 - 20 mmol/L    Urea Nitrogen 37 (H) 6 - 23 mg/dL    Creatinine 2.04 (H) 0.50 - 1.05 mg/dL    eGFR 26 (L) >60 mL/min/1.73m*2    Calcium 8.8 8.6 - 10.3 mg/dL    Phosphorus 3.5 2.5 - 4.9 mg/dL    Albumin 3.3 (L) 3.4 - 5.0 g/dL   Magnesium   Result Value Ref Range    Magnesium 2.08 1.60 - 2.40 mg/dL   Hepatic function panel   Result Value Ref Range    Albumin 3.4 3.4 - 5.0 g/dL    Bilirubin, Total 0.7 0.0 - 1.2  mg/dL    Bilirubin, Direct 0.3 0.0 - 0.3 mg/dL    Alkaline Phosphatase 144 (H) 33 - 136 U/L     (H) 7 - 45 U/L     (H) 9 - 39 U/L    Total Protein 5.9 (L) 6.4 - 8.2 g/dL   POCT GLUCOSE   Result Value Ref Range    POCT Glucose 249 (H) 74 - 99 mg/dL   POCT GLUCOSE   Result Value Ref Range    POCT Glucose 211 (H) 74 - 99 mg/dL   POCT GLUCOSE   Result Value Ref Range    POCT Glucose 211 (H) 74 - 99 mg/dL     ECG 12 lead    Result Date: 7/2/2024  Accelerated Junctional rhythm Left axis deviation Septal infarct , age undetermined ST & T wave abnormality, consider inferolateral ischemia Abnormal ECG When compared with ECG of 20-JUN-2024 15:59, (unconfirmed) Junctional rhythm has replaced Wide QRS rhythm    ECG 12 lead    Result Date: 7/1/2024  Marked sinus bradycardia with AV dissociation and Wide QRS rhythm Left axis deviation Nonspecific intraventricular block Lateral infarct , age undetermined Inferior infarct , age undetermined Abnormal ECG When compared with ECG of 20-JUN-2024 15:59, (unconfirmed) Sinus rhythm is now with AV dissociation    CT chest wo IV contrast    Result Date: 7/1/2024  Interpreted By:  Alma Laguna, STUDY: CT CHEST WO IV CONTRAST;  7/1/2024 12:51 pm   INDICATION: Signs/Symptoms:Respiratory failure, L lung consolidation vs effusion.   COMPARISON: 06/14/2024   ACCESSION NUMBER(S): [id number]   ORDERING CLINICIAN: WILLARD MARTINEZ   TECHNIQUE: Serial axial CT images of the chest obtained without intravenous contrast. Sagittal and coronal reconstructions were generated.   FINDINGS: Resolution is limited due to the patient's size.   VESSELS: There are atherosclerotic changes of the aorta. The main pulmonary artery appears enlarged. The cava is unremarkable.   HEART:  The heart appears enlarged.   MEDIASTINUM AND ARAMIS: There are multiple slightly prominent mediastinal lymph nodes.   LUNG, PLEURA, AND LARGE AIRWAYS: There is dense consolidation at the left lung base. There is perhaps  minimal left pleural fluid.   CHEST WALL AND LOWER NECK: There is a calcified thyroid nodule on the left.   BONES: There are degenerative changes of the spine.   UPPER ABDOMEN: The patient is status post cholecystectomy.   COMPARISON TO THE PRIOR EXAM: The chest similar.       Limited resolution.   Persistent dense consolidation at the left lung base. Follow-up to resolution is suggested.   Heart appears somewhat enlarged.   Signed by: Alma Laguna 7/1/2024 1:11 PM Dictation workstation:   [id number]    XR chest 1 view    Result Date: 7/1/2024  Interpreted By:  Tanika Dean, STUDY: XR CHEST 1 VIEW;  7/1/2024 10:11 am   INDICATION: Signs/Symptoms:Chest Pain.   COMPARISON: 06/20/2024   ACCESSION NUMBER(S): [id number]   ORDERING CLINICIAN: WILLARD MARTINEZ   FINDINGS: CARDIOMEDIASTINAL SILHOUETTE: The heart is enlarged     LUNGS: There is extensive progressive consolidation at the left lung with near complete opacification. There is a reticulonodular interstitial pattern involving the right lung. 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   ABDOMEN: No remarkable upper abdominal findings.     BONES: No acute osseous changes. There are neural stimulators extending along the right and left sides of the neck superiorly.       1. Enlarged heart. 2. Marked progression of disease with near-complete opacification of the left hemithorax. 3. Reticulonodular interstitial pattern right lung. 4. 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   MACRO: None   Signed by: Tanika Dean 7/1/2024 10:27 AM Dictation workstation:   [id number]    ECG 12 lead    Result Date: 6/21/2024  Wide QRS rhythm with Premature ventricular complexes or Fusion complexes Nonspecific intraventricular block Abnormal ECG When compared with ECG of 20-JUN-2024 14:06, (unconfirmed) Wide QRS rhythm has replaced Sinus rhythm    XR chest 1 view    Result Date: 6/20/2024  Interpreted By:  Becki  Neil, STUDY: XR CHEST 1 VIEW;  6/20/2024 1:55 pm   INDICATION: Signs/Symptoms:SOB after removing oxygen at facility, feels improved after reapplied.   COMPARISON: 06/14/2024   ACCESSION NUMBER(S): [id number]   ORDERING CLINICIAN: ANNE JUNIOR   FINDINGS: There is opacification of the left lower lung laterally probably from effusion with consolidated infiltrate. This appears slightly improved from the prior exam. This obscures the left cardiac margin, as well as been lower hilum. There is interstitial prominence of the right hemithorax. This is probably exacerbated with increased appearance compared to the prior exam from a shallower inspiration, but probably with superimposed interstitial edema. Left suprahilar opacity may be from superimposed vasculature and/or atelectasis.   ABDOMEN AND OTHER FINDINGS: No remarkable upper abdominal findings.   BONES: No acute osseous changes.       1.  Probable slight improvement of left mid and lower lung airspace disease, and mild congestion.   Signed by: Neil Connell 6/20/2024 2:36 PM Dictation workstation:   [id number]    ECG 12 lead    Result Date: 6/20/2024  Normal sinus rhythm Incomplete right bundle branch block ST elevation, consider lateral injury or acute infarct ** ** ACUTE MI ** ** Abnormal ECG When compared with ECG of 14-JUN-2024 17:45, (unconfirmed) Sinus rhythm has replaced Junctional rhythm Inverted T waves have replaced nonspecific T wave abnormality in Inferior leads T wave inversion no longer evident in Lateral leads    ECG 12 lead    Result Date: 6/18/2024  Accelerated Junctional rhythm Left axis deviation ST elevation, consider lateral injury or acute infarct ** ** ACUTE MI ** ** Abnormal ECG When compared with ECG of 27-MAR-2024 18:08, Junctional rhythm has replaced Sinus rhythm Left anterior fascicular block is no longer Present ST more depressed in Inferior leads ST elevation now present in Lateral leads    Transthoracic Echo (TTE)  Complete    Result Date: 6/17/2024            [facility] [address], [place] [address]    Tel [phone] Fax [phone] TRANSTHORACIC ECHOCARDIOGRAM REPORT  Patient Name:      TEJINDER GONZALES          Pal Physician:    [id number] Jack Luna MD Study Date:        6/16/2024             Ordering Provider:    [id number] AMEENA JOHNSTON MRN/PID:           [id number]              Fellow: Accession#:        [id number]          Nurse:                She KOENIG Date of Birth/Age: 1955 / 69 years  Sonographer:          Marcella Person RDCS Gender:            F                     Additional Staff: Height:            160.00 cm             Admit Date: Weight:            137.89 kg             Admission Status:     Inpatient -                                                                Routine BSA / BMI:         2.31 m2 / 53.86 kg/m2 Department Location:  [facility] CCU Blood Pressure: 156 /79 mmHg Study Type:    TRANSTHORACIC ECHO (TTE) COMPLETE Diagnosis/ICD: Hypoxemia-R09.02; Acute diastolic (congestive) heart failure                (CHF)-I50.31 Indication:    hypoxia; Acute diastolic CHF Patient History: Pertinent History: HTN, Hyperlipidemia and CHF. Study Detail: The following Echo studies were performed: 2D, M-Mode, Doppler and               color flow. Definity used as a contrast agent for endocardial               border definition. Total contrast used for this procedure was 2 mL               via IV push.  PHYSICIAN INTERPRETATION: Left Ventricle: Left ventricular systolic function is normal. There are no regional wall motion abnormalities. The left ventricular cavity size is normal. There is mild concentric left ventricular hypertrophy. Spectral Doppler shows an impaired relaxation pattern of left ventricular  diastolic filling. Left Atrium: The left atrium is mildly dilated. Right Ventricle: The right ventricle is normal in size. There is normal right ventricular global systolic function. Right Atrium: The right atrium is upper limits of normal in size. Aortic Valve: The aortic valve appears structurally normal. There is no evidence of aortic valve regurgitation. The peak instantaneous gradient of the aortic valve is 16.8 mmHg. The mean gradient of the aortic valve is 8.0 mmHg. Mitral Valve: The mitral valve is normal in structure. There is trace to mild mitral valve regurgitation. Tricuspid Valve: The tricuspid valve is structurally normal. There is trace tricuspid regurgitation. Pulmonic Valve: The pulmonic valve was not assessed. The pulmonic valve regurgitation was not assessed. Pericardium: There is no pericardial effusion noted. Aorta: The aortic root is normal.  CONCLUSIONS:  1. Left ventricular systolic function is normal.  2. Spectral Doppler shows an impaired relaxation pattern of left ventricular diastolic filling.  3. Mild LVH with normal EF of 55-60%.  4. Mildly increased pressure gradient LVOT and aortic valve, within acceptable limits at this time.  5. Diastolic dysfunction with mildly dilated atria. QUANTITATIVE DATA SUMMARY: 2D MEASUREMENTS:                          Normal Ranges: LAs:           4.10 cm   (2.7-4.0cm) IVSd:          1.50 cm   (0.6-1.1cm) LVPWd:         1.30 cm   (0.6-1.1cm) LVIDd:         3.80 cm   (3.9-5.9cm) LVIDs:         2.70 cm LV Mass Index: 84.0 g/m2 LV % FS        28.9 % LA VOLUME:                             Normal Ranges: LA Area A4C:     24.0 cm2 LA Area A2C:     21.0 cm2 LA Volume Index: 28.0 ml/m2 AORTA MEASUREMENTS:                    Normal Ranges: Asc Ao, d: 3.60 cm (2.1-3.4cm) LV SYSTOLIC FUNCTION BY 2D PLANIMETRY (MOD):                     Normal Ranges: EF-A4C View: 52.9 % (>=55%) LV DIASTOLIC FUNCTION:                               Normal Ranges: MV Peak E:        1.38  m/s    (0.7-1.2 m/s) MV Peak A:        1.08 m/s    (0.42-0.7 m/s) E/A Ratio:        1.28        (1.0-2.2) MV e'             0.06 m/s    (>8.0) MV lateral e'     0.07 m/s MV medial e'      0.06 m/s E/e' Ratio:       21.23       (<8.0) PulmV Sys Rojelio:    72.30 cm/s PulmV Graham Rojelio:   58.80 cm/s PulmV S/D Rojelio:    1.20 PulmV A Revs Rojelio: 28.30 cm/s PulmV A Revs Dur: 169.00 msec MITRAL VALVE:                 Normal Ranges: MV DT: 363 msec (150-240msec) AORTIC VALVE:                                    Normal Ranges: AoV Vmax:                2.05 m/s  (<=1.7m/s) AoV Peak P.8 mmHg (<20mmHg) AoV Mean P.0 mmHg  (1.7-11.5mmHg) LVOT Max Rojelio:            1.42 m/s  (<=1.1m/s) AoV VTI:                 46.70 cm  (18-25cm) LVOT VTI:                39.20 cm LVOT Diameter:           2.50 cm   (1.8-2.4cm) AoV Area, VTI:           4.12 cm2  (2.5-5.5cm2) AoV Area,Vmax:           3.40 cm2  (2.5-4.5cm2) AoV Dimensionless Index: 0.84  RIGHT VENTRICLE: RV Basal 3.10 cm RV Mid   2.20 cm RV Major 7.3 cm TAPSE:   18.3 mm RV s'    0.13 m/s TRICUSPID VALVE/RVSP:                             Normal Ranges: Peak TR Velocity: 1.87 m/s RV Syst Pressure: 17.0 mmHg (< 30mmHg) Pulmonary Veins: PulmV A Revs Dur: 169.00 msec PulmV A Revs Rojelio: 28.30 cm/s PulmV Graham Rojelio:   58.80 cm/s PulmV S/D Rojelio:    1.20 PulmV Sys Rojelio:    72.30 cm/s  [id number] Jack Luna MD Electronically signed on 2024 at 7:49:29 AM  ** Final **     Electrocardiogram, 12-lead PRN ACS symptoms    Result Date: 6/15/2024  Accelerated Junctional rhythm Left axis deviation ST elevation, consider lateral injury or acute infarct ** ** ACUTE MI ** ** Abnormal ECG When compared with ECG of 2024 17:38, (unconfirmed) ST no longer elevated in Lateral leads    CT angio chest for pulmonary embolism    Result Date: 6/15/2024  STUDY: CT Angiogram of the Chest; 2024 at 10:52 p.m. INDICATION: Elevated D-Dimer.  Hypoxia. COMPARISON: None Available. ACCESSION  NUMBER(S): [id number] ORDERING CLINICIAN: MARIANELA HARDWICK TECHNIQUE:  CTA of the chest was performed with intravenous contrast. Images are reviewed and processed at a workstation according to the CT angiogram protocol with 3-D and/or MIP post processing imaging generated.  Omnipaque 350 60 mL was administered intravenously. Automated mA/kV exposure control was utilized and patient examination was performed in strict accordance with principles of ALARA. FINDINGS: Pulmonary arteries are adequately opacified without acute or chronic filling defects.  The thoracic aorta is normal in course and caliber without dissection or aneurysm. There is mild cardiomegaly without pericardial effusion.  Thoracic lymph nodes are not enlarged. Small left pleural effusion seen.  There is no pleural thickening or pneumothorax.  The airways are patent. There is some subsegmental atelectasis of the left lower lobe.  There is pulmonary vascular congestion.  There is mild peripheral groundglass opacity and perihilar ill-defined groundglass opacity. Upper abdomen demonstrates no acute pathology. There are no acute fractures.  No suspicious bony lesions.    1. No evidence of pulmonary embolism. 2. Cardiomegaly with pulmonary vascular congestion and small left pleural effusion. 3. Mild peripheral groundglass opacity and perihilar ill-defined groundglass opacity. Suggest correlation for pneumonia. Signed by Hao Esqueda MD    XR chest 1 view    Result Date: 6/14/2024  Interpreted By:  Nikolas Gonzalez, STUDY: XR CHEST 1 VIEW; 6/14/2024 4:35 pm   INDICATION: Signs/Symptoms:Shortness of breath, pulmonary edema?  Focal infiltrate?   COMPARISON: None.   ACCESSION NUMBER(S): [id number]   ORDERING CLINICIAN: MARIANELA HARDWICK   TECHNIQUE: Number of films: 2 AP erect portable views were obtained   FINDINGS: There is limitation due to rotation, respiratory motion and overlying artifacts obscuring some detail.   The cardiac silhouette is enlarged,  exaggerated by the technique. Bilateral interstitial and mild alveolar perihilar infiltrates are present. There are small effusions. No pneumothorax is seen. Degenerative changes involve the spine and shoulders. Two electronic devices are seen over the upper chest, with the wires directed superiorly in the neck.       Limited study. Cardiomegaly and congestive heart failure.     Signed by: Nikolas Gonzalez 6/14/2024 4:41 PM Dictation workstation:   [id number]      Assessment/Plan  #Acute on chronic hypercapnic respiratory failure (resolved)  #Elevated liver enzymes (trending down)  #CRISTOFER on CKD 3 (trending down)  #DM  #Possible pneumonia  #Leukocytosis    Neuro:   -No acute issues  -Promote sleep/wake cycle.  - History: Anxiety, bipolar  - Home meds: Celexa, gabapentin  - Pain: 0/10  - Sedation: none  - CAM ICU    Cardiovascular:   -No acute issues  -Continuous cardiac monitoring.  -Vitals per protocol.  -PRN antihypertensives on profile.  - History: Essential HTN, HLD   - Goals: MAP>65  - Home meds: norvasc, asa, metoprolol succinate xl  - Last echo: Last echo 7/2023, EF 75%, pulmonary artery hypertension 40-50 mmHg)     Pulmonary:   #Acute on chronic hypercapnic respiratory failure(resolved)  -Patient is currently on 3L NC.  Bipap ordered PRN and HS.  -Patient scheduled for PFT on 7/18/24(scheduled prior to admission).  -Solu-medrol decreased to 40mg Q24H.  - History: Asthma  - Home meds: Albuterol  -Continuous pulse oximetry   -O2 PRN to maintain SpO2 > 92%, wean as tolerated  -Breathing treatments scheduled and PRN ordered.  -Wean 02 as tolearated.  - Imaging:               -Chest CT 7/1:  Impression:     Limited resolution.      Persistent dense consolidation at the left lung base. Follow-up to  resolution is suggested.      Heart appears somewhat enlarged     Gastrointestinal:   #Elevated liver enzymes  -Downtrend noted in liver enzymes on A.M. labs.  -Hepatic panel negative.  -GI on consult, appreciate  recommendations.  -Liver US 2024:   IMPRESSION:  -Unable to identify the pancreas in this exam. Previous  cholecystectomy.  -Hepatomegaly.  Nonspecific increased echogenicity throughout the   Liver, most likely fatty infiltration. .  -Mid to lower pole right renal cyst.  -Trace right upper quadrant ascites.  -History: Pancreatic insufficiency  -Home meds: creon  - Diet: Carb consistent diet  - Prophylaxis: protonix  - Bowel regimen: miralax  -Last BM: Last BM Date: 24    Renal:   #CRISTOFER on CKD 3  #Hyperkalemia  -Downtrending CRISTOFER.  -Morning K=5.1  Net IO Since Admission: -500 mL [24 1151]  Results from last 72 hours   Lab Units 24  03224  2153   BUN mg/dL 37* 32*   CREATININE mg/dL 2.04* 2.21*   -Electrolytes: Replete per protocol, goal K>4 Phos >3 Mg >2    Endocrine:   #DM  -SSI increased to scale 2.  -Home dose of long acting still on hold.  Consider advancing once PO intake is adequate.   Results from last 7 days   Lab Units 24  1134 24  0735 24  0455 24  0320 24  0005 24  2153 24  1756   POCT GLUCOSE mg/dL 211* 211* 249*  --  211*  --  202* 195*   GLUCOSE mg/dL  --   --   --  209*  --  212*  --   --     -History: DM  -Home meds: Lantus 55 units BID  -SSI and glucose checks per protocol.  -BG < 180 goal    Hematology:   -No acute issues  Results from last 7 days   Lab Units 24  03224  0944   HEMOGLOBIN g/dL 9.6* 10.6*   HEMATOCRIT % 31.3* 36.3   PLATELETS AUTO x10*3/uL 200 237   - Daily CBC  -History: vitamin D deficiency  -Home meds: none  - DVT Prophylaxis: heparin and scd's.    Infectious Disease:   #Possible pneumonia   #Leukocytosis  -Downtrending leukocytosis.  Results from last 7 days   Lab Units 24  03224  0944   WBC AUTO x10*3/uL 17.6* 19.4*   Temp (24hrs), Av.7 °C (98 °F), Min:36.5 °C (97.7 °F), Max:36.8 °C (98.2 °F)  -History: none  -Home meds: none  -Abx: Doxy and zosyn   -Cultures:    Collected Updated Procedure Result Status    07/02/2024 0118 07/02/2024 1116 Legionella Antigen, Urine [[id number]]   Urine    In process Component Value   No component results          07/02/2024 0118 07/02/2024 1116 Streptococcus pneumoniae Antigen, Urine [[id number]]   Urine    In process Component Value   No component results          07/01/2024 1201 07/01/2024 1339 MRSA Surveillance for Vancomycin De-escalation, PCR [[id number]]    Swab from Anterior Nares    Final result Component Value   MRSA PCR Not Detected          07/01/2024 1040 07/01/2024 1901 Blood Culture [[id number]]   Blood culture from Peripheral Venipuncture    Preliminary result Component Value   Blood Culture Loaded on Instrument - Culture in progress P          07/01/2024 1040 07/01/2024 1901 Blood Culture [[id number]]   Blood culture from Peripheral Venipuncture    Preliminary result Component Value   Blood Culture Loaded on Instrument - Culture in progress P          06/15/2024 0312 06/16/2024 1131 Streptococcus pneumoniae Antigen, Urine [[id number]]   Urine    Final result Component Value   Streptococcus pneumoniae Ag, Urine Negative          06/15/2024 0312 06/16/2024 1131 Legionella Antigen, Urine [[id number]]   Urine    Final result Component Value   L. pneumophila Urine Ag Negative        Musculoskeletal:   -No acute issues  -History: muscle spasms  -Home meds: Robaxin  - PT to see   - OT to see      Integumentary:   -No acute issues  -History: none  -Home meds: none     Lines/Drains:PIV    CODE STATUS: Full Code    Disposition: Probable downgrade today    ABCDEF Checklist  Analgesia: Spontaneous awakening trial to be pursued if clinically appropriate. RASS goal reviewed  Breathing: Spontaneous breathing trial to be pursued if clinically appropriate. Mechanical power of assisted ventilation reviewed  Choice of analgesia/sedation: Analgesic and sedative agents adjusted per clinical context.   Delirium assessed by CAM, will avoid exacerbating  factors  Early mobility and exercise: Physical and occupational therapy engaged  Family: Plan of care, overall trajectory of patient shared with family. Questions elicited and answered as appropriate.       Due to the high probability of life threatening clinical decompensation, the patient required critical care time evaluating and managing this patient.  Critical care time included obtaining a history, examining the patient, ordering and reviewing studies, discussing, developing, and implementing a management plan, evaluating the patient's response to treatment, and discussion with other care team providers. I saw and evaluated the patient myself.  Critical care time was performed exclusive of billable procedures.    Critical care time: 30        MELQUIADES Poole-CNP  Critical Care Medicine    I spent 30 minutes in the professional and overall care of this patient.

## 2024-07-03 LAB
ALBUMIN SERPL BCP-MCNC: 3.4 G/DL (ref 3.4–5)
ALP SERPL-CCNC: 124 U/L (ref 33–136)
ALT SERPL W P-5'-P-CCNC: 605 U/L (ref 7–45)
ANION GAP SERPL CALC-SCNC: 11 MMOL/L (ref 10–20)
AST SERPL W P-5'-P-CCNC: 177 U/L (ref 9–39)
BASOPHILS # BLD AUTO: 0.01 X10*3/UL (ref 0–0.1)
BASOPHILS NFR BLD AUTO: 0.1 %
BILIRUB SERPL-MCNC: 0.7 MG/DL (ref 0–1.2)
BUN SERPL-MCNC: 47 MG/DL (ref 6–23)
CALCIUM SERPL-MCNC: 8.7 MG/DL (ref 8.6–10.3)
CHLORIDE SERPL-SCNC: 96 MMOL/L (ref 98–107)
CO2 SERPL-SCNC: 31 MMOL/L (ref 21–32)
CREAT SERPL-MCNC: 1.54 MG/DL (ref 0.5–1.05)
EGFRCR SERPLBLD CKD-EPI 2021: 36 ML/MIN/1.73M*2
EOSINOPHIL # BLD AUTO: 0.01 X10*3/UL (ref 0–0.7)
EOSINOPHIL NFR BLD AUTO: 0.1 %
ERYTHROCYTE [DISTWIDTH] IN BLOOD BY AUTOMATED COUNT: 15.2 % (ref 11.5–14.5)
GLUCOSE BLD MANUAL STRIP-MCNC: 196 MG/DL (ref 74–99)
GLUCOSE BLD MANUAL STRIP-MCNC: 232 MG/DL (ref 74–99)
GLUCOSE BLD MANUAL STRIP-MCNC: 288 MG/DL (ref 74–99)
GLUCOSE BLD MANUAL STRIP-MCNC: 312 MG/DL (ref 74–99)
GLUCOSE BLD MANUAL STRIP-MCNC: 355 MG/DL (ref 74–99)
GLUCOSE BLD MANUAL STRIP-MCNC: 379 MG/DL (ref 74–99)
GLUCOSE SERPL-MCNC: 239 MG/DL (ref 74–99)
HCT VFR BLD AUTO: 31.7 % (ref 36–46)
HGB BLD-MCNC: 9.6 G/DL (ref 12–16)
IMM GRANULOCYTES # BLD AUTO: 0.17 X10*3/UL (ref 0–0.7)
IMM GRANULOCYTES NFR BLD AUTO: 1 % (ref 0–0.9)
LDH SERPL L TO P-CCNC: 309 U/L (ref 84–246)
LEGIONELLA AG UR QL: NEGATIVE
LYMPHOCYTES # BLD AUTO: 1.49 X10*3/UL (ref 1.2–4.8)
LYMPHOCYTES NFR BLD AUTO: 9.1 %
MAGNESIUM SERPL-MCNC: 2.1 MG/DL (ref 1.6–2.4)
MCH RBC QN AUTO: 25.9 PG (ref 26–34)
MCHC RBC AUTO-ENTMCNC: 30.3 G/DL (ref 32–36)
MCV RBC AUTO: 86 FL (ref 80–100)
MONOCYTES # BLD AUTO: 0.88 X10*3/UL (ref 0.1–1)
MONOCYTES NFR BLD AUTO: 5.4 %
NEUTROPHILS # BLD AUTO: 13.73 X10*3/UL (ref 1.2–7.7)
NEUTROPHILS NFR BLD AUTO: 84.3 %
NRBC BLD-RTO: 0.2 /100 WBCS (ref 0–0)
PLATELET # BLD AUTO: 217 X10*3/UL (ref 150–450)
POTASSIUM SERPL-SCNC: 4.8 MMOL/L (ref 3.5–5.3)
PROT SERPL-MCNC: 6.1 G/DL (ref 6.4–8.2)
RBC # BLD AUTO: 3.7 X10*6/UL (ref 4–5.2)
S PNEUM AG UR QL: NEGATIVE
SODIUM SERPL-SCNC: 133 MMOL/L (ref 136–145)
WBC # BLD AUTO: 16.3 X10*3/UL (ref 4.4–11.3)

## 2024-07-03 PROCEDURE — 97530 THERAPEUTIC ACTIVITIES: CPT | Mod: GP,CQ

## 2024-07-03 PROCEDURE — 2500000002 HC RX 250 W HCPCS SELF ADMINISTERED DRUGS (ALT 637 FOR MEDICARE OP, ALT 636 FOR OP/ED)

## 2024-07-03 PROCEDURE — 85025 COMPLETE CBC W/AUTO DIFF WBC: CPT

## 2024-07-03 PROCEDURE — 2500000004 HC RX 250 GENERAL PHARMACY W/ HCPCS (ALT 636 FOR OP/ED)

## 2024-07-03 PROCEDURE — 97530 THERAPEUTIC ACTIVITIES: CPT | Mod: GO,CO

## 2024-07-03 PROCEDURE — 97112 NEUROMUSCULAR REEDUCATION: CPT | Mod: GP,CQ

## 2024-07-03 PROCEDURE — 83735 ASSAY OF MAGNESIUM: CPT

## 2024-07-03 PROCEDURE — 2500000001 HC RX 250 WO HCPCS SELF ADMINISTERED DRUGS (ALT 637 FOR MEDICARE OP)

## 2024-07-03 PROCEDURE — 99233 SBSQ HOSP IP/OBS HIGH 50: CPT

## 2024-07-03 PROCEDURE — 2500000005 HC RX 250 GENERAL PHARMACY W/O HCPCS

## 2024-07-03 PROCEDURE — 99232 SBSQ HOSP IP/OBS MODERATE 35: CPT | Performed by: NURSE PRACTITIONER

## 2024-07-03 PROCEDURE — 99233 SBSQ HOSP IP/OBS HIGH 50: CPT | Performed by: INTERNAL MEDICINE

## 2024-07-03 PROCEDURE — 2500000005 HC RX 250 GENERAL PHARMACY W/O HCPCS: Performed by: STUDENT IN AN ORGANIZED HEALTH CARE EDUCATION/TRAINING PROGRAM

## 2024-07-03 PROCEDURE — 80053 COMPREHEN METABOLIC PANEL: CPT

## 2024-07-03 PROCEDURE — 87385 HISTOPLASMA CAPSUL AG IA: CPT

## 2024-07-03 PROCEDURE — 94640 AIRWAY INHALATION TREATMENT: CPT

## 2024-07-03 PROCEDURE — 97535 SELF CARE MNGMENT TRAINING: CPT | Mod: GO,CO

## 2024-07-03 PROCEDURE — 36415 COLL VENOUS BLD VENIPUNCTURE: CPT

## 2024-07-03 PROCEDURE — 2060000001 HC INTERMEDIATE ICU ROOM DAILY

## 2024-07-03 PROCEDURE — 87449 NOS EACH ORGANISM AG IA: CPT

## 2024-07-03 PROCEDURE — 82947 ASSAY GLUCOSE BLOOD QUANT: CPT

## 2024-07-03 PROCEDURE — 94660 CPAP INITIATION&MGMT: CPT

## 2024-07-03 PROCEDURE — 87305 ASPERGILLUS AG IA: CPT

## 2024-07-03 RX ORDER — INSULIN GLARGINE 100 [IU]/ML
30 INJECTION, SOLUTION SUBCUTANEOUS 2 TIMES DAILY
Status: DISCONTINUED | OUTPATIENT
Start: 2024-07-03 | End: 2024-07-04

## 2024-07-03 RX ORDER — AMLODIPINE BESYLATE 5 MG/1
5 TABLET ORAL DAILY
Status: DISCONTINUED | OUTPATIENT
Start: 2024-07-03 | End: 2024-07-08 | Stop reason: HOSPADM

## 2024-07-03 RX ORDER — SODIUM CHLORIDE 9 MG/ML
INJECTION, SOLUTION INTRAMUSCULAR; INTRAVENOUS; SUBCUTANEOUS
Status: DISPENSED
Start: 2024-07-03 | End: 2024-07-03

## 2024-07-03 RX ORDER — GABAPENTIN 300 MG/1
300 CAPSULE ORAL 2 TIMES DAILY
Status: DISCONTINUED | OUTPATIENT
Start: 2024-07-03 | End: 2024-07-08 | Stop reason: HOSPADM

## 2024-07-03 RX ORDER — PREDNISONE 20 MG/1
40 TABLET ORAL DAILY
Status: DISCONTINUED | OUTPATIENT
Start: 2024-07-03 | End: 2024-07-05

## 2024-07-03 RX ORDER — DOXYCYCLINE 100 MG/1
100 CAPSULE ORAL EVERY 12 HOURS SCHEDULED
Status: DISCONTINUED | OUTPATIENT
Start: 2024-07-03 | End: 2024-07-04

## 2024-07-03 ASSESSMENT — COGNITIVE AND FUNCTIONAL STATUS - GENERAL
MOBILITY SCORE: 13
DRESSING REGULAR LOWER BODY CLOTHING: A LOT
STANDING UP FROM CHAIR USING ARMS: A LOT
WALKING IN HOSPITAL ROOM: A LOT
DRESSING REGULAR UPPER BODY CLOTHING: A LITTLE
TURNING FROM BACK TO SIDE WHILE IN FLAT BAD: A LITTLE
PERSONAL GROOMING: A LITTLE
DAILY ACTIVITIY SCORE: 16
TOILETING: A LOT
MOVING FROM LYING ON BACK TO SITTING ON SIDE OF FLAT BED WITH BEDRAILS: A LITTLE
CLIMB 3 TO 5 STEPS WITH RAILING: TOTAL
MOVING TO AND FROM BED TO CHAIR: A LOT
HELP NEEDED FOR BATHING: A LOT

## 2024-07-03 ASSESSMENT — PAIN SCALES - GENERAL
PAINLEVEL_OUTOF10: 0 - NO PAIN

## 2024-07-03 ASSESSMENT — ACTIVITIES OF DAILY LIVING (ADL)
BATHING_LEVEL_OF_ASSISTANCE: MAXIMUM ASSISTANCE
EFFECT OF PAIN ON DAILY ACTIVITIES: .
HOME_MANAGEMENT_TIME_ENTRY: 25
BATHING_WHERE_ASSESSED: EDGE OF BED

## 2024-07-03 ASSESSMENT — PAIN - FUNCTIONAL ASSESSMENT
PAIN_FUNCTIONAL_ASSESSMENT: 0-10

## 2024-07-03 NOTE — PROGRESS NOTES
Physical Therapy    Physical Therapy    Physical Therapy Treatment    Patient Name: Kaylene Feliciano  MRN: [id number]  Today's Date: 7/3/2024  Time Calculation  Start Time: 0930  Stop Time: 1009  Time Calculation (min): 39 min     2111/2111-A       07/03/24 0930   PT  Visit   PT Received On 07/03/24   Response to Previous Treatment Patient with no complaints from previous session.   General   Reason for Referral impaired self care   Referred By Dr. Lai   Past Medical History Relevant to Rehab OA, ARCHIE, femur fracture, CKD, depression, HTN, DM, asthma, CKD, HLD, CHF   Prior to Session Communication Bedside nurse   Patient Position Received Bed, 3 rail up;Alarm on   General Comment Patient pleasant and cooperative.  Patient agreeable to therapy and cleared for participation   Precautions   Medical Precautions Fall precautions   Vital Signs   SpO2 96 %  (4L)   /77   Pain Assessment   Pain Assessment 0-10   0-10 (Numeric) Pain Score 0 - No pain   Effect of Pain on Daily Activities .   Cognition   Overall Cognitive Status WFL   Orientation Level Oriented X4   Therapeutic Exercise   Therapeutic Exercise Performed Yes   Therapeutic Exercise Activity 1 AP/GS/LAQ/marching/heel-toe raises x10   Balance/Neuromuscular Re-Education   Balance/Neuromuscular Re-Education Activity Performed Yes   Balance/Neuromuscular Re-Education Activity 1 Pt sat EOB 25 minutes performing seated activities including reaching outside JENY with SUP.   Bed Mobility   Bed Mobility Yes   Bed Mobility 1   Bed Mobility 1 Supine to sitting   Level of Assistance 1 Minimum assistance;Moderate assistance   Bed Mobility Comments 1 HOB elevated, Increased time and effort with min cues   Bed Mobility 2   Bed Mobility  2 Sitting to supine   Level of Assistance 2 Minimum assistance   Bed Mobility 3   Bed Mobility 3 Scooting   Level of Assistance 3 Contact guard   Transfers   Transfer Yes   Transfer 1   Transfer From 1 Bed to   Transfer to 1 Stand;Sit    Technique 1 Sit to stand;Stand to sit   Transfer Device 1 Walker;Gait belt   Transfer Level of Assistance 1 Moderate assistance;Moderate verbal cues;+2   Trials/Comments 1 VC's for safe UE placement and sequencing. Forward flexed posture upon standing with cues to facilitate upright posture, good follow through. POt fatigues quicklyy requiring seated rest break   Activity Tolerance   Endurance Tolerates 10 - 20 min exercise with multiple rests   PT Assessment   PT Assessment Results Decreased strength;Decreased endurance;Impaired balance;Decreased mobility   Rehab Prognosis Good   End of Session Communication Bedside nurse   Assessment Comment Pt puts forth good effort. Increased time and effort to complete activities. Seated activities with one STS performed.   End of Session Patient Position Up in chair;Alarm on  (bed in chair mode)     Outcome Measures:  [facility] Basic Mobility  Turning from your back to your side while in a flat bed without using bedrails: A little  Moving from lying on your back to sitting on the side of a flat bed without using bedrails: A little  Moving to and from bed to chair (including a wheelchair): A lot  Standing up from a chair using your arms (e.g. wheelchair or bedside chair): A lot  To walk in hospital room: A lot  Climbing 3-5 steps with railing: Total  Basic Mobility - Total Score: 13                             EDUCATION:  Outpatient Education  Individual(s) Educated: Patient  Education Provided: Fall Risk  Education Documentation  Handouts, taught by Ce Malin PTA at 7/3/2024 11:03 AM.  Learner: Patient  Readiness: Acceptance  Method: Explanation, Demonstration  Response: Verbalizes Understanding, Demonstrated Understanding    Precautions, taught by Ce Malin PTA at 7/3/2024 11:03 AM.  Learner: Patient  Readiness: Acceptance  Method: Explanation, Demonstration  Response: Verbalizes Understanding, Demonstrated Understanding    Body Mechanics, taught by Ce Malin PTA  at 7/3/2024 11:03 AM.  Learner: Patient  Readiness: Acceptance  Method: Explanation, Demonstration  Response: Verbalizes Understanding, Demonstrated Understanding    Home Exercise Program, taught by Ce Malin PTA at 7/3/2024 11:03 AM.  Learner: Patient  Readiness: Acceptance  Method: Explanation, Demonstration  Response: Verbalizes Understanding, Demonstrated Understanding    Mobility Training, taught by Ce Malin PTA at 7/3/2024 11:03 AM.  Learner: Patient  Readiness: Acceptance  Method: Explanation, Demonstration  Response: Verbalizes Understanding, Demonstrated Understanding    Education Comments  No comments found.        GOALS:  Encounter Problems       Encounter Problems (Active)       Balance       Pt will demonstrate static/dynamic standing balance for >/= 3 min SBA without evidence of instability or LOB with functional mobility tasks.         Start:  07/02/24    Expected End:  07/16/24               Mobility       Pt will complete supine, seated and standing exercises to maintain/improve overall strength with minimal verbal cues.         Start:  07/02/24    Expected End:  07/16/24               PT Transfers       Pt will be able to complete all bed mobility tasks with use of bed HR mod I.        Start:  07/02/24    Expected End:  07/16/24            Pt will be able to complete all transfers with least restrictive device min A demonstrating good safety awareness and proper body mechanics.        Start:  07/02/24    Expected End:  07/16/24

## 2024-07-03 NOTE — PROGRESS NOTES
"Subjective  Patient sitting up in bed in NAD.  Continues to have moist productive cough.  Liver enzymes downtrending.  Likely elevated secondary to ischemia, acidosis, hypoxemia.  Plan to continue treatment for pneumonia, monitor LFTs daily.    Objective  Blood pressure 161/65, pulse 70, temperature 36.3 °C (97.3 °F), temperature source Temporal, resp. rate 24, height 1.61 m (5' 3.39\"), weight 139 kg (307 lb 1.6 oz), SpO2 96%.    Physical Exam  Constitutional: Alert, pleasant and interactive, in NAD  Eyes: PERRL, sclera clear, no conjunctival injection  Skin: Warm and dry, no rash or ecchymosis  ENMT: Mucous membranes moist, no lesions noted  Resp: Coarse rhonchi, even and unlabored, moist productive cough  CV: RRR, normal S1, S2, no m,r,g  GI: +BS, soft, round, NT, no rebound tenderness or guarding, no palpable masses or organomegaly  Extremities: Extremities warm, no edema, contusions, wounds or cyanosis  Neuro: Alert and oriented x3  Psych: Appropriate mood and behavior    Medications  Scheduled medications  amLODIPine, 5 mg, oral, Daily  aspirin, 81 mg, oral, Daily  doxycycline, 100 mg, oral, q12h YARELI  pantoprazole, 40 mg, oral, Daily   Or  esomeprazole, 40 mg, nasoduodenal tube, Daily   Or  pantoprazole, 40 mg, intravenous, Daily  heparin (porcine), 7,500 Units, subcutaneous, q8h YARELI  insulin glargine, 30 Units, subcutaneous, BID  insulin lispro, 0-10 Units, subcutaneous, q4h  ipratropium-albuteroL, 3 mL, nebulization, q6h  nystatin, 1 Application, Topical, BID  pancrelipase (Lip-Prot-Amyl), 1 capsule, oral, TID AC  piperacillin-tazobactam, 4.5 g, intravenous, q8h  polyethylene glycol, 17 g, oral, Daily  predniSONE, 40 mg, oral, Daily  sodium chloride (PF) 0.9%, , ,       Continuous medications     PRN medications  PRN medications: albuterol, calcium chloride, calcium chloride, dextrose, dextrose, glucagon, glucagon, hydrALAZINE, labetaloL, magnesium sulfate, magnesium sulfate, oxygen, oxygen, sodium chloride " (PF) 0.9%     Labs  Lab Results   Component Value Date    WBC 16.3 (H) 07/03/2024    HGB 9.6 (L) 07/03/2024    HCT 31.7 (L) 07/03/2024    MCV 86 07/03/2024     07/03/2024     Lab Results   Component Value Date    GLUCOSE 239 (H) 07/03/2024    CALCIUM 8.7 07/03/2024     (L) 07/03/2024    K 4.8 07/03/2024    CO2 31 07/03/2024    CL 96 (L) 07/03/2024    BUN 47 (H) 07/03/2024    CREATININE 1.54 (H) 07/03/2024     Lab Results   Component Value Date     (H) 07/03/2024     (H) 07/03/2024    ALKPHOS 124 07/03/2024    BILITOT 0.7 07/03/2024     Lab Results   Component Value Date    IRON 54 07/02/2024    TIBC 240 07/02/2024    FERRITIN 2,605 (H) 07/02/2024     Lab Results   Component Value Date    INR 1.4 (H) 07/01/2024    PROTIME 16.0 (H) 07/01/2024     Radiology  Abdominal US 7/2/2024 noting:  IMPRESSION:  Unable to identify the pancreas in this exam. Previous  cholecystectomy.      Hepatomegaly.  Nonspecific increased echogenicity throughout the  liver, most likely fatty infiltration. .      Mid to lower pole right renal cyst.      Trace right upper quadrant ascites.      Signed by: Elmer So 7/2/2024 9:06 AM  Dictation workstation:   [id number]     Assessment:  Kaylene Feliciano is a 69 y.o. female presenting with SOB.  PMH of COPD, CKD, obesity hypoventilation syndrome.  Recent admission 10 days ago for orthopnea for which she was stated treated with steroids, Sudafed.  Liver enzymes now noted to be elevated since previous admission.  ALT/ALT >1 K though downtrending significantly.  Bilirubin normal.  Acetaminophen level <10.  No acute findings on US.  Acute hepatitis panel nonreactive.     # transaminitis DILI v infection v ischemia- downtrending- suspect ischemia-downtrending  # leukocytosis  # CKD-downtrending  # acute on chronic hypercapnic respiratory failure-improved     Plan:  - continue supportive care  - diet as tolerated  - continue to trend liver enzymes daily  - avoid hepatotoxic  drugs  - follow up remaining liver work up     Plan has been discussed with Dr. Ugarte. GI will sign off.      Terri Cotton, APRN/CNP

## 2024-07-03 NOTE — PROGRESS NOTES
Occupational Therapy    OT Treatment    Patient Name: Kaylene Feliciano  MRN: [id number]  Today's Date: 7/3/2024  Time Calculation  Start Time: 0931  Stop Time: 1009  Time Calculation (min): 38 min       2111/2111-A    Assessment:  OT Assessment: Patient demonstrates decreased independence with self care, decreased independence with functional transfers, decreased endurance, decreased balance and decreased strength.  Patient will benefit from skilled OT to address deficits.  Anticipate patient will benefit from moderate intensity therapy post hospital sta  Prognosis: Good  Evaluation/Treatment Tolerance: Patient tolerated treatment well  End of Session Communication: Bedside nurse  End of Session Patient Position: Bed, 3 rail up, Alarm on  OT Assessment Results: Decreased ADL status, Decreased endurance, Decreased functional mobility  Prognosis: Good  Evaluation/Treatment Tolerance: Patient tolerated treatment well    Plan:  Treatment Interventions: ADL retraining, Functional transfer training  OT Frequency: 3 times per week  OT Discharge Recommendations: Moderate intensity level of continued care  Equipment Recommended upon Discharge: Wheeled walker, Wheelchair  Treatment Interventions: ADL retraining, Functional transfer training  Subjective     Outcome Measures:[facility] Daily Activity  Putting on and taking off regular lower body clothing: A lot  Bathing (including washing, rinsing, drying): A lot  Putting on and taking off regular upper body clothing: A little  Toileting, which includes using toilet, bedpan or urinal: A lot  Taking care of personal grooming such as brushing teeth: A little  Eating Meals: None  Daily Activity - Total Score: 16         07/03/24 0931   OT Last Visit   OT Received On 07/03/24   General   Reason for Referral impaired self care   Prior to Session Communication Bedside nurse   Patient Position Received Bed, 3 rail up;Alarm on   Preferred Learning Style verbal;visual   General Comment Patient  pleasant and cooperative.  Patient agreeable to therapy and cleared for participation   Precautions   Medical Precautions Fall precautions   Vital Signs   SpO2 96 %  (4L)   Pain Assessment   Pain Assessment 0-10   0-10 (Numeric) Pain Score 0 - No pain   Cognition   Orientation Level Oriented X4   Grooming   Grooming Level of Assistance Setup   Grooming Where Assessed Edge of bed   Grooming Comments washed face and brushed teeth   UE Bathing   UE Bathing Level of Assistance Setup   UE Bathing Where Assessed Edge of bed   UE Bathing Comments washed under arms   LE Bathing   LE Bathing Level of Assistance Maximum assistance   LE Bathing Where Assessed Edge of bed   UE Dressing   UE Dressing Level of Assistance Minimum assistance   UE Dressing Where Assessed Edge of bed   UE Dressing Comments changed gown   LE Dressing   LE Dressing   (max A to don/doff socks)   Toileting   Toileting Level of Assistance Dependent   Where Assessed Bed level   Toileting Comments pt using pure wick and soiled with BM. Pt stood alongside EOB and therapist completed posterior laura care.   Bed Mobility   Bed Mobility   (min A to get in and OOB, HOB elevated)   Transfers   Transfer   (STS at mod A x 2)   Dynamic Sitting Balance   Dynamic Sitting-Comments Pt completed seated scooting alongsdie EOB at min A   IP OT Assessment   OT Assessment Patient demonstrates decreased independence with self care, decreased independence with functional transfers, decreased endurance, decreased balance and decreased strength.  Patient will benefit from skilled OT to address deficits.  Anticipate patient will benefit from moderate intensity therapy post hospital sta   Prognosis Good   Evaluation/Treatment Tolerance Patient tolerated treatment well   End of Session Communication Bedside nurse   End of Session Patient Position Bed, 3 rail up;Alarm on   OT Assessment   OT Assessment Results Decreased ADL status;Decreased endurance;Decreased functional mobility    Education   Individual(s) Educated Patient   Education Provided Orthotics and/or prosthetic trainging   Patient Response to Education Patient/Caregiver Verbalized Understanding of Information   Education Comment Pt would benefit from continued reinforcement   Inpatient Plan   Treatment Interventions ADL retraining;Functional transfer training   OT Frequency 3 times per week   OT Discharge Recommendations Moderate intensity level of continued care   Equipment Recommended upon Discharge Wheeled walker;Wheelchair         Goals:  Encounter Problems       Encounter Problems (Active)       OT Goals       Patient will complete functional transfers with min A using larry stedy. (Progressing)       Start:  07/02/24    Expected End:  07/16/24            Patient will complete toileting with mod A. (Progressing)       Start:  07/02/24    Expected End:  07/16/24            Patient will complete grooming seated with set up. (Progressing)       Start:  07/02/24    Expected End:  07/16/24

## 2024-07-03 NOTE — PROGRESS NOTES
"Department of Medicine  Division of Pulmonary, Critical Care, and Sleep Medicine    Kaylene Feliciano is a 69 y.o. female with PMHx of ARCHIE (not on CPAP currently), Obesity hypoventilation syndrome with BMI 53.5, Diastolic CHF (last Echo 7/2023 showing EF 75% and PAH 40-50 mmHg), IDDM2, CKD Stage III, HTN, and HLD, on day 2 of admission. She initially presented with SOB, cough, and altered mental status and was subsequently admitted for acute on chronic hypoxic and hypercapnic respiratory failure likely 2/2 ARCHIE/OHS and PNA. Pulmonary team was consulted for BiPAP management.    Subjective   The patient was seen and examined at bedside this morning. The patient states she is doing well on 4L O2 NC. She says she is still coughing but can't bring anything up.     In terms of her ARCHIE, patient had 2 sleep studies done in 2006 and 2011 at [facility]. 1st sleep study showed mild ARCHIE with AHI 10.7 but \"may have been underestimated since pt did not sleep on her back and had no REM sleep during the study.\" During the study, they said that pt had a low baseline SpO2 87% when asleep and awake and though this was related to OHS. When she was first diagnosed with ARCHIE, she was initially treated with nocturnal oxygen and did not require any CPAP. Pt stated that she was on nocturnal oxygen for 1 year, and the therapy was discontinued since she improved. She also lost some weight at that time, which helped with her breathing. In 2011, when the patient had a vagal nerve stimulators placed for refractory depression, they were concerned that this may exacerbate her ARCHIE. So, another sleep study was done. However, I cannot find the results in Care Everywhere and any records that indicate she was given CPAP. She hasn't had any problems for many years till 3 weeks ago in which she was having shortness of breath and apneic episodes. She says she is normally not on oxygen supplementation at home.    Objective     Vital Signs      7/3/2024     " 1:00 AM 7/3/2024     1:15 AM 7/3/2024     4:45 AM 7/3/2024     6:00 AM 7/3/2024     8:53 AM 7/3/2024     9:30 AM 7/3/2024    12:00 PM   Vitals   Systolic   142  166 166 161   Diastolic   64  77 77 65   Heart Rate 62 68 58  66  70   Temp   36 °C (96.8 °F)  36.4 °C (97.5 °F)  36.3 °C (97.3 °F)   Resp 13 36 13  24  24   Weight (lb)   307.1 307.1      BMI   53.74 kg/m2 53.74 kg/m2      BSA (m2)   2.49 m2 2.49 m2         Physical Exam  Vitals and nursing note reviewed.   Constitutional:       General: No acute distress.     Appearance: Ill appearance.   HENT:      Head: Normocephalic and atraumatic.      Mouth/Throat:      Mouth: Clear oropharynx with dry mucous membranes.   Eyes:      Conjunctiva/sclera: Conjunctivae normal.   Cardiovascular:      Rate and Rhythm: Normal rate and regular rhythm.   Pulmonary:      Effort: Satting well on 4L O2 NC. No respiratory distress.      Breath sounds: Diminished breath sounds bilaterally. No stridor. No wheezing, rales, or rhonchi.   Musculoskeletal:         General: Normal range of motion.      Cervical back: Normal range of motion and neck supple.      Extremities: No bilateral calf tenderness or edema.  Skin:     General: Skin is warm and dry.      Coloration: Skin is not jaundiced.   Neurological:      General: No focal deficit present.      Mental Status: Alert and oriented to person, place, and time. Mental status is at baseline.   Psychiatric:         Mood and Affect: Mood normal.         Behavior: Behavior normal.         Judgment: Judgment normal.     Labs:  Lab Results   Component Value Date    WBC 16.3 (H) 07/03/2024    HGB 9.6 (L) 07/03/2024    HCT 31.7 (L) 07/03/2024    MCV 86 07/03/2024     07/03/2024      Lab Results   Component Value Date    GLUCOSE 239 (H) 07/03/2024    CALCIUM 8.7 07/03/2024     (L) 07/03/2024    K 4.8 07/03/2024    CO2 31 07/03/2024    CL 96 (L) 07/03/2024    BUN 47 (H) 07/03/2024    CREATININE 1.54 (H) 07/03/2024      Lab Results    Component Value Date     (H) 07/03/2024     (H) 07/03/2024    ALKPHOS 124 07/03/2024    BILITOT 0.7 07/03/2024        Oxygen Therapy  SpO2: 96 %  Medical Gas Therapy: Supplemental oxygen  O2 Delivery Method: Nasal cannula (4 lpml)  FiO2 (%):  [28 %-40 %] 36 %  S RR:  [12] 12    Intake/Output last 3 Shifts:  I/O last 3 completed shifts:  In: 1170 (8.4 mL/kg) [P.O.:720; I.V.:50 (0.4 mL/kg); IV Piggyback:400]  Out: 700 (5 mL/kg) [Urine:700 (0.1 mL/kg/hr)]  Weight: 139.3 kg     Medications   Scheduled medications  amLODIPine, 5 mg, oral, Daily  aspirin, 81 mg, oral, Daily  doxycycline, 100 mg, oral, q12h YARELI  pantoprazole, 40 mg, oral, Daily   Or  esomeprazole, 40 mg, nasoduodenal tube, Daily   Or  pantoprazole, 40 mg, intravenous, Daily  heparin (porcine), 7,500 Units, subcutaneous, q8h YARELI  insulin glargine, 30 Units, subcutaneous, BID  insulin lispro, 0-10 Units, subcutaneous, q4h  ipratropium-albuteroL, 3 mL, nebulization, q6h  nystatin, 1 Application, Topical, BID  pancrelipase (Lip-Prot-Amyl), 1 capsule, oral, TID AC  piperacillin-tazobactam, 4.5 g, intravenous, q8h  polyethylene glycol, 17 g, oral, Daily  predniSONE, 40 mg, oral, Daily  sodium chloride (PF) 0.9%, , ,       Continuous medications     PRN medications  PRN medications: albuterol, calcium chloride, calcium chloride, dextrose, dextrose, glucagon, glucagon, hydrALAZINE, labetaloL, magnesium sulfate, magnesium sulfate, oxygen, oxygen, sodium chloride (PF) 0.9%     Allergies  Sulfa (sulfonamide antibiotics), Belladonna, Ciprofloxacin, Adhesive tape-silicones, Iodinated contrast media, and Tegaderm ag mesh [silver]    Chest Radiograph   CT chest wo IV contrast 07/01/2024    Narrative  Interpreted By:  Alma Laguna,  STUDY:  CT CHEST WO IV CONTRAST;  7/1/2024 12:51 pm    INDICATION:  Signs/Symptoms:Respiratory failure, L lung consolidation vs effusion.    COMPARISON:  06/14/2024    ACCESSION NUMBER(S):  [id number]    ORDERING  CLINICIAN:  WILLARD MARTINEZ    TECHNIQUE:  Serial axial CT images of the chest obtained without intravenous  contrast. Sagittal and coronal reconstructions were generated.    FINDINGS:  Resolution is limited due to the patient's size.    VESSELS: There are atherosclerotic changes of the aorta. The main  pulmonary artery appears enlarged. The cava is unremarkable.    HEART:  The heart appears enlarged.    MEDIASTINUM AND ARAMIS: There are multiple slightly prominent  mediastinal lymph nodes.    LUNG, PLEURA, AND LARGE AIRWAYS: There is dense consolidation at the  left lung base. There is perhaps minimal left pleural fluid.    CHEST WALL AND LOWER NECK: There is a calcified thyroid nodule on the  left.    BONES: There are degenerative changes of the spine.    UPPER ABDOMEN: The patient is status post cholecystectomy.    COMPARISON TO THE PRIOR EXAM:  The chest similar.    Impression  Limited resolution.    Persistent dense consolidation at the left lung base. Follow-up to  resolution is suggested.    Heart appears somewhat enlarged.    Signed by: Alma Laguna 7/1/2024 1:11 PM  Dictation workstation:   [id number]       XR chest 1 view 07/01/2024    Narrative  Interpreted By:  Tanika Dean,  STUDY:  XR CHEST 1 VIEW;  7/1/2024 10:11 am    INDICATION:  Signs/Symptoms:Chest Pain.    COMPARISON:  06/20/2024    ACCESSION NUMBER(S):  [id number]    ORDERING CLINICIAN:  WILLARD MARTINEZ    FINDINGS:  CARDIOMEDIASTINAL SILHOUETTE:  The heart is enlarged      LUNGS:  There is extensive progressive consolidation at the left lung with  near complete opacification. There is a reticulonodular interstitial  pattern involving the right lung. 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    ABDOMEN:  No remarkable upper abdominal findings.      BONES:  No acute osseous changes.  There are neural stimulators extending along the right and left sides  of the neck superiorly.    Impression  1.  Enlarged heart.  2. Marked progression of disease with near-complete opacification of  the left hemithorax.  3. Reticulonodular interstitial pattern right lung.  4. 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    MACRO:  None    Signed by: Tanika Dean 7/1/2024 10:27 AM  Dictation workstation:   [id number]     Assessment and Plan / Recommendations   Assessment/Plan     This is a 70 y/o female with PMHx of ARCHIE (not on CPAP currently), Obesity hypoventilation syndrome with BMI 53.5, Diastolic CHF (last Echo 6/2024 showed diastolic dysfunction with normal EF of 55-60%), IDDM2, CKD Stage III, HTN, and HLD, who was admitted for acute on chronic hypoxic and hypercapnic respiratory failure likely 2/2 ARCHIE/OHS and PNA.     Plan:  - Continue with antibiotics regimen for PNA   - Continue with nocturnal BiPAP   - Work with  regarding getting the patient BiPAP prior to discharge  - Would like pt to follow up with sleep medicine in the next 2 weeks for sleep study    Luda Koehler, DO  Internal Medicine PGY-1    Date: 07/03/24 Time: 3:29 PM  Please excuse any misspellings or unintended errors related to the Dragon speech recognition software used to dictate this note.

## 2024-07-03 NOTE — PROGRESS NOTES
Kaylene Feliciano is a 69 y.o. female on day 2 of admission presenting with Acute on chronic respiratory failure with hypoxia and hypercapnia (Multi).      Subjective   Patient was seen and examined today in the morning at bedside.  Overnight she was placed on CPAP.  In the morning she reports that she feels much better than yesterday and she endorses intermittent cough and her breathing's feels much better.  She denies any cough, chest pain, palpitation or changes to her urinary habits or bowel habit.    Objective     Last Recorded Vitals  /65 (BP Location: Right arm, Patient Position: Lying)   Pulse 70   Temp 36.3 °C (97.3 °F) (Temporal)   Resp 24   Wt 139 kg (307 lb 1.6 oz)   SpO2 96%   Intake/Output last 3 Shifts:    Intake/Output Summary (Last 24 hours) at 7/3/2024 1613  Last data filed at 7/2/2024 1900  Gross per 24 hour   Intake 580 ml   Output --   Net 580 ml       Admission Weight  Weight: 136 kg (300 lb) (07/01/24 0926)    Daily Weight  07/03/24 : 139 kg (307 lb 1.6 oz)      Physical Exam:  General: Not in acute distress, A&O x 3, alert, cooperative, obese on 4 L NC  HEENT: Normocephalic, atraumatic, EOMI, moist mucous membranes, NC in place  Neck: Neck supple, trachea midline, no evidence of trauma  Cardiovascular: RRR, S1 and S2 appreciated, no murmurs rubs gallops appreciated, distal pulses 2+ bilaterally (radial and dorsalis pedis)  Respiratory: Decreased air entry on the left side with scattered wheezes or rhonchi.  With no increased work of breathing and she is on 4 L NC.  GI: Abdomen soft, nondistended, nontender to palpation, bowel sounds present  Extremities: No edema appreciated in lower extremities bilaterally, no cyanosis  Neuro: A&O X3, no focal deficits, strength and sensation intact bilaterally  Skin: Warm and dry, without lesions or rashes    Relevant Results  Scheduled medications  amLODIPine, 5 mg, oral, Daily  aspirin, 81 mg, oral, Daily  doxycycline, 100 mg, oral, q12h  YARELI  pantoprazole, 40 mg, oral, Daily   Or  esomeprazole, 40 mg, nasoduodenal tube, Daily   Or  pantoprazole, 40 mg, intravenous, Daily  heparin (porcine), 7,500 Units, subcutaneous, q8h YARELI  insulin glargine, 30 Units, subcutaneous, BID  insulin lispro, 0-10 Units, subcutaneous, q4h  ipratropium-albuteroL, 3 mL, nebulization, q6h  nystatin, 1 Application, Topical, BID  pancrelipase (Lip-Prot-Amyl), 1 capsule, oral, TID AC  piperacillin-tazobactam, 4.5 g, intravenous, q8h  polyethylene glycol, 17 g, oral, Daily  predniSONE, 40 mg, oral, Daily  sodium chloride (PF) 0.9%, , ,       Continuous medications     PRN medications  PRN medications: albuterol, calcium chloride, calcium chloride, dextrose, dextrose, glucagon, glucagon, hydrALAZINE, labetaloL, magnesium sulfate, magnesium sulfate, oxygen, oxygen, sodium chloride (PF) 0.9%     Results for orders placed or performed during the hospital encounter of 07/01/24 (from the past 24 hour(s))   POCT GLUCOSE   Result Value Ref Range    POCT Glucose 278 (H) 74 - 99 mg/dL   POCT GLUCOSE   Result Value Ref Range    POCT Glucose 314 (H) 74 - 99 mg/dL   POCT GLUCOSE   Result Value Ref Range    POCT Glucose 288 (H) 74 - 99 mg/dL   POCT GLUCOSE   Result Value Ref Range    POCT Glucose 196 (H) 74 - 99 mg/dL   CBC and Auto Differential   Result Value Ref Range    WBC 16.3 (H) 4.4 - 11.3 x10*3/uL    nRBC 0.2 (H) 0.0 - 0.0 /100 WBCs    RBC 3.70 (L) 4.00 - 5.20 x10*6/uL    Hemoglobin 9.6 (L) 12.0 - 16.0 g/dL    Hematocrit 31.7 (L) 36.0 - 46.0 %    MCV 86 80 - 100 fL    MCH 25.9 (L) 26.0 - 34.0 pg    MCHC 30.3 (L) 32.0 - 36.0 g/dL    RDW 15.2 (H) 11.5 - 14.5 %    Platelets 217 150 - 450 x10*3/uL    Neutrophils % 84.3 40.0 - 80.0 %    Immature Granulocytes %, Automated 1.0 (H) 0.0 - 0.9 %    Lymphocytes % 9.1 13.0 - 44.0 %    Monocytes % 5.4 2.0 - 10.0 %    Eosinophils % 0.1 0.0 - 6.0 %    Basophils % 0.1 0.0 - 2.0 %    Neutrophils Absolute 13.73 (H) 1.20 - 7.70 x10*3/uL    Immature  Granulocytes Absolute, Automated 0.17 0.00 - 0.70 x10*3/uL    Lymphocytes Absolute 1.49 1.20 - 4.80 x10*3/uL    Monocytes Absolute 0.88 0.10 - 1.00 x10*3/uL    Eosinophils Absolute 0.01 0.00 - 0.70 x10*3/uL    Basophils Absolute 0.01 0.00 - 0.10 x10*3/uL   Comprehensive Metabolic Panel   Result Value Ref Range    Glucose 239 (H) 74 - 99 mg/dL    Sodium 133 (L) 136 - 145 mmol/L    Potassium 4.8 3.5 - 5.3 mmol/L    Chloride 96 (L) 98 - 107 mmol/L    Bicarbonate 31 21 - 32 mmol/L    Anion Gap 11 10 - 20 mmol/L    Urea Nitrogen 47 (H) 6 - 23 mg/dL    Creatinine 1.54 (H) 0.50 - 1.05 mg/dL    eGFR 36 (L) >60 mL/min/1.73m*2    Calcium 8.7 8.6 - 10.3 mg/dL    Albumin 3.4 3.4 - 5.0 g/dL    Alkaline Phosphatase 124 33 - 136 U/L    Total Protein 6.1 (L) 6.4 - 8.2 g/dL     (H) 9 - 39 U/L    Bilirubin, Total 0.7 0.0 - 1.2 mg/dL     (H) 7 - 45 U/L   Magnesium   Result Value Ref Range    Magnesium 2.10 1.60 - 2.40 mg/dL   POCT GLUCOSE   Result Value Ref Range    POCT Glucose 232 (H) 74 - 99 mg/dL   POCT GLUCOSE   Result Value Ref Range    POCT Glucose 312 (H) 74 - 99 mg/dL             US abdomen limited liver    Result Date: 7/2/2024  Interpreted By:  Elmer So, STUDY: US ABDOMEN LIMITED LIVER  7/2/2024 8:32 am   INDICATION: 68 y/o   F with  Signs/Symptoms:RUQ pain..   COMPARISON: Correlation with CT scan chest from 07/01/2024.   ACCESSION NUMBER(S): [id number]   ORDERING CLINICIAN: JACKLYN REGALADO   TECHNIQUE: Portable ultrasound of the right upper quadrant was performed using grayscale imaging, color Doppler, and spectral Doppler.   FINDINGS: LIVER: Craniocaudal length: 22.5 cm. This is  abnormally enlarged Echogenicity:  Diffusely increased. Mass:  None   GALLBLADDER: Surgically absent.   BILE DUCTS: No intrahepatic biliary ductal dilatation. Common bile duct measured .39 cm in diameter. This is within the limits of normal.   PANCREAS: The pancreas was obscured by bowel gas..   RIGHT KIDNEY: Craniocaudal  length:  11.4 cm, Within normal limits of size for age. Echogenicity was normal. No hydronephrosis, shadowing stone, or perinephric edema. No gross right renal mass. There was a mid to lower pole simple cyst measuring 35 x 35 x 33 mm.   PERITONEAL FLUID: Trace right upper quadrant ascites.         Unable to identify the pancreas in this exam. Previous cholecystectomy.   Hepatomegaly.  Nonspecific increased echogenicity throughout the liver, most likely fatty infiltration. .   Mid to lower pole right renal cyst.   Trace right upper quadrant ascites.   Signed by: Elmer So 7/2/2024 9:06 AM Dictation workstation:   [id number]    ECG 12 lead    Result Date: 7/2/2024  Accelerated Junctional rhythm Left axis deviation Septal infarct , age undetermined ST & T wave abnormality, consider inferolateral ischemia Abnormal ECG When compared with ECG of 20-JUN-2024 15:59, (unconfirmed) Junctional rhythm has replaced Wide QRS rhythm    ECG 12 lead    Result Date: 7/1/2024  Marked sinus bradycardia with AV dissociation and Wide QRS rhythm Left axis deviation Nonspecific intraventricular block Lateral infarct , age undetermined Inferior infarct , age undetermined Abnormal ECG When compared with ECG of 20-JUN-2024 15:59, (unconfirmed) Sinus rhythm is now with AV dissociation    CT chest wo IV contrast    Result Date: 7/1/2024  Interpreted By:  Alma Laguna, STUDY: CT CHEST WO IV CONTRAST;  7/1/2024 12:51 pm   INDICATION: Signs/Symptoms:Respiratory failure, L lung consolidation vs effusion.   COMPARISON: 06/14/2024   ACCESSION NUMBER(S): [id number]   ORDERING CLINICIAN: WILLARD MARTINEZ   TECHNIQUE: Serial axial CT images of the chest obtained without intravenous contrast. Sagittal and coronal reconstructions were generated.   FINDINGS: Resolution is limited due to the patient's size.   VESSELS: There are atherosclerotic changes of the aorta. The main pulmonary artery appears enlarged. The cava is unremarkable.   HEART:  The  heart appears enlarged.   MEDIASTINUM AND ARAMIS: There are multiple slightly prominent mediastinal lymph nodes.   LUNG, PLEURA, AND LARGE AIRWAYS: There is dense consolidation at the left lung base. There is perhaps minimal left pleural fluid.   CHEST WALL AND LOWER NECK: There is a calcified thyroid nodule on the left.   BONES: There are degenerative changes of the spine.   UPPER ABDOMEN: The patient is status post cholecystectomy.   COMPARISON TO THE PRIOR EXAM: The chest similar.       Limited resolution.   Persistent dense consolidation at the left lung base. Follow-up to resolution is suggested.   Heart appears somewhat enlarged.   Signed by: Alma Laguna 7/1/2024 1:11 PM Dictation workstation:   [id number]    XR chest 1 view    Result Date: 7/1/2024  Interpreted By:  Tanika Dean, STUDY: XR CHEST 1 VIEW;  7/1/2024 10:11 am   INDICATION: Signs/Symptoms:Chest Pain.   COMPARISON: 06/20/2024   ACCESSION NUMBER(S): [id number]   ORDERING CLINICIAN: WILLARD MARTINEZ   FINDINGS: CARDIOMEDIASTINAL SILHOUETTE: The heart is enlarged     LUNGS: There is extensive progressive consolidation at the left lung with near complete opacification. There is a reticulonodular interstitial pattern involving the right lung. 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   ABDOMEN: No remarkable upper abdominal findings.     BONES: No acute osseous changes. There are neural stimulators extending along the right and left sides of the neck superiorly.       1. Enlarged heart. 2. Marked progression of disease with near-complete opacification of the left hemithorax. 3. Reticulonodular interstitial pattern right lung. 4. 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   MACRO: None   Signed by: Tanika Dean 7/1/2024 10:27 AM Dictation workstation:   [id number]    ECG 12 lead    Result Date: 6/21/2024  Wide QRS rhythm with Premature ventricular complexes or Fusion complexes  Nonspecific intraventricular block Abnormal ECG When compared with ECG of 20-JUN-2024 14:06, (unconfirmed) Wide QRS rhythm has replaced Sinus rhythm    XR chest 1 view    Result Date: 6/20/2024  Interpreted By:  Neil Connell, STUDY: XR CHEST 1 VIEW;  6/20/2024 1:55 pm   INDICATION: Signs/Symptoms:SOB after removing oxygen at facility, feels improved after reapplied.   COMPARISON: 06/14/2024   ACCESSION NUMBER(S): [id number]   ORDERING CLINICIAN: ANNE JUNIOR   FINDINGS: There is opacification of the left lower lung laterally probably from effusion with consolidated infiltrate. This appears slightly improved from the prior exam. This obscures the left cardiac margin, as well as been lower hilum. There is interstitial prominence of the right hemithorax. This is probably exacerbated with increased appearance compared to the prior exam from a shallower inspiration, but probably with superimposed interstitial edema. Left suprahilar opacity may be from superimposed vasculature and/or atelectasis.   ABDOMEN AND OTHER FINDINGS: No remarkable upper abdominal findings.   BONES: No acute osseous changes.       1.  Probable slight improvement of left mid and lower lung airspace disease, and mild congestion.   Signed by: Neil Connell 6/20/2024 2:36 PM Dictation workstation:   [id number]    ECG 12 lead    Result Date: 6/20/2024  Normal sinus rhythm Incomplete right bundle branch block ST elevation, consider lateral injury or acute infarct ** ** ACUTE MI ** ** Abnormal ECG When compared with ECG of 14-JUN-2024 17:45, (unconfirmed) Sinus rhythm has replaced Junctional rhythm Inverted T waves have replaced nonspecific T wave abnormality in Inferior leads T wave inversion no longer evident in Lateral leads    ECG 12 lead    Result Date: 6/18/2024  Accelerated Junctional rhythm Left axis deviation ST elevation, consider lateral injury or acute infarct ** ** ACUTE MI ** ** Abnormal ECG When compared with ECG of 27-MAR-2024 18:08,  Junctional rhythm has replaced Sinus rhythm Left anterior fascicular block is no longer Present ST more depressed in Inferior leads ST elevation now present in Lateral leads    Transthoracic Echo (TTE) Complete    Result Date: 6/17/2024            [facility] [address] [facility] [address]    Tel [phone] Fax [phone] TRANSTHORACIC ECHOCARDIOGRAM REPORT  Patient Name:      TEJINDER GONZALES          Reading Physician:    [id number] Jack Luna MD Study Date:        6/16/2024             Ordering Provider:    [id number] AMEENA JOHNSTON MRN/PID:           [id number]              Fellow: Accession#:        [id number]          Nurse:                She KOENIG Date of Birth/Age: 1955 / 69 years  Sonographer:          Marcella Person RDCS Gender:            F                     Additional Staff: Height:            160.00 cm             Admit Date: Weight:            137.89 kg             Admission Status:     Inpatient -                                                                Routine BSA / BMI:         2.31 m2 / 53.86 kg/m2 Department Location:  [facility] Blood Pressure: 156 /79 mmHg Study Type:    TRANSTHORACIC ECHO (TTE) COMPLETE Diagnosis/ICD: Hypoxemia-R09.02; Acute diastolic (congestive) heart failure                (CHF)-I50.31 Indication:    hypoxia; Acute diastolic CHF Patient History: Pertinent History: HTN, Hyperlipidemia and CHF. Study Detail: The following Echo studies were performed: 2D, M-Mode, Doppler and               color flow. Definity used as a contrast agent for endocardial               border definition. Total contrast used for this procedure was 2 mL               via IV push.  PHYSICIAN INTERPRETATION: Left Ventricle: Left ventricular systolic function is normal. There are no regional  wall motion abnormalities. The left ventricular cavity size is normal. There is mild concentric left ventricular hypertrophy. Spectral Doppler shows an impaired relaxation pattern of left ventricular diastolic filling. Left Atrium: The left atrium is mildly dilated. Right Ventricle: The right ventricle is normal in size. There is normal right ventricular global systolic function. Right Atrium: The right atrium is upper limits of normal in size. Aortic Valve: The aortic valve appears structurally normal. There is no evidence of aortic valve regurgitation. The peak instantaneous gradient of the aortic valve is 16.8 mmHg. The mean gradient of the aortic valve is 8.0 mmHg. Mitral Valve: The mitral valve is normal in structure. There is trace to mild mitral valve regurgitation. Tricuspid Valve: The tricuspid valve is structurally normal. There is trace tricuspid regurgitation. Pulmonic Valve: The pulmonic valve was not assessed. The pulmonic valve regurgitation was not assessed. Pericardium: There is no pericardial effusion noted. Aorta: The aortic root is normal.  CONCLUSIONS:  1. Left ventricular systolic function is normal.  2. Spectral Doppler shows an impaired relaxation pattern of left ventricular diastolic filling.  3. Mild LVH with normal EF of 55-60%.  4. Mildly increased pressure gradient LVOT and aortic valve, within acceptable limits at this time.  5. Diastolic dysfunction with mildly dilated atria. QUANTITATIVE DATA SUMMARY: 2D MEASUREMENTS:                          Normal Ranges: LAs:           4.10 cm   (2.7-4.0cm) IVSd:          1.50 cm   (0.6-1.1cm) LVPWd:         1.30 cm   (0.6-1.1cm) LVIDd:         3.80 cm   (3.9-5.9cm) LVIDs:         2.70 cm LV Mass Index: 84.0 g/m2 LV % FS        28.9 % LA VOLUME:                             Normal Ranges: LA Area A4C:     24.0 cm2 LA Area A2C:     21.0 cm2 LA Volume Index: 28.0 ml/m2 AORTA MEASUREMENTS:                    Normal Ranges: Asc Ao, d: 3.60 cm  (2.1-3.4cm) LV SYSTOLIC FUNCTION BY 2D PLANIMETRY (MOD):                     Normal Ranges: EF-A4C View: 52.9 % (>=55%) LV DIASTOLIC FUNCTION:                               Normal Ranges: MV Peak E:        1.38 m/s    (0.7-1.2 m/s) MV Peak A:        1.08 m/s    (0.42-0.7 m/s) E/A Ratio:        1.28        (1.0-2.2) MV e'             0.06 m/s    (>8.0) MV lateral e'     0.07 m/s MV medial e'      0.06 m/s E/e' Ratio:       21.23       (<8.0) PulmV Sys Rojelio:    72.30 cm/s PulmV Graham Rojelio:   58.80 cm/s PulmV S/D Rojelio:    1.20 PulmV A Revs Rojelio: 28.30 cm/s PulmV A Revs Dur: 169.00 msec MITRAL VALVE:                 Normal Ranges: MV DT: 363 msec (150-240msec) AORTIC VALVE:                                    Normal Ranges: AoV Vmax:                2.05 m/s  (<=1.7m/s) AoV Peak P.8 mmHg (<20mmHg) AoV Mean P.0 mmHg  (1.7-11.5mmHg) LVOT Max Rojelio:            1.42 m/s  (<=1.1m/s) AoV VTI:                 46.70 cm  (18-25cm) LVOT VTI:                39.20 cm LVOT Diameter:           2.50 cm   (1.8-2.4cm) AoV Area, VTI:           4.12 cm2  (2.5-5.5cm2) AoV Area,Vmax:           3.40 cm2  (2.5-4.5cm2) AoV Dimensionless Index: 0.84  RIGHT VENTRICLE: RV Basal 3.10 cm RV Mid   2.20 cm RV Major 7.3 cm TAPSE:   18.3 mm RV s'    0.13 m/s TRICUSPID VALVE/RVSP:                             Normal Ranges: Peak TR Velocity: 1.87 m/s RV Syst Pressure: 17.0 mmHg (< 30mmHg) Pulmonary Veins: PulmV A Revs Dur: 169.00 msec PulmV A Revs Rojelio: 28.30 cm/s PulmV Graham Rojelio:   58.80 cm/s PulmV S/D Rojelio:    1.20 PulmV Sys Rojelio:    72.30 cm/s  [id number] Jack Luna MD Electronically signed on 2024 at 7:49:29 AM  ** Final **     Electrocardiogram, 12-lead PRN ACS symptoms    Result Date: 6/15/2024  Accelerated Junctional rhythm Left axis deviation ST elevation, consider lateral injury or acute infarct ** ** ACUTE MI ** ** Abnormal ECG When compared with ECG of 2024 17:38, (unconfirmed) ST no longer elevated in Lateral  leads    CT angio chest for pulmonary embolism    Result Date: 6/15/2024  STUDY: CT Angiogram of the Chest; 6/14/2024 at 10:52 p.m. INDICATION: Elevated D-Dimer.  Hypoxia. COMPARISON: None Available. ACCESSION NUMBER(S): [id number] ORDERING CLINICIAN: MARIANELA HARDWICK TECHNIQUE:  CTA of the chest was performed with intravenous contrast. Images are reviewed and processed at a workstation according to the CT angiogram protocol with 3-D and/or MIP post processing imaging generated.  Omnipaque 350 60 mL was administered intravenously. Automated mA/kV exposure control was utilized and patient examination was performed in strict accordance with principles of ALARA. FINDINGS: Pulmonary arteries are adequately opacified without acute or chronic filling defects.  The thoracic aorta is normal in course and caliber without dissection or aneurysm. There is mild cardiomegaly without pericardial effusion.  Thoracic lymph nodes are not enlarged. Small left pleural effusion seen.  There is no pleural thickening or pneumothorax.  The airways are patent. There is some subsegmental atelectasis of the left lower lobe.  There is pulmonary vascular congestion.  There is mild peripheral groundglass opacity and perihilar ill-defined groundglass opacity. Upper abdomen demonstrates no acute pathology. There are no acute fractures.  No suspicious bony lesions.    1. No evidence of pulmonary embolism. 2. Cardiomegaly with pulmonary vascular congestion and small left pleural effusion. 3. Mild peripheral groundglass opacity and perihilar ill-defined groundglass opacity. Suggest correlation for pneumonia. Signed by Hao Esqueda MD    XR chest 1 view    Result Date: 6/14/2024  Interpreted By:  Nikolas Gonzalez, STUDY: XR CHEST 1 VIEW; 6/14/2024 4:35 pm   INDICATION: Signs/Symptoms:Shortness of breath, pulmonary edema?  Focal infiltrate?   COMPARISON: None.   ACCESSION NUMBER(S): [id number]   ORDERING CLINICIAN: MARIANELA HARDWICK   TECHNIQUE: Number  of films: 2 AP erect portable views were obtained   FINDINGS: There is limitation due to rotation, respiratory motion and overlying artifacts obscuring some detail.   The cardiac silhouette is enlarged, exaggerated by the technique. Bilateral interstitial and mild alveolar perihilar infiltrates are present. There are small effusions. No pneumothorax is seen. Degenerative changes involve the spine and shoulders. Two electronic devices are seen over the upper chest, with the wires directed superiorly in the neck.       Limited study. Cardiomegaly and congestive heart failure.     Signed by: Nikolas Gonzalez 6/14/2024 4:41 PM Dictation workstation:   [id number]     Assessment/Plan   Principal Problem:    Acute on chronic respiratory failure with hypoxia and hypercapnia (Multi)    This is 69-year-old female with PMH significant for asthma (on 4 L via nasal cannula at home) OA, ARCHIE (not on any therapy), femur fracture, depression (s/p DBS), HTN, IDDM 2, asthma, CKD 3, HLD, diastolic CHF (last echo 7/2023, EF 75%, pulmonary artery hypertension 40-50 mmHg).  Patient presented to [facility] emergency department from nursing facility on 7/1/2024 with shortness of breath, hypoxic with altered mental status.  Patient arrived to the emergency department on 15 L via Oxymizer.  In the emergency department patient had been vitally stable, labs are significant for potassium of 7.5 (hemolyzed), sodium 132, BUN 24, creatinine 2.49, alk phos 163, ALT 1132, AST 1367, bilirubin 1.5, BNP for 56, troponin 64, WBC 19.4, initial ABG 7.28/64/133/30.1 with repeat 7.27/66/78/30.3 after being on BiPAP for approximately 6 hours in duration.  Imaging at that time was significant for marked progression of disease with near complete opacification of left hemithorax, reticulonodular pattern in the right lung, findings consistent with mixed alveolar interstitial pneumonia.  CT imaging at that time showed persistent dense consolidation at the left lung  base.  She was treated with albuterol nebulizer hyperkalemia cocktail, doxycycline and Zosyn as well as methylprednisolone.  At this time patient was admitted to the ICU for significant acidosis and acute hypoxemic respiratory on chronic hypoxemic hypercapnic failure on continuous BiPAP.  While in the ICU, patient was subsequently weaned down to 3 L via nasal cannula today.  GI was consulted for elevated LFTs, and recommended continue supportive care diet as tolerated and continue to trend liver enzymes while avoiding hepatotoxic drugs.     AHRF on chronic hypoxemic hypercapnic respiratory failure 2/2 interstitial pneumonia with possible HAP  Sepsis  2/2 pneumonia with endorgan dysfunction consistent with elevated troponin, elevated transaminases,   Leukocytosis downtrending  Diastolic CHF with PAH    Patient presented to [facility] with chief complaints of hypoxia and altered mental status. Over the past month, she had multiple ED visits and was recently admitted for CHF exacerbation or undiagnosed COPD. Her CXR revealed significant disease progression, with nearly complete opacification of the left hemithorax and a reticulonodular pattern in the right lung, consistent with mixed alveolar-interstitial pneumonia. CT imaging at that time showed persistent dense consolidation at the left lung base. Her last echocardiogram in July 2023 demonstrated an ejection fraction of 75% and pulmonary artery hypertension with pressures ranging from 40 to 50 mmHg.  WBC downtrending from 19> 17>16.3.     Plan   -Incentive spirometry  -Telemetry  -Continue supplemental oxygen, wean as seen fit  -Continue NIPPV nightly and as needed  -Pulmonology consult placed for obtaining NIPPV outpatient on discharge  -DC Solu-Medrol 40 mg and order prednisone 40 mg tablets daily for total course of 5 days.  -Continue Zosyn to cover for HAP and switch IV doxycycline to oral to cover atypical. 7/3   -Continue scheduled DuoNebs as well as as  needed  -Chest physiotherapy  -Lab sent for opportunistic lung infections, pneumonia lab, pending results  -PT OT    CRISTOFER on CKD3 improving  - Downtrending creatinine today 2.04 >2.21> 1.54.    - Etiology most likely prerenal versus renal  - Continue to monitor with RFP  - Avoid nephrotoxic agent  - Renal adjustment of medications.     Transaminitis  -Transaminases today ALT//792 from 1132/1361  -LFTs more than 20 of the upper limit with suggest an etiology of viral versus drug-induced versus ischemic versus autoimmune.  -Liver ultrasound: Unable to identify the pancreas, previous cholecystectomy, hepatomegaly, fatty infiltration, trace RUQ ascites  -Autoimmune panel for prior biliary cholangitis and autoimmune hepatitis ordered, pending result  -GI consulted, appreciate recommendation.     IDDM 2  -Continue sliding scale  -Hypoglycemia protocol in place  -Starting home dose Lantus at reduced dose 30 units at bedtime  -Accu-Cheks    Bipolar disorder  Anxiety  HTN  HLD  Depression (s/p DBS)  -Continue home ASA 81, nystatin, pancrelipase  -Home meds not ordered: Prednisone 10 mg, lisinopril 40 mg, metoprolol succinate 25 mg, atorvastatin 10 mg, citalopram 40 mg, gabapentin 100 mg, insulin glargine 55 units twice daily, amlodipine 5 mg     IVF: None at this time  DVT prophylaxis: Heparin  Diet: Carb controlled  Dispo: Anticipate additional 2-3 hospital stay for resolution of symptoms, tapering of antibiotics, and placement  Consults: GI, pulmonology     CODE STATUS: Full code    Assessment and plan discussed with my attending.   Vazquez Pereira MD   Internal Medicine, PGY-1 .

## 2024-07-03 NOTE — NURSING NOTE
Visited with pt. She did not seem SOB at rest with O2 per cannula. She will be going to a rehab when she is discharge then returning home with her . She tells  me she is feeling better. Sepsis home going instructions reviewed.

## 2024-07-04 LAB
ALBUMIN SERPL BCP-MCNC: 3.3 G/DL (ref 3.4–5)
ALP SERPL-CCNC: 105 U/L (ref 33–136)
ALT SERPL W P-5'-P-CCNC: 421 U/L (ref 7–45)
ANION GAP SERPL CALC-SCNC: 14 MMOL/L (ref 10–20)
AST SERPL W P-5'-P-CCNC: 66 U/L (ref 9–39)
BILIRUB SERPL-MCNC: 0.7 MG/DL (ref 0–1.2)
BUN SERPL-MCNC: 41 MG/DL (ref 6–23)
CALCIUM SERPL-MCNC: 8.8 MG/DL (ref 8.6–10.3)
CHLORIDE SERPL-SCNC: 96 MMOL/L (ref 98–107)
CO2 SERPL-SCNC: 29 MMOL/L (ref 21–32)
CREAT SERPL-MCNC: 1.28 MG/DL (ref 0.5–1.05)
EGFRCR SERPLBLD CKD-EPI 2021: 45 ML/MIN/1.73M*2
ERYTHROCYTE [DISTWIDTH] IN BLOOD BY AUTOMATED COUNT: 15.5 % (ref 11.5–14.5)
GLUCOSE BLD MANUAL STRIP-MCNC: 228 MG/DL (ref 74–99)
GLUCOSE BLD MANUAL STRIP-MCNC: 273 MG/DL (ref 74–99)
GLUCOSE BLD MANUAL STRIP-MCNC: 281 MG/DL (ref 74–99)
GLUCOSE BLD MANUAL STRIP-MCNC: 286 MG/DL (ref 74–99)
GLUCOSE BLD MANUAL STRIP-MCNC: 325 MG/DL (ref 74–99)
GLUCOSE BLD MANUAL STRIP-MCNC: 395 MG/DL (ref 74–99)
GLUCOSE SERPL-MCNC: 274 MG/DL (ref 74–99)
HCT VFR BLD AUTO: 30.7 % (ref 36–46)
HGB BLD-MCNC: 9.4 G/DL (ref 12–16)
MAGNESIUM SERPL-MCNC: 1.98 MG/DL (ref 1.6–2.4)
MCH RBC QN AUTO: 26.3 PG (ref 26–34)
MCHC RBC AUTO-ENTMCNC: 30.6 G/DL (ref 32–36)
MCV RBC AUTO: 86 FL (ref 80–100)
NRBC BLD-RTO: 0 /100 WBCS (ref 0–0)
PLATELET # BLD AUTO: 214 X10*3/UL (ref 150–450)
POTASSIUM SERPL-SCNC: 4.5 MMOL/L (ref 3.5–5.3)
PROT SERPL-MCNC: 5.8 G/DL (ref 6.4–8.2)
RBC # BLD AUTO: 3.58 X10*6/UL (ref 4–5.2)
SODIUM SERPL-SCNC: 134 MMOL/L (ref 136–145)
WBC # BLD AUTO: 12.3 X10*3/UL (ref 4.4–11.3)

## 2024-07-04 PROCEDURE — 83735 ASSAY OF MAGNESIUM: CPT

## 2024-07-04 PROCEDURE — 2500000005 HC RX 250 GENERAL PHARMACY W/O HCPCS: Performed by: STUDENT IN AN ORGANIZED HEALTH CARE EDUCATION/TRAINING PROGRAM

## 2024-07-04 PROCEDURE — 2500000002 HC RX 250 W HCPCS SELF ADMINISTERED DRUGS (ALT 637 FOR MEDICARE OP, ALT 636 FOR OP/ED)

## 2024-07-04 PROCEDURE — 82947 ASSAY GLUCOSE BLOOD QUANT: CPT

## 2024-07-04 PROCEDURE — 2500000001 HC RX 250 WO HCPCS SELF ADMINISTERED DRUGS (ALT 637 FOR MEDICARE OP)

## 2024-07-04 PROCEDURE — 99233 SBSQ HOSP IP/OBS HIGH 50: CPT

## 2024-07-04 PROCEDURE — 2500000004 HC RX 250 GENERAL PHARMACY W/ HCPCS (ALT 636 FOR OP/ED)

## 2024-07-04 PROCEDURE — 94640 AIRWAY INHALATION TREATMENT: CPT

## 2024-07-04 PROCEDURE — 36415 COLL VENOUS BLD VENIPUNCTURE: CPT

## 2024-07-04 PROCEDURE — 85027 COMPLETE CBC AUTOMATED: CPT

## 2024-07-04 PROCEDURE — 94660 CPAP INITIATION&MGMT: CPT

## 2024-07-04 PROCEDURE — 2500000002 HC RX 250 W HCPCS SELF ADMINISTERED DRUGS (ALT 637 FOR MEDICARE OP, ALT 636 FOR OP/ED): Performed by: STUDENT IN AN ORGANIZED HEALTH CARE EDUCATION/TRAINING PROGRAM

## 2024-07-04 PROCEDURE — 84075 ASSAY ALKALINE PHOSPHATASE: CPT

## 2024-07-04 PROCEDURE — 76937 US GUIDE VASCULAR ACCESS: CPT

## 2024-07-04 PROCEDURE — 2060000001 HC INTERMEDIATE ICU ROOM DAILY

## 2024-07-04 RX ORDER — AMOXICILLIN AND CLAVULANATE POTASSIUM 875; 125 MG/1; MG/1
1 TABLET, FILM COATED ORAL ONCE
Status: COMPLETED | OUTPATIENT
Start: 2024-07-04 | End: 2024-07-04

## 2024-07-04 RX ORDER — IPRATROPIUM BROMIDE AND ALBUTEROL SULFATE 2.5; .5 MG/3ML; MG/3ML
3 SOLUTION RESPIRATORY (INHALATION) 3 TIMES DAILY
Status: DISCONTINUED | OUTPATIENT
Start: 2024-07-04 | End: 2024-07-08 | Stop reason: HOSPADM

## 2024-07-04 RX ORDER — AMOXICILLIN AND CLAVULANATE POTASSIUM 875; 125 MG/1; MG/1
1 TABLET, FILM COATED ORAL EVERY 12 HOURS SCHEDULED
Status: COMPLETED | OUTPATIENT
Start: 2024-07-04 | End: 2024-07-05

## 2024-07-04 RX ORDER — INSULIN GLARGINE 100 [IU]/ML
45 INJECTION, SOLUTION SUBCUTANEOUS 2 TIMES DAILY
Status: DISCONTINUED | OUTPATIENT
Start: 2024-07-04 | End: 2024-07-08 | Stop reason: HOSPADM

## 2024-07-04 ASSESSMENT — COGNITIVE AND FUNCTIONAL STATUS - GENERAL
MOBILITY SCORE: 11
MOBILITY SCORE: 13
TURNING FROM BACK TO SIDE WHILE IN FLAT BAD: A LITTLE
HELP NEEDED FOR BATHING: A LOT
STANDING UP FROM CHAIR USING ARMS: A LOT
CLIMB 3 TO 5 STEPS WITH RAILING: TOTAL
DRESSING REGULAR UPPER BODY CLOTHING: A LITTLE
WALKING IN HOSPITAL ROOM: TOTAL
DRESSING REGULAR LOWER BODY CLOTHING: A LOT
TURNING FROM BACK TO SIDE WHILE IN FLAT BAD: A LOT
DRESSING REGULAR UPPER BODY CLOTHING: A LITTLE
DAILY ACTIVITIY SCORE: 16
DRESSING REGULAR LOWER BODY CLOTHING: A LOT
WALKING IN HOSPITAL ROOM: A LOT
STANDING UP FROM CHAIR USING ARMS: A LOT
DAILY ACTIVITIY SCORE: 16
MOVING FROM LYING ON BACK TO SITTING ON SIDE OF FLAT BED WITH BEDRAILS: A LITTLE
MOVING TO AND FROM BED TO CHAIR: A LOT
PERSONAL GROOMING: A LITTLE
MOVING TO AND FROM BED TO CHAIR: A LOT
CLIMB 3 TO 5 STEPS WITH RAILING: TOTAL
HELP NEEDED FOR BATHING: A LOT
TOILETING: A LOT
MOVING FROM LYING ON BACK TO SITTING ON SIDE OF FLAT BED WITH BEDRAILS: A LITTLE
PERSONAL GROOMING: A LITTLE
TOILETING: A LOT

## 2024-07-04 ASSESSMENT — PAIN - FUNCTIONAL ASSESSMENT
PAIN_FUNCTIONAL_ASSESSMENT: 0-10

## 2024-07-04 ASSESSMENT — PAIN SCALES - GENERAL
PAINLEVEL_OUTOF10: 0 - NO PAIN

## 2024-07-04 NOTE — CARE PLAN
Problem: Skin  Goal: Participates in plan/prevention/treatment measures  Outcome: Progressing  Flowsheets (Taken 7/4/2024 1818)  Participates in plan/prevention/treatment measures: Elevate heels     Problem: Skin  Goal: Prevent/manage excess moisture  7/4/2024 1819 by Ramila Joshi RN  Flowsheets (Taken 7/4/2024 1819)  Prevent/manage excess moisture: Cleanse incontinence/protect with barrier cream  7/4/2024 1818 by Ramila Joshi RN  Outcome: Progressing     Problem: Skin  Goal: Prevent/minimize sheer/friction injuries  7/4/2024 1819 by Ramila Joshi RN  Flowsheets (Taken 7/4/2024 1819)  Prevent/minimize sheer/friction injuries: Use pull sheet  7/4/2024 1818 by Ramila Joshi RN  Outcome: Progressing     Problem: Diabetes  Goal: Maintain glucose levels >70mg/dl to <250mg/dl throughout shift  Outcome: Not Progressing  Flowsheets (Taken 7/4/2024 1820)  Maintain glucose levels >70mg/dl to <250mg/dl throughout shift: Med administration/monitoring of effect   The patient's goals for the shift include      The clinical goals for the shift include Patient will have Spo2 92% or greater through out this shift    Patient continued on 4l nc during the day and bipap at night. SP02 remains above 92%. No respiratory distress noted.    Problem: Diabetes  Goal: Maintain glucose levels >70mg/dl to <250mg/dl throughout shift  Outcome: Not Progressing  Flowsheets (Taken 7/4/2024 1820)  Maintain glucose levels >70mg/dl to <250mg/dl throughout shift: Med administration/monitoring of effect

## 2024-07-04 NOTE — PROGRESS NOTES
Kaylene Feliicano is a 69 y.o. female on day 3 of admission presenting with Acute on chronic respiratory failure with hypoxia and hypercapnia (Multi).      Subjective   Patient was seen and examined today in the morning at bedside.  Overnight she was placed on CPAP.  In the morning she reports that she feels much better than yesterday and she endorses intermittent cough and her breathing's feels much better.  Family was at bedside, further discussion regarding discharge planning.    Objective     Last Recorded Vitals  /62 (BP Location: Left arm, Patient Position: Lying)   Pulse 70   Temp 36 °C (96.8 °F) (Temporal)   Resp 22   Wt 138 kg (304 lb 6.4 oz)   SpO2 97%   Intake/Output last 3 Shifts:    Intake/Output Summary (Last 24 hours) at 7/4/2024 1424  Last data filed at 7/4/2024 1330  Gross per 24 hour   Intake 440 ml   Output 2099 ml   Net -1659 ml       Admission Weight  Weight: 136 kg (300 lb) (07/01/24 0926)    Daily Weight  07/04/24 : 138 kg (304 lb 6.4 oz)      Physical Exam:  General: Not in acute distress, A&O x 3, alert, cooperative, obese on 4 L NC  HEENT: Normocephalic, atraumatic, EOMI, moist mucous membranes, NC in place  Neck: Neck supple, trachea midline, no evidence of trauma  Cardiovascular: RRR, S1 and S2 appreciated, no murmurs rubs gallops appreciated, distal pulses 2+ bilaterally (radial and dorsalis pedis)  Respiratory: Decreased air entry on the left side with scattered wheezes or rhonchi.  With no increased work of breathing and she is on 4 L NC.  GI: Abdomen soft, nondistended, nontender to palpation, bowel sounds present  Extremities: No edema appreciated in lower extremities bilaterally, no cyanosis  Neuro: A&O X3, no focal deficits, strength and sensation intact bilaterally  Skin: Warm and dry, without lesions or rashes    Relevant Results  Scheduled medications  amLODIPine, 5 mg, oral, Daily  amoxicillin-pot clavulanate, 1 tablet, oral, q12h YARELI  aspirin, 81 mg, oral,  Daily  gabapentin, 300 mg, oral, BID  heparin (porcine), 7,500 Units, subcutaneous, q8h YARELI  insulin glargine, 45 Units, subcutaneous, BID  insulin lispro, 0-10 Units, subcutaneous, q4h  ipratropium-albuteroL, 3 mL, nebulization, q6h  nystatin, 1 Application, Topical, BID  pancrelipase (Lip-Prot-Amyl), 1 capsule, oral, TID AC  pantoprazole, 40 mg, oral, Daily  polyethylene glycol, 17 g, oral, Daily  predniSONE, 40 mg, oral, Daily      Continuous medications     PRN medications  PRN medications: albuterol, dextrose, dextrose, glucagon, oxygen, oxygen     Results for orders placed or performed during the hospital encounter of 07/01/24 (from the past 24 hour(s))   POCT GLUCOSE   Result Value Ref Range    POCT Glucose 355 (H) 74 - 99 mg/dL   POCT GLUCOSE   Result Value Ref Range    POCT Glucose 379 (H) 74 - 99 mg/dL   POCT GLUCOSE   Result Value Ref Range    POCT Glucose 395 (H) 74 - 99 mg/dL   Magnesium   Result Value Ref Range    Magnesium 1.98 1.60 - 2.40 mg/dL   CBC   Result Value Ref Range    WBC 12.3 (H) 4.4 - 11.3 x10*3/uL    nRBC 0.0 0.0 - 0.0 /100 WBCs    RBC 3.58 (L) 4.00 - 5.20 x10*6/uL    Hemoglobin 9.4 (L) 12.0 - 16.0 g/dL    Hematocrit 30.7 (L) 36.0 - 46.0 %    MCV 86 80 - 100 fL    MCH 26.3 26.0 - 34.0 pg    MCHC 30.6 (L) 32.0 - 36.0 g/dL    RDW 15.5 (H) 11.5 - 14.5 %    Platelets 214 150 - 450 x10*3/uL   Comprehensive Metabolic Panel   Result Value Ref Range    Glucose 274 (H) 74 - 99 mg/dL    Sodium 134 (L) 136 - 145 mmol/L    Potassium 4.5 3.5 - 5.3 mmol/L    Chloride 96 (L) 98 - 107 mmol/L    Bicarbonate 29 21 - 32 mmol/L    Anion Gap 14 10 - 20 mmol/L    Urea Nitrogen 41 (H) 6 - 23 mg/dL    Creatinine 1.28 (H) 0.50 - 1.05 mg/dL    eGFR 45 (L) >60 mL/min/1.73m*2    Calcium 8.8 8.6 - 10.3 mg/dL    Albumin 3.3 (L) 3.4 - 5.0 g/dL    Alkaline Phosphatase 105 33 - 136 U/L    Total Protein 5.8 (L) 6.4 - 8.2 g/dL    AST 66 (H) 9 - 39 U/L    Bilirubin, Total 0.7 0.0 - 1.2 mg/dL     (H) 7 - 45 U/L    POCT GLUCOSE   Result Value Ref Range    POCT Glucose 273 (H) 74 - 99 mg/dL   POCT GLUCOSE   Result Value Ref Range    POCT Glucose 228 (H) 74 - 99 mg/dL   POCT GLUCOSE   Result Value Ref Range    POCT Glucose 281 (H) 74 - 99 mg/dL             US abdomen limited liver    Result Date: 7/2/2024  Interpreted By:  Elmer So, STUDY: US ABDOMEN LIMITED LIVER  7/2/2024 8:32 am   INDICATION: 70 y/o   F with  Signs/Symptoms:RUQ pain..   COMPARISON: Correlation with CT scan chest from 07/01/2024.   ACCESSION NUMBER(S): [id number]   ORDERING CLINICIAN: JACKLYN REGALADO   TECHNIQUE: Portable ultrasound of the right upper quadrant was performed using grayscale imaging, color Doppler, and spectral Doppler.   FINDINGS: LIVER: Craniocaudal length: 22.5 cm. This is  abnormally enlarged Echogenicity:  Diffusely increased. Mass:  None   GALLBLADDER: Surgically absent.   BILE DUCTS: No intrahepatic biliary ductal dilatation. Common bile duct measured .39 cm in diameter. This is within the limits of normal.   PANCREAS: The pancreas was obscured by bowel gas..   RIGHT KIDNEY: Craniocaudal length:  11.4 cm, Within normal limits of size for age. Echogenicity was normal. No hydronephrosis, shadowing stone, or perinephric edema. No gross right renal mass. There was a mid to lower pole simple cyst measuring 35 x 35 x 33 mm.   PERITONEAL FLUID: Trace right upper quadrant ascites.         Unable to identify the pancreas in this exam. Previous cholecystectomy.   Hepatomegaly.  Nonspecific increased echogenicity throughout the liver, most likely fatty infiltration. .   Mid to lower pole right renal cyst.   Trace right upper quadrant ascites.   Signed by: Elmer So 7/2/2024 9:06 AM Dictation workstation:   [id number]    ECG 12 lead    Result Date: 7/2/2024  Accelerated Junctional rhythm Left axis deviation Septal infarct , age undetermined ST & T wave abnormality, consider inferolateral ischemia Abnormal ECG When compared with ECG of  20-JUN-2024 15:59, (unconfirmed) Junctional rhythm has replaced Wide QRS rhythm    ECG 12 lead    Result Date: 7/1/2024  Marked sinus bradycardia with AV dissociation and Wide QRS rhythm Left axis deviation Nonspecific intraventricular block Lateral infarct , age undetermined Inferior infarct , age undetermined Abnormal ECG When compared with ECG of 20-JUN-2024 15:59, (unconfirmed) Sinus rhythm is now with AV dissociation    CT chest wo IV contrast    Result Date: 7/1/2024  Interpreted By:  Alma Laguna, STUDY: CT CHEST WO IV CONTRAST;  7/1/2024 12:51 pm   INDICATION: Signs/Symptoms:Respiratory failure, L lung consolidation vs effusion.   COMPARISON: 06/14/2024   ACCESSION NUMBER(S): [id number]   ORDERING CLINICIAN: WILLARD MARTINEZ   TECHNIQUE: Serial axial CT images of the chest obtained without intravenous contrast. Sagittal and coronal reconstructions were generated.   FINDINGS: Resolution is limited due to the patient's size.   VESSELS: There are atherosclerotic changes of the aorta. The main pulmonary artery appears enlarged. The cava is unremarkable.   HEART:  The heart appears enlarged.   MEDIASTINUM AND ARAMIS: There are multiple slightly prominent mediastinal lymph nodes.   LUNG, PLEURA, AND LARGE AIRWAYS: There is dense consolidation at the left lung base. There is perhaps minimal left pleural fluid.   CHEST WALL AND LOWER NECK: There is a calcified thyroid nodule on the left.   BONES: There are degenerative changes of the spine.   UPPER ABDOMEN: The patient is status post cholecystectomy.   COMPARISON TO THE PRIOR EXAM: The chest similar.       Limited resolution.   Persistent dense consolidation at the left lung base. Follow-up to resolution is suggested.   Heart appears somewhat enlarged.   Signed by: Alma Laguna 7/1/2024 1:11 PM Dictation workstation:   [id number]    XR chest 1 view    Result Date: 7/1/2024  Interpreted By:  Tanika Dean, STUDY: XR CHEST 1 VIEW;  7/1/2024 10:11 am   INDICATION:  Signs/Symptoms:Chest Pain.   COMPARISON: 06/20/2024   ACCESSION NUMBER(S): [id number]   ORDERING CLINICIAN: WILLARD MARTINEZ   FINDINGS: CARDIOMEDIASTINAL SILHOUETTE: The heart is enlarged     LUNGS: There is extensive progressive consolidation at the left lung with near complete opacification. There is a reticulonodular interstitial pattern involving the right lung. 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   ABDOMEN: No remarkable upper abdominal findings.     BONES: No acute osseous changes. There are neural stimulators extending along the right and left sides of the neck superiorly.       1. Enlarged heart. 2. Marked progression of disease with near-complete opacification of the left hemithorax. 3. Reticulonodular interstitial pattern right lung. 4. 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   MACRO: None   Signed by: Tainka Dean 7/1/2024 10:27 AM Dictation workstation:   [id number]    ECG 12 lead    Result Date: 6/21/2024  Wide QRS rhythm with Premature ventricular complexes or Fusion complexes Nonspecific intraventricular block Abnormal ECG When compared with ECG of 20-JUN-2024 14:06, (unconfirmed) Wide QRS rhythm has replaced Sinus rhythm    XR chest 1 view    Result Date: 6/20/2024  Interpreted By:  Neil Connell, STUDY: XR CHEST 1 VIEW;  6/20/2024 1:55 pm   INDICATION: Signs/Symptoms:SOB after removing oxygen at facility, feels improved after reapplied.   COMPARISON: 06/14/2024   ACCESSION NUMBER(S): [id number]   ORDERING CLINICIAN: ANNE JUNIOR   FINDINGS: There is opacification of the left lower lung laterally probably from effusion with consolidated infiltrate. This appears slightly improved from the prior exam. This obscures the left cardiac margin, as well as been lower hilum. There is interstitial prominence of the right hemithorax. This is probably exacerbated with increased appearance compared to the prior exam from a  shallower inspiration, but probably with superimposed interstitial edema. Left suprahilar opacity may be from superimposed vasculature and/or atelectasis.   ABDOMEN AND OTHER FINDINGS: No remarkable upper abdominal findings.   BONES: No acute osseous changes.       1.  Probable slight improvement of left mid and lower lung airspace disease, and mild congestion.   Signed by: Neil Connell 6/20/2024 2:36 PM Dictation workstation:   [id number]    ECG 12 lead    Result Date: 6/20/2024  Normal sinus rhythm Incomplete right bundle branch block ST elevation, consider lateral injury or acute infarct ** ** ACUTE MI ** ** Abnormal ECG When compared with ECG of 14-JUN-2024 17:45, (unconfirmed) Sinus rhythm has replaced Junctional rhythm Inverted T waves have replaced nonspecific T wave abnormality in Inferior leads T wave inversion no longer evident in Lateral leads    ECG 12 lead    Result Date: 6/18/2024  Accelerated Junctional rhythm Left axis deviation ST elevation, consider lateral injury or acute infarct ** ** ACUTE MI ** ** Abnormal ECG When compared with ECG of 27-MAR-2024 18:08, Junctional rhythm has replaced Sinus rhythm Left anterior fascicular block is no longer Present ST more depressed in Inferior leads ST elevation now present in Lateral leads    Transthoracic Echo (TTE) Complete    Result Date: 6/17/2024            [facility] [address] [address]    Tel [phone] Fax [phone] TRANSTHORACIC ECHOCARDIOGRAM REPORT  Patient Name:      TEJINDER Aguillon Physician:    [id number] Jack Luna MD Study Date:        6/16/2024             Ordering Provider:    [id number] AMEENA JOHNSTON MRN/PID:           [id number]              Fellow: Accession#:        [id number]          Nurse:                She KOENIG Date of Birth/Age: 1955 / 69 years   Sonographer:          Marcella Person                                                                WANG Gender:            F                     Additional Staff: Height:            160.00 cm             Admit Date: Weight:            137.89 kg             Admission Status:     Inpatient -                                                                Routine BSA / BMI:         2.31 m2 / 53.86 kg/m2 Department Location:  [facility] CCU Blood Pressure: 156 /79 mmHg Study Type:    TRANSTHORACIC ECHO (TTE) COMPLETE Diagnosis/ICD: Hypoxemia-R09.02; Acute diastolic (congestive) heart failure                (CHF)-I50.31 Indication:    hypoxia; Acute diastolic CHF Patient History: Pertinent History: HTN, Hyperlipidemia and CHF. Study Detail: The following Echo studies were performed: 2D, M-Mode, Doppler and               color flow. Definity used as a contrast agent for endocardial               border definition. Total contrast used for this procedure was 2 mL               via IV push.  PHYSICIAN INTERPRETATION: Left Ventricle: Left ventricular systolic function is normal. There are no regional wall motion abnormalities. The left ventricular cavity size is normal. There is mild concentric left ventricular hypertrophy. Spectral Doppler shows an impaired relaxation pattern of left ventricular diastolic filling. Left Atrium: The left atrium is mildly dilated. Right Ventricle: The right ventricle is normal in size. There is normal right ventricular global systolic function. Right Atrium: The right atrium is upper limits of normal in size. Aortic Valve: The aortic valve appears structurally normal. There is no evidence of aortic valve regurgitation. The peak instantaneous gradient of the aortic valve is 16.8 mmHg. The mean gradient of the aortic valve is 8.0 mmHg. Mitral Valve: The mitral valve is normal in structure. There is trace to mild mitral valve regurgitation. Tricuspid Valve: The tricuspid valve is structurally normal.  There is trace tricuspid regurgitation. Pulmonic Valve: The pulmonic valve was not assessed. The pulmonic valve regurgitation was not assessed. Pericardium: There is no pericardial effusion noted. Aorta: The aortic root is normal.  CONCLUSIONS:  1. Left ventricular systolic function is normal.  2. Spectral Doppler shows an impaired relaxation pattern of left ventricular diastolic filling.  3. Mild LVH with normal EF of 55-60%.  4. Mildly increased pressure gradient LVOT and aortic valve, within acceptable limits at this time.  5. Diastolic dysfunction with mildly dilated atria. QUANTITATIVE DATA SUMMARY: 2D MEASUREMENTS:                          Normal Ranges: LAs:           4.10 cm   (2.7-4.0cm) IVSd:          1.50 cm   (0.6-1.1cm) LVPWd:         1.30 cm   (0.6-1.1cm) LVIDd:         3.80 cm   (3.9-5.9cm) LVIDs:         2.70 cm LV Mass Index: 84.0 g/m2 LV % FS        28.9 % LA VOLUME:                             Normal Ranges: LA Area A4C:     24.0 cm2 LA Area A2C:     21.0 cm2 LA Volume Index: 28.0 ml/m2 AORTA MEASUREMENTS:                    Normal Ranges: Asc Ao, d: 3.60 cm (2.1-3.4cm) LV SYSTOLIC FUNCTION BY 2D PLANIMETRY (MOD):                     Normal Ranges: EF-A4C View: 52.9 % (>=55%) LV DIASTOLIC FUNCTION:                               Normal Ranges: MV Peak E:        1.38 m/s    (0.7-1.2 m/s) MV Peak A:        1.08 m/s    (0.42-0.7 m/s) E/A Ratio:        1.28        (1.0-2.2) MV e'             0.06 m/s    (>8.0) MV lateral e'     0.07 m/s MV medial e'      0.06 m/s E/e' Ratio:       21.23       (<8.0) PulmV Sys Rojelio:    72.30 cm/s PulmV Graham Rojelio:   58.80 cm/s PulmV S/D Rojelio:    1.20 PulmV A Revs Rojelio: 28.30 cm/s PulmV A Revs Dur: 169.00 msec MITRAL VALVE:                 Normal Ranges: MV DT: 363 msec (150-240msec) AORTIC VALVE:                                    Normal Ranges: AoV Vmax:                2.05 m/s  (<=1.7m/s) AoV Peak P.8 mmHg (<20mmHg) AoV Mean P.0 mmHg   (1.7-11.5mmHg) LVOT Max Rojelio:            1.42 m/s  (<=1.1m/s) AoV VTI:                 46.70 cm  (18-25cm) LVOT VTI:                39.20 cm LVOT Diameter:           2.50 cm   (1.8-2.4cm) AoV Area, VTI:           4.12 cm2  (2.5-5.5cm2) AoV Area,Vmax:           3.40 cm2  (2.5-4.5cm2) AoV Dimensionless Index: 0.84  RIGHT VENTRICLE: RV Basal 3.10 cm RV Mid   2.20 cm RV Major 7.3 cm TAPSE:   18.3 mm RV s'    0.13 m/s TRICUSPID VALVE/RVSP:                             Normal Ranges: Peak TR Velocity: 1.87 m/s RV Syst Pressure: 17.0 mmHg (< 30mmHg) Pulmonary Veins: PulmV A Revs Dur: 169.00 msec PulmV A Revs Rojelio: 28.30 cm/s PulmV Graham Rojelio:   58.80 cm/s PulmV S/D Rojelio:    1.20 PulmV Sys Rojelio:    72.30 cm/s  [id number] Jack Luna MD Electronically signed on 6/17/2024 at 7:49:29 AM  ** Final **     Electrocardiogram, 12-lead PRN ACS symptoms    Result Date: 6/15/2024  Accelerated Junctional rhythm Left axis deviation ST elevation, consider lateral injury or acute infarct ** ** ACUTE MI ** ** Abnormal ECG When compared with ECG of 14-JUN-2024 17:38, (unconfirmed) ST no longer elevated in Lateral leads    CT angio chest for pulmonary embolism    Result Date: 6/15/2024  STUDY: CT Angiogram of the Chest; 6/14/2024 at 10:52 p.m. INDICATION: Elevated D-Dimer.  Hypoxia. COMPARISON: None Available. ACCESSION NUMBER(S): [id number] ORDERING CLINICIAN: MARIANELA HARDWICK TECHNIQUE:  CTA of the chest was performed with intravenous contrast. Images are reviewed and processed at a workstation according to the CT angiogram protocol with 3-D and/or MIP post processing imaging generated.  Omnipaque 350 60 mL was administered intravenously. Automated mA/kV exposure control was utilized and patient examination was performed in strict accordance with principles of ALARA. FINDINGS: Pulmonary arteries are adequately opacified without acute or chronic filling defects.  The thoracic aorta is normal in course and caliber without dissection or aneurysm. There is  mild cardiomegaly without pericardial effusion.  Thoracic lymph nodes are not enlarged. Small left pleural effusion seen.  There is no pleural thickening or pneumothorax.  The airways are patent. There is some subsegmental atelectasis of the left lower lobe.  There is pulmonary vascular congestion.  There is mild peripheral groundglass opacity and perihilar ill-defined groundglass opacity. Upper abdomen demonstrates no acute pathology. There are no acute fractures.  No suspicious bony lesions.    1. No evidence of pulmonary embolism. 2. Cardiomegaly with pulmonary vascular congestion and small left pleural effusion. 3. Mild peripheral groundglass opacity and perihilar ill-defined groundglass opacity. Suggest correlation for pneumonia. Signed by Hao Esqueda MD    XR chest 1 view    Result Date: 6/14/2024  Interpreted By:  Nikolas Gonzalez, STUDY: XR CHEST 1 VIEW; 6/14/2024 4:35 pm   INDICATION: Signs/Symptoms:Shortness of breath, pulmonary edema?  Focal infiltrate?   COMPARISON: None.   ACCESSION NUMBER(S): [id number]   ORDERING CLINICIAN: MARIANELA HARDWICK   TECHNIQUE: Number of films: 2 AP erect portable views were obtained   FINDINGS: There is limitation due to rotation, respiratory motion and overlying artifacts obscuring some detail.   The cardiac silhouette is enlarged, exaggerated by the technique. Bilateral interstitial and mild alveolar perihilar infiltrates are present. There are small effusions. No pneumothorax is seen. Degenerative changes involve the spine and shoulders. Two electronic devices are seen over the upper chest, with the wires directed superiorly in the neck.       Limited study. Cardiomegaly and congestive heart failure.     Signed by: Nikolas Gonzalez 6/14/2024 4:41 PM Dictation workstation:   [id number]     Assessment/Plan   Principal Problem:    Acute on chronic respiratory failure with hypoxia and hypercapnia (Multi)    This is 69-year-old female with PMH significant for asthma (on 4 L  via nasal cannula at home) OA, ARCHIE (not on any therapy), femur fracture, depression (s/p DBS), HTN, IDDM 2, asthma, CKD 3, HLD, diastolic CHF (last echo 7/2023, EF 75%, pulmonary artery hypertension 40-50 mmHg).  Patient presented to [facility] emergency department from nursing facility on 7/1/2024 with shortness of breath, hypoxic with altered mental status.  Patient arrived to the emergency department on 15 L via Oxymizer.  In the emergency department patient had been vitally stable, labs are significant for potassium of 7.5 (hemolyzed), sodium 132, BUN 24, creatinine 2.49, alk phos 163, ALT 1132, AST 1367, bilirubin 1.5, BNP for 56, troponin 64, WBC 19.4, initial ABG 7.28/64/133/30.1 with repeat 7.27/66/78/30.3 after being on BiPAP for approximately 6 hours in duration.  Imaging at that time was significant for marked progression of disease with near complete opacification of left hemithorax, reticulonodular pattern in the right lung, findings consistent with mixed alveolar interstitial pneumonia.  CT imaging at that time showed persistent dense consolidation at the left lung base.  She was treated with albuterol nebulizer hyperkalemia cocktail, doxycycline and Zosyn as well as methylprednisolone.  At this time patient was admitted to the ICU for significant acidosis and acute hypoxemic respiratory on chronic hypoxemic hypercapnic failure on continuous BiPAP.  While in the ICU, patient was subsequently weaned down to 3 L via nasal cannula today.  GI was consulted for elevated LFTs, and recommended continue supportive care diet as tolerated and continue to trend liver enzymes while avoiding hepatotoxic drugs.     AHRF on chronic hypoxemic hypercapnic respiratory failure 2/2 interstitial pneumonia with possible HAP  Sepsis  2/2 pneumonia with endorgan dysfunction consistent with elevated troponin, elevated transaminases,   Leukocytosis downtrending  Diastolic CHF with PAH    Patient presented to [facility] with chief  complaints of hypoxia and altered mental status. Over the past month, she had multiple ED visits and was recently admitted for CHF exacerbation or undiagnosed COPD. Her CXR revealed significant disease progression, with nearly complete opacification of the left hemithorax and a reticulonodular pattern in the right lung, consistent with mixed alveolar-interstitial pneumonia. CT imaging at that time showed persistent dense consolidation at the left lung base. Her last echocardiogram in July 2023 demonstrated an ejection fraction of 75% and pulmonary artery hypertension with pressures ranging from 40 to 50 mmHg.  WBC downtrending from 19> 17>16.3.     Plan   -Incentive spirometry  -Telemetry  -Continue supplemental oxygen, wean as seen fit  -Continue NIPPV nightly and as needed  -Pulmonology consult placed for obtaining NIPPV outpatient on discharge.  No further recommendation.  -DC Solu-Medrol 40 mg and order prednisone 40 mg tablets daily for total course of 5 days.  -Discontinue Zosyn to cover for HAP and switching to Augmentin 7/4.  Discontinue I oral doxycycline for negative pneumonia lab.  -Continue scheduled DuoNebs as well as as needed  -Chest physiotherapy  -Lab sent for opportunistic lung infections, pneumonia lab, pending results  -PT OT    CRISTOFER on CKD3 improving  - Downtrending creatinine today 2.04 >2.21> 1.54>1.28  - Etiology most likely prerenal versus renal  - Continue to monitor with RFP  - Avoid nephrotoxic agent  - Renal adjustment of medications.     Transaminitis downtrending  -, AST 66, normal bilirubin.  -LFTs more than 20 of the upper limit with suggest an etiology of viral versus drug-induced versus ischemic versus autoimmune.  -Liver ultrasound: Unable to identify the pancreas, previous cholecystectomy, hepatomegaly, fatty infiltration, trace RUQ ascites  -Autoimmune panel for prior biliary cholangitis and autoimmune hepatitis ordered, pending result  -GI consulted, appreciate  recommendation.     IDDM 2  -Blood sugar average of 300.  Received 36 units of sliding scale  -Continue sliding scale #2  -Increase Lantus to 45 twice daily from 30.  Home dose 55 twice daily  -Hypoglycemia protocol in place  -Accu-Cheks    Bipolar disorder  Anxiety  HTN  HLD  Depression (s/p DBS)  -Continue home ASA 81, nystatin, pancrelipase  -Resume home dose gabapentin  -Home meds not ordered: Prednisone 10 mg, lisinopril 40 mg, metoprolol succinate 25 mg, atorvastatin 10 mg, citalopram 40 mg, gabapentin 100 mg, insulin glargine 55 units twice daily, amlodipine 5 mg     IVF: None at this time  DVT prophylaxis: Heparin  Diet: Carb controlled  Dispo: Anticipate additional 1-2 midnight further recommendation from pulmonology and BiPAP settings.  Consults: GI, pulmonology     CODE STATUS: Full code    Assessment and plan discussed with my attending.   Vazquez Pereira MD   Internal Medicine, PGY-2 .

## 2024-07-05 ENCOUNTER — APPOINTMENT (OUTPATIENT)
Dept: CARDIOLOGY | Facility: HOSPITAL | Age: 69
DRG: 871 | End: 2024-07-05
Payer: MEDICARE

## 2024-07-05 LAB
ALBUMIN SERPL BCP-MCNC: 3.1 G/DL (ref 3.4–5)
ALP SERPL-CCNC: 93 U/L (ref 33–136)
ALT SERPL W P-5'-P-CCNC: 286 U/L (ref 7–45)
ANION GAP SERPL CALC-SCNC: 9 MMOL/L (ref 10–20)
AST SERPL W P-5'-P-CCNC: 31 U/L (ref 9–39)
ATRIAL RATE: 75 BPM
BACTERIA BLD CULT: NORMAL
BACTERIA BLD CULT: NORMAL
BILIRUB SERPL-MCNC: 0.7 MG/DL (ref 0–1.2)
BUN SERPL-MCNC: 29 MG/DL (ref 6–23)
CALCIUM SERPL-MCNC: 8.7 MG/DL (ref 8.6–10.3)
CHLORIDE SERPL-SCNC: 98 MMOL/L (ref 98–107)
CO2 SERPL-SCNC: 33 MMOL/L (ref 21–32)
CREAT SERPL-MCNC: 0.97 MG/DL (ref 0.5–1.05)
EGFRCR SERPLBLD CKD-EPI 2021: 63 ML/MIN/1.73M*2
ERYTHROCYTE [DISTWIDTH] IN BLOOD BY AUTOMATED COUNT: 15.7 % (ref 11.5–14.5)
GLUCOSE BLD MANUAL STRIP-MCNC: 121 MG/DL (ref 74–99)
GLUCOSE BLD MANUAL STRIP-MCNC: 135 MG/DL (ref 74–99)
GLUCOSE BLD MANUAL STRIP-MCNC: 204 MG/DL (ref 74–99)
GLUCOSE BLD MANUAL STRIP-MCNC: 221 MG/DL (ref 74–99)
GLUCOSE BLD MANUAL STRIP-MCNC: 243 MG/DL (ref 74–99)
GLUCOSE BLD MANUAL STRIP-MCNC: 97 MG/DL (ref 74–99)
GLUCOSE SERPL-MCNC: 141 MG/DL (ref 74–99)
HCT VFR BLD AUTO: 31.8 % (ref 36–46)
HGB BLD-MCNC: 9.9 G/DL (ref 12–16)
MAGNESIUM SERPL-MCNC: 1.88 MG/DL (ref 1.6–2.4)
MCH RBC QN AUTO: 26.6 PG (ref 26–34)
MCHC RBC AUTO-ENTMCNC: 31.1 G/DL (ref 32–36)
MCV RBC AUTO: 86 FL (ref 80–100)
NRBC BLD-RTO: 0 /100 WBCS (ref 0–0)
P AXIS: 63 DEGREES
P OFFSET: 139 MS
P ONSET: 119 MS
PLATELET # BLD AUTO: 197 X10*3/UL (ref 150–450)
POTASSIUM SERPL-SCNC: 4.3 MMOL/L (ref 3.5–5.3)
PR INTERVAL: 170 MS
PROT SERPL-MCNC: 5.5 G/DL (ref 6.4–8.2)
Q ONSET: 204 MS
QRS COUNT: 12 BEATS
QRS DURATION: 112 MS
QT INTERVAL: 388 MS
QTC CALCULATION(BAZETT): 433 MS
QTC FREDERICIA: 417 MS
R AXIS: -63 DEGREES
RBC # BLD AUTO: 3.72 X10*6/UL (ref 4–5.2)
SODIUM SERPL-SCNC: 136 MMOL/L (ref 136–145)
T AXIS: 81 DEGREES
T OFFSET: 398 MS
VENTRICULAR RATE: 75 BPM
WBC # BLD AUTO: 8.4 X10*3/UL (ref 4.4–11.3)

## 2024-07-05 PROCEDURE — 2500000001 HC RX 250 WO HCPCS SELF ADMINISTERED DRUGS (ALT 637 FOR MEDICARE OP)

## 2024-07-05 PROCEDURE — 2500000004 HC RX 250 GENERAL PHARMACY W/ HCPCS (ALT 636 FOR OP/ED)

## 2024-07-05 PROCEDURE — 2500000005 HC RX 250 GENERAL PHARMACY W/O HCPCS: Performed by: STUDENT IN AN ORGANIZED HEALTH CARE EDUCATION/TRAINING PROGRAM

## 2024-07-05 PROCEDURE — 85027 COMPLETE CBC AUTOMATED: CPT

## 2024-07-05 PROCEDURE — 83735 ASSAY OF MAGNESIUM: CPT

## 2024-07-05 PROCEDURE — 82947 ASSAY GLUCOSE BLOOD QUANT: CPT

## 2024-07-05 PROCEDURE — 99233 SBSQ HOSP IP/OBS HIGH 50: CPT

## 2024-07-05 PROCEDURE — 2500000002 HC RX 250 W HCPCS SELF ADMINISTERED DRUGS (ALT 637 FOR MEDICARE OP, ALT 636 FOR OP/ED)

## 2024-07-05 PROCEDURE — 2500000002 HC RX 250 W HCPCS SELF ADMINISTERED DRUGS (ALT 637 FOR MEDICARE OP, ALT 636 FOR OP/ED): Performed by: STUDENT IN AN ORGANIZED HEALTH CARE EDUCATION/TRAINING PROGRAM

## 2024-07-05 PROCEDURE — 2060000001 HC INTERMEDIATE ICU ROOM DAILY

## 2024-07-05 PROCEDURE — 93005 ELECTROCARDIOGRAM TRACING: CPT

## 2024-07-05 PROCEDURE — 94640 AIRWAY INHALATION TREATMENT: CPT

## 2024-07-05 PROCEDURE — 94660 CPAP INITIATION&MGMT: CPT

## 2024-07-05 PROCEDURE — 36415 COLL VENOUS BLD VENIPUNCTURE: CPT

## 2024-07-05 PROCEDURE — 80053 COMPREHEN METABOLIC PANEL: CPT

## 2024-07-05 RX ORDER — LISINOPRIL 40 MG/1
40 TABLET ORAL DAILY
Status: DISCONTINUED | OUTPATIENT
Start: 2024-07-05 | End: 2024-07-08 | Stop reason: HOSPADM

## 2024-07-05 RX ORDER — CITALOPRAM 20 MG/1
40 TABLET, FILM COATED ORAL DAILY
Status: DISCONTINUED | OUTPATIENT
Start: 2024-07-05 | End: 2024-07-08 | Stop reason: HOSPADM

## 2024-07-05 RX ORDER — METOPROLOL SUCCINATE 25 MG/1
25 TABLET, EXTENDED RELEASE ORAL DAILY
Status: DISCONTINUED | OUTPATIENT
Start: 2024-07-05 | End: 2024-07-08 | Stop reason: HOSPADM

## 2024-07-05 ASSESSMENT — COGNITIVE AND FUNCTIONAL STATUS - GENERAL
MOVING TO AND FROM BED TO CHAIR: A LOT
TOILETING: A LOT
DRESSING REGULAR UPPER BODY CLOTHING: A LOT
DRESSING REGULAR LOWER BODY CLOTHING: A LOT
HELP NEEDED FOR BATHING: A LOT
PERSONAL GROOMING: A LOT
MOBILITY SCORE: 13
MOVING FROM LYING ON BACK TO SITTING ON SIDE OF FLAT BED WITH BEDRAILS: A LITTLE
STANDING UP FROM CHAIR USING ARMS: A LOT
CLIMB 3 TO 5 STEPS WITH RAILING: TOTAL
WALKING IN HOSPITAL ROOM: A LOT
TURNING FROM BACK TO SIDE WHILE IN FLAT BAD: A LITTLE
DAILY ACTIVITIY SCORE: 14

## 2024-07-05 ASSESSMENT — PAIN - FUNCTIONAL ASSESSMENT
PAIN_FUNCTIONAL_ASSESSMENT: 0-10

## 2024-07-05 ASSESSMENT — PAIN SCALES - GENERAL
PAINLEVEL_OUTOF10: 0 - NO PAIN

## 2024-07-05 NOTE — HOSPITAL COURSE
This is 69-year-old female with PMH significant for asthma (on 4 L via nasal cannula at home) OA, ARCHIE (not on any therapy), femur fracture, depression (s/p DBS), HTN, IDDM 2, asthma, CKD 3, HLD, diastolic CHF (last echo 7/2023, EF 75%, pulmonary artery hypertension 40-50 mmHg).  She presented to [facility] ED from nursing facility on 7/1/2024 with shortness of breath, hypoxic with altered mental status.  Patient arrived to the emergency department on 15 L via Oxymizer.  In the emergency department patient had been vitally stable, labs are significant for potassium of 7.5 (hemolyzed) with increase in LFTs.  Her ABG was remarkable for respiratory acidosis for which she was placed on BiPAP but she continued to be acidotic.  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  CT imaging at that time showed persistent dense consolidation at the left lung base.  She was treated with albuterol nebulizer hyperkalemia cocktail, doxycycline and Zosyn as well as methylprednisolone.  She was admitted to ICU for continuous need of BiPAP.  While in the ICU, patient was subsequently weaned down to 3 L via nasal cannula today.  GI was consulted for elevated LFTs, and recommended continue supportive care diet as tolerated and continue to trend liver enzymes while avoiding hepatotoxic drugs.  She was successfully downgraded to primary medical team.  She continued to require BiPAP and she completed IV antibiotic plus oral Augmentin for community-acquired pneumonia.  Pulmonology consult was added to obtain BiPAP settings approval process for BiPAP at home.  Her BiPAP settings are 16/8, 40% FiO2, goal oxygen saturations greater than 90%.  She will continue to wear BiPAP at night which maintain her saturations above 90%.  She will continue her her normal 4 L  nasal cannula during the day.  Patient continued to improve clinically and was deemed medically stable for discharge. Patient was discharged in stable condition, with plans to follow-up with the PCP and sleep medicine clinics for further care.

## 2024-07-05 NOTE — PROGRESS NOTES
Kaylene Feliciano is a 69 y.o. female on day 4 of admission presenting with Acute on chronic respiratory failure with hypoxia and hypercapnia (Multi).      Subjective   Patient was seen and examined today in the morning at bedside.  Overnight she was placed on CPAP.  In the morning she reports that she feels much better than yesterday and she endorses intermittent cough and her breathing's feels much better.      Objective     Last Recorded Vitals  /80 (BP Location: Left arm, Patient Position: Lying)   Pulse 64   Temp 36 °C (96.8 °F) (Temporal)   Resp 19   Wt 137 kg (302 lb 7.5 oz)   SpO2 97%   Intake/Output last 3 Shifts:    Intake/Output Summary (Last 24 hours) at 7/5/2024 1449  Last data filed at 7/4/2024 2100  Gross per 24 hour   Intake 470 ml   Output 500 ml   Net -30 ml       Admission Weight  Weight: 136 kg (300 lb) (07/01/24 0926)    Daily Weight  07/05/24 : 137 kg (302 lb 7.5 oz)      Physical Exam:  General: Not in acute distress, A&O x 3, alert, cooperative, obese on 4 L NC  HEENT: Normocephalic, atraumatic, EOMI, moist mucous membranes, NC in place  Neck: Neck supple, trachea midline, no evidence of trauma  Cardiovascular: RRR, S1 and S2 appreciated, no murmurs rubs gallops appreciated, distal pulses 2+ bilaterally (radial and dorsalis pedis)  Respiratory: Decreased air entry on the left side with scattered wheezes or rhonchi.  With no increased work of breathing and she is on 4 L NC.  GI: Abdomen soft, nondistended, nontender to palpation, bowel sounds present  Extremities: No edema appreciated in lower extremities bilaterally, no cyanosis  Neuro: A&O X3, no focal deficits, strength and sensation intact bilaterally  Skin: Warm and dry, without lesions or rashes    Relevant Results  Scheduled medications  amLODIPine, 5 mg, oral, Daily  amoxicillin-pot clavulanate, 1 tablet, oral, q12h YARELI  aspirin, 81 mg, oral, Daily  [Held by provider] citalopram, 40 mg, oral, Daily  gabapentin, 300 mg, oral,  BID  heparin (porcine), 7,500 Units, subcutaneous, q8h YARELI  insulin glargine, 45 Units, subcutaneous, BID  insulin lispro, 0-10 Units, subcutaneous, q4h  ipratropium-albuteroL, 3 mL, nebulization, TID  lisinopril, 40 mg, oral, Daily  metoprolol succinate XL, 25 mg, oral, Daily  nystatin, 1 Application, Topical, BID  pancrelipase (Lip-Prot-Amyl), 1 capsule, oral, TID AC  pantoprazole, 40 mg, oral, Daily  polyethylene glycol, 17 g, oral, Daily      Continuous medications     PRN medications  PRN medications: albuterol, dextrose, dextrose, glucagon, oxygen, oxygen     Results for orders placed or performed during the hospital encounter of 07/01/24 (from the past 24 hour(s))   POCT GLUCOSE   Result Value Ref Range    POCT Glucose 286 (H) 74 - 99 mg/dL   POCT GLUCOSE   Result Value Ref Range    POCT Glucose 325 (H) 74 - 99 mg/dL   POCT GLUCOSE   Result Value Ref Range    POCT Glucose 204 (H) 74 - 99 mg/dL   POCT GLUCOSE   Result Value Ref Range    POCT Glucose 135 (H) 74 - 99 mg/dL   Magnesium   Result Value Ref Range    Magnesium 1.88 1.60 - 2.40 mg/dL   CBC   Result Value Ref Range    WBC 8.4 4.4 - 11.3 x10*3/uL    nRBC 0.0 0.0 - 0.0 /100 WBCs    RBC 3.72 (L) 4.00 - 5.20 x10*6/uL    Hemoglobin 9.9 (L) 12.0 - 16.0 g/dL    Hematocrit 31.8 (L) 36.0 - 46.0 %    MCV 86 80 - 100 fL    MCH 26.6 26.0 - 34.0 pg    MCHC 31.1 (L) 32.0 - 36.0 g/dL    RDW 15.7 (H) 11.5 - 14.5 %    Platelets 197 150 - 450 x10*3/uL   Comprehensive Metabolic Panel   Result Value Ref Range    Glucose 141 (H) 74 - 99 mg/dL    Sodium 136 136 - 145 mmol/L    Potassium 4.3 3.5 - 5.3 mmol/L    Chloride 98 98 - 107 mmol/L    Bicarbonate 33 (H) 21 - 32 mmol/L    Anion Gap 9 (L) 10 - 20 mmol/L    Urea Nitrogen 29 (H) 6 - 23 mg/dL    Creatinine 0.97 0.50 - 1.05 mg/dL    eGFR 63 >60 mL/min/1.73m*2    Calcium 8.7 8.6 - 10.3 mg/dL    Albumin 3.1 (L) 3.4 - 5.0 g/dL    Alkaline Phosphatase 93 33 - 136 U/L    Total Protein 5.5 (L) 6.4 - 8.2 g/dL    AST 31 9 - 39 U/L     Bilirubin, Total 0.7 0.0 - 1.2 mg/dL     (H) 7 - 45 U/L   POCT GLUCOSE   Result Value Ref Range    POCT Glucose 97 74 - 99 mg/dL   ECG 12 Lead   Result Value Ref Range    Ventricular Rate 75 BPM    Atrial Rate 75 BPM    GA Interval 170 ms    QRS Duration 112 ms    QT Interval 388 ms    QTC Calculation(Bazett) 433 ms    P Axis 63 degrees    R Axis -63 degrees    T Axis 81 degrees    QRS Count 12 beats    Q Onset 204 ms    P Onset 119 ms    P Offset 139 ms    T Offset 398 ms    QTC Fredericia 417 ms   POCT GLUCOSE   Result Value Ref Range    POCT Glucose 121 (H) 74 - 99 mg/dL             US abdomen limited liver    Result Date: 7/2/2024  Interpreted By:  Elmer So, STUDY: US ABDOMEN LIMITED LIVER  7/2/2024 8:32 am   INDICATION: 68 y/o   F with  Signs/Symptoms:RUQ pain..   COMPARISON: Correlation with CT scan chest from 07/01/2024.   ACCESSION NUMBER(S): [id number]   ORDERING CLINICIAN: JACKLYN REGALADO   TECHNIQUE: Portable ultrasound of the right upper quadrant was performed using grayscale imaging, color Doppler, and spectral Doppler.   FINDINGS: LIVER: Craniocaudal length: 22.5 cm. This is  abnormally enlarged Echogenicity:  Diffusely increased. Mass:  None   GALLBLADDER: Surgically absent.   BILE DUCTS: No intrahepatic biliary ductal dilatation. Common bile duct measured .39 cm in diameter. This is within the limits of normal.   PANCREAS: The pancreas was obscured by bowel gas..   RIGHT KIDNEY: Craniocaudal length:  11.4 cm, Within normal limits of size for age. Echogenicity was normal. No hydronephrosis, shadowing stone, or perinephric edema. No gross right renal mass. There was a mid to lower pole simple cyst measuring 35 x 35 x 33 mm.   PERITONEAL FLUID: Trace right upper quadrant ascites.         Unable to identify the pancreas in this exam. Previous cholecystectomy.   Hepatomegaly.  Nonspecific increased echogenicity throughout the liver, most likely fatty infiltration. .   Mid to lower pole  right renal cyst.   Trace right upper quadrant ascites.   Signed by: Elmer So 7/2/2024 9:06 AM Dictation workstation:   [id number]    ECG 12 lead    Result Date: 7/2/2024  Accelerated Junctional rhythm Left axis deviation Septal infarct , age undetermined ST & T wave abnormality, consider inferolateral ischemia Abnormal ECG When compared with ECG of 20-JUN-2024 15:59, (unconfirmed) Junctional rhythm has replaced Wide QRS rhythm    ECG 12 lead    Result Date: 7/1/2024  Marked sinus bradycardia with AV dissociation and Wide QRS rhythm Left axis deviation Nonspecific intraventricular block Lateral infarct , age undetermined Inferior infarct , age undetermined Abnormal ECG When compared with ECG of 20-JUN-2024 15:59, (unconfirmed) Sinus rhythm is now with AV dissociation    CT chest wo IV contrast    Result Date: 7/1/2024  Interpreted By:  Alma Laguna, STUDY: CT CHEST WO IV CONTRAST;  7/1/2024 12:51 pm   INDICATION: Signs/Symptoms:Respiratory failure, L lung consolidation vs effusion.   COMPARISON: 06/14/2024   ACCESSION NUMBER(S): [id number]   ORDERING CLINICIAN: WILLARD MARTINEZ   TECHNIQUE: Serial axial CT images of the chest obtained without intravenous contrast. Sagittal and coronal reconstructions were generated.   FINDINGS: Resolution is limited due to the patient's size.   VESSELS: There are atherosclerotic changes of the aorta. The main pulmonary artery appears enlarged. The cava is unremarkable.   HEART:  The heart appears enlarged.   MEDIASTINUM AND ARAMIS: There are multiple slightly prominent mediastinal lymph nodes.   LUNG, PLEURA, AND LARGE AIRWAYS: There is dense consolidation at the left lung base. There is perhaps minimal left pleural fluid.   CHEST WALL AND LOWER NECK: There is a calcified thyroid nodule on the left.   BONES: There are degenerative changes of the spine.   UPPER ABDOMEN: The patient is status post cholecystectomy.   COMPARISON TO THE PRIOR EXAM: The chest similar.        Limited resolution.   Persistent dense consolidation at the left lung base. Follow-up to resolution is suggested.   Heart appears somewhat enlarged.   Signed by: Alma Laguna 7/1/2024 1:11 PM Dictation workstation:   [id number]    XR chest 1 view    Result Date: 7/1/2024  Interpreted By:  Tainka Dean, STUDY: XR CHEST 1 VIEW;  7/1/2024 10:11 am   INDICATION: Signs/Symptoms:Chest Pain.   COMPARISON: 06/20/2024   ACCESSION NUMBER(S): [id number]   ORDERING CLINICIAN: WILLARD MARTINEZ   FINDINGS: CARDIOMEDIASTINAL SILHOUETTE: The heart is enlarged     LUNGS: There is extensive progressive consolidation at the left lung with near complete opacification. There is a reticulonodular interstitial pattern involving the right lung. 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   ABDOMEN: No remarkable upper abdominal findings.     BONES: No acute osseous changes. There are neural stimulators extending along the right and left sides of the neck superiorly.       1. Enlarged heart. 2. Marked progression of disease with near-complete opacification of the left hemithorax. 3. Reticulonodular interstitial pattern right lung. 4. 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   MACRO: None   Signed by: Tanika Dean 7/1/2024 10:27 AM Dictation workstation:   [id number]    ECG 12 lead    Result Date: 6/21/2024  Wide QRS rhythm with Premature ventricular complexes or Fusion complexes Nonspecific intraventricular block Abnormal ECG When compared with ECG of 20-JUN-2024 14:06, (unconfirmed) Wide QRS rhythm has replaced Sinus rhythm    XR chest 1 view    Result Date: 6/20/2024  Interpreted By:  Neil Connell, STUDY: XR CHEST 1 VIEW;  6/20/2024 1:55 pm   INDICATION: Signs/Symptoms:SOB after removing oxygen at facility, feels improved after reapplied.   COMPARISON: 06/14/2024   ACCESSION NUMBER(S): [id number]   ORDERING CLINICIAN: ANNE JUNIOR   FINDINGS: There is  opacification of the left lower lung laterally probably from effusion with consolidated infiltrate. This appears slightly improved from the prior exam. This obscures the left cardiac margin, as well as been lower hilum. There is interstitial prominence of the right hemithorax. This is probably exacerbated with increased appearance compared to the prior exam from a shallower inspiration, but probably with superimposed interstitial edema. Left suprahilar opacity may be from superimposed vasculature and/or atelectasis.   ABDOMEN AND OTHER FINDINGS: No remarkable upper abdominal findings.   BONES: No acute osseous changes.       1.  Probable slight improvement of left mid and lower lung airspace disease, and mild congestion.   Signed by: Neil Connell 6/20/2024 2:36 PM Dictation workstation:   [id number]    ECG 12 lead    Result Date: 6/20/2024  Normal sinus rhythm Incomplete right bundle branch block ST elevation, consider lateral injury or acute infarct ** ** ACUTE MI ** ** Abnormal ECG When compared with ECG of 14-JUN-2024 17:45, (unconfirmed) Sinus rhythm has replaced Junctional rhythm Inverted T waves have replaced nonspecific T wave abnormality in Inferior leads T wave inversion no longer evident in Lateral leads    ECG 12 lead    Result Date: 6/18/2024  Accelerated Junctional rhythm Left axis deviation ST elevation, consider lateral injury or acute infarct ** ** ACUTE MI ** ** Abnormal ECG When compared with ECG of 27-MAR-2024 18:08, Junctional rhythm has replaced Sinus rhythm Left anterior fascicular block is no longer Present ST more depressed in Inferior leads ST elevation now present in Lateral leads    Transthoracic Echo (TTE) Complete    Result Date: 6/17/2024            [facility] [place] [address] [facility] [address]    Tel [phone] Fax [phone] TRANSTHORACIC ECHOCARDIOGRAM REPORT  Patient Name:      TEJINDER GONZALES          Reading Physician:    [id number] Jack                                                                 Cheryl NATHAN Study Date:        6/16/2024             Ordering Provider:    [id number] AMEENA JOHNSTON MRN/PID:           [id number]              Fellow: Accession#:        [id number]          Nurse:                She KOENIG Date of Birth/Age: 1955 / 69 years  Sonographer:          Marcella Person RDCS Gender:            F                     Additional Staff: Height:            160.00 cm             Admit Date: Weight:            137.89 kg             Admission Status:     Inpatient -                                                                Routine BSA / BMI:         2.31 m2 / 53.86 kg/m2 Department Location:  [facility] CCU Blood Pressure: 156 /79 mmHg Study Type:    TRANSTHORACIC ECHO (TTE) COMPLETE Diagnosis/ICD: Hypoxemia-R09.02; Acute diastolic (congestive) heart failure                (CHF)-I50.31 Indication:    hypoxia; Acute diastolic CHF Patient History: Pertinent History: HTN, Hyperlipidemia and CHF. Study Detail: The following Echo studies were performed: 2D, M-Mode, Doppler and               color flow. Definity used as a contrast agent for endocardial               border definition. Total contrast used for this procedure was 2 mL               via IV push.  PHYSICIAN INTERPRETATION: Left Ventricle: Left ventricular systolic function is normal. There are no regional wall motion abnormalities. The left ventricular cavity size is normal. There is mild concentric left ventricular hypertrophy. Spectral Doppler shows an impaired relaxation pattern of left ventricular diastolic filling. Left Atrium: The left atrium is mildly dilated. Right Ventricle: The right ventricle is normal in size. There is normal right ventricular global systolic function. Right Atrium: The right atrium is upper limits of normal in size. Aortic Valve: The aortic valve appears  structurally normal. There is no evidence of aortic valve regurgitation. The peak instantaneous gradient of the aortic valve is 16.8 mmHg. The mean gradient of the aortic valve is 8.0 mmHg. Mitral Valve: The mitral valve is normal in structure. There is trace to mild mitral valve regurgitation. Tricuspid Valve: The tricuspid valve is structurally normal. There is trace tricuspid regurgitation. Pulmonic Valve: The pulmonic valve was not assessed. The pulmonic valve regurgitation was not assessed. Pericardium: There is no pericardial effusion noted. Aorta: The aortic root is normal.  CONCLUSIONS:  1. Left ventricular systolic function is normal.  2. Spectral Doppler shows an impaired relaxation pattern of left ventricular diastolic filling.  3. Mild LVH with normal EF of 55-60%.  4. Mildly increased pressure gradient LVOT and aortic valve, within acceptable limits at this time.  5. Diastolic dysfunction with mildly dilated atria. QUANTITATIVE DATA SUMMARY: 2D MEASUREMENTS:                          Normal Ranges: LAs:           4.10 cm   (2.7-4.0cm) IVSd:          1.50 cm   (0.6-1.1cm) LVPWd:         1.30 cm   (0.6-1.1cm) LVIDd:         3.80 cm   (3.9-5.9cm) LVIDs:         2.70 cm LV Mass Index: 84.0 g/m2 LV % FS        28.9 % LA VOLUME:                             Normal Ranges: LA Area A4C:     24.0 cm2 LA Area A2C:     21.0 cm2 LA Volume Index: 28.0 ml/m2 AORTA MEASUREMENTS:                    Normal Ranges: Asc Ao, d: 3.60 cm (2.1-3.4cm) LV SYSTOLIC FUNCTION BY 2D PLANIMETRY (MOD):                     Normal Ranges: EF-A4C View: 52.9 % (>=55%) LV DIASTOLIC FUNCTION:                               Normal Ranges: MV Peak E:        1.38 m/s    (0.7-1.2 m/s) MV Peak A:        1.08 m/s    (0.42-0.7 m/s) E/A Ratio:        1.28        (1.0-2.2) MV e'             0.06 m/s    (>8.0) MV lateral e'     0.07 m/s MV medial e'      0.06 m/s E/e' Ratio:       21.23       (<8.0) PulmV Sys Rojelio:    72.30 cm/s PulmV Graham Rojelio:   58.80  cm/s PulmV S/D Rojelio:    1.20 PulmV A Revs Rojelio: 28.30 cm/s PulmV A Revs Dur: 169.00 msec MITRAL VALVE:                 Normal Ranges: MV DT: 363 msec (150-240msec) AORTIC VALVE:                                    Normal Ranges: AoV Vmax:                2.05 m/s  (<=1.7m/s) AoV Peak P.8 mmHg (<20mmHg) AoV Mean P.0 mmHg  (1.7-11.5mmHg) LVOT Max Rojelio:            1.42 m/s  (<=1.1m/s) AoV VTI:                 46.70 cm  (18-25cm) LVOT VTI:                39.20 cm LVOT Diameter:           2.50 cm   (1.8-2.4cm) AoV Area, VTI:           4.12 cm2  (2.5-5.5cm2) AoV Area,Vmax:           3.40 cm2  (2.5-4.5cm2) AoV Dimensionless Index: 0.84  RIGHT VENTRICLE: RV Basal 3.10 cm RV Mid   2.20 cm RV Major 7.3 cm TAPSE:   18.3 mm RV s'    0.13 m/s TRICUSPID VALVE/RVSP:                             Normal Ranges: Peak TR Velocity: 1.87 m/s RV Syst Pressure: 17.0 mmHg (< 30mmHg) Pulmonary Veins: PulmV A Revs Dur: 169.00 msec PulmV A Revs Rojelio: 28.30 cm/s PulmV Graham Rojelio:   58.80 cm/s PulmV S/D Rojelio:    1.20 PulmV Sys Rojelio:    72.30 cm/s  [id number] Jack Luna MD Electronically signed on 2024 at 7:49:29 AM  ** Final **     Electrocardiogram, 12-lead PRN ACS symptoms    Result Date: 6/15/2024  Accelerated Junctional rhythm Left axis deviation ST elevation, consider lateral injury or acute infarct ** ** ACUTE MI ** ** Abnormal ECG When compared with ECG of 2024 17:38, (unconfirmed) ST no longer elevated in Lateral leads    CT angio chest for pulmonary embolism    Result Date: 6/15/2024  STUDY: CT Angiogram of the Chest; 2024 at 10:52 p.m. INDICATION: Elevated D-Dimer.  Hypoxia. COMPARISON: None Available. ACCESSION NUMBER(S): [id number] ORDERING CLINICIAN: MARIANELA HARDWICK TECHNIQUE:  CTA of the chest was performed with intravenous contrast. Images are reviewed and processed at a workstation according to the CT angiogram protocol with 3-D and/or MIP post processing imaging generated.  Omnipaque 350 60 mL  was administered intravenously. Automated mA/kV exposure control was utilized and patient examination was performed in strict accordance with principles of ALARA. FINDINGS: Pulmonary arteries are adequately opacified without acute or chronic filling defects.  The thoracic aorta is normal in course and caliber without dissection or aneurysm. There is mild cardiomegaly without pericardial effusion.  Thoracic lymph nodes are not enlarged. Small left pleural effusion seen.  There is no pleural thickening or pneumothorax.  The airways are patent. There is some subsegmental atelectasis of the left lower lobe.  There is pulmonary vascular congestion.  There is mild peripheral groundglass opacity and perihilar ill-defined groundglass opacity. Upper abdomen demonstrates no acute pathology. There are no acute fractures.  No suspicious bony lesions.    1. No evidence of pulmonary embolism. 2. Cardiomegaly with pulmonary vascular congestion and small left pleural effusion. 3. Mild peripheral groundglass opacity and perihilar ill-defined groundglass opacity. Suggest correlation for pneumonia. Signed by Hao Esqueda MD    XR chest 1 view    Result Date: 6/14/2024  Interpreted By:  Nikolas Gonzalez, STUDY: XR CHEST 1 VIEW; 6/14/2024 4:35 pm   INDICATION: Signs/Symptoms:Shortness of breath, pulmonary edema?  Focal infiltrate?   COMPARISON: None.   ACCESSION NUMBER(S): [id number]   ORDERING CLINICIAN: MARIANELA HARDWICK   TECHNIQUE: Number of films: 2 AP erect portable views were obtained   FINDINGS: There is limitation due to rotation, respiratory motion and overlying artifacts obscuring some detail.   The cardiac silhouette is enlarged, exaggerated by the technique. Bilateral interstitial and mild alveolar perihilar infiltrates are present. There are small effusions. No pneumothorax is seen. Degenerative changes involve the spine and shoulders. Two electronic devices are seen over the upper chest, with the wires directed superiorly  in the neck.       Limited study. Cardiomegaly and congestive heart failure.     Signed by: Nikolas Gonzalez 6/14/2024 4:41 PM Dictation workstation:   [id number]     Assessment/Plan   Principal Problem:    Acute on chronic respiratory failure with hypoxia and hypercapnia (Multi)    This is 69-year-old female with PMH significant for asthma (on 4 L via nasal cannula at home) OA, ARCHIE (not on any therapy), femur fracture, depression (s/p DBS), HTN, IDDM 2, asthma, CKD 3, HLD, diastolic CHF (last echo 7/2023, EF 75%, pulmonary artery hypertension 40-50 mmHg).  Patient presented to [facility] emergency department from nursing facility on 7/1/2024 with shortness of breath, hypoxic with altered mental status.  Patient arrived to the emergency department on 15 L via Oxymizer.  In the emergency department patient had been vitally stable, labs are significant for potassium of 7.5 (hemolyzed), sodium 132, BUN 24, creatinine 2.49, alk phos 163, ALT 1132, AST 1367, bilirubin 1.5, BNP for 56, troponin 64, WBC 19.4, initial ABG 7.28/64/133/30.1 with repeat 7.27/66/78/30.3 after being on BiPAP for approximately 6 hours in duration.  Imaging at that time was significant for marked progression of disease with near complete opacification of left hemithorax, reticulonodular pattern in the right lung, findings consistent with mixed alveolar interstitial pneumonia.  CT imaging at that time showed persistent dense consolidation at the left lung base.  She was treated with albuterol nebulizer hyperkalemia cocktail, doxycycline and Zosyn as well as methylprednisolone.  At this time patient was admitted to the ICU for significant acidosis and acute hypoxemic respiratory on chronic hypoxemic hypercapnic failure on continuous BiPAP.  While in the ICU, patient was subsequently weaned down to 3 L via nasal cannula today.  GI was consulted for elevated LFTs, and recommended continue supportive care diet as tolerated and continue to trend liver enzymes  while avoiding hepatotoxic drugs.     AHRF on chronic hypoxemic hypercapnic respiratory failure 2/2 interstitial pneumonia  Sepsis  2/2 pneumonia with endorgan dysfunction consistent with elevated troponin, elevated transaminases,   Leukocytosis resolved  Diastolic CHF with PAH    Patient presented to [facility] with chief complaints of hypoxia and altered mental status. Over the past month, she had multiple ED visits and was recently admitted for CHF exacerbation or undiagnosed COPD. Her CXR revealed significant disease progression, with nearly complete opacification of the left hemithorax and a reticulonodular pattern in the right lung, consistent with mixed alveolar-interstitial pneumonia. CT imaging at that time showed persistent dense consolidation at the left lung base. Her last echocardiogram in July 2023 demonstrated an ejection fraction of 75% and pulmonary artery hypertension with pressures ranging from 40 to 50 mmHg.  WBC downtrending from 19> 17>16.3.     Plan   -Incentive spirometry  -Telemetry  -Continue supplemental oxygen, wean as seen fit  -Continue NIPPV nightly and as needed  -Pulmonology consult placed for obtaining NIPPV outpatient on discharge.  No further recommendation.  -DC Solu-Medrol 40 mg and discontinue prednisone 40 mg tablets daily after having total course of 5 days.  -She finished  a total of 5 days of antibiotic.  Discontinue Zosyn and Augmentin 7/4-7/5.  Discontinue oral doxycycline for negative pneumonia lab.  -Continue scheduled DuoNebs as well as as needed  -Chest physiotherapy  -Lab sent for opportunistic lung infections, pending results  -PT OT    CRISTOFER on CKD3 resolved  - Downtrending creatinine today 2.04 >2.21> 1.54>1.28>.97  - Etiology most likely prerenal versus renal  - Continue to monitor with RFP  - Avoid nephrotoxic agent  - Renal adjustment of medications.     Transaminitis downtrending  -, AST 31, normal bilirubin.  -LFTs more than 20 of the upper limit with suggest  an etiology of viral versus drug-induced versus ischemic versus autoimmune.  -Liver ultrasound: Unable to identify the pancreas, previous cholecystectomy, hepatomegaly, fatty infiltration, trace RUQ ascites  -Autoimmune panel for prior biliary cholangitis and autoimmune hepatitis ordered, pending result  -GI consulted, appreciate recommendation.     IDDM 2  -Blood sugar average of 300.  Received 36 units of sliding scale  -Continue sliding scale #2  -Increase Lantus to 45 twice daily from 30.  Home dose 55 twice daily  -Hypoglycemia protocol in place  -Accu-Cheks    Bipolar disorder  Anxiety  HTN  HLD  Depression (s/p DBS)  -Continue home ASA 81, nystatin, pancrelipase  -Resume home dose gabapentin  -Hold home citalopram  -Resume home med with holding parameters     IVF: None at this time  DVT prophylaxis: Heparin  Diet: Carb controlled  Dispo: Anticipate additional 1-2 midnight further recommendation from pulmonology and BiPAP settings.  Consults: GI, pulmonology     CODE STATUS: Full code    Assessment and plan discussed with my attending.   Vazquez Pereira MD   Internal Medicine, PGY-2 .

## 2024-07-05 NOTE — CARE PLAN
Problem: Skin  Goal: Prevent/manage excess moisture  Outcome: Progressing     Problem: Skin  Goal: Promote skin healing  Outcome: Progressing     Problem: Diabetes  Goal: Maintain glucose levels >70mg/dl to <250mg/dl throughout shift  Outcome: Progressing     Problem: Respiratory  Goal: Tolerate mechanical ventilation evidenced by VS/agitation level this shift  Outcome: Progressing     Problem: Respiratory  Goal: Patent airway maintained this shift  Outcome: Progressing   The patient's goals for the shift include      The clinical goals for the shift include Patient will have Spo2 92% or greater through out this shift. The patient will be compliant with wearing the BIPAP overnight. The patient will report she feels less short of breath with exertion.    The patient was cooperative with wearing the BIPAP overnight and maintained a pulse ox greater than 94%. Respirations were shallow, even and unlabored at rest, but she became dyspneic with minimal exertion. She continues to have a moist, nonproductive cough. The patient was started on Augmentin and she reports some gastric discomfort. She was incontinent of soft loose stool x 1 this shift.

## 2024-07-05 NOTE — CARE PLAN
Problem: Skin  Goal: Decreased wound size/increased tissue granulation at next dressing change  Outcome: Progressing  Goal: Participates in plan/prevention/treatment measures  Outcome: Progressing  Goal: Prevent/manage excess moisture  7/5/2024 1729 by Sylvia Teran RN  Outcome: Progressing  7/5/2024 1729 by Sylvia Teran RN  Flowsheets (Taken 7/5/2024 1729)  Prevent/manage excess moisture: Cleanse incontinence/protect with barrier cream  Goal: Prevent/minimize sheer/friction injuries  Outcome: Progressing  Goal: Promote/optimize nutrition  Outcome: Progressing  Goal: Promote skin healing  Outcome: Progressing     Problem: Diabetes  Goal: Achieve decreasing blood glucose levels by end of shift  Outcome: Progressing  Goal: Increase stability of blood glucose readings by end of shift  Outcome: Progressing  Goal: Decrease in ketones present in urine by end of shift  Outcome: Progressing  Goal: Maintain electrolyte levels within acceptable range throughout shift  Outcome: Progressing  Goal: Maintain glucose levels >70mg/dl to <250mg/dl throughout shift  Outcome: Progressing  Goal: No changes in neurological exam by end of shift  Outcome: Progressing  Goal: Learn about and adhere to nutrition recommendations by end of shift  Outcome: Progressing  Goal: Vital signs within normal range for age by end of shift  Outcome: Progressing  Goal: Increase self care and/or family involovement by end of shift  Outcome: Progressing  Goal: Receive DSME education by end of shift  Outcome: Progressing     Problem: Respiratory  Goal: Clear secretions with interventions this shift  Outcome: Progressing  Goal: Minimize anxiety/maximize coping throughout shift  Outcome: Progressing  Goal: Minimal/no exertional discomfort or dyspnea this shift  Outcome: Progressing  Goal: No signs of respiratory distress (eg. Use of accessory muscles. Peds grunting)  Outcome: Progressing  Goal: Patent airway maintained this shift  Outcome:  Progressing  Goal: Tolerate mechanical ventilation evidenced by VS/agitation level this shift  Outcome: Progressing  Goal: Tolerate pulmonary toileting this shift  Outcome: Progressing  Goal: Verbalize decreased shortness of breath this shift  Outcome: Progressing  Goal: Wean oxygen to maintain O2 saturation per order/standard this shift  Outcome: Progressing  Goal: Increase self care and/or family involvement in next 24 hours  Outcome: Progressing   The patient's goals for the shift include      The clinical goals for the shift include Patient will remain HDS throughout shift.

## 2024-07-05 NOTE — PROGRESS NOTES
07/05/24 1217   Discharge Planning   Who is requesting discharge planning? Provider   Home or Post Acute Services Post acute facilities (Rehab/SNF/etc)   Type of Post Acute Facility Services Skilled nursing;Rehab   Patient expects to be discharged to: Back to [place] on discharge.   Does the patient need discharge transport arranged? Yes   RoundTrip coordination needed? Yes   Has discharge transport been arranged? No     Per doctor, patient is close to being medically ready for discharge. Requested [facility] to submit for pre cert for patient to return to [place] on discharge. Will follow for discharge needs.     1225- Spoke to Travis from [facility] regarding getting a BiPap for this patient to have at home. Per Travis, if patient going to facility, the facility will take care of BiPap in facility. When going home, patient will need to have a sleep study done before insurance will pay for machine.

## 2024-07-06 LAB
ASPERGILLUS GALACTOMANNAN EIA,SERUM: 0.03
FUNGITELL BETA-D GLUCAN,SERUM: <31 PG/ML
GLUCOSE BLD MANUAL STRIP-MCNC: 102 MG/DL (ref 74–99)
GLUCOSE BLD MANUAL STRIP-MCNC: 170 MG/DL (ref 74–99)
GLUCOSE BLD MANUAL STRIP-MCNC: 216 MG/DL (ref 74–99)
GLUCOSE BLD MANUAL STRIP-MCNC: 74 MG/DL (ref 74–99)
GLUCOSE BLD MANUAL STRIP-MCNC: 83 MG/DL (ref 74–99)

## 2024-07-06 PROCEDURE — 99231 SBSQ HOSP IP/OBS SF/LOW 25: CPT

## 2024-07-06 PROCEDURE — 2500000004 HC RX 250 GENERAL PHARMACY W/ HCPCS (ALT 636 FOR OP/ED)

## 2024-07-06 PROCEDURE — 2500000001 HC RX 250 WO HCPCS SELF ADMINISTERED DRUGS (ALT 637 FOR MEDICARE OP)

## 2024-07-06 PROCEDURE — 2500000002 HC RX 250 W HCPCS SELF ADMINISTERED DRUGS (ALT 637 FOR MEDICARE OP, ALT 636 FOR OP/ED): Performed by: STUDENT IN AN ORGANIZED HEALTH CARE EDUCATION/TRAINING PROGRAM

## 2024-07-06 PROCEDURE — 94660 CPAP INITIATION&MGMT: CPT

## 2024-07-06 PROCEDURE — 1200000002 HC GENERAL ROOM WITH TELEMETRY DAILY

## 2024-07-06 PROCEDURE — 94640 AIRWAY INHALATION TREATMENT: CPT

## 2024-07-06 PROCEDURE — 2500000002 HC RX 250 W HCPCS SELF ADMINISTERED DRUGS (ALT 637 FOR MEDICARE OP, ALT 636 FOR OP/ED)

## 2024-07-06 PROCEDURE — 82947 ASSAY GLUCOSE BLOOD QUANT: CPT

## 2024-07-06 RX ORDER — INSULIN LISPRO 100 [IU]/ML
0-10 INJECTION, SOLUTION INTRAVENOUS; SUBCUTANEOUS EVERY 6 HOURS
Status: DISCONTINUED | OUTPATIENT
Start: 2024-07-06 | End: 2024-07-06

## 2024-07-06 RX ORDER — INSULIN LISPRO 100 [IU]/ML
0-10 INJECTION, SOLUTION INTRAVENOUS; SUBCUTANEOUS EVERY 6 HOURS
Status: DISCONTINUED | OUTPATIENT
Start: 2024-07-06 | End: 2024-07-07

## 2024-07-06 RX ORDER — MENTHOL AND ZINC OXIDE .44; 20.625 G/100G; G/100G
1 OINTMENT TOPICAL 4 TIMES DAILY PRN
Status: DISCONTINUED | OUTPATIENT
Start: 2024-07-06 | End: 2024-07-08 | Stop reason: HOSPADM

## 2024-07-06 ASSESSMENT — COGNITIVE AND FUNCTIONAL STATUS - GENERAL
MOBILITY SCORE: 13
WALKING IN HOSPITAL ROOM: A LOT
MOVING FROM LYING ON BACK TO SITTING ON SIDE OF FLAT BED WITH BEDRAILS: A LITTLE
HELP NEEDED FOR BATHING: A LOT
DAILY ACTIVITIY SCORE: 14
PERSONAL GROOMING: A LOT
TURNING FROM BACK TO SIDE WHILE IN FLAT BAD: A LITTLE
CLIMB 3 TO 5 STEPS WITH RAILING: A LOT
CLIMB 3 TO 5 STEPS WITH RAILING: TOTAL
TOILETING: A LOT
PERSONAL GROOMING: A LOT
DRESSING REGULAR LOWER BODY CLOTHING: A LOT
DRESSING REGULAR LOWER BODY CLOTHING: A LOT
MOVING TO AND FROM BED TO CHAIR: A LOT
DAILY ACTIVITIY SCORE: 14
MOVING FROM LYING ON BACK TO SITTING ON SIDE OF FLAT BED WITH BEDRAILS: A LITTLE
HELP NEEDED FOR BATHING: A LOT
STANDING UP FROM CHAIR USING ARMS: A LOT
DRESSING REGULAR UPPER BODY CLOTHING: A LOT
MOBILITY SCORE: 14
DRESSING REGULAR UPPER BODY CLOTHING: A LOT
TURNING FROM BACK TO SIDE WHILE IN FLAT BAD: A LITTLE
STANDING UP FROM CHAIR USING ARMS: A LOT
WALKING IN HOSPITAL ROOM: A LOT
TOILETING: A LOT
MOVING TO AND FROM BED TO CHAIR: A LOT

## 2024-07-06 ASSESSMENT — PAIN - FUNCTIONAL ASSESSMENT
PAIN_FUNCTIONAL_ASSESSMENT: 0-10

## 2024-07-06 ASSESSMENT — PAIN SCALES - GENERAL
PAINLEVEL_OUTOF10: 0 - NO PAIN

## 2024-07-06 NOTE — CARE PLAN
The patient's goals for the shift include      The clinical goals for the shift include Patient will remain HDS throughout shift.    Problem: Skin  Goal: Decreased wound size/increased tissue granulation at next dressing change  Outcome: Progressing  Flowsheets (Taken 7/5/2024 2227)  Decreased wound size/increased tissue granulation at next dressing change:   Promote sleep for wound healing   Protective dressings over bony prominences     Problem: Skin  Goal: Participates in plan/prevention/treatment measures  Outcome: Progressing  Flowsheets (Taken 7/5/2024 2227)  Participates in plan/prevention/treatment measures: Elevate heels     Problem: Skin  Goal: Prevent/manage excess moisture  Outcome: Progressing  Flowsheets (Taken 7/5/2024 2227)  Prevent/manage excess moisture:   Cleanse incontinence/protect with barrier cream   Moisturize dry skin   Use wicking fabric (obtain order)     Problem: Skin  Goal: Prevent/minimize sheer/friction injuries  Outcome: Progressing  Flowsheets (Taken 7/5/2024 2227)  Prevent/minimize sheer/friction injuries:   HOB 30 degrees or less   Use pull sheet   Increase activity/out of bed for meals     Problem: Skin  Goal: Promote/optimize nutrition  Outcome: Progressing  Flowsheets (Taken 7/5/2024 2227)  Promote/optimize nutrition: Monitor/record intake including meals     Problem: Skin  Goal: Promote skin healing  Outcome: Progressing  Flowsheets (Taken 7/5/2024 2227)  Promote skin healing:   Protective dressings over bony prominences   Assess skin/pad under line(s)/device(s)   Turn/reposition every 2 hours/use positioning/transfer devices     Problem: Diabetes  Goal: Achieve decreasing blood glucose levels by end of shift  Outcome: Progressing     Problem: Diabetes  Goal: Increase stability of blood glucose readings by end of shift  Outcome: Progressing     Problem: Diabetes  Goal: Decrease in ketones present in urine by end of shift  Outcome: Progressing     Problem: Diabetes  Goal:  Maintain glucose levels >70mg/dl to <250mg/dl throughout shift  Outcome: Progressing     Problem: Diabetes  Goal: No changes in neurological exam by end of shift  Outcome: Progressing     Problem: Diabetes  Goal: Learn about and adhere to nutrition recommendations by end of shift  Outcome: Progressing     Problem: Diabetes  Goal: Vital signs within normal range for age by end of shift  Outcome: Progressing     Problem: Diabetes  Goal: Increase self care and/or family involovement by end of shift  Outcome: Progressing     Problem: Respiratory  Goal: Clear secretions with interventions this shift  Outcome: Progressing     Problem: Respiratory  Goal: Minimize anxiety/maximize coping throughout shift  Outcome: Progressing     Problem: Respiratory  Goal: Minimal/no exertional discomfort or dyspnea this shift  Outcome: Progressing     Problem: Respiratory  Goal: No signs of respiratory distress (eg. Use of accessory muscles. Peds grunting)  Outcome: Progressing     Problem: Respiratory  Goal: Patent airway maintained this shift  Outcome: Progressing       Problem: Respiratory  Goal: Verbalize decreased shortness of breath this shift  Outcome: Progressing     Problem: Respiratory  Goal: Wean oxygen to maintain O2 saturation per order/standard this shift  Outcome: Progressing     Problem: Respiratory  Goal: Increase self care and/or family involvement in next 24 hours  Outcome: Progressing

## 2024-07-06 NOTE — PROGRESS NOTES
Kaylene Feliciano is a 69 y.o. female on day 5 of admission presenting with Acute on chronic respiratory failure with hypoxia and hypercapnia (Multi).      Subjective   -Patient seen this morning resting comfortably in bed  -VSS WNL on NIPPV  -NAEON  -Patient given lab holiday.  -Patient stated feeling much better today and her breathing is normal.  Patient denies 12 point ROS.    Objective     Last Recorded Vitals  /54 (BP Location: Right arm, Patient Position: Lying)   Pulse 60   Temp 36.8 °C (98.2 °F) (Temporal)   Resp 17   Wt 134 kg (296 lb 1.2 oz)   SpO2 100%   Intake/Output last 3 Shifts:    Intake/Output Summary (Last 24 hours) at 7/6/2024 1153  Last data filed at 7/6/2024 0600  Gross per 24 hour   Intake 480 ml   Output 1251 ml   Net -771 ml       Admission Weight  Weight: 136 kg (300 lb) (07/01/24 0926)    Daily Weight  07/06/24 : 134 kg (296 lb 1.2 oz)      Physical Exam:  General: Not in acute distress, A&O x 3, alert, cooperative, obese   HEENT: Normocephalic, atraumatic, EOMI, moist mucous membranes, NC in place  Neck: Neck supple, trachea midline, no evidence of trauma  Cardiovascular: RRR, S1 and S2 appreciated, no murmurs rubs gallops appreciated, distal pulses 2+ bilaterally (radial and dorsalis pedis)  Respiratory: Decreased breath sounds appreciated over the left lung with still present but improved scattered wheezes rhonchi.  no increased work of breathing on 4 L NC.  GI: Abdomen soft, nondistended, nontender to palpation, bowel sounds present  Extremities: No edema appreciated in lower extremities bilaterally, no cyanosis  Neuro: A&O X3, no focal deficits, strength and sensation intact bilaterally  Skin: Warm and dry, without lesions or rashes    Relevant Results  Scheduled medications  amLODIPine, 5 mg, oral, Daily  aspirin, 81 mg, oral, Daily  [Held by provider] citalopram, 40 mg, oral, Daily  gabapentin, 300 mg, oral, BID  heparin (porcine), 7,500 Units, subcutaneous, q8h YARELI  insulin  glargine, 45 Units, subcutaneous, BID  insulin lispro, 0-10 Units, subcutaneous, q6h  ipratropium-albuteroL, 3 mL, nebulization, TID  lisinopril, 40 mg, oral, Daily  metoprolol succinate XL, 25 mg, oral, Daily  nystatin, 1 Application, Topical, BID  pancrelipase (Lip-Prot-Amyl), 1 capsule, oral, TID AC  pantoprazole, 40 mg, oral, Daily  polyethylene glycol, 17 g, oral, Daily      Continuous medications     PRN medications  PRN medications: albuterol, dextrose, dextrose, glucagon, oxygen, oxygen     Results for orders placed or performed during the hospital encounter of 07/01/24 (from the past 24 hour(s))   POCT GLUCOSE   Result Value Ref Range    POCT Glucose 121 (H) 74 - 99 mg/dL   POCT GLUCOSE   Result Value Ref Range    POCT Glucose 221 (H) 74 - 99 mg/dL   POCT GLUCOSE   Result Value Ref Range    POCT Glucose 243 (H) 74 - 99 mg/dL   POCT GLUCOSE   Result Value Ref Range    POCT Glucose 216 (H) 74 - 99 mg/dL   POCT GLUCOSE   Result Value Ref Range    POCT Glucose 83 74 - 99 mg/dL             US abdomen limited liver    Result Date: 7/2/2024  Interpreted By:  Elmer So, STUDY: US ABDOMEN LIMITED LIVER  7/2/2024 8:32 am   INDICATION: 68 y/o   F with  Signs/Symptoms:RUQ pain..   COMPARISON: Correlation with CT scan chest from 07/01/2024.   ACCESSION NUMBER(S): [id number]   ORDERING CLINICIAN: JACKLYN REGALADO   TECHNIQUE: Portable ultrasound of the right upper quadrant was performed using grayscale imaging, color Doppler, and spectral Doppler.   FINDINGS: LIVER: Craniocaudal length: 22.5 cm. This is  abnormally enlarged Echogenicity:  Diffusely increased. Mass:  None   GALLBLADDER: Surgically absent.   BILE DUCTS: No intrahepatic biliary ductal dilatation. Common bile duct measured .39 cm in diameter. This is within the limits of normal.   PANCREAS: The pancreas was obscured by bowel gas..   RIGHT KIDNEY: Craniocaudal length:  11.4 cm, Within normal limits of size for age. Echogenicity was normal. No  hydronephrosis, shadowing stone, or perinephric edema. No gross right renal mass. There was a mid to lower pole simple cyst measuring 35 x 35 x 33 mm.   PERITONEAL FLUID: Trace right upper quadrant ascites.         Unable to identify the pancreas in this exam. Previous cholecystectomy.   Hepatomegaly.  Nonspecific increased echogenicity throughout the liver, most likely fatty infiltration. .   Mid to lower pole right renal cyst.   Trace right upper quadrant ascites.   Signed by: Elmer So 7/2/2024 9:06 AM Dictation workstation:   [id number]    ECG 12 lead    Result Date: 7/2/2024  Accelerated Junctional rhythm Left axis deviation Septal infarct , age undetermined ST & T wave abnormality, consider inferolateral ischemia Abnormal ECG When compared with ECG of 20-JUN-2024 15:59, (unconfirmed) Junctional rhythm has replaced Wide QRS rhythm    ECG 12 lead    Result Date: 7/1/2024  Marked sinus bradycardia with AV dissociation and Wide QRS rhythm Left axis deviation Nonspecific intraventricular block Lateral infarct , age undetermined Inferior infarct , age undetermined Abnormal ECG When compared with ECG of 20-JUN-2024 15:59, (unconfirmed) Sinus rhythm is now with AV dissociation    CT chest wo IV contrast    Result Date: 7/1/2024  Interpreted By:  Alma Laguna, STUDY: CT CHEST WO IV CONTRAST;  7/1/2024 12:51 pm   INDICATION: Signs/Symptoms:Respiratory failure, L lung consolidation vs effusion.   COMPARISON: 06/14/2024   ACCESSION NUMBER(S): [id number]   ORDERING CLINICIAN: WILLARD MARTINEZ   TECHNIQUE: Serial axial CT images of the chest obtained without intravenous contrast. Sagittal and coronal reconstructions were generated.   FINDINGS: Resolution is limited due to the patient's size.   VESSELS: There are atherosclerotic changes of the aorta. The main pulmonary artery appears enlarged. The cava is unremarkable.   HEART:  The heart appears enlarged.   MEDIASTINUM AND ARAMIS: There are multiple slightly  prominent mediastinal lymph nodes.   LUNG, PLEURA, AND LARGE AIRWAYS: There is dense consolidation at the left lung base. There is perhaps minimal left pleural fluid.   CHEST WALL AND LOWER NECK: There is a calcified thyroid nodule on the left.   BONES: There are degenerative changes of the spine.   UPPER ABDOMEN: The patient is status post cholecystectomy.   COMPARISON TO THE PRIOR EXAM: The chest similar.       Limited resolution.   Persistent dense consolidation at the left lung base. Follow-up to resolution is suggested.   Heart appears somewhat enlarged.   Signed by: Alma Laguna 7/1/2024 1:11 PM Dictation workstation:   [id number]    XR chest 1 view    Result Date: 7/1/2024  Interpreted By:  Tanika Dean, STUDY: XR CHEST 1 VIEW;  7/1/2024 10:11 am   INDICATION: Signs/Symptoms:Chest Pain.   COMPARISON: 06/20/2024   ACCESSION NUMBER(S): [id number]   ORDERING CLINICIAN: WILLARD MARTINEZ   FINDINGS: CARDIOMEDIASTINAL SILHOUETTE: The heart is enlarged     LUNGS: There is extensive progressive consolidation at the left lung with near complete opacification. There is a reticulonodular interstitial pattern involving the right lung. 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   ABDOMEN: No remarkable upper abdominal findings.     BONES: No acute osseous changes. There are neural stimulators extending along the right and left sides of the neck superiorly.       1. Enlarged heart. 2. Marked progression of disease with near-complete opacification of the left hemithorax. 3. Reticulonodular interstitial pattern right lung. 4. 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   MACRO: None   Signed by: Tanika Dean 7/1/2024 10:27 AM Dictation workstation:   [id number]    ECG 12 lead    Result Date: 6/21/2024  Wide QRS rhythm with Premature ventricular complexes or Fusion complexes Nonspecific intraventricular block Abnormal ECG When compared with ECG of  20-JUN-2024 14:06, (unconfirmed) Wide QRS rhythm has replaced Sinus rhythm    XR chest 1 view    Result Date: 6/20/2024  Interpreted By:  Neil Connell, STUDY: XR CHEST 1 VIEW;  6/20/2024 1:55 pm   INDICATION: Signs/Symptoms:SOB after removing oxygen at facility, feels improved after reapplied.   COMPARISON: 06/14/2024   ACCESSION NUMBER(S): [id number]   ORDERING CLINICIAN: ANNE JUNIOR   FINDINGS: There is opacification of the left lower lung laterally probably from effusion with consolidated infiltrate. This appears slightly improved from the prior exam. This obscures the left cardiac margin, as well as been lower hilum. There is interstitial prominence of the right hemithorax. This is probably exacerbated with increased appearance compared to the prior exam from a shallower inspiration, but probably with superimposed interstitial edema. Left suprahilar opacity may be from superimposed vasculature and/or atelectasis.   ABDOMEN AND OTHER FINDINGS: No remarkable upper abdominal findings.   BONES: No acute osseous changes.       1.  Probable slight improvement of left mid and lower lung airspace disease, and mild congestion.   Signed by: Neil Connell 6/20/2024 2:36 PM Dictation workstation:   [id number]    ECG 12 lead    Result Date: 6/20/2024  Normal sinus rhythm Incomplete right bundle branch block ST elevation, consider lateral injury or acute infarct ** ** ACUTE MI ** ** Abnormal ECG When compared with ECG of 14-JUN-2024 17:45, (unconfirmed) Sinus rhythm has replaced Junctional rhythm Inverted T waves have replaced nonspecific T wave abnormality in Inferior leads T wave inversion no longer evident in Lateral leads    ECG 12 lead    Result Date: 6/18/2024  Accelerated Junctional rhythm Left axis deviation ST elevation, consider lateral injury or acute infarct ** ** ACUTE MI ** ** Abnormal ECG When compared with ECG of 27-MAR-2024 18:08, Junctional rhythm has replaced Sinus rhythm Left anterior fascicular  block is no longer Present ST more depressed in Inferior leads ST elevation now present in Lateral leads    Transthoracic Echo (TTE) Complete    Result Date: 6/17/2024            [facility] [place] [address] [facility] [address]    Tel [phone] Fax [phone] TRANSTHORACIC ECHOCARDIOGRAM REPORT  Patient Name:      TEJINDER GONZALES          Reading Physician:    [id number] Jack Luna MD Study Date:        6/16/2024             Ordering Provider:    [id number] AMEENA JOHNSTON MRN/PID:           [id number]              Fellow: Accession#:        [id number]          Nurse:                She KOENIG Date of Birth/Age: 1955 / 69 years  Sonographer:          Marcella Person RDCS Gender:            F                     Additional Staff: Height:            160.00 cm             Admit Date: Weight:            137.89 kg             Admission Status:     Inpatient -                                                                Routine BSA / BMI:         2.31 m2 / 53.86 kg/m2 Department Location:  [facility] CCU Blood Pressure: 156 /79 mmHg Study Type:    TRANSTHORACIC ECHO (TTE) COMPLETE Diagnosis/ICD: Hypoxemia-R09.02; Acute diastolic (congestive) heart failure                (CHF)-I50.31 Indication:    hypoxia; Acute diastolic CHF Patient History: Pertinent History: HTN, Hyperlipidemia and CHF. Study Detail: The following Echo studies were performed: 2D, M-Mode, Doppler and               color flow. Definity used as a contrast agent for endocardial               border definition. Total contrast used for this procedure was 2 mL               via IV push.  PHYSICIAN INTERPRETATION: Left Ventricle: Left ventricular systolic function is normal. There are no regional wall motion abnormalities. The left ventricular cavity size is  normal. There is mild concentric left ventricular hypertrophy. Spectral Doppler shows an impaired relaxation pattern of left ventricular diastolic filling. Left Atrium: The left atrium is mildly dilated. Right Ventricle: The right ventricle is normal in size. There is normal right ventricular global systolic function. Right Atrium: The right atrium is upper limits of normal in size. Aortic Valve: The aortic valve appears structurally normal. There is no evidence of aortic valve regurgitation. The peak instantaneous gradient of the aortic valve is 16.8 mmHg. The mean gradient of the aortic valve is 8.0 mmHg. Mitral Valve: The mitral valve is normal in structure. There is trace to mild mitral valve regurgitation. Tricuspid Valve: The tricuspid valve is structurally normal. There is trace tricuspid regurgitation. Pulmonic Valve: The pulmonic valve was not assessed. The pulmonic valve regurgitation was not assessed. Pericardium: There is no pericardial effusion noted. Aorta: The aortic root is normal.  CONCLUSIONS:  1. Left ventricular systolic function is normal.  2. Spectral Doppler shows an impaired relaxation pattern of left ventricular diastolic filling.  3. Mild LVH with normal EF of 55-60%.  4. Mildly increased pressure gradient LVOT and aortic valve, within acceptable limits at this time.  5. Diastolic dysfunction with mildly dilated atria. QUANTITATIVE DATA SUMMARY: 2D MEASUREMENTS:                          Normal Ranges: LAs:           4.10 cm   (2.7-4.0cm) IVSd:          1.50 cm   (0.6-1.1cm) LVPWd:         1.30 cm   (0.6-1.1cm) LVIDd:         3.80 cm   (3.9-5.9cm) LVIDs:         2.70 cm LV Mass Index: 84.0 g/m2 LV % FS        28.9 % LA VOLUME:                             Normal Ranges: LA Area A4C:     24.0 cm2 LA Area A2C:     21.0 cm2 LA Volume Index: 28.0 ml/m2 AORTA MEASUREMENTS:                    Normal Ranges: Asc Ao, d: 3.60 cm (2.1-3.4cm) LV SYSTOLIC FUNCTION BY 2D PLANIMETRY (MOD):                      Normal Ranges: EF-A4C View: 52.9 % (>=55%) LV DIASTOLIC FUNCTION:                               Normal Ranges: MV Peak E:        1.38 m/s    (0.7-1.2 m/s) MV Peak A:        1.08 m/s    (0.42-0.7 m/s) E/A Ratio:        1.28        (1.0-2.2) MV e'             0.06 m/s    (>8.0) MV lateral e'     0.07 m/s MV medial e'      0.06 m/s E/e' Ratio:       21.23       (<8.0) PulmV Sys Rojelio:    72.30 cm/s PulmV Graham Rojelio:   58.80 cm/s PulmV S/D Rojelio:    1.20 PulmV A Revs Rojelio: 28.30 cm/s PulmV A Revs Dur: 169.00 msec MITRAL VALVE:                 Normal Ranges: MV DT: 363 msec (150-240msec) AORTIC VALVE:                                    Normal Ranges: AoV Vmax:                2.05 m/s  (<=1.7m/s) AoV Peak P.8 mmHg (<20mmHg) AoV Mean P.0 mmHg  (1.7-11.5mmHg) LVOT Max Rojelio:            1.42 m/s  (<=1.1m/s) AoV VTI:                 46.70 cm  (18-25cm) LVOT VTI:                39.20 cm LVOT Diameter:           2.50 cm   (1.8-2.4cm) AoV Area, VTI:           4.12 cm2  (2.5-5.5cm2) AoV Area,Vmax:           3.40 cm2  (2.5-4.5cm2) AoV Dimensionless Index: 0.84  RIGHT VENTRICLE: RV Basal 3.10 cm RV Mid   2.20 cm RV Major 7.3 cm TAPSE:   18.3 mm RV s'    0.13 m/s TRICUSPID VALVE/RVSP:                             Normal Ranges: Peak TR Velocity: 1.87 m/s RV Syst Pressure: 17.0 mmHg (< 30mmHg) Pulmonary Veins: PulmV A Revs Dur: 169.00 msec PulmV A Revs Rojelio: 28.30 cm/s PulmV Graham Rojelio:   58.80 cm/s PulmV S/D Rojelio:    1.20 PulmV Sys Rojelio:    72.30 cm/s  [id number] Jack Luna MD Electronically signed on 2024 at 7:49:29 AM  ** Final **     Electrocardiogram, 12-lead PRN ACS symptoms    Result Date: 6/15/2024  Accelerated Junctional rhythm Left axis deviation ST elevation, consider lateral injury or acute infarct ** ** ACUTE MI ** ** Abnormal ECG When compared with ECG of 2024 17:38, (unconfirmed) ST no longer elevated in Lateral leads    CT angio chest for pulmonary embolism    Result Date:  6/15/2024  STUDY: CT Angiogram of the Chest; 6/14/2024 at 10:52 p.m. INDICATION: Elevated D-Dimer.  Hypoxia. COMPARISON: None Available. ACCESSION NUMBER(S): [id number] ORDERING CLINICIAN: MARIANELA HARDWICK TECHNIQUE:  CTA of the chest was performed with intravenous contrast. Images are reviewed and processed at a workstation according to the CT angiogram protocol with 3-D and/or MIP post processing imaging generated.  Omnipaque 350 60 mL was administered intravenously. Automated mA/kV exposure control was utilized and patient examination was performed in strict accordance with principles of ALARA. FINDINGS: Pulmonary arteries are adequately opacified without acute or chronic filling defects.  The thoracic aorta is normal in course and caliber without dissection or aneurysm. There is mild cardiomegaly without pericardial effusion.  Thoracic lymph nodes are not enlarged. Small left pleural effusion seen.  There is no pleural thickening or pneumothorax.  The airways are patent. There is some subsegmental atelectasis of the left lower lobe.  There is pulmonary vascular congestion.  There is mild peripheral groundglass opacity and perihilar ill-defined groundglass opacity. Upper abdomen demonstrates no acute pathology. There are no acute fractures.  No suspicious bony lesions.    1. No evidence of pulmonary embolism. 2. Cardiomegaly with pulmonary vascular congestion and small left pleural effusion. 3. Mild peripheral groundglass opacity and perihilar ill-defined groundglass opacity. Suggest correlation for pneumonia. Signed by Hao Esqueda MD    XR chest 1 view    Result Date: 6/14/2024  Interpreted By:  Nikolas Gonzalez, STUDY: XR CHEST 1 VIEW; 6/14/2024 4:35 pm   INDICATION: Signs/Symptoms:Shortness of breath, pulmonary edema?  Focal infiltrate?   COMPARISON: None.   ACCESSION NUMBER(S): [id number]   ORDERING CLINICIAN: MARIANELA HARDWICK   TECHNIQUE: Number of films: 2 AP erect portable views were obtained   FINDINGS:  There is limitation due to rotation, respiratory motion and overlying artifacts obscuring some detail.   The cardiac silhouette is enlarged, exaggerated by the technique. Bilateral interstitial and mild alveolar perihilar infiltrates are present. There are small effusions. No pneumothorax is seen. Degenerative changes involve the spine and shoulders. Two electronic devices are seen over the upper chest, with the wires directed superiorly in the neck.       Limited study. Cardiomegaly and congestive heart failure.     Signed by: Nikolas Gonzalez 6/14/2024 4:41 PM Dictation workstation:   [id number]     Assessment/Plan   Principal Problem:    Acute on chronic respiratory failure with hypoxia and hypercapnia (Multi)    This is 69-year-old female with PMH significant for asthma (on 4 L via nasal cannula at home) OA, ARCHIE (not on any therapy), femur fracture, depression (s/p DBS), HTN, IDDM 2, asthma, CKD 3, HLD, diastolic CHF (last echo 7/2023, EF 75%, pulmonary artery hypertension 40-50 mmHg).  Patient presented to [facility] emergency department from nursing facility on 7/1/2024 with shortness of breath, hypoxic with altered mental status.  Patient arrived to the emergency department on 15 L via Oxymizer.  In the emergency department patient had been vitally stable, labs are significant for potassium of 7.5 (hemolyzed), sodium 132, BUN 24, creatinine 2.49, alk phos 163, ALT 1132, AST 1367, bilirubin 1.5, BNP for 56, troponin 64, WBC 19.4, initial ABG 7.28/64/133/30.1 with repeat 7.27/66/78/30.3 after being on BiPAP for approximately 6 hours in duration.  Imaging at that time was significant for marked progression of disease with near complete opacification of left hemithorax, reticulonodular pattern in the right lung, findings consistent with mixed alveolar interstitial pneumonia.  CT imaging at that time showed persistent dense consolidation at the left lung base.  She was treated with albuterol nebulizer hyperkalemia  cocktail, doxycycline and Zosyn as well as methylprednisolone.  At this time patient was admitted to the ICU for significant acidosis and acute hypoxemic respiratory on chronic hypoxemic hypercapnic failure on continuous BiPAP.  While in the ICU, patient was subsequently weaned down to 3 L via nasal cannula today.  GI was consulted for elevated LFTs, and recommended continue supportive care diet as tolerated and continue to trend liver enzymes while avoiding hepatotoxic drugs.     AHRF on chronic hypoxemic hypercapnic respiratory failure 2/2 interstitial pneumonia  Sepsis  2/2 pneumonia with endorgan dysfunction consistent with elevated troponin, elevated transaminases, (RESOLVED)  Diastolic CHF with PAH  Patient presented to [facility] with chief complaints of hypoxia and altered mental status. Over the past month, she had multiple ED visits and was recently admitted for CHF exacerbation or undiagnosed COPD. Her CXR revealed significant disease progression, with nearly complete opacification of the left hemithorax and a reticulonodular pattern in the right lung, consistent with mixed alveolar-interstitial pneumonia. CT imaging at that time showed persistent dense consolidation at the left lung base. Her last echocardiogram in July 2023 demonstrated an ejection fraction of 75% and pulmonary artery hypertension with pressures ranging from 40 to 50 mmHg.  WBC downtrending from 19> 17>16.3.    -Continue incentive spirometry  -Continue supplemental oxygen, wean as seen fit  -Continue NIPPV nightly and as needed  -Pulmonology consult placed for obtaining NIPPV outpatient on discharge.  No further recommendation.  -S/p 5-day course of steroids  -S/p 5 days of antibiotic.  Discontinue Zosyn and Augmentin 7/4-7/5.    -Continue scheduled DuoNebs as well as as needed  -Chest physiotherapy  -Lab sent for opportunistic lung infections, pending results  -PT OT    4. CRISTOFER on CKD3 RESOLVED)  - Downtrending creatinine 2.04 >2.21>  1.54>1.28>.97  - Etiology most likely prerenal versus renal  - Continue to monitor with RFP  - Avoid nephrotoxic agent  - Renal adjustment of medications.     5. Transaminitis downtrending  -, AST 31, normal bilirubin.  -LFTs more than 20 of the upper limit with suggest an etiology of viral versus drug-induced versus ischemic versus autoimmune.  -Liver ultrasound: Unable to identify the pancreas, previous cholecystectomy, hepatomegaly, fatty infiltration, trace RUQ ascites  -Autoimmune panel for prior biliary cholangitis and autoimmune hepatitis ordered, pending result  -GI consulted recommended to continue supportive care    6. IDDM 2  -Blood sugar average of 300.  Received 36 units of sliding scale  -Continue sliding scale #2  -Continue Lantus to 45 twice daily.  Home dose 55 twice daily  -Hypoglycemia protocol in place  -Accu-Cheks    Bipolar disorder  Anxiety  HTN  HLD  Depression (s/p DBS)  -Continue home ASA 81, nystatin, pancrelipase, gabapentin, citalopram  -Resume home med with holding parameters     Transfer order placed to transfer patient to [facility] as she is no longer requiring CCU level of care.    IVF: None at this time  DVT prophylaxis: Heparin  Diet: Carb controlled  Dispo: Anticipate additional 1-2 midnight further recommendation from pulmonology and BiPAP settings.  Consults: GI, pulmonology     CODE STATUS: Full code    Assessment and plan discussed with attending physician.    Edouard Sher M.D.  Internal Medicine PGY-2

## 2024-07-07 VITALS
WEIGHT: 293 LBS | DIASTOLIC BLOOD PRESSURE: 74 MMHG | HEIGHT: 63 IN | OXYGEN SATURATION: 97 % | TEMPERATURE: 97 F | SYSTOLIC BLOOD PRESSURE: 151 MMHG | BODY MASS INDEX: 51.91 KG/M2 | HEART RATE: 76 BPM | RESPIRATION RATE: 25 BRPM

## 2024-07-07 LAB
GLUCOSE BLD MANUAL STRIP-MCNC: 105 MG/DL (ref 74–99)
GLUCOSE BLD MANUAL STRIP-MCNC: 117 MG/DL (ref 74–99)
GLUCOSE BLD MANUAL STRIP-MCNC: 152 MG/DL (ref 74–99)
GLUCOSE BLD MANUAL STRIP-MCNC: 254 MG/DL (ref 74–99)
GLUCOSE BLD MANUAL STRIP-MCNC: 334 MG/DL (ref 74–99)
GLUCOSE BLD MANUAL STRIP-MCNC: 57 MG/DL (ref 74–99)
GLUCOSE BLD MANUAL STRIP-MCNC: 80 MG/DL (ref 74–99)
GLUCOSE BLD MANUAL STRIP-MCNC: 84 MG/DL (ref 74–99)

## 2024-07-07 PROCEDURE — 82947 ASSAY GLUCOSE BLOOD QUANT: CPT

## 2024-07-07 PROCEDURE — 2500000005 HC RX 250 GENERAL PHARMACY W/O HCPCS: Performed by: STUDENT IN AN ORGANIZED HEALTH CARE EDUCATION/TRAINING PROGRAM

## 2024-07-07 PROCEDURE — 2500000001 HC RX 250 WO HCPCS SELF ADMINISTERED DRUGS (ALT 637 FOR MEDICARE OP)

## 2024-07-07 PROCEDURE — 99231 SBSQ HOSP IP/OBS SF/LOW 25: CPT

## 2024-07-07 PROCEDURE — 94640 AIRWAY INHALATION TREATMENT: CPT

## 2024-07-07 PROCEDURE — 2500000002 HC RX 250 W HCPCS SELF ADMINISTERED DRUGS (ALT 637 FOR MEDICARE OP, ALT 636 FOR OP/ED): Performed by: STUDENT IN AN ORGANIZED HEALTH CARE EDUCATION/TRAINING PROGRAM

## 2024-07-07 PROCEDURE — 1200000002 HC GENERAL ROOM WITH TELEMETRY DAILY

## 2024-07-07 PROCEDURE — 2500000002 HC RX 250 W HCPCS SELF ADMINISTERED DRUGS (ALT 637 FOR MEDICARE OP, ALT 636 FOR OP/ED)

## 2024-07-07 PROCEDURE — 2500000004 HC RX 250 GENERAL PHARMACY W/ HCPCS (ALT 636 FOR OP/ED)

## 2024-07-07 RX ORDER — INSULIN LISPRO 100 [IU]/ML
0-10 INJECTION, SOLUTION INTRAVENOUS; SUBCUTANEOUS
Status: DISCONTINUED | OUTPATIENT
Start: 2024-07-08 | End: 2024-07-08 | Stop reason: HOSPADM

## 2024-07-07 ASSESSMENT — COGNITIVE AND FUNCTIONAL STATUS - GENERAL
MOBILITY SCORE: 14
STANDING UP FROM CHAIR USING ARMS: A LOT
PERSONAL GROOMING: A LOT
CLIMB 3 TO 5 STEPS WITH RAILING: A LOT
MOVING FROM LYING ON BACK TO SITTING ON SIDE OF FLAT BED WITH BEDRAILS: A LITTLE
TURNING FROM BACK TO SIDE WHILE IN FLAT BAD: A LITTLE
DRESSING REGULAR UPPER BODY CLOTHING: A LOT
MOVING TO AND FROM BED TO CHAIR: A LOT
HELP NEEDED FOR BATHING: A LOT
WALKING IN HOSPITAL ROOM: A LOT
TOILETING: A LOT
DAILY ACTIVITIY SCORE: 14
DRESSING REGULAR LOWER BODY CLOTHING: A LOT

## 2024-07-07 ASSESSMENT — PAIN SCALES - GENERAL
PAINLEVEL_OUTOF10: 0 - NO PAIN
PAINLEVEL_OUTOF10: 0 - NO PAIN

## 2024-07-07 NOTE — PROGRESS NOTES
07/07/24 0817   Discharge Planning   Home or Post Acute Services Post acute facilities (Rehab/SNF/etc)   Type of Post Acute Facility Services Skilled nursing   Patient expects to be discharged to: [facility]   Patient Choice   Patient / Family choosing to utilize agency / facility established prior to hospitalization Yes     Precert was obtained on 7/5 (good through 7/8) for patient to return to [facility]. Per SNF, they can accept patient on Monday 7/8 because that is when the Bipap will be delivered to the SNF.

## 2024-07-07 NOTE — CARE PLAN
The patient's goals for the shift include Feel better    The clinical goals for the shift include pt will remain HDS    Over the shift, the patient did not make progress toward the following goals. Barriers to progression include . Recommendations to address these barriers include .

## 2024-07-07 NOTE — CARE PLAN
The patient's goals for the shift include Feel better    The clinical goals for the shift include pt will remain HDS      Problem: Skin  Goal: Decreased wound size/increased tissue granulation at next dressing change  Outcome: Progressing  Goal: Promote/optimize nutrition  Outcome: Progressing     Problem: Diabetes  Goal: Decrease in ketones present in urine by end of shift  Outcome: Progressing  Goal: No changes in neurological exam by end of shift  Outcome: Progressing  Goal: Receive DSME education by end of shift  Outcome: Progressing     Problem: Respiratory  Goal: Clear secretions with interventions this shift  Outcome: Progressing  Goal: Minimize anxiety/maximize coping throughout shift  Outcome: Progressing  Goal: Minimal/no exertional discomfort or dyspnea this shift  Outcome: Progressing  Goal: No signs of respiratory distress (eg. Use of accessory muscles. Peds grunting)  Outcome: Progressing  Goal: Patent airway maintained this shift  Outcome: Progressing  Goal: Tolerate mechanical ventilation evidenced by VS/agitation level this shift  Outcome: Progressing  Goal: Tolerate pulmonary toileting this shift  Outcome: Progressing  Goal: Verbalize decreased shortness of breath this shift  Outcome: Progressing  Goal: Wean oxygen to maintain O2 saturation per order/standard this shift  Outcome: Progressing  Goal: Increase self care and/or family involvement in next 24 hours  Outcome: Progressing

## 2024-07-07 NOTE — PROGRESS NOTES
Kaylene Feliciano is a 69 y.o. female on day 6 of admission presenting with Acute on chronic respiratory failure with hypoxia and hypercapnia (Multi).      Subjective   -Patient seen this morning resting comfortably in bed  -VSS WNL on NIPPV  -NAEON  -Patient given lab holiday.  -Patient stated feeling much better today and her breathing is normal.  Patient denies 12 point ROS.    Objective     Last Recorded Vitals  /55 (BP Location: Right arm, Patient Position: Lying)   Pulse 64   Temp 36.6 °C (97.9 °F) (Temporal)   Resp 18   Wt 134 kg (295 lb 11.2 oz)   SpO2 100%   Intake/Output last 3 Shifts:    Intake/Output Summary (Last 24 hours) at 7/7/2024 1521  Last data filed at 7/7/2024 1100  Gross per 24 hour   Intake --   Output 800 ml   Net -800 ml       Admission Weight  Weight: 136 kg (300 lb) (07/01/24 0926)    Daily Weight  07/07/24 : 134 kg (295 lb 11.2 oz)      Physical Exam:  General: Not in acute distress, A&O x 3, alert, cooperative, obese   HEENT: Normocephalic, atraumatic, EOMI, moist mucous membranes, NC in place  Neck: Neck supple, trachea midline, no evidence of trauma  Cardiovascular: RRR, S1 and S2 appreciated, no murmurs rubs gallops appreciated, distal pulses 2+ bilaterally (radial and dorsalis pedis)  Respiratory: Decreased breath sounds appreciated over the left lung with still present but improved scattered wheezes rhonchi.  no increased work of breathing on 4 L NC.  GI: Abdomen soft, nondistended, nontender to palpation, bowel sounds present  Extremities: No edema appreciated in lower extremities bilaterally, no cyanosis  Neuro: A&O X3, no focal deficits, strength and sensation intact bilaterally  Skin: Warm and dry, without lesions or rashes    Relevant Results  Scheduled medications  amLODIPine, 5 mg, oral, Daily  aspirin, 81 mg, oral, Daily  citalopram, 40 mg, oral, Daily  gabapentin, 300 mg, oral, BID  heparin (porcine), 7,500 Units, subcutaneous, q8h YARELI  insulin glargine, 45 Units,  subcutaneous, BID  insulin lispro, 0-10 Units, subcutaneous, q6h  ipratropium-albuteroL, 3 mL, nebulization, TID  lisinopril, 40 mg, oral, Daily  metoprolol succinate XL, 25 mg, oral, Daily  nystatin, 1 Application, Topical, BID  pancrelipase (Lip-Prot-Amyl), 1 capsule, oral, TID AC  pantoprazole, 40 mg, oral, Daily  polyethylene glycol, 17 g, oral, Daily      Continuous medications     PRN medications  PRN medications: albuterol, dextrose, dextrose, glucagon, menthol-zinc oxide, oxygen, oxygen     Results for orders placed or performed during the hospital encounter of 07/01/24 (from the past 24 hour(s))   POCT GLUCOSE   Result Value Ref Range    POCT Glucose 102 (H) 74 - 99 mg/dL   POCT GLUCOSE   Result Value Ref Range    POCT Glucose 170 (H) 74 - 99 mg/dL   POCT GLUCOSE   Result Value Ref Range    POCT Glucose 84 74 - 99 mg/dL   POCT GLUCOSE   Result Value Ref Range    POCT Glucose 57 (L) 74 - 99 mg/dL   POCT GLUCOSE   Result Value Ref Range    POCT Glucose 80 74 - 99 mg/dL   POCT GLUCOSE   Result Value Ref Range    POCT Glucose 105 (H) 74 - 99 mg/dL   POCT GLUCOSE   Result Value Ref Range    POCT Glucose 117 (H) 74 - 99 mg/dL             US abdomen limited liver    Result Date: 7/2/2024  Interpreted By:  Elmer So, STUDY: US ABDOMEN LIMITED LIVER  7/2/2024 8:32 am   INDICATION: 68 y/o   F with  Signs/Symptoms:RUQ pain..   COMPARISON: Correlation with CT scan chest from 07/01/2024.   ACCESSION NUMBER(S): [id number]   ORDERING CLINICIAN: JACKLYN REGALADO   TECHNIQUE: Portable ultrasound of the right upper quadrant was performed using grayscale imaging, color Doppler, and spectral Doppler.   FINDINGS: LIVER: Craniocaudal length: 22.5 cm. This is  abnormally enlarged Echogenicity:  Diffusely increased. Mass:  None   GALLBLADDER: Surgically absent.   BILE DUCTS: No intrahepatic biliary ductal dilatation. Common bile duct measured .39 cm in diameter. This is within the limits of normal.   PANCREAS: The pancreas was  obscured by bowel gas..   RIGHT KIDNEY: Craniocaudal length:  11.4 cm, Within normal limits of size for age. Echogenicity was normal. No hydronephrosis, shadowing stone, or perinephric edema. No gross right renal mass. There was a mid to lower pole simple cyst measuring 35 x 35 x 33 mm.   PERITONEAL FLUID: Trace right upper quadrant ascites.         Unable to identify the pancreas in this exam. Previous cholecystectomy.   Hepatomegaly.  Nonspecific increased echogenicity throughout the liver, most likely fatty infiltration. .   Mid to lower pole right renal cyst.   Trace right upper quadrant ascites.   Signed by: Elmer So 7/2/2024 9:06 AM Dictation workstation:   [id number]    ECG 12 lead    Result Date: 7/2/2024  Accelerated Junctional rhythm Left axis deviation Septal infarct , age undetermined ST & T wave abnormality, consider inferolateral ischemia Abnormal ECG When compared with ECG of 20-JUN-2024 15:59, (unconfirmed) Junctional rhythm has replaced Wide QRS rhythm    ECG 12 lead    Result Date: 7/1/2024  Marked sinus bradycardia with AV dissociation and Wide QRS rhythm Left axis deviation Nonspecific intraventricular block Lateral infarct , age undetermined Inferior infarct , age undetermined Abnormal ECG When compared with ECG of 20-JUN-2024 15:59, (unconfirmed) Sinus rhythm is now with AV dissociation    CT chest wo IV contrast    Result Date: 7/1/2024  Interpreted By:  Alma Laguna, STUDY: CT CHEST WO IV CONTRAST;  7/1/2024 12:51 pm   INDICATION: Signs/Symptoms:Respiratory failure, L lung consolidation vs effusion.   COMPARISON: 06/14/2024   ACCESSION NUMBER(S): [id number]   ORDERING CLINICIAN: WILLARD MARTINEZ   TECHNIQUE: Serial axial CT images of the chest obtained without intravenous contrast. Sagittal and coronal reconstructions were generated.   FINDINGS: Resolution is limited due to the patient's size.   VESSELS: There are atherosclerotic changes of the aorta. The main pulmonary artery  appears enlarged. The cava is unremarkable.   HEART:  The heart appears enlarged.   MEDIASTINUM AND ARAMIS: There are multiple slightly prominent mediastinal lymph nodes.   LUNG, PLEURA, AND LARGE AIRWAYS: There is dense consolidation at the left lung base. There is perhaps minimal left pleural fluid.   CHEST WALL AND LOWER NECK: There is a calcified thyroid nodule on the left.   BONES: There are degenerative changes of the spine.   UPPER ABDOMEN: The patient is status post cholecystectomy.   COMPARISON TO THE PRIOR EXAM: The chest similar.       Limited resolution.   Persistent dense consolidation at the left lung base. Follow-up to resolution is suggested.   Heart appears somewhat enlarged.   Signed by: Alma Laguna 7/1/2024 1:11 PM Dictation workstation:   [id number]    XR chest 1 view    Result Date: 7/1/2024  Interpreted By:  Tanika Dean, STUDY: XR CHEST 1 VIEW;  7/1/2024 10:11 am   INDICATION: Signs/Symptoms:Chest Pain.   COMPARISON: 06/20/2024   ACCESSION NUMBER(S): [id number]   ORDERING CLINICIAN: WILLARD MARTINEZ   FINDINGS: CARDIOMEDIASTINAL SILHOUETTE: The heart is enlarged     LUNGS: There is extensive progressive consolidation at the left lung with near complete opacification. There is a reticulonodular interstitial pattern involving the right lung. 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   ABDOMEN: No remarkable upper abdominal findings.     BONES: No acute osseous changes. There are neural stimulators extending along the right and left sides of the neck superiorly.       1. Enlarged heart. 2. Marked progression of disease with near-complete opacification of the left hemithorax. 3. Reticulonodular interstitial pattern right lung. 4. 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   MACRO: None   Signed by: Tanika Dean 7/1/2024 10:27 AM Dictation workstation:   [id number]    ECG 12 lead    Result Date: 6/21/2024  Wide QRS rhythm with  Premature ventricular complexes or Fusion complexes Nonspecific intraventricular block Abnormal ECG When compared with ECG of 20-JUN-2024 14:06, (unconfirmed) Wide QRS rhythm has replaced Sinus rhythm    XR chest 1 view    Result Date: 6/20/2024  Interpreted By:  Neil Connell, STUDY: XR CHEST 1 VIEW;  6/20/2024 1:55 pm   INDICATION: Signs/Symptoms:SOB after removing oxygen at facility, feels improved after reapplied.   COMPARISON: 06/14/2024   ACCESSION NUMBER(S): [id number]   ORDERING CLINICIAN: ANNE JUNIOR   FINDINGS: There is opacification of the left lower lung laterally probably from effusion with consolidated infiltrate. This appears slightly improved from the prior exam. This obscures the left cardiac margin, as well as been lower hilum. There is interstitial prominence of the right hemithorax. This is probably exacerbated with increased appearance compared to the prior exam from a shallower inspiration, but probably with superimposed interstitial edema. Left suprahilar opacity may be from superimposed vasculature and/or atelectasis.   ABDOMEN AND OTHER FINDINGS: No remarkable upper abdominal findings.   BONES: No acute osseous changes.       1.  Probable slight improvement of left mid and lower lung airspace disease, and mild congestion.   Signed by: Neil Connell 6/20/2024 2:36 PM Dictation workstation:   [id number]    ECG 12 lead    Result Date: 6/20/2024  Normal sinus rhythm Incomplete right bundle branch block ST elevation, consider lateral injury or acute infarct ** ** ACUTE MI ** ** Abnormal ECG When compared with ECG of 14-JUN-2024 17:45, (unconfirmed) Sinus rhythm has replaced Junctional rhythm Inverted T waves have replaced nonspecific T wave abnormality in Inferior leads T wave inversion no longer evident in Lateral leads    ECG 12 lead    Result Date: 6/18/2024  Accelerated Junctional rhythm Left axis deviation ST elevation, consider lateral injury or acute infarct ** ** ACUTE MI ** **  Abnormal ECG When compared with ECG of 27-MAR-2024 18:08, Junctional rhythm has replaced Sinus rhythm Left anterior fascicular block is no longer Present ST more depressed in Inferior leads ST elevation now present in Lateral leads    Transthoracic Echo (TTE) Complete    Result Date: 6/17/2024            [facility] [id number] [facility] [id number]    Tel [phone] Fax [phone] TRANSTHORACIC ECHOCARDIOGRAM REPORT  Patient Name:      TEJINDER GONZALES          Reading Physician:    [id number] Jack Luna MD Study Date:        6/16/2024             Ordering Provider:    [id number] AMEENA JOHNSTON MRN/PID:           [id number]              Fellow: Accession#:        [id number]          Nurse:                She KOENIG Date of Birth/Age: 1955 / 69 years  Sonographer:          Marcella Person RDCS Gender:            F                     Additional Staff: Height:            160.00 cm             Admit Date: Weight:            137.89 kg             Admission Status:     Inpatient -                                                                Routine BSA / BMI:         2.31 m2 / 53.86 kg/m2 Department Location:  [facility] Blood Pressure: 156 /79 mmHg Study Type:    TRANSTHORACIC ECHO (TTE) COMPLETE Diagnosis/ICD: Hypoxemia-R09.02; Acute diastolic (congestive) heart failure                (CHF)-I50.31 Indication:    hypoxia; Acute diastolic CHF Patient History: Pertinent History: HTN, Hyperlipidemia and CHF. Study Detail: The following Echo studies were performed: 2D, M-Mode, Doppler and               color flow. Definity used as a contrast agent for endocardial               border definition. Total contrast used for this procedure was 2 mL               via IV push.  PHYSICIAN INTERPRETATION: Left Ventricle: Left  ventricular systolic function is normal. There are no regional wall motion abnormalities. The left ventricular cavity size is normal. There is mild concentric left ventricular hypertrophy. Spectral Doppler shows an impaired relaxation pattern of left ventricular diastolic filling. Left Atrium: The left atrium is mildly dilated. Right Ventricle: The right ventricle is normal in size. There is normal right ventricular global systolic function. Right Atrium: The right atrium is upper limits of normal in size. Aortic Valve: The aortic valve appears structurally normal. There is no evidence of aortic valve regurgitation. The peak instantaneous gradient of the aortic valve is 16.8 mmHg. The mean gradient of the aortic valve is 8.0 mmHg. Mitral Valve: The mitral valve is normal in structure. There is trace to mild mitral valve regurgitation. Tricuspid Valve: The tricuspid valve is structurally normal. There is trace tricuspid regurgitation. Pulmonic Valve: The pulmonic valve was not assessed. The pulmonic valve regurgitation was not assessed. Pericardium: There is no pericardial effusion noted. Aorta: The aortic root is normal.  CONCLUSIONS:  1. Left ventricular systolic function is normal.  2. Spectral Doppler shows an impaired relaxation pattern of left ventricular diastolic filling.  3. Mild LVH with normal EF of 55-60%.  4. Mildly increased pressure gradient LVOT and aortic valve, within acceptable limits at this time.  5. Diastolic dysfunction with mildly dilated atria. QUANTITATIVE DATA SUMMARY: 2D MEASUREMENTS:                          Normal Ranges: LAs:           4.10 cm   (2.7-4.0cm) IVSd:          1.50 cm   (0.6-1.1cm) LVPWd:         1.30 cm   (0.6-1.1cm) LVIDd:         3.80 cm   (3.9-5.9cm) LVIDs:         2.70 cm LV Mass Index: 84.0 g/m2 LV % FS        28.9 % LA VOLUME:                             Normal Ranges: LA Area A4C:     24.0 cm2 LA Area A2C:     21.0 cm2 LA Volume Index: 28.0 ml/m2 AORTA MEASUREMENTS:                     Normal Ranges: Asc Ao, d: 3.60 cm (2.1-3.4cm) LV SYSTOLIC FUNCTION BY 2D PLANIMETRY (MOD):                     Normal Ranges: EF-A4C View: 52.9 % (>=55%) LV DIASTOLIC FUNCTION:                               Normal Ranges: MV Peak E:        1.38 m/s    (0.7-1.2 m/s) MV Peak A:        1.08 m/s    (0.42-0.7 m/s) E/A Ratio:        1.28        (1.0-2.2) MV e'             0.06 m/s    (>8.0) MV lateral e'     0.07 m/s MV medial e'      0.06 m/s E/e' Ratio:       21.23       (<8.0) PulmV Sys Rojelio:    72.30 cm/s PulmV Graham Rojelio:   58.80 cm/s PulmV S/D Rojelio:    1.20 PulmV A Revs Rojelio: 28.30 cm/s PulmV A Revs Dur: 169.00 msec MITRAL VALVE:                 Normal Ranges: MV DT: 363 msec (150-240msec) AORTIC VALVE:                                    Normal Ranges: AoV Vmax:                2.05 m/s  (<=1.7m/s) AoV Peak P.8 mmHg (<20mmHg) AoV Mean P.0 mmHg  (1.7-11.5mmHg) LVOT Max Rojelio:            1.42 m/s  (<=1.1m/s) AoV VTI:                 46.70 cm  (18-25cm) LVOT VTI:                39.20 cm LVOT Diameter:           2.50 cm   (1.8-2.4cm) AoV Area, VTI:           4.12 cm2  (2.5-5.5cm2) AoV Area,Vmax:           3.40 cm2  (2.5-4.5cm2) AoV Dimensionless Index: 0.84  RIGHT VENTRICLE: RV Basal 3.10 cm RV Mid   2.20 cm RV Major 7.3 cm TAPSE:   18.3 mm RV s'    0.13 m/s TRICUSPID VALVE/RVSP:                             Normal Ranges: Peak TR Velocity: 1.87 m/s RV Syst Pressure: 17.0 mmHg (< 30mmHg) Pulmonary Veins: PulmV A Revs Dur: 169.00 msec PulmV A Revs Rojelio: 28.30 cm/s PulmV Graham Rojelio:   58.80 cm/s PulmV S/D Rojelio:    1.20 PulmV Sys Rojelio:    72.30 cm/s  [id number] Jack Luna MD Electronically signed on 2024 at 7:49:29 AM  ** Final **     Electrocardiogram, 12-lead PRN ACS symptoms    Result Date: 6/15/2024  Accelerated Junctional rhythm Left axis deviation ST elevation, consider lateral injury or acute infarct ** ** ACUTE MI ** ** Abnormal ECG When compared with ECG of 2024 17:38,  (unconfirmed) ST no longer elevated in Lateral leads    CT angio chest for pulmonary embolism    Result Date: 6/15/2024  STUDY: CT Angiogram of the Chest; 6/14/2024 at 10:52 p.m. INDICATION: Elevated D-Dimer.  Hypoxia. COMPARISON: None Available. ACCESSION NUMBER(S): [id number] ORDERING CLINICIAN: MARIANELA HARDWICK TECHNIQUE:  CTA of the chest was performed with intravenous contrast. Images are reviewed and processed at a workstation according to the CT angiogram protocol with 3-D and/or MIP post processing imaging generated.  Omnipaque 350 60 mL was administered intravenously. Automated mA/kV exposure control was utilized and patient examination was performed in strict accordance with principles of ALARA. FINDINGS: Pulmonary arteries are adequately opacified without acute or chronic filling defects.  The thoracic aorta is normal in course and caliber without dissection or aneurysm. There is mild cardiomegaly without pericardial effusion.  Thoracic lymph nodes are not enlarged. Small left pleural effusion seen.  There is no pleural thickening or pneumothorax.  The airways are patent. There is some subsegmental atelectasis of the left lower lobe.  There is pulmonary vascular congestion.  There is mild peripheral groundglass opacity and perihilar ill-defined groundglass opacity. Upper abdomen demonstrates no acute pathology. There are no acute fractures.  No suspicious bony lesions.    1. No evidence of pulmonary embolism. 2. Cardiomegaly with pulmonary vascular congestion and small left pleural effusion. 3. Mild peripheral groundglass opacity and perihilar ill-defined groundglass opacity. Suggest correlation for pneumonia. Signed by Hao Esqueda MD    XR chest 1 view    Result Date: 6/14/2024  Interpreted By:  Nikolas Gonzalez, STUDY: XR CHEST 1 VIEW; 6/14/2024 4:35 pm   INDICATION: Signs/Symptoms:Shortness of breath, pulmonary edema?  Focal infiltrate?   COMPARISON: None.   ACCESSION NUMBER(S): [id number]    ORDERING CLINICIAN: MARIANELA HARDWICK   TECHNIQUE: Number of films: 2 AP erect portable views were obtained   FINDINGS: There is limitation due to rotation, respiratory motion and overlying artifacts obscuring some detail.   The cardiac silhouette is enlarged, exaggerated by the technique. Bilateral interstitial and mild alveolar perihilar infiltrates are present. There are small effusions. No pneumothorax is seen. Degenerative changes involve the spine and shoulders. Two electronic devices are seen over the upper chest, with the wires directed superiorly in the neck.       Limited study. Cardiomegaly and congestive heart failure.     Signed by: Nikolas Gonzalez 6/14/2024 4:41 PM Dictation workstation:   [id number]     Assessment/Plan   Principal Problem:    Acute on chronic respiratory failure with hypoxia and hypercapnia (Multi)    This is 69-year-old female with PMH significant for asthma (on 4 L via nasal cannula at home) OA, ARCHIE (not on any therapy), femur fracture, depression (s/p DBS), HTN, IDDM 2, asthma, CKD 3, HLD, diastolic CHF (last echo 7/2023, EF 75%, pulmonary artery hypertension 40-50 mmHg).  Patient presented to [facility] emergency department from nursing facility on 7/1/2024 with shortness of breath, hypoxic with altered mental status.  Patient arrived to the emergency department on 15 L via Oxymizer.  In the emergency department patient had been vitally stable, labs are significant for potassium of 7.5 (hemolyzed), sodium 132, BUN 24, creatinine 2.49, alk phos 163, ALT 1132, AST 1367, bilirubin 1.5, BNP for 56, troponin 64, WBC 19.4, initial ABG 7.28/64/133/30.1 with repeat 7.27/66/78/30.3 after being on BiPAP for approximately 6 hours in duration.  Imaging at that time was significant for marked progression of disease with near complete opacification of left hemithorax, reticulonodular pattern in the right lung, findings consistent with mixed alveolar interstitial pneumonia.  CT imaging at that time showed  persistent dense consolidation at the left lung base.  She was treated with albuterol nebulizer hyperkalemia cocktail, doxycycline and Zosyn as well as methylprednisolone.  At this time patient was admitted to the ICU for significant acidosis and acute hypoxemic respiratory on chronic hypoxemic hypercapnic failure on continuous BiPAP.  While in the ICU, patient was subsequently weaned down to 3 L via nasal cannula today.  GI was consulted for elevated LFTs, and recommended continue supportive care diet as tolerated and continue to trend liver enzymes while avoiding hepatotoxic drugs.     AHRF on chronic hypoxemic hypercapnic respiratory failure 2/2 interstitial pneumonia  Sepsis  2/2 pneumonia with endorgan dysfunction consistent with elevated troponin, elevated transaminases, (RESOLVED)  Diastolic CHF with PAH  Patient presented to [facility] with chief complaints of hypoxia and altered mental status. Over the past month, she had multiple ED visits and was recently admitted for CHF exacerbation or undiagnosed COPD. Her CXR revealed significant disease progression, with nearly complete opacification of the left hemithorax and a reticulonodular pattern in the right lung, consistent with mixed alveolar-interstitial pneumonia. CT imaging at that time showed persistent dense consolidation at the left lung base. Her last echocardiogram in July 2023 demonstrated an ejection fraction of 75% and pulmonary artery hypertension with pressures ranging from 40 to 50 mmHg.  WBC downtrending from 19> 17>16.3.    -Continue incentive spirometry  -Continue supplemental oxygen, wean as seen fit  -Continue NIPPV nightly and as needed  -Pulmonology consult placed for obtaining NIPPV outpatient on discharge.  No further recommendation.  -S/p 5-day course of steroids  -S/p 5 days of antibiotic.  Discontinue Zosyn and Augmentin 7/4-7/5.    -Continue scheduled DuoNebs as well as as needed  -Chest physiotherapy  -Lab sent for opportunistic lung  infections, pending results  -PT OT    4. CRISTOFER on CKD3 RESOLVED)  - Downtrending creatinine 2.04 >2.21> 1.54>1.28>.97  - Etiology most likely prerenal versus renal  - Continue to monitor with RFP  - Avoid nephrotoxic agent  - Renal adjustment of medications.     5. Transaminitis resolved  -, AST 31, normal bilirubin.  -LFTs more than 20 of the upper limit with suggest an etiology of viral versus drug-induced versus ischemic versus autoimmune.  -Liver ultrasound: Unable to identify the pancreas, previous cholecystectomy, hepatomegaly, fatty infiltration, trace RUQ ascites  -Autoimmune panel for prior biliary cholangitis and autoimmune hepatitis ordered, pending result  -GI consulted recommended to continue supportive care    6. IDDM 2  -Blood sugar average of 300.  Received 36 units of sliding scale  -Continue sliding scale #2  -Continue Lantus to 45 twice daily.  Home dose 55 twice daily  -Hypoglycemia protocol in place  -Accu-Cheks    Bipolar disorder  Anxiety  HTN  HLD  Depression (s/p DBS)  -Continue home ASA 81, nystatin, pancrelipase, gabapentin, citalopram  -Resume home med with holding parameters    IVF: None at this time  DVT prophylaxis: Heparin  Diet: Carb controlled  Dispo: Anticipate discharge on 7/8  consults: GI, pulmonology     CODE STATUS: Full code    Assessment and plan discussed with my attending.   Vazquez Pereira MD   Internal Medicine, PGY-2 .

## 2024-07-07 NOTE — NURSING NOTE
Assumed care, patient A&Ox4 in bed using tablet. Patients vitals stable. Patient complained of irritated laura area from urinary incontinence and educated on importance of keeping clean and dry and turning and repositioning . Patient call light in reach uses light appropriately. Patient safety maintained.

## 2024-07-08 VITALS
OXYGEN SATURATION: 96 % | WEIGHT: 293 LBS | RESPIRATION RATE: 18 BRPM | HEART RATE: 73 BPM | DIASTOLIC BLOOD PRESSURE: 58 MMHG | HEIGHT: 63 IN | TEMPERATURE: 98.1 F | BODY MASS INDEX: 51.91 KG/M2 | SYSTOLIC BLOOD PRESSURE: 148 MMHG

## 2024-07-08 PROBLEM — J96.22 ACUTE ON CHRONIC RESPIRATORY FAILURE WITH HYPOXIA AND HYPERCAPNIA (MULTI): Status: RESOLVED | Noted: 2024-07-01 | Resolved: 2024-07-08

## 2024-07-08 PROBLEM — J96.21 ACUTE ON CHRONIC RESPIRATORY FAILURE WITH HYPOXIA AND HYPERCAPNIA (MULTI): Status: RESOLVED | Noted: 2024-07-01 | Resolved: 2024-07-08

## 2024-07-08 LAB
GLUCOSE BLD MANUAL STRIP-MCNC: 116 MG/DL (ref 74–99)
GLUCOSE BLD MANUAL STRIP-MCNC: 162 MG/DL (ref 74–99)
GLUCOSE BLD MANUAL STRIP-MCNC: 87 MG/DL (ref 74–99)
MITOCHONDRIA AB SER QL IF: NEGATIVE
SCAN RESULT: NORMAL
SMOOTH MUSCLE AB SER QL IF: NEGATIVE

## 2024-07-08 PROCEDURE — 99238 HOSP IP/OBS DSCHRG MGMT 30/<: CPT

## 2024-07-08 PROCEDURE — 2500000001 HC RX 250 WO HCPCS SELF ADMINISTERED DRUGS (ALT 637 FOR MEDICARE OP)

## 2024-07-08 PROCEDURE — 2500000002 HC RX 250 W HCPCS SELF ADMINISTERED DRUGS (ALT 637 FOR MEDICARE OP, ALT 636 FOR OP/ED)

## 2024-07-08 PROCEDURE — 2500000004 HC RX 250 GENERAL PHARMACY W/ HCPCS (ALT 636 FOR OP/ED)

## 2024-07-08 PROCEDURE — 2500000005 HC RX 250 GENERAL PHARMACY W/O HCPCS: Performed by: STUDENT IN AN ORGANIZED HEALTH CARE EDUCATION/TRAINING PROGRAM

## 2024-07-08 PROCEDURE — 82947 ASSAY GLUCOSE BLOOD QUANT: CPT

## 2024-07-08 PROCEDURE — 94660 CPAP INITIATION&MGMT: CPT

## 2024-07-08 RX ORDER — MENTHOL AND ZINC OXIDE .44; 20.625 G/100G; G/100G
1 OINTMENT TOPICAL 4 TIMES DAILY PRN
Start: 2024-07-08

## 2024-07-08 RX ORDER — METOPROLOL SUCCINATE 25 MG/1
25 TABLET, EXTENDED RELEASE ORAL DAILY
Start: 2024-07-08

## 2024-07-08 RX ORDER — AMLODIPINE BESYLATE 5 MG/1
10 TABLET ORAL DAILY
Start: 2024-07-08

## 2024-07-08 ASSESSMENT — COGNITIVE AND FUNCTIONAL STATUS - GENERAL
MOVING FROM LYING ON BACK TO SITTING ON SIDE OF FLAT BED WITH BEDRAILS: A LITTLE
WALKING IN HOSPITAL ROOM: A LOT
STANDING UP FROM CHAIR USING ARMS: A LOT
DRESSING REGULAR LOWER BODY CLOTHING: A LOT
HELP NEEDED FOR BATHING: A LOT
TOILETING: A LOT
PERSONAL GROOMING: A LOT
DAILY ACTIVITIY SCORE: 14
MOVING TO AND FROM BED TO CHAIR: A LOT
CLIMB 3 TO 5 STEPS WITH RAILING: A LOT
DRESSING REGULAR UPPER BODY CLOTHING: A LOT
MOBILITY SCORE: 14
TURNING FROM BACK TO SIDE WHILE IN FLAT BAD: A LITTLE

## 2024-07-08 ASSESSMENT — PAIN SCALES - GENERAL: PAINLEVEL_OUTOF10: 0 - NO PAIN

## 2024-07-08 NOTE — CARE PLAN
The patient's goals for the shift include Feel better    The clinical goals for the shift include See plan of care taken 7/8    Pt. Discharged back to her facility.  Report has been called.  Pt. Transported by physicians ambulance with her belongings.

## 2024-07-08 NOTE — DISCHARGE SUMMARY
Discharge Diagnosis  Acute on chronic respiratory failure with hypoxia and hypercapnia (Multi)    Issues Requiring Follow-Up  Follow-up appointment with primary care to review hospital course.  Follow-up with outpatient sleep study and sleep medicine  Medication changes  Discharge Meds     Your medication list        START taking these medications        Instructions Last Dose Given Next Dose Due   menthol-zinc oxide 0.44-20.6 % ointment  Commonly known as: Calmoseptine - Risamine      Apply 1 Application topically 4 times a day as needed (moisture associated skin damage).       oxygen gas therapy  Commonly known as: O2      Inhale 1 each once every 24 hours.       oxygen gas therapy  Commonly known as: O2      Inhale 1 each once every 24 hours.              CHANGE how you take these medications        Instructions Last Dose Given Next Dose Due   amLODIPine 5 mg tablet  Commonly known as: Norvasc  What changed: how much to take      Take 2 tablets (10 mg) by mouth once daily.       metoprolol succinate XL 25 mg 24 hr tablet  Commonly known as: Toprol-XL  What changed: when to take this      Take 1 tablet (25 mg) by mouth once daily. Hold for SBP<110 or HR <70              CONTINUE taking these medications        Instructions Last Dose Given Next Dose Due   aspirin 81 mg chewable tablet           atorvastatin 10 mg tablet  Commonly known as: Lipitor           citalopram 40 mg tablet  Commonly known as: CeleXA           cranberry 400 mg capsule           Creon 6,000-19,000 -30,000 unit capsule  Generic drug: pancrelipase (Lip-Prot-Amyl)           gabapentin 100 mg capsule  Commonly known as: Neurontin           ipratropium-albuteroL 0.5-2.5 mg/3 mL nebulizer solution  Commonly known as: Duo-Neb           Lantus U-100 Insulin 100 unit/mL injection  Generic drug: insulin glargine           lidocaine 4 % patch           lisinopril 40 mg tablet      Take 1 tablet (40 mg) by mouth once daily. Please do not take this  medication until follow-up with primary doctor regarding kidney function.       methocarbamol 500 mg tablet  Commonly known as: Robaxin                  STOP taking these medications      nystatin 100,000 unit/gram powder  Commonly known as: Mycostatin        predniSONE 10 mg tablet  Commonly known as: Deltasone                  Where to Get Your Medications        Information about where to get these medications is not yet available    Ask your nurse or doctor about these medications  amLODIPine 5 mg tablet  menthol-zinc oxide 0.44-20.6 % ointment  metoprolol succinate XL 25 mg 24 hr tablet  oxygen gas therapy  oxygen gas therapy         Test Results Pending At Discharge  Pending Labs       No current pending labs.            Hospital Course  This is 69-year-old female with PMH significant for asthma (on 4 L via nasal cannula at home) OA, ARCHIE (not on any therapy), femur fracture, depression (s/p DBS), HTN, IDDM 2, asthma, CKD 3, HLD, diastolic CHF (last echo 7/2023, EF 75%, pulmonary artery hypertension 40-50 mmHg).  She presented to [facility] ED from nursing facility on 7/1/2024 with shortness of breath, hypoxic with altered mental status.  Patient arrived to the emergency department on 15 L via Oxymizer.  In the emergency department patient had been vitally stable, labs are significant for potassium of 7.5 (hemolyzed) with increase in LFTs.  Her ABG was remarkable for respiratory acidosis for which she was placed on BiPAP but she continued to be acidotic.  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  CT imaging at that time showed persistent dense consolidation at the left lung base.  She was treated with albuterol nebulizer hyperkalemia cocktail, doxycycline and Zosyn as well as methylprednisolone.  She was admitted to ICU for  continuous need of BiPAP.  While in the ICU, patient was subsequently weaned down to 3 L via nasal cannula today.  GI was consulted for elevated LFTs, and recommended continue supportive care diet as tolerated and continue to trend liver enzymes while avoiding hepatotoxic drugs.  She was successfully downgraded to primary medical team.  She continued to require BiPAP and she completed IV antibiotic plus oral Augmentin for community-acquired pneumonia.  Pulmonology consult was added to obtain BiPAP settings approval process for BiPAP at home.  Her BiPAP settings are 16/8, 40% FiO2, goal oxygen saturations greater than 90%.  She will continue to wear BiPAP at night which maintain her saturations above 90%.  She will continue her her normal 4 L nasal cannula during the day.  Patient continued to improve clinically and was deemed medically stable for discharge. Patient was discharged in stable condition, with plans to follow-up with the PCP and sleep medicine clinics for further care.    Pertinent Physical Exam At Time of Discharge  Physical Exam    General: Not in acute distress, A&O x 3, alert, cooperative, obese   HEENT: Normocephalic, atraumatic, EOMI, moist mucous membranes, NC in place  Neck: Neck supple, trachea midline, no evidence of trauma  Cardiovascular: RRR, S1 and S2 appreciated, no murmurs rubs gallops appreciated, distal pulses 2+ bilaterally (radial and dorsalis pedis)  Respiratory: Decreased breath sounds appreciated over the left lung with still present but improved scattered wheezes rhonchi.  no increased work of breathing on 4 L NC.  GI: Abdomen soft, nondistended, nontender to palpation, bowel sounds present  Extremities: No edema appreciated in lower extremities bilaterally, no cyanosis  Neuro: A&O X3, no focal deficits, strength and sensation intact bilaterally  Skin: Warm and dry, without lesions or rashes    Outpatient Follow-Up  Future Appointments   Date Time Provider Department Center    7/18/2024 11:30 AM Ashley Maynard, APRN-CNP [id number]   9/17/2024  2:50 PM Fabian Restrepo MD [id number] [place]       Assessment and plan discussed with my attending.   Vazquez Pereira MD   Internal Medicine, PGY-2 .

## 2024-07-08 NOTE — CARE PLAN
The patient's goals for the shift include Feel better    The clinical goals for the shift include see care plan    Patient tolerated bipap overnight. VSS. Skin remains intact, extremely red but blanchable to bilateral buttock/perianal/groin. Barrier cream applied multiple times this shift due to urinary incontinence. Patient turns well, and participates in ROM. Blood glucose elevated last night at 250, gave the 45 units of lantus and rechecked at midnight due to hypoglycemic episode yesterday morning. Blood sugar increased to 300's, order received for 8 units mix insulin. Sliding scale 3 times before meals.    Problem: Skin  Goal: Decreased wound size/increased tissue granulation at next dressing change  Outcome: Progressing  Flowsheets (Taken 7/7/2024 0305 by Tammy Payton, RN)  Decreased wound size/increased tissue granulation at next dressing change: Promote sleep for wound healing  Goal: Promote/optimize nutrition  Outcome: Progressing  Flowsheets (Taken 7/7/2024 1052 by Adonis Carrion RN)  Promote/optimize nutrition: Consume > 50% meals/supplements     Problem: Respiratory  Goal: Minimal/no exertional discomfort or dyspnea this shift  Outcome: Progressing     Problem: Diabetes  Goal: No changes in neurological exam by end of shift  Outcome: Met     Problem: Respiratory  Goal: Clear secretions with interventions this shift  Outcome: Met  Goal: Minimize anxiety/maximize coping throughout shift  Outcome: Met  Goal: No signs of respiratory distress (eg. Use of accessory muscles. Peds grunting)  Outcome: Met  Goal: Patent airway maintained this shift  Outcome: Met

## 2024-07-08 NOTE — PROGRESS NOTES
07/08/24 1237   Discharge Planning   Home or Post Acute Services Post acute facilities (Rehab/SNF/etc)   Type of Post Acute Facility Services Skilled nursing   Patient expects to be discharged to: [facility]   Has discharge transport been arranged? Yes   What day is the transport expected? 07/08/24   What time is the transport expected? 1500   Patient Choice   Patient / Family choosing to utilize agency / facility established prior to hospitalization Yes     Received Dr d/c orders. [facility] sent the [facility] and AVS. Transport has been set for 3pm . Updated the facility, nursing and the pt.

## 2024-07-08 NOTE — DISCHARGE INSTRUCTIONS
Patient admitted to the hospital with acute hypoxic respiratory failure in the setting of pneumonia.  She was initially admitted to the ICU, improved on antibiotics, and discharged to regular nursing floor.  Her acute kidney injury resolved, she completed a course of Augmentin for pneumonia, and she was initiated on nocturnal BiPAP support.  Her BiPAP settings are 16/8, 40% FiO2, goal oxygen saturations greater than 90%.  She should wear this at night.  She should continue her normal 4 L nasal cannula during the day, goal saturations greater than 90%.    A referral has been placed to sleep medicine.  Patient cannot have BiPAP at home until a sleep study is completed per her insurance.  If she is able to get this appointment while in skilled nursing facility, please ensure that she is able to go to this appointment.  Her metoprolol was to once daily dosing and amlodipine increased to 10mg daily.  No other medication changes were made.

## 2024-07-08 NOTE — PROGRESS NOTES
07/08/24 0828   Discharge Planning   Patient expects to be discharged to: 7/8/24 is last day of auth. BIPAP to be delivered today. will set up transport when confirme dthey will have it.

## 2024-07-09 ENCOUNTER — NURSING HOME VISIT (OUTPATIENT)
Dept: POST ACUTE CARE | Facility: EXTERNAL LOCATION | Age: 69
End: 2024-07-09
Payer: MEDICARE

## 2024-07-09 VITALS
DIASTOLIC BLOOD PRESSURE: 68 MMHG | TEMPERATURE: 98.1 F | RESPIRATION RATE: 18 BRPM | SYSTOLIC BLOOD PRESSURE: 104 MMHG | OXYGEN SATURATION: 97 % | WEIGHT: 293 LBS | HEART RATE: 68 BPM | BODY MASS INDEX: 52.32 KG/M2

## 2024-07-09 DIAGNOSIS — I10 ESSENTIAL HYPERTENSION, BENIGN: Chronic | ICD-10-CM

## 2024-07-09 DIAGNOSIS — F31.9 CHRONIC BIPOLAR AFFECTIVE DISORDER (MULTI): ICD-10-CM

## 2024-07-09 DIAGNOSIS — E11.8 POORLY CONTROLLED TYPE 2 DIABETES MELLITUS WITH COMPLICATION (MULTI): ICD-10-CM

## 2024-07-09 DIAGNOSIS — J18.9 PNEUMONIA DUE TO INFECTIOUS ORGANISM, UNSPECIFIED LATERALITY, UNSPECIFIED PART OF LUNG: ICD-10-CM

## 2024-07-09 DIAGNOSIS — E11.65 POORLY CONTROLLED TYPE 2 DIABETES MELLITUS WITH COMPLICATION (MULTI): ICD-10-CM

## 2024-07-09 DIAGNOSIS — K86.89 SECONDARY PANCREATIC INSUFFICIENCY (HHS-HCC): ICD-10-CM

## 2024-07-09 DIAGNOSIS — E66.2 OBESITY HYPOVENTILATION SYNDROME (MULTI): Primary | ICD-10-CM

## 2024-07-09 DIAGNOSIS — R79.89 ELEVATED LFTS: ICD-10-CM

## 2024-07-09 DIAGNOSIS — N18.31 STAGE 3A CHRONIC KIDNEY DISEASE (MULTI): Chronic | ICD-10-CM

## 2024-07-09 DIAGNOSIS — F32.5 MAJOR DEPRESSIVE DISORDER IN REMISSION, UNSPECIFIED WHETHER RECURRENT (CMS-HCC): Chronic | ICD-10-CM

## 2024-07-09 PROCEDURE — 99310 SBSQ NF CARE HIGH MDM 45: CPT | Performed by: NURSE PRACTITIONER

## 2024-07-09 ASSESSMENT — ENCOUNTER SYMPTOMS
CHILLS: 0
FEVER: 0
SHORTNESS OF BREATH: 0
DIARRHEA: 0
CONSTIPATION: 0
VOMITING: 0
JOINT SWELLING: 0
COUGH: 0
MYALGIAS: 0
DIAPHORESIS: 0
NAUSEA: 0

## 2024-07-09 NOTE — ASSESSMENT & PLAN NOTE
ON high dose lantsu (55 units BID), lispro premeal and ss coverage.   Blood sugars acceptable since readmission.  She has completed course of steroids.    May consider GLP if she can tolerate to help aide in weight management pending clinical course

## 2024-07-09 NOTE — ASSESSMENT & PLAN NOTE
Metoprolol was decreased and amlodipine increased during hospitalization.  It is unclear if she had bradycardia or metoprolol was changed due to the fact the she was taking extended release BID and it was decreased to daily.   Continue to monitor and adjust meds based on clinical course.

## 2024-07-09 NOTE — PROGRESS NOTES
"   Patient: Kaylene Feliciano  : 1955 AGE: 69 y.o. SEX:female   MRN: [id number]   Provider: KELLI Chapman     Location [facility]   Service Date: 2024     PCP: Linda Srivastava MD   Referred by: Cesia Vizcarra MD          [facility]  New Visit Note      HISTORY OF PRESENT ILLNESS     Kaylene Feliciano is a 69 y.o. female with a h/o  ARCHIE (not on CPAP currently), Obesity hypoventilation syndrome with BMI 52, Diastolic CHF (last Echo 2023 showing EF 75% and PAH 40-50 mmHg), IDDM2, CKD Stage III, HTN, and HLD,  who presents to [facility].    2024 : NPV with concerns of ARCHIE/OHS management. Presents with  who aides in interview today. Patient was recently hospitalized for acute on chronic hypoxic & hypercapnic resp failure in the setting of pneumonia/ARCHIE/OHS.  Patient was seen by pulmonology and ultimately discharged to [facility] rehab on oxygen 4LPM, BiPAP at 16/8, FiO2 40% at night. Tolerating well and reports benefit of better quality sleep, less night time awakenings with bipap use.     Patient had 2 sleep studies done in  and  at [facility]. 1st sleep study showed mild ARCHIE with AHI 10.7 but \"may have been underestimated since pt did not sleep on her back and had no REM sleep during the study.\" During the study, they said that pt had a low baseline SpO2 87% when asleep and awake and though this was related to OHS. When she was first diagnosed with ARCHIE, she was initially treated with nocturnal oxygen and did not require any CPAP. Pt stated that she was on nocturnal oxygen for 1 year and the therapy was discontinued since she improved (still has oxygen concentrator). She also lost some weight at that time, which helped with her breathing. In , when the patient had a vagal nerve stimulators placed for refractory depression, they were concerned that this may exacerbate her ARCHIE. So, another sleep " study was done.  She hasn't had any problems for many years until recent hospitalization.       SLEEP STUDY HISTORY (personally reviewed raw data such as interpretation report, data sheet, hypnogram, and titration table if available and applicable)    -PSG 1/18/2006:  1. Mild obstructive sleep apnea syndrome. The AHI may have been underestimated since the patient did not sleep on her back and had no REM sleep during this study.   2. The patient had a low baseline oxygen saturation when asleep and awake that were not associated with respiratory events. This may be related to obesity-hypoventilation syndrome.   3. Abnormal sleep architecture likely due to her medications,respiratory events and first night effect     [facility] ARCHIE with SpO2 sajan 83%.  End-tidal CO2 was elevated which suggests a sleep-related hypoventilation.  Maximum end-tidal CO2 was 62, patient spent 31.4% of sleep time with ETCO2 above 45 mmHg.     SLEEP-WAKE SCHEDULE    Sleep Patterns:  In terms of the patient's sleep/wake cycle, she generally spends most of her time in hospital bed in living room. She watches TV and usually falls sleep around 3-4 AM.  During the night, the patient generally awakens 1 times nightly. These awakenings are usually brief in duration. Final wake time on weekday mornings is around 9 AM. Naps for 1 hour each afternoon which is refreshing. the patient's sleep schedule is  similar on the weekends.    Breathing during sleep: snoring, witnessed apneas, orthopnea, night sweats, and nocturnal reflux symptoms  Behaviors at night: No   Sleep paralysis: No   Hypnogogic or hypnopompic hallucinations: No   Cataplexy: No     Leg symptoms and timing:  - Sensations: Patient does not have unusual sensations in their extremities that cause an urge to move them     Daytime Symptoms:  On awakening patient reports: waking refreshed and morning dry mouth  Patient report some daytime symptoms including: DAYTIME  "SYMPTOMS: reports irritability during the day difficulty with memory or concentration during the day  Patient denies daytime symptoms including: Denies: excessive daytime sleepiness sleep inertia    Sleep environment:  Preferred sleep position: side  Room is dark: Yes  Room is quiet: Yes  Room is cool: Yes  Bed comfort: good    SLEEP HABITS  Caffeine consumption: Yes, 2 cups/day  Alcohol consumption: Yes, rarely (occasional)  Smoking: No  Marijuana: No  Sleep aids: denies     WEIGHT: gained 30lbs in < 1 year      ESS: 6  CONSTANTIN: 13  STOP BAN (high)   FOSQ:  30    REVIEW OF SYSTEMS     All other systems have been reviewed and are negative.    ALLERGIES     Allergies   Allergen Reactions    Sulfa (Sulfonamide Antibiotics) Anaphylaxis    Belladonna Other     Pt states \"enlarged spleen\"    Ciprofloxacin Diarrhea and Nausea/vomiting    Adhesive Tape-Silicones Itching    Iodinated Contrast Media Itching    Tegaderm Ag Mesh [Silver] Itching       MEDICATIONS     Current Outpatient Medications   Medication Sig Dispense Refill    amLODIPine (Norvasc) 5 mg tablet Take 2 tablets (10 mg) by mouth once daily.      aspirin 81 mg chewable tablet Chew 1 tablet (81 mg) once daily.      atorvastatin (Lipitor) 10 mg tablet Take 1 tablet (10 mg) by mouth once daily at bedtime.      citalopram (CeleXA) 40 mg tablet Take 1 tablet (40 mg) by mouth once daily.      cranberry 400 mg capsule Take 1 capsule by mouth once daily.      gabapentin (Neurontin) 100 mg capsule Take 3 capsules (300 mg) by mouth 2 times a day.      insulin glargine (Lantus U-100 Insulin) 100 unit/mL injection Inject 55 Units under the skin 2 times a day.      ipratropium-albuteroL (Duo-Neb) 0.5-2.5 mg/3 mL nebulizer solution Take 3 mL by nebulization every 4 hours.      lidocaine 4 % patch Place 1 patch on the skin once daily as needed for moderate pain (4 - 6).      lisinopril 40 mg tablet Take 1 tablet (40 mg) by mouth once daily. Please do not take this medication " "until follow-up with primary doctor regarding kidney function. 30 tablet 0    menthol-zinc oxide (Calmoseptine - Risamine) 0.44-20.6 % ointment Apply 1 Application topically 4 times a day as needed (moisture associated skin damage).      methocarbamol (Robaxin) 500 mg tablet Take 1 tablet (500 mg) by mouth 2 times a day.      metoprolol succinate XL (Toprol-XL) 25 mg 24 hr tablet Take 1 tablet (25 mg) by mouth once daily. Hold for SBP<110 or HR <70      oxygen (O2) gas therapy Inhale 1 each once every 24 hours.      oxygen (O2) gas therapy Inhale 1 each once every 24 hours.      pancrelipase, Lip-Prot-Amyl, (Creon) 6,000-19,000 -30,000 unit capsule Take 2 capsules by mouth 3 times a day before meals.       No current facility-administered medications for this visit.       PAST HISTORIES     PERTINENT PAST MEDICAL HISTORY: See HPI    PERTINENT PAST SURGICAL HISTORY for Sleep Medicine:  tonsillectomy and adenoidectomy    PERTINENT FAMILY HISTORY for Sleep Medicine:  sleep apnea on PAP- sister    PERTINENT SOCIAL HISTORY:  She  reports that she has never smoked. She has never used smokeless tobacco. She reports that she does not drink alcohol and does not use drugs. She currently lives with spouse.    Active Problems, Allergy List, Medication List, and PMH/PSH/FH/Social Hx have been reviewed and reconciled in chart. No significant changes unless documented in the pertinent chart section. Updates made when necessary.     PHYSICAL EXAM     VITAL SIGNS: /75   Pulse 78   Resp 18   Ht 1.6 m (5' 3\")   Wt 132 kg (290 lb)   SpO2 97%   BMI 51.37 kg/m²  on oxygen 4LPM    CURRENT WEIGHT:   Vitals:    07/18/24 1132   Weight: 132 kg (290 lb)      PREVIOUS WEIGHTS:  Wt Readings from Last 3 Encounters:   07/18/24 132 kg (290 lb)   07/16/24 134 kg (296 lb)   07/11/24 134 kg (296 lb)     Physical Exam  Constitutional: Awake, not in distress. Seated in wheelchair  Lungs: Diminished to auscultation bilateral, no cough " noted  Heart: Regular rate and rhythm  Skin: Warm, no rash  Neuro: No tremors, moves all extremities  Psych: alert and oriented to time, place, and person    HEENT:   Retrognathia-slight   Airway comments: narrow lateral walls, crowded posterior oropharynx   Tongue scalloping: slight   Modified Mallampati score - 3    RESULTS/DATA     Iron (ug/dL)   Date Value   07/02/2024 54   12/11/2020 23 (L)     % Saturation (%)   Date Value   07/02/2024 23 (L)     Iron Saturation (%)   Date Value   12/11/2020 10 (L)     TIBC (ug/dL)   Date Value   07/02/2024 240   12/11/2020 225 (L)     Ferritin   Date Value   07/02/2024 2,605 ng/mL (H)   12/11/2020 256 ug/L (H)       Bicarbonate   Date Value Ref Range Status   07/05/2024 33 (H) 21 - 32 mmol/L Final       ASSESSMENT/PLAN     Ms. Feliciano is a 69 y.o. female and She was referred to the [facility] for evaluation of ARCHIE and OHS    Problem List, Orders, Assessment, Recommendations:    #ARCHIE/OHS  - Reviewed case with Dr. Nichols for diagnostic recommendations  - Obtain split-night PSG with [facility], if unavailable, may use EtCO2 monitoring, start on RA, see order for full details (please expedite testing)  - Will call with results of sleep study and will expedite PAP set up based on results via [facility]- [facility]  -Sleep apnea, PAP therapy education as well as the tips to be successful with sleep study and PAP treatment was provided at length in clinic today. Patient verbalized understanding.  - Discussed 30-day mask guarantee and insurance requirement regarding PAP compliance and follow-up.   -Diet, exercise, and weight loss were emphasized today in clinic, as were non-supine sleep, avoiding alcohol in the late evening, and driving or operating heavy machinery when sleepy.   - patient will follow-up 1 month after starting PAP and will bring equipment to the follow-up clinic        # HTN/diastolic CHF   -Last echo 7/2023, EF 75%, pulmonary artery hypertension 40-50  mmHg  BP Readings from Last 1 Encounters:   07/18/24 146/75     - doing well, asymptomatic, denies any headache, blurry vision, chest pain, palpitation, dizziness, lightheadedness, or syncopal episodes  - discussed at length the impact of untreated ARCHIE and BP control  - supportive management: low salt DASH diet (less than 2000 mg sodium intake daily), moderate intensity aerobic exercise at least 30 minutes 5 days per week, reduce stress, quit smoking, limit alcohol, lose weight, and monitor BP once daily  - continue current management and follow-up with PCP      #Obesity  BMI Readings from Last 1 Encounters:   07/18/24 51.37 kg/m²     - Encouraged healthy weight loss via diet and exercise  - Weight loss can help in the long term treatment of ARCHIE.  - Defer management to PCP     All of patient's questions were answered. She verbalizes understanding and agreement with my assessment and plan.    Disposition    Return to clinic in 3 months

## 2024-07-09 NOTE — ASSESSMENT & PLAN NOTE
Completed atb in the hospital.  She states she has an occasional cough,  states she is overall breathing better.

## 2024-07-09 NOTE — LETTER
Patient: Kaylene Feliciano  : 1955    Encounter Date: 2024    Subjective  Kaylene Feliciano is a 69 y.o. female returns from hospitalization due to respiratory failure, pna.    HPI   She developed acute lethargy, hypoxia and was found to have hypercapneic respiratory failure along with pna, started on bipap and sx slowly resolved.  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   Also noted to have elevated LFT, both alt and ast > 1000 on admission, evaluated by gastroenterology, felt potentially ischemic, enzymes decreased during hospitalization.  She was discharged with Bipap and plans to follow up with pulmonary next week as she will need approval for home bipap use.  She states she is feeling better, had some mild loose stools over the last few days, no abdominal pain but occasional cramping.     She remains on oxygen.      Labs:    WBC: 8.4  Hgb: 9.9  Hct: 31.8  Platelet: 197    Na:   136  K: 4.3  Cl: 98  Co2: 33  BUN: 29  Creatinine: 0.97  GFR: 63  Alk Phos 93   (max 1132 24)  AST 31 (max 1367 24)  Total Bilirubin 0.7    Magnesium  1.86   Bnpep 556    MEDS:  Amlodipine (dose increased)  Asa  Atorvastatin  Citalopram  Cranberry  Creon  Gabapentin  Duoneb  Lantus  Lidocaine patch  Lisinopril  Methocarbamol  Metoprolol succinate          Review of Systems   Constitutional:  Negative for chills, diaphoresis and fever.   Respiratory:  Negative for cough and shortness of breath.         Becomes sob with exertion, recovers fairly quickly   Cardiovascular:  Negative for chest pain and leg swelling.   Gastrointestinal:  Negative for constipation, diarrhea, nausea and vomiting.   Musculoskeletal:  Negative for joint swelling and myalgias.       Objective  /68   Pulse 68   Temp 36.7 °C (98.1 °F)   Resp 18   Wt 136 kg (299 lb)   SpO2 97%   BMI 52.32 kg/m²     Physical Exam  Vitals reviewed.   Constitutional:       General: She is not in acute distress.     Appearance: She  is obese. She is not ill-appearing.   Cardiovascular:      Rate and Rhythm: Normal rate and regular rhythm.      Pulses: Normal pulses.      Heart sounds:      No gallop.   Pulmonary:      Breath sounds: No wheezing, rhonchi or rales.      Comments: Diminished due to body habitus  Abdominal:      General: Abdomen is flat. Bowel sounds are normal.      Palpations: Abdomen is soft.      Tenderness: There is no guarding or rebound.   Musculoskeletal:      Right lower leg: No edema.      Left lower leg: No edema.         Assessment/Plan  Problem List Items Addressed This Visit       Chronic bipolar affective disorder (Multi)     S/p deep brain stimulator which she states has controlled her sx         Depression (Chronic)     Has deep brain stimulator and states she is well controlled with current regimen. Also takes celexa.          CKD (chronic kidney disease), stage III (Multi) (Chronic)     monitor with routine labs.           Essential hypertension, benign (Chronic)     Metoprolol was decreased and amlodipine increased during hospitalization.  It is unclear if she had bradycardia or metoprolol was changed due to the fact the she was taking extended release BID and it was decreased to daily.   Continue to monitor and adjust meds based on clinical course.               Poorly controlled type 2 diabetes mellitus with complication (Multi)     ON high dose lantsu (55 units BID), lispro premeal and ss coverage.   Blood sugars acceptable since readmission.  She has completed course of steroids.    May consider GLP if she can tolerate to help aide in weight management pending clinical course         Secondary pancreatic insufficiency (HHS-HCC)     Takes creon.           Obesity hypoventilation syndrome (Multi) - Primary     She developed hypercapneic respiratory failure which improved after Bipap.   She has returned to facility with bipap and tolerating well.   She is scheduled to see pulmonary 7/18 as she will need Bipap  for home on discharge as well.             PNA (pneumonia)     Completed atb in the hospital.  She states she has an occasional cough,  states she is overall breathing better.          Elevated LFTs     ALT and AST were > 1000 on admisison, evaluated by gastroenterology, and downtrended during admission.  No abdominal pain, felt potentially ischemic in nature.          labs/meds/orders reviewed  staff to monitor and notify for any changes.  continue with therapy as able.  Hospital records reviewed.  Staff to closely monitor respiratory status, follow up with pulmonary next week, she needs outpatient pulmonary to arrange for home bipap.   She appears weak, remains high risk for complication.   Time for coordination of care was greater than 35 minutes with hospital chart review, visit and exam, discussion of treatment plan with patient and also discussion of case with staff.          Electronically Signed By: KELLI Slaughter   7/9/24 12:36 PM

## 2024-07-09 NOTE — ASSESSMENT & PLAN NOTE
She developed hypercapneic respiratory failure which improved after Bipap.   She has returned to facility with bipap and tolerating well.   She is scheduled to see pulmonary 7/18 as she will need Bipap for home on discharge as well.

## 2024-07-09 NOTE — ASSESSMENT & PLAN NOTE
ALT and AST were > 1000 on admisison, evaluated by gastroenterology, and downtrended during admission.  No abdominal pain, felt potentially ischemic in nature.

## 2024-07-09 NOTE — PROGRESS NOTES
Subjective   Kaylene Feliciano is a 69 y.o. female returns from hospitalization due to respiratory failure, pna.    HPI   She developed acute lethargy, hypoxia and was found to have hypercapneic respiratory failure along with pna, started on bipap and sx slowly resolved.  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   Also noted to have elevated LFT, both alt and ast > 1000 on admission, evaluated by gastroenterology, felt potentially ischemic, enzymes decreased during hospitalization.  She was discharged with Bipap and plans to follow up with pulmonary next week as she will need approval for home bipap use.  She states she is feeling better, had some mild loose stools over the last few days, no abdominal pain but occasional cramping.     She remains on oxygen.      Labs:  7/5  WBC: 8.4  Hgb: 9.9  Hct: 31.8  Platelet: 197    Na:   136  K: 4.3  Cl: 98  Co2: 33  BUN: 29  Creatinine: 0.97  GFR: 63  Alk Phos 93   (max 1132 7/1/24)  AST 31 (max 1367 7/1/24)  Total Bilirubin 0.7    Magnesium  1.86  7/1 Bnpep 556    MEDS:  Amlodipine (dose increased)  Asa  Atorvastatin  Citalopram  Cranberry  Creon  Gabapentin  Duoneb  Lantus  Lidocaine patch  Lisinopril  Methocarbamol  Metoprolol succinate          Review of Systems   Constitutional:  Negative for chills, diaphoresis and fever.   Respiratory:  Negative for cough and shortness of breath.         Becomes sob with exertion, recovers fairly quickly   Cardiovascular:  Negative for chest pain and leg swelling.   Gastrointestinal:  Negative for constipation, diarrhea, nausea and vomiting.   Musculoskeletal:  Negative for joint swelling and myalgias.       Objective   /68   Pulse 68   Temp 36.7 °C (98.1 °F)   Resp 18   Wt 136 kg (299 lb)   SpO2 97%   BMI 52.32 kg/m²     Physical Exam  Vitals reviewed.   Constitutional:       General: She is not in acute distress.     Appearance: She is obese. She is not ill-appearing.   Cardiovascular:      Rate and  Rhythm: Normal rate and regular rhythm.      Pulses: Normal pulses.      Heart sounds:      No gallop.   Pulmonary:      Breath sounds: No wheezing, rhonchi or rales.      Comments: Diminished due to body habitus  Abdominal:      General: Abdomen is flat. Bowel sounds are normal.      Palpations: Abdomen is soft.      Tenderness: There is no guarding or rebound.   Musculoskeletal:      Right lower leg: No edema.      Left lower leg: No edema.         Assessment/Plan   Problem List Items Addressed This Visit       Chronic bipolar affective disorder (Multi)     S/p deep brain stimulator which she states has controlled her sx         Depression (Chronic)     Has deep brain stimulator and states she is well controlled with current regimen. Also takes celexa.          CKD (chronic kidney disease), stage III (Multi) (Chronic)     monitor with routine labs.           Essential hypertension, benign (Chronic)     Metoprolol was decreased and amlodipine increased during hospitalization.  It is unclear if she had bradycardia or metoprolol was changed due to the fact the she was taking extended release BID and it was decreased to daily.   Continue to monitor and adjust meds based on clinical course.               Poorly controlled type 2 diabetes mellitus with complication (Multi)     ON high dose lantsu (55 units BID), lispro premeal and ss coverage.   Blood sugars acceptable since readmission.  She has completed course of steroids.    May consider GLP if she can tolerate to help aide in weight management pending clinical course         Secondary pancreatic insufficiency (HHS-HCC)     Takes creon.           Obesity hypoventilation syndrome (Multi) - Primary     She developed hypercapneic respiratory failure which improved after Bipap.   She has returned to facility with bipap and tolerating well.   She is scheduled to see pulmonary 7/18 as she will need Bipap for home on discharge as well.             PNA (pneumonia)      Completed atb in the hospital.  She states she has an occasional cough,  states she is overall breathing better.          Elevated LFTs     ALT and AST were > 1000 on admisison, evaluated by gastroenterology, and downtrended during admission.  No abdominal pain, felt potentially ischemic in nature.          labs/meds/orders reviewed  staff to monitor and notify for any changes.  continue with therapy as able.  Hospital records reviewed.  Staff to closely monitor respiratory status, follow up with pulmonary next week, she needs outpatient pulmonary to arrange for home bipap.   She appears weak, remains high risk for complication.   Time for coordination of care was greater than 35 minutes with hospital chart review, visit and exam, discussion of treatment plan with patient and also discussion of case with staff.

## 2024-07-11 ENCOUNTER — NURSING HOME VISIT (OUTPATIENT)
Dept: POST ACUTE CARE | Facility: EXTERNAL LOCATION | Age: 69
End: 2024-07-11
Payer: MEDICARE

## 2024-07-11 DIAGNOSIS — E66.2 OBESITY HYPOVENTILATION SYNDROME (MULTI): ICD-10-CM

## 2024-07-11 DIAGNOSIS — E11.65 POORLY CONTROLLED TYPE 2 DIABETES MELLITUS WITH COMPLICATION (MULTI): ICD-10-CM

## 2024-07-11 DIAGNOSIS — E11.8 POORLY CONTROLLED TYPE 2 DIABETES MELLITUS WITH COMPLICATION (MULTI): ICD-10-CM

## 2024-07-11 DIAGNOSIS — J96.11 CHRONIC RESPIRATORY FAILURE WITH HYPOXIA AND HYPERCAPNIA (MULTI): Primary | ICD-10-CM

## 2024-07-11 DIAGNOSIS — J96.12 CHRONIC RESPIRATORY FAILURE WITH HYPOXIA AND HYPERCAPNIA (MULTI): Primary | ICD-10-CM

## 2024-07-11 DIAGNOSIS — F31.9 CHRONIC BIPOLAR AFFECTIVE DISORDER (MULTI): ICD-10-CM

## 2024-07-11 DIAGNOSIS — G47.33 OSA (OBSTRUCTIVE SLEEP APNEA): ICD-10-CM

## 2024-07-11 DIAGNOSIS — J18.9 PNEUMONIA DUE TO INFECTIOUS ORGANISM, UNSPECIFIED LATERALITY, UNSPECIFIED PART OF LUNG: ICD-10-CM

## 2024-07-11 DIAGNOSIS — I10 ESSENTIAL HYPERTENSION, BENIGN: Chronic | ICD-10-CM

## 2024-07-11 PROCEDURE — 99306 1ST NF CARE HIGH MDM 50: CPT | Performed by: INTERNAL MEDICINE

## 2024-07-11 NOTE — LETTER
Patient: Kaylene Feliciano  : 1955    Encounter Date: 2024    Subjective  Patient ID: Kaylene Feliciano is a 69 y.o. female who is acute skilled care being seen and evaluated for multiple medical problems.    HPI   Kaylene Feliciano is a 69 y.o. female returns from hospitalization due to respiratory failure, pna.    HPI   She developed acute lethargy, hypoxia and was found to have hypercapneic respiratory failure along with pna, started on bipap and sx slowly resolved.  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   Also noted to have elevated LFT, both alt and ast > 1000 on admission, evaluated by gastroenterology, felt potentially ischemic, enzymes decreased during hospitalization.  She was discharged with Bipap and plans to follow up with pulmonary next week as she will need approval for home bipap use.  She states she is feeling better, had some mild loose stools over the last few days, no abdominal pain but occasional cramping.     She remains on oxygen.      The patient is back at the facility following an episode of what appears to be left macro atelectasis due to mucous plugging and potential pneumonia.  She is now on BiPAP therapy with IPAP of 16 and EPAP of 8 and 40% FiO2.  She reports he is sleeping much better with BiPAP and was able to have BiPAP here in the extended care facility however would not be able to take BiPAP on with her until she gets an outpatient sleep study.  Nursing has no new adverse events reported to me.    Labs:    WBC: 8.4  Hgb: 9.9  Hct: 31.8  Platelet: 197     Na:   136  K: 4.3  Cl: 98  Co2: 33  BUN: 29  Creatinine: 0.97  GFR: 63  Alk Phos 93   (max 1132 24)  AST 31 (max 1367 24)  Total Bilirubin 0.7    Albumin 3.1    Magnesium  1.86   Bnpep 556     MEDS:  Amlodipine (dose increased)  Asa  Atorvastatin  Citalopram  Cranberry  Creon  Gabapentin  Duoneb  Lantus  Lidocaine patch  Lisinopril  Methocarbamol  Metoprolol succinate     Review of Systems    Constitutional:  Negative for chills, diaphoresis and fever.   Respiratory:  Negative for cough and shortness of breath.         Becomes sob with exertion, recovers fairly quickly   Cardiovascular:  Negative for chest pain and leg swelling.   Gastrointestinal:  Negative for constipation, diarrhea, nausea and vomiting.   Musculoskeletal:  Negative for joint swelling and myalgias.       Objective  /76   Pulse 62   Temp 36.6 °C (97.9 °F)   Resp 18   Wt 134 kg (296 lb)   SpO2 97%   BMI 51.80 kg/m²     Physical Exam  Vitals reviewed.   Constitutional:       General: She is not in acute distress.     Appearance: She is obese. She is not ill-appearing.   Cardiovascular:      Rate and Rhythm: Normal rate and regular rhythm.      Pulses: Normal pulses.      Heart sounds:      No gallop.   Pulmonary:      Breath sounds: No wheezing, rhonchi or rales.      Comments: Diminished due to body habitus  Abdominal:      General: Abdomen is flat. Bowel sounds are normal.      Palpations: Abdomen is soft.      Tenderness: There is no guarding or rebound.   Musculoskeletal:      Right lower leg: No edema.      Left lower leg: No edema.   Skin:     General: Skin is warm and dry.   Neurological:      General: No focal deficit present.      Mental Status: She is oriented to person, place, and time.   Psychiatric:         Mood and Affect: Mood normal.         Behavior: Behavior normal.         Assessment/Plan  Problem List Items Addressed This Visit             ICD-10-CM    Chronic bipolar affective disorder (Multi) F31.9    Essential hypertension, benign (Chronic) I10    Poorly controlled type 2 diabetes mellitus with complication (Multi) E11.8, E11.65    Obesity hypoventilation syndrome (Multi) E66.2    PNA (pneumonia) J18.9    ARCHIE (obstructive sleep apnea) G47.33    Chronic respiratory failure with hypoxia and hypercapnia (Multi) - Primary J96.11, J96.12     8.  We will continue with rehabilitative restorative and  supportive care as patient tolerates    B.  Will work toward getting the patient to her outpatient sleep study so that when she does leave the facility she will have BiPAP in her home.    C.  Laboratory examinations will be monitored on an ongoing as-needed basis pending whether or not the patient stays with  long-term [facility]    D.  The patient's prognosis is guarded and disposition will be pending response to rehabilitation overall assessment patient safety awareness.        Electronically Signed By: Mohamud Vazquez MD   7/22/24  4:56 PM

## 2024-07-15 PROBLEM — G47.33 OSA (OBSTRUCTIVE SLEEP APNEA): Status: ACTIVE | Noted: 2024-07-15

## 2024-07-16 ENCOUNTER — NURSING HOME VISIT (OUTPATIENT)
Dept: POST ACUTE CARE | Facility: EXTERNAL LOCATION | Age: 69
End: 2024-07-16
Payer: MEDICARE

## 2024-07-16 ENCOUNTER — APPOINTMENT (OUTPATIENT)
Dept: PULMONOLOGY | Facility: HOSPITAL | Age: 69
End: 2024-07-16
Payer: MEDICARE

## 2024-07-16 VITALS
DIASTOLIC BLOOD PRESSURE: 70 MMHG | SYSTOLIC BLOOD PRESSURE: 120 MMHG | HEART RATE: 74 BPM | TEMPERATURE: 97.8 F | BODY MASS INDEX: 51.8 KG/M2 | OXYGEN SATURATION: 96 % | WEIGHT: 293 LBS | RESPIRATION RATE: 18 BRPM

## 2024-07-16 VITALS
BODY MASS INDEX: 51.8 KG/M2 | SYSTOLIC BLOOD PRESSURE: 110 MMHG | WEIGHT: 293 LBS | HEART RATE: 62 BPM | OXYGEN SATURATION: 97 % | TEMPERATURE: 97.9 F | DIASTOLIC BLOOD PRESSURE: 76 MMHG | RESPIRATION RATE: 18 BRPM

## 2024-07-16 DIAGNOSIS — J18.9 PNEUMONIA DUE TO INFECTIOUS ORGANISM, UNSPECIFIED LATERALITY, UNSPECIFIED PART OF LUNG: ICD-10-CM

## 2024-07-16 DIAGNOSIS — E11.22 TYPE 2 DIABETES MELLITUS WITH STAGE 3A CHRONIC KIDNEY DISEASE, WITH LONG-TERM CURRENT USE OF INSULIN (MULTI): Chronic | ICD-10-CM

## 2024-07-16 DIAGNOSIS — N18.31 TYPE 2 DIABETES MELLITUS WITH STAGE 3A CHRONIC KIDNEY DISEASE, WITH LONG-TERM CURRENT USE OF INSULIN (MULTI): Chronic | ICD-10-CM

## 2024-07-16 DIAGNOSIS — Z79.4 TYPE 2 DIABETES MELLITUS WITH STAGE 3A CHRONIC KIDNEY DISEASE, WITH LONG-TERM CURRENT USE OF INSULIN (MULTI): Chronic | ICD-10-CM

## 2024-07-16 DIAGNOSIS — I10 ESSENTIAL HYPERTENSION, BENIGN: Chronic | ICD-10-CM

## 2024-07-16 DIAGNOSIS — E66.2 OBESITY HYPOVENTILATION SYNDROME (MULTI): Primary | ICD-10-CM

## 2024-07-16 DIAGNOSIS — R53.81 PHYSICAL DECONDITIONING: ICD-10-CM

## 2024-07-16 PROCEDURE — 99309 SBSQ NF CARE MODERATE MDM 30: CPT | Performed by: NURSE PRACTITIONER

## 2024-07-16 ASSESSMENT — ENCOUNTER SYMPTOMS
CONSTIPATION: 0
NAUSEA: 0
SHORTNESS OF BREATH: 0
MYALGIAS: 0
VOMITING: 0
COUGH: 0
DIAPHORESIS: 0
JOINT SWELLING: 0
CHILLS: 0
FEVER: 0
DIARRHEA: 0

## 2024-07-16 NOTE — ASSESSMENT & PLAN NOTE
She was minimally ambulatory prior to admission,  is primary caregiver.  Her goal is to regain enough strength to use walker in the apartment safely.  continue with therapy as able.  She is slowly making progress, able to  therapy for 30-40seconds.

## 2024-07-16 NOTE — LETTER
Patient: Kaylene Feliciano  : 1955    Encounter Date: 2024    Subjective  Kaylene Feliciano is a 69 y.o. female here for weekly skilled visit.   HPI    Health problems reviewed and no acute concerns.  Participating in therapy and feeling stronger, she is starting to  therapy.  She feels her breathing is improving and tolerating bipap.  She states she is sleeping well. .  Eating and drinking well.  Denies any complaints of pain.  No concerns per staff.        Review of Systems   Constitutional:  Negative for chills, diaphoresis and fever.   Respiratory:  Negative for cough and shortness of breath.         Becomes sob with exertion, recovers fairly quickly   Cardiovascular:  Negative for chest pain and leg swelling.   Gastrointestinal:  Negative for constipation, diarrhea, nausea and vomiting.   Musculoskeletal:  Negative for joint swelling and myalgias.       Objective  /70   Pulse 74   Temp 36.6 °C (97.8 °F)   Resp 18   Wt 134 kg (296 lb)   SpO2 96%   BMI 51.80 kg/m²     Physical Exam  Vitals reviewed.   Constitutional:       General: She is not in acute distress.     Appearance: She is obese. She is not ill-appearing.   Cardiovascular:      Rate and Rhythm: Normal rate and regular rhythm.      Pulses: Normal pulses.      Heart sounds:      No gallop.   Pulmonary:      Breath sounds: No wheezing, rhonchi or rales.      Comments: Diminished due to body habitus  Abdominal:      General: Abdomen is flat. Bowel sounds are normal.      Palpations: Abdomen is soft.      Tenderness: There is no guarding or rebound.   Musculoskeletal:      Right lower leg: No edema.      Left lower leg: No edema.         Assessment/Plan  Problem List Items Addressed This Visit       Essential hypertension, benign (Chronic)     controlled. continue with current regimen.  Continue to monitor and adjust meds based on clinical course.                   Physical deconditioning     She was minimally ambulatory prior to  admission,  is primary caregiver.  Her goal is to regain enough strength to use walker in the apartment safely.  continue with therapy as able.  She is slowly making progress, able to  therapy for 30-40seconds.              Diabetes mellitus (Multi) (Chronic)     Blood sugars reviewed, mostly 100-200's,  improved since insulin adjustment.  Continue to monitor and adjust meds based on clinical course.           Obesity hypoventilation syndrome (Multi) - Primary     She developed hypercapneic respiratory failure which improved after Bipap.   She has returned to facility with bipap and tolerating well.   She is scheduled to see pulmonary 7/18 as she will need Bipap for home on discharge as well.             PNA (pneumonia)     Completed atb in the hospital.  She states she has an occasional cough,  states she is overall breathing better.         labs/meds/orders reviewed  staff to monitor and notify for any changes.  continue with therapy as able.  Staff to closely monitor respiratory status, follow up with pulmonary later this week, she needs outpatient pulmonary to arrange for home bipap.   She appears weak, remains high risk for complication.             Electronically Signed By: KELLI Slaughter   7/16/24 11:09 AM

## 2024-07-16 NOTE — ASSESSMENT & PLAN NOTE
controlled. continue with current regimen.  Continue to monitor and adjust meds based on clinical course.

## 2024-07-16 NOTE — PROGRESS NOTES
Subjective   Kaylene Feliciano is a 69 y.o. female here for weekly skilled visit.   HPI    Health problems reviewed and no acute concerns.  Participating in therapy and feeling stronger, she is starting to  therapy.  She feels her breathing is improving and tolerating bipap.  She states she is sleeping well. .  Eating and drinking well.  Denies any complaints of pain.  No concerns per staff.        Review of Systems   Constitutional:  Negative for chills, diaphoresis and fever.   Respiratory:  Negative for cough and shortness of breath.         Becomes sob with exertion, recovers fairly quickly   Cardiovascular:  Negative for chest pain and leg swelling.   Gastrointestinal:  Negative for constipation, diarrhea, nausea and vomiting.   Musculoskeletal:  Negative for joint swelling and myalgias.       Objective   /70   Pulse 74   Temp 36.6 °C (97.8 °F)   Resp 18   Wt 134 kg (296 lb)   SpO2 96%   BMI 51.80 kg/m²     Physical Exam  Vitals reviewed.   Constitutional:       General: She is not in acute distress.     Appearance: She is obese. She is not ill-appearing.   Cardiovascular:      Rate and Rhythm: Normal rate and regular rhythm.      Pulses: Normal pulses.      Heart sounds:      No gallop.   Pulmonary:      Breath sounds: No wheezing, rhonchi or rales.      Comments: Diminished due to body habitus  Abdominal:      General: Abdomen is flat. Bowel sounds are normal.      Palpations: Abdomen is soft.      Tenderness: There is no guarding or rebound.   Musculoskeletal:      Right lower leg: No edema.      Left lower leg: No edema.         Assessment/Plan   Problem List Items Addressed This Visit       Essential hypertension, benign (Chronic)     controlled. continue with current regimen.  Continue to monitor and adjust meds based on clinical course.                   Physical deconditioning     She was minimally ambulatory prior to admission,  is primary caregiver.  Her goal is to regain enough  strength to use walker in the apartment safely.  continue with therapy as able.  She is slowly making progress, able to  therapy for 30-40seconds.              Diabetes mellitus (Multi) (Chronic)     Blood sugars reviewed, mostly 100-200's,  improved since insulin adjustment.  Continue to monitor and adjust meds based on clinical course.           Obesity hypoventilation syndrome (Multi) - Primary     She developed hypercapneic respiratory failure which improved after Bipap.   She has returned to facility with bipap and tolerating well.   She is scheduled to see pulmonary 7/18 as she will need Bipap for home on discharge as well.             PNA (pneumonia)     Completed atb in the hospital.  She states she has an occasional cough,  states she is overall breathing better.         labs/meds/orders reviewed  staff to monitor and notify for any changes.  continue with therapy as able.  Staff to closely monitor respiratory status, follow up with pulmonary later this week, she needs outpatient pulmonary to arrange for home bipap.   She appears weak, remains high risk for complication.

## 2024-07-16 NOTE — ASSESSMENT & PLAN NOTE
Blood sugars reviewed, mostly 100-200's,  improved since insulin adjustment.  Continue to monitor and adjust meds based on clinical course.

## 2024-07-16 NOTE — PROGRESS NOTES
Subjective   Patient ID: Kaylene Feliciano is a 69 y.o. female who is acute skilled care being seen and evaluated for multiple medical problems.    HPI   Kaylene Feliciano is a 69 y.o. female returns from hospitalization due to respiratory failure, pna.    HPI   She developed acute lethargy, hypoxia and was found to have hypercapneic respiratory failure along with pna, started on bipap and sx slowly resolved.  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   Also noted to have elevated LFT, both alt and ast > 1000 on admission, evaluated by gastroenterology, felt potentially ischemic, enzymes decreased during hospitalization.  She was discharged with Bipap and plans to follow up with pulmonary next week as she will need approval for home bipap use.  She states she is feeling better, had some mild loose stools over the last few days, no abdominal pain but occasional cramping.     She remains on oxygen.      The patient is back at the facility following an episode of what appears to be left macro atelectasis due to mucous plugging and potential pneumonia.  She is now on BiPAP therapy with IPAP of 16 and EPAP of 8 and 40% FiO2.  She reports he is sleeping much better with BiPAP and was able to have BiPAP here in the extended care facility however would not be able to take BiPAP on with her until she gets an outpatient sleep study.  Nursing has no new adverse events reported to me.    Labs:  7/5  WBC: 8.4  Hgb: 9.9  Hct: 31.8  Platelet: 197     Na:   136  K: 4.3  Cl: 98  Co2: 33  BUN: 29  Creatinine: 0.97  GFR: 63  Alk Phos 93   (max 1132 7/1/24)  AST 31 (max 1367 7/1/24)  Total Bilirubin 0.7    Albumin 3.1    Magnesium  1.86  7/1 Bnpep 556     MEDS:  Amlodipine (dose increased)  Asa  Atorvastatin  Citalopram  Cranberry  Creon  Gabapentin  Duoneb  Lantus  Lidocaine patch  Lisinopril  Methocarbamol  Metoprolol succinate     Review of Systems   Constitutional:  Negative for chills, diaphoresis and fever.    Respiratory:  Negative for cough and shortness of breath.         Becomes sob with exertion, recovers fairly quickly   Cardiovascular:  Negative for chest pain and leg swelling.   Gastrointestinal:  Negative for constipation, diarrhea, nausea and vomiting.   Musculoskeletal:  Negative for joint swelling and myalgias.       Objective   /76   Pulse 62   Temp 36.6 °C (97.9 °F)   Resp 18   Wt 134 kg (296 lb)   SpO2 97%   BMI 51.80 kg/m²     Physical Exam  Vitals reviewed.   Constitutional:       General: She is not in acute distress.     Appearance: She is obese. She is not ill-appearing.   Cardiovascular:      Rate and Rhythm: Normal rate and regular rhythm.      Pulses: Normal pulses.      Heart sounds:      No gallop.   Pulmonary:      Breath sounds: No wheezing, rhonchi or rales.      Comments: Diminished due to body habitus  Abdominal:      General: Abdomen is flat. Bowel sounds are normal.      Palpations: Abdomen is soft.      Tenderness: There is no guarding or rebound.   Musculoskeletal:      Right lower leg: No edema.      Left lower leg: No edema.   Skin:     General: Skin is warm and dry.   Neurological:      General: No focal deficit present.      Mental Status: She is oriented to person, place, and time.   Psychiatric:         Mood and Affect: Mood normal.         Behavior: Behavior normal.         Assessment/Plan   Problem List Items Addressed This Visit             ICD-10-CM    Chronic bipolar affective disorder (Multi) F31.9    Essential hypertension, benign (Chronic) I10    Poorly controlled type 2 diabetes mellitus with complication (Multi) E11.8, E11.65    Obesity hypoventilation syndrome (Multi) E66.2    PNA (pneumonia) J18.9    ARCHIE (obstructive sleep apnea) G47.33    Chronic respiratory failure with hypoxia and hypercapnia (Multi) - Primary J96.11, J96.12     8.  We will continue with rehabilitative restorative and supportive care as patient tolerates    B.  Will work toward getting  the patient to her outpatient sleep study so that when she does leave the facility she will have BiPAP in her home.    C.  Laboratory examinations will be monitored on an ongoing as-needed basis pending whether or not the patient stays with  long-term care    D.  The patient's prognosis is guarded and disposition will be pending response to rehabilitation overall assessment patient safety awareness.

## 2024-07-18 ENCOUNTER — APPOINTMENT (OUTPATIENT)
Dept: SLEEP MEDICINE | Facility: CLINIC | Age: 69
End: 2024-07-18
Payer: MEDICARE

## 2024-07-18 VITALS
RESPIRATION RATE: 18 BRPM | OXYGEN SATURATION: 97 % | SYSTOLIC BLOOD PRESSURE: 146 MMHG | BODY MASS INDEX: 51.38 KG/M2 | HEIGHT: 63 IN | WEIGHT: 290 LBS | HEART RATE: 78 BPM | DIASTOLIC BLOOD PRESSURE: 75 MMHG

## 2024-07-18 DIAGNOSIS — G47.33 OSA (OBSTRUCTIVE SLEEP APNEA): Primary | ICD-10-CM

## 2024-07-18 DIAGNOSIS — E66.2 OBESITY HYPOVENTILATION SYNDROME (MULTI): ICD-10-CM

## 2024-07-18 DIAGNOSIS — E66.01 MORBID OBESITY (MULTI): Chronic | ICD-10-CM

## 2024-07-18 DIAGNOSIS — I10 ESSENTIAL HYPERTENSION, BENIGN: Chronic | ICD-10-CM

## 2024-07-18 PROCEDURE — 3077F SYST BP >= 140 MM HG: CPT | Performed by: NURSE PRACTITIONER

## 2024-07-18 PROCEDURE — 3060F POS MICROALBUMINURIA REV: CPT | Performed by: NURSE PRACTITIONER

## 2024-07-18 PROCEDURE — 99205 OFFICE O/P NEW HI 60 MIN: CPT | Performed by: NURSE PRACTITIONER

## 2024-07-18 PROCEDURE — 1159F MED LIST DOCD IN RCRD: CPT | Performed by: NURSE PRACTITIONER

## 2024-07-18 PROCEDURE — 3008F BODY MASS INDEX DOCD: CPT | Performed by: NURSE PRACTITIONER

## 2024-07-18 PROCEDURE — G2211 COMPLEX E/M VISIT ADD ON: HCPCS | Performed by: NURSE PRACTITIONER

## 2024-07-18 PROCEDURE — 1111F DSCHRG MED/CURRENT MED MERGE: CPT | Performed by: NURSE PRACTITIONER

## 2024-07-18 PROCEDURE — 1160F RVW MEDS BY RX/DR IN RCRD: CPT | Performed by: NURSE PRACTITIONER

## 2024-07-18 PROCEDURE — 4010F ACE/ARB THERAPY RXD/TAKEN: CPT | Performed by: NURSE PRACTITIONER

## 2024-07-18 PROCEDURE — 3078F DIAST BP <80 MM HG: CPT | Performed by: NURSE PRACTITIONER

## 2024-07-18 ASSESSMENT — SLEEP AND FATIGUE QUESTIONNAIRES
HOW LIKELY ARE YOU TO NOD OFF OR FALL ASLEEP WHILE WATCHING TV: SLIGHT CHANCE OF DOZING
HOW LIKELY ARE YOU TO NOD OFF OR FALL ASLEEP IN A CAR, WHILE STOPPED FOR A FEW MINUTES IN TRAFFIC: SLIGHT CHANCE OF DOZING
HOW LIKELY ARE YOU TO NOD OFF OR FALL ASLEEP WHILE SITTING AND READING: SLIGHT CHANCE OF DOZING
HOW LIKELY ARE YOU TO NOD OFF OR FALL ASLEEP WHILE SITTING AND TALKING TO SOMEONE: WOULD NEVER DOZE
DIFFICULTY_FALLING_ASLEEP: MODERATE
HOW LIKELY ARE YOU TO NOD OFF OR FALL ASLEEP WHILE SITTING QUIETLY AFTER LUNCH WITHOUT ALCOHOL: WOULD NEVER DOZE
SATISFACTION_WITH_CURRENT_SLEEP_PATTERN: DISSATISFIED
SITING INACTIVE IN A PUBLIC PLACE LIKE A CLASS ROOM OR A MOVIE THEATER: WOULD NEVER DOZE
WORRIED_DISTRESSED_DUE_TO_SLEEP: A LITTLE
WAKING_TOO_EARLY: MODERATE
HOW LIKELY ARE YOU TO NOD OFF OR FALL ASLEEP WHEN YOU ARE A PASSENGER IN A CAR FOR AN HOUR WITHOUT A BREAK: SLIGHT CHANCE OF DOZING
DIFFICULTY_STAYING_ASLEEP: MODERATE
SLEEP_PROBLEM_NOTICEABLE_TO_OTHERS: A LITTLE
HOW LIKELY ARE YOU TO NOD OFF OR FALL ASLEEP WHILE LYING DOWN TO REST IN THE AFTERNOON WHEN CIRCUMSTANCES PERMIT: MODERATE CHANCE OF DOZING
ESS-CHAD TOTAL SCORE: 6
SLEEP_PROBLEM_INTERFERES_DAILY_ACTIVITIES: SOMEWHAT

## 2024-07-18 ASSESSMENT — ENCOUNTER SYMPTOMS
OCCASIONAL FEELINGS OF UNSTEADINESS: 1
LOSS OF SENSATION IN FEET: 1
DEPRESSION: 0

## 2024-07-18 ASSESSMENT — PATIENT HEALTH QUESTIONNAIRE - PHQ9
1. LITTLE INTEREST OR PLEASURE IN DOING THINGS: NOT AT ALL
2. FEELING DOWN, DEPRESSED OR HOPELESS: NOT AT ALL
SUM OF ALL RESPONSES TO PHQ9 QUESTIONS 1 AND 2: 0

## 2024-07-18 NOTE — PATIENT INSTRUCTIONS
[facility] Sleep Medicine   [facility]  [address] [address]  [phone]       NAME: Kaylene Feliciano   DATE: 07/18/24    Your Sleep Provider Today: KELLI Chapman  Your Primary Care Physician: Linda Srivastava MD       DIAGNOSIS:   1. ARCHIE (obstructive sleep apnea)        2. Obesity hypoventilation syndrome (Multi)  Referral to Adult Sleep Medicine      3. Morbid obesity (Multi)  Referral to Adult Sleep Medicine          Thank you for coming to the Sleep Medicine Clinic today! Your sleep medicine provider today was: KELLI Chapman Below is a summary of your treatment plan, other important information, and our contact numbers:      TREATMENT PLAN     - Get your sleep study scheduled  - Follow-up in 3 months.  - If not already done, sign up for 'My Chart' and send prescription requests or messages through this      Scheduling a Sleep Study    Your provider ordered a sleep apnea test today. It will usually take 2 - 3 business days for Insurance to approve the order.     Once you test is approved it will be sent to the ordering sleep lab. When the sleep lab is notified of the new order, they will reach out to you to get you scheduled for an in-lab sleep study or to get you scheduled for a pick-up date for your Home Sleep Study Kit (depending on which type of study your provider recommended).    They usually will reach out to you about 1 week after your test has been ordered. Please contact the office at [phone] if you have not heard back from them in 2 weeks after you have seen your provider. See below for list of sleep lab contact numbers, if needed.     Sleep Testing for sleep apnea: The best and ideal way to check out if you have sleep apnea is to do an overnight sleep study in the sleep laboratory. Alternatively, a home sleep apnea test can also be done depending on your insurance and risk factors.     If you are having a home sleep  apnea test, kindly allot 1 hour during pickup of the testing kit as you will have to complete paperwork and listen to the sleep technician for in-person on-the-spot demonstration and instructions on how to hook up the testing kit at home. Do the test for 1 day and start off with sleeping on your back. If you sleep on your side in the middle of night or you have always been a side or stomach-sleeper, it is ok as long as you have some time on your back and off-back.     If you are having an overnight in-lab sleep study, please make sure to bring toiletries, a comfy pillow, additional warm blankets, and any nighttime medications (include as-needed inhaler, pain pill, etc) that you may regularly take. Also, be sure to eat dinner before you arrive as we generally do not provide meals inside the sleep testing center. Lastly, in order to fall asleep faster in the sleep testing center, we advise patients to wake up 2 hours earlier on the morning of scheduled testing and avoid napping 2 days prior testing. Sometimes, your sleep provider may prescribe a sleep aid to be taken at lights out in the sleep testing center. If you are taking a sleep aid, consider having somebody pick you up after the sleep testing.    Overnight sleep studies may be scheduled on a weekday or weekend. We also perform daytime testing for shift workers on a case-by-case basis.    Once you have booked an appointment to do the sleep study, please contact my office for follow-up visit to discuss results.       IMPORTANT INFORMATION     Call 911 for medical emergencies.  Our offices are generally open from Monday-Friday, 9 am - 5 pm.  If you need to get in touch with me, you may either call me/my team (number is below) or you can use [employer].  If a referral for a test, for CPAP, or for another specialist was made, and you have not heard about scheduling this within a week, please call scheduling at [phone] ([phone]).  If you are unable to make your  "appointment for clinic or an overnight study, kindly call the office at least 48 hours in advance to cancel and reschedule.  If you are on CPAP, please bring your device's card or the device to each clinic appointment.   There are no supporting services by either the sleep doctors or their staff on weekends and Holidays, or after 5 PM on weekdays.     PRESCRIPTIONS     We require 7 days advanced notice for prescription refills. If we do not receive the request in this time, we cannot guarantee that your medication will be refilled in time.      IMPORTANT PHONE NUMBERS     [facility]: [phone]  [employer] (DME): [phone]  [employer] (DME): [phone]  [facility] (DME): [phone]    CONTACTING YOUR SLEEP MEDICINE PROVIDER AND SLEEP TEAM      For issues with your machine or mask interface, please call your DME provider first. [employer] stands for durable medical company. [employer] is the company who provides you the machine and/or PAP supplies / accessories.   To schedule, cancel, or reschedule SLEEP STUDY APPOINTMENTS, please call the Main Phone Line at [phone] - option 3.   To schedule, cancel, or reschedule CLINIC APPOINTMENTS, you can do it in \"[employer]\", call [phone] for [facility] office , [phone] for [facility] office to speak with my on site staff, or call the Main Phone Line at [phone] - option 2  For CLINICAL QUESTIONS or MEDICATION REFILLS, please call direct line for Adult Sleep Nurses at [phone].   Lastly, you can also send a message directly to your provider through \"My Chart\", which is the email service through your  Records Account: [email or web address]     Adult Sleep Nurses (Ramona Huerta, ARYA and Cherelle Castañeda RN):  For clinical questions and refilling prescriptions: [phone]  Email sleep diaries and other documents at: [email or web address]    Office locations for Ashley Maynard NP:    [facility]   [address]   [address]  [place] [address]  [phone]    [address]  [address]  [place] [address] [phone]      OUR SLEEP TESTING LOCATIONS     Our team will contact you to schedule your sleep study, however, you can contact us as follow:  Main Phone Line (scheduling only): [phone]), option 3    Sleep Testing Locations:   [place] (18 years and older): [address] [address], 2nd floor   [place] (18 years and older): [address] [place] [address]; 4th floor  After hours line: [phone]   [place] (18 years and older) at [place]: [address] [place] [address]  After hours line: [phone]   [facility] (Main campus: All ages): [facility], 6th floor. After hours line: [phone]   [place] (5 years and older; younger considered on case-by-case basis): [address]; [address], [address]. Scheduling  After hours line: [phone]       Here at [facility], we wish you a restful sleep!    Your sleep medicine provider for this visit was: MELQUIADES Chapman-CNP

## 2024-07-19 ENCOUNTER — NURSING HOME VISIT (OUTPATIENT)
Dept: POST ACUTE CARE | Facility: EXTERNAL LOCATION | Age: 69
End: 2024-07-19
Payer: MEDICARE

## 2024-07-19 DIAGNOSIS — E66.2 OBESITY HYPOVENTILATION SYNDROME (MULTI): ICD-10-CM

## 2024-07-19 DIAGNOSIS — F31.9 CHRONIC BIPOLAR AFFECTIVE DISORDER (MULTI): ICD-10-CM

## 2024-07-19 DIAGNOSIS — J96.11 CHRONIC RESPIRATORY FAILURE WITH HYPOXIA AND HYPERCAPNIA (MULTI): Primary | ICD-10-CM

## 2024-07-19 DIAGNOSIS — M48.00 SPINAL STENOSIS, MULTILEVEL: ICD-10-CM

## 2024-07-19 DIAGNOSIS — I10 ESSENTIAL HYPERTENSION, BENIGN: Chronic | ICD-10-CM

## 2024-07-19 DIAGNOSIS — J96.12 CHRONIC RESPIRATORY FAILURE WITH HYPOXIA AND HYPERCAPNIA (MULTI): Primary | ICD-10-CM

## 2024-07-19 DIAGNOSIS — R53.81 PHYSICAL DECONDITIONING: ICD-10-CM

## 2024-07-19 PROCEDURE — 99308 SBSQ NF CARE LOW MDM 20: CPT | Performed by: INTERNAL MEDICINE

## 2024-07-19 NOTE — LETTER
Patient: Kaylene Feliciano  : 1955    Encounter Date: 2024    Subjective  Patient ID: Kaylene Feliciano is a 69 y.o. female who is long term resident being seen and evaluated for multiple medical problems.    HPI   69-year-old female patient sitting up on the edge of the bed.  She is much more awake and alert and reports this feels much better since sustained use of BiPAP.  We continue to work on getting her an outpatient sleep study in preparation for discharge home so that she can have BiPAP when she goes home.  Nursing reports the appearance of ecchymosis in the dependent portion of the patient's pendulous pannus.  The patient denies pain in this region.    Current high risk medication:  Amlodipine  Creon  Insulin  Methocarbamol    Laboratory examination from 2024:  Potassium 4.4   Bicarbonate 29, previously 34  Creatinine 1.0  Hemoglobin 9.9    Review of Systems   Constitutional:  Negative for chills, diaphoresis and fever.   Respiratory:  Negative for cough and shortness of breath.         Becomes sob with exertion, recovers fairly quickly   Cardiovascular:  Negative for chest pain and leg swelling.   Gastrointestinal:  Negative for constipation, diarrhea, nausea and vomiting.   Musculoskeletal:  Negative for joint swelling and myalgias.       Objective  /78   Pulse 78   Temp 36.6 °C (97.9 °F)   Resp 18   Wt 133 kg (294 lb)   SpO2 96%   BMI 52.08 kg/m²     Physical Exam  Vitals reviewed.   Constitutional:       General: She is not in acute distress.     Appearance: She is obese. She is not ill-appearing.   Cardiovascular:      Rate and Rhythm: Normal rate and regular rhythm.      Pulses: Normal pulses.      Heart sounds:      No gallop.   Pulmonary:      Breath sounds: No wheezing, rhonchi or rales.      Comments: Diminished due to body habitus  Abdominal:      General: Abdomen is flat. Bowel sounds are normal.      Palpations: Abdomen is soft.      Tenderness: There is no guarding or  rebound.   Musculoskeletal:      Right lower leg: No edema.      Left lower leg: No edema.   Skin:     Comments: There is dependent ecchymosis on the patient's right-sided pendulous pannus consistent with small hematoma draining dependently with gravity, likely from insulin or Lovenox injection recently.         Assessment/Plan  Problem List Items Addressed This Visit             ICD-10-CM    Chronic bipolar affective disorder (Multi) F31.9    Essential hypertension, benign (Chronic) I10    Physical deconditioning R53.81    Spinal stenosis, multilevel M48.00    Obesity hypoventilation syndrome (Multi) E66.2    Chronic respiratory failure with hypoxia and hypercapnia (Multi) - Primary J96.11, J96.12     A.  Continue with rehabilitative restorative and supportive care as the patient tolerates    B.  Laboratory examinations will continue to be monitored on an ongoing as-needed basis otherwise should be next due in January 2025 or as needed.    C.  Will continue to strive to get the patient's outpatient sleep study completed prior to discharge home so there will be no break in her BiPAP therapy which seems to be so effective at controlling her symptoms    D.  The patient's prognosis is guarded.        Electronically Signed By: Mohamud Vazquez MD   7/25/24  2:29 PM

## 2024-07-20 VITALS
DIASTOLIC BLOOD PRESSURE: 78 MMHG | TEMPERATURE: 97.9 F | HEART RATE: 78 BPM | BODY MASS INDEX: 52.08 KG/M2 | RESPIRATION RATE: 18 BRPM | OXYGEN SATURATION: 96 % | SYSTOLIC BLOOD PRESSURE: 126 MMHG | WEIGHT: 293 LBS

## 2024-07-20 LAB
ATRIAL RATE: 0 BPM
ATRIAL RATE: 18 BPM
P OFFSET: 133 MS
P OFFSET: 135 MS
P ONSET: 113 MS
P ONSET: 115 MS
PR INTERVAL: 180 MS
Q ONSET: 203 MS
Q ONSET: 209 MS
QRS COUNT: 11 BEATS
QRS COUNT: 12 BEATS
QRS DURATION: 100 MS
QRS DURATION: 104 MS
QT INTERVAL: 400 MS
QT INTERVAL: 432 MS
QTC CALCULATION(BAZETT): 438 MS
QTC CALCULATION(BAZETT): 462 MS
QTC FREDERICIA: 425 MS
QTC FREDERICIA: 452 MS
R AXIS: -49 DEGREES
R AXIS: -52 DEGREES
T AXIS: 19 DEGREES
T AXIS: 49 DEGREES
T OFFSET: 409 MS
T OFFSET: 419 MS
VENTRICULAR RATE: 69 BPM
VENTRICULAR RATE: 72 BPM

## 2024-07-20 NOTE — PROGRESS NOTES
Subjective   Patient ID: Kaylene Feliciano is a 69 y.o. female who is long term resident being seen and evaluated for multiple medical problems.    HPI   69-year-old female patient sitting up on the edge of the bed.  She is much more awake and alert and reports this feels much better since sustained use of BiPAP.  We continue to work on getting her an outpatient sleep study in preparation for discharge home so that she can have BiPAP when she goes home.  Nursing reports the appearance of ecchymosis in the dependent portion of the patient's pendulous pannus.  The patient denies pain in this region.    Current high risk medication:  Amlodipine  Creon  Insulin  Methocarbamol    Laboratory examination from July 18, 2024:  Potassium 4.4   Bicarbonate 29, previously 34  Creatinine 1.0  Hemoglobin 9.9    Review of Systems   Constitutional:  Negative for chills, diaphoresis and fever.   Respiratory:  Negative for cough and shortness of breath.         Becomes sob with exertion, recovers fairly quickly   Cardiovascular:  Negative for chest pain and leg swelling.   Gastrointestinal:  Negative for constipation, diarrhea, nausea and vomiting.   Musculoskeletal:  Negative for joint swelling and myalgias.       Objective   /78   Pulse 78   Temp 36.6 °C (97.9 °F)   Resp 18   Wt 133 kg (294 lb)   SpO2 96%   BMI 52.08 kg/m²     Physical Exam  Vitals reviewed.   Constitutional:       General: She is not in acute distress.     Appearance: She is obese. She is not ill-appearing.   Cardiovascular:      Rate and Rhythm: Normal rate and regular rhythm.      Pulses: Normal pulses.      Heart sounds:      No gallop.   Pulmonary:      Breath sounds: No wheezing, rhonchi or rales.      Comments: Diminished due to body habitus  Abdominal:      General: Abdomen is flat. Bowel sounds are normal.      Palpations: Abdomen is soft.      Tenderness: There is no guarding or rebound.   Musculoskeletal:      Right lower leg: No edema.      Left  lower leg: No edema.   Skin:     Comments: There is dependent ecchymosis on the patient's right-sided pendulous pannus consistent with small hematoma draining dependently with gravity, likely from insulin or Lovenox injection recently.         Assessment/Plan   Problem List Items Addressed This Visit             ICD-10-CM    Chronic bipolar affective disorder (Multi) F31.9    Essential hypertension, benign (Chronic) I10    Physical deconditioning R53.81    Spinal stenosis, multilevel M48.00    Obesity hypoventilation syndrome (Multi) E66.2    Chronic respiratory failure with hypoxia and hypercapnia (Multi) - Primary J96.11, J96.12     A.  Continue with rehabilitative restorative and supportive care as the patient tolerates    B.  Laboratory examinations will continue to be monitored on an ongoing as-needed basis otherwise should be next due in January 2025 or as needed.    C.  Will continue to strive to get the patient's outpatient sleep study completed prior to discharge home so there will be no break in her BiPAP therapy which seems to be so effective at controlling her symptoms    D.  The patient's prognosis is guarded.

## 2024-07-22 PROBLEM — J96.11 CHRONIC RESPIRATORY FAILURE WITH HYPOXIA AND HYPERCAPNIA (MULTI): Status: ACTIVE | Noted: 2024-07-22

## 2024-07-22 PROBLEM — J96.12 CHRONIC RESPIRATORY FAILURE WITH HYPOXIA AND HYPERCAPNIA (MULTI): Status: ACTIVE | Noted: 2024-07-22

## 2024-07-22 ASSESSMENT — ENCOUNTER SYMPTOMS
NAUSEA: 0
CHILLS: 0
JOINT SWELLING: 0
CONSTIPATION: 0
VOMITING: 0
SHORTNESS OF BREATH: 0
DIARRHEA: 0
DIAPHORESIS: 0
FEVER: 0
COUGH: 0
MYALGIAS: 0

## 2024-07-23 ENCOUNTER — NURSING HOME VISIT (OUTPATIENT)
Dept: POST ACUTE CARE | Facility: EXTERNAL LOCATION | Age: 69
End: 2024-07-23
Payer: MEDICARE

## 2024-07-23 VITALS
SYSTOLIC BLOOD PRESSURE: 134 MMHG | RESPIRATION RATE: 18 BRPM | TEMPERATURE: 98.2 F | OXYGEN SATURATION: 96 % | DIASTOLIC BLOOD PRESSURE: 82 MMHG | BODY MASS INDEX: 52.26 KG/M2 | HEART RATE: 74 BPM | WEIGHT: 293 LBS

## 2024-07-23 DIAGNOSIS — F31.9 CHRONIC BIPOLAR AFFECTIVE DISORDER (MULTI): ICD-10-CM

## 2024-07-23 DIAGNOSIS — R53.81 PHYSICAL DECONDITIONING: ICD-10-CM

## 2024-07-23 DIAGNOSIS — N18.31 STAGE 3A CHRONIC KIDNEY DISEASE (MULTI): Chronic | ICD-10-CM

## 2024-07-23 DIAGNOSIS — J96.12 CHRONIC RESPIRATORY FAILURE WITH HYPOXIA AND HYPERCAPNIA (MULTI): Primary | ICD-10-CM

## 2024-07-23 DIAGNOSIS — Z79.4 TYPE 2 DIABETES MELLITUS WITH STAGE 3A CHRONIC KIDNEY DISEASE, WITH LONG-TERM CURRENT USE OF INSULIN (MULTI): Chronic | ICD-10-CM

## 2024-07-23 DIAGNOSIS — E11.22 TYPE 2 DIABETES MELLITUS WITH STAGE 3A CHRONIC KIDNEY DISEASE, WITH LONG-TERM CURRENT USE OF INSULIN (MULTI): Chronic | ICD-10-CM

## 2024-07-23 DIAGNOSIS — E66.01 MORBID OBESITY (MULTI): Chronic | ICD-10-CM

## 2024-07-23 DIAGNOSIS — N18.31 TYPE 2 DIABETES MELLITUS WITH STAGE 3A CHRONIC KIDNEY DISEASE, WITH LONG-TERM CURRENT USE OF INSULIN (MULTI): Chronic | ICD-10-CM

## 2024-07-23 DIAGNOSIS — J96.11 CHRONIC RESPIRATORY FAILURE WITH HYPOXIA AND HYPERCAPNIA (MULTI): Primary | ICD-10-CM

## 2024-07-23 DIAGNOSIS — I10 ESSENTIAL HYPERTENSION, BENIGN: Chronic | ICD-10-CM

## 2024-07-23 PROCEDURE — 99309 SBSQ NF CARE MODERATE MDM 30: CPT | Performed by: NURSE PRACTITIONER

## 2024-07-23 ASSESSMENT — ENCOUNTER SYMPTOMS
CONSTIPATION: 0
SHORTNESS OF BREATH: 0
NAUSEA: 0
MYALGIAS: 0
FEVER: 0
DIAPHORESIS: 0
VOMITING: 0
COUGH: 0
DIARRHEA: 0
CHILLS: 0
JOINT SWELLING: 0

## 2024-07-23 NOTE — PROGRESS NOTES
Subjective   Kaylene Feliciano is a 69 y.o. female here for weekly skilled visit.   HPI    Health problems reviewed and no acute concerns.  Participating in therapy and feeling stronger, she is standing  in therapy.  She feels her breathing is improving and tolerating bipap, scheduled for sleep study later this week.   She states she is sleeping well. .  Eating and drinking well.  Denies any complaints of pain.  No concerns per staff.        Review of Systems   Constitutional:  Negative for chills, diaphoresis and fever.   Respiratory:  Negative for cough and shortness of breath.         Becomes sob with exertion, recovers fairly quickly   Cardiovascular:  Negative for chest pain and leg swelling.   Gastrointestinal:  Negative for constipation, diarrhea, nausea and vomiting.   Musculoskeletal:  Negative for joint swelling and myalgias.       Objective   /82   Pulse 74   Temp 36.8 °C (98.2 °F)   Resp 18   Wt 134 kg (295 lb)   SpO2 96%   BMI 52.26 kg/m²     Physical Exam  Vitals reviewed.   Constitutional:       General: She is not in acute distress.     Appearance: She is obese. She is not ill-appearing.   Cardiovascular:      Rate and Rhythm: Normal rate and regular rhythm.      Pulses: Normal pulses.      Heart sounds:      No gallop.   Pulmonary:      Breath sounds: No wheezing, rhonchi or rales.      Comments: Diminished due to body habitus  Abdominal:      General: Abdomen is flat. Bowel sounds are normal.      Palpations: Abdomen is soft.      Tenderness: There is no guarding or rebound.   Musculoskeletal:      Right lower leg: No edema.      Left lower leg: No edema.         Assessment/Plan   Problem List Items Addressed This Visit       Chronic bipolar affective disorder (Multi)     S/p deep brain stimulator which she states has controlled her sx         CKD (chronic kidney disease), stage III (Multi) (Chronic)     monitor with routine labs.           Essential hypertension, benign (Chronic)      controlled. continue with current regimen.  Continue to monitor and adjust meds based on clinical course.                   Morbid obesity (Multi) (Chronic)     This will increase her risk of complications         Physical deconditioning     She was minimally ambulatory prior to admission,  is primary caregiver.  Her goal is to regain enough strength to use walker in the apartment safely.  continue with therapy as able.  She is slowly making progress, able to  therapy for 30-40seconds.              Diabetes mellitus (Multi) (Chronic)     Blood sugars reviewed, mostly 100-200's,  improved since insulin adjustment.  Continue to monitor and adjust meds based on clinical course.           Chronic respiratory failure with hypoxia and hypercapnia (Multi) - Primary     Using Bipap nightly,  continue with current settings, she has a sleep study scheduled for later this week to secure home bipap        labs/meds/orders reviewed  staff to monitor and notify for any changes.  continue with therapy as able.  Staff to closely monitor respiratory status, follow up with pulmonary later this week, she needs outpatient pulmonary to arrange for home bipap.   She appears weak but is gaining strength

## 2024-07-23 NOTE — ASSESSMENT & PLAN NOTE
Using Bipap nightly,  continue with current settings, she has a sleep study scheduled for later this week to secure home bipap

## 2024-07-24 ENCOUNTER — CLINICAL SUPPORT (OUTPATIENT)
Dept: SLEEP MEDICINE | Facility: HOSPITAL | Age: 69
End: 2024-07-24
Payer: MEDICARE

## 2024-07-24 DIAGNOSIS — G47.33 OSA (OBSTRUCTIVE SLEEP APNEA): ICD-10-CM

## 2024-07-24 DIAGNOSIS — E66.2 OBESITY HYPOVENTILATION SYNDROME (MULTI): ICD-10-CM

## 2024-07-24 LAB
ATRIAL RATE: 75 BPM
P AXIS: 63 DEGREES
P OFFSET: 139 MS
P ONSET: 119 MS
PR INTERVAL: 170 MS
Q ONSET: 204 MS
QRS COUNT: 12 BEATS
QRS DURATION: 112 MS
QT INTERVAL: 388 MS
QTC CALCULATION(BAZETT): 433 MS
QTC FREDERICIA: 417 MS
R AXIS: -63 DEGREES
T AXIS: 81 DEGREES
T OFFSET: 398 MS
VENTRICULAR RATE: 75 BPM

## 2024-07-25 VITALS — BODY MASS INDEX: 51.37 KG/M2 | HEIGHT: 63 IN | WEIGHT: 289.9 LBS

## 2024-07-25 ASSESSMENT — ENCOUNTER SYMPTOMS
CHILLS: 0
DIARRHEA: 0
DIAPHORESIS: 0
COUGH: 0
JOINT SWELLING: 0
VOMITING: 0
CONSTIPATION: 0
MYALGIAS: 0
SHORTNESS OF BREATH: 0
NAUSEA: 0
FEVER: 0

## 2024-07-25 ASSESSMENT — SLEEP AND FATIGUE QUESTIONNAIRES
HOW LIKELY ARE YOU TO NOD OFF OR FALL ASLEEP IN A CAR, WHILE STOPPED FOR A FEW MINUTES IN TRAFFIC: WOULD NEVER DOZE
HOW LIKELY ARE YOU TO NOD OFF OR FALL ASLEEP WHILE SITTING AND READING: WOULD NEVER DOZE
HOW LIKELY ARE YOU TO NOD OFF OR FALL ASLEEP WHILE SITTING QUIETLY AFTER LUNCH WITHOUT ALCOHOL: SLIGHT CHANCE OF DOZING
HOW LIKELY ARE YOU TO NOD OFF OR FALL ASLEEP WHILE WATCHING TV: SLIGHT CHANCE OF DOZING
SITING INACTIVE IN A PUBLIC PLACE LIKE A CLASS ROOM OR A MOVIE THEATER: WOULD NEVER DOZE
HOW LIKELY ARE YOU TO NOD OFF OR FALL ASLEEP WHEN YOU ARE A PASSENGER IN A CAR FOR AN HOUR WITHOUT A BREAK: SLIGHT CHANCE OF DOZING
HOW LIKELY ARE YOU TO NOD OFF OR FALL ASLEEP WHILE SITTING AND TALKING TO SOMEONE: WOULD NEVER DOZE
ESS-CHAD TOTAL SCORE: 6
HOW LIKELY ARE YOU TO NOD OFF OR FALL ASLEEP WHILE LYING DOWN TO REST IN THE AFTERNOON WHEN CIRCUMSTANCES PERMIT: HIGH CHANCE OF DOZING

## 2024-07-25 NOTE — PROGRESS NOTES
[facility] TECH NOTE:     Patient: Kaylene Feliciano   MRN//AGE: [id number]  1955  69 y.o.   Technologist: Ksenia Alicea   Room: 4   Service Date: 2024        Sleep Testing Location: [facility]    [place]: 6    TECHNOLOGIST SLEEP STUDY PROCEDURE NOTE:   This sleep study is being conducted according to the policies and procedures outlined by the AAS accreditation standards.  The sleep study procedure and processes involved during this appointment was explained to the patient/patient’s family, questions were answered. The patient/family verbalized understanding.      The patient is a 69 y.o. year old female scheduled for a Split Night Study with montage of:  Standard PSG .     The study that was ultimately completed was a Diagnostic PSG  with montage of:  Standard PSG .    The full study Was completed.  Patient questionnaires completed?: yes     Consents signed? yes    Initial Fall Risk Screening:     Kaylene has fallen in the last 6 months. Her fall did not result in injury. Kaylene does have a fear of falling. She does need assistance with sitting, standing, or walking. She does need assistance walking in her home. She does need assistance in an unfamiliar setting. The patient isusing an assistive device.     Brief Study observations: Patient is a 69 year old female here for a split night study.  Patient comes from nursing rehab facility with her  as her caregiver.  Patient is currently using BIPAP at facility due to CO2 retention.  Patient is wheelchair bound and can not walk.   transferred patient to hospital bed for study.  Patient is on continuous supplemental oxygen at 4 LPM.  Study was started on room air, per lab protocol.  Patient's oxygen fell to mid 80s on room air and patient was symptomatic and stating she needed oxygen.  Per lab protocol, supplemental oxygen administered at 1 LPM and this setting resolved symptoms.  ETCO2 was monitored per order.  Patient did not become hypercapnic  throughout the recording.  Study remained PSG due to an AHI of less than 10 during the first two hours of sleep, following lab protocols.  Oral breathing observed which did cause fluctuations in reading from ETCO2 monitoring signal.  Moderate continuous snore observed.       Deviation to order/protocol and reason: None      If PAP, which was preferred mask/pressure/mode: NA      Other:None    After the procedure, the patient/family was informed to ensure followup with ordering clinician for testing results.      Technologist: CATHERINE Patel

## 2024-07-29 ENCOUNTER — TELEPHONE (OUTPATIENT)
Dept: SLEEP MEDICINE | Facility: CLINIC | Age: 69
End: 2024-07-29
Payer: MEDICARE

## 2024-07-29 ENCOUNTER — TELEPHONE (OUTPATIENT)
Dept: PRIMARY CARE | Facility: CLINIC | Age: 69
End: 2024-07-29
Payer: MEDICARE

## 2024-07-29 DIAGNOSIS — G47.33 OSA (OBSTRUCTIVE SLEEP APNEA): ICD-10-CM

## 2024-07-29 NOTE — TELEPHONE ENCOUNTER
----- Message from Ashley Maynard sent at 7/29/2024 12:07 PM EDT -----  Regarding: RE: SPSG 7/24/24  I reassured him and worked magic to get things done in a timely manner for this pt lol. We need to keep her follow up in August.  ----- Message -----  From: Princess Currie  Sent: 7/29/2024  11:49 AM EDT  To: KELLI Chapman  Subject: RE: SPSG 7/24/24                                 Wonderful!! He sounded very worried. Do you want me to cancel the August appointment?  ----- Message -----  From: KELLI Chapman  Sent: 7/29/2024  11:42 AM EDT  To: Princess Currie  Subject: RE: SPSG 7/24/24                                 Called  and discussed PSG results that came back today. Ordered PAP set up. Everything should be good. Thank you!  ----- Message -----  From: Princess Currie  Sent: 7/29/2024  10:35 AM EDT  To: KELLI Chapman; Maria Esther Guerra MA  Subject: [facility] 7/24/24                                      called stating that his wife is currently still in rehab facility but insurance wanted her discharged. Rehab facility has appeal currently pending.  scheduled follow-up on sleep study for 8/20 but now is worried that wife gets sent home sooner and wants to make sure she gets CPAP asap. Is there a way for you guys to keep an eye out for her results and place order if needed so they don't have to wait until 8/20 for the order?

## 2024-07-29 NOTE — TELEPHONE ENCOUNTER
Herminia  from [facility] called pt wants to leave tomorrow the DON asked her to reach out if pt can be discharged with BiPAP .

## 2024-07-29 NOTE — PROGRESS NOTES
Called patient, spoke to  rodney and discussed PSG results showing mild ARCHIE. Patient is still in rehab with potential discharge later today. Will start APAP 4-20 with oxygen 1LPM bleed in via [facility] (please expedite set up). Follow up scheduled 8/20/24.

## 2024-07-30 LAB
ATRIAL RATE: 44 BPM
ATRIAL RATE: 63 BPM
P AXIS: -15 DEGREES
P OFFSET: 152 MS
P OFFSET: 157 MS
P ONSET: 132 MS
P ONSET: 137 MS
PR INTERVAL: 208 MS
Q ONSET: 184 MS
Q ONSET: 218 MS
QRS COUNT: 10 BEATS
QRS COUNT: 10 BEATS
QRS DURATION: 104 MS
QRS DURATION: 162 MS
QT INTERVAL: 412 MS
QT INTERVAL: 490 MS
QTC CALCULATION(BAZETT): 421 MS
QTC CALCULATION(BAZETT): 505 MS
QTC FREDERICIA: 418 MS
QTC FREDERICIA: 500 MS
R AXIS: -26 DEGREES
R AXIS: 160 DEGREES
T AXIS: 10 DEGREES
T AXIS: 126 DEGREES
T OFFSET: 424 MS
T OFFSET: 429 MS
VENTRICULAR RATE: 63 BPM
VENTRICULAR RATE: 64 BPM

## 2024-07-31 ENCOUNTER — TELEPHONE (OUTPATIENT)
Dept: SLEEP MEDICINE | Facility: CLINIC | Age: 69
End: 2024-07-31
Payer: MEDICARE

## 2024-07-31 NOTE — TELEPHONE ENCOUNTER
LMOM for patient to call the office and we can assist her in scheduling sooner OV with Ashley to review sleep study.    Office number given on VM.

## 2024-07-31 NOTE — TELEPHONE ENCOUNTER
----- Message from Princess Rosey sent at 7/29/2024 10:33 AM EDT -----  Regarding: SPSG 7/24/24   called stating that his wife is currently still in rehab facility but insurance wanted her discharged. Rehab facility has appeal currently pending.  scheduled follow-up on sleep study for 8/20 but now is worried that wife gets sent home sooner and wants to make sure she gets CPAP asap. Is there a way for you guys to keep an eye out for her results and place order if needed so they don't have to wait until 8/20 for the order?

## 2024-08-06 ENCOUNTER — APPOINTMENT (OUTPATIENT)
Dept: SLEEP MEDICINE | Facility: CLINIC | Age: 69
End: 2024-08-06
Payer: MEDICARE

## 2024-08-06 DIAGNOSIS — E66.2 OBESITY HYPOVENTILATION SYNDROME (MULTI): Primary | ICD-10-CM

## 2024-08-06 DIAGNOSIS — R09.02 HYPOXEMIA: ICD-10-CM

## 2024-08-06 NOTE — TELEPHONE ENCOUNTER
Vianey at [facility] called to advise Pt need a order to get her Oxygen 4L/min so she csn be discharged home with it.      Fax order [phone]

## 2024-08-13 ENCOUNTER — NURSING HOME VISIT (OUTPATIENT)
Dept: POST ACUTE CARE | Facility: EXTERNAL LOCATION | Age: 69
End: 2024-08-13
Payer: MEDICARE

## 2024-08-13 VITALS
HEART RATE: 76 BPM | OXYGEN SATURATION: 98 % | WEIGHT: 284 LBS | RESPIRATION RATE: 16 BRPM | TEMPERATURE: 97.9 F | BODY MASS INDEX: 50.31 KG/M2 | DIASTOLIC BLOOD PRESSURE: 68 MMHG | SYSTOLIC BLOOD PRESSURE: 118 MMHG

## 2024-08-13 DIAGNOSIS — R53.81 PHYSICAL DECONDITIONING: ICD-10-CM

## 2024-08-13 DIAGNOSIS — E66.2 OBESITY HYPOVENTILATION SYNDROME (MULTI): ICD-10-CM

## 2024-08-13 DIAGNOSIS — E66.01 MORBID OBESITY (MULTI): Chronic | ICD-10-CM

## 2024-08-13 DIAGNOSIS — G47.33 OSA (OBSTRUCTIVE SLEEP APNEA): ICD-10-CM

## 2024-08-13 DIAGNOSIS — N18.31 TYPE 2 DIABETES MELLITUS WITH STAGE 3A CHRONIC KIDNEY DISEASE, WITH LONG-TERM CURRENT USE OF INSULIN (MULTI): Chronic | ICD-10-CM

## 2024-08-13 DIAGNOSIS — Z79.4 TYPE 2 DIABETES MELLITUS WITH STAGE 3A CHRONIC KIDNEY DISEASE, WITH LONG-TERM CURRENT USE OF INSULIN (MULTI): Chronic | ICD-10-CM

## 2024-08-13 DIAGNOSIS — I10 ESSENTIAL HYPERTENSION, BENIGN: Primary | Chronic | ICD-10-CM

## 2024-08-13 DIAGNOSIS — F31.9 CHRONIC BIPOLAR AFFECTIVE DISORDER (MULTI): ICD-10-CM

## 2024-08-13 DIAGNOSIS — E11.22 TYPE 2 DIABETES MELLITUS WITH STAGE 3A CHRONIC KIDNEY DISEASE, WITH LONG-TERM CURRENT USE OF INSULIN (MULTI): Chronic | ICD-10-CM

## 2024-08-13 DIAGNOSIS — N18.31 STAGE 3A CHRONIC KIDNEY DISEASE (MULTI): Chronic | ICD-10-CM

## 2024-08-13 PROCEDURE — 99316 NF DSCHRG MGMT 30 MIN+: CPT | Performed by: NURSE PRACTITIONER

## 2024-08-13 ASSESSMENT — ENCOUNTER SYMPTOMS
SHORTNESS OF BREATH: 0
NAUSEA: 0
MYALGIAS: 0
CHILLS: 0
DIAPHORESIS: 0
DIARRHEA: 0
FEVER: 0
CONSTIPATION: 0
VOMITING: 0
COUGH: 0
JOINT SWELLING: 0

## 2024-08-13 NOTE — ASSESSMENT & PLAN NOTE
She was minimally ambulatory prior to admission,  is primary caregiver.  She is using slideboard for transfers and is planning on discharge later today.    She is being set up with [facility] for therapy in the home with the goal to increase strength.

## 2024-08-13 NOTE — PROGRESS NOTES
Subjective   Kaylene Feliciano is a 69 y.o. female here for weekly skilled visit.   HPI    Health problems reviewed and no acute concerns.  Participating in therapy and feeling stronger, she is standing  in therapy.  She feels her breathing is improving and tolerating bipap, home auto pap is being delivered today and she will be discharged once all equipment is delivered.   She states she is sleeping well. .  Eating and drinking well.  Denies any complaints of pain.  No concerns per staff.        Review of Systems   Constitutional:  Negative for chills, diaphoresis and fever.   Respiratory:  Negative for cough and shortness of breath.         Becomes sob with exertion, recovers fairly quickly   Cardiovascular:  Negative for chest pain and leg swelling.   Gastrointestinal:  Negative for constipation, diarrhea, nausea and vomiting.   Musculoskeletal:  Negative for joint swelling and myalgias.       Objective   /68   Pulse 76   Temp 36.6 °C (97.9 °F)   Resp 16   Wt 129 kg (284 lb)   SpO2 98%   BMI 50.31 kg/m²     Physical Exam  Vitals reviewed.   Constitutional:       General: She is not in acute distress.     Appearance: She is obese. She is not ill-appearing.   Cardiovascular:      Rate and Rhythm: Normal rate and regular rhythm.      Pulses: Normal pulses.      Heart sounds:      No gallop.   Pulmonary:      Breath sounds: No wheezing, rhonchi or rales.      Comments: Diminished due to body habitus  Abdominal:      General: Abdomen is flat. Bowel sounds are normal.      Palpations: Abdomen is soft.      Tenderness: There is no guarding or rebound.   Musculoskeletal:      Right lower leg: No edema.      Left lower leg: No edema.         Assessment/Plan   Problem List Items Addressed This Visit       Chronic bipolar affective disorder (Multi)     S/p deep brain stimulator which she states has controlled her sx         CKD (chronic kidney disease), stage III (Multi) (Chronic)     monitor with routine labs.            Essential hypertension, benign - Primary (Chronic)     controlled. continue with current regimen.  Continue to monitor and adjust meds based on clinical course.                   Morbid obesity (Multi) (Chronic)     This will increase her risk of complications         Physical deconditioning     She was minimally ambulatory prior to admission,  is primary caregiver.  She is using slideboard for transfers and is planning on discharge later today.    She is being set up with [facility] for therapy in the home with the goal to increase strength.             Diabetes mellitus (Multi) (Chronic)     Blood sugars reviewed, mostly 100-200's,  she had a few episodes of hypoglycemia, mostly when she had poor oral intake and she adjusts this as needed at home, she is to bring her glucose readings to her PCP visit for further montoring for adjustment if needed.  Continue to monitor and adjust meds based on clinical course.           Obesity hypoventilation syndrome (Multi)     She developed hypercapneic respiratory failure which improved after Bipap.   She has returned to facility with bipap and tolerating well.   She followed up with pulmonary team and had sleep study, home autopap ordered through pulmonary team and she is planning on discharge later today once oxygen is delivered to the home and she will follow up with pulmonary.          ARCHIE (obstructive sleep apnea)     Follow up with pulmonary team as scheduled.         labs/meds/orders reviewed  staff to monitor and notify for any changes.  continue with therapy as able.  Staff to closely monitor respiratory status, follow up with pulmonary as scheduled.   She appears weak but is gaining strength  Acceptable for discharge with [facility] once equipment is delivered.  Requires use of assistive device for ambulation.  Folllow up with PCP 1-2 weeks.  Scripts left in anticipation of discharge.  Time for coordination of care was greater than 35 minutes [facility] chart review, visit  and exam, discussion with nursing, therapy and ss staff.

## 2024-08-13 NOTE — LETTER
Patient: Kaylene Feliciano  : 1955    Encounter Date: 2024    Subjective  Kaylene Feliciano is a 69 y.o. female here for weekly skilled visit.   HPI    Health problems reviewed and no acute concerns.  Participating in therapy and feeling stronger, she is standing  in therapy.  She feels her breathing is improving and tolerating bipap, home auto pap is being delivered today and she will be discharged once all equipment is delivered.   She states she is sleeping well. .  Eating and drinking well.  Denies any complaints of pain.  No concerns per staff.        Review of Systems   Constitutional:  Negative for chills, diaphoresis and fever.   Respiratory:  Negative for cough and shortness of breath.         Becomes sob with exertion, recovers fairly quickly   Cardiovascular:  Negative for chest pain and leg swelling.   Gastrointestinal:  Negative for constipation, diarrhea, nausea and vomiting.   Musculoskeletal:  Negative for joint swelling and myalgias.       Objective  /68   Pulse 76   Temp 36.6 °C (97.9 °F)   Resp 16   Wt 129 kg (284 lb)   SpO2 98%   BMI 50.31 kg/m²     Physical Exam  Vitals reviewed.   Constitutional:       General: She is not in acute distress.     Appearance: She is obese. She is not ill-appearing.   Cardiovascular:      Rate and Rhythm: Normal rate and regular rhythm.      Pulses: Normal pulses.      Heart sounds:      No gallop.   Pulmonary:      Breath sounds: No wheezing, rhonchi or rales.      Comments: Diminished due to body habitus  Abdominal:      General: Abdomen is flat. Bowel sounds are normal.      Palpations: Abdomen is soft.      Tenderness: There is no guarding or rebound.   Musculoskeletal:      Right lower leg: No edema.      Left lower leg: No edema.         Assessment/Plan  Problem List Items Addressed This Visit       Chronic bipolar affective disorder (Multi)     S/p deep brain stimulator which she states has controlled her sx         CKD (chronic kidney  disease), stage III (Multi) (Chronic)     monitor with routine labs.           Essential hypertension, benign - Primary (Chronic)     controlled. continue with current regimen.  Continue to monitor and adjust meds based on clinical course.                   Morbid obesity (Multi) (Chronic)     This will increase her risk of complications         Physical deconditioning     She was minimally ambulatory prior to admission,  is primary caregiver.  She is using slideboard for transfers and is planning on discharge later today.    She is being set up with [facility] for therapy in the home with the goal to increase strength.             Diabetes mellitus (Multi) (Chronic)     Blood sugars reviewed, mostly 100-200's,  she had a few episodes of hypoglycemia, mostly when she had poor oral intake and she adjusts this as needed at home, she is to bring her glucose readings to her PCP visit for further montoring for adjustment if needed.  Continue to monitor and adjust meds based on clinical course.           Obesity hypoventilation syndrome (Multi)     She developed hypercapneic respiratory failure which improved after Bipap.   She has returned to facility with bipap and tolerating well.   She followed up with pulmonary team and had sleep study, home autopap ordered through pulmonary team and she is planning on discharge later today once oxygen is delivered to the home and she will follow up with pulmonary.          ARCHIE (obstructive sleep apnea)     Follow up with pulmonary team as scheduled.         labs/meds/orders reviewed  staff to monitor and notify for any changes.  continue with therapy as able.  Staff to closely monitor respiratory status, follow up with pulmonary as scheduled.   She appears weak but is gaining strength  Acceptable for discharge with [facility] once equipment is delivered.  Requires use of assistive device for ambulation.  Folllow up with PCP 1-2 weeks.  Scripts left in anticipation of discharge.  Time for  coordination of care was greater than 35 minutes [facility] chart review, visit and exam, discussion with nursing, therapy and ss staff.                 Electronically Signed By: KELLI Slaughter   8/13/24 12:05 PM

## 2024-08-13 NOTE — ASSESSMENT & PLAN NOTE
Blood sugars reviewed, mostly 100-200's,  she had a few episodes of hypoglycemia, mostly when she had poor oral intake and she adjusts this as needed at home, she is to bring her glucose readings to her PCP visit for further montoring for adjustment if needed.  Continue to monitor and adjust meds based on clinical course.

## 2024-08-13 NOTE — ASSESSMENT & PLAN NOTE
She developed hypercapneic respiratory failure which improved after Bipap.   She has returned to facility with bipap and tolerating well.   She followed up with pulmonary team and had sleep study, home autopap ordered through pulmonary team and she is planning on discharge later today once oxygen is delivered to the home and she will follow up with pulmonary.

## 2024-08-16 LAB
ATRIAL RATE: 0 BPM
ATRIAL RATE: 69 BPM
P OFFSET: 128 MS
P OFFSET: 136 MS
P ONSET: 108 MS
P ONSET: 116 MS
PR INTERVAL: 180 MS
PR INTERVAL: 200 MS
Q ONSET: 159 MS
Q ONSET: 206 MS
QRS COUNT: 12 BEATS
QRS COUNT: 12 BEATS
QRS DURATION: 108 MS
QRS DURATION: 180 MS
QT INTERVAL: 396 MS
QT INTERVAL: 496 MS
QTC CALCULATION(BAZETT): 433 MS
QTC CALCULATION(BAZETT): 531 MS
QTC FREDERICIA: 421 MS
QTC FREDERICIA: 519 MS
R AXIS: -81 DEGREES
R AXIS: -82 DEGREES
T AXIS: 138 DEGREES
T AXIS: 146 DEGREES
T OFFSET: 404 MS
T OFFSET: 407 MS
VENTRICULAR RATE: 69 BPM
VENTRICULAR RATE: 72 BPM

## 2024-08-20 ENCOUNTER — APPOINTMENT (OUTPATIENT)
Dept: SLEEP MEDICINE | Facility: CLINIC | Age: 69
End: 2024-08-20
Payer: MEDICARE

## 2024-09-10 ENCOUNTER — TELEPHONE (OUTPATIENT)
Dept: NEPHROLOGY | Facility: CLINIC | Age: 69
End: 2024-09-10
Payer: MEDICARE

## 2024-09-10 DIAGNOSIS — N18.31 STAGE 3A CHRONIC KIDNEY DISEASE (MULTI): Primary | ICD-10-CM

## 2024-09-10 DIAGNOSIS — E08.22 DIABETES MELLITUS DUE TO UNDERLYING CONDITION WITH DIABETIC CHRONIC KIDNEY DISEASE, UNSPECIFIED CKD STAGE, UNSPECIFIED WHETHER LONG TERM INSULIN USE (MULTI): ICD-10-CM

## 2024-09-10 DIAGNOSIS — I10 ESSENTIAL HYPERTENSION: ICD-10-CM

## 2024-09-10 NOTE — TELEPHONE ENCOUNTER
Pt has an appt next week and would like to know if you can put an order in for BW. Please advise pt at [phone].

## 2024-09-12 ENCOUNTER — APPOINTMENT (OUTPATIENT)
Dept: CARDIOLOGY | Facility: HOSPITAL | Age: 69
End: 2024-09-12
Payer: MEDICARE

## 2024-09-12 ENCOUNTER — APPOINTMENT (OUTPATIENT)
Dept: RADIOLOGY | Facility: HOSPITAL | Age: 69
End: 2024-09-12
Payer: MEDICARE

## 2024-09-12 ENCOUNTER — HOSPITAL ENCOUNTER (INPATIENT)
Facility: HOSPITAL | Age: 69
DRG: 194 | End: 2024-09-12
Attending: STUDENT IN AN ORGANIZED HEALTH CARE EDUCATION/TRAINING PROGRAM | Admitting: INTERNAL MEDICINE
Payer: MEDICARE

## 2024-09-12 DIAGNOSIS — E11.8 POORLY CONTROLLED TYPE 2 DIABETES MELLITUS WITH COMPLICATION (MULTI): ICD-10-CM

## 2024-09-12 DIAGNOSIS — R65.20 SEPSIS WITH ACUTE HYPERCAPNIC RESPIRATORY FAILURE WITHOUT SEPTIC SHOCK, DUE TO UNSPECIFIED ORGANISM (MULTI): ICD-10-CM

## 2024-09-12 DIAGNOSIS — J96.02 SEPSIS WITH ACUTE HYPERCAPNIC RESPIRATORY FAILURE WITHOUT SEPTIC SHOCK, DUE TO UNSPECIFIED ORGANISM (MULTI): ICD-10-CM

## 2024-09-12 DIAGNOSIS — R53.1 GENERALIZED WEAKNESS: ICD-10-CM

## 2024-09-12 DIAGNOSIS — J18.9 PNEUMONIA OF BOTH LOWER LOBES DUE TO INFECTIOUS ORGANISM: ICD-10-CM

## 2024-09-12 DIAGNOSIS — A41.9 SEPSIS WITH ACUTE HYPERCAPNIC RESPIRATORY FAILURE WITHOUT SEPTIC SHOCK, DUE TO UNSPECIFIED ORGANISM (MULTI): ICD-10-CM

## 2024-09-12 DIAGNOSIS — Z74.09 IMPAIRED MOBILITY: ICD-10-CM

## 2024-09-12 DIAGNOSIS — J96.21 ACUTE ON CHRONIC RESPIRATORY FAILURE WITH HYPOXIA AND HYPERCAPNIA (MULTI): Primary | ICD-10-CM

## 2024-09-12 DIAGNOSIS — J96.22 ACUTE ON CHRONIC RESPIRATORY FAILURE WITH HYPOXIA AND HYPERCAPNIA (MULTI): Primary | ICD-10-CM

## 2024-09-12 DIAGNOSIS — J96.12 CHRONIC RESPIRATORY FAILURE WITH HYPOXIA AND HYPERCAPNIA (MULTI): ICD-10-CM

## 2024-09-12 DIAGNOSIS — E11.65 POORLY CONTROLLED TYPE 2 DIABETES MELLITUS WITH COMPLICATION (MULTI): ICD-10-CM

## 2024-09-12 DIAGNOSIS — G47.33 OSA (OBSTRUCTIVE SLEEP APNEA): ICD-10-CM

## 2024-09-12 DIAGNOSIS — J96.11 CHRONIC RESPIRATORY FAILURE WITH HYPOXIA AND HYPERCAPNIA (MULTI): ICD-10-CM

## 2024-09-12 LAB
ALBUMIN SERPL BCP-MCNC: 4 G/DL (ref 3.4–5)
ALP SERPL-CCNC: 90 U/L (ref 33–136)
ALT SERPL W P-5'-P-CCNC: 10 U/L (ref 7–45)
ANION GAP BLDV CALCULATED.4IONS-SCNC: 11 MMOL/L (ref 10–25)
ANION GAP SERPL CALC-SCNC: 13 MMOL/L (ref 10–20)
AST SERPL W P-5'-P-CCNC: 14 U/L (ref 9–39)
BASE EXCESS BLDV CALC-SCNC: 2.6 MMOL/L (ref -2–3)
BASOPHILS # BLD AUTO: 0.04 X10*3/UL (ref 0–0.1)
BASOPHILS NFR BLD AUTO: 0.2 %
BILIRUB SERPL-MCNC: 0.9 MG/DL (ref 0–1.2)
BNP SERPL-MCNC: 454 PG/ML (ref 0–99)
BODY TEMPERATURE: ABNORMAL
BUN SERPL-MCNC: 23 MG/DL (ref 6–23)
CA-I BLDV-SCNC: 1.22 MMOL/L (ref 1.1–1.33)
CALCIUM SERPL-MCNC: 9.2 MG/DL (ref 8.6–10.3)
CARDIAC TROPONIN I PNL SERPL HS: 6 NG/L (ref 0–13)
CHLORIDE BLDV-SCNC: 100 MMOL/L (ref 98–107)
CHLORIDE SERPL-SCNC: 99 MMOL/L (ref 98–107)
CO2 SERPL-SCNC: 31 MMOL/L (ref 21–32)
CREAT SERPL-MCNC: 1.06 MG/DL (ref 0.5–1.05)
CRITICAL CALL TIME: 2336
CRITICAL CALLED BY: ABNORMAL
CRITICAL CALLED TO: ABNORMAL
CRITICAL READ BACK: ABNORMAL
EGFRCR SERPLBLD CKD-EPI 2021: 57 ML/MIN/1.73M*2
EOSINOPHIL # BLD AUTO: 0.03 X10*3/UL (ref 0–0.7)
EOSINOPHIL NFR BLD AUTO: 0.2 %
ERYTHROCYTE [DISTWIDTH] IN BLOOD BY AUTOMATED COUNT: 16.5 % (ref 11.5–14.5)
GLUCOSE BLDV-MCNC: 146 MG/DL (ref 74–99)
GLUCOSE SERPL-MCNC: 151 MG/DL (ref 74–99)
HCO3 BLDV-SCNC: 31.3 MMOL/L (ref 22–26)
HCT VFR BLD AUTO: 28.8 % (ref 36–46)
HCT VFR BLD EST: 30 % (ref 36–46)
HGB BLD-MCNC: 8.6 G/DL (ref 12–16)
HGB BLDV-MCNC: 9.9 G/DL (ref 12–16)
IMM GRANULOCYTES # BLD AUTO: 0.08 X10*3/UL (ref 0–0.7)
IMM GRANULOCYTES NFR BLD AUTO: 0.5 % (ref 0–0.9)
INHALED O2 CONCENTRATION: 65 %
LACTATE BLDV-SCNC: 1.4 MMOL/L (ref 0.4–2)
LYMPHOCYTES # BLD AUTO: 0.99 X10*3/UL (ref 1.2–4.8)
LYMPHOCYTES NFR BLD AUTO: 6.2 %
MAGNESIUM SERPL-MCNC: 1.97 MG/DL (ref 1.6–2.4)
MCH RBC QN AUTO: 25.6 PG (ref 26–34)
MCHC RBC AUTO-ENTMCNC: 29.9 G/DL (ref 32–36)
MCV RBC AUTO: 86 FL (ref 80–100)
MONOCYTES # BLD AUTO: 0.81 X10*3/UL (ref 0.1–1)
MONOCYTES NFR BLD AUTO: 5 %
NEUTROPHILS # BLD AUTO: 14.09 X10*3/UL (ref 1.2–7.7)
NEUTROPHILS NFR BLD AUTO: 87.9 %
NRBC BLD-RTO: 0 /100 WBCS (ref 0–0)
OXYHGB MFR BLDV: 61.1 % (ref 45–75)
PCO2 BLDV: 73 MM HG (ref 41–51)
PH BLDV: 7.24 PH (ref 7.33–7.43)
PLATELET # BLD AUTO: 262 X10*3/UL (ref 150–450)
PO2 BLDV: 42 MM HG (ref 35–45)
POTASSIUM BLDV-SCNC: 5.3 MMOL/L (ref 3.5–5.3)
POTASSIUM SERPL-SCNC: 4.6 MMOL/L (ref 3.5–5.3)
PROT SERPL-MCNC: 7.8 G/DL (ref 6.4–8.2)
RBC # BLD AUTO: 3.36 X10*6/UL (ref 4–5.2)
SAO2 % BLDV: 63 % (ref 45–75)
SARS-COV-2 RNA RESP QL NAA+PROBE: NOT DETECTED
SODIUM BLDV-SCNC: 137 MMOL/L (ref 136–145)
SODIUM SERPL-SCNC: 138 MMOL/L (ref 136–145)
WBC # BLD AUTO: 16 X10*3/UL (ref 4.4–11.3)

## 2024-09-12 PROCEDURE — 85025 COMPLETE CBC W/AUTO DIFF WBC: CPT

## 2024-09-12 PROCEDURE — 84484 ASSAY OF TROPONIN QUANT: CPT

## 2024-09-12 PROCEDURE — 93005 ELECTROCARDIOGRAM TRACING: CPT

## 2024-09-12 PROCEDURE — 2500000005 HC RX 250 GENERAL PHARMACY W/O HCPCS

## 2024-09-12 PROCEDURE — 96365 THER/PROPH/DIAG IV INF INIT: CPT

## 2024-09-12 PROCEDURE — 87040 BLOOD CULTURE FOR BACTERIA: CPT | Mod: STJLAB

## 2024-09-12 PROCEDURE — 2500000004 HC RX 250 GENERAL PHARMACY W/ HCPCS (ALT 636 FOR OP/ED)

## 2024-09-12 PROCEDURE — 84132 ASSAY OF SERUM POTASSIUM: CPT

## 2024-09-12 PROCEDURE — 94660 CPAP INITIATION&MGMT: CPT

## 2024-09-12 PROCEDURE — 83605 ASSAY OF LACTIC ACID: CPT

## 2024-09-12 PROCEDURE — 99285 EMERGENCY DEPT VISIT HI MDM: CPT | Mod: CS

## 2024-09-12 PROCEDURE — 2500000002 HC RX 250 W HCPCS SELF ADMINISTERED DRUGS (ALT 637 FOR MEDICARE OP, ALT 636 FOR OP/ED)

## 2024-09-12 PROCEDURE — 94640 AIRWAY INHALATION TREATMENT: CPT

## 2024-09-12 PROCEDURE — 99285 EMERGENCY DEPT VISIT HI MDM: CPT | Performed by: STUDENT IN AN ORGANIZED HEALTH CARE EDUCATION/TRAINING PROGRAM

## 2024-09-12 PROCEDURE — 83880 ASSAY OF NATRIURETIC PEPTIDE: CPT

## 2024-09-12 PROCEDURE — 71045 X-RAY EXAM CHEST 1 VIEW: CPT

## 2024-09-12 PROCEDURE — 96375 TX/PRO/DX INJ NEW DRUG ADDON: CPT

## 2024-09-12 PROCEDURE — 96368 THER/DIAG CONCURRENT INF: CPT

## 2024-09-12 PROCEDURE — 5A09357 ASSISTANCE WITH RESPIRATORY VENTILATION, LESS THAN 24 CONSECUTIVE HOURS, CONTINUOUS POSITIVE AIRWAY PRESSURE: ICD-10-PCS | Performed by: INTERNAL MEDICINE

## 2024-09-12 PROCEDURE — 80053 COMPREHEN METABOLIC PANEL: CPT

## 2024-09-12 PROCEDURE — 36415 COLL VENOUS BLD VENIPUNCTURE: CPT

## 2024-09-12 PROCEDURE — 87635 SARS-COV-2 COVID-19 AMP PRB: CPT

## 2024-09-12 PROCEDURE — 83735 ASSAY OF MAGNESIUM: CPT

## 2024-09-12 RX ORDER — CEFTRIAXONE 2 G/50ML
2 INJECTION, SOLUTION INTRAVENOUS ONCE
Status: COMPLETED | OUTPATIENT
Start: 2024-09-12 | End: 2024-09-13

## 2024-09-12 RX ORDER — IPRATROPIUM BROMIDE AND ALBUTEROL SULFATE 2.5; .5 MG/3ML; MG/3ML
6 SOLUTION RESPIRATORY (INHALATION) ONCE
Status: COMPLETED | OUTPATIENT
Start: 2024-09-12 | End: 2024-09-12

## 2024-09-12 RX ORDER — MAGNESIUM SULFATE HEPTAHYDRATE 40 MG/ML
2 INJECTION, SOLUTION INTRAVENOUS ONCE
Status: COMPLETED | OUTPATIENT
Start: 2024-09-12 | End: 2024-09-13

## 2024-09-12 ASSESSMENT — PAIN SCALES - GENERAL: PAINLEVEL_OUTOF10: 0 - NO PAIN

## 2024-09-12 ASSESSMENT — COLUMBIA-SUICIDE SEVERITY RATING SCALE - C-SSRS
2. HAVE YOU ACTUALLY HAD ANY THOUGHTS OF KILLING YOURSELF?: NO
6. HAVE YOU EVER DONE ANYTHING, STARTED TO DO ANYTHING, OR PREPARED TO DO ANYTHING TO END YOUR LIFE?: NO
1. IN THE PAST MONTH, HAVE YOU WISHED YOU WERE DEAD OR WISHED YOU COULD GO TO SLEEP AND NOT WAKE UP?: NO

## 2024-09-12 ASSESSMENT — PAIN - FUNCTIONAL ASSESSMENT: PAIN_FUNCTIONAL_ASSESSMENT: 0-10

## 2024-09-13 ENCOUNTER — APPOINTMENT (OUTPATIENT)
Dept: CARDIOLOGY | Facility: HOSPITAL | Age: 69
End: 2024-09-13
Payer: MEDICARE

## 2024-09-13 ENCOUNTER — APPOINTMENT (OUTPATIENT)
Dept: RADIOLOGY | Facility: HOSPITAL | Age: 69
End: 2024-09-13
Payer: MEDICARE

## 2024-09-13 PROBLEM — N18.9 CKD (CHRONIC KIDNEY DISEASE): Status: ACTIVE | Noted: 2023-09-15

## 2024-09-13 PROBLEM — D72.828 NEUTROPHILIC LEUKOCYTOSIS: Status: ACTIVE | Noted: 2024-09-13

## 2024-09-13 PROBLEM — D72.9 NEUTROPHILIC LEUKOCYTOSIS: Status: ACTIVE | Noted: 2024-09-13

## 2024-09-13 PROBLEM — R19.7 DIARRHEA: Status: ACTIVE | Noted: 2024-09-13

## 2024-09-13 PROBLEM — J96.21 ACUTE ON CHRONIC RESPIRATORY FAILURE WITH HYPOXIA AND HYPERCAPNIA (MULTI): Status: ACTIVE | Noted: 2024-09-13

## 2024-09-13 PROBLEM — J96.22 ACUTE ON CHRONIC RESPIRATORY FAILURE WITH HYPOXIA AND HYPERCAPNIA (MULTI): Status: ACTIVE | Noted: 2024-09-13

## 2024-09-13 LAB
ANION GAP BLDA CALCULATED.4IONS-SCNC: 9 MMO/L (ref 10–25)
ANION GAP BLDV CALCULATED.4IONS-SCNC: 10 MMOL/L (ref 10–25)
ANION GAP SERPL CALC-SCNC: 17 MMOL/L (ref 10–20)
APPEARANCE UR: ABNORMAL
ATRIAL RATE: 32 BPM
ATRIAL RATE: 65 BPM
BACTERIA #/AREA URNS AUTO: ABNORMAL /HPF
BASE EXCESS BLDA CALC-SCNC: 2.8 MMOL/L (ref -2–3)
BASE EXCESS BLDV CALC-SCNC: 3.7 MMOL/L (ref -2–3)
BILIRUB UR STRIP.AUTO-MCNC: NEGATIVE MG/DL
BODY TEMPERATURE: ABNORMAL
BODY TEMPERATURE: ABNORMAL
BUN SERPL-MCNC: 25 MG/DL (ref 6–23)
CA-I BLDA-SCNC: 1.2 MMOL/L (ref 1.1–1.33)
CA-I BLDV-SCNC: 1.2 MMOL/L (ref 1.1–1.33)
CALCIUM SERPL-MCNC: 9.4 MG/DL (ref 8.6–10.3)
CARDIAC TROPONIN I PNL SERPL HS: 8 NG/L (ref 0–13)
CHLORIDE BLDA-SCNC: 102 MMOL/L (ref 98–107)
CHLORIDE BLDV-SCNC: 100 MMOL/L (ref 98–107)
CHLORIDE SERPL-SCNC: 97 MMOL/L (ref 98–107)
CO2 SERPL-SCNC: 27 MMOL/L (ref 21–32)
COLOR UR: YELLOW
CREAT SERPL-MCNC: 1.06 MG/DL (ref 0.5–1.05)
EGFRCR SERPLBLD CKD-EPI 2021: 57 ML/MIN/1.73M*2
EPAP CMH2O: 6 CM H2O
ERYTHROCYTE [DISTWIDTH] IN BLOOD BY AUTOMATED COUNT: 16.6 % (ref 11.5–14.5)
GLUCOSE BLD MANUAL STRIP-MCNC: 215 MG/DL (ref 74–99)
GLUCOSE BLD MANUAL STRIP-MCNC: 236 MG/DL (ref 74–99)
GLUCOSE BLD MANUAL STRIP-MCNC: 323 MG/DL (ref 74–99)
GLUCOSE BLD MANUAL STRIP-MCNC: 393 MG/DL (ref 74–99)
GLUCOSE BLDA-MCNC: 269 MG/DL (ref 74–99)
GLUCOSE BLDV-MCNC: 193 MG/DL (ref 74–99)
GLUCOSE SERPL-MCNC: 195 MG/DL (ref 74–99)
GLUCOSE UR STRIP.AUTO-MCNC: NORMAL MG/DL
HCO3 BLDA-SCNC: 28.4 MMOL/L (ref 22–26)
HCO3 BLDV-SCNC: 31.5 MMOL/L (ref 22–26)
HCT VFR BLD AUTO: 30.3 % (ref 36–46)
HCT VFR BLD EST: 27 % (ref 36–46)
HCT VFR BLD EST: 28 % (ref 36–46)
HGB BLD-MCNC: 8.7 G/DL (ref 12–16)
HGB BLDA-MCNC: 9.2 G/DL (ref 12–16)
HGB BLDV-MCNC: 8.9 G/DL (ref 12–16)
HOLD SPECIMEN: NORMAL
INHALED O2 CONCENTRATION: 40 %
INHALED O2 CONCENTRATION: 60 %
INR PPP: 1.3 (ref 0.9–1.1)
IPAP CMH2O: 20 CM H2O
KETONES UR STRIP.AUTO-MCNC: NEGATIVE MG/DL
LACTATE BLDA-SCNC: 1.3 MMOL/L (ref 0.4–2)
LACTATE BLDV-SCNC: 1.1 MMOL/L (ref 0.4–2)
LACTATE SERPL-SCNC: 0.8 MMOL/L (ref 0.4–2)
LEUKOCYTE ESTERASE UR QL STRIP.AUTO: ABNORMAL
MAGNESIUM SERPL-MCNC: 2.54 MG/DL (ref 1.6–2.4)
MCH RBC QN AUTO: 24.9 PG (ref 26–34)
MCHC RBC AUTO-ENTMCNC: 28.7 G/DL (ref 32–36)
MCV RBC AUTO: 87 FL (ref 80–100)
MUCOUS THREADS #/AREA URNS AUTO: ABNORMAL /LPF
NITRITE UR QL STRIP.AUTO: NEGATIVE
NRBC BLD-RTO: 0 /100 WBCS (ref 0–0)
OXYHGB MFR BLDA: 90.4 % (ref 94–98)
OXYHGB MFR BLDV: 64.6 % (ref 45–75)
P OFFSET: 131 MS
P OFFSET: 137 MS
P ONSET: 111 MS
P ONSET: 117 MS
PCO2 BLDA: 48 MM HG (ref 38–42)
PCO2 BLDV: 67 MM HG (ref 41–51)
PH BLDA: 7.38 PH (ref 7.38–7.42)
PH BLDV: 7.28 PH (ref 7.33–7.43)
PH UR STRIP.AUTO: 5.5 [PH]
PHOSPHATE SERPL-MCNC: 4.3 MG/DL (ref 2.5–4.9)
PLATELET # BLD AUTO: 268 X10*3/UL (ref 150–450)
PO2 BLDA: 63 MM HG (ref 85–95)
PO2 BLDV: 42 MM HG (ref 35–45)
POTASSIUM BLDA-SCNC: 4.7 MMOL/L (ref 3.5–5.3)
POTASSIUM BLDV-SCNC: 5 MMOL/L (ref 3.5–5.3)
POTASSIUM SERPL-SCNC: 4.6 MMOL/L (ref 3.5–5.3)
PR INTERVAL: 160 MS
PR INTERVAL: 200 MS
PROCALCITONIN SERPL-MCNC: 0.41 NG/ML
PROT UR STRIP.AUTO-MCNC: ABNORMAL MG/DL
PROTHROMBIN TIME: 14.4 SECONDS (ref 9.8–12.8)
Q ONSET: 206 MS
Q ONSET: 207 MS
QRS COUNT: 12 BEATS
QRS COUNT: 12 BEATS
QRS DURATION: 104 MS
QRS DURATION: 108 MS
QT INTERVAL: 406 MS
QT INTERVAL: 428 MS
QTC CALCULATION(BAZETT): 444 MS
QTC CALCULATION(BAZETT): 465 MS
QTC FREDERICIA: 431 MS
QTC FREDERICIA: 452 MS
R AXIS: -55 DEGREES
R AXIS: 257 DEGREES
RBC # BLD AUTO: 3.5 X10*6/UL (ref 4–5.2)
RBC # UR STRIP.AUTO: ABNORMAL /UL
RBC #/AREA URNS AUTO: ABNORMAL /HPF
SAO2 % BLDA: 93 % (ref 94–100)
SAO2 % BLDV: 67 % (ref 45–75)
SODIUM BLDA-SCNC: 135 MMOL/L (ref 136–145)
SODIUM BLDV-SCNC: 136 MMOL/L (ref 136–145)
SODIUM SERPL-SCNC: 136 MMOL/L (ref 136–145)
SP GR UR STRIP.AUTO: 1.02
SQUAMOUS #/AREA URNS AUTO: ABNORMAL /HPF
T AXIS: 142 DEGREES
T AXIS: 27 DEGREES
T OFFSET: 410 MS
T OFFSET: 420 MS
UROBILINOGEN UR STRIP.AUTO-MCNC: NORMAL MG/DL
VENTRICULAR RATE: 71 BPM
VENTRICULAR RATE: 72 BPM
WBC # BLD AUTO: 15.4 X10*3/UL (ref 4.4–11.3)
WBC #/AREA URNS AUTO: ABNORMAL /HPF

## 2024-09-13 PROCEDURE — 84132 ASSAY OF SERUM POTASSIUM: CPT | Performed by: NURSE PRACTITIONER

## 2024-09-13 PROCEDURE — 87081 CULTURE SCREEN ONLY: CPT | Mod: STJLAB

## 2024-09-13 PROCEDURE — 71250 CT THORAX DX C-: CPT | Performed by: RADIOLOGY

## 2024-09-13 PROCEDURE — 2500000001 HC RX 250 WO HCPCS SELF ADMINISTERED DRUGS (ALT 637 FOR MEDICARE OP): Performed by: NURSE PRACTITIONER

## 2024-09-13 PROCEDURE — 94640 AIRWAY INHALATION TREATMENT: CPT

## 2024-09-13 PROCEDURE — 2500000004 HC RX 250 GENERAL PHARMACY W/ HCPCS (ALT 636 FOR OP/ED): Performed by: INTERNAL MEDICINE

## 2024-09-13 PROCEDURE — 84132 ASSAY OF SERUM POTASSIUM: CPT

## 2024-09-13 PROCEDURE — 85027 COMPLETE CBC AUTOMATED: CPT

## 2024-09-13 PROCEDURE — 97161 PT EVAL LOW COMPLEX 20 MIN: CPT | Mod: GP

## 2024-09-13 PROCEDURE — 2500000002 HC RX 250 W HCPCS SELF ADMINISTERED DRUGS (ALT 637 FOR MEDICARE OP, ALT 636 FOR OP/ED)

## 2024-09-13 PROCEDURE — 2500000004 HC RX 250 GENERAL PHARMACY W/ HCPCS (ALT 636 FOR OP/ED)

## 2024-09-13 PROCEDURE — 2500000004 HC RX 250 GENERAL PHARMACY W/ HCPCS (ALT 636 FOR OP/ED): Performed by: NURSE PRACTITIONER

## 2024-09-13 PROCEDURE — 71045 X-RAY EXAM CHEST 1 VIEW: CPT | Performed by: RADIOLOGY

## 2024-09-13 PROCEDURE — 99223 1ST HOSP IP/OBS HIGH 75: CPT

## 2024-09-13 PROCEDURE — 2500000002 HC RX 250 W HCPCS SELF ADMINISTERED DRUGS (ALT 637 FOR MEDICARE OP, ALT 636 FOR OP/ED): Performed by: NURSE PRACTITIONER

## 2024-09-13 PROCEDURE — 84100 ASSAY OF PHOSPHORUS: CPT

## 2024-09-13 PROCEDURE — 96367 TX/PROPH/DG ADDL SEQ IV INF: CPT

## 2024-09-13 PROCEDURE — 83735 ASSAY OF MAGNESIUM: CPT

## 2024-09-13 PROCEDURE — 99223 1ST HOSP IP/OBS HIGH 75: CPT | Performed by: INTERNAL MEDICINE

## 2024-09-13 PROCEDURE — 93005 ELECTROCARDIOGRAM TRACING: CPT

## 2024-09-13 PROCEDURE — 81001 URINALYSIS AUTO W/SCOPE: CPT

## 2024-09-13 PROCEDURE — 85610 PROTHROMBIN TIME: CPT

## 2024-09-13 PROCEDURE — 82947 ASSAY GLUCOSE BLOOD QUANT: CPT

## 2024-09-13 PROCEDURE — 2060000001 HC INTERMEDIATE ICU ROOM DAILY

## 2024-09-13 PROCEDURE — 94660 CPAP INITIATION&MGMT: CPT

## 2024-09-13 PROCEDURE — 87899 AGENT NOS ASSAY W/OPTIC: CPT | Mod: STJLAB

## 2024-09-13 PROCEDURE — 2500000005 HC RX 250 GENERAL PHARMACY W/O HCPCS

## 2024-09-13 PROCEDURE — 97165 OT EVAL LOW COMPLEX 30 MIN: CPT | Mod: GO

## 2024-09-13 PROCEDURE — 94799 UNLISTED PULMONARY SVC/PX: CPT

## 2024-09-13 PROCEDURE — 84145 PROCALCITONIN (PCT): CPT | Mod: STJLAB

## 2024-09-13 PROCEDURE — 36415 COLL VENOUS BLD VENIPUNCTURE: CPT

## 2024-09-13 PROCEDURE — 87449 NOS EACH ORGANISM AG IA: CPT | Mod: STJLAB

## 2024-09-13 PROCEDURE — 87086 URINE CULTURE/COLONY COUNT: CPT | Mod: STJLAB

## 2024-09-13 PROCEDURE — 2500000005 HC RX 250 GENERAL PHARMACY W/O HCPCS: Performed by: NURSE PRACTITIONER

## 2024-09-13 PROCEDURE — 96366 THER/PROPH/DIAG IV INF ADDON: CPT

## 2024-09-13 PROCEDURE — 2500000005 HC RX 250 GENERAL PHARMACY W/O HCPCS: Performed by: STUDENT IN AN ORGANIZED HEALTH CARE EDUCATION/TRAINING PROGRAM

## 2024-09-13 PROCEDURE — 97530 THERAPEUTIC ACTIVITIES: CPT | Mod: GP

## 2024-09-13 PROCEDURE — 2500000001 HC RX 250 WO HCPCS SELF ADMINISTERED DRUGS (ALT 637 FOR MEDICARE OP)

## 2024-09-13 PROCEDURE — 71250 CT THORAX DX C-: CPT

## 2024-09-13 PROCEDURE — 96361 HYDRATE IV INFUSION ADD-ON: CPT

## 2024-09-13 RX ORDER — IPRATROPIUM BROMIDE AND ALBUTEROL SULFATE 2.5; .5 MG/3ML; MG/3ML
3 SOLUTION RESPIRATORY (INHALATION) EVERY 2 HOUR PRN
Status: DISCONTINUED | OUTPATIENT
Start: 2024-09-13 | End: 2024-09-18 | Stop reason: HOSPADM

## 2024-09-13 RX ORDER — ENOXAPARIN SODIUM 100 MG/ML
40 INJECTION SUBCUTANEOUS EVERY 12 HOURS SCHEDULED
Status: DISCONTINUED | OUTPATIENT
Start: 2024-09-13 | End: 2024-09-15

## 2024-09-13 RX ORDER — INSULIN LISPRO 100 [IU]/ML
0-15 INJECTION, SOLUTION INTRAVENOUS; SUBCUTANEOUS 3 TIMES DAILY
COMMUNITY

## 2024-09-13 RX ORDER — CIDER VINEGAR 300 MG
1 TABLET ORAL DAILY
COMMUNITY

## 2024-09-13 RX ORDER — NAPROXEN SODIUM 220 MG/1
81 TABLET, FILM COATED ORAL DAILY
Status: DISCONTINUED | OUTPATIENT
Start: 2024-09-13 | End: 2024-09-18 | Stop reason: HOSPADM

## 2024-09-13 RX ORDER — CITALOPRAM 20 MG/1
40 TABLET, FILM COATED ORAL DAILY
Status: DISCONTINUED | OUTPATIENT
Start: 2024-09-13 | End: 2024-09-18 | Stop reason: HOSPADM

## 2024-09-13 RX ORDER — INSULIN LISPRO 100 [IU]/ML
0-10 INJECTION, SOLUTION INTRAVENOUS; SUBCUTANEOUS EVERY 4 HOURS
Status: DISCONTINUED | OUTPATIENT
Start: 2024-09-13 | End: 2024-09-18 | Stop reason: HOSPADM

## 2024-09-13 RX ORDER — INSULIN GLARGINE 100 [IU]/ML
25 INJECTION, SOLUTION SUBCUTANEOUS 2 TIMES DAILY
Status: DISCONTINUED | OUTPATIENT
Start: 2024-09-13 | End: 2024-09-14

## 2024-09-13 RX ORDER — LIDOCAINE 560 MG/1
1 PATCH PERCUTANEOUS; TOPICAL; TRANSDERMAL DAILY PRN
Status: DISCONTINUED | OUTPATIENT
Start: 2024-09-13 | End: 2024-09-18 | Stop reason: HOSPADM

## 2024-09-13 RX ORDER — METHOCARBAMOL 500 MG/1
500 TABLET, FILM COATED ORAL 2 TIMES DAILY
Status: DISCONTINUED | OUTPATIENT
Start: 2024-09-13 | End: 2024-09-18 | Stop reason: HOSPADM

## 2024-09-13 RX ORDER — IPRATROPIUM BROMIDE AND ALBUTEROL SULFATE 2.5; .5 MG/3ML; MG/3ML
SOLUTION RESPIRATORY (INHALATION)
Status: COMPLETED
Start: 2024-09-13 | End: 2024-09-13

## 2024-09-13 RX ORDER — METOPROLOL SUCCINATE 25 MG/1
25 TABLET, EXTENDED RELEASE ORAL DAILY
Status: DISCONTINUED | OUTPATIENT
Start: 2024-09-13 | End: 2024-09-18 | Stop reason: HOSPADM

## 2024-09-13 RX ORDER — ACETAMINOPHEN 160 MG/5ML
650 SOLUTION ORAL EVERY 6 HOURS PRN
Status: DISCONTINUED | OUTPATIENT
Start: 2024-09-13 | End: 2024-09-18 | Stop reason: HOSPADM

## 2024-09-13 RX ORDER — GABAPENTIN 300 MG/1
300 CAPSULE ORAL 2 TIMES DAILY
Status: DISCONTINUED | OUTPATIENT
Start: 2024-09-13 | End: 2024-09-18 | Stop reason: HOSPADM

## 2024-09-13 RX ORDER — FUROSEMIDE 10 MG/ML
40 INJECTION INTRAMUSCULAR; INTRAVENOUS ONCE
Status: COMPLETED | OUTPATIENT
Start: 2024-09-13 | End: 2024-09-13

## 2024-09-13 RX ORDER — TALC
3 POWDER (GRAM) TOPICAL NIGHTLY PRN
Status: DISCONTINUED | OUTPATIENT
Start: 2024-09-13 | End: 2024-09-18 | Stop reason: HOSPADM

## 2024-09-13 RX ORDER — ATORVASTATIN CALCIUM 10 MG/1
10 TABLET, FILM COATED ORAL NIGHTLY
Status: DISCONTINUED | OUTPATIENT
Start: 2024-09-13 | End: 2024-09-18 | Stop reason: HOSPADM

## 2024-09-13 RX ORDER — IPRATROPIUM BROMIDE AND ALBUTEROL SULFATE 2.5; .5 MG/3ML; MG/3ML
3 SOLUTION RESPIRATORY (INHALATION) 3 TIMES DAILY
Status: DISCONTINUED | OUTPATIENT
Start: 2024-09-14 | End: 2024-09-18 | Stop reason: HOSPADM

## 2024-09-13 RX ORDER — BUDESONIDE 0.5 MG/2ML
0.5 INHALANT ORAL
Status: DISCONTINUED | OUTPATIENT
Start: 2024-09-13 | End: 2024-09-18 | Stop reason: HOSPADM

## 2024-09-13 RX ORDER — GUAIFENESIN 600 MG/1
600 TABLET, EXTENDED RELEASE ORAL 2 TIMES DAILY PRN
Status: DISCONTINUED | OUTPATIENT
Start: 2024-09-13 | End: 2024-09-18 | Stop reason: HOSPADM

## 2024-09-13 RX ORDER — CEFTRIAXONE 2 G/50ML
2 INJECTION, SOLUTION INTRAVENOUS DAILY
Status: DISCONTINUED | OUTPATIENT
Start: 2024-09-14 | End: 2024-09-17 | Stop reason: ALTCHOICE

## 2024-09-13 RX ORDER — DEXTROSE 50 % IN WATER (D50W) INTRAVENOUS SYRINGE
25
Status: DISCONTINUED | OUTPATIENT
Start: 2024-09-13 | End: 2024-09-18 | Stop reason: HOSPADM

## 2024-09-13 RX ORDER — AMLODIPINE BESYLATE 10 MG/1
10 TABLET ORAL DAILY
Status: DISCONTINUED | OUTPATIENT
Start: 2024-09-13 | End: 2024-09-18 | Stop reason: HOSPADM

## 2024-09-13 RX ORDER — IPRATROPIUM BROMIDE AND ALBUTEROL SULFATE 2.5; .5 MG/3ML; MG/3ML
3 SOLUTION RESPIRATORY (INHALATION) EVERY 6 HOURS
Status: DISCONTINUED | OUTPATIENT
Start: 2024-09-13 | End: 2024-09-13

## 2024-09-13 RX ORDER — ACETAMINOPHEN 325 MG/1
650 TABLET ORAL EVERY 6 HOURS PRN
Status: DISCONTINUED | OUTPATIENT
Start: 2024-09-13 | End: 2024-09-18 | Stop reason: HOSPADM

## 2024-09-13 RX ORDER — POLYETHYLENE GLYCOL 3350 17 G/17G
17 POWDER, FOR SOLUTION ORAL DAILY PRN
Status: DISCONTINUED | OUTPATIENT
Start: 2024-09-13 | End: 2024-09-18 | Stop reason: HOSPADM

## 2024-09-13 RX ORDER — DEXTROSE 50 % IN WATER (D50W) INTRAVENOUS SYRINGE
12.5
Status: DISCONTINUED | OUTPATIENT
Start: 2024-09-13 | End: 2024-09-18 | Stop reason: HOSPADM

## 2024-09-13 SDOH — SOCIAL STABILITY: SOCIAL INSECURITY: DO YOU FEEL UNSAFE GOING BACK TO THE PLACE WHERE YOU ARE LIVING?: NO

## 2024-09-13 SDOH — SOCIAL STABILITY: SOCIAL INSECURITY: HAS ANYONE EVER THREATENED TO HURT YOUR FAMILY OR YOUR PETS?: NO

## 2024-09-13 SDOH — SOCIAL STABILITY: SOCIAL INSECURITY: ABUSE: ADULT

## 2024-09-13 SDOH — ECONOMIC STABILITY: FOOD INSECURITY: WITHIN THE PAST 12 MONTHS, YOU WORRIED THAT YOUR FOOD WOULD RUN OUT BEFORE YOU GOT MONEY TO BUY MORE.: NEVER TRUE

## 2024-09-13 SDOH — SOCIAL STABILITY: SOCIAL INSECURITY: ARE THERE ANY APPARENT SIGNS OF INJURIES/BEHAVIORS THAT COULD BE RELATED TO ABUSE/NEGLECT?: NO

## 2024-09-13 SDOH — ECONOMIC STABILITY: INCOME INSECURITY: HOW HARD IS IT FOR YOU TO PAY FOR THE VERY BASICS LIKE FOOD, HOUSING, MEDICAL CARE, AND HEATING?: NOT VERY HARD

## 2024-09-13 SDOH — ECONOMIC STABILITY: INCOME INSECURITY: IN THE LAST 12 MONTHS, WAS THERE A TIME WHEN YOU WERE NOT ABLE TO PAY THE MORTGAGE OR RENT ON TIME?: NO

## 2024-09-13 SDOH — ECONOMIC STABILITY: FOOD INSECURITY: WITHIN THE PAST 12 MONTHS, THE FOOD YOU BOUGHT JUST DIDN'T LAST AND YOU DIDN'T HAVE MONEY TO GET MORE.: NEVER TRUE

## 2024-09-13 SDOH — SOCIAL STABILITY: SOCIAL INSECURITY: DOES ANYONE TRY TO KEEP YOU FROM HAVING/CONTACTING OTHER FRIENDS OR DOING THINGS OUTSIDE YOUR HOME?: NO

## 2024-09-13 SDOH — SOCIAL STABILITY: SOCIAL INSECURITY: WERE YOU ABLE TO COMPLETE ALL THE BEHAVIORAL HEALTH SCREENINGS?: YES

## 2024-09-13 SDOH — ECONOMIC STABILITY: INCOME INSECURITY: IN THE PAST 12 MONTHS, HAS THE ELECTRIC, GAS, OIL, OR WATER COMPANY THREATENED TO SHUT OFF SERVICE IN YOUR HOME?: NO

## 2024-09-13 SDOH — ECONOMIC STABILITY: HOUSING INSECURITY: IN THE PAST 12 MONTHS, HOW MANY TIMES HAVE YOU MOVED WHERE YOU WERE LIVING?: 0

## 2024-09-13 SDOH — SOCIAL STABILITY: SOCIAL INSECURITY: ARE YOU OR HAVE YOU BEEN THREATENED OR ABUSED PHYSICALLY, EMOTIONALLY, OR SEXUALLY BY ANYONE?: NO

## 2024-09-13 SDOH — SOCIAL STABILITY: SOCIAL INSECURITY: HAVE YOU HAD THOUGHTS OF HARMING ANYONE ELSE?: NO

## 2024-09-13 SDOH — ECONOMIC STABILITY: HOUSING INSECURITY: AT ANY TIME IN THE PAST 12 MONTHS, WERE YOU HOMELESS OR LIVING IN A SHELTER (INCLUDING NOW)?: NO

## 2024-09-13 SDOH — SOCIAL STABILITY: SOCIAL INSECURITY: HAVE YOU HAD ANY THOUGHTS OF HARMING ANYONE ELSE?: NO

## 2024-09-13 SDOH — SOCIAL STABILITY: SOCIAL INSECURITY: DO YOU FEEL ANYONE HAS EXPLOITED OR TAKEN ADVANTAGE OF YOU FINANCIALLY OR OF YOUR PERSONAL PROPERTY?: NO

## 2024-09-13 ASSESSMENT — ENCOUNTER SYMPTOMS
ABDOMINAL PAIN: 0
DIARRHEA: 1
BACK PAIN: 0
VOMITING: 0
DIZZINESS: 0
WEAKNESS: 1
POLYDIPSIA: 0
WOUND: 0
DYSURIA: 0
NERVOUS/ANXIOUS: 0
ABDOMINAL DISTENTION: 0
COUGH: 0
DYSPHORIC MOOD: 0
CONFUSION: 0
COLOR CHANGE: 0
SHORTNESS OF BREATH: 1
POLYPHAGIA: 0
FLANK PAIN: 0
FATIGUE: 1
CHEST TIGHTNESS: 0
NAUSEA: 0
LIGHT-HEADEDNESS: 0
DIFFICULTY URINATING: 0
HEMATURIA: 0
FEVER: 1
FREQUENCY: 0
CONSTIPATION: 0
AGITATION: 0
SORE THROAT: 0
TROUBLE SWALLOWING: 0
PALPITATIONS: 0

## 2024-09-13 ASSESSMENT — PAIN - FUNCTIONAL ASSESSMENT
PAIN_FUNCTIONAL_ASSESSMENT: 0-10

## 2024-09-13 ASSESSMENT — ACTIVITIES OF DAILY LIVING (ADL)
FEEDING YOURSELF: INDEPENDENT
ADL_ASSISTANCE: NEEDS ASSISTANCE
HEARING - LEFT EAR: FUNCTIONAL
PATIENT'S MEMORY ADEQUATE TO SAFELY COMPLETE DAILY ACTIVITIES?: YES
LACK_OF_TRANSPORTATION: NO
BATHING: INDEPENDENT
HEARING - RIGHT EAR: FUNCTIONAL
TOILETING: INDEPENDENT
ADEQUATE_TO_COMPLETE_ADL: YES
LACK_OF_TRANSPORTATION: NO
GROOMING: INDEPENDENT
JUDGMENT_ADEQUATE_SAFELY_COMPLETE_DAILY_ACTIVITIES: YES
DRESSING YOURSELF: INDEPENDENT
WALKS IN HOME: INDEPENDENT
ADL_ASSISTANCE: NEEDS ASSISTANCE

## 2024-09-13 ASSESSMENT — COGNITIVE AND FUNCTIONAL STATUS - GENERAL
MOVING FROM LYING ON BACK TO SITTING ON SIDE OF FLAT BED WITH BEDRAILS: A LITTLE
HELP NEEDED FOR BATHING: A LOT
TURNING FROM BACK TO SIDE WHILE IN FLAT BAD: A LOT
CLIMB 3 TO 5 STEPS WITH RAILING: TOTAL
DRESSING REGULAR UPPER BODY CLOTHING: A LITTLE
WALKING IN HOSPITAL ROOM: TOTAL
STANDING UP FROM CHAIR USING ARMS: TOTAL
DAILY ACTIVITIY SCORE: 17
PERSONAL GROOMING: A LITTLE
TURNING FROM BACK TO SIDE WHILE IN FLAT BAD: A LOT
MOBILITY SCORE: 10
CLIMB 3 TO 5 STEPS WITH RAILING: TOTAL
MOBILITY SCORE: 9
DRESSING REGULAR UPPER BODY CLOTHING: A LITTLE
EATING MEALS: A LITTLE
DAILY ACTIVITIY SCORE: 24
WALKING IN HOSPITAL ROOM: TOTAL
DRESSING REGULAR LOWER BODY CLOTHING: A LOT
MOVING TO AND FROM BED TO CHAIR: TOTAL
WALKING IN HOSPITAL ROOM: TOTAL
MOVING TO AND FROM BED TO CHAIR: TOTAL
TOILETING: A LOT
TOILETING: A LOT
DAILY ACTIVITIY SCORE: 17
MOBILITY SCORE: 24
STANDING UP FROM CHAIR USING ARMS: TOTAL
DRESSING REGULAR UPPER BODY CLOTHING: A LITTLE
PATIENT BASELINE BEDBOUND: NO
HELP NEEDED FOR BATHING: A LOT
TOILETING: A LOT
TURNING FROM BACK TO SIDE WHILE IN FLAT BAD: A LITTLE
CLIMB 3 TO 5 STEPS WITH RAILING: TOTAL
DRESSING REGULAR LOWER BODY CLOTHING: A LOT
MOVING FROM LYING ON BACK TO SITTING ON SIDE OF FLAT BED WITH BEDRAILS: A LITTLE
MOBILITY SCORE: 9
MOVING TO AND FROM BED TO CHAIR: TOTAL
MOVING FROM LYING ON BACK TO SITTING ON SIDE OF FLAT BED WITH BEDRAILS: A LITTLE
HELP NEEDED FOR BATHING: A LOT
DAILY ACTIVITIY SCORE: 14
STANDING UP FROM CHAIR USING ARMS: TOTAL
DRESSING REGULAR LOWER BODY CLOTHING: TOTAL

## 2024-09-13 ASSESSMENT — PAIN SCALES - GENERAL
PAINLEVEL_OUTOF10: 0 - NO PAIN

## 2024-09-13 ASSESSMENT — LIFESTYLE VARIABLES
HOW MANY STANDARD DRINKS CONTAINING ALCOHOL DO YOU HAVE ON A TYPICAL DAY: PATIENT DOES NOT DRINK
HOW OFTEN DO YOU HAVE A DRINK CONTAINING ALCOHOL: NEVER
SKIP TO QUESTIONS 9-10: 1
AUDIT-C TOTAL SCORE: 0
HOW OFTEN DO YOU HAVE 6 OR MORE DRINKS ON ONE OCCASION: NEVER
AUDIT-C TOTAL SCORE: 0

## 2024-09-13 ASSESSMENT — VISUAL ACUITY: OU: 1

## 2024-09-13 NOTE — CONSULTS
Department of Medicine  Division of Pulmonary, Critical Care, and Sleep Medicine    Reason For Consult  Difficulty Breathing    History Of Present Illness  The patient is a 69-year-old woman with an extensive past medical history Mo significantly severe morbid obesity with a body mass index of 50 who presented to [facility] with a complaint of progressive shortness of breath and difficulty breathing.  She was seen and examined in pulmonology consultation after request was made by the patient's primary care physician for the management of acute hypoxic and hypercapnic respiratory failure.  Upon my evaluation she is presently obtaining 4 L of nasal cannula oxygen therapy.  She was recently discontinued from her BiPAP.  She was obtaining 60% oxygen with an inspiratory pressure of 20 and an expiratory pressure of 6.  She appears more comfortable this morning.  She reports that she felt such shortness of breath despite her home CPAP.  She does have a history of deep brain stimulation.  She underwent CT scan of the chest in the emergency department without contrast which demonstrated enlarged pulmonary arterial system along with patchy bibasilar atelectasis with small pleural effusions.    Review of Systems  A 14 point review of systems was performed with pertinent positives as addressed in the HPI    Past Medical History:  Past Medical History:   Diagnosis Date    Asthma ([facility]) 09/15/2023    Chronic bipolar affective disorder (Multi) 09/15/2023    Chronic hypercapnic respiratory failure (Multi)     CKD (chronic kidney disease), stage II 09/15/2023    Depression 09/15/2023    Diabetes mellitus (Multi)     Diastolic HF (heart failure) (Multi)     Essential hypertension, benign 09/15/2023    Femur fracture (Multi)     Hyperlipidemia 09/15/2023    Hypertension     Poorly controlled type 2 diabetes mellitus with complication (Multi) 09/15/2023    S/P deep brain stimulator placement     Vitamin D deficiency 09/15/2023  "      Surgical History:  Past Surgical History:   Procedure Laterality Date    OTHER SURGICAL HISTORY  2021    Knee arthroscopy    OTHER SURGICAL HISTORY  2021    Deep brain stimulation    OTHER SURGICAL HISTORY  2021    Hysterectomy    OTHER SURGICAL HISTORY  2021    Cholecystectomy    OTHER SURGICAL HISTORY  10/14/2021    Knee surgery    OTHER SURGICAL HISTORY  10/14/2021    Tonsillectomy with adenoidectomy    OTHER SURGICAL HISTORY  10/14/2021    Vaginal sling procedure    OTHER SURGICAL HISTORY  10/19/2021     section       Social History:   Social History     Tobacco Use    Smoking status: Never    Smokeless tobacco: Never   Substance Use Topics    Alcohol use: Never    Drug use: Never       Family History:  Family History   Problem Relation Name Age of Onset    Diabetes Mother      Hypertension Mother      Thyroid cancer Mother      Heart attack Mother      Osteoporosis Mother      Stroke Father      Hypertension Father      Diabetes Brother      Hypertension Brother      Cancer Brother      Diabetes Other Grandparent         type 2, without complication, unspecified insulin use       Occupational & Environmental History          Vital Signs  Visit Vitals  /65 (BP Location: Right arm)   Pulse 80   Temp 36.7 °C (98.1 °F) (Temporal)   Resp 21   Ht 1.6 m (5' 3\")   Wt 127 kg (280 lb)   SpO2 (!) 88%   BMI 49.60 kg/m²   OB Status Postmenopausal   Smoking Status Never   BSA 2.38 m²        Physical Exam  Vitals and nursing note reviewed.   Constitutional:       General: No in acute distress.     Appearance: Normal appearance.   HENT:      Head: Normocephalic and atraumatic.      Mouth/Throat:      Mouth: Mucous membranes are moist.   Eyes:      Conjunctiva/sclera: Conjunctivae normal.   Cardiovascular:      Rate and Rhythm: Normal rate and regular rhythm.   Pulmonary:      Effort: Pulmonary effort is normal. No respiratory distress.      Breath sounds: Normal breath sounds. No " stridor. No wheezing or rhonchi.   Musculoskeletal:         General: Normal range of motion.      Cervical back: Normal range of motion and neck supple.   Skin:     General: Skin is warm and dry.      Coloration: Skin is not jaundiced.   Neurological:      General: No focal deficit present.      Mental Status: Alert and oriented to person, place, and time. Mental status is at baseline.   Psychiatric:         Mood and Affect: Mood normal.         Behavior: Behavior normal.         Judgment: Judgment normal.       Oxygen Therapy  SpO2: (!) 88 %  Medical Gas Therapy: Supplemental oxygen  Medical Gas Delivery Method: CPAP/Bi-PAP mask  FiO2 (%): 60 %      Medications   Scheduled medications  [START ON 9/14/2024] azithromycin, 500 mg, intravenous, q24h  budesonide, 0.5 mg, nebulization, BID  [START ON 9/14/2024] cefTRIAXone, 2 g, intravenous, Daily  enoxaparin, 40 mg, subcutaneous, q12h YARELI  insulin lispro, 0-10 Units, subcutaneous, q4h  ipratropium-albuteroL, 3 mL, nebulization, q6h  ipratropium-albuteroL, , ,   methylPREDNISolone sodium succinate (PF), 40 mg, intravenous, q6h  oxygen, , inhalation, Continuous - Inhalation      Continuous medications     PRN medications  PRN medications: acetaminophen **OR** acetaminophen, dextrose, dextrose, glucagon, glucagon, guaiFENesin, ipratropium-albuteroL, ipratropium-albuteroL, melatonin, oxygen, polyethylene glycol     Allergies  Sulfa (sulfonamide antibiotics), Belladonna, Ciprofloxacin, Adhesive tape-silicones, Iodinated contrast media, and Tegaderm ag mesh [silver]      Lab Results     Lab Results   Component Value Date    WBC 15.4 (H) 09/13/2024    HGB 8.7 (L) 09/13/2024    HCT 30.3 (L) 09/13/2024    MCV 87 09/13/2024     09/13/2024      Lab Results   Component Value Date    GLUCOSE 195 (H) 09/13/2024    CALCIUM 9.4 09/13/2024     09/13/2024    K 4.6 09/13/2024    CO2 27 09/13/2024    CL 97 (L) 09/13/2024    BUN 25 (H) 09/13/2024    CREATININE 1.06 (H)  09/13/2024      Lab Results   Component Value Date    ALT 10 09/12/2024    AST 14 09/12/2024    ALKPHOS 90 09/12/2024    BILITOT 0.9 09/12/2024        Chest Radiograph   CT chest wo IV contrast 09/13/2024    Narrative  Interpreted By:  Larry Green,  STUDY:  CT CHEST WO IV CONTRAST;  9/13/2024 1:14 am    INDICATION:  Signs/Symptoms:respiratory failure.    COMPARISON:  07/01/2024    ACCESSION NUMBER(S):  [id number]    ORDERING CLINICIAN:  WILLARD MARTINEZ    TECHNIQUE:  Helical data acquisition of the chest was obtained without  intravenous contrast. Images were reformatted in axial, coronal, and  sagittal planes.    FINDINGS:  Body habitus markedly limits assessment.    There is enlargement of the main pulmonary artery compatible with  pulmonary hypertension heart size is enlarged. No pericardial  effusion. No no pneumothorax    Patchy bibasilar atelectasis. Features suggesting pulmonary edema.  Septal thickening patchy ground-glass opacities. Small effusions.  Stable hilar and mediastinal lymph nodes. No axillary adenopathy.    Marked hepatic steatosis.    Impression  1.  Limited assessment due to body habitus. The heart size is  enlarged. Enlarged main pulmonary artery compatible pulmonary  arterial hypertension. Patchy bibasilar atelectasis with small  pleural effusions. Features most suggestive of pulmonary edema rather  than an acute infection.      Signed by: Larry Green 9/13/2024 2:03 AM  Dictation workstation:   [id number]      XR chest 1 view 09/13/2024    Narrative  Interpreted By:  Finkelstein, Evan,  STUDY:  XR CHEST 1 VIEW;  9/13/2024 12:05 am    INDICATION:  Signs/Symptoms:Chest Pain.      COMPARISON:  Chest radiograph 07/01/2024    ACCESSION NUMBER(S):  [id number]    ORDERING CLINICIAN:  WILLARD MARTINEZ    FINDINGS:  Stimulators and leads again seen overlying the upper chest.    CARDIOMEDIASTINAL SILHOUETTE:  Enlarged cardiac silhouette, similar compared to  imaging.    LUNGS:  Prominent interstitial markings. Patchy airspace opacities most  pronounced in the mid and lower aspect of the lungs bilaterally.  Blunted left costophrenic angle.    ABDOMEN:  No remarkable upper abdominal findings.    BONES:  No acute osseous abnormality.    Impression  Enlarged cardiac silhouette and prominent interstitial markings  suggesting pulmonary edema. Patchy airspace opacities overlying the  mid and lower aspect of the lungs bilaterally, which may represent  atelectasis or pneumonia in the appropriate clinical setting. Small  to moderate left pleural effusion.    MACRO:  None.    Signed by: Evan Finkelstein 9/13/2024 1:03 AM  Dictation workstation:   [id number]         Pulmonary Function Tests         Assessment and Plan / Recommendations   Assessment/Plan   Impression:  Acute on chronic respiratory failure with hypoxia and hypercapnia  Acute decompensated diastolic cardiomyopathy  Pulmonary hypertension, multifactorial  Obstructive sleep apnea  Severe morbid obesity with a body mass index of 50    Plan:  Agree with close monitoring of the patient's respiratory status  Supplemental oxygen as needed to maintain adequate pulse oximetry, the patient currently obtaining 5 L of nasal cannula oxygen  Use of noninvasive positive pressure ventilatory support in the form of BiPAP as needed to reduce the work of breathing and to maintain throughout the night  The patient currently prescribed 60% oxygen with an inspiratory pressure of 20 and expiratory pressure of 6  Continue with antimicrobial therapy ceftriaxone and azithromycin  IV steroids with monitoring of serum fingerstick blood sugar and supplemental insulin as needed  1 dose of IV diuresis with IV Lasix  VTE prophylaxis with subcu Lovenox  Thank for the consultation.  Will follow closely with you.      Surya Mead,       Date: 09/13/24  Time: 10:09 AM  Please excuse any misspellings or unintended errors related to the Dragon  speech recognition software used to dictate this note.

## 2024-09-13 NOTE — H&P
History Of Present Illness  Kaylene Feliciano is a 69 y.o. female presenting with past medical history of chronic respiratory failure on 4 L of oxygen obstructive sleep apnea CPAP at night asthma hypertension diastolic congestive heart failure admitted to the hospital with increased shortness of breath and hypoxia that started with her yesterday patient was diagnosed recently with sleep apnea and started on CPAP in July with oxygen patient seen in emergency room she was hypoxic with acute respiratory failure with hypercapnia and started on BiPAP and admitted to CCU.     Past Medical History  She has a past medical history of Asthma ([facility]) (09/15/2023), Chronic bipolar affective disorder (Multi) (09/15/2023), Chronic hypercapnic respiratory failure (Multi), CKD (chronic kidney disease), stage II (09/15/2023), Depression (09/15/2023), Diabetes mellitus (Multi), Diastolic HF (heart failure) (Multi), Essential hypertension, benign (09/15/2023), Femur fracture (Multi), Hyperlipidemia (09/15/2023), Hypertension, Poorly controlled type 2 diabetes mellitus with complication (Multi) (09/15/2023), S/P deep brain stimulator placement, and Vitamin D deficiency (09/15/2023).    Surgical History  She has a past surgical history that includes Other surgical history (01/29/2021); Other surgical history (01/29/2021); Other surgical history (01/29/2021); Other surgical history (01/29/2021); Other surgical history (10/14/2021); Other surgical history (10/14/2021); Other surgical history (10/14/2021); and Other surgical history (10/19/2021).     Social History  She reports that she has never smoked. She has never used smokeless tobacco. She reports that she does not drink alcohol and does not use drugs.    Family History  Family History   Problem Relation Name Age of Onset    Diabetes Mother      Hypertension Mother      Thyroid cancer Mother      Heart attack Mother      Osteoporosis Mother      Stroke Father      Hypertension Father       Diabetes Brother      Hypertension Brother      Cancer Brother      Diabetes Other Grandparent         type 2, without complication, unspecified insulin use        Allergies  Sulfa (sulfonamide antibiotics), Belladonna, Ciprofloxacin, Adhesive tape-silicones, Iodinated contrast media, and Tegaderm ag mesh [silver]    Review of Systems   Constitutional:  Negative for diaphoresis and fatigue.   HENT:  Negative for ear pain, facial swelling, tinnitus and trouble swallowing.    Eyes:  Negative for photophobia and visual disturbance.   Respiratory:  Negative for choking and stridor.  Positive shortness of breath  Cardiovascular:  Negative for chest pain and palpitations.   Gastrointestinal:  Negative for abdominal pain, blood in stool and diarrhea.   Endocrine: Negative for cold intolerance, heat intolerance, polydipsia and polyuria.   Musculoskeletal:  Negative for back pain and joint swelling.   Skin:  Negative for color change and rash.   Allergic/Immunologic: Negative for food allergies.   Neurological:  Negative for tremors, facial asymmetry and weakness.   Psychiatric/Behavioral:  The patient is not hyperactive.       Physical Exam  HENT:      Right Ear: External ear normal.      Left Ear: External ear normal.      Mouth/Throat:      Mouth: Mucous membranes are moist.   Cardiovascular:      Rate and Rhythm: Normal rate and regular rhythm.      Heart sounds: No murmur heard.     No friction rub. No gallop.   Pulmonary:      Effort: No accessory muscle usage or respiratory distress.      Breath sounds: No stridor. No wheezing or rhonchi.   Chest:      Chest wall: No tenderness.   Abdominal:      General: There is no distension.      Palpations: There is no mass.      Tenderness: There is no abdominal tenderness. There is no guarding or rebound.   Musculoskeletal:         General: No deformity or signs of injury.      Cervical back: No rigidity or tenderness. Normal range of motion.      Right lower leg: No edema.  "     Left lower leg: No edema.   Skin:     Coloration: Skin is not jaundiced or pale.      Findings: No lesion.   Neurological:      General: No focal deficit present.      Mental Status: He is alert, oriented to person, place, and time and easily aroused.      Cranial Nerves: No cranial nerve deficit.      Sensory: No sensory deficit.      Motor: No weakness.        Last Recorded Vitals  Blood pressure 152/60, pulse 68, temperature 36.1 °C (97 °F), temperature source Temporal, resp. rate 14, height 1.6 m (5' 3\"), weight 127 kg (280 lb), SpO2 98%.    Labs    Admission on 09/12/2024   Component Date Value Ref Range Status    WBC 09/12/2024 16.0 (H)  4.4 - 11.3 x10*3/uL Final    nRBC 09/12/2024 0.0  0.0 - 0.0 /100 WBCs Final    RBC 09/12/2024 3.36 (L)  4.00 - 5.20 x10*6/uL Final    Hemoglobin 09/12/2024 8.6 (L)  12.0 - 16.0 g/dL Final    Hematocrit 09/12/2024 28.8 (L)  36.0 - 46.0 % Final    MCV 09/12/2024 86  80 - 100 fL Final    MCH 09/12/2024 25.6 (L)  26.0 - 34.0 pg Final    MCHC 09/12/2024 29.9 (L)  32.0 - 36.0 g/dL Final    RDW 09/12/2024 16.5 (H)  11.5 - 14.5 % Final    Platelets 09/12/2024 262  150 - 450 x10*3/uL Final    Neutrophils % 09/12/2024 87.9  40.0 - 80.0 % Final    Immature Granulocytes %, Automated 09/12/2024 0.5  0.0 - 0.9 % Final    Immature Granulocyte Count (IG) includes promyelocytes, myelocytes and metamyelocytes but does not include bands. Percent differential counts (%) should be interpreted in the context of the absolute cell counts (cells/UL).    Lymphocytes % 09/12/2024 6.2  13.0 - 44.0 % Final    Monocytes % 09/12/2024 5.0  2.0 - 10.0 % Final    Eosinophils % 09/12/2024 0.2  0.0 - 6.0 % Final    Basophils % 09/12/2024 0.2  0.0 - 2.0 % Final    Neutrophils Absolute 09/12/2024 14.09 (H)  1.20 - 7.70 x10*3/uL Final    Percent differential counts (%) should be interpreted in the context of the absolute cell counts (cells/uL).    Immature Granulocytes Absolute, Au* 09/12/2024 0.08  0.00 - " 0.70 x10*3/uL Final    Lymphocytes Absolute 09/12/2024 0.99 (L)  1.20 - 4.80 x10*3/uL Final    Monocytes Absolute 09/12/2024 0.81  0.10 - 1.00 x10*3/uL Final    Eosinophils Absolute 09/12/2024 0.03  0.00 - 0.70 x10*3/uL Final    Basophils Absolute 09/12/2024 0.04  0.00 - 0.10 x10*3/uL Final    Glucose 09/12/2024 151 (H)  74 - 99 mg/dL Final    Sodium 09/12/2024 138  136 - 145 mmol/L Final    Potassium 09/12/2024 4.6  3.5 - 5.3 mmol/L Final    Chloride 09/12/2024 99  98 - 107 mmol/L Final    Bicarbonate 09/12/2024 31  21 - 32 mmol/L Final    Anion Gap 09/12/2024 13  10 - 20 mmol/L Final    Urea Nitrogen 09/12/2024 23  6 - 23 mg/dL Final    Creatinine 09/12/2024 1.06 (H)  0.50 - 1.05 mg/dL Final    eGFR 09/12/2024 57 (L)  >60 mL/min/1.73m*2 Final    Calculations of estimated GFR are performed using the 2021 CKD-EPI Study Refit equation without the race variable for the IDMS-Traceable creatinine methods.  https://jasn.asnjournals.org/content/early/2021/09/22/ASN.8397371572    Calcium 09/12/2024 9.2  8.6 - 10.3 mg/dL Final    Albumin 09/12/2024 4.0  3.4 - 5.0 g/dL Final    Alkaline Phosphatase 09/12/2024 90  33 - 136 U/L Final    Total Protein 09/12/2024 7.8  6.4 - 8.2 g/dL Final    AST 09/12/2024 14  9 - 39 U/L Final    Bilirubin, Total 09/12/2024 0.9  0.0 - 1.2 mg/dL Final    ALT 09/12/2024 10  7 - 45 U/L Final    Patients treated with Sulfasalazine may generate falsely decreased results for ALT.    Magnesium 09/12/2024 1.97  1.60 - 2.40 mg/dL Final    Coronavirus 2019, PCR 09/12/2024 Not Detected  Not Detected Final    POCT pH, Venous 09/12/2024 7.24 (LL)  7.33 - 7.43 pH Final    POCT pCO2, Venous 09/12/2024 73 (HH)  41 - 51 mm Hg Final    POCT pO2, Venous 09/12/2024 42  35 - 45 mm Hg Final    POCT SO2, Venous 09/12/2024 63  45 - 75 % Final    POCT Oxy Hemoglobin, Venous 09/12/2024 61.1  45.0 - 75.0 % Final    POCT Hematocrit Calculated, Venous 09/12/2024 30.0 (L)  36.0 - 46.0 % Final    POCT Sodium, Venous  09/12/2024 137  136 - 145 mmol/L Final    POCT Potassium, Venous 09/12/2024 5.3  3.5 - 5.3 mmol/L Final    POCT Chloride, Venous 09/12/2024 100  98 - 107 mmol/L Final    POCT Ionized Calicum, Venous 09/12/2024 1.22  1.10 - 1.33 mmol/L Final    POCT Glucose, Venous 09/12/2024 146 (H)  74 - 99 mg/dL Final    POCT Lactate, Venous 09/12/2024 1.4  0.4 - 2.0 mmol/L Final    POCT Base Excess, Venous 09/12/2024 2.6  -2.0 - 3.0 mmol/L Final    POCT HCO3 Calculated, Venous 09/12/2024 31.3 (H)  22.0 - 26.0 mmol/L Final    POCT Hemoglobin, Venous 09/12/2024 9.9 (L)  12.0 - 16.0 g/dL Final    POCT Anion Gap, Venous 09/12/2024 11.0  10.0 - 25.0 mmol/L Final    Patient Temperature 09/12/2024    Final    NOTE: Patient Results are Not Corrected for Temperature    FiO2 09/12/2024 65  % Final    Critical Called By 09/12/2024 TERRI   Final    Critical Called To 09/12/2024 DR. MARTINEZ   Final    Critical Call Time 09/12/2024 2336   Final    Critical Read Back 09/12/2024 Y   Final    Troponin I, High Sensitivity 09/12/2024 6  0 - 13 ng/L Final    BNP 09/12/2024 454 (H)  0 - 99 pg/mL Final    Troponin I, High Sensitivity 09/12/2024 8  0 - 13 ng/L Final    Lactate 09/12/2024 0.8  0.4 - 2.0 mmol/L Final    Color, Urine 09/13/2024 Yellow  Light-Yellow, Yellow, Dark-Yellow Final    Appearance, Urine 09/13/2024 Turbid (N)  Clear Final    Specific Gravity, Urine 09/13/2024 1.019  1.005 - 1.035 Final    pH, Urine 09/13/2024 5.5  5.0, 5.5, 6.0, 6.5, 7.0, 7.5, 8.0 Final    Protein, Urine 09/13/2024 70 (1+) (A)  NEGATIVE, 10 (TRACE), 20 (TRACE) mg/dL Final    Glucose, Urine 09/13/2024 Normal  Normal mg/dL Final    Blood, Urine 09/13/2024 0.1 (1+) (A)  NEGATIVE Final    Ketones, Urine 09/13/2024 NEGATIVE  NEGATIVE mg/dL Final    Bilirubin, Urine 09/13/2024 NEGATIVE  NEGATIVE Final    Urobilinogen, Urine 09/13/2024 Normal  Normal mg/dL Final    Nitrite, Urine 09/13/2024 NEGATIVE  NEGATIVE Final    Leukocyte Esterase, Urine 09/13/2024 250 Valeria/µL (A)   NEGATIVE Final    POCT pH, Venous 09/13/2024 7.28 (L)  7.33 - 7.43 pH Final    POCT pCO2, Venous 09/13/2024 67 (H)  41 - 51 mm Hg Final    POCT pO2, Venous 09/13/2024 42  35 - 45 mm Hg Final    POCT SO2, Venous 09/13/2024 67  45 - 75 % Final    POCT Oxy Hemoglobin, Venous 09/13/2024 64.6  45.0 - 75.0 % Final    POCT Hematocrit Calculated, Venous 09/13/2024 27.0 (L)  36.0 - 46.0 % Final    POCT Sodium, Venous 09/13/2024 136  136 - 145 mmol/L Final    POCT Potassium, Venous 09/13/2024 5.0  3.5 - 5.3 mmol/L Final    POCT Chloride, Venous 09/13/2024 100  98 - 107 mmol/L Final    POCT Ionized Calicum, Venous 09/13/2024 1.20  1.10 - 1.33 mmol/L Final    POCT Glucose, Venous 09/13/2024 193 (H)  74 - 99 mg/dL Final    POCT Lactate, Venous 09/13/2024 1.1  0.4 - 2.0 mmol/L Final    POCT Base Excess, Venous 09/13/2024 3.7 (H)  -2.0 - 3.0 mmol/L Final    POCT HCO3 Calculated, Venous 09/13/2024 31.5 (H)  22.0 - 26.0 mmol/L Final    POCT Hemoglobin, Venous 09/13/2024 8.9 (L)  12.0 - 16.0 g/dL Final    POCT Anion Gap, Venous 09/13/2024 10.0  10.0 - 25.0 mmol/L Final    Patient Temperature 09/13/2024    Final    NOTE: Patient Results are Not Corrected for Temperature    FiO2 09/13/2024 60  % Final    Ipap CMH2O 09/13/2024 20.0  cm H2O Final    Epap CMH2O 09/13/2024 6.0  cm H2O Final    Glucose 09/13/2024 195 (H)  74 - 99 mg/dL Final    Sodium 09/13/2024 136  136 - 145 mmol/L Final    Potassium 09/13/2024 4.6  3.5 - 5.3 mmol/L Final    Chloride 09/13/2024 97 (L)  98 - 107 mmol/L Final    Bicarbonate 09/13/2024 27  21 - 32 mmol/L Final    Anion Gap 09/13/2024 17  10 - 20 mmol/L Final    Urea Nitrogen 09/13/2024 25 (H)  6 - 23 mg/dL Final    Creatinine 09/13/2024 1.06 (H)  0.50 - 1.05 mg/dL Final    eGFR 09/13/2024 57 (L)  >60 mL/min/1.73m*2 Final    Calculations of estimated GFR are performed using the 2021 CKD-EPI Study Refit equation without the race variable for the IDMS-Traceable creatinine  methods.  https://jasn.asnjournals.org/content/early/2021/09/22/ASN.6712893806    Calcium 09/13/2024 9.4  8.6 - 10.3 mg/dL Final    WBC 09/13/2024 15.4 (H)  4.4 - 11.3 x10*3/uL Final    nRBC 09/13/2024 0.0  0.0 - 0.0 /100 WBCs Final    RBC 09/13/2024 3.50 (L)  4.00 - 5.20 x10*6/uL Final    Hemoglobin 09/13/2024 8.7 (L)  12.0 - 16.0 g/dL Final    Hematocrit 09/13/2024 30.3 (L)  36.0 - 46.0 % Final    MCV 09/13/2024 87  80 - 100 fL Final    MCH 09/13/2024 24.9 (L)  26.0 - 34.0 pg Final    MCHC 09/13/2024 28.7 (L)  32.0 - 36.0 g/dL Final    RDW 09/13/2024 16.6 (H)  11.5 - 14.5 % Final    Platelets 09/13/2024 268  150 - 450 x10*3/uL Final    Magnesium 09/13/2024 2.54 (H)  1.60 - 2.40 mg/dL Final    Protime 09/13/2024 14.4 (H)  9.8 - 12.8 seconds Final    INR 09/13/2024 1.3 (H)  0.9 - 1.1 Final    Phosphorus 09/13/2024 4.3  2.5 - 4.9 mg/dL Final    The performance characteristics of phosphorus testing in heparinized plasma have been validated by the individual  laboratory site where testing is performed. Testing on heparinized plasma is not approved by the FDA; however, such approval is not necessary.    POCT Glucose 09/13/2024 215 (H)  74 - 99 mg/dL Final    WBC, Urine 09/13/2024 21-50 (A)  1-5, NONE /HPF Final    RBC, Urine 09/13/2024 6-10 (A)  NONE, 1-2, 3-5 /HPF Final    Squamous Epithelial Cells, Urine 09/13/2024 1-9 (SPARSE)  Reference range not established. /HPF Final    Bacteria, Urine 09/13/2024 1+ (A)  NONE SEEN /HPF Final    Mucus, Urine 09/13/2024 FEW  Reference range not established. /LPF Final         Imaging     CT chest wo IV contrast  Narrative: Interpreted By:  Larry Green,   STUDY:  CT CHEST WO IV CONTRAST;  9/13/2024 1:14 am      INDICATION:  Signs/Symptoms:respiratory failure.      COMPARISON:  07/01/2024      ACCESSION NUMBER(S):  [id number]      ORDERING CLINICIAN:  WILLARD MARTINEZ      TECHNIQUE:  Helical data acquisition of the chest was obtained without  intravenous contrast. Images  were reformatted in axial, coronal, and  sagittal planes.      FINDINGS:  Body habitus markedly limits assessment.      There is enlargement of the main pulmonary artery compatible with  pulmonary hypertension heart size is enlarged. No pericardial  effusion. No no pneumothorax      Patchy bibasilar atelectasis. Features suggesting pulmonary edema.  Septal thickening patchy ground-glass opacities. Small effusions.  Stable hilar and mediastinal lymph nodes. No axillary adenopathy.      Marked hepatic steatosis.      Impression: 1.  Limited assessment due to body habitus. The heart size is  enlarged. Enlarged main pulmonary artery compatible pulmonary  arterial hypertension. Patchy bibasilar atelectasis with small  pleural effusions. Features most suggestive of pulmonary edema rather  than an acute infection.          Signed by: Larry Green 9/13/2024 2:03 AM  Dictation workstation:   [id number]  XR chest 1 view  Narrative: Interpreted By:  Finkelstein, Evan,   STUDY:  XR CHEST 1 VIEW;  9/13/2024 12:05 am      INDICATION:  Signs/Symptoms:Chest Pain.          COMPARISON:  Chest radiograph 07/01/2024      ACCESSION NUMBER(S):  [id number]      ORDERING CLINICIAN:  WILLARD MARTINEZ      FINDINGS:  Stimulators and leads again seen overlying the upper chest.      CARDIOMEDIASTINAL SILHOUETTE:  Enlarged cardiac silhouette, similar compared to imaging.      LUNGS:  Prominent interstitial markings. Patchy airspace opacities most  pronounced in the mid and lower aspect of the lungs bilaterally.  Blunted left costophrenic angle.      ABDOMEN:  No remarkable upper abdominal findings.      BONES:  No acute osseous abnormality.      Impression: Enlarged cardiac silhouette and prominent interstitial markings  suggesting pulmonary edema. Patchy airspace opacities overlying the  mid and lower aspect of the lungs bilaterally, which may represent  atelectasis or pneumonia in the appropriate clinical setting. Small  to moderate  left pleural effusion.      MACRO:  None.      Signed by: Evan Finkelstein 9/13/2024 1:03 AM  Dictation workstation:   [id number]       Patient Active Problem List   Diagnosis    Asthma ([facility]HS-HCC)    Candidiasis of vagina    Chronic bipolar affective disorder (Multi)    Depression    CKD (chronic kidney disease)    Diabetes, polyneuropathy (Multi)    Dysuria    Essential hypertension, benign    Hyperlipidemia    Mental problems    Morbid obesity (Multi)    Non-compliance    Physical deconditioning    Diabetes mellitus (Multi)    Poorly controlled type 2 diabetes mellitus with complication (Multi)    Proteinuria    Secondary pancreatic insufficiency (HHS-HCC)    Spinal stenosis, multilevel    Vitamin D deficiency    BMI 45.0-49.9, adult (Multi)    CKD (chronic kidney disease), stage II    Hypoxia    Shortness of breath    Obesity hypoventilation syndrome (Multi)    Pneumonia due to infectious organism    Elevated LFTs    ARCHIE (obstructive sleep apnea)    Chronic respiratory failure with hypoxia and hypercapnia (Multi)    Acute on chronic respiratory failure with hypoxia and hypercapnia (Multi)    Neutrophilic leukocytosis    Diarrhea         Assessment/Plan   Assessment & Plan  Acute on chronic respiratory failure with hypoxia and hypercapnia (Multi)    CKD (chronic kidney disease)    Pneumonia due to infectious organism    ARCHIE (obstructive sleep apnea)    Neutrophilic leukocytosis    Diarrhea      Acute respiratory failure with hypoxia and hypercapnia continue with CPAP consult pulmonary on the patient patient had history of severe obstructive sleep apnea  Pneumonia patient started on antibiotics       I spent 45 minutes in the professional and overall care of this patient.      ANDREW Mccloud MD

## 2024-09-13 NOTE — PROGRESS NOTES
Physical Therapy    Physical Therapy Evaluation & Treatment    Patient Name: Kaylene Feliciano  MRN: [id number]  Today's Date: 9/13/2024   Time Calculation  Start Time: 0956  Stop Time: 1019  Time Calculation (min): 23 min  2118/2118-A    Assessment/Plan   PT Assessment  PT Assessment Results: Decreased strength, Decreased range of motion, Decreased endurance, Impaired balance, Decreased mobility, Decreased safety awareness  Rehab Prognosis: Good  Evaluation/Treatment Tolerance: Patient limited by fatigue (and SOB)  Medical Staff Made Aware: Yes  End of Session Communication: Bedside nurse  Assessment Comment: Pt is a 70 y/o female admitted for acute on chronic respiratory failure with hypoxia and hypercapnia. Pt presents with SOB, decreased strength, endurance and balance. Pt able to tolerate bed mobility and seated LE exercises this date. She is functioning below baseline level of function and will benefit from skilled therapy during stay to improve overall functional mobility and safety awareness. Upon discharge pt will benefit from low vs moderate intensity therapy depending on if pt  can continue care.    End of Session Patient Position: Bed, 4 rail up, Alarm on (call light within reach, pt in chair position, RT present for breathing treatment)  IP OR SWING BED PT PLAN  Inpatient or Swing Bed: Inpatient  PT Plan  Treatment/Interventions: Bed mobility, Transfer training, Gait training, Stair training, Balance training, Neuromuscular re-education, Endurance training, Strengthening, Range of motion, Therapeutic exercise, Therapeutic activity, Home exercise program, Positioning, Postural re-education, Wheelchair management  PT Plan: Ongoing PT  PT Frequency: 3 times per week  PT Discharge Recommendations: Low intensity level of continued care, Moderate intensity level of continued care (low vs mod depending on if pt  can continue to assist pt)  PT Recommended Transfer Status: Assist x2  PT - OK to Discharge:  Yes (Once medically cleared to next level of care.)    Current Problem:  Patient Active Problem List   Diagnosis    Asthma (HHS-HCC)    Candidiasis of vagina    Chronic bipolar affective disorder (Multi)    Depression    CKD (chronic kidney disease)    Diabetes, polyneuropathy (Multi)    Dysuria    Essential hypertension, benign    Hyperlipidemia    Mental problems    Morbid obesity (Multi)    Non-compliance    Physical deconditioning    Diabetes mellitus (Multi)    Poorly controlled type 2 diabetes mellitus with complication (Multi)    Proteinuria    Secondary pancreatic insufficiency (HHS-HCC)    Spinal stenosis, multilevel    Vitamin D deficiency    BMI 45.0-49.9, adult (Multi)    CKD (chronic kidney disease), stage II    Hypoxia    Shortness of breath    Obesity hypoventilation syndrome (Multi)    Pneumonia due to infectious organism    Elevated LFTs    ARCHIE (obstructive sleep apnea)    Chronic respiratory failure with hypoxia and hypercapnia (Multi)    Acute on chronic respiratory failure with hypoxia and hypercapnia (Multi)    Neutrophilic leukocytosis    Diarrhea     Subjective     General Visit Information:  General  Reason for Referral: P/w increased SOB and hypoxia. Pt was diagnosed recently with sleep apnea and started on CPAP in July with oxygen. Pt admitted for acute on chronic respiratory failure with hypoxia and hypercapnia.  Referred By: Dr. Mccloud  Family/Caregiver Present: No  Co-Treatment: OT  Co-Treatment Reason: To maximize pt safety with functional mobility and discharge planning  Prior to Session Communication: Bedside nurse  Patient Position Received: Bed, 4 rail up, Alarm on (Pt in chair position)  Preferred Learning Style: verbal  General Comment: Pt pleasant and agreeable to work with therapy. Pt became significantly SOB with minimal activity desaturating to low 80s, RN aware and pt received breathing treatment from RT.    Home Living:  Home Living  Home Living Comments: Pt lives with  her  on the 19th floor of an apartment with elevator access and level entry. First floor set up with tub shower, grab bars and transfer bench. Pt has a hospital bed, FWW, manual/power w/c, manual niko lift and slide board.    Prior Level of Function:  Prior Function Per Pt/Caregiver Report  Level of [place]: Needs assistance with ADLs, Needs assistance with functional transfers, Needs assistance with homemaking  Receives Help From: Family  ADL Assistance: Needs assistance (Per pt she is able to shower herself and  provided assistance with dressing using rolling in bed)  Homemaking Assistance: Needs assistance  Ambulatory Assistance:  (Pt non-ambulatory at baseline. Required assist with stand pivot transfers with use of slide board)    Precautions:  Precautions  Medical Precautions: Fall precautions, Oxygen therapy device and L/min (6L O2 via NC, Aspiration precautions per EMR)    Vital Signs:  Vital Signs  Vitals Session: During PT  Heart Rate: 81 (Post tx: 98)  Heart Rate Source: Monitor  Resp: (!) 45 (Post tx: 47)  SpO2: 90 % (Pt desaturated to 82% after seated EOB, RN aware and RT in room providing breathing treatment. Post tx: 91%; pt on 6L O2 via NC)  Objective     Pain:  Pain Assessment  Pain Assessment: 0-10  0-10 (Numeric) Pain Score: 0 - No pain    Cognition:  Cognition  Overall Cognitive Status: Within Functional Limits  Orientation Level: Oriented X4    General Assessments:      Activity Tolerance  Endurance: Tolerates 10 - 20 min exercise with multiple rests  Activity Tolerance Comments: Demos SOB with minimal activity while sitting EOB    Sensation  Sensation Comment: Pt reports n/t in BL feet, baseline neuropathy     Static Sitting Balance  Static Sitting-Balance Support: Bilateral upper extremity supported, Feet supported  Static Sitting-Level of Assistance: Close supervision, Contact guard  Static Sitting-Comment/Number of Minutes: ~8 min     Functional Assessments:     Bed  Mobility  Bed Mobility: Yes  Bed Mobility 1  Bed Mobility 1: Supine to sitting, Scooting  Level of Assistance 1: Contact guard  Bed Mobility Comments 1: HOB elevated and use of bed HR  Bed Mobility 2  Bed Mobility  2: Sitting to supine  Level of Assistance 2: Contact guard  Bed Mobility Comments 2: HOB elevated and use of bed HR    Transfers  Transfer: No (Attempted to have pt stand; however, pt politely declined this date)     Extremity/Trunk Assessments:     AROM RLE (degrees)  RLE AROM Comment: Hip flex, knee ext/flex limited 2/2 gross weakness and body habitus.  Strength RLE  RLE Overall Strength:  (Pt demos gross LE weakness based on functional mobility.)    AROM LLE (degrees)  LLE AROM Comment: Hip flex, knee ext/flex limited 2/2 gross weakness and body habitus.  Strength LLE  LLE Overall Strength:  (Pt demos gross LE weakness based on functional mobility.)    Treatments:  Pt able to tolerate the following activities during today's treatment session. Pt instructed/educated throughout the session on safety awareness, body mechanics and proper hand placement. Skilled monitoring of vitals throughout session for pt safety.     Therapeutic Exercise  Therapeutic Exercise Performed: Yes  Therapeutic Exercise Activity 1: Seated EOB: AP x20 BL, LAQ x10 BL (Pt educated on pillow squeezes, QS and GS to complete throughout the day to maintain/improve overall LE strength)    Therapeutic Activity  Therapeutic Activity Performed: Yes  Therapeutic Activity 1: Static sitting EOB for ~8 min for UE/LE support CGA-close supervision with focus on orienting to position, maintaining upright posture, completing LE exercises and education on pursed lipped breathing as pt became SOB.     Outcome Measures:  [facility] Basic Mobility  Turning from your back to your side while in a flat bed without using bedrails: A little  Moving from lying on your back to sitting on the side of a flat bed without using bedrails: A little  Moving to and from  bed to chair (including a wheelchair): Total  Standing up from a chair using your arms (e.g. wheelchair or bedside chair): Total  To walk in hospital room: Total  Climbing 3-5 steps with railing: Total  Basic Mobility - Total Score: 10     Goals:  Encounter Problems       Encounter Problems (Active)       Mobility       Pt will complete supine, seated and standing exercises to maintain/improve overall strength with minimal verbal cues.         Start:  09/13/24    Expected End:  09/27/24            Pt will maintain > 92% SpO2 with functional mobility activities without evidence of SOB.       Start:  09/13/24    Expected End:  09/27/24            Pt will be able to stand with FWW max A for ~1 min with upright posture and good safety awareness.       Start:  09/13/24    Expected End:  09/27/24                   PT Transfers       Pt will be able to complete all transfers with slide board min A demonstrating good safety awareness and proper body mechanics.        Start:  09/13/24    Expected End:  09/27/24            Pt will be able to complete all bed mobility tasks mod I.        Start:  09/13/24    Expected End:  09/27/24                 Education Documentation  Body Mechanics, taught by Jacquelyn Abrams PT at 9/13/2024 11:35 AM.  Learner: Patient  Readiness: Acceptance  Method: Explanation  Response: Verbalizes Understanding, Demonstrated Understanding, Needs Reinforcement    Home Exercise Program, taught by Jacquelyn Abrams PT at 9/13/2024 11:35 AM.  Learner: Patient  Readiness: Acceptance  Method: Explanation  Response: Verbalizes Understanding, Demonstrated Understanding, Needs Reinforcement    Mobility Training, taught by Jacquelyn Abrams PT at 9/13/2024 11:35 AM.  Learner: Patient  Readiness: Acceptance  Method: Explanation  Response: Verbalizes Understanding, Demonstrated Understanding, Needs Reinforcement    Education Comments  No comments found.

## 2024-09-13 NOTE — PROGRESS NOTES
09/13/24 1326   Discharge Planning   Living Arrangements Spouse/significant other   Support Systems Spouse/significant other   Assistance Needed wheelchair bound/does not walk   Type of Residence Private residence   Number of Stairs to Enter Residence 0   Number of Stairs Within Residence 0   Do you have animals or pets at home? No   Who is requesting discharge planning? Provider   Home or Post Acute Services None   Expected Discharge Disposition Home   Does the patient need discharge transport arranged? No   Financial Resource Strain   How hard is it for you to pay for the very basics like food, housing, medical care, and heating? Not very   Housing Stability   In the last 12 months, was there a time when you were not able to pay the mortgage or rent on time? N   In the past 12 months, how many times have you moved where you were living? 0   At any time in the past 12 months, were you homeless or living in a shelter (including now)? N   Transportation Needs   In the past 12 months, has lack of transportation kept you from medical appointments or from getting medications? no   In the past 12 months, has lack of transportation kept you from meetings, work, or from getting things needed for daily living? No   Patient Choice   Patient / Family choosing to utilize agency / facility established prior to hospitalization No     Met with patient and spouse at bedside. Introduced self and role in hospital. Verified address and PCP is Dr. Linda Srivastava. Uses [employer] in [place] for medications and has no issues obtaining/paying for them. There is an insulin that patient  is having trouble getting but didn't know the name. Patient is wheelchair bound, doesn't walk, doesn't drive, can transfer self to bed and shower bench and is independent with most ADL's. Spouse is able to transport patient when needed and also does all the grocery shopping. Patient does wear O2 @ home, 4 liters and has a Cpap at night. All are  provided by [facility] and [facility]. Patient and spouse state that patient will return home on discharge with no other needs. CT team to follow patient.

## 2024-09-13 NOTE — H&P
History Of Present Illness  Kaylene Feliciano is a 69 y.o. female with pertinent past medical history of chronic hypercapnic respiratory failure (4L O2 at baseline), ARCHIE (nocturnal CPAP with additional O2), asthma, HTN, HLD, insulin dependent type two diabetes mellitus, diastolic HF (LVEF 75% 7/2023), pulmonary hypertension, CKD, chronic bipolar affective disorder s/p bilateral deep brain stimulators, and depression presents to [facility] on 9/12/2024 from home with complaints of shortness of breath and hypoxia.     Upon assessment patient is on continuous BiPAP but able to provide history independently. Patient states she had been feeling more short of breath over the past few days despite using her baseline 4L of supplemental oxygen along with nocturnal CPAP. She reported her SPO2 to be as low as 77% at home before wearing her CPAP. Per EMS note, patient was on home oxygen/CPAP upon their arrival and she was switched to 15 L of oxygen via a non-rebreather and one duo-neb was administered with patient reporting slight improvement of symptoms. Patient states she feels like she has been having a fever over the past two days and has been feeling more fatigued in addition to the shortness of breath. She does also report having recent diarrhea and loose stools at home, believes it started over the past week or so. She denies recent  chills, headache, dizziness, chest pain/palpitations, cough, abdominal pain, nausea/vomiting, dysuria, or hematuria.    Of note, patient was admitted to [facility] ICU on 7/1/2024 for similar presentation and also required continuous BiPAP at the time.     ED Course:  Vital signs: Temp. 97.9F, HR 77bpm, RR 20, /72, SPO2 93% on 15L via non-rebreather   CMP: Glucose 151, Cr 1.06, EGFR 57; Mg2+ 1.97  , Troponin 6, 8  CBC w/diff: WBC 16, H/H 8.6/28.8, neutrophils 14.9  Lactate: 0.8  UA w/ culture pending collection  Blood cultures x 2 pending results  Initial VBG: pH 7.24 pCO2 73, HCO3 31.3, Hgb  9.9  Repeat VBG: pH 7.28 pCO2 67 HCO3 31.5 Hgb 8.9  CXR: Enlarged cardiac silhouette and prominent interstitial markings suggesting pulmonary edema. Patchy airspace opacities overlying the mid and lower aspect of the lungs bilaterally, which may represent atelectasis or pneumonia in the appropriate clinical setting. Small to moderate left pleural effusion   CT chest: Limited assessment due to body habitus. The heart size is enlarged. Enlarged main pulmonary artery compatible pulmonary arterial hypertension. Patchy bibasilar atelectasis with small pleural effusions. Features most suggestive of pulmonary edema rather than an acute infection   Medications Given: IV rocephin, IV azithromycin, IV solu-medrol, Duo-Neb, IV magnesium, 0.9% NaCl 500mL bolus  Disposition: Patient admitted for acute on chronic hypercapnic respiratory failure requiring BiPAP with possible pneumonia and leukocytosis.    Past Medical History  She has a past medical history of Asthma ([facility]) (09/15/2023), Chronic bipolar affective disorder (Multi) (09/15/2023), CKD (chronic kidney disease), stage II (09/15/2023), Depression (09/15/2023), Diabetes mellitus (Multi), Essential hypertension, benign (09/15/2023), Hyperlipidemia (09/15/2023), Hypertension, Poorly controlled type 2 diabetes mellitus with complication (Multi) (09/15/2023), and Vitamin D deficiency (09/15/2023).    Surgical History    Past Surgical History:   Procedure Laterality Date    OTHER SURGICAL HISTORY  2021    Knee arthroscopy    OTHER SURGICAL HISTORY  2021    Deep brain stimulation    OTHER SURGICAL HISTORY  2021    Hysterectomy    OTHER SURGICAL HISTORY  2021    Cholecystectomy    OTHER SURGICAL HISTORY  10/14/2021    Knee surgery    OTHER SURGICAL HISTORY  10/14/2021    Tonsillectomy with adenoidectomy    OTHER SURGICAL HISTORY  10/14/2021    Vaginal sling procedure    OTHER SURGICAL HISTORY  10/19/2021     section         Social History  She  reports that she has never smoked. She has never used smokeless tobacco. She reports that she does not drink alcohol and does not use drugs.    Family History  Family History   Problem Relation Name Age of Onset    Diabetes Mother      Hypertension Mother      Thyroid cancer Mother      Heart attack Mother      Osteoporosis Mother      Stroke Father      Hypertension Father      Diabetes Brother      Hypertension Brother      Cancer Brother      Diabetes Other Grandparent         type 2, without complication, unspecified insulin use        Allergies  Sulfa (sulfonamide antibiotics), Belladonna, Ciprofloxacin, Adhesive tape-silicones, Iodinated contrast media, and Tegaderm ag mesh [silver]    Review of Systems   Constitutional:  Positive for fatigue and fever.   HENT:  Negative for hearing loss, sore throat and trouble swallowing.    Eyes:  Negative for visual disturbance.   Respiratory:  Positive for shortness of breath. Negative for cough and chest tightness.    Cardiovascular:  Positive for leg swelling. Negative for chest pain and palpitations.   Gastrointestinal:  Positive for diarrhea. Negative for abdominal distention, abdominal pain, constipation, nausea and vomiting.   Endocrine: Negative for polydipsia, polyphagia and polyuria.   Genitourinary:  Negative for difficulty urinating, dysuria, flank pain, frequency, hematuria and urgency.   Musculoskeletal:  Negative for back pain and gait problem.   Skin:  Negative for color change, rash and wound.   Neurological:  Positive for weakness. Negative for dizziness, syncope and light-headedness.   Psychiatric/Behavioral:  Negative for agitation, confusion and dysphoric mood. The patient is not nervous/anxious.       Physical Exam  Vitals reviewed.   Constitutional:       General: She is not in acute distress.     Appearance: Normal appearance.   HENT:      Head: Normocephalic and atraumatic.      Right Ear: Hearing and external ear normal.      Left Ear: Hearing and  external ear normal.      Nose: Nose normal.      Mouth/Throat:      Mouth: Mucous membranes are moist.      Pharynx: Oropharynx is clear.   Eyes:      General: Lids are normal. Vision grossly intact.      Extraocular Movements: Extraocular movements intact.      Conjunctiva/sclera: Conjunctivae normal.      Pupils: Pupils are equal, round, and reactive to light.      Visual Fields: Right eye visual fields normal and left eye visual fields normal.   Cardiovascular:      Rate and Rhythm: Normal rate and regular rhythm.      Pulses: Normal pulses.           Radial pulses are 2+ on the right side and 2+ on the left side.        Dorsalis pedis pulses are 2+ on the right side and 2+ on the left side.        Posterior tibial pulses are 2+ on the right side and 2+ on the left side.      Heart sounds: S1 normal and S2 normal. No murmur heard.     No S3 or S4 sounds.   Pulmonary:      Effort: Accessory muscle usage present. No tachypnea or respiratory distress.      Breath sounds: Normal air entry. No stridor. Decreased breath sounds present. No wheezing, rhonchi or rales.      Comments: On continuous biPAP  Abdominal:      General: Bowel sounds are normal. There is no distension.      Palpations: Abdomen is soft.      Tenderness: There is no abdominal tenderness.   Musculoskeletal:      Cervical back: Full passive range of motion without pain and normal range of motion.      Right lower le+ Edema present.      Left lower le+ Edema present.   Skin:     General: Skin is warm and dry.      Capillary Refill: Capillary refill takes less than 2 seconds.      Coloration: Skin is not cyanotic or jaundiced.      Findings: No bruising, ecchymosis, erythema or rash.   Neurological:      General: No focal deficit present.      Mental Status: She is alert and oriented to person, place, and time. Mental status is at baseline.      Comments: Strong bilateral hand grasps  Strong bilateral foot dorsi/plantar flexion   Psychiatric:    "      Attention and Perception: Attention normal.         Mood and Affect: Mood and affect normal.         Speech: Speech normal.         Behavior: Behavior normal. Behavior is cooperative.       Last Recorded Vitals  Blood pressure 152/60, pulse 65, temperature 36.1 °C (97 °F), temperature source Temporal, resp. rate 20, height 1.6 m (5' 3\"), weight 127 kg (280 lb), SpO2 98%.  Visit Vitals  /60 (BP Location: Right arm)   Pulse 65   Temp 36.1 °C (97 °F) (Temporal)   Resp 20   Ht 1.6 m (5' 3\")   Wt 127 kg (280 lb)   SpO2 98%   BMI 49.60 kg/m²   OB Status Postmenopausal   Smoking Status Never   BSA 2.38 m²     Weight: 127 kg (280 lb)   Pain Assessment: 0-10  0-10 (Numeric) Pain Score: 0 - No pain    Relevant Results  Results for orders placed or performed during the hospital encounter of 09/12/24 (from the past 24 hour(s))   CBC and Auto Differential   Result Value Ref Range    WBC 16.0 (H) 4.4 - 11.3 x10*3/uL    nRBC 0.0 0.0 - 0.0 /100 WBCs    RBC 3.36 (L) 4.00 - 5.20 x10*6/uL    Hemoglobin 8.6 (L) 12.0 - 16.0 g/dL    Hematocrit 28.8 (L) 36.0 - 46.0 %    MCV 86 80 - 100 fL    MCH 25.6 (L) 26.0 - 34.0 pg    MCHC 29.9 (L) 32.0 - 36.0 g/dL    RDW 16.5 (H) 11.5 - 14.5 %    Platelets 262 150 - 450 x10*3/uL    Neutrophils % 87.9 40.0 - 80.0 %    Immature Granulocytes %, Automated 0.5 0.0 - 0.9 %    Lymphocytes % 6.2 13.0 - 44.0 %    Monocytes % 5.0 2.0 - 10.0 %    Eosinophils % 0.2 0.0 - 6.0 %    Basophils % 0.2 0.0 - 2.0 %    Neutrophils Absolute 14.09 (H) 1.20 - 7.70 x10*3/uL    Immature Granulocytes Absolute, Automated 0.08 0.00 - 0.70 x10*3/uL    Lymphocytes Absolute 0.99 (L) 1.20 - 4.80 x10*3/uL    Monocytes Absolute 0.81 0.10 - 1.00 x10*3/uL    Eosinophils Absolute 0.03 0.00 - 0.70 x10*3/uL    Basophils Absolute 0.04 0.00 - 0.10 x10*3/uL   Comprehensive Metabolic Panel   Result Value Ref Range    Glucose 151 (H) 74 - 99 mg/dL    Sodium 138 136 - 145 mmol/L    Potassium 4.6 3.5 - 5.3 mmol/L    Chloride 99 98 - " 107 mmol/L    Bicarbonate 31 21 - 32 mmol/L    Anion Gap 13 10 - 20 mmol/L    Urea Nitrogen 23 6 - 23 mg/dL    Creatinine 1.06 (H) 0.50 - 1.05 mg/dL    eGFR 57 (L) >60 mL/min/1.73m*2    Calcium 9.2 8.6 - 10.3 mg/dL    Albumin 4.0 3.4 - 5.0 g/dL    Alkaline Phosphatase 90 33 - 136 U/L    Total Protein 7.8 6.4 - 8.2 g/dL    AST 14 9 - 39 U/L    Bilirubin, Total 0.9 0.0 - 1.2 mg/dL    ALT 10 7 - 45 U/L   Magnesium   Result Value Ref Range    Magnesium 1.97 1.60 - 2.40 mg/dL   BLOOD GAS VENOUS FULL PANEL   Result Value Ref Range    POCT pH, Venous 7.24 (LL) 7.33 - 7.43 pH    POCT pCO2, Venous 73 (HH) 41 - 51 mm Hg    POCT pO2, Venous 42 35 - 45 mm Hg    POCT SO2, Venous 63 45 - 75 %    POCT Oxy Hemoglobin, Venous 61.1 45.0 - 75.0 %    POCT Hematocrit Calculated, Venous 30.0 (L) 36.0 - 46.0 %    POCT Sodium, Venous 137 136 - 145 mmol/L    POCT Potassium, Venous 5.3 3.5 - 5.3 mmol/L    POCT Chloride, Venous 100 98 - 107 mmol/L    POCT Ionized Calicum, Venous 1.22 1.10 - 1.33 mmol/L    POCT Glucose, Venous 146 (H) 74 - 99 mg/dL    POCT Lactate, Venous 1.4 0.4 - 2.0 mmol/L    POCT Base Excess, Venous 2.6 -2.0 - 3.0 mmol/L    POCT HCO3 Calculated, Venous 31.3 (H) 22.0 - 26.0 mmol/L    POCT Hemoglobin, Venous 9.9 (L) 12.0 - 16.0 g/dL    POCT Anion Gap, Venous 11.0 10.0 - 25.0 mmol/L    Patient Temperature      FiO2 65 %    Critical Called By TERRI Valdez Called To DR. MARTINEZ     Critical Call Time 2336     Critical Read Back Y    Troponin I, High Sensitivity, Initial   Result Value Ref Range    Troponin I, High Sensitivity 6 0 - 13 ng/L   B-type natriuretic peptide   Result Value Ref Range     (H) 0 - 99 pg/mL   Sars-CoV-2 PCR   Result Value Ref Range    Coronavirus 2019, PCR Not Detected Not Detected   Troponin, High Sensitivity, 1 Hour   Result Value Ref Range    Troponin I, High Sensitivity 8 0 - 13 ng/L   Lactate   Result Value Ref Range    Lactate 0.8 0.4 - 2.0 mmol/L   BLOOD GAS VENOUS FULL PANEL   Result  Value Ref Range    POCT pH, Venous 7.28 (L) 7.33 - 7.43 pH    POCT pCO2, Venous 67 (H) 41 - 51 mm Hg    POCT pO2, Venous 42 35 - 45 mm Hg    POCT SO2, Venous 67 45 - 75 %    POCT Oxy Hemoglobin, Venous 64.6 45.0 - 75.0 %    POCT Hematocrit Calculated, Venous 27.0 (L) 36.0 - 46.0 %    POCT Sodium, Venous 136 136 - 145 mmol/L    POCT Potassium, Venous 5.0 3.5 - 5.3 mmol/L    POCT Chloride, Venous 100 98 - 107 mmol/L    POCT Ionized Calicum, Venous 1.20 1.10 - 1.33 mmol/L    POCT Glucose, Venous 193 (H) 74 - 99 mg/dL    POCT Lactate, Venous 1.1 0.4 - 2.0 mmol/L    POCT Base Excess, Venous 3.7 (H) -2.0 - 3.0 mmol/L    POCT HCO3 Calculated, Venous 31.5 (H) 22.0 - 26.0 mmol/L    POCT Hemoglobin, Venous 8.9 (L) 12.0 - 16.0 g/dL    POCT Anion Gap, Venous 10.0 10.0 - 25.0 mmol/L    Patient Temperature      FiO2 60 %    Ipap CMH2O 20.0 cm H2O    Epap CMH2O 6.0 cm H2O      CT chest wo IV contrast    Result Date: 9/13/2024  Interpreted By:  Larry Green, STUDY: CT CHEST WO IV CONTRAST;  9/13/2024 1:14 am   INDICATION: Signs/Symptoms:respiratory failure.   COMPARISON: 07/01/2024   ACCESSION NUMBER(S): [id number]   ORDERING CLINICIAN: WILLARD MARTINEZ   TECHNIQUE: Helical data acquisition of the chest was obtained without intravenous contrast. Images were reformatted in axial, coronal, and sagittal planes.   FINDINGS: Body habitus markedly limits assessment.   There is enlargement of the main pulmonary artery compatible with pulmonary hypertension heart size is enlarged. No pericardial effusion. No no pneumothorax   Patchy bibasilar atelectasis. Features suggesting pulmonary edema. Septal thickening patchy ground-glass opacities. Small effusions. Stable hilar and mediastinal lymph nodes. No axillary adenopathy.   Marked hepatic steatosis.       1.  Limited assessment due to body habitus. The heart size is enlarged. Enlarged main pulmonary artery compatible pulmonary arterial hypertension. Patchy bibasilar atelectasis with  small pleural effusions. Features most suggestive of pulmonary edema rather than an acute infection.     Signed by: Larry Green 9/13/2024 2:03 AM Dictation workstation:   [id number]    XR chest 1 view    Result Date: 9/13/2024  Interpreted By:  Finkelstein, Evan, STUDY: XR CHEST 1 VIEW;  9/13/2024 12:05 am   INDICATION: Signs/Symptoms:Chest Pain.     COMPARISON: Chest radiograph 07/01/2024   ACCESSION NUMBER(S): [id number]   ORDERING CLINICIAN: WILLARD MARTINEZ   FINDINGS: Stimulators and leads again seen overlying the upper chest.   CARDIOMEDIASTINAL SILHOUETTE: Enlarged cardiac silhouette, similar compared to imaging.   LUNGS: Prominent interstitial markings. Patchy airspace opacities most pronounced in the mid and lower aspect of the lungs bilaterally. Blunted left costophrenic angle.   ABDOMEN: No remarkable upper abdominal findings.   BONES: No acute osseous abnormality.       Enlarged cardiac silhouette and prominent interstitial markings suggesting pulmonary edema. Patchy airspace opacities overlying the mid and lower aspect of the lungs bilaterally, which may represent atelectasis or pneumonia in the appropriate clinical setting. Small to moderate left pleural effusion.   MACRO: None.   Signed by: Evan Finkelstein 9/13/2024 1:03 AM Dictation workstation:   [id number]        Home Medications  Prior to Admission medications    Medication Sig Start Date End Date Taking? Authorizing Provider   amLODIPine (Norvasc) 5 mg tablet Take 2 tablets (10 mg) by mouth once daily. 7/8/24   Cesiadamir Vizcarra MD   aspirin 81 mg chewable tablet Chew 1 tablet (81 mg) once daily.    Historical Provider, MD   atorvastatin (Lipitor) 10 mg tablet Take 1 tablet (10 mg) by mouth once daily at bedtime.    Historical Provider, MD   citalopram (CeleXA) 40 mg tablet Take 1 tablet (40 mg) by mouth once daily.    Historical Provider, MD   cranberry 400 mg capsule Take 1 capsule by mouth once daily.    Historical Provider, MD    gabapentin (Neurontin) 100 mg capsule Take 3 capsules (300 mg) by mouth 2 times a day.    Historical Provider, MD   insulin glargine (Lantus U-100 Insulin) 100 unit/mL injection Inject 55 Units under the skin 2 times a day.    Historical Provider, MD   ipratropium-albuteroL (Duo-Neb) 0.5-2.5 mg/3 mL nebulizer solution Take 3 mL by nebulization every 4 hours.    Historical Provider, MD   lidocaine 4 % patch Place 1 patch on the skin once daily as needed for moderate pain (4 - 6).    Historical Provider, MD   lisinopril 40 mg tablet Take 1 tablet (40 mg) by mouth once daily. Please do not take this medication until follow-up with primary doctor regarding kidney function. 6/19/24 7/19/24  Paradise Petersen MD   menthol-zinc oxide (Calmoseptine - Risamine) 0.44-20.6 % ointment Apply 1 Application topically 4 times a day as needed (moisture associated skin damage). 7/8/24   Cesia Vizcarra MD   methocarbamol (Robaxin) 500 mg tablet Take 1 tablet (500 mg) by mouth 2 times a day.    Historical Provider, MD   metoprolol succinate XL (Toprol-XL) 25 mg 24 hr tablet Take 1 tablet (25 mg) by mouth once daily. Hold for SBP<110 or HR <70 7/8/24   Cesia Vizcarra MD   oxygen (O2) gas therapy Inhale 1 each once every 24 hours. 7/8/24   Cesia Vizcarra MD   oxygen (O2) gas therapy Inhale 1 each once every 24 hours. 7/8/24   Cesia Vizcarra MD   pancrelipase, Lip-Prot-Amyl, (Creon) 6,000-19,000 -30,000 unit capsule Take 2 capsules by mouth 3 times a day before meals.    Historical Provider, MD       Medications  Scheduled medications  [START ON 9/14/2024] azithromycin, 500 mg, intravenous, q24h  budesonide, 0.5 mg, nebulization, BID  [START ON 9/14/2024] cefTRIAXone, 2 g, intravenous, Daily  enoxaparin, 40 mg, subcutaneous, q12h YARELI  insulin lispro, 0-10 Units, subcutaneous, q4h  ipratropium-albuteroL, 3 mL, nebulization, q6h  ipratropium-albuteroL, , ,   methylPREDNISolone sodium succinate (PF), 40 mg, intravenous,  q6h  oxygen, , inhalation, Continuous - Inhalation      Continuous medications     PRN medications  PRN medications: acetaminophen **OR** acetaminophen, dextrose, dextrose, glucagon, glucagon, guaiFENesin, ipratropium-albuteroL, ipratropium-albuteroL, melatonin, polyethylene glycol        Assessment/Plan   Assessment & Plan  Acute on chronic respiratory failure with hypoxia and hypercapnia (Multi)    CKD (chronic kidney disease)    Pneumonia due to infectious organism    ARCHIE (obstructive sleep apnea)    Neutrophilic leukocytosis    Diarrhea      Plan:  Code Status: Full Code   Consult: Pulmonology. Other consults deferred to attending.  ATB: 2g IV Rocephin Q24H, 500 mg IV azithromycin Q24H   Meds: Tylenol 650 mg p.o. every 6 hours as needed for pain 1-6/headaches, melatonin 3 milligrams p.o. daily as needed for insomnia, MiraLAX 17 gms p.o daily as needed for constipation.  Budesonide nebulizer BID  Sliding scale insulin  Hypoglycemia protocol  Duo-neb Q6H scheduled and Q2H PRN SOB/wheezing  IV solu-medrol 40 mg Q6H scheduled   Caution with nephrotoxic medications   Monitor for hypo/hyperglycemia signs and symptoms   Diet: NPO except sips with medications while on BiPAP  Telemetry monitoring   Vital signs with BP, HR, & RR Q 4 hours.  Continuous pulse-ox  Admission height and weight.  Non-invasive ventilation- continuous BiPAP; see orders for settings  Respiratory evaluation and treatment per protocol   Wean oxygen as tolerated  Labs ordered: CBC, BMP, Mg, Phos. VBG full panel. Procalcitonin, Urine legionella, urine strep PNA, sputum culture, c.diff PCR, stool PCR, occult stool, MRSA swab  Test ordered: Deferred to attending and consults  Monitor / trend labs while inpatient.  Replace electrolytes as needed.  Transfuse with PRBC for hemoglobin<7.0   Aspiration precautions.  Fall precautions   Bedrest while on biPAP and until improvement of respiratory status  PT/OT eval and treat as indicated   Call physician for  temperature < 36.5 or >38.0 degrees celsius.  Call physician for pulse <50 or >120.  Call physician for respiratory rate <10 or >22.  Call physician for systolic blood pressure <90 or >170.  Call physician for diastolic blood pressure < 50 or >100.  Call physician for pulse ox <92%.  See orders for further plan of care ordered.     VTE prophylaxis:   Subcutaneous lovenox  BLE SCDs.  Monitor for s/s of bleeding    Medication reconciliation to be completed when nursing/pharmacy  are able to verify patient's accurate home medications.    See additional orders for further plan of care. Any further evaluation and management per attending and consulting physicians.      Any information found to be copied from previous providers is done in the best interest of the patient to provide accurate, quality, and continuity of care.     I spent 75 minutes in the professional and overall care of this patient.      MELQUIADES [facility]  [facility]. House

## 2024-09-13 NOTE — CARE PLAN
The patient's goals for the shift include      The clinical goals for the shift include Patient will remain free of shortness of breath.      Problem: Grooming  Goal: STG - Patient completes grooming SUP  9/13/2024 1858 by Malka Zamora RN  Outcome: Progressing  9/13/2024 1857 by Malka Zamora RN  Outcome: Progressing     Problem: Skin  Goal: Decreased wound size/increased tissue granulation at next dressing change  Outcome: Progressing  Flowsheets (Taken 9/13/2024 1858)  Decreased wound size/increased tissue granulation at next dressing change:   Promote sleep for wound healing   Protective dressings over bony prominences  Goal: Participates in plan/prevention/treatment measures  Outcome: Progressing  Goal: Prevent/manage excess moisture  Outcome: Progressing  Flowsheets (Taken 9/13/2024 1858)  Prevent/manage excess moisture:   Cleanse incontinence/protect with barrier cream   Moisturize dry skin   Monitor for/manage infection if present  Goal: Prevent/minimize sheer/friction injuries  Outcome: Progressing  Goal: Promote/optimize nutrition  Outcome: Progressing  Goal: Promote skin healing  Outcome: Progressing

## 2024-09-13 NOTE — PROGRESS NOTES
Occupational Therapy    Occupational Therapy    Evaluation    Patient Name: Kaylene Feliciano  MRN: [id number]  Today's Date: 9/13/2024  Time Calculation  Start Time: 0957  Stop Time: 1016  Time Calculation (min): 19 min  2118/2118-A    Assessment  IP OT Assessment  OT Assessment: Pt. presents with decreased balance and endurance for ADLs and transfers impacting pt. ability to complete ADLs  Prognosis: Fair  Evaluation/Treatment Tolerance: Patient limited by fatigue  End of Session Communication: Bedside nurse  End of Session Patient Position: Bed, 4 rail up, Alarm on (Chair position)    Plan:  Treatment Interventions: ADL retraining, Functional transfer training, UE strengthening/ROM, Endurance training  OT Frequency: 3 times per week  OT Discharge Recommendations: Low intensity level of continued care, Moderate intensity level of continued care (depending on progress)  OT Recommended Transfer Status:  (CGA for bed mobility)  OT - OK to Discharge: Yes (Next level care when cleared by medical team)    Subjective   Per EMR: Kaylene Feliciano is a 69 y.o. female presenting with past medical history of chronic respiratory failure on 4 L of oxygen obstructive sleep apnea CPAP at night asthma hypertension diastolic congestive heart failure admitted to the hospital with increased shortness of breath and hypoxia that started with her yesterday patient was diagnosed recently with sleep apnea and started on CPAP in July with oxygen patient seen in emergency room she was hypoxic with acute respiratory failure with hypercapnia and started on BiPAP and admitted to CCU.     Past Medical History  She has a past medical history of Asthma ([facility]) (09/15/2023), Chronic bipolar affective disorder (Multi) (09/15/2023), Chronic hypercapnic respiratory failure (Multi), CKD (chronic kidney disease), stage II (09/15/2023), Depression (09/15/2023), Diabetes mellitus (Multi), Diastolic HF (heart failure) (Multi), Essential hypertension, benign  (09/15/2023), Femur fracture (Multi), Hyperlipidemia (09/15/2023), Hypertension, Poorly controlled type 2 diabetes mellitus with complication (Multi) (09/15/2023), S/P deep brain stimulator placement, and Vitamin D deficiency (09/15/2023).     Surgical History  She has a past surgical history that includes Other surgical history (01/29/2021); Other surgical history (01/29/2021); Other surgical history (01/29/2021); Other surgical history (01/29/2021); Other surgical history (10/14/2021); Other surgical history (10/14/2021); Other surgical history (10/14/2021); and Other surgical history (10/19/2021).  Current Problem:  1. Acute on chronic respiratory failure with hypoxia and hypercapnia (Multi)        2. Sepsis with acute hypercapnic respiratory failure without septic shock, due to unspecified organism (Multi)        3. Pneumonia of both lower lobes due to infectious organism            General:  General  Reason for Referral: ADLs, discharge plannng  Referred By: Dr. Mccloud  Family/Caregiver Present: No  Co-Treatment: PT  Co-Treatment Reason: Safety  Prior to Session Communication: Bedside nurse  Patient Position Received: Bed, 4 rail up, Alarm on (chair position)    Precautions:  Medical Precautions: Fall precautions    Vital Signs:  SpO2:  (drops to 82% on 6L with bed mobility recovers to 90%, RT present to give tx and place on bipap)    Pain:  Pain Assessment  Pain Assessment: 0-10  0-10 (Numeric) Pain Score: 0 - No pain    Objective     Cognition:  Overall Cognitive Status: Within Functional Limits  Orientation Level: Oriented X4             Home Living:  Home Living Comments:  (Pt. resides with spouse in apt with elevator, has tub shower with extended tub bench. Non ambulatory x 3 years, uses slide board or pivot transfers to chair. Owns hospital bed, wc, niko lift. PLOF spouse assists with transfers and ADLs)     Prior Function:  Level of [place]: Needs assistance with ADLs  Receives Help From:  Family  ADL Assistance: Needs assistance  Homemaking Assistance: Needs assistance         ADL:  Grooming Assistance: Minimal  LE Dressing Assistance: Total    Activity Tolerance:  Endurance: Tolerates 10 - 20 min exercise with multiple rests  Activity Tolerance Comments:  (SOB with bed mobility)    Bed Mobility/Transfers:   Bed Mobility  Bed Mobility:  (supine <> sit CGA with use of rails)  Transfers  Transfer:  (Declines attempt to stand)        Sitting Balance:  Static Sitting Balance  Static Sitting-Balance Support: Bilateral upper extremity supported  Static Sitting-Level of Assistance: Close supervision      Sensation:  Sensation Comment: Numbness to bilateral feet        Hand Function:  Hand Function  Gross Grasp: Functional  Coordination: Functional    Extremities: RUE   RUE : Within Functional Limits and LUE   LUE: Within Functional Limits    Outcome Measures: [facility] Daily Activity  Putting on and taking off regular lower body clothing: Total  Bathing (including washing, rinsing, drying): A lot  Putting on and taking off regular upper body clothing: A little  Toileting, which includes using toilet, bedpan or urinal: A lot  Taking care of personal grooming such as brushing teeth: A little  Eating Meals: A little  Daily Activity - Total Score: 14          EDUCATION:  Education  Individual(s) Educated: Patient  Education Provided: Fall precautons, Risk and benefits of OT discussed with patient or other, POC discussed and agreed upon  Patient Response to Education: Patient/Caregiver Verbalized Understanding of Information      Goals:   Encounter Problems       Encounter Problems (Active)       Functional Balance       LTG - Patient will maintainsitting balance to allow for completion of daily activities       Start:  09/13/24    Expected End:  09/27/24               Grooming       STG - Patient completes grooming SUP       Start:  09/13/24    Expected End:  09/27/24               OT Transfers       STG - Patient  will perform bed mobility SUP       Start:  09/13/24    Expected End:  09/27/24            STG - Patient will transfer sit to and from stand min assist       Start:  09/13/24    Expected End:  09/27/24

## 2024-09-13 NOTE — ED PROVIDER NOTES
EMERGENCY DEPARTMENT ENCOUNTER      Pt Name: Kaylene Feliciano  MRN: [id number]  Birthdate 1955  Date of evaluation: 9/12/2024  Provider: Flip Dumont DO    CHIEF COMPLAINT       Chief Complaint   Patient presents with    Shortness of Breath     Presents from home with increased SOB, states she wears 4L baseline, states she has had hx of fluid on lungs         HISTORY OF PRESENT ILLNESS    HPI    69-year-old female with past medical history significant for asthma (on 4 L via nasal cannula at home) with as needed CPAP, HTN, IDDM 2, CKD 3, HLD, diastolic CHF (last echo 7/2023, EF 75%, pulmonary artery hypertension who presented to the emergency department for evaluation of shortness of breath.  Per EMS report patient has been short of breath of the past couple days and has a CPAP at home which he uses as needed when her oxygen saturation drops on her home 4 L and she was wearing her CPAP when they arrived.  Saturating 92% on her CPAP and a switch over to nonrebreather 15 L/min which brought her oxygen saturation up to 95%.  EMS gave 1 DuoNeb and route to the emergency department.  Patient states prior to switching from her nasal cannula to her CPAP her ox saturation was 77% and she called 911.  Of note patient was recently admitted [facility] 2 months ago for a similar presentation at which time she was also treated for pneumonia.    Nursing Notes were reviewed.    PAST MEDICAL HISTORY     Past Medical History:   Diagnosis Date    Asthma ([facility]) 09/15/2023    Chronic bipolar affective disorder (Multi) 09/15/2023    Chronic hypercapnic respiratory failure (Multi)     CKD (chronic kidney disease), stage II 09/15/2023    Depression 09/15/2023    Diabetes mellitus (Multi)     Diastolic HF (heart failure) (Multi)     Essential hypertension, benign 09/15/2023    Femur fracture (Multi)     Hyperlipidemia 09/15/2023    Hypertension     Poorly controlled type 2 diabetes mellitus with complication  (Multi) 09/15/2023    S/P deep brain stimulator placement     Vitamin D deficiency 09/15/2023         SURGICAL HISTORY       Past Surgical History:   Procedure Laterality Date    OTHER SURGICAL HISTORY  2021    Knee arthroscopy    OTHER SURGICAL HISTORY  2021    Deep brain stimulation    OTHER SURGICAL HISTORY  2021    Hysterectomy    OTHER SURGICAL HISTORY  2021    Cholecystectomy    OTHER SURGICAL HISTORY  10/14/2021    Knee surgery    OTHER SURGICAL HISTORY  10/14/2021    Tonsillectomy with adenoidectomy    OTHER SURGICAL HISTORY  10/14/2021    Vaginal sling procedure    OTHER SURGICAL HISTORY  10/19/2021     section         CURRENT MEDICATIONS       Current Discharge Medication List        CONTINUE these medications which have NOT CHANGED    Details   amLODIPine (Norvasc) 5 mg tablet Take 2 tablets (10 mg) by mouth once daily.    Associated Diagnoses: Essential hypertension, benign      aspirin 81 mg chewable tablet Chew 1 tablet (81 mg) once daily.      citalopram (CeleXA) 40 mg tablet Take 1 tablet (40 mg) by mouth once daily.      methocarbamol (Robaxin) 500 mg tablet Take 1 tablet (500 mg) by mouth 2 times a day.      metoprolol succinate XL (Toprol-XL) 25 mg 24 hr tablet Take 1 tablet (25 mg) by mouth once daily. Hold for SBP<110 or HR <70    Associated Diagnoses: Essential hypertension, benign      atorvastatin (Lipitor) 10 mg tablet Take 1 tablet (10 mg) by mouth once daily at bedtime.      cranberry 400 mg capsule Take 1 capsule by mouth once daily.      gabapentin (Neurontin) 300 mg capsule Take 1 capsule (300 mg) by mouth 2 times a day.      insulin glargine (Lantus U-100 Insulin) 100 unit/mL injection Inject 55 Units under the skin 2 times a day.      insulin lispro (HumaLOG) 100 unit/mL injection Inject 0-15 Units under the skin 3 times a day. Less than 110 Give 0 units,   111-150    Give 1 unit  151-200    Give 3 units  201-250    Give 6 units  251-300    Give 9  units  301-350    Give 12 units  351-400    Give 15 units      ipratropium-albuteroL (Duo-Neb) 0.5-2.5 mg/3 mL nebulizer solution Take 3 mL by nebulization every 4 hours.      lidocaine 4 % patch Place 1 patch on the skin once daily as needed for moderate pain (4 - 6).      lisinopril 40 mg tablet Take 1 tablet (40 mg) by mouth once daily. Please do not take this medication until follow-up with primary doctor regarding kidney function.  Qty: 30 tablet, Refills: 0    Associated Diagnoses: Essential hypertension, benign      menthol-zinc oxide (Calmoseptine - Risamine) 0.44-20.6 % ointment Apply 1 Application topically 4 times a day as needed (moisture associated skin damage).    Associated Diagnoses: BMI 45.0-49.9, adult (Multi)      !! oxygen (O2) gas therapy Inhale 1 each once every 24 hours.    Associated Diagnoses: Obesity hypoventilation syndrome (Multi)      !! oxygen (O2) gas therapy Inhale 1 each once every 24 hours.    Associated Diagnoses: Obesity hypoventilation syndrome (Multi)      pancrelipase, Lip-Prot-Amyl, (Creon) 6,000-19,000 -30,000 unit capsule Take 2 capsules by mouth 3 times a day before meals.       !! - Potential duplicate medications found. Please discuss with provider.          ALLERGIES     Sulfa (sulfonamide antibiotics), Belladonna, Ciprofloxacin, Adhesive tape-silicones, Iodinated contrast media, and Tegaderm ag mesh [silver]    FAMILY HISTORY       Family History   Problem Relation Name Age of Onset    Diabetes Mother      Hypertension Mother      Thyroid cancer Mother      Heart attack Mother      Osteoporosis Mother      Stroke Father      Hypertension Father      Diabetes Brother      Hypertension Brother      Cancer Brother      Diabetes Other Grandparent         type 2, without complication, unspecified insulin use          SOCIAL HISTORY       Social History     Socioeconomic History    Marital status:    Tobacco Use    Smoking status: Never    Smokeless tobacco: Never    Substance and Sexual Activity    Alcohol use: Never    Drug use: Never     Social Determinants of Health     Financial Resource Strain: Low Risk  (9/13/2024)    Overall Financial Resource Strain (CARDIA)     Difficulty of Paying Living Expenses: Not hard at all   Food Insecurity: No Food Insecurity (7/1/2024)    Hunger Vital Sign     Worried About Running Out of Food in the Last Year: Never true     Ran Out of Food in the Last Year: Never true   Transportation Needs: No Transportation Needs (9/13/2024)    PRAPARE - Transportation     Lack of Transportation (Medical): No     Lack of Transportation (Non-Medical): No   Physical Activity: Unknown (7/1/2024)    Exercise Vital Sign     Days of Exercise per Week: 0 days   Stress: Patient Declined (7/1/2024)    [employer] - Occupational Stress Questionnaire     Feeling of Stress : Patient declined   Social Connections: Patient Declined (7/1/2024)    Social Connection and Isolation Panel [NHANES]     Frequency of Communication with Friends and Family: Patient declined     Frequency of Social Gatherings with Friends and Family: Patient declined     Attends [employer] Services: Patient declined     Active Member of Clubs or Organizations: Patient declined     Attends Club or Organization Meetings: Patient declined     Marital Status: Patient declined   Housing Stability: Low Risk  (9/13/2024)    Housing Stability Vital Sign     Unable to Pay for Housing in the Last Year: No     Number of Times Moved in the Last Year: 1     Homeless in the Last Year: No       SCREENINGS                        PHYSICAL EXAM    (up to 7 for level 4, 8 or more for level 5)     ED Triage Vitals [09/12/24 4765]   Temperature Pulse Respirations BP   36.6 °C (97.9 °F) -- 20 145/72      Pulse Ox Temp Source Heart Rate Source Patient Position   (!) 93 % Temporal -- Lying      BP Location FiO2 (%)     Right arm --       Physical Exam  Vitals and nursing note reviewed.    Constitutional:       General: She is in acute distress.      Appearance: She is obese. She is ill-appearing. She is not toxic-appearing or diaphoretic.   HENT:      Head: Normocephalic and atraumatic.      Mouth/Throat:      Mouth: Mucous membranes are moist.      Pharynx: Oropharynx is clear.   Neck:      Thyroid: No thyromegaly.      Vascular: No JVD.      Trachea: No tracheal deviation.   Cardiovascular:      Rate and Rhythm: Normal rate and regular rhythm.      Pulses: Normal pulses.      Heart sounds: Normal heart sounds.   Pulmonary:      Effort: Accessory muscle usage and respiratory distress present.      Comments: Exam limited due to body habitus however sounds diminished bilaterally particularly in the bases.  No audible wheezes, rales, or rhonchi.  Chest:      Chest wall: No mass, deformity, tenderness, crepitus or edema.   Abdominal:      Palpations: Abdomen is soft. There is no mass.      Tenderness: There is no abdominal tenderness. There is no guarding or rebound.   Musculoskeletal:         General: Normal range of motion.      Cervical back: Normal range of motion and neck supple.      Right lower leg: No tenderness. No edema.      Left lower leg: No tenderness. No edema.   Skin:     General: Skin is warm and dry.   Neurological:      General: No focal deficit present.      Mental Status: She is alert and oriented to person, place, and time.   Psychiatric:         Mood and Affect: Mood normal.         Behavior: Behavior normal.          DIAGNOSTIC RESULTS     LABS:  Labs Reviewed   CBC WITH AUTO DIFFERENTIAL - Abnormal       Result Value    WBC 16.0 (*)     nRBC 0.0      RBC 3.36 (*)     Hemoglobin 8.6 (*)     Hematocrit 28.8 (*)     MCV 86      MCH 25.6 (*)     MCHC 29.9 (*)     RDW 16.5 (*)     Platelets 262      Neutrophils % 87.9      Immature Granulocytes %, Automated 0.5      Lymphocytes % 6.2      Monocytes % 5.0      Eosinophils % 0.2      Basophils % 0.2      Neutrophils Absolute 14.09  (*)     Immature Granulocytes Absolute, Automated 0.08      Lymphocytes Absolute 0.99 (*)     Monocytes Absolute 0.81      Eosinophils Absolute 0.03      Basophils Absolute 0.04     COMPREHENSIVE METABOLIC PANEL - Abnormal    Glucose 151 (*)     Sodium 138      Potassium 4.6      Chloride 99      Bicarbonate 31      Anion Gap 13      Urea Nitrogen 23      Creatinine 1.06 (*)     eGFR 57 (*)     Calcium 9.2      Albumin 4.0      Alkaline Phosphatase 90      Total Protein 7.8      AST 14      Bilirubin, Total 0.9      ALT 10     BLOOD GAS VENOUS FULL PANEL - Abnormal    POCT pH, Venous 7.24 (*)     POCT pCO2, Venous 73 (*)     POCT pO2, Venous 42      POCT SO2, Venous 63      POCT Oxy Hemoglobin, Venous 61.1      POCT Hematocrit Calculated, Venous 30.0 (*)     POCT Sodium, Venous 137      POCT Potassium, Venous 5.3      POCT Chloride, Venous 100      POCT Ionized Calicum, Venous 1.22      POCT Glucose, Venous 146 (*)     POCT Lactate, Venous 1.4      POCT Base Excess, Venous 2.6      POCT HCO3 Calculated, Venous 31.3 (*)     POCT Hemoglobin, Venous 9.9 (*)     POCT Anion Gap, Venous 11.0      Patient Temperature        FiO2 65      Critical Called By TERRI      Critical Called To DR. MARTINEZ      Critical Call Time 2336      Critical Read Back Y     B-TYPE NATRIURETIC PEPTIDE - Abnormal     (*)     Narrative:        <100 pg/mL - Heart failure unlikely  100-299 pg/mL - Intermediate probability of acute heart                  failure exacerbation. Correlate with clinical                  context and patient history.    >=300 pg/mL - Heart Failure likely. Correlate with clinical                  context and patient history.    BNP testing is performed using different testing methodology at [facility] than at other [facility] hospitals. Direct result comparisons should only be made within the same method.      URINALYSIS WITH REFLEX CULTURE AND MICROSCOPIC - Abnormal    Color, Urine Yellow       Appearance, Urine Turbid (*)     Specific Gravity, Urine 1.019      pH, Urine 5.5      Protein, Urine 70 (1+) (*)     Glucose, Urine Normal      Blood, Urine 0.1 (1+) (*)     Ketones, Urine NEGATIVE      Bilirubin, Urine NEGATIVE      Urobilinogen, Urine Normal      Nitrite, Urine NEGATIVE      Leukocyte Esterase, Urine 250 Valeira/µL (*)    BLOOD GAS VENOUS FULL PANEL - Abnormal    POCT pH, Venous 7.28 (*)     POCT pCO2, Venous 67 (*)     POCT pO2, Venous 42      POCT SO2, Venous 67      POCT Oxy Hemoglobin, Venous 64.6      POCT Hematocrit Calculated, Venous 27.0 (*)     POCT Sodium, Venous 136      POCT Potassium, Venous 5.0      POCT Chloride, Venous 100      POCT Ionized Calicum, Venous 1.20      POCT Glucose, Venous 193 (*)     POCT Lactate, Venous 1.1      POCT Base Excess, Venous 3.7 (*)     POCT HCO3 Calculated, Venous 31.5 (*)     POCT Hemoglobin, Venous 8.9 (*)     POCT Anion Gap, Venous 10.0      Patient Temperature        FiO2 60      Ipap CMH2O 20.0      Epap CMH2O 6.0     BASIC METABOLIC PANEL - Abnormal    Glucose 195 (*)     Sodium 136      Potassium 4.6      Chloride 97 (*)     Bicarbonate 27      Anion Gap 17      Urea Nitrogen 25 (*)     Creatinine 1.06 (*)     eGFR 57 (*)     Calcium 9.4     CBC - Abnormal    WBC 15.4 (*)     nRBC 0.0      RBC 3.50 (*)     Hemoglobin 8.7 (*)     Hematocrit 30.3 (*)     MCV 87      MCH 24.9 (*)     MCHC 28.7 (*)     RDW 16.6 (*)     Platelets 268     MAGNESIUM - Abnormal    Magnesium 2.54 (*)    PROTIME-INR - Abnormal    Protime 14.4 (*)     INR 1.3 (*)    MICROSCOPIC ONLY, URINE - Abnormal    WBC, Urine 21-50 (*)     RBC, Urine 6-10 (*)     Squamous Epithelial Cells, Urine 1-9 (SPARSE)      Bacteria, Urine 1+ (*)     Mucus, Urine FEW     POCT GLUCOSE - Abnormal    POCT Glucose 215 (*)    MAGNESIUM - Normal    Magnesium 1.97     SARS-COV-2 PCR - Normal    Coronavirus 2019, PCR Not Detected      Narrative:     This assay has received FDA Emergency Use Authorization  (EUA) and is only authorized for the duration of time that circumstances exist to justify the authorization of the emergency use of in vitro diagnostic tests for the detection of SARS-CoV-2 virus and/or diagnosis of COVID-19 infection under section 564(b)(1) of the Act, 21 U.S.C. 360bbb-3(b)(1). This assay is an in vitro diagnostic nucleic acid amplification test for the qualitative detection of SARS-CoV-2 from nasopharyngeal specimens and has been validated for use at [facility]. Negative results do not preclude COVID-19 infections and should not be used as the sole basis for diagnosis, treatment, or other management decisions.     SERIAL TROPONIN-INITIAL - Normal    Troponin I, High Sensitivity 6      Narrative:     Less than 99th percentile of normal range cutoff-  Female and children under 18 years old <14 ng/L; Male <21 ng/L: Negative  Repeat testing should be performed if clinically indicated.     Female and children under 18 years old 14-50 ng/L; Male 21-50 ng/L:  Consistent with possible cardiac damage and possible increased clinical   risk. Serial measurements may help to assess extent of myocardial damage.     >50 ng/L: Consistent with cardiac damage, increased clinical risk and  myocardial infarction. Serial measurements may help assess extent of   myocardial damage.      NOTE: Children less than 1 year old may have higher baseline troponin   levels and results should be interpreted in conjunction with the overall   clinical context.     NOTE: Troponin I testing is performed using a different   testing methodology at [facility] than at [facility]. Direct result comparisons should only   be made within the same method.   SERIAL TROPONIN, 1 HOUR - Normal    Troponin I, High Sensitivity 8      Narrative:     Less than 99th percentile of normal range cutoff-  Female and children under 18 years old <14 ng/L; Male <21 ng/L: Negative  Repeat testing should be  performed if clinically indicated.     Female and children under 18 years old 14-50 ng/L; Male 21-50 ng/L:  Consistent with possible cardiac damage and possible increased clinical   risk. Serial measurements may help to assess extent of myocardial damage.     >50 ng/L: Consistent with cardiac damage, increased clinical risk and  myocardial infarction. Serial measurements may help assess extent of   myocardial damage.      NOTE: Children less than 1 year old may have higher baseline troponin   levels and results should be interpreted in conjunction with the overall   clinical context.     NOTE: Troponin I testing is performed using a different   testing methodology at [facility] than at other   [facility]. Direct result comparisons should only   be made within the same method.   LACTATE - Normal    Lactate 0.8      Narrative:     Venipuncture immediately after or during the administration of Metamizole may lead to falsely low results. Testing should be performed immediately  prior to Metamizole dosing.   PHOSPHORUS - Normal    Phosphorus 4.3     BLOOD CULTURE   BLOOD CULTURE   LEGIONELLA ANTIGEN, URINE   STAPHYLOCOCCUS AUREUS/MRSA COLONIZATION, CULTURE   STREPTOCOCCUS PNEUMONIAE ANTIGEN, URINE   RESPIRATORY CULTURE/SMEAR   C. DIFFICILE, PCR   STOOL PATHOGEN PANEL, PCR   OCCULT BLOOD X1, STOOL   URINE CULTURE   TROPONIN SERIES- (INITIAL, 1 HR)    Narrative:     The following orders were created for panel order Troponin I Series, High Sensitivity (0, 1 HR).  Procedure                               Abnormality         Status                     ---------                               -----------         ------                     Troponin I, High Sensiti...[[id number]]  Normal              Final result               Troponin, High Sensitivi...[[id number]]  Normal              Final result                 Please view results for these tests on the individual orders.   URINALYSIS WITH REFLEX CULTURE AND  MICROSCOPIC    Narrative:     The following orders were created for panel order Urinalysis with Reflex Culture and Microscopic.  Procedure                               Abnormality         Status                     ---------                               -----------         ------                     Urinalysis with Reflex C...[[id number]]  Abnormal            Final result               Extra Urine Gray Tube[[id number]]                            In process                   Please view results for these tests on the individual orders.   EXTRA URINE GRAY TUBE   PROCALCITONIN   BLOOD GAS ARTERIAL FULL PANEL   POCT GLUCOSE METER   POCT GLUCOSE METER   POCT GLUCOSE METER   POCT GLUCOSE METER   POCT GLUCOSE METER   POCT GLUCOSE METER       All other labs were within normal range or not returned as of this dictation.    Imaging  CT chest wo IV contrast   Final Result   1.  Limited assessment due to body habitus. The heart size is   enlarged. Enlarged main pulmonary artery compatible pulmonary   arterial hypertension. Patchy bibasilar atelectasis with small   pleural effusions. Features most suggestive of pulmonary edema rather   than an acute infection.             Signed by: Larry Green 9/13/2024 2:03 AM   Dictation workstation:   [id number]      XR chest 1 view   Final Result   Enlarged cardiac silhouette and prominent interstitial markings   suggesting pulmonary edema. Patchy airspace opacities overlying the   mid and lower aspect of the lungs bilaterally, which may represent   atelectasis or pneumonia in the appropriate clinical setting. Small   to moderate left pleural effusion.        MACRO:   None.        Signed by: Evan Finkelstein 9/13/2024 1:03 AM   Dictation workstation:   [id number]           Procedures  Procedures     EMERGENCY DEPARTMENT COURSE/MDM:     ED Course as of 09/13/24 0840   Fri Sep 13, 2024   0240 Tissue reperfusion exam performed which showed capillary refill less than 3 seconds. [CH]       ED Course User Index  [CH] Flip Dumont DO         Diagnoses as of 09/13/24 0840   Acute on chronic respiratory failure with hypoxia and hypercapnia (Multi)   Sepsis with acute hypercapnic respiratory failure without septic shock, due to unspecified organism (Multi)   Pneumonia of both lower lobes due to infectious organism        Medical Decision Making    69-year-old female with past medical history significant for asthma (on 4 L via nasal cannula at home) with as needed CPAP, HTN, IDDM 2, CKD 3, HLD, diastolic CHF (last echo 7/2023, EF 75%, pulmonary artery hypertension who presented to the emergency department for evaluation of shortness of breath.  On arrival patient is tachypneic with accessory muscle use with a respiratory rate in the mid 20s saturating in the high 90s on 15 L nasal cannula.  Vitals otherwise WNL.  Nontoxic-appearing and afebrile.  Patient treated empirically for COPD exacerbation with 2 DuoNebs, IV magnesium and Solu-Medrol.  VBG was ordered which showed  respiratory acidosis with a pH of 7.24 and a pCO2 of 73.  Patient was started on BiPAP with IPAP of 14 and EPAP of 6.  Cardiac workup and respiratory swabs were also ordered.    Labs significant for leukocytosis with white blood cell count of 16 with chest x-ray suggestive of bilateral lower lobe pneumonia and UA suggestive of possible urinary tract infection.  Patient treated empirically with 2 g of Rocephin and 500 mg of azithromycin patient was given 500 cc bolus of normal saline due to concern for fluid overload given history of CHF, CKD 3, and pulmonary hypertension.  Repeat VBG after starting BiPAP showed a mild improvement in pH to 7.28 and pCO2 of 67.  IPAP increased to 20.  CT scan of the chest without contrast was ordered which on my independent interpretation showed bilateral opacities in the lung bases suggestive of atelectasis versus pneumonia with formal radiology read pending.    Patient case discussed with internal  medicine attending Dr. Mccloud who agreed admit patient to his service for further evaluation and treatment at the level of IMCU.    Patient and or family in agreement and understanding of treatment plan.  All questions answered.      I reviewed the case with the attending ED physician. The attending ED physician agrees with the plan. Patient and/or patient´s representative was counseled regarding labs, imaging, likely diagnosis, and plan. All questions were answered.    ED Medications administered this visit:    Medications   oxygen (O2) therapy (has no administration in time range)   ipratropium-albuteroL (Duo-Neb) nebulizer solution  - Omnicell Override Pull (has no administration in time range)   acetaminophen (Tylenol) tablet 650 mg (has no administration in time range)     Or   acetaminophen (Tylenol) oral liquid 650 mg (has no administration in time range)   melatonin tablet 3 mg (has no administration in time range)   polyethylene glycol (Glycolax, Miralax) packet 17 g (has no administration in time range)   enoxaparin (Lovenox) syringe 40 mg (has no administration in time range)   azithromycin 500 mg in dextrose 5% 250 mL IV (has no administration in time range)   guaiFENesin (Mucinex) 12 hr tablet 600 mg (has no administration in time range)   budesonide (Pulmicort) 0.5 mg/2 mL nebulizer solution 0.5 mg (has no administration in time range)   cefTRIAXone (Rocephin) 2 g in dextrose (iso) IV 50 mL (has no administration in time range)   ipratropium-albuteroL (Duo-Neb) 0.5-2.5 mg/3 mL nebulizer solution 3 mL (has no administration in time range)   ipratropium-albuteroL (Duo-Neb) 0.5-2.5 mg/3 mL nebulizer solution 3 mL (has no administration in time range)   methylPREDNISolone sod succinate (SOLU-Medrol) 40 mg/mL injection 40 mg (40 mg intravenous Given 9/13/24 0720)   insulin lispro (HumaLOG) injection 0-10 Units ( subcutaneous Not Given 9/13/24 3605)   glucagon (Glucagen) injection 1 mg (has no  administration in time range)   dextrose 50 % injection 25 g (has no administration in time range)   glucagon (Glucagen) injection 1 mg (has no administration in time range)   dextrose 50 % injection 12.5 g (has no administration in time range)   methylPREDNISolone sod succinate (SOLU-Medrol) injection 125 mg (125 mg intravenous Given 9/12/24 2327)   ipratropium-albuteroL (Duo-Neb) 0.5-2.5 mg/3 mL nebulizer solution 6 mL ( nebulization Handoff 9/13/24 0154)   magnesium sulfate 2 g in sterile water for injection 50 mL (0 g intravenous Stopped 9/13/24 0017)   sodium chloride 0.9 % bolus 500 mL (0 mL intravenous Stopped 9/13/24 0236)   cefTRIAXone (Rocephin) 2 g in dextrose (iso) IV 50 mL (0 g intravenous Stopped 9/13/24 0017)   azithromycin 500 mg in dextrose 5% 250 mL IV (0 mg intravenous Stopped 9/13/24 0152)       New Prescriptions from this visit:    Current Discharge Medication List          Follow-up:  No follow-up provider specified.      Final Impression:   1. Acute on chronic respiratory failure with hypoxia and hypercapnia (Multi)    2. Sepsis with acute hypercapnic respiratory failure without septic shock, due to unspecified organism (Multi)    3. Pneumonia of both lower lobes due to infectious organism          (Please note that portions of this note were completed with a voice recognition program.  Efforts were made to edit the dictations but occasionally words are mis-transcribed.)     Flip Dumont,   Resident  09/13/24 6741

## 2024-09-14 LAB
ANION GAP SERPL CALC-SCNC: 14 MMOL/L (ref 10–20)
BACTERIA UR CULT: NORMAL
BNP SERPL-MCNC: 292 PG/ML (ref 0–99)
BUN SERPL-MCNC: 41 MG/DL (ref 6–23)
CALCIUM SERPL-MCNC: 8.8 MG/DL (ref 8.6–10.3)
CHLORIDE SERPL-SCNC: 96 MMOL/L (ref 98–107)
CO2 SERPL-SCNC: 27 MMOL/L (ref 21–32)
CREAT SERPL-MCNC: 1.15 MG/DL (ref 0.5–1.05)
EGFRCR SERPLBLD CKD-EPI 2021: 52 ML/MIN/1.73M*2
ERYTHROCYTE [DISTWIDTH] IN BLOOD BY AUTOMATED COUNT: 16.7 % (ref 11.5–14.5)
FERRITIN SERPL-MCNC: 377 NG/ML (ref 8–150)
FOLATE SERPL-MCNC: 5.8 NG/ML
GLUCOSE BLD MANUAL STRIP-MCNC: 297 MG/DL (ref 74–99)
GLUCOSE BLD MANUAL STRIP-MCNC: 354 MG/DL (ref 74–99)
GLUCOSE BLD MANUAL STRIP-MCNC: 358 MG/DL (ref 74–99)
GLUCOSE BLD MANUAL STRIP-MCNC: 368 MG/DL (ref 74–99)
GLUCOSE BLD MANUAL STRIP-MCNC: 431 MG/DL (ref 74–99)
GLUCOSE BLD MANUAL STRIP-MCNC: 474 MG/DL (ref 74–99)
GLUCOSE SERPL-MCNC: 327 MG/DL (ref 74–99)
HCT VFR BLD AUTO: 25.3 % (ref 36–46)
HGB BLD-MCNC: 7.7 G/DL (ref 12–16)
IRON SATN MFR SERPL: 33 % (ref 25–45)
IRON SERPL-MCNC: 66 UG/DL (ref 35–150)
LEGIONELLA AG UR QL: NEGATIVE
MAGNESIUM SERPL-MCNC: 2.29 MG/DL (ref 1.6–2.4)
MCH RBC QN AUTO: 25.2 PG (ref 26–34)
MCHC RBC AUTO-ENTMCNC: 30.4 G/DL (ref 32–36)
MCV RBC AUTO: 83 FL (ref 80–100)
NRBC BLD-RTO: 0 /100 WBCS (ref 0–0)
PLATELET # BLD AUTO: 284 X10*3/UL (ref 150–450)
POTASSIUM SERPL-SCNC: 4.2 MMOL/L (ref 3.5–5.3)
RBC # BLD AUTO: 3.05 X10*6/UL (ref 4–5.2)
S PNEUM AG UR QL: NEGATIVE
SODIUM SERPL-SCNC: 133 MMOL/L (ref 136–145)
TIBC SERPL-MCNC: 198 UG/DL (ref 240–445)
UIBC SERPL-MCNC: 132 UG/DL (ref 110–370)
VIT B12 SERPL-MCNC: 210 PG/ML (ref 211–911)
WBC # BLD AUTO: 14.6 X10*3/UL (ref 4.4–11.3)

## 2024-09-14 PROCEDURE — 94660 CPAP INITIATION&MGMT: CPT

## 2024-09-14 PROCEDURE — 2500000002 HC RX 250 W HCPCS SELF ADMINISTERED DRUGS (ALT 637 FOR MEDICARE OP, ALT 636 FOR OP/ED): Performed by: INTERNAL MEDICINE

## 2024-09-14 PROCEDURE — 82728 ASSAY OF FERRITIN: CPT | Performed by: NURSE PRACTITIONER

## 2024-09-14 PROCEDURE — 1200000002 HC GENERAL ROOM WITH TELEMETRY DAILY

## 2024-09-14 PROCEDURE — 2500000001 HC RX 250 WO HCPCS SELF ADMINISTERED DRUGS (ALT 637 FOR MEDICARE OP)

## 2024-09-14 PROCEDURE — 2500000005 HC RX 250 GENERAL PHARMACY W/O HCPCS: Performed by: NURSE PRACTITIONER

## 2024-09-14 PROCEDURE — 83540 ASSAY OF IRON: CPT | Performed by: NURSE PRACTITIONER

## 2024-09-14 PROCEDURE — 2500000001 HC RX 250 WO HCPCS SELF ADMINISTERED DRUGS (ALT 637 FOR MEDICARE OP): Performed by: NURSE PRACTITIONER

## 2024-09-14 PROCEDURE — 83880 ASSAY OF NATRIURETIC PEPTIDE: CPT | Performed by: STUDENT IN AN ORGANIZED HEALTH CARE EDUCATION/TRAINING PROGRAM

## 2024-09-14 PROCEDURE — 2500000002 HC RX 250 W HCPCS SELF ADMINISTERED DRUGS (ALT 637 FOR MEDICARE OP, ALT 636 FOR OP/ED): Performed by: NURSE PRACTITIONER

## 2024-09-14 PROCEDURE — 36415 COLL VENOUS BLD VENIPUNCTURE: CPT | Performed by: STUDENT IN AN ORGANIZED HEALTH CARE EDUCATION/TRAINING PROGRAM

## 2024-09-14 PROCEDURE — 2500000004 HC RX 250 GENERAL PHARMACY W/ HCPCS (ALT 636 FOR OP/ED): Performed by: NURSE PRACTITIONER

## 2024-09-14 PROCEDURE — 94640 AIRWAY INHALATION TREATMENT: CPT

## 2024-09-14 PROCEDURE — 82947 ASSAY GLUCOSE BLOOD QUANT: CPT

## 2024-09-14 PROCEDURE — 2500000004 HC RX 250 GENERAL PHARMACY W/ HCPCS (ALT 636 FOR OP/ED)

## 2024-09-14 PROCEDURE — 82746 ASSAY OF FOLIC ACID SERUM: CPT | Mod: STJLAB | Performed by: NURSE PRACTITIONER

## 2024-09-14 PROCEDURE — 99233 SBSQ HOSP IP/OBS HIGH 50: CPT | Performed by: STUDENT IN AN ORGANIZED HEALTH CARE EDUCATION/TRAINING PROGRAM

## 2024-09-14 PROCEDURE — 36415 COLL VENOUS BLD VENIPUNCTURE: CPT

## 2024-09-14 PROCEDURE — 85027 COMPLETE CBC AUTOMATED: CPT

## 2024-09-14 PROCEDURE — 83735 ASSAY OF MAGNESIUM: CPT

## 2024-09-14 PROCEDURE — 80048 BASIC METABOLIC PNL TOTAL CA: CPT

## 2024-09-14 PROCEDURE — 82607 VITAMIN B-12: CPT | Mod: STJLAB | Performed by: NURSE PRACTITIONER

## 2024-09-14 PROCEDURE — 2500000002 HC RX 250 W HCPCS SELF ADMINISTERED DRUGS (ALT 637 FOR MEDICARE OP, ALT 636 FOR OP/ED)

## 2024-09-14 RX ORDER — INSULIN LISPRO 100 [IU]/ML
12 INJECTION, SOLUTION INTRAVENOUS; SUBCUTANEOUS ONCE
Status: COMPLETED | OUTPATIENT
Start: 2024-09-14 | End: 2024-09-14

## 2024-09-14 RX ORDER — CALCIUM CARBONATE 200(500)MG
1000 TABLET,CHEWABLE ORAL EVERY 4 HOURS PRN
Status: DISCONTINUED | OUTPATIENT
Start: 2024-09-14 | End: 2024-09-14

## 2024-09-14 RX ORDER — INSULIN GLARGINE 100 [IU]/ML
55 INJECTION, SOLUTION SUBCUTANEOUS 2 TIMES DAILY
Status: DISCONTINUED | OUTPATIENT
Start: 2024-09-14 | End: 2024-09-15

## 2024-09-14 RX ORDER — PREDNISONE 20 MG/1
40 TABLET ORAL DAILY
Status: DISCONTINUED | OUTPATIENT
Start: 2024-09-15 | End: 2024-09-15

## 2024-09-14 ASSESSMENT — PAIN SCALES - GENERAL
PAINLEVEL_OUTOF10: 0 - NO PAIN

## 2024-09-14 ASSESSMENT — PAIN - FUNCTIONAL ASSESSMENT
PAIN_FUNCTIONAL_ASSESSMENT: 0-10

## 2024-09-14 NOTE — PROGRESS NOTES
"Subjective  Patient had uneventful night does not complain about any chest pain shortness of breath  Nursing staff was interviewed  Objectives    Last Recorded Vitals  Blood pressure 135/66, pulse 60, temperature 36.1 °C (97 °F), temperature source Temporal, resp. rate 24, height 1.6 m (5' 3\"), weight 132 kg (292 lb 1.8 oz), SpO2 92%.    Physical Exam  HENT:      Right Ear: External ear normal.      Left Ear: External ear normal.      Mouth/Throat:      Mouth: Mucous membranes are moist.   Cardiovascular:      Rate and Rhythm: Normal rate and regular rhythm.      Heart sounds: No murmur heard.     No friction rub. No gallop.   Pulmonary:      Effort: No accessory muscle usage or respiratory distress.      Breath sounds: No stridor. No wheezing or rhonchi.   Chest:      Chest wall: No tenderness.   Abdominal:      General: There is no distension.      Palpations: There is no mass.      Tenderness: There is no abdominal tenderness. There is no guarding or rebound.   Musculoskeletal:         General: No deformity or signs of injury.      Cervical back: No rigidity or tenderness. Normal range of motion.      Right lower leg: No edema.      Left lower leg: No edema.   Skin:     Coloration: Skin is not jaundiced or pale.      Findings: No lesion.   Neurological:      General: No focal deficit present.      Mental Status: He is alert, oriented to person, place, and time and easily aroused.      Cranial Nerves: No cranial nerve deficit.      Sensory: No sensory deficit.      Motor: No weakness.        Labs    No results displayed because visit has over 200 results.            Imaging          Patient Active Problem List   Diagnosis    Asthma ([facility])    Candidiasis of vagina    Chronic bipolar affective disorder (Multi)    Depression    CKD (chronic kidney disease)    Diabetes, polyneuropathy (Multi)    Dysuria    Essential hypertension, benign    Hyperlipidemia    Mental problems    Morbid obesity (Multi)    " Non-compliance    Physical deconditioning    Diabetes mellitus (Multi)    Poorly controlled type 2 diabetes mellitus with complication (Multi)    Proteinuria    Secondary pancreatic insufficiency ([facility])    Spinal stenosis, multilevel    Vitamin D deficiency    BMI 45.0-49.9, adult (Multi)    CKD (chronic kidney disease), stage II    Hypoxia    Shortness of breath    Obesity hypoventilation syndrome (Multi)    Pneumonia due to infectious organism    Elevated LFTs    ARCHIE (obstructive sleep apnea)    Chronic respiratory failure with hypoxia and hypercapnia (Multi)    Acute on chronic respiratory failure with hypoxia and hypercapnia (Multi)    Neutrophilic leukocytosis    Diarrhea         Assessment/Plan   Assessment & Plan  Acute on chronic respiratory failure with hypoxia and hypercapnia (Multi)    CKD (chronic kidney disease)    Pneumonia due to infectious organism    ARCHIE (obstructive sleep apnea)    Neutrophilic leukocytosis    Diarrhea      Acute respiratory failure related to pneumonia and diastolic congestive heart failure patient is doing better currently on 4 L of oxygen close to her baseline will transfer to regular medical floor with telemetry continue with antibiotic treatment  Diabetes increase Lantus  Pulmonary hypertension obstructive sleep apnea on CPAP at night       I spent 25 minutes in the professional and overall care of this patient.      ANDREW Mccloud MD

## 2024-09-14 NOTE — CARE PLAN
Problem: Grooming  Goal: STG - Patient completes grooming SUP  Outcome: Progressing     Problem: Skin  Goal: Participates in plan/prevention/treatment measures  Outcome: Progressing  Goal: Prevent/manage excess moisture  Outcome: Progressing  Goal: Prevent/minimize sheer/friction injuries  Outcome: Progressing     Problem: Respiratory  Goal: Minimal/no exertional discomfort or dyspnea this shift  Outcome: Progressing  Goal: Patent airway maintained this shift  Outcome: Progressing  Goal: Tolerate mechanical ventilation evidenced by VS/agitation level this shift  Outcome: Progressing  Goal: Tolerate pulmonary toileting this shift  Outcome: Progressing  Goal: Verbalize decreased shortness of breath this shift  Outcome: Progressing  Goal: Wean oxygen to maintain O2 saturation per order/standard this shift  Outcome: Progressing  Goal: Increase self care and/or family involvement in next 24 hours  Outcome: Progressing       The clinical goals for the shift include pt will remain hds

## 2024-09-14 NOTE — PROGRESS NOTES
Department of Medicine  Division of Pulmonary, Critical Care, and Sleep Medicine    Kaylene Feliciano is a 69 y.o. female on day 1 of admission presenting with Acute on chronic respiratory failure with hypoxia and hypercapnia (Multi).    Subjective   She is back to her home oxygen requirements. Feeling better. Did wear bipap 20/6 at night.        Objective     Vital Signs      9/13/2024     8:18 PM 9/13/2024    10:21 PM 9/14/2024    12:04 AM 9/14/2024     1:30 AM 9/14/2024     4:00 AM 9/14/2024     5:45 AM 9/14/2024     8:13 AM   Vitals   Systolic 140 152 127  116  135   Diastolic 46 53 52  50  66   Heart Rate 80 76 72  58  60   Temp 36.2 °C (97.2 °F)  36.2 °C (97.2 °F)  36 °C (96.8 °F)  36.1 °C (97 °F)   Resp 26 25 26 16 12  24   Weight (lb)      292.11    BMI      51.74 kg/m2    BSA (m2)      2.43 m2         Physical Exam  Vitals and nursing note reviewed.   Constitutional:       General: No acute distress.     Appearance: Normal appearance.   HENT:      Head: Normocephalic and atraumatic.      Mouth/Throat:      Mouth: Mucous membranes are moist.   Eyes:      Conjunctiva/sclera: Conjunctivae normal.   Cardiovascular:      Rate and Rhythm: Normal rate and regular rhythm.   Pulmonary:      Effort: Pulmonary effort is normal. No respiratory distress.      Breath sounds: Diminished breath sounds. No stridor. No wheezing or rhonchi.   Musculoskeletal:         General: Normal range of motion.      Cervical back: Normal range of motion and neck supple.   Skin:     General: Skin is warm and dry.      Coloration: Skin is not jaundiced.   Neurological:      General: No focal deficit present.      Mental Status: Alert and oriented to person, place, and time. Mental status is at baseline.   Psychiatric:         Mood and Affect: Mood normal.         Behavior: Behavior normal.         Judgment: Judgment normal.       Labs:  Lab Results   Component Value Date    WBC 14.6 (H) 09/14/2024    HGB 7.7 (L) 09/14/2024    HCT 25.3 (L)  09/14/2024    MCV 83 09/14/2024     09/14/2024      Lab Results   Component Value Date    GLUCOSE 327 (H) 09/14/2024    CALCIUM 8.8 09/14/2024     (L) 09/14/2024    K 4.2 09/14/2024    CO2 27 09/14/2024    CL 96 (L) 09/14/2024    BUN 41 (H) 09/14/2024    CREATININE 1.15 (H) 09/14/2024      Lab Results   Component Value Date    ALT 10 09/12/2024    AST 14 09/12/2024    ALKPHOS 90 09/12/2024    BILITOT 0.9 09/12/2024        Oxygen Therapy  SpO2: 92 %  Medical Gas Therapy: Supplemental oxygen  Medical Gas Delivery Method: Nasal cannula  FiO2 (%):  [40 %-60 %] 60 %  S RR:  [12] 12    Intake/Output last 3 Shifts:  I/O last 3 completed shifts:  In: 660 (5 mL/kg) [P.O.:360; IV Piggyback:300]  Out: 300 (2.3 mL/kg) [Urine:300 (0.1 mL/kg/hr)]  Weight: 132.5 kg       Medications   Scheduled medications  amLODIPine, 10 mg, oral, Daily  aspirin, 81 mg, oral, Daily  atorvastatin, 10 mg, oral, Nightly  azithromycin, 500 mg, intravenous, q24h  budesonide, 0.5 mg, nebulization, BID  cefTRIAXone, 2 g, intravenous, Daily  citalopram, 40 mg, oral, Daily  enoxaparin, 40 mg, subcutaneous, q12h YARELI  gabapentin, 300 mg, oral, BID  insulin glargine, 55 Units, subcutaneous, BID  insulin lispro, 0-10 Units, subcutaneous, q4h  ipratropium-albuteroL, 3 mL, nebulization, TID  methocarbamol, 500 mg, oral, BID  methylPREDNISolone sodium succinate (PF), 40 mg, intravenous, q6h  metoprolol succinate XL, 25 mg, oral, Daily  oxygen, , inhalation, Continuous - Inhalation  pancrelipase (Lip-Prot-Amyl), 1 capsule, oral, TID AC      Continuous medications     PRN medications  PRN medications: acetaminophen **OR** acetaminophen, dextrose, dextrose, glucagon, glucagon, guaiFENesin, ipratropium-albuteroL, lidocaine, melatonin, oxygen, polyethylene glycol     Allergies  Sulfa (sulfonamide antibiotics), Belladonna, Ciprofloxacin, Adhesive tape-silicones, Iodinated contrast media, and Tegaderm ag mesh [silver]      Chest Radiograph   CT chest wo IV  contrast 09/13/2024    Narrative  Interpreted By:  Larry Green,  STUDY:  CT CHEST WO IV CONTRAST;  9/13/2024 1:14 am    INDICATION:  Signs/Symptoms:respiratory failure.    COMPARISON:  07/01/2024    ACCESSION NUMBER(S):  [id number]    ORDERING CLINICIAN:  WILLARD MARTINEZ    TECHNIQUE:  Helical data acquisition of the chest was obtained without  intravenous contrast. Images were reformatted in axial, coronal, and  sagittal planes.    FINDINGS:  Body habitus markedly limits assessment.    There is enlargement of the main pulmonary artery compatible with  pulmonary hypertension heart size is enlarged. No pericardial  effusion. No no pneumothorax    Patchy bibasilar atelectasis. Features suggesting pulmonary edema.  Septal thickening patchy ground-glass opacities. Small effusions.  Stable hilar and mediastinal lymph nodes. No axillary adenopathy.    Marked hepatic steatosis.    Impression  1.  Limited assessment due to body habitus. The heart size is  enlarged. Enlarged main pulmonary artery compatible pulmonary  arterial hypertension. Patchy bibasilar atelectasis with small  pleural effusions. Features most suggestive of pulmonary edema rather  than an acute infection.      Signed by: Larry Green 9/13/2024 2:03 AM  Dictation workstation:   [id number]       XR chest 1 view 09/13/2024    Narrative  Interpreted By:  Finkelstein, Evan,  STUDY:  XR CHEST 1 VIEW;  9/13/2024 12:05 am    INDICATION:  Signs/Symptoms:Chest Pain.      COMPARISON:  Chest radiograph 07/01/2024    ACCESSION NUMBER(S):  [id number]    ORDERING CLINICIAN:  WILLARD MARTINEZ    FINDINGS:  Stimulators and leads again seen overlying the upper chest.    CARDIOMEDIASTINAL SILHOUETTE:  Enlarged cardiac silhouette, similar compared to imaging.    LUNGS:  Prominent interstitial markings. Patchy airspace opacities most  pronounced in the mid and lower aspect of the lungs bilaterally.  Blunted left costophrenic angle.    ABDOMEN:  No remarkable  upper abdominal findings.    BONES:  No acute osseous abnormality.    Impression  Enlarged cardiac silhouette and prominent interstitial markings  suggesting pulmonary edema. Patchy airspace opacities overlying the  mid and lower aspect of the lungs bilaterally, which may represent  atelectasis or pneumonia in the appropriate clinical setting. Small  to moderate left pleural effusion.    MACRO:  None.    Signed by: Evan Finkelstein 9/13/2024 1:03 AM  Dictation workstation:   [id number]         Assessment and Plan / Recommendations   Assessment/Plan   Impression:  Acute on chronic respiratory failure with hypoxia and hypercapnia  Acute decompensated diastolic cardiomyopathy  Pulmonary hypertension, multifactorial  Obstructive sleep apnea  Severe morbid obesity with a body mass index of 50     Plan:  Agree with close monitoring of the patient's respiratory status  Supplemental oxygen as needed to maintain adequate pulse oximetry, the patient currently obtaining 4 L of nasal cannula oxygen  Use of noninvasive positive pressure ventilatory support in the form of BiPAP as needed to reduce the work of breathing and to maintain throughout the night  The patient currently prescribed 60% oxygen with an inspiratory pressure of 20 and expiratory pressure of 6  Continue with antimicrobial therapy ceftriaxone and azithromycin (5 days)  Decreased steroids to prednisone 40 mg daily x 3 days  Outpatient pulmonology referral referral   VTE prophylaxis with subcu Lovenox  Thank for the consultation.  Will follow closely with you.        Alexis Castaneda MD      Date: 09/14/24 Time: 12:26 PM

## 2024-09-14 NOTE — CARE PLAN
Pt remained HDS this RN shift. Pt pulse ox decreasing to 88% on 6 L NC while sleeping, pt was put on Bipap 40%. Pt pulse ox remained >92% on Bipap, pt tolerated Bipap for >5 hours this RN shift. Pt denied pain, nausea. Pt medicated per order, IV Azithromycin and Ceftriaxone administered per order. Pt on IV Solumedrol, blood glucose elevated throughout the shift. Pt safety maintained, call light at reach.

## 2024-09-15 VITALS
HEIGHT: 63 IN | BODY MASS INDEX: 51.76 KG/M2 | OXYGEN SATURATION: 92 % | WEIGHT: 292.11 LBS | DIASTOLIC BLOOD PRESSURE: 65 MMHG | HEART RATE: 71 BPM | RESPIRATION RATE: 19 BRPM | SYSTOLIC BLOOD PRESSURE: 146 MMHG | TEMPERATURE: 97.7 F

## 2024-09-15 LAB
ANION GAP SERPL CALC-SCNC: 11 MMOL/L (ref 10–20)
BACTERIA BLD CULT: NORMAL
BACTERIA BLD CULT: NORMAL
BUN SERPL-MCNC: 54 MG/DL (ref 6–23)
CALCIUM SERPL-MCNC: 9.2 MG/DL (ref 8.6–10.3)
CHLORIDE SERPL-SCNC: 95 MMOL/L (ref 98–107)
CO2 SERPL-SCNC: 30 MMOL/L (ref 21–32)
CREAT SERPL-MCNC: 1.2 MG/DL (ref 0.5–1.05)
EGFRCR SERPLBLD CKD-EPI 2021: 49 ML/MIN/1.73M*2
ERYTHROCYTE [DISTWIDTH] IN BLOOD BY AUTOMATED COUNT: 16.6 % (ref 11.5–14.5)
GLUCOSE BLD MANUAL STRIP-MCNC: 225 MG/DL (ref 74–99)
GLUCOSE BLD MANUAL STRIP-MCNC: 272 MG/DL (ref 74–99)
GLUCOSE BLD MANUAL STRIP-MCNC: 278 MG/DL (ref 74–99)
GLUCOSE BLD MANUAL STRIP-MCNC: 317 MG/DL (ref 74–99)
GLUCOSE BLD MANUAL STRIP-MCNC: 347 MG/DL (ref 74–99)
GLUCOSE BLD MANUAL STRIP-MCNC: 381 MG/DL (ref 74–99)
GLUCOSE BLD MANUAL STRIP-MCNC: 404 MG/DL (ref 74–99)
GLUCOSE SERPL-MCNC: 279 MG/DL (ref 74–99)
HCT VFR BLD AUTO: 25.6 % (ref 36–46)
HGB BLD-MCNC: 7.8 G/DL (ref 12–16)
MAGNESIUM SERPL-MCNC: 2.34 MG/DL (ref 1.6–2.4)
MCH RBC QN AUTO: 25 PG (ref 26–34)
MCHC RBC AUTO-ENTMCNC: 30.5 G/DL (ref 32–36)
MCV RBC AUTO: 82 FL (ref 80–100)
NRBC BLD-RTO: 0 /100 WBCS (ref 0–0)
PLATELET # BLD AUTO: 293 X10*3/UL (ref 150–450)
POTASSIUM SERPL-SCNC: 4.1 MMOL/L (ref 3.5–5.3)
RBC # BLD AUTO: 3.12 X10*6/UL (ref 4–5.2)
SODIUM SERPL-SCNC: 132 MMOL/L (ref 136–145)
WBC # BLD AUTO: 14.2 X10*3/UL (ref 4.4–11.3)

## 2024-09-15 PROCEDURE — 2500000001 HC RX 250 WO HCPCS SELF ADMINISTERED DRUGS (ALT 637 FOR MEDICARE OP): Performed by: NURSE PRACTITIONER

## 2024-09-15 PROCEDURE — 94660 CPAP INITIATION&MGMT: CPT

## 2024-09-15 PROCEDURE — 85027 COMPLETE CBC AUTOMATED: CPT

## 2024-09-15 PROCEDURE — 1200000002 HC GENERAL ROOM WITH TELEMETRY DAILY

## 2024-09-15 PROCEDURE — 2500000004 HC RX 250 GENERAL PHARMACY W/ HCPCS (ALT 636 FOR OP/ED): Performed by: NURSE PRACTITIONER

## 2024-09-15 PROCEDURE — 82947 ASSAY GLUCOSE BLOOD QUANT: CPT

## 2024-09-15 PROCEDURE — 36415 COLL VENOUS BLD VENIPUNCTURE: CPT

## 2024-09-15 PROCEDURE — 2500000004 HC RX 250 GENERAL PHARMACY W/ HCPCS (ALT 636 FOR OP/ED)

## 2024-09-15 PROCEDURE — 2500000002 HC RX 250 W HCPCS SELF ADMINISTERED DRUGS (ALT 637 FOR MEDICARE OP, ALT 636 FOR OP/ED)

## 2024-09-15 PROCEDURE — 2500000004 HC RX 250 GENERAL PHARMACY W/ HCPCS (ALT 636 FOR OP/ED): Performed by: INTERNAL MEDICINE

## 2024-09-15 PROCEDURE — 84295 ASSAY OF SERUM SODIUM: CPT

## 2024-09-15 PROCEDURE — 2500000002 HC RX 250 W HCPCS SELF ADMINISTERED DRUGS (ALT 637 FOR MEDICARE OP, ALT 636 FOR OP/ED): Performed by: INTERNAL MEDICINE

## 2024-09-15 PROCEDURE — 94640 AIRWAY INHALATION TREATMENT: CPT

## 2024-09-15 PROCEDURE — 83735 ASSAY OF MAGNESIUM: CPT

## 2024-09-15 PROCEDURE — 2500000005 HC RX 250 GENERAL PHARMACY W/O HCPCS: Performed by: NURSE PRACTITIONER

## 2024-09-15 PROCEDURE — 2500000002 HC RX 250 W HCPCS SELF ADMINISTERED DRUGS (ALT 637 FOR MEDICARE OP, ALT 636 FOR OP/ED): Performed by: NURSE PRACTITIONER

## 2024-09-15 RX ORDER — INSULIN GLARGINE 100 [IU]/ML
60 INJECTION, SOLUTION SUBCUTANEOUS 2 TIMES DAILY
Status: DISCONTINUED | OUTPATIENT
Start: 2024-09-15 | End: 2024-09-17

## 2024-09-15 RX ORDER — PREDNISONE 20 MG/1
20 TABLET ORAL DAILY
Status: DISCONTINUED | OUTPATIENT
Start: 2024-09-15 | End: 2024-09-18 | Stop reason: HOSPADM

## 2024-09-15 RX ORDER — ENOXAPARIN SODIUM 100 MG/ML
60 INJECTION SUBCUTANEOUS EVERY 12 HOURS SCHEDULED
Status: DISCONTINUED | OUTPATIENT
Start: 2024-09-15 | End: 2024-09-18 | Stop reason: HOSPADM

## 2024-09-15 RX ORDER — INSULIN LISPRO 100 [IU]/ML
12 INJECTION, SOLUTION INTRAVENOUS; SUBCUTANEOUS ONCE
Status: COMPLETED | OUTPATIENT
Start: 2024-09-15 | End: 2024-09-15

## 2024-09-15 ASSESSMENT — PAIN SCALES - GENERAL
PAINLEVEL_OUTOF10: 0 - NO PAIN
PAINLEVEL_OUTOF10: 0 - NO PAIN

## 2024-09-15 NOTE — PROGRESS NOTES
"Subjective  Patient had uneventful night does not complain about any chest pain shortness of breath patient currently on BiPAP overnight  Nursing staff was interviewed  Objectives    Last Recorded Vitals  Blood pressure 137/61, pulse 66, temperature 35.6 °C (96.1 °F), temperature source Temporal, resp. rate 18, height 1.6 m (5' 3\"), weight 132 kg (292 lb 1.8 oz), SpO2 100%.    Physical Exam  HENT:      Right Ear: External ear normal.      Left Ear: External ear normal.      Mouth/Throat:      Mouth: Mucous membranes are moist.   Cardiovascular:      Rate and Rhythm: Normal rate and regular rhythm.      Heart sounds: No murmur heard.     No friction rub. No gallop.   Pulmonary:      Effort: No accessory muscle usage or respiratory distress.      Breath sounds: No stridor. No wheezing or rhonchi.   Chest:      Chest wall: No tenderness.   Abdominal:      General: There is no distension.      Palpations: There is no mass.      Tenderness: There is no abdominal tenderness. There is no guarding or rebound.   Musculoskeletal:         General: No deformity or signs of injury.      Cervical back: No rigidity or tenderness. Normal range of motion.      Right lower leg: No edema.      Left lower leg: No edema.   Skin:     Coloration: Skin is not jaundiced or pale.      Findings: No lesion.   Neurological:      General: No focal deficit present.      Mental Status: He is alert, oriented to person, place, and time and easily aroused.      Cranial Nerves: No cranial nerve deficit.      Sensory: No sensory deficit.      Motor: No weakness.        Labs    No results displayed because visit has over 200 results.            Imaging          Patient Active Problem List   Diagnosis    Asthma ([facility])    Candidiasis of vagina    Chronic bipolar affective disorder (Multi)    Depression    CKD (chronic kidney disease)    Diabetes, polyneuropathy (Multi)    Dysuria    Essential hypertension, benign    Hyperlipidemia    Mental problems    " Morbid obesity (Multi)    Non-compliance    Physical deconditioning    Diabetes mellitus (Multi)    Poorly controlled type 2 diabetes mellitus with complication (Multi)    Proteinuria    Secondary pancreatic insufficiency ([facility])    Spinal stenosis, multilevel    Vitamin D deficiency    BMI 45.0-49.9, adult (Multi)    CKD (chronic kidney disease), stage II    Hypoxia    Shortness of breath    Obesity hypoventilation syndrome (Multi)    Pneumonia due to infectious organism    Elevated LFTs    ARCHIE (obstructive sleep apnea)    Chronic respiratory failure with hypoxia and hypercapnia (Multi)    Acute on chronic respiratory failure with hypoxia and hypercapnia (Multi)    Neutrophilic leukocytosis    Diarrhea         Assessment/Plan   Assessment & Plan  Acute on chronic respiratory failure with hypoxia and hypercapnia (Multi)    CKD (chronic kidney disease)    Pneumonia due to infectious organism    ARCHIE (obstructive sleep apnea)    Neutrophilic leukocytosis    Diarrhea      Acute on chronic respiratory failure related to pneumonia and obstructive sleep apnea continue with antibiotic continue with BiPAP patient is doing well waiting for final culture anticipate possible return to nursing home tomorrow patient blood glucose is elevated I will reduce her steroid to 20 she is not wheezing anymore  Elevation of BUN and creatinine I will hold diuretics       I spent 25 minutes in the professional and overall care of this patient.      ANDREW Mccloud MD

## 2024-09-16 ENCOUNTER — APPOINTMENT (OUTPATIENT)
Dept: RADIOLOGY | Facility: HOSPITAL | Age: 69
End: 2024-09-16
Payer: MEDICARE

## 2024-09-16 LAB
ANION GAP BLDA CALCULATED.4IONS-SCNC: 5 MMO/L (ref 10–25)
ANION GAP SERPL CALC-SCNC: 10 MMOL/L (ref 10–20)
APPARATUS: ABNORMAL
BASE EXCESS BLDA CALC-SCNC: 9.2 MMOL/L (ref -2–3)
BNP SERPL-MCNC: 306 PG/ML (ref 0–99)
BODY TEMPERATURE: ABNORMAL
BUN SERPL-MCNC: 47 MG/DL (ref 6–23)
CA-I BLDA-SCNC: 1.18 MMOL/L (ref 1.1–1.33)
CALCIUM SERPL-MCNC: 9 MG/DL (ref 8.6–10.3)
CHLORIDE BLDA-SCNC: 98 MMOL/L (ref 98–107)
CHLORIDE SERPL-SCNC: 100 MMOL/L (ref 98–107)
CO2 SERPL-SCNC: 32 MMOL/L (ref 21–32)
CREAT SERPL-MCNC: 1.07 MG/DL (ref 0.5–1.05)
EGFRCR SERPLBLD CKD-EPI 2021: 56 ML/MIN/1.73M*2
ERYTHROCYTE [DISTWIDTH] IN BLOOD BY AUTOMATED COUNT: 16.9 % (ref 11.5–14.5)
FLOW: 6 LPM
GLUCOSE BLD MANUAL STRIP-MCNC: 101 MG/DL (ref 74–99)
GLUCOSE BLD MANUAL STRIP-MCNC: 106 MG/DL (ref 74–99)
GLUCOSE BLD MANUAL STRIP-MCNC: 118 MG/DL (ref 74–99)
GLUCOSE BLD MANUAL STRIP-MCNC: 215 MG/DL (ref 74–99)
GLUCOSE BLD MANUAL STRIP-MCNC: 224 MG/DL (ref 74–99)
GLUCOSE BLDA-MCNC: 289 MG/DL (ref 74–99)
GLUCOSE SERPL-MCNC: 96 MG/DL (ref 74–99)
HCO3 BLDA-SCNC: 34.1 MMOL/L (ref 22–26)
HCT VFR BLD AUTO: 27.9 % (ref 36–46)
HCT VFR BLD EST: 29 % (ref 36–46)
HGB BLD-MCNC: 8.7 G/DL (ref 12–16)
HGB BLDA-MCNC: 9.5 G/DL (ref 12–16)
INHALED O2 CONCENTRATION: 45 %
LACTATE BLDA-SCNC: 1.5 MMOL/L (ref 0.4–2)
MAGNESIUM SERPL-MCNC: 2.36 MG/DL (ref 1.6–2.4)
MCH RBC QN AUTO: 25.8 PG (ref 26–34)
MCHC RBC AUTO-ENTMCNC: 31.2 G/DL (ref 32–36)
MCV RBC AUTO: 83 FL (ref 80–100)
NRBC BLD-RTO: 0 /100 WBCS (ref 0–0)
OXYHGB MFR BLDA: 94 % (ref 94–98)
PCO2 BLDA: 48 MM HG (ref 38–42)
PH BLDA: 7.46 PH (ref 7.38–7.42)
PLATELET # BLD AUTO: 272 X10*3/UL (ref 150–450)
PO2 BLDA: 76 MM HG (ref 85–95)
POTASSIUM BLDA-SCNC: 4.9 MMOL/L (ref 3.5–5.3)
POTASSIUM SERPL-SCNC: 4 MMOL/L (ref 3.5–5.3)
RBC # BLD AUTO: 3.37 X10*6/UL (ref 4–5.2)
SAO2 % BLDA: 97 % (ref 94–100)
SODIUM BLDA-SCNC: 132 MMOL/L (ref 136–145)
SODIUM SERPL-SCNC: 138 MMOL/L (ref 136–145)
WBC # BLD AUTO: 12.1 X10*3/UL (ref 4.4–11.3)

## 2024-09-16 PROCEDURE — 99232 SBSQ HOSP IP/OBS MODERATE 35: CPT | Performed by: INTERNAL MEDICINE

## 2024-09-16 PROCEDURE — 36415 COLL VENOUS BLD VENIPUNCTURE: CPT

## 2024-09-16 PROCEDURE — 71045 X-RAY EXAM CHEST 1 VIEW: CPT | Performed by: RADIOLOGY

## 2024-09-16 PROCEDURE — 83735 ASSAY OF MAGNESIUM: CPT

## 2024-09-16 PROCEDURE — 36600 WITHDRAWAL OF ARTERIAL BLOOD: CPT

## 2024-09-16 PROCEDURE — 2500000002 HC RX 250 W HCPCS SELF ADMINISTERED DRUGS (ALT 637 FOR MEDICARE OP, ALT 636 FOR OP/ED)

## 2024-09-16 PROCEDURE — 2500000004 HC RX 250 GENERAL PHARMACY W/ HCPCS (ALT 636 FOR OP/ED)

## 2024-09-16 PROCEDURE — 1200000002 HC GENERAL ROOM WITH TELEMETRY DAILY

## 2024-09-16 PROCEDURE — 2500000002 HC RX 250 W HCPCS SELF ADMINISTERED DRUGS (ALT 637 FOR MEDICARE OP, ALT 636 FOR OP/ED): Performed by: INTERNAL MEDICINE

## 2024-09-16 PROCEDURE — 2500000001 HC RX 250 WO HCPCS SELF ADMINISTERED DRUGS (ALT 637 FOR MEDICARE OP)

## 2024-09-16 PROCEDURE — 2500000001 HC RX 250 WO HCPCS SELF ADMINISTERED DRUGS (ALT 637 FOR MEDICARE OP): Performed by: NURSE PRACTITIONER

## 2024-09-16 PROCEDURE — 2500000004 HC RX 250 GENERAL PHARMACY W/ HCPCS (ALT 636 FOR OP/ED): Performed by: NURSE PRACTITIONER

## 2024-09-16 PROCEDURE — 82947 ASSAY GLUCOSE BLOOD QUANT: CPT

## 2024-09-16 PROCEDURE — 94660 CPAP INITIATION&MGMT: CPT

## 2024-09-16 PROCEDURE — 80048 BASIC METABOLIC PNL TOTAL CA: CPT

## 2024-09-16 PROCEDURE — 94640 AIRWAY INHALATION TREATMENT: CPT

## 2024-09-16 PROCEDURE — 84132 ASSAY OF SERUM POTASSIUM: CPT | Performed by: NURSE PRACTITIONER

## 2024-09-16 PROCEDURE — 2500000005 HC RX 250 GENERAL PHARMACY W/O HCPCS: Performed by: NURSE PRACTITIONER

## 2024-09-16 PROCEDURE — 2500000004 HC RX 250 GENERAL PHARMACY W/ HCPCS (ALT 636 FOR OP/ED): Performed by: INTERNAL MEDICINE

## 2024-09-16 PROCEDURE — 83880 ASSAY OF NATRIURETIC PEPTIDE: CPT | Performed by: NURSE PRACTITIONER

## 2024-09-16 PROCEDURE — 85027 COMPLETE CBC AUTOMATED: CPT

## 2024-09-16 PROCEDURE — 71045 X-RAY EXAM CHEST 1 VIEW: CPT

## 2024-09-16 RX ORDER — FUROSEMIDE 10 MG/ML
40 INJECTION INTRAMUSCULAR; INTRAVENOUS ONCE
Status: COMPLETED | OUTPATIENT
Start: 2024-09-16 | End: 2024-09-16

## 2024-09-16 ASSESSMENT — COGNITIVE AND FUNCTIONAL STATUS - GENERAL
TURNING FROM BACK TO SIDE WHILE IN FLAT BAD: A LOT
TOILETING: TOTAL
PERSONAL GROOMING: A LOT
DAILY ACTIVITIY SCORE: 13
PERSONAL GROOMING: A LOT
EATING MEALS: A LITTLE
STANDING UP FROM CHAIR USING ARMS: A LOT
HELP NEEDED FOR BATHING: A LOT
DRESSING REGULAR LOWER BODY CLOTHING: A LITTLE
DAILY ACTIVITIY SCORE: 12
MOVING FROM LYING ON BACK TO SITTING ON SIDE OF FLAT BED WITH BEDRAILS: A LITTLE
MOVING TO AND FROM BED TO CHAIR: A LOT
CLIMB 3 TO 5 STEPS WITH RAILING: TOTAL
HELP NEEDED FOR BATHING: TOTAL
CLIMB 3 TO 5 STEPS WITH RAILING: TOTAL
HELP NEEDED FOR BATHING: TOTAL
EATING MEALS: A LITTLE
MOVING FROM LYING ON BACK TO SITTING ON SIDE OF FLAT BED WITH BEDRAILS: A LITTLE
MOBILITY SCORE: 13
TURNING FROM BACK TO SIDE WHILE IN FLAT BAD: A LOT
MOVING TO AND FROM BED TO CHAIR: A LOT
PERSONAL GROOMING: A LOT
STANDING UP FROM CHAIR USING ARMS: A LOT
DAILY ACTIVITIY SCORE: 11
EATING MEALS: A LITTLE
TOILETING: TOTAL
DRESSING REGULAR UPPER BODY CLOTHING: A LITTLE
DRESSING REGULAR UPPER BODY CLOTHING: A LOT
TURNING FROM BACK TO SIDE WHILE IN FLAT BAD: A LITTLE
MOBILITY SCORE: 11
WALKING IN HOSPITAL ROOM: TOTAL
CLIMB 3 TO 5 STEPS WITH RAILING: TOTAL
STANDING UP FROM CHAIR USING ARMS: A LOT
DRESSING REGULAR UPPER BODY CLOTHING: A LOT
DRESSING REGULAR LOWER BODY CLOTHING: A LOT
WALKING IN HOSPITAL ROOM: TOTAL
DRESSING REGULAR LOWER BODY CLOTHING: A LOT
MOVING TO AND FROM BED TO CHAIR: A LOT
TOILETING: TOTAL
MOBILITY SCORE: 11
WALKING IN HOSPITAL ROOM: TOTAL

## 2024-09-16 ASSESSMENT — PAIN SCALES - WONG BAKER: WONGBAKER_NUMERICALRESPONSE: NO HURT

## 2024-09-16 ASSESSMENT — PAIN SCALES - GENERAL
PAINLEVEL_OUTOF10: 0 - NO PAIN
PAINLEVEL_OUTOF10: 0 - NO PAIN

## 2024-09-16 ASSESSMENT — ACTIVITIES OF DAILY LIVING (ADL): LACK_OF_TRANSPORTATION: NO

## 2024-09-16 ASSESSMENT — PAIN - FUNCTIONAL ASSESSMENT: PAIN_FUNCTIONAL_ASSESSMENT: 0-10

## 2024-09-16 NOTE — PROGRESS NOTES
09/16/24 1115   Discharge Planning   Living Arrangements Spouse/significant other   Support Systems Spouse/significant other   Assistance Needed yes   Type of Residence Private residence   Expected Discharge Disposition Home Health   Does the patient need discharge transport arranged? No   Housing Stability   In the last 12 months, was there a time when you were not able to pay the mortgage or rent on time? N   At any time in the past 12 months, were you homeless or living in a shelter (including now)? N   Transportation Needs   In the past 12 months, has lack of transportation kept you from medical appointments or from getting medications? no   In the past 12 months, has lack of transportation kept you from meetings, work, or from getting things needed for daily living? No   Patient Choice   Provider Choice list and CMS website (https://medicare.gov/care-compare#search) for post-acute Quality and Resource Measure Data were provided and reviewed with: Patient     Met with patient at the bedside, confirmed demographics, insurance, pcp is Dr. Srivastava. She lives with her  (her primary care giver) in an apartment. She is wheelchair bound and uses a sliding board to transfer from the bed to the chair. She does assist with simple tasks in the home, light housework and cooking. She uses either a commode or a bedpan. She is currently on 4 lnc and a cpap. [employer] provides her supplies. She is a insulin dependent diabetic and checks her sugars daily.  She was active with [facility] prior to this admission. She was recently at [facility] from June until mid August. She is not able to purchase a medication (unsure what it it is) prescribed by the nephrologist. She states the physician is aware. Therapy is recommending a moderate level intensity with an ampac score of 10/14. Patient wants to return home with [facility]. Will send a referral to home care.

## 2024-09-16 NOTE — CARE PLAN
The patient's goals for the shift include      The clinical goals for the shift include remain afebrile on shift    Over the shift, the patient did not make progress toward the following goals. Barriers to progression include . Recommendations to address these barriers include .    Problem: Grooming  Goal: STG - Patient completes grooming SUP  Outcome: Progressing     Problem: Skin  Goal: Participates in plan/prevention/treatment measures  Outcome: Progressing  Goal: Prevent/manage excess moisture  Outcome: Progressing  Goal: Prevent/minimize sheer/friction injuries  Outcome: Progressing     Problem: Respiratory  Goal: Minimal/no exertional discomfort or dyspnea this shift  Outcome: Progressing  Goal: Patent airway maintained this shift  Outcome: Progressing  Goal: Tolerate mechanical ventilation evidenced by VS/agitation level this shift  Outcome: Progressing  Goal: Tolerate pulmonary toileting this shift  Outcome: Progressing  Goal: Verbalize decreased shortness of breath this shift  Outcome: Progressing  Goal: Wean oxygen to maintain O2 saturation per order/standard this shift  Outcome: Progressing  Goal: Increase self care and/or family involvement in next 24 hours  Outcome: Progressing     Problem: Fall/Injury  Goal: Not fall by end of shift  Outcome: Progressing  Goal: Be free from injury by end of the shift  Outcome: Progressing  Goal: Verbalize understanding of personal risk factors for fall in the hospital  Outcome: Progressing  Goal: Verbalize understanding of risk factor reduction measures to prevent injury from fall in the home  Outcome: Progressing  Goal: Use assistive devices by end of the shift  Outcome: Progressing  Goal: Pace activities to prevent fatigue by end of the shift  Outcome: Progressing     Problem: Pain - Adult  Goal: Verbalizes/displays adequate comfort level or baseline comfort level  Outcome: Progressing     Problem: Safety - Adult  Goal: Free from fall injury  Outcome: Progressing      Problem: Discharge Planning  Goal: Discharge to home or other facility with appropriate resources  Outcome: Progressing

## 2024-09-16 NOTE — PROGRESS NOTES
Physical Therapy                 Therapy Communication Note    Patient Name: Kaylene Feliciano  MRN: [id number]  Department: [facility]  Room: 3119/3119-A  Today's Date: 9/16/2024     Discipline: Physical Therapy    Missed Visit Reason: Missed Visit Reason: Patient refused    Missed Time: Attempt    Comment:Attempted to see patient this am however patient declined asking PTA to return later. Will reattempt time permitting

## 2024-09-16 NOTE — PROGRESS NOTES
"Subjective  Patient had uneventful night does not complain about any chest pain still short of breath requiring 8 L of oxygen  Nursing staff was interviewed  Objectives    Last Recorded Vitals  Blood pressure 141/63, pulse 58, temperature 36.1 °C (97 °F), temperature source Temporal, resp. rate 20, height 1.6 m (5' 3\"), weight 132 kg (292 lb 1.8 oz), SpO2 95%.    Physical Exam  HENT:      Right Ear: External ear normal.      Left Ear: External ear normal.      Mouth/Throat:      Mouth: Mucous membranes are moist.   Cardiovascular:      Rate and Rhythm: Normal rate and regular rhythm.      Heart sounds: No murmur heard.     No friction rub. No gallop.   Pulmonary:      Effort: No accessory muscle usage or respiratory distress.      Breath sounds: No stridor. No wheezing or rhonchi.   Chest:      Chest wall: No tenderness.   Abdominal:      General: There is no distension.      Palpations: There is no mass.      Tenderness: There is no abdominal tenderness. There is no guarding or rebound.   Musculoskeletal:         General: No deformity or signs of injury.      Cervical back: No rigidity or tenderness. Normal range of motion.      Right lower leg: No edema.      Left lower leg: No edema.   Skin:     Coloration: Skin is not jaundiced or pale.      Findings: No lesion.   Neurological:      General: No focal deficit present.      Mental Status: He is alert, oriented to person, place, and time and easily aroused.      Cranial Nerves: No cranial nerve deficit.      Sensory: No sensory deficit.      Motor: No weakness.        Labs    No results displayed because visit has over 200 results.            Imaging          Patient Active Problem List   Diagnosis    Asthma ([facility]-HCC)    Candidiasis of vagina    Chronic bipolar affective disorder (Multi)    Depression    CKD (chronic kidney disease)    Diabetes, polyneuropathy (Multi)    Dysuria    Essential hypertension, benign    Hyperlipidemia    Mental problems    Morbid " obesity (Multi)    Non-compliance    Physical deconditioning    Diabetes mellitus (Multi)    Poorly controlled type 2 diabetes mellitus with complication (Multi)    Proteinuria    Secondary pancreatic insufficiency ([facility]-HCC)    Spinal stenosis, multilevel    Vitamin D deficiency    BMI 45.0-49.9, adult (Multi)    CKD (chronic kidney disease), stage II    Hypoxia    Shortness of breath    Obesity hypoventilation syndrome (Multi)    Pneumonia due to infectious organism    Elevated LFTs    ARCHIE (obstructive sleep apnea)    Chronic respiratory failure with hypoxia and hypercapnia (Multi)    Acute on chronic respiratory failure with hypoxia and hypercapnia (Multi)    Neutrophilic leukocytosis    Diarrhea         Assessment/Plan   Assessment & Plan  Acute on chronic respiratory failure with hypoxia and hypercapnia (Multi)    CKD (chronic kidney disease)    Pneumonia due to infectious organism    ARCHIE (obstructive sleep apnea)    Neutrophilic leukocytosis    Diarrhea      Acute respiratory failure pulmonary on consult continue with antibiotic related to her pneumonia continue with CPAP at night repeat chest x-ray and BNP       I spent 25 minutes in the professional and overall care of this patient.      ANDREW Mccloud MD

## 2024-09-16 NOTE — PROGRESS NOTES
"Kaylene Feliciano is a 69 y.o. female on day 3 of admission presenting with Acute on chronic respiratory failure with hypoxia and hypercapnia (Multi).    Subjective   Ms. Feliciano had no complaints today. She was seen reclining in bed. She plans to get up to chair.       Objective     Physical Exam  Vitals reviewed.   Constitutional:       Appearance: She is obese.   HENT:      Head: Normocephalic.      Nose: Nose normal.      Mouth/Throat:      Mouth: Mucous membranes are moist.   Eyes:      Extraocular Movements: Extraocular movements intact.   Cardiovascular:      Rate and Rhythm: Normal rate and regular rhythm.      Heart sounds: No murmur heard.  Pulmonary:      Breath sounds: Normal breath sounds. No wheezing or rhonchi.   Abdominal:      Palpations: Abdomen is soft.   Musculoskeletal:      Right lower leg: No edema.      Left lower leg: No edema.   Neurological:      General: No focal deficit present.      Mental Status: She is alert and oriented to person, place, and time.         Last Recorded Vitals  Blood pressure 128/57, pulse 67, temperature 36.6 °C (97.9 °F), temperature source Temporal, resp. rate 22, height 1.6 m (5' 3\"), weight 132 kg (292 lb 1.8 oz), SpO2 96%.  Intake/Output last 3 Shifts:  I/O last 3 completed shifts:  In: - (0 mL/kg)   Out: 1050 (7.9 mL/kg) [Urine:1050 (0.2 mL/kg/hr)]  Weight: 132.5 kg     Relevant Results  Scheduled medications  amLODIPine, 10 mg, oral, Daily  aspirin, 81 mg, oral, Daily  atorvastatin, 10 mg, oral, Nightly  azithromycin, 500 mg, intravenous, q24h  budesonide, 0.5 mg, nebulization, BID  cefTRIAXone, 2 g, intravenous, Daily  citalopram, 40 mg, oral, Daily  enoxaparin, 60 mg, subcutaneous, q12h YARELI  gabapentin, 300 mg, oral, BID  insulin glargine, 60 Units, subcutaneous, BID  insulin lispro, 0-10 Units, subcutaneous, q4h  ipratropium-albuteroL, 3 mL, nebulization, TID  methocarbamol, 500 mg, oral, BID  metoprolol succinate XL, 25 mg, oral, Daily  oxygen, , inhalation, " Continuous - Inhalation  pancrelipase (Lip-Prot-Amyl), 1 capsule, oral, TID AC  predniSONE, 20 mg, oral, Daily      Continuous medications     PRN medications  PRN medications: acetaminophen **OR** acetaminophen, dextrose, dextrose, glucagon, glucagon, guaiFENesin, ipratropium-albuteroL, lidocaine, melatonin, oxygen, polyethylene glycol  XR chest 1 view    Result Date: 9/16/2024  Interpreted By:  Zack Hamilton, STUDY: XR CHEST 1 VIEW;  9/16/2024 8:58 am   INDICATION: Signs/Symptoms:hyoxia, increased oxygen demand.   COMPARISON: 09/12/2024   ACCESSION NUMBER(S): [id number]   ORDERING CLINICIAN: CRYSTAL JOHNSON   FINDINGS: Diffuse hazy opacification of the lungs may be slightly more dense than prior exam. Underlying pleural effusions may be present. Cardiomediastinal silhouette unchanged. Upper chest inter devices are present.       Diffuse hazy opacification of the lungs may be slightly worse than 4 days ago.   MACRO: None   Signed by: Zack Hamilton 9/16/2024 9:24 AM Dictation workstation:   [id number]   Results for orders placed or performed during the hospital encounter of 09/12/24 (from the past 24 hour(s))   POCT GLUCOSE   Result Value Ref Range    POCT Glucose 381 (H) 74 - 99 mg/dL   POCT GLUCOSE   Result Value Ref Range    POCT Glucose 347 (H) 74 - 99 mg/dL   POCT GLUCOSE   Result Value Ref Range    POCT Glucose 225 (H) 74 - 99 mg/dL   POCT GLUCOSE   Result Value Ref Range    POCT Glucose 106 (H) 74 - 99 mg/dL   Basic metabolic panel   Result Value Ref Range    Glucose 96 74 - 99 mg/dL    Sodium 138 136 - 145 mmol/L    Potassium 4.0 3.5 - 5.3 mmol/L    Chloride 100 98 - 107 mmol/L    Bicarbonate 32 21 - 32 mmol/L    Anion Gap 10 10 - 20 mmol/L    Urea Nitrogen 47 (H) 6 - 23 mg/dL    Creatinine 1.07 (H) 0.50 - 1.05 mg/dL    eGFR 56 (L) >60 mL/min/1.73m*2    Calcium 9.0 8.6 - 10.3 mg/dL   CBC   Result Value Ref Range    WBC 12.1 (H) 4.4 - 11.3 x10*3/uL    nRBC 0.0 0.0 - 0.0 /100 WBCs    RBC 3.37 (L) 4.00 - 5.20  x10*6/uL    Hemoglobin 8.7 (L) 12.0 - 16.0 g/dL    Hematocrit 27.9 (L) 36.0 - 46.0 %    MCV 83 80 - 100 fL    MCH 25.8 (L) 26.0 - 34.0 pg    MCHC 31.2 (L) 32.0 - 36.0 g/dL    RDW 16.9 (H) 11.5 - 14.5 %    Platelets 272 150 - 450 x10*3/uL   Magnesium   Result Value Ref Range    Magnesium 2.36 1.60 - 2.40 mg/dL   B-Type Natriuretic Peptide   Result Value Ref Range     (H) 0 - 99 pg/mL   POCT GLUCOSE   Result Value Ref Range    POCT Glucose 101 (H) 74 - 99 mg/dL   POCT GLUCOSE   Result Value Ref Range    POCT Glucose 118 (H) 74 - 99 mg/dL   POCT GLUCOSE   Result Value Ref Range    POCT Glucose 224 (H) 74 - 99 mg/dL         Assessment/Plan   Assessment & Plan  Acute on chronic respiratory failure with hypoxia and hypercapnia (Multi)    CKD (chronic kidney disease)    Pneumonia due to infectious organism    ARCHIE (obstructive sleep apnea)    Neutrophilic leukocytosis    Diarrhea      Ms. Feliciano is a 69F PMH chronic hypercapnia and hypoxia 4L, ARCHIE, asthma, HTN, HLD, DM2, HF, CKD, bipolar disorder, depression admitted with SOB and hypoxia.   Consult for dyspnea    #Dyspnea  #Acute on chronic hypoxic and hypercapnic respiratory failure  #Heart failure  #ARCHIE  #Obesity  #OHS    Patient with likely OHS, ARCHIE. Will need awake ABG to fully assess and further sleep follow up.   Discussed with patient that NIPPV helps to ventilate given habitus. Continue to use NIPPV at bedtime  Recommend up out of bed to chairx3, PT/OT  CT images show dilated vasculature also but has had ok RV function on TTE. Patient is at risk for pHTN but without RHC unable to diagnose.   Patient should follow up with pulmonary at discharge.   Continue to wean steorids  Diuresis per primary team but patient reports improved volume status    Pulmonary to sign off    I spent 40 minutes in the professional and overall care of this patient.      Erica Sifuentes MD

## 2024-09-17 ENCOUNTER — APPOINTMENT (OUTPATIENT)
Dept: NEPHROLOGY | Facility: CLINIC | Age: 69
End: 2024-09-17
Payer: MEDICARE

## 2024-09-17 LAB
ANION GAP SERPL CALC-SCNC: 11 MMOL/L (ref 10–20)
BACTERIA BLD CULT: NORMAL
BACTERIA BLD CULT: NORMAL
BUN SERPL-MCNC: 36 MG/DL (ref 6–23)
CALCIUM SERPL-MCNC: 8.9 MG/DL (ref 8.6–10.3)
CHLORIDE SERPL-SCNC: 98 MMOL/L (ref 98–107)
CO2 SERPL-SCNC: 34 MMOL/L (ref 21–32)
CREAT SERPL-MCNC: 0.84 MG/DL (ref 0.5–1.05)
EGFRCR SERPLBLD CKD-EPI 2021: 75 ML/MIN/1.73M*2
ERYTHROCYTE [DISTWIDTH] IN BLOOD BY AUTOMATED COUNT: 16.9 % (ref 11.5–14.5)
GLUCOSE BLD MANUAL STRIP-MCNC: 141 MG/DL (ref 74–99)
GLUCOSE BLD MANUAL STRIP-MCNC: 143 MG/DL (ref 74–99)
GLUCOSE BLD MANUAL STRIP-MCNC: 146 MG/DL (ref 74–99)
GLUCOSE BLD MANUAL STRIP-MCNC: 259 MG/DL (ref 74–99)
GLUCOSE BLD MANUAL STRIP-MCNC: 375 MG/DL (ref 74–99)
GLUCOSE BLD MANUAL STRIP-MCNC: 57 MG/DL (ref 74–99)
GLUCOSE BLD MANUAL STRIP-MCNC: 76 MG/DL (ref 74–99)
GLUCOSE BLD MANUAL STRIP-MCNC: 85 MG/DL (ref 74–99)
GLUCOSE SERPL-MCNC: 54 MG/DL (ref 74–99)
HCT VFR BLD AUTO: 30.4 % (ref 36–46)
HGB BLD-MCNC: 9.1 G/DL (ref 12–16)
MAGNESIUM SERPL-MCNC: 2.14 MG/DL (ref 1.6–2.4)
MCH RBC QN AUTO: 25.1 PG (ref 26–34)
MCHC RBC AUTO-ENTMCNC: 29.9 G/DL (ref 32–36)
MCV RBC AUTO: 84 FL (ref 80–100)
NRBC BLD-RTO: 0 /100 WBCS (ref 0–0)
PLATELET # BLD AUTO: 260 X10*3/UL (ref 150–450)
POTASSIUM SERPL-SCNC: 3.7 MMOL/L (ref 3.5–5.3)
RBC # BLD AUTO: 3.63 X10*6/UL (ref 4–5.2)
SODIUM SERPL-SCNC: 139 MMOL/L (ref 136–145)
WBC # BLD AUTO: 10.5 X10*3/UL (ref 4.4–11.3)

## 2024-09-17 PROCEDURE — 2500000001 HC RX 250 WO HCPCS SELF ADMINISTERED DRUGS (ALT 637 FOR MEDICARE OP): Performed by: NURSE PRACTITIONER

## 2024-09-17 PROCEDURE — 2500000005 HC RX 250 GENERAL PHARMACY W/O HCPCS: Performed by: NURSE PRACTITIONER

## 2024-09-17 PROCEDURE — 97535 SELF CARE MNGMENT TRAINING: CPT | Mod: GO,CO

## 2024-09-17 PROCEDURE — 94660 CPAP INITIATION&MGMT: CPT

## 2024-09-17 PROCEDURE — 97530 THERAPEUTIC ACTIVITIES: CPT | Mod: GP,CQ

## 2024-09-17 PROCEDURE — 2500000004 HC RX 250 GENERAL PHARMACY W/ HCPCS (ALT 636 FOR OP/ED): Performed by: NURSE PRACTITIONER

## 2024-09-17 PROCEDURE — 2500000002 HC RX 250 W HCPCS SELF ADMINISTERED DRUGS (ALT 637 FOR MEDICARE OP, ALT 636 FOR OP/ED)

## 2024-09-17 PROCEDURE — 2500000002 HC RX 250 W HCPCS SELF ADMINISTERED DRUGS (ALT 637 FOR MEDICARE OP, ALT 636 FOR OP/ED): Performed by: NURSE PRACTITIONER

## 2024-09-17 PROCEDURE — 2500000004 HC RX 250 GENERAL PHARMACY W/ HCPCS (ALT 636 FOR OP/ED): Performed by: INTERNAL MEDICINE

## 2024-09-17 PROCEDURE — 2500000004 HC RX 250 GENERAL PHARMACY W/ HCPCS (ALT 636 FOR OP/ED)

## 2024-09-17 PROCEDURE — 36415 COLL VENOUS BLD VENIPUNCTURE: CPT

## 2024-09-17 PROCEDURE — 83735 ASSAY OF MAGNESIUM: CPT

## 2024-09-17 PROCEDURE — 94640 AIRWAY INHALATION TREATMENT: CPT

## 2024-09-17 PROCEDURE — 85027 COMPLETE CBC AUTOMATED: CPT

## 2024-09-17 PROCEDURE — 1200000002 HC GENERAL ROOM WITH TELEMETRY DAILY

## 2024-09-17 PROCEDURE — 80048 BASIC METABOLIC PNL TOTAL CA: CPT

## 2024-09-17 PROCEDURE — 82947 ASSAY GLUCOSE BLOOD QUANT: CPT

## 2024-09-17 PROCEDURE — 2500000002 HC RX 250 W HCPCS SELF ADMINISTERED DRUGS (ALT 637 FOR MEDICARE OP, ALT 636 FOR OP/ED): Performed by: INTERNAL MEDICINE

## 2024-09-17 RX ORDER — METHYLPREDNISOLONE 4 MG/1
TABLET ORAL
Qty: 21 TABLET | Refills: 0 | Status: SHIPPED | OUTPATIENT
Start: 2024-09-17 | End: 2024-09-24

## 2024-09-17 RX ORDER — INSULIN GLARGINE 100 [IU]/ML
50 INJECTION, SOLUTION SUBCUTANEOUS 2 TIMES DAILY
Qty: 30 ML | Refills: 0 | Status: SHIPPED | OUTPATIENT
Start: 2024-09-17 | End: 2024-10-17

## 2024-09-17 RX ORDER — INSULIN GLARGINE 100 [IU]/ML
50 INJECTION, SOLUTION SUBCUTANEOUS 2 TIMES DAILY
Status: DISCONTINUED | OUTPATIENT
Start: 2024-09-17 | End: 2024-09-18 | Stop reason: HOSPADM

## 2024-09-17 ASSESSMENT — COGNITIVE AND FUNCTIONAL STATUS - GENERAL
PERSONAL GROOMING: A LOT
HELP NEEDED FOR BATHING: TOTAL
CLIMB 3 TO 5 STEPS WITH RAILING: TOTAL
EATING MEALS: A LITTLE
TOILETING: A LOT
DAILY ACTIVITIY SCORE: 15
STANDING UP FROM CHAIR USING ARMS: A LOT
PERSONAL GROOMING: A LOT
STANDING UP FROM CHAIR USING ARMS: A LOT
DRESSING REGULAR LOWER BODY CLOTHING: A LITTLE
MOVING FROM LYING ON BACK TO SITTING ON SIDE OF FLAT BED WITH BEDRAILS: A LITTLE
DRESSING REGULAR LOWER BODY CLOTHING: A LOT
DRESSING REGULAR LOWER BODY CLOTHING: A LITTLE
DRESSING REGULAR UPPER BODY CLOTHING: A LITTLE
DRESSING REGULAR UPPER BODY CLOTHING: A LITTLE
MOVING TO AND FROM BED TO CHAIR: A LOT
STANDING UP FROM CHAIR USING ARMS: A LOT
WALKING IN HOSPITAL ROOM: A LOT
MOBILITY SCORE: 13
MOBILITY SCORE: 13
CLIMB 3 TO 5 STEPS WITH RAILING: TOTAL
MOVING TO AND FROM BED TO CHAIR: A LOT
TOILETING: TOTAL
DAILY ACTIVITIY SCORE: 15
DAILY ACTIVITIY SCORE: 13
TURNING FROM BACK TO SIDE WHILE IN FLAT BAD: A LITTLE
EATING MEALS: A LITTLE
CLIMB 3 TO 5 STEPS WITH RAILING: TOTAL
MOBILITY SCORE: 13
WALKING IN HOSPITAL ROOM: TOTAL
TURNING FROM BACK TO SIDE WHILE IN FLAT BAD: A LITTLE
HELP NEEDED FOR BATHING: A LOT
MOVING TO AND FROM BED TO CHAIR: A LOT
TOILETING: TOTAL
DRESSING REGULAR UPPER BODY CLOTHING: A LITTLE
WALKING IN HOSPITAL ROOM: TOTAL
TURNING FROM BACK TO SIDE WHILE IN FLAT BAD: A LITTLE
PERSONAL GROOMING: A LITTLE
HELP NEEDED FOR BATHING: A LOT

## 2024-09-17 ASSESSMENT — PAIN SCALES - GENERAL
PAINLEVEL_OUTOF10: 0 - NO PAIN

## 2024-09-17 ASSESSMENT — ACTIVITIES OF DAILY LIVING (ADL)
HOME_MANAGEMENT_TIME_ENTRY: 23
EFFECT OF PAIN ON DAILY ACTIVITIES: .

## 2024-09-17 ASSESSMENT — PAIN - FUNCTIONAL ASSESSMENT
PAIN_FUNCTIONAL_ASSESSMENT: 0-10
PAIN_FUNCTIONAL_ASSESSMENT: 0-10

## 2024-09-17 ASSESSMENT — PAIN SCALES - WONG BAKER: WONGBAKER_NUMERICALRESPONSE: NO HURT

## 2024-09-17 NOTE — NURSING NOTE
Glucose at AM check was 57. Patient given apple juice. Patient states she feels fine. Will recheck glucose in 15 minutes. Will continue to monitor patient.

## 2024-09-17 NOTE — PROGRESS NOTES
Physical Therapy    Physical Therapy    Physical Therapy Treatment    Patient Name: Kaylene Feliciano  MRN: [id number]  Today's Date: 9/17/2024  Time Calculation  Start Time: 1404  Stop Time: 1428  Time Calculation (min): 24 min     3119/3119-A       09/17/24 1404   PT  Visit   PT Received On 09/17/24   Response to Previous Treatment Patient with no complaints from previous session.   General   Reason for Referral ADLs, discharge plannng   Referred By Dr. Mccloud   Prior to Session Communication Bedside nurse   Patient Position Received Bed, 3 rail up;Alarm on   General Comment Pt is pleasant and agreeable to therapy, cleared for participation.   Precautions   Medical Precautions Fall precautions   Vital Signs   SpO2   (91-95%)   Pain Assessment   Pain Assessment 0-10   0-10 (Numeric) Pain Score 0 - No pain   Effect of Pain on Daily Activities .   Cognition   Overall Cognitive Status WFL   Orientation Level Oriented X4   Therapeutic Exercise   Therapeutic Exercise Performed Yes   Therapeutic Exercise Activity 1 AP/LAQ/marching 2x10   Bed Mobility   Bed Mobility Yes   Bed Mobility 1   Bed Mobility 1 Supine to sitting   Level of Assistance 1 Contact guard;Minimal verbal cues;+2   Bed Mobility Comments 1 HOB elevated with use of bed rails   Transfers   Transfer Yes   Transfer 1   Transfer From 1 Bed to   Transfer to 1 Chair with arms   Technique 1 Sit to stand;Stand to sit   Transfer Device 1 Larry Stedy;Gait belt   Transfer Level of Assistance 1 Minimum assistance;Minimal verbal cues;+2   Trials/Comments 1 Pt put forth good effort with use of larry stedy. Min cues for technqiue and safety with good follow through.   Other Activity   Other Activity Performed Yes   Other Activity 1 seated activites at EOB, good sitting balance.   Activity Tolerance   Endurance Tolerates 10 - 20 min exercise with multiple rests   PT Assessment   PT Assessment Results Decreased strength;Decreased endurance;Impaired balance;Decreased mobility    Rehab Prognosis Good   Evaluation/Treatment Tolerance Patient tolerated treatment well   End of Session Communication Bedside nurse   Assessment Comment Pt put forth good effort. MinAx2 for transfers with larry degroot.   End of Session Patient Position Up in chair;Alarm on     Outcome Measures:  [facility] Basic Mobility  Turning from your back to your side while in a flat bed without using bedrails: A little  Moving from lying on your back to sitting on the side of a flat bed without using bedrails: A little  Moving to and from bed to chair (including a wheelchair): A lot  Standing up from a chair using your arms (e.g. wheelchair or bedside chair): A lot  To walk in hospital room: A lot  Climbing 3-5 steps with railing: Total  Basic Mobility - Total Score: 13                             EDUCATION:  Outpatient Education  Individual(s) Educated: Patient  Education Provided: Fall Risk  Education Documentation  Handouts, taught by Ce Malin PTA at 9/17/2024  2:58 PM.  Learner: Patient  Readiness: Acceptance  Method: Explanation, Demonstration  Response: Verbalizes Understanding, Demonstrated Understanding    Precautions, taught by Ce Malin PTA at 9/17/2024  2:58 PM.  Learner: Patient  Readiness: Acceptance  Method: Explanation, Demonstration  Response: Verbalizes Understanding, Demonstrated Understanding    Body Mechanics, taught by Ce Malin PTA at 9/17/2024  2:58 PM.  Learner: Patient  Readiness: Acceptance  Method: Explanation, Demonstration  Response: Verbalizes Understanding, Demonstrated Understanding    Home Exercise Program, taught by Ce Malin PTA at 9/17/2024  2:58 PM.  Learner: Patient  Readiness: Acceptance  Method: Explanation, Demonstration  Response: Verbalizes Understanding, Demonstrated Understanding    Mobility Training, taught by Ce Malin PTA at 9/17/2024  2:58 PM.  Learner: Patient  Readiness: Acceptance  Method: Explanation, Demonstration  Response: Verbalizes Understanding,  Demonstrated Understanding    Education Comments  No comments found.        GOALS:  Encounter Problems       Encounter Problems (Active)       Mobility       Pt will complete supine, seated and standing exercises to maintain/improve overall strength with minimal verbal cues.         Start:  09/13/24    Expected End:  09/27/24            Pt will maintain > 92% SpO2 with functional mobility activities without evidence of SOB.       Start:  09/13/24    Expected End:  09/27/24            Pt will be able to stand with FWW max A for ~1 min with upright posture and good safety awareness.       Start:  09/13/24    Expected End:  09/27/24                   PT Transfers       Pt will be able to complete all transfers with slide board min A demonstrating good safety awareness and proper body mechanics.        Start:  09/13/24    Expected End:  09/27/24            Pt will be able to complete all bed mobility tasks mod I.        Start:  09/13/24    Expected End:  09/27/24               Pain - Adult

## 2024-09-17 NOTE — CARE PLAN
Problem: Skin  Goal: Participates in plan/prevention/treatment measures  9/17/2024 0512 by Kristin Collado RN  Outcome: Progressing  9/17/2024 0509 by Kristin Collado RN  Outcome: Progressing  Goal: Prevent/manage excess moisture  9/17/2024 0512 by Kristin Collado RN  Outcome: Progressing  Flowsheets (Taken 9/17/2024 0512)  Prevent/manage excess moisture:   Cleanse incontinence/protect with barrier cream   Moisturize dry skin  9/17/2024 0509 by Kristin Collado RN  Outcome: Progressing  Goal: Prevent/minimize sheer/friction injuries  9/17/2024 0512 by Kristin Collado RN  Outcome: Progressing  9/17/2024 0509 by Kristin Collado RN  Outcome: Progressing     Problem: Respiratory  Goal: Minimal/no exertional discomfort or dyspnea this shift  Outcome: Progressing  Goal: Patent airway maintained this shift  Outcome: Progressing  Goal: Verbalize decreased shortness of breath this shift  Outcome: Progressing  Goal: Wean oxygen to maintain O2 saturation per order/standard this shift  Outcome: Progressing  Goal: Increase self care and/or family involvement in next 24 hours  Outcome: Progressing     Problem: Pain - Adult  Goal: Verbalizes/displays adequate comfort level or baseline comfort level  Outcome: Progressing     Problem: Safety - Adult  Goal: Free from fall injury  Outcome: Progressing     Problem: Diabetes  Goal: Achieve decreasing blood glucose levels by end of shift  Outcome: Progressing  Goal: Increase stability of blood glucose readings by end of shift  Outcome: Progressing  Goal: Decrease in ketones present in urine by end of shift  Outcome: Progressing  Goal: Maintain electrolyte levels within acceptable range throughout shift  Outcome: Progressing  Goal: Maintain glucose levels >70mg/dl to <250mg/dl throughout shift  Outcome: Progressing  Goal: No changes in neurological exam by end of shift  Outcome: Progressing  Goal: Learn about and adhere to nutrition recommendations by end of shift  Outcome: Progressing  Goal: Vital  signs within normal range for age by end of shift  Outcome: Progressing  Goal: Increase self care and/or family involovement by end of shift  Outcome: Progressing  Goal: Receive DSME education by end of shift  Outcome: Progressing     Problem: Fall/Injury  Goal: Not fall by end of shift  Outcome: Progressing  Goal: Be free from injury by end of the shift  Outcome: Progressing  Goal: Verbalize understanding of personal risk factors for fall in the hospital  Outcome: Progressing  Goal: Verbalize understanding of risk factor reduction measures to prevent injury from fall in the home  Outcome: Progressing  Goal: Use assistive devices by end of the shift  Outcome: Progressing  Goal: Pace activities to prevent fatigue by end of the shift  Outcome: Progressing   The patient's goals for the shift include      The clinical goals for the shift include pts.bs will be less 250 (no sob)

## 2024-09-17 NOTE — DOCUMENTATION CLARIFICATION NOTE
"    PATIENT:               TEJINDER GONZALES  ACCT #:                  [id number]  MRN:                       [id number]  :                       1955  ADMIT DATE:       2024 10:53 PM  DISCH DATE:  RESPONDING PROVIDER #:        [id number]          PROVIDER RESPONSE TEXT:    Chronic Diastolic Congestive Heart Failure    CDI QUERY TEXT:    Clarification        Instruction:    Based on your assessment of the patient and the clinical information, please provide the requested documentation by clicking on the appropriate radio button and enter any additional information if prompted.    Question: Please further clarify the type and acuity of congestive heart failure    When answering this query, please exercise your independent professional judgment. The fact that a question is being asked, does not imply that any particular answer is desired or expected.    The patient's clinical indicators include:  Clinical Information: 70 yo female admitted with Pneumonia and CHF    Clinical Indicators:   vital signs: T36.6 HR 77 R22 /72 93 percent NRB      Consult note:\" Acute decompensated diastolic cardiomyopathy\"   Echo:\"  1. Left ventricular systolic function is normal.  2. Spectral Doppler shows an impaired relaxation pattern of left ventricular diastolic filling.  3. Mild LVH with normal EF of 55-60 percent.\"   chest x-ray:\" Impression:  Enlarged cardiac silhouette and prominent interstitial markings  suggesting pulmonary edema. Patchy airspace opacities overlying the  mid and lower aspect of the lungs bilaterally, which may represent  atelectasis or pneumonia in the appropriate clinical setting. Small  to moderate left pleural effusion. \"  ED note;'Pulmonary:  Effort: Accessory muscle usage and respiratory distress present.  Comments: Exam limited due to body habitus however sounds diminished bilaterally particularly in the bases.  No audible wheezes, rales, or rhonchi. \"    Treatment:  Lasix " 40 mg IV once 9/13 and 9/16    Risk Factors: Pneumonia, acute resp. failure, Diastolic HF, HTN, CKD, DM, Obesity  Options provided:  -- Acute Diastolic Congestive Heart Failure  -- Acute on Chronic Diastolic Congestive Heart Failure  -- Chronic Diastolic Congestive Heart Failure  -- Other - I will add my own diagnosis  -- Refer to Clinical Documentation Reviewer    Query created by: Neli Bergman on 9/16/2024 12:07 PM      Electronically signed by:  ANDREW QUINN MD 9/17/2024 8:55 AM           .

## 2024-09-17 NOTE — PROGRESS NOTES
09/17/24 0925   Discharge Planning   Expected Discharge Disposition SNF   Patient Choice   Patient / Family choosing to utilize agency / facility established prior to hospitalization Yes     Per nursing, patient requesting to talk to case management for d/c planning. Met at bedside to discuss d/c plan with patient. Patient states she now would like to go to SNF and that [facility] stating she has been there in the past. Message sent to [facility] to request to send new SNF referral. CT team to follow.     1120: [facility]  unable to accept. Patient updated and new SNF list provided. CT team to follow up for choices.

## 2024-09-17 NOTE — CARE PLAN
The patient's goals for the shift include      The clinical goals for the shift include pts.bs will be less 250 (no sob)    Over the shift, the patient did not make progress toward the following goals. Barriers to progression include . Recommendations to address these barriers include .    Problem: Grooming  Goal: STG - Patient completes grooming SUP  Outcome: Progressing     Problem: Skin  Goal: Participates in plan/prevention/treatment measures  Outcome: Progressing  Goal: Prevent/manage excess moisture  Outcome: Progressing  Goal: Prevent/minimize sheer/friction injuries  Outcome: Progressing     Problem: Respiratory  Goal: Minimal/no exertional discomfort or dyspnea this shift  Outcome: Progressing  Goal: Patent airway maintained this shift  Outcome: Progressing  Goal: Tolerate mechanical ventilation evidenced by VS/agitation level this shift  Outcome: Progressing  Goal: Tolerate pulmonary toileting this shift  Outcome: Progressing  Goal: Verbalize decreased shortness of breath this shift  Outcome: Progressing  Goal: Wean oxygen to maintain O2 saturation per order/standard this shift  Outcome: Progressing  Goal: Increase self care and/or family involvement in next 24 hours  Outcome: Progressing     Problem: Fall/Injury  Goal: Not fall by end of shift  Outcome: Progressing  Goal: Be free from injury by end of the shift  Outcome: Progressing  Goal: Verbalize understanding of personal risk factors for fall in the hospital  Outcome: Progressing  Goal: Verbalize understanding of risk factor reduction measures to prevent injury from fall in the home  Outcome: Progressing  Goal: Use assistive devices by end of the shift  Outcome: Progressing  Goal: Pace activities to prevent fatigue by end of the shift  Outcome: Progressing

## 2024-09-17 NOTE — PROGRESS NOTES
Occupational Therapy    OT Treatment    Patient Name: Kaylene Feliciano  MRN: [id number]  Today's Date: 9/17/2024  Time Calculation  Start Time: 1359  Stop Time: 1422  Time Calculation (min): 23 min       3119/3119-A    Assessment:  End of Session Communication: Bedside nurse  End of Session Patient Position: Up in chair, Alarm on       Plan:  OT Frequency: 3 times per week  OT Discharge Recommendations: Low intensity level of continued care, Moderate intensity level of continued care     Subjective      09/17/24 1400   OT Last Visit   OT Received On 09/17/24   General   Reason for Referral ADLs, discharge plannng   Referred By Dr. Mccloud   Prior to Session Communication Bedside nurse   Patient Position Received Bed, 3 rail up;Alarm on   General Comment Pt is pleasant, cooperative and agreeable to tx; cleared for participation.  Pacing and rest breaks needed, monitoring SpO2.    Precautions   Medical Precautions Fall precautions;Oxygen therapy device and L/min   Precautions Comment 5LO2 via NC   Vital Signs   Vitals Session During OT   Heart Rate 86   Heart Rate Source Monitor   SpO2   (91-95% during activity)   Pain Assessment   0-10 (Numeric) Pain Score 0 - No pain   Cognition   Orientation Level Oriented X4   Grooming   Grooming Level of Assistance Distant supervision   Grooming Where Assessed Edge of bed   Grooming Comments Pt completed oral care while seated EOB with setup of supplies. Pt usually completes from bed or w/c level with setup while at home.   LE Dressing   LE Dressing   Pt Dep to  don/doff socks while seated EOB. Pt reports  assists with LB dressing.   Bed Mobility 1   Bed Mobility 1 Supine to sitting   Level of Assistance 1 Contact guard   Bed Mobility Comments 1 HOB elevated, use of bed rails, cues for breathing throughout exertion.   Transfer 1   Transfer From 1 Bed to   Transfer to 1 Chair with arms   Technique 1 Sit to stand;Stand to sit   Transfer Device 1 Milly Stedy;Gait belt   Transfer  Level of Assistance 1 Minimum assistance;+2   Trials/Comments 1 STS from bed>larry stedy with Min A x2, good effort. Stand>sit larry stedy>recliner with Min A x2   Static Sitting Balance   Static Sitting-Balance Support Bilateral upper extremity supported;Feet supported   Static Sitting-Level of Assistance Distant supervision   Dynamic Sitting Balance   Dynamic Sitting-Balance Support No upper extremity supported;Feet supported   Dynamic Sitting-Level of Assistance Close supervision;Distant supervision   Dynamic Sitting-Balance Reaching across midline;Reaching for objects;Trunk control activities   IP OT Assessment   End of Session Communication Bedside nurse   End of Session Patient Position Up in chair;Alarm on   Inpatient Plan   OT Frequency 3 times per week   OT Discharge Recommendations Low intensity level of continued care;Moderate intensity level of continued care     Outcome Measures:[facility] Daily Activity  Putting on and taking off regular lower body clothing: A lot  Bathing (including washing, rinsing, drying): A lot  Putting on and taking off regular upper body clothing: A little  Toileting, which includes using toilet, bedpan or urinal: A lot  Taking care of personal grooming such as brushing teeth: A little  Eating Meals: A little  Daily Activity - Total Score: 15  Education Documentation  No documentation found.  Education Comments  No comments found.            Goals:  Encounter Problems       Encounter Problems (Active)       Functional Balance       LTG - Patient will maintainsitting balance to allow for completion of daily activities (Progressing)       Start:  09/13/24    Expected End:  09/27/24               Grooming       STG - Patient completes grooming SUP (Progressing)       Start:  09/13/24    Expected End:  09/27/24               OT Transfers       STG - Patient will perform bed mobility SUP (Progressing)       Start:  09/13/24    Expected End:  09/27/24            STG - Patient will transfer  sit to and from stand min assist (Progressing)       Start:  09/13/24    Expected End:  09/27/24

## 2024-09-17 NOTE — PROGRESS NOTES
09/17/24 0913   Discharge Planning   Expected Discharge Disposition Home Heal     Message sent to [facility] requesting external HHC orders. Once received will send them to [facility] via [employer].

## 2024-09-17 NOTE — CARE PLAN
The patient's goals for the shift include      The clinical goals for the shift include pts.bs will be less 250 (no sob)    Problem: Skin  Goal: Participates in plan/prevention/treatment measures  Outcome: Progressing  Goal: Prevent/manage excess moisture  Outcome: Progressing  Goal: Prevent/minimize sheer/friction injuries  Outcome: Progressing     Problem: Respiratory  Goal: Minimal/no exertional discomfort or dyspnea this shift  Outcome: Progressing  Goal: Patent airway maintained this shift  Outcome: Progressing  Goal: Verbalize decreased shortness of breath this shift  Outcome: Progressing  Goal: Wean oxygen to maintain O2 saturation per order/standard this shift  Outcome: Progressing  Goal: Increase self care and/or family involvement in next 24 hours  Outcome: Progressing     Problem: Pain - Adult  Goal: Verbalizes/displays adequate comfort level or baseline comfort level  Outcome: Progressing     Problem: Safety - Adult  Goal: Free from fall injury  Outcome: Progressing     Problem: Diabetes  Goal: Achieve decreasing blood glucose levels by end of shift  Outcome: Progressing  Goal: Increase stability of blood glucose readings by end of shift  Outcome: Progressing  Goal: Decrease in ketones present in urine by end of shift  Outcome: Progressing  Goal: Maintain electrolyte levels within acceptable range throughout shift  Outcome: Progressing  Goal: Maintain glucose levels >70mg/dl to <250mg/dl throughout shift  Outcome: Progressing  Goal: No changes in neurological exam by end of shift  Outcome: Progressing  Goal: Learn about and adhere to nutrition recommendations by end of shift  Outcome: Progressing  Goal: Vital signs within normal range for age by end of shift  Outcome: Progressing  Goal: Increase self care and/or family involovement by end of shift  Outcome: Progressing  Goal: Receive DSME education by end of shift  Outcome: Progressing     Problem: Fall/Injury  Goal: Not fall by end of shift  Outcome:  Progressing  Goal: Be free from injury by end of the shift  Outcome: Progressing  Goal: Verbalize understanding of personal risk factors for fall in the hospital  Outcome: Progressing  Goal: Verbalize understanding of risk factor reduction measures to prevent injury from fall in the home  Outcome: Progressing  Goal: Use assistive devices by end of the shift  Outcome: Progressing  Goal: Pace activities to prevent fatigue by end of the shift  Outcome: Progressing

## 2024-09-17 NOTE — NURSING NOTE
Pt. Slept fairly . Turn herself to sdes. C pap maintained denies c/o pain. No sob, latest blood sugar =85.

## 2024-09-17 NOTE — DOCUMENTATION CLARIFICATION NOTE
"    PATIENT:               TEJINDER GONZALES  ACCT #:                  [id number]  MRN:                       [id number]  :                       1955  ADMIT DATE:       2024 10:53 PM  DISCH DATE:  RESPONDING PROVIDER #:        [id number]          PROVIDER RESPONSE TEXT:    no acute respiratory failure    CDI QUERY TEXT:    Clarification        Instruction:    Based on your assessment of the patient and the clinical information, please provide the requested documentation by clicking on the appropriate radio button and enter any additional information if prompted.    Question: Please further clarify if a relationship exists between the Sepsis and acute organ dysfunction    When answering this query, please exercise your independent professional judgment. The fact that a question is being asked, does not imply that any particular answer is desired or expected.    The patient's clinical indicators include:  Clinical Information: 70 yo female admitted with Pneumonia and CHF    Clinical Indicators:   vital signs: T36.6 HR 77 R34 /72 93 percent NRB   WBC 16 Lactate 0.8, BC no growth to date   procalcitonin 0.41  ED note: 'Sepsis with acute hypercapnic respiratory failure without septic shock, due to unspecified organism\"   chest x-ray:\" Impression:  Enlarged cardiac silhouette and prominent interstitial markings  suggesting pulmonary edema. Patchy airspace opacities overlying the  mid and lower aspect of the lungs bilaterally, which may represent  atelectasis or pneumonia in the appropriate clinical setting. Small  to moderate left pleural effusion. \"    Treatment:  Rocephin 2 gm IV Daily -current  Azithromycin 500 mg IV Q 24 hrs -current   ml bolus     Risk Factors: Pneumonia, acute resp. failure, Diastolic HF, HTN, CKD, DM, Obesity  Options provided:  -- Sepsis with respiratory organ dysfunction of acute hypoxic and hypercapnic respiratory failure  -- Other - I will add my own " diagnosis  -- Refer to Clinical Documentation Reviewer    Query created by: Neli Bergman on 9/16/2024 12:16 PM      Electronically signed by:  ANDREW QUINN MD 9/17/2024 8:55 AM

## 2024-09-17 NOTE — PROGRESS NOTES
Met with pt for follow up snf choice and she is requesting [facility].  Request to [facility] to send referral.

## 2024-09-17 NOTE — DISCHARGE SUMMARY
Discharge Diagnosis  Acute on chronic respiratory failure with hypoxia and hypercapnia (Multi)    Issues Requiring Follow-Up  Discharge to skilled nursing facility    Discharge Meds     Medication List      CONTINUE taking these medications     amLODIPine 5 mg tablet; Commonly known as: Norvasc; Take 2 tablets (10   mg) by mouth once daily.   apple cider vinegar 300 mg tablet   aspirin 81 mg chewable tablet   atorvastatin 10 mg tablet; Commonly known as: Lipitor   citalopram 40 mg tablet; Commonly known as: CeleXA   cranberry 400 mg capsule   Creon 6,000-19,000 -30,000 unit capsule; Generic drug: pancrelipase   (Lip-Prot-Amyl)   gabapentin 300 mg capsule; Commonly known as: Neurontin   insulin lispro 100 unit/mL injection; Commonly known as: HumaLOG   ipratropium-albuteroL 0.5-2.5 mg/3 mL nebulizer solution; Commonly known   as: Duo-Neb   lidocaine 4 % patch   menthol-zinc oxide 0.44-20.6 % ointment; Commonly known as: Calmoseptine   - Risamine; Apply 1 Application topically 4 times a day as needed   (moisture associated skin damage).   methocarbamol 500 mg tablet; Commonly known as: Robaxin   metoprolol succinate XL 25 mg 24 hr tablet; Commonly known as:   Toprol-XL; Take 1 tablet (25 mg) by mouth once daily. Hold for SBP<110 or   HR <70   oxygen gas therapy; Commonly known as: O2; Inhale 1 each once every 24   hours.   oxygen gas therapy; Commonly known as: O2; Inhale 1 each once every 24   hours.     ASK your doctor about these medications     Lantus U-100 Insulin 100 unit/mL injection; Generic drug: insulin   glargine   lisinopril 40 mg tablet; Take 1 tablet (40 mg) by mouth once daily.   Please do not take this medication until follow-up with primary doctor   regarding kidney function.       Test Results Pending At Discharge  Pending Labs       Order Current Status    Staphylococcus aureus/MRSA colonization, Culture In process    Blood Culture Preliminary result    Blood Culture Preliminary result             Hospital Course   Patient wasadmitted with acute respiratory failure related to pneumonia patient started on antibiotic BiPAP with improvement of her symptoms patient will be discharged on CPAP at night antibiotics    Pertinent Physical Exam At Time of Discharge  Physical Exam  HENT:      Right Ear: External ear normal.      Left Ear: External ear normal.      Mouth/Throat:      Mouth: Mucous membranes are moist.   Cardiovascular:      Rate and Rhythm: Normal rate and regular rhythm.      Heart sounds: No murmur heard.     No friction rub. No gallop.   Pulmonary:      Effort: No accessory muscle usage or respiratory distress.      Breath sounds: No stridor. No wheezing or rhonchi.   Chest:      Chest wall: No tenderness.   Abdominal:      General: There is no distension.      Palpations: There is no mass.      Tenderness: There is no abdominal tenderness. There is no guarding or rebound.   Musculoskeletal:         General: No deformity or signs of injury.      Cervical back: No rigidity or tenderness. Normal range of motion.      Right lower leg: No edema.      Left lower leg: No edema.   Skin:     Coloration: Skin is not jaundiced or pale.      Findings: No lesion.   Neurological:      General: No focal deficit present.      Mental Status: She is alert, oriented to person, place, and time and easily aroused.      Cranial Nerves: No cranial nerve deficit.      Sensory: No sensory deficit.      Motor: No weakness.     9/18/2024 patient is doing well no event overnight vital signs reviewed nurse was not reviewed blood work was reinterviewed still medically stable to be discharged to skilled nursing facility waiting on insurance approval    Outpatient Follow-Up  Future Appointments   Date Time Provider Department Center   10/11/2024 10:00 AM Erica Sifuentes MD [id number] Sawyer Mccloud MD

## 2024-09-18 ENCOUNTER — APPOINTMENT (OUTPATIENT)
Dept: SLEEP MEDICINE | Facility: CLINIC | Age: 69
End: 2024-09-18
Payer: MEDICARE

## 2024-09-18 VITALS
HEART RATE: 66 BPM | BODY MASS INDEX: 51.76 KG/M2 | TEMPERATURE: 97.3 F | RESPIRATION RATE: 18 BRPM | OXYGEN SATURATION: 94 % | WEIGHT: 292.11 LBS | SYSTOLIC BLOOD PRESSURE: 136 MMHG | DIASTOLIC BLOOD PRESSURE: 63 MMHG | HEIGHT: 63 IN

## 2024-09-18 LAB
ANION GAP SERPL CALC-SCNC: 12 MMOL/L (ref 10–20)
BUN SERPL-MCNC: 26 MG/DL (ref 6–23)
CALCIUM SERPL-MCNC: 8.6 MG/DL (ref 8.6–10.3)
CHLORIDE SERPL-SCNC: 98 MMOL/L (ref 98–107)
CO2 SERPL-SCNC: 32 MMOL/L (ref 21–32)
CREAT SERPL-MCNC: 0.79 MG/DL (ref 0.5–1.05)
EGFRCR SERPLBLD CKD-EPI 2021: 81 ML/MIN/1.73M*2
ERYTHROCYTE [DISTWIDTH] IN BLOOD BY AUTOMATED COUNT: 16.9 % (ref 11.5–14.5)
GLUCOSE BLD MANUAL STRIP-MCNC: 118 MG/DL (ref 74–99)
GLUCOSE BLD MANUAL STRIP-MCNC: 124 MG/DL (ref 74–99)
GLUCOSE BLD MANUAL STRIP-MCNC: 126 MG/DL (ref 74–99)
GLUCOSE BLD MANUAL STRIP-MCNC: 127 MG/DL (ref 74–99)
GLUCOSE BLD MANUAL STRIP-MCNC: 66 MG/DL (ref 74–99)
GLUCOSE BLD MANUAL STRIP-MCNC: 83 MG/DL (ref 74–99)
GLUCOSE SERPL-MCNC: 137 MG/DL (ref 74–99)
HCT VFR BLD AUTO: 28.8 % (ref 36–46)
HGB BLD-MCNC: 8.6 G/DL (ref 12–16)
MAGNESIUM SERPL-MCNC: 2.01 MG/DL (ref 1.6–2.4)
MCH RBC QN AUTO: 25.1 PG (ref 26–34)
MCHC RBC AUTO-ENTMCNC: 29.9 G/DL (ref 32–36)
MCV RBC AUTO: 84 FL (ref 80–100)
NRBC BLD-RTO: 0 /100 WBCS (ref 0–0)
PLATELET # BLD AUTO: 226 X10*3/UL (ref 150–450)
POTASSIUM SERPL-SCNC: 3.9 MMOL/L (ref 3.5–5.3)
RBC # BLD AUTO: 3.42 X10*6/UL (ref 4–5.2)
SODIUM SERPL-SCNC: 138 MMOL/L (ref 136–145)
STAPHYLOCOCCUS SPEC CULT: NORMAL
WBC # BLD AUTO: 11.5 X10*3/UL (ref 4.4–11.3)

## 2024-09-18 PROCEDURE — 83735 ASSAY OF MAGNESIUM: CPT

## 2024-09-18 PROCEDURE — 2500000002 HC RX 250 W HCPCS SELF ADMINISTERED DRUGS (ALT 637 FOR MEDICARE OP, ALT 636 FOR OP/ED): Performed by: NURSE PRACTITIONER

## 2024-09-18 PROCEDURE — 97535 SELF CARE MNGMENT TRAINING: CPT | Mod: GO,CO

## 2024-09-18 PROCEDURE — 80048 BASIC METABOLIC PNL TOTAL CA: CPT

## 2024-09-18 PROCEDURE — 2500000001 HC RX 250 WO HCPCS SELF ADMINISTERED DRUGS (ALT 637 FOR MEDICARE OP): Performed by: NURSE PRACTITIONER

## 2024-09-18 PROCEDURE — 36415 COLL VENOUS BLD VENIPUNCTURE: CPT

## 2024-09-18 PROCEDURE — 82947 ASSAY GLUCOSE BLOOD QUANT: CPT

## 2024-09-18 PROCEDURE — 94660 CPAP INITIATION&MGMT: CPT

## 2024-09-18 PROCEDURE — 94640 AIRWAY INHALATION TREATMENT: CPT

## 2024-09-18 PROCEDURE — 2500000004 HC RX 250 GENERAL PHARMACY W/ HCPCS (ALT 636 FOR OP/ED): Performed by: INTERNAL MEDICINE

## 2024-09-18 PROCEDURE — 2500000002 HC RX 250 W HCPCS SELF ADMINISTERED DRUGS (ALT 637 FOR MEDICARE OP, ALT 636 FOR OP/ED): Performed by: INTERNAL MEDICINE

## 2024-09-18 PROCEDURE — 97530 THERAPEUTIC ACTIVITIES: CPT | Mod: GP,CQ

## 2024-09-18 PROCEDURE — 2500000002 HC RX 250 W HCPCS SELF ADMINISTERED DRUGS (ALT 637 FOR MEDICARE OP, ALT 636 FOR OP/ED)

## 2024-09-18 PROCEDURE — 2500000004 HC RX 250 GENERAL PHARMACY W/ HCPCS (ALT 636 FOR OP/ED): Performed by: NURSE PRACTITIONER

## 2024-09-18 PROCEDURE — 85027 COMPLETE CBC AUTOMATED: CPT

## 2024-09-18 ASSESSMENT — PAIN - FUNCTIONAL ASSESSMENT: PAIN_FUNCTIONAL_ASSESSMENT: 0-10

## 2024-09-18 ASSESSMENT — PAIN SCALES - GENERAL
PAINLEVEL_OUTOF10: 0 - NO PAIN
PAINLEVEL_OUTOF10: 0 - NO PAIN

## 2024-09-18 ASSESSMENT — COGNITIVE AND FUNCTIONAL STATUS - GENERAL
MOVING FROM LYING ON BACK TO SITTING ON SIDE OF FLAT BED WITH BEDRAILS: A LITTLE
MOBILITY SCORE: 13
DRESSING REGULAR UPPER BODY CLOTHING: A LITTLE
CLIMB 3 TO 5 STEPS WITH RAILING: TOTAL
MOVING TO AND FROM BED TO CHAIR: A LOT
TOILETING: A LOT
DAILY ACTIVITIY SCORE: 15
PERSONAL GROOMING: A LITTLE
EATING MEALS: A LITTLE
HELP NEEDED FOR BATHING: A LOT
DRESSING REGULAR LOWER BODY CLOTHING: A LOT
STANDING UP FROM CHAIR USING ARMS: A LOT
WALKING IN HOSPITAL ROOM: A LOT
TURNING FROM BACK TO SIDE WHILE IN FLAT BAD: A LITTLE

## 2024-09-18 ASSESSMENT — ACTIVITIES OF DAILY LIVING (ADL)
HOME_MANAGEMENT_TIME_ENTRY: 19
EFFECT OF PAIN ON DAILY ACTIVITIES: .

## 2024-09-18 NOTE — PROGRESS NOTES
Occupational Therapy    OT Treatment    Patient Name: Kaylene Feliciano  MRN: [id number]  Today's Date: 9/18/2024  Time Calculation  Start Time: 1253  Stop Time: 1312  Time Calculation (min): 19 min       3119/3119-A    Assessment:  End of Session Communication: Bedside nurse  End of Session Patient Position: Up in chair, Alarm on       Plan:  OT Frequency: 3 times per week  OT Discharge Recommendations: Low intensity level of continued care, Moderate intensity level of continued care     Subjective      09/18/24 1253   General   Reason for Referral ADLs, discharge plannng   Referred By Dr. Mccloud   Prior to Session Communication Bedside nurse   Patient Position Received Bed, 3 rail up;Alarm on   General Comment Pt is pleasant, cooperative and agreeable to tx; cleared for participation. Attempted earlier this date, pt requested to return at later time.   Precautions   Medical Precautions Fall precautions;Oxygen therapy device and L/min   Precautions Comment 3LO2 via NC - per pt- wears 4L at baseline; nursing informed   Vital Signs   Vitals Session During OT   SpO2   (90-96 following transfers)   Cognition   Orientation Level Oriented X4   Grooming   Grooming Level of Assistance Distant supervision   Grooming Where Assessed Edge of bed   Grooming Comments Pt completed various grooming tasks with setup of supplies.   LE Dressing   LE Dressing   (Pt Dep to  don/doff socks while seated EOB. Pt reports  assists with LB dressing.)   Bed Mobility 1   Bed Mobility 1 Supine to sitting   Level of Assistance 1 Contact guard   Bed Mobility Comments 1 HOB elevated, increased time and effort,cues for breathing throughout exertion.   Transfer 1   Transfer From 1 Bed to   Transfer to 1 Chair with arms   Technique 1 Sit to stand;Stand to sit   Transfer Device 1 Larry Stedy;Gait belt   Transfer Level of Assistance 1 Minimum assistance;+2   Trials/Comments 1 Sit>stand and stand>sit to/from larry stedy   Static Sitting Balance    Static Sitting-Balance Support Bilateral upper extremity supported;Feet supported   Static Sitting-Level of Assistance Distant supervision   Dynamic Sitting Balance   Dynamic Sitting-Balance Support No upper extremity supported;Feet supported   Dynamic Sitting-Level of Assistance Close supervision;Distant supervision   Dynamic Sitting-Balance Reaching across midline;Reaching for objects;Trunk control activities   IP OT Assessment   End of Session Communication Bedside nurse   End of Session Patient Position Up in chair;Alarm on   Inpatient Plan   OT Frequency 3 times per week   OT Discharge Recommendations Low intensity level of continued care;Moderate intensity level of continued care     Outcome Measures:[facility] Daily Activity  Putting on and taking off regular lower body clothing: A lot  Bathing (including washing, rinsing, drying): A lot  Putting on and taking off regular upper body clothing: A little  Toileting, which includes using toilet, bedpan or urinal: A lot  Taking care of personal grooming such as brushing teeth: A little  Eating Meals: A little  Daily Activity - Total Score: 15  Education Documentation  No documentation found.  Education Comments  No comments found.            Goals:  Encounter Problems       Encounter Problems (Active)       Functional Balance       LTG - Patient will maintainsitting balance to allow for completion of daily activities (Progressing)       Start:  09/13/24    Expected End:  09/27/24               Grooming       STG - Patient completes grooming SUP (Progressing)       Start:  09/13/24    Expected End:  09/27/24               OT Transfers       STG - Patient will perform bed mobility SUP (Progressing)       Start:  09/13/24    Expected End:  09/27/24            STG - Patient will transfer sit to and from stand min assist (Progressing)       Start:  09/13/24    Expected End:  09/27/24

## 2024-09-18 NOTE — CARE PLAN
The patient's goals for the shift include      The clinical goals for the shift include nosob,bs less than 250 by the end of this shift      Problem: Skin  Goal: Participates in plan/prevention/treatment measures  9/18/2024 0432 by Kristin Collado RN  Outcome: Progressing  9/17/2024 2308 by Kristin Collado RN  Outcome: Progressing  Goal: Prevent/manage excess moisture  9/18/2024 0432 by Kristin Collado RN  Outcome: Progressing  9/17/2024 2308 by Kristin Collado RN  Outcome: Progressing  Goal: Prevent/minimize sheer/friction injuries  9/18/2024 0432 by Kristin Collado RN  Outcome: Progressing  9/17/2024 2308 by Kristin Collado RN  Outcome: Progressing  Flowsheets (Taken 9/17/2024 2308)  Prevent/minimize sheer/friction injuries:   HOB 30 degrees or less   Increase activity/out of bed for meals  Goal: Promote/optimize nutrition  9/18/2024 0432 by Kristin Collado RN  Outcome: Progressing  9/17/2024 2308 by Kristin Collado RN  Outcome: Progressing     Problem: Respiratory  Goal: Minimal/no exertional discomfort or dyspnea this shift  Outcome: Progressing  Goal: Patent airway maintained this shift  Outcome: Progressing  Goal: Tolerate mechanical ventilation evidenced by VS/agitation level this shift  Outcome: Progressing  Goal: Tolerate pulmonary toileting this shift  Outcome: Progressing  Goal: Verbalize decreased shortness of breath this shift  Outcome: Progressing  Goal: Wean oxygen to maintain O2 saturation per order/standard this shift  Outcome: Progressing     Problem: Pain - Adult  Goal: Verbalizes/displays adequate comfort level or baseline comfort level  Outcome: Progressing     Problem: Safety - Adult  Goal: Free from fall injury  Outcome: Progressing     Problem: Diabetes  Goal: Achieve decreasing blood glucose levels by end of shift  Outcome: Progressing  Goal: Increase stability of blood glucose readings by end of shift  Outcome: Progressing  Goal: Decrease in ketones present in urine by end of shift  Outcome:  Progressing  Goal: Maintain electrolyte levels within acceptable range throughout shift  Outcome: Progressing  Goal: Maintain glucose levels >70mg/dl to <250mg/dl throughout shift  Outcome: Progressing  Goal: No changes in neurological exam by end of shift  Outcome: Progressing  Goal: Learn about and adhere to nutrition recommendations by end of shift  Outcome: Progressing  Goal: Vital signs within normal range for age by end of shift  Outcome: Progressing  Goal: Increase self care and/or family involovement by end of shift  Outcome: Progressing     Problem: Fall/Injury  Goal: Not fall by end of shift  Outcome: Progressing  Goal: Be free from injury by end of the shift  Outcome: Progressing  Goal: Verbalize understanding of personal risk factors for fall in the hospital  Outcome: Progressing  Goal: Verbalize understanding of risk factor reduction measures to prevent injury from fall in the home  Outcome: Progressing  Goal: Use assistive devices by end of the shift  Outcome: Progressing  Goal: Pace activities to prevent fatigue by end of the shift  Outcome: Progressing

## 2024-09-18 NOTE — CARE PLAN
The patient's goals for the shift include      The clinical goals for the shift include nosob,bs less than 250 by the end of this shift    Problem: Skin  Goal: Participates in plan/prevention/treatment measures  Outcome: Progressing  Goal: Prevent/manage excess moisture  Outcome: Progressing  Goal: Prevent/minimize sheer/friction injuries  Outcome: Progressing  Flowsheets (Taken 9/17/2024 3789)  Prevent/minimize sheer/friction injuries:   HOB 30 degrees or less   Increase activity/out of bed for meals  Goal: Promote/optimize nutrition  Outcome: Progressing

## 2024-09-18 NOTE — PROGRESS NOTES
09/18/24 0913   Discharge Planning   Expected Discharge Disposition SNF   Does the patient need discharge transport arranged? Yes   [facility] coordination needed? Yes   Has discharge transport been arranged? No   What day is the transport expected? 09/18/24     Message sent to the direct precert team to start the insurance auth for [facility].     0915:  [facility] home care updated on the discharge plan    09/18: Insurance authorization approved for [facility], message left in [facility] to see if the facility can accept today.     1230: Voice mail left for  regarding transport time. [facility] notified the facility and submitted the [id number]. Nursing notified.

## 2024-09-18 NOTE — PROGRESS NOTES
Physical Therapy    Physical Therapy    Physical Therapy Treatment    Patient Name: Kaylene Feliciano  MRN: [id number]  Today's Date: 9/18/2024  Time Calculation  Start Time: 1256  Stop Time: 1312  Time Calculation (min): 16 min     3119/3119-A     09/18/24 1256   PT  Visit   PT Received On 09/18/24   Response to Previous Treatment Patient with no complaints from previous session.   General   Reason for Referral ADLs, discharge plannng   Referred By Dr. Mccloud   Prior to Session Communication Bedside nurse   Patient Position Received Bed, 3 rail up;Alarm on   General Comment Pt is pleasant and agreeable to therapy, cleared for participation.   Precautions   Medical Precautions Fall precautions   Vital Signs   SpO2   (90-95% on 3L, pt states she is usually on 4L at home)   Pain Assessment   Pain Assessment 0-10   0-10 (Numeric) Pain Score 0 - No pain   Effect of Pain on Daily Activities .   Cognition   Overall Cognitive Status WFL   Orientation Level Oriented X4   Therapeutic Activity   Therapeutic Activity Performed Yes   Therapeutic Activity 1 seated activities at EOB, unsupported and SBA.   Bed Mobility   Bed Mobility Yes   Bed Mobility 1   Bed Mobility 1 Supine to sitting   Level of Assistance 1 Minimum assistance   Bed Mobility Comments 1 HOB elevated, increased time and effort to complete   Transfers   Transfer Yes   Transfer 1   Transfer From 1 Bed to   Transfer to 1 Chair with arms   Technique 1 Sit to stand;Stand to sit   Transfer Device 1 Larry Stedy;Gait belt   Transfer Level of Assistance 1 Minimum assistance;+2   Trials/Comments 1 Pt put forth good effort to stand using larry stedy   Activity Tolerance   Endurance Tolerates 10 - 20 min exercise with multiple rests   PT Assessment   PT Assessment Results Decreased strength;Decreased endurance;Impaired balance;Decreased mobility   Rehab Prognosis Good   End of Session Communication Bedside nurse   Assessment Comment Pt put forth good effort. MinAx2 for transfer  with larry degroot.   End of Session Patient Position Up in chair;Alarm on     Outcome Measures:  [facility] Basic Mobility  Turning from your back to your side while in a flat bed without using bedrails: A little  Moving from lying on your back to sitting on the side of a flat bed without using bedrails: A little  Moving to and from bed to chair (including a wheelchair): A lot  Standing up from a chair using your arms (e.g. wheelchair or bedside chair): A lot  To walk in hospital room: A lot  Climbing 3-5 steps with railing: Total  Basic Mobility - Total Score: 13                             EDUCATION:  Outpatient Education  Individual(s) Educated: Patient  Education Provided: Fall Risk  Education Documentation  Handouts, taught by Ce Malin PTA at 9/18/2024  1:22 PM.  Learner: Patient  Readiness: Acceptance  Method: Explanation  Response: Verbalizes Understanding, Demonstrated Understanding, Needs Reinforcement    Precautions, taught by Ce Malin PTA at 9/18/2024  1:22 PM.  Learner: Patient  Readiness: Acceptance  Method: Explanation  Response: Verbalizes Understanding, Demonstrated Understanding, Needs Reinforcement    Body Mechanics, taught by Ce Malin PTA at 9/18/2024  1:22 PM.  Learner: Patient  Readiness: Acceptance  Method: Explanation  Response: Verbalizes Understanding, Demonstrated Understanding, Needs Reinforcement    Home Exercise Program, taught by Ce Malin PTA at 9/18/2024  1:22 PM.  Learner: Patient  Readiness: Acceptance  Method: Explanation  Response: Verbalizes Understanding, Demonstrated Understanding, Needs Reinforcement    Mobility Training, taught by Ce Malin PTA at 9/18/2024  1:22 PM.  Learner: Patient  Readiness: Acceptance  Method: Explanation  Response: Verbalizes Understanding, Demonstrated Understanding, Needs Reinforcement    Handouts, taught by Ce Malin PTA at 9/17/2024  2:58 PM.  Learner: Patient  Readiness: Acceptance  Method: Explanation,  Demonstration  Response: Verbalizes Understanding, Demonstrated Understanding    Precautions, taught by Ce Malin PTA at 9/17/2024  2:58 PM.  Learner: Patient  Readiness: Acceptance  Method: Explanation, Demonstration  Response: Verbalizes Understanding, Demonstrated Understanding    Body Mechanics, taught by Ce Malin PTA at 9/17/2024  2:58 PM.  Learner: Patient  Readiness: Acceptance  Method: Explanation, Demonstration  Response: Verbalizes Understanding, Demonstrated Understanding    Home Exercise Program, taught by Ce Malin PTA at 9/17/2024  2:58 PM.  Learner: Patient  Readiness: Acceptance  Method: Explanation, Demonstration  Response: Verbalizes Understanding, Demonstrated Understanding    Mobility Training, taught by Ce Malin PTA at 9/17/2024  2:58 PM.  Learner: Patient  Readiness: Acceptance  Method: Explanation, Demonstration  Response: Verbalizes Understanding, Demonstrated Understanding    Education Comments  No comments found.        GOALS:  Encounter Problems       Encounter Problems (Active)       Mobility       Pt will complete supine, seated and standing exercises to maintain/improve overall strength with minimal verbal cues.         Start:  09/13/24    Expected End:  09/27/24            Pt will maintain > 92% SpO2 with functional mobility activities without evidence of SOB.       Start:  09/13/24    Expected End:  09/27/24            Pt will be able to stand with FWW max A for ~1 min with upright posture and good safety awareness.       Start:  09/13/24    Expected End:  09/27/24                   PT Transfers       Pt will be able to complete all transfers with slide board min A demonstrating good safety awareness and proper body mechanics.        Start:  09/13/24    Expected End:  09/27/24            Pt will be able to complete all bed mobility tasks mod I.        Start:  09/13/24    Expected End:  09/27/24               Pain - Adult

## 2024-10-03 ENCOUNTER — APPOINTMENT (OUTPATIENT)
Dept: PULMONOLOGY | Facility: CLINIC | Age: 69
End: 2024-10-03
Payer: MEDICARE

## 2024-10-11 ENCOUNTER — APPOINTMENT (OUTPATIENT)
Dept: PULMONOLOGY | Facility: CLINIC | Age: 69
End: 2024-10-11
Payer: MEDICARE

## 2024-10-18 ENCOUNTER — APPOINTMENT (OUTPATIENT)
Dept: PULMONOLOGY | Facility: CLINIC | Age: 69
End: 2024-10-18
Payer: MEDICARE

## 2024-10-23 ENCOUNTER — APPOINTMENT (OUTPATIENT)
Dept: PULMONOLOGY | Facility: CLINIC | Age: 69
End: 2024-10-23
Payer: MEDICARE

## 2024-10-23 VITALS
HEART RATE: 81 BPM | HEIGHT: 63 IN | DIASTOLIC BLOOD PRESSURE: 55 MMHG | SYSTOLIC BLOOD PRESSURE: 87 MMHG | BODY MASS INDEX: 51.74 KG/M2 | TEMPERATURE: 97.7 F | OXYGEN SATURATION: 93 %

## 2024-10-23 DIAGNOSIS — E66.01 MORBID OBESITY (MULTI): ICD-10-CM

## 2024-10-23 DIAGNOSIS — J41.8 MIXED SIMPLE AND MUCOPURULENT CHRONIC BRONCHITIS (MULTI): ICD-10-CM

## 2024-10-23 DIAGNOSIS — J96.12 CHRONIC RESPIRATORY FAILURE WITH HYPERCAPNIA (MULTI): ICD-10-CM

## 2024-10-23 DIAGNOSIS — G47.33 OSA (OBSTRUCTIVE SLEEP APNEA): ICD-10-CM

## 2024-10-23 DIAGNOSIS — I50.33 ACUTE ON CHRONIC DIASTOLIC CONGESTIVE HEART FAILURE: Primary | ICD-10-CM

## 2024-10-23 PROCEDURE — 99215 OFFICE O/P EST HI 40 MIN: CPT | Performed by: INTERNAL MEDICINE

## 2024-10-23 PROCEDURE — 1159F MED LIST DOCD IN RCRD: CPT | Performed by: INTERNAL MEDICINE

## 2024-10-23 PROCEDURE — 3060F POS MICROALBUMINURIA REV: CPT | Performed by: INTERNAL MEDICINE

## 2024-10-23 PROCEDURE — 3078F DIAST BP <80 MM HG: CPT | Performed by: INTERNAL MEDICINE

## 2024-10-23 PROCEDURE — 3074F SYST BP LT 130 MM HG: CPT | Performed by: INTERNAL MEDICINE

## 2024-10-23 PROCEDURE — 1126F AMNT PAIN NOTED NONE PRSNT: CPT | Performed by: INTERNAL MEDICINE

## 2024-10-23 ASSESSMENT — ENCOUNTER SYMPTOMS
DEPRESSION: 0
OCCASIONAL FEELINGS OF UNSTEADINESS: 1
LOSS OF SENSATION IN FEET: 1

## 2024-10-23 ASSESSMENT — PATIENT HEALTH QUESTIONNAIRE - PHQ9
SUM OF ALL RESPONSES TO PHQ9 QUESTIONS 1 AND 2: 0
1. LITTLE INTEREST OR PLEASURE IN DOING THINGS: NOT AT ALL
2. FEELING DOWN, DEPRESSED OR HOPELESS: NOT AT ALL

## 2024-10-23 ASSESSMENT — PAIN SCALES - GENERAL: PAINLEVEL_OUTOF10: 0-NO PAIN

## 2024-10-29 PROBLEM — I50.33 ACUTE ON CHRONIC DIASTOLIC CONGESTIVE HEART FAILURE: Status: ACTIVE | Noted: 2024-10-29

## 2024-10-29 PROBLEM — J41.8 MIXED SIMPLE AND MUCOPURULENT CHRONIC BRONCHITIS (MULTI): Status: ACTIVE | Noted: 2024-10-29

## 2024-11-07 ENCOUNTER — APPOINTMENT (OUTPATIENT)
Dept: PULMONOLOGY | Facility: CLINIC | Age: 69
End: 2024-11-07
Payer: MEDICARE

## 2024-11-26 ENCOUNTER — HOSPITAL ENCOUNTER (OUTPATIENT)
Dept: RESPIRATORY THERAPY | Facility: HOSPITAL | Age: 69
Discharge: HOME | End: 2024-11-26
Payer: MEDICARE

## 2024-11-26 DIAGNOSIS — E66.01 MORBID OBESITY (MULTI): ICD-10-CM

## 2024-11-26 DIAGNOSIS — I50.33 ACUTE ON CHRONIC DIASTOLIC CONGESTIVE HEART FAILURE: ICD-10-CM

## 2024-11-26 LAB
MGC ASCENT PFT - FEV1 - PRE: 0.88
MGC ASCENT PFT - FEV1 - PRE: 0.88
MGC ASCENT PFT - FEV1 - PREDICTED: 2.08
MGC ASCENT PFT - FEV1 - PREDICTED: 2.08
MGC ASCENT PFT - FVC - PRE: 1.06
MGC ASCENT PFT - FVC - PRE: 1.06
MGC ASCENT PFT - FVC - PREDICTED: 2.65
MGC ASCENT PFT - FVC - PREDICTED: 2.65

## 2024-11-26 PROCEDURE — 94727 GAS DIL/WSHOT DETER LNG VOL: CPT

## 2024-12-26 ENCOUNTER — APPOINTMENT (OUTPATIENT)
Dept: CARDIOLOGY | Facility: CLINIC | Age: 69
End: 2024-12-26
Payer: MEDICARE

## 2025-01-02 ENCOUNTER — APPOINTMENT (OUTPATIENT)
Dept: CARDIOLOGY | Facility: CLINIC | Age: 70
End: 2025-01-02
Payer: MEDICARE

## 2025-01-07 ENCOUNTER — APPOINTMENT (OUTPATIENT)
Dept: CARDIOLOGY | Facility: HOSPITAL | Age: 70
End: 2025-01-07
Payer: MEDICARE

## 2025-01-07 ENCOUNTER — HOSPITAL ENCOUNTER (INPATIENT)
Facility: HOSPITAL | Age: 70
End: 2025-01-07
Attending: STUDENT IN AN ORGANIZED HEALTH CARE EDUCATION/TRAINING PROGRAM | Admitting: INTERNAL MEDICINE
Payer: MEDICARE

## 2025-01-07 ENCOUNTER — APPOINTMENT (OUTPATIENT)
Dept: RADIOLOGY | Facility: HOSPITAL | Age: 70
End: 2025-01-07
Payer: MEDICARE

## 2025-01-07 DIAGNOSIS — R19.7 DIARRHEA, UNSPECIFIED TYPE: ICD-10-CM

## 2025-01-07 DIAGNOSIS — K21.9 GASTROESOPHAGEAL REFLUX DISEASE, UNSPECIFIED WHETHER ESOPHAGITIS PRESENT: ICD-10-CM

## 2025-01-07 DIAGNOSIS — R60.0 LOWER EXTREMITY EDEMA: ICD-10-CM

## 2025-01-07 DIAGNOSIS — G47.33 OSA (OBSTRUCTIVE SLEEP APNEA): ICD-10-CM

## 2025-01-07 DIAGNOSIS — E11.22 TYPE 2 DIABETES MELLITUS WITH STAGE 3A CHRONIC KIDNEY DISEASE, WITH LONG-TERM CURRENT USE OF INSULIN (MULTI): Chronic | ICD-10-CM

## 2025-01-07 DIAGNOSIS — I50.33 ACUTE ON CHRONIC DIASTOLIC CONGESTIVE HEART FAILURE: ICD-10-CM

## 2025-01-07 DIAGNOSIS — J96.22 ACUTE ON CHRONIC RESPIRATORY FAILURE WITH HYPOXIA AND HYPERCAPNIA (MULTI): ICD-10-CM

## 2025-01-07 DIAGNOSIS — J96.01 ACUTE HYPOXIC RESPIRATORY FAILURE (MULTI): ICD-10-CM

## 2025-01-07 DIAGNOSIS — J96.01 ACUTE HYPOXIC ON CHRONIC HYPERCAPNIC RESPIRATORY FAILURE (MULTI): Primary | ICD-10-CM

## 2025-01-07 DIAGNOSIS — Z79.4 TYPE 2 DIABETES MELLITUS WITH STAGE 3A CHRONIC KIDNEY DISEASE, WITH LONG-TERM CURRENT USE OF INSULIN (MULTI): Chronic | ICD-10-CM

## 2025-01-07 DIAGNOSIS — L30.4 INTERTRIGO: ICD-10-CM

## 2025-01-07 DIAGNOSIS — D50.9 IRON DEFICIENCY ANEMIA, UNSPECIFIED IRON DEFICIENCY ANEMIA TYPE: ICD-10-CM

## 2025-01-07 DIAGNOSIS — J96.12 ACUTE HYPOXIC ON CHRONIC HYPERCAPNIC RESPIRATORY FAILURE (MULTI): Primary | ICD-10-CM

## 2025-01-07 DIAGNOSIS — J96.21 ACUTE ON CHRONIC RESPIRATORY FAILURE WITH HYPOXIA AND HYPERCAPNIA (MULTI): ICD-10-CM

## 2025-01-07 DIAGNOSIS — I50.31 ACUTE DIASTOLIC HEART FAILURE: ICD-10-CM

## 2025-01-07 DIAGNOSIS — R06.02 SHORTNESS OF BREATH: ICD-10-CM

## 2025-01-07 DIAGNOSIS — N18.31 TYPE 2 DIABETES MELLITUS WITH STAGE 3A CHRONIC KIDNEY DISEASE, WITH LONG-TERM CURRENT USE OF INSULIN (MULTI): Chronic | ICD-10-CM

## 2025-01-07 PROBLEM — D64.9 ANEMIA: Status: ACTIVE | Noted: 2025-01-07

## 2025-01-07 PROBLEM — E66.9 OBESITY: Status: ACTIVE | Noted: 2025-01-07

## 2025-01-07 PROBLEM — D72.829 LEUKOCYTOSIS: Status: ACTIVE | Noted: 2025-01-07

## 2025-01-07 PROBLEM — B36.9 FUNGAL INFECTION OF SKIN: Status: ACTIVE | Noted: 2025-01-07

## 2025-01-07 PROBLEM — J18.9 PNEUMONIA OF BOTH LUNGS DUE TO INFECTIOUS ORGANISM: Status: ACTIVE | Noted: 2025-01-07

## 2025-01-07 PROBLEM — N61.0 CELLULITIS OF LEFT BREAST: Status: ACTIVE | Noted: 2025-01-07

## 2025-01-07 LAB
ALBUMIN SERPL BCP-MCNC: 3.9 G/DL (ref 3.4–5)
ALP SERPL-CCNC: 104 U/L (ref 33–136)
ALT SERPL W P-5'-P-CCNC: 14 U/L (ref 7–45)
AMPHETAMINES UR QL SCN: NORMAL
ANION GAP BLDV CALCULATED.4IONS-SCNC: 6 MMOL/L (ref 10–25)
ANION GAP BLDV CALCULATED.4IONS-SCNC: 7 MMOL/L (ref 10–25)
ANION GAP SERPL CALC-SCNC: 13 MMOL/L (ref 10–20)
APPEARANCE UR: CLEAR
AST SERPL W P-5'-P-CCNC: 19 U/L (ref 9–39)
BACTERIA #/AREA URNS AUTO: ABNORMAL /HPF
BARBITURATES UR QL SCN: NORMAL
BASE EXCESS BLDV CALC-SCNC: 3.7 MMOL/L (ref -2–3)
BASE EXCESS BLDV CALC-SCNC: 5.6 MMOL/L (ref -2–3)
BASOPHILS # BLD AUTO: 0.08 X10*3/UL (ref 0–0.1)
BASOPHILS NFR BLD AUTO: 0.5 %
BENZODIAZ UR QL SCN: NORMAL
BILIRUB SERPL-MCNC: 0.7 MG/DL (ref 0–1.2)
BILIRUB UR STRIP.AUTO-MCNC: NEGATIVE MG/DL
BNP SERPL-MCNC: 201 PG/ML (ref 0–99)
BODY SURFACE AREA: 2.38 M2
BODY TEMPERATURE: ABNORMAL
BODY TEMPERATURE: ABNORMAL
BUN SERPL-MCNC: 20 MG/DL (ref 6–23)
BZE UR QL SCN: NORMAL
CA-I BLDV-SCNC: 1.2 MMOL/L (ref 1.1–1.33)
CA-I BLDV-SCNC: 1.22 MMOL/L (ref 1.1–1.33)
CALCIUM SERPL-MCNC: 8.9 MG/DL (ref 8.6–10.3)
CANNABINOIDS UR QL SCN: NORMAL
CARDIAC TROPONIN I PNL SERPL HS: 12 NG/L (ref 0–13)
CARDIAC TROPONIN I PNL SERPL HS: 12 NG/L (ref 0–13)
CHLORIDE BLDV-SCNC: 98 MMOL/L (ref 98–107)
CHLORIDE BLDV-SCNC: 98 MMOL/L (ref 98–107)
CHLORIDE SERPL-SCNC: 96 MMOL/L (ref 98–107)
CK SERPL-CCNC: 46 U/L (ref 0–215)
CO2 SERPL-SCNC: 33 MMOL/L (ref 21–32)
COLOR UR: ABNORMAL
CREAT SERPL-MCNC: 1.11 MG/DL (ref 0.5–1.05)
CRITICAL CALL TIME: 1154
CRITICAL CALL TIME: 1352
CRITICAL CALLED BY: ABNORMAL
CRITICAL CALLED BY: ABNORMAL
CRITICAL CALLED TO: ABNORMAL
CRITICAL CALLED TO: ABNORMAL
CRITICAL READ BACK: ABNORMAL
CRITICAL READ BACK: ABNORMAL
D DIMER PPP FEU-MCNC: 1318 NG/ML FEU
EGFRCR SERPLBLD CKD-EPI 2021: 54 ML/MIN/1.73M*2
EOSINOPHIL # BLD AUTO: 0.47 X10*3/UL (ref 0–0.7)
EOSINOPHIL NFR BLD AUTO: 3 %
EPAP CMH2O: 10 CM H2O
EPAP CMH2O: 10 CM H2O
ERYTHROCYTE [DISTWIDTH] IN BLOOD BY AUTOMATED COUNT: 16.1 % (ref 11.5–14.5)
FENTANYL+NORFENTANYL UR QL SCN: NORMAL
FERRITIN SERPL-MCNC: 186 NG/ML (ref 8–150)
FLUAV RNA RESP QL NAA+PROBE: NOT DETECTED
FLUBV RNA RESP QL NAA+PROBE: NOT DETECTED
GLUCOSE BLD MANUAL STRIP-MCNC: 116 MG/DL (ref 74–99)
GLUCOSE BLD MANUAL STRIP-MCNC: 178 MG/DL (ref 74–99)
GLUCOSE BLD MANUAL STRIP-MCNC: 184 MG/DL (ref 74–99)
GLUCOSE BLDV-MCNC: 122 MG/DL (ref 74–99)
GLUCOSE BLDV-MCNC: 124 MG/DL (ref 74–99)
GLUCOSE SERPL-MCNC: 118 MG/DL (ref 74–99)
GLUCOSE UR STRIP.AUTO-MCNC: NORMAL MG/DL
HCO3 BLDV-SCNC: 33.5 MMOL/L (ref 22–26)
HCO3 BLDV-SCNC: 35.2 MMOL/L (ref 22–26)
HCT VFR BLD AUTO: 32.1 % (ref 36–46)
HCT VFR BLD EST: 29 % (ref 36–46)
HCT VFR BLD EST: 36 % (ref 36–46)
HGB BLD-MCNC: 9.3 G/DL (ref 12–16)
HGB BLDV-MCNC: 12 G/DL (ref 12–16)
HGB BLDV-MCNC: 9.7 G/DL (ref 12–16)
HOLD SPECIMEN: NORMAL
HYALINE CASTS #/AREA URNS AUTO: ABNORMAL /LPF
IMM GRANULOCYTES # BLD AUTO: 0.07 X10*3/UL (ref 0–0.7)
IMM GRANULOCYTES NFR BLD AUTO: 0.4 % (ref 0–0.9)
INHALED O2 CONCENTRATION: 100 %
INHALED O2 CONCENTRATION: 60 %
INR PPP: 1.2 (ref 0.9–1.1)
IPAP CMH2O: 18 CM H2O
IPAP CMH2O: 18 CM H2O
IRON SATN MFR SERPL: 10 % (ref 25–45)
IRON SERPL-MCNC: 27 UG/DL (ref 35–150)
KETONES UR STRIP.AUTO-MCNC: NEGATIVE MG/DL
LACTATE BLDV-SCNC: 1.2 MMOL/L (ref 0.4–2)
LACTATE BLDV-SCNC: 1.7 MMOL/L (ref 0.4–2)
LACTATE SERPL-SCNC: 1.8 MMOL/L (ref 0.4–2)
LEUKOCYTE ESTERASE UR QL STRIP.AUTO: ABNORMAL
LYMPHOCYTES # BLD AUTO: 3.23 X10*3/UL (ref 1.2–4.8)
LYMPHOCYTES NFR BLD AUTO: 20.4 %
MCH RBC QN AUTO: 25.2 PG (ref 26–34)
MCHC RBC AUTO-ENTMCNC: 29 G/DL (ref 32–36)
MCV RBC AUTO: 87 FL (ref 80–100)
METHADONE UR QL SCN: NORMAL
MONOCYTES # BLD AUTO: 0.85 X10*3/UL (ref 0.1–1)
MONOCYTES NFR BLD AUTO: 5.4 %
MRSA DNA SPEC QL NAA+PROBE: NOT DETECTED
NEUTROPHILS # BLD AUTO: 11.16 X10*3/UL (ref 1.2–7.7)
NEUTROPHILS NFR BLD AUTO: 70.3 %
NITRITE UR QL STRIP.AUTO: NEGATIVE
NRBC BLD-RTO: 0 /100 WBCS (ref 0–0)
OPIATES UR QL SCN: NORMAL
OXYCODONE+OXYMORPHONE UR QL SCN: NORMAL
OXYHGB MFR BLDV: 31.8 % (ref 45–75)
OXYHGB MFR BLDV: 53 % (ref 45–75)
PCO2 BLDV: 80 MM HG (ref 41–51)
PCO2 BLDV: 86 MM HG (ref 41–51)
PCP UR QL SCN: NORMAL
PH BLDV: 7.22 PH (ref 7.33–7.43)
PH BLDV: 7.23 PH (ref 7.33–7.43)
PH UR STRIP.AUTO: 5 [PH]
PLATELET # BLD AUTO: 330 X10*3/UL (ref 150–450)
PO2 BLDV: 27 MM HG (ref 35–45)
PO2 BLDV: 37 MM HG (ref 35–45)
POTASSIUM BLDV-SCNC: 4.8 MMOL/L (ref 3.5–5.3)
POTASSIUM BLDV-SCNC: 4.8 MMOL/L (ref 3.5–5.3)
POTASSIUM SERPL-SCNC: 4.5 MMOL/L (ref 3.5–5.3)
PROT SERPL-MCNC: 7.2 G/DL (ref 6.4–8.2)
PROT UR STRIP.AUTO-MCNC: NEGATIVE MG/DL
PROTHROMBIN TIME: 13.5 SECONDS (ref 9.8–12.8)
RBC # BLD AUTO: 3.69 X10*6/UL (ref 4–5.2)
RBC # UR STRIP.AUTO: ABNORMAL /UL
RBC #/AREA URNS AUTO: ABNORMAL /HPF
SAO2 % BLDV: 32 % (ref 45–75)
SAO2 % BLDV: 54 % (ref 45–75)
SARS-COV-2 RNA RESP QL NAA+PROBE: NOT DETECTED
SODIUM BLDV-SCNC: 133 MMOL/L (ref 136–145)
SODIUM BLDV-SCNC: 135 MMOL/L (ref 136–145)
SODIUM SERPL-SCNC: 137 MMOL/L (ref 136–145)
SP GR UR STRIP.AUTO: 1.01
TIBC SERPL-MCNC: 262 UG/DL (ref 240–445)
UIBC SERPL-MCNC: 235 UG/DL (ref 110–370)
UROBILINOGEN UR STRIP.AUTO-MCNC: NORMAL MG/DL
VENTILATOR MODE: ABNORMAL
WBC # BLD AUTO: 15.9 X10*3/UL (ref 4.4–11.3)
WBC #/AREA URNS AUTO: ABNORMAL /HPF

## 2025-01-07 PROCEDURE — 93005 ELECTROCARDIOGRAM TRACING: CPT

## 2025-01-07 PROCEDURE — 2060000001 HC INTERMEDIATE ICU ROOM DAILY

## 2025-01-07 PROCEDURE — 80307 DRUG TEST PRSMV CHEM ANLYZR: CPT | Performed by: NURSE PRACTITIONER

## 2025-01-07 PROCEDURE — 82746 ASSAY OF FOLIC ACID SERUM: CPT | Mod: STJLAB | Performed by: NURSE PRACTITIONER

## 2025-01-07 PROCEDURE — 2500000004 HC RX 250 GENERAL PHARMACY W/ HCPCS (ALT 636 FOR OP/ED)

## 2025-01-07 PROCEDURE — 82728 ASSAY OF FERRITIN: CPT | Performed by: NURSE PRACTITIONER

## 2025-01-07 PROCEDURE — 82947 ASSAY GLUCOSE BLOOD QUANT: CPT

## 2025-01-07 PROCEDURE — 87181 SC STD AGAR DILUTION PER AGT: CPT | Mod: STJLAB

## 2025-01-07 PROCEDURE — 99291 CRITICAL CARE FIRST HOUR: CPT | Performed by: STUDENT IN AN ORGANIZED HEALTH CARE EDUCATION/TRAINING PROGRAM

## 2025-01-07 PROCEDURE — 96376 TX/PRO/DX INJ SAME DRUG ADON: CPT

## 2025-01-07 PROCEDURE — 94664 DEMO&/EVAL PT USE INHALER: CPT

## 2025-01-07 PROCEDURE — 96375 TX/PRO/DX INJ NEW DRUG ADDON: CPT

## 2025-01-07 PROCEDURE — 87636 SARSCOV2 & INF A&B AMP PRB: CPT | Performed by: STUDENT IN AN ORGANIZED HEALTH CARE EDUCATION/TRAINING PROGRAM

## 2025-01-07 PROCEDURE — 2500000002 HC RX 250 W HCPCS SELF ADMINISTERED DRUGS (ALT 637 FOR MEDICARE OP, ALT 636 FOR OP/ED)

## 2025-01-07 PROCEDURE — 84132 ASSAY OF SERUM POTASSIUM: CPT

## 2025-01-07 PROCEDURE — 85610 PROTHROMBIN TIME: CPT

## 2025-01-07 PROCEDURE — 83540 ASSAY OF IRON: CPT | Performed by: NURSE PRACTITIONER

## 2025-01-07 PROCEDURE — 82607 VITAMIN B-12: CPT | Mod: STJLAB | Performed by: NURSE PRACTITIONER

## 2025-01-07 PROCEDURE — 93970 EXTREMITY STUDY: CPT

## 2025-01-07 PROCEDURE — 83605 ASSAY OF LACTIC ACID: CPT

## 2025-01-07 PROCEDURE — 87086 URINE CULTURE/COLONY COUNT: CPT | Mod: STJLAB

## 2025-01-07 PROCEDURE — 94660 CPAP INITIATION&MGMT: CPT

## 2025-01-07 PROCEDURE — 87899 AGENT NOS ASSAY W/OPTIC: CPT | Mod: STJLAB | Performed by: NURSE PRACTITIONER

## 2025-01-07 PROCEDURE — 84484 ASSAY OF TROPONIN QUANT: CPT

## 2025-01-07 PROCEDURE — 85379 FIBRIN DEGRADATION QUANT: CPT | Performed by: NURSE PRACTITIONER

## 2025-01-07 PROCEDURE — 36415 COLL VENOUS BLD VENIPUNCTURE: CPT

## 2025-01-07 PROCEDURE — 99223 1ST HOSP IP/OBS HIGH 75: CPT | Performed by: NURSE PRACTITIONER

## 2025-01-07 PROCEDURE — 82435 ASSAY OF BLOOD CHLORIDE: CPT | Performed by: STUDENT IN AN ORGANIZED HEALTH CARE EDUCATION/TRAINING PROGRAM

## 2025-01-07 PROCEDURE — 82550 ASSAY OF CK (CPK): CPT

## 2025-01-07 PROCEDURE — 96374 THER/PROPH/DIAG INJ IV PUSH: CPT

## 2025-01-07 PROCEDURE — 2500000002 HC RX 250 W HCPCS SELF ADMINISTERED DRUGS (ALT 637 FOR MEDICARE OP, ALT 636 FOR OP/ED): Performed by: STUDENT IN AN ORGANIZED HEALTH CARE EDUCATION/TRAINING PROGRAM

## 2025-01-07 PROCEDURE — 36410 VNPNXR 3YR/> PHY/QHP DX/THER: CPT

## 2025-01-07 PROCEDURE — 83550 IRON BINDING TEST: CPT | Performed by: NURSE PRACTITIONER

## 2025-01-07 PROCEDURE — 71045 X-RAY EXAM CHEST 1 VIEW: CPT | Performed by: STUDENT IN AN ORGANIZED HEALTH CARE EDUCATION/TRAINING PROGRAM

## 2025-01-07 PROCEDURE — 2500000004 HC RX 250 GENERAL PHARMACY W/ HCPCS (ALT 636 FOR OP/ED): Performed by: STUDENT IN AN ORGANIZED HEALTH CARE EDUCATION/TRAINING PROGRAM

## 2025-01-07 PROCEDURE — 99291 CRITICAL CARE FIRST HOUR: CPT

## 2025-01-07 PROCEDURE — 87449 NOS EACH ORGANISM AG IA: CPT | Mod: STJLAB | Performed by: NURSE PRACTITIONER

## 2025-01-07 PROCEDURE — 96365 THER/PROPH/DIAG IV INF INIT: CPT | Mod: 59

## 2025-01-07 PROCEDURE — A9540 TC99M MAA: HCPCS | Performed by: STUDENT IN AN ORGANIZED HEALTH CARE EDUCATION/TRAINING PROGRAM

## 2025-01-07 PROCEDURE — 2500000002 HC RX 250 W HCPCS SELF ADMINISTERED DRUGS (ALT 637 FOR MEDICARE OP, ALT 636 FOR OP/ED): Performed by: NURSE PRACTITIONER

## 2025-01-07 PROCEDURE — 96366 THER/PROPH/DIAG IV INF ADDON: CPT

## 2025-01-07 PROCEDURE — 85018 HEMOGLOBIN: CPT

## 2025-01-07 PROCEDURE — 2500000005 HC RX 250 GENERAL PHARMACY W/O HCPCS

## 2025-01-07 PROCEDURE — 87641 MR-STAPH DNA AMP PROBE: CPT | Performed by: NURSE PRACTITIONER

## 2025-01-07 PROCEDURE — 99222 1ST HOSP IP/OBS MODERATE 55: CPT | Performed by: INTERNAL MEDICINE

## 2025-01-07 PROCEDURE — 2500000004 HC RX 250 GENERAL PHARMACY W/ HCPCS (ALT 636 FOR OP/ED): Performed by: NURSE PRACTITIONER

## 2025-01-07 PROCEDURE — 2500000005 HC RX 250 GENERAL PHARMACY W/O HCPCS: Performed by: NURSE PRACTITIONER

## 2025-01-07 PROCEDURE — 71045 X-RAY EXAM CHEST 1 VIEW: CPT

## 2025-01-07 PROCEDURE — 83880 ASSAY OF NATRIURETIC PEPTIDE: CPT

## 2025-01-07 PROCEDURE — 93970 EXTREMITY STUDY: CPT | Performed by: NURSE PRACTITIONER

## 2025-01-07 PROCEDURE — 5A09457 ASSISTANCE WITH RESPIRATORY VENTILATION, 24-96 CONSECUTIVE HOURS, CONTINUOUS POSITIVE AIRWAY PRESSURE: ICD-10-PCS

## 2025-01-07 PROCEDURE — 87077 CULTURE AEROBIC IDENTIFY: CPT | Mod: STJLAB

## 2025-01-07 PROCEDURE — 3430000001 HC RX 343 DIAGNOSTIC RADIOPHARMACEUTICALS: Performed by: STUDENT IN AN ORGANIZED HEALTH CARE EDUCATION/TRAINING PROGRAM

## 2025-01-07 PROCEDURE — 94640 AIRWAY INHALATION TREATMENT: CPT

## 2025-01-07 PROCEDURE — 99292 CRITICAL CARE ADDL 30 MIN: CPT

## 2025-01-07 PROCEDURE — 93010 ELECTROCARDIOGRAM REPORT: CPT | Performed by: STUDENT IN AN ORGANIZED HEALTH CARE EDUCATION/TRAINING PROGRAM

## 2025-01-07 PROCEDURE — 85025 COMPLETE CBC W/AUTO DIFF WBC: CPT

## 2025-01-07 PROCEDURE — 81001 URINALYSIS AUTO W/SCOPE: CPT

## 2025-01-07 PROCEDURE — 96367 TX/PROPH/DG ADDL SEQ IV INF: CPT

## 2025-01-07 PROCEDURE — 2500000001 HC RX 250 WO HCPCS SELF ADMINISTERED DRUGS (ALT 637 FOR MEDICARE OP): Performed by: NURSE PRACTITIONER

## 2025-01-07 PROCEDURE — 96372 THER/PROPH/DIAG INJ SC/IM: CPT | Performed by: NURSE PRACTITIONER

## 2025-01-07 PROCEDURE — 84132 ASSAY OF SERUM POTASSIUM: CPT | Performed by: STUDENT IN AN ORGANIZED HEALTH CARE EDUCATION/TRAINING PROGRAM

## 2025-01-07 RX ORDER — GABAPENTIN 300 MG/1
300 CAPSULE ORAL 2 TIMES DAILY
Status: DISPENSED | OUTPATIENT
Start: 2025-01-07

## 2025-01-07 RX ORDER — METOPROLOL SUCCINATE 25 MG/1
25 TABLET, EXTENDED RELEASE ORAL DAILY
Status: DISPENSED | OUTPATIENT
Start: 2025-01-08

## 2025-01-07 RX ORDER — AMLODIPINE BESYLATE 10 MG/1
10 TABLET ORAL DAILY
Status: DISPENSED | OUTPATIENT
Start: 2025-01-08

## 2025-01-07 RX ORDER — IPRATROPIUM BROMIDE AND ALBUTEROL SULFATE 2.5; .5 MG/3ML; MG/3ML
SOLUTION RESPIRATORY (INHALATION)
Status: COMPLETED
Start: 2025-01-07 | End: 2025-01-07

## 2025-01-07 RX ORDER — ATORVASTATIN CALCIUM 10 MG/1
10 TABLET, FILM COATED ORAL NIGHTLY
Status: DISPENSED | OUTPATIENT
Start: 2025-01-08

## 2025-01-07 RX ORDER — INSULIN LISPRO 100 [IU]/ML
0-10 INJECTION, SOLUTION INTRAVENOUS; SUBCUTANEOUS
Status: DISCONTINUED | OUTPATIENT
Start: 2025-01-08 | End: 2025-01-07

## 2025-01-07 RX ORDER — VANCOMYCIN HYDROCHLORIDE 1 G/20ML
INJECTION, POWDER, LYOPHILIZED, FOR SOLUTION INTRAVENOUS DAILY PRN
Status: DISCONTINUED | OUTPATIENT
Start: 2025-01-07 | End: 2025-01-08

## 2025-01-07 RX ORDER — INSULIN GLARGINE 100 [IU]/ML
50 INJECTION, SOLUTION SUBCUTANEOUS 2 TIMES DAILY
Status: DISCONTINUED | OUTPATIENT
Start: 2025-01-07 | End: 2025-01-11

## 2025-01-07 RX ORDER — MAGNESIUM SULFATE HEPTAHYDRATE 40 MG/ML
INJECTION, SOLUTION INTRAVENOUS
Status: COMPLETED
Start: 2025-01-07 | End: 2025-01-07

## 2025-01-07 RX ORDER — NYSTATIN 100000 [USP'U]/G
1 POWDER TOPICAL 2 TIMES DAILY
Status: DISPENSED | OUTPATIENT
Start: 2025-01-07

## 2025-01-07 RX ORDER — INSULIN LISPRO 100 [IU]/ML
0-10 INJECTION, SOLUTION INTRAVENOUS; SUBCUTANEOUS EVERY 6 HOURS
Status: DISCONTINUED | OUTPATIENT
Start: 2025-01-07 | End: 2025-01-08

## 2025-01-07 RX ORDER — ENOXAPARIN SODIUM 100 MG/ML
40 INJECTION SUBCUTANEOUS EVERY 12 HOURS SCHEDULED
Status: DISPENSED | OUTPATIENT
Start: 2025-01-07

## 2025-01-07 RX ORDER — FLUCONAZOLE 2 MG/ML
200 INJECTION, SOLUTION INTRAVENOUS EVERY 24 HOURS
Status: DISCONTINUED | OUTPATIENT
Start: 2025-01-07 | End: 2025-01-08

## 2025-01-07 RX ORDER — FUROSEMIDE 10 MG/ML
20 INJECTION INTRAMUSCULAR; INTRAVENOUS ONCE
Status: COMPLETED | OUTPATIENT
Start: 2025-01-07 | End: 2025-01-07

## 2025-01-07 RX ORDER — NAPROXEN SODIUM 220 MG/1
81 TABLET, FILM COATED ORAL DAILY
Status: DISPENSED | OUTPATIENT
Start: 2025-01-08

## 2025-01-07 RX ORDER — CEFTRIAXONE 2 G/50ML
2 INJECTION, SOLUTION INTRAVENOUS ONCE
Status: COMPLETED | OUTPATIENT
Start: 2025-01-07 | End: 2025-01-07

## 2025-01-07 RX ORDER — FUROSEMIDE 20 MG/1
20 TABLET ORAL DAILY
Status: ON HOLD | COMMUNITY

## 2025-01-07 RX ORDER — ALBUTEROL SULFATE 0.83 MG/ML
2.5 SOLUTION RESPIRATORY (INHALATION) EVERY 4 HOURS PRN
Status: ACTIVE | OUTPATIENT
Start: 2025-01-07

## 2025-01-07 RX ORDER — FUROSEMIDE 20 MG/1
20 TABLET ORAL DAILY
Status: DISPENSED | OUTPATIENT
Start: 2025-01-08

## 2025-01-07 RX ORDER — VANCOMYCIN HYDROCHLORIDE 1 G/200ML
1000 INJECTION, SOLUTION INTRAVENOUS EVERY 12 HOURS
Status: DISCONTINUED | OUTPATIENT
Start: 2025-01-07 | End: 2025-01-07

## 2025-01-07 RX ORDER — CEFTRIAXONE 2 G/50ML
2 INJECTION, SOLUTION INTRAVENOUS EVERY 24 HOURS
Status: DISCONTINUED | OUTPATIENT
Start: 2025-01-08 | End: 2025-01-09 | Stop reason: RX

## 2025-01-07 RX ORDER — CITALOPRAM 20 MG/1
40 TABLET, FILM COATED ORAL DAILY
Status: DISPENSED | OUTPATIENT
Start: 2025-01-08

## 2025-01-07 RX ORDER — ALBUTEROL SULFATE 0.83 MG/ML
2.5 SOLUTION RESPIRATORY (INHALATION) EVERY 4 HOURS PRN
Status: ON HOLD | COMMUNITY

## 2025-01-07 RX ORDER — ACETAMINOPHEN 650 MG/1
650 SUPPOSITORY RECTAL EVERY 4 HOURS PRN
Status: ACTIVE | OUTPATIENT
Start: 2025-01-07

## 2025-01-07 RX ORDER — MAGNESIUM SULFATE HEPTAHYDRATE 40 MG/ML
2 INJECTION, SOLUTION INTRAVENOUS ONCE
Status: COMPLETED | OUTPATIENT
Start: 2025-01-07 | End: 2025-01-07

## 2025-01-07 RX ORDER — DIPHENHYDRAMINE HCL 25 MG
25 TABLET ORAL ONCE
Status: COMPLETED | OUTPATIENT
Start: 2025-01-08 | End: 2025-01-08

## 2025-01-07 RX ORDER — PANTOPRAZOLE SODIUM 40 MG/10ML
40 INJECTION, POWDER, LYOPHILIZED, FOR SOLUTION INTRAVENOUS DAILY
Status: DISCONTINUED | OUTPATIENT
Start: 2025-01-07 | End: 2025-01-10

## 2025-01-07 RX ORDER — IPRATROPIUM BROMIDE AND ALBUTEROL SULFATE 2.5; .5 MG/3ML; MG/3ML
3 SOLUTION RESPIRATORY (INHALATION) EVERY 20 MIN
Status: COMPLETED | OUTPATIENT
Start: 2025-01-07 | End: 2025-01-07

## 2025-01-07 RX ADMIN — PREDNISONE 50 MG: 20 TABLET ORAL at 18:59

## 2025-01-07 RX ADMIN — FLUCONAZOLE IN SODIUM CHLORIDE 200 MG: 2 INJECTION, SOLUTION INTRAVENOUS at 16:55

## 2025-01-07 RX ADMIN — NYSTATIN 1 APPLICATION: 100000 POWDER TOPICAL at 21:27

## 2025-01-07 RX ADMIN — VANCOMYCIN HYDROCHLORIDE 1500 MG: 1.5 INJECTION, POWDER, LYOPHILIZED, FOR SOLUTION INTRAVENOUS at 18:12

## 2025-01-07 RX ADMIN — IPRATROPIUM BROMIDE AND ALBUTEROL SULFATE 3 ML: 2.5; .5 SOLUTION RESPIRATORY (INHALATION) at 11:50

## 2025-01-07 RX ADMIN — INSULIN LISPRO 2 UNITS: 100 INJECTION, SOLUTION INTRAVENOUS; SUBCUTANEOUS at 23:48

## 2025-01-07 RX ADMIN — Medication 40 L/MIN: at 17:03

## 2025-01-07 RX ADMIN — Medication 15 L/MIN: at 11:42

## 2025-01-07 RX ADMIN — FUROSEMIDE 20 MG: 10 INJECTION, SOLUTION INTRAMUSCULAR; INTRAVENOUS at 15:48

## 2025-01-07 RX ADMIN — DOXYCYCLINE 100 MG: 100 INJECTION, POWDER, LYOPHILIZED, FOR SOLUTION INTRAVENOUS at 14:52

## 2025-01-07 RX ADMIN — FUROSEMIDE 20 MG: 10 INJECTION, SOLUTION INTRAMUSCULAR; INTRAVENOUS at 14:27

## 2025-01-07 RX ADMIN — PREDNISONE 50 MG: 20 TABLET ORAL at 23:34

## 2025-01-07 RX ADMIN — Medication 100 PERCENT: at 11:44

## 2025-01-07 RX ADMIN — CEFTRIAXONE SODIUM 2 G: 2 INJECTION, SOLUTION INTRAVENOUS at 23:34

## 2025-01-07 RX ADMIN — IPRATROPIUM BROMIDE AND ALBUTEROL SULFATE 3 ML: 2.5; .5 SOLUTION RESPIRATORY (INHALATION) at 11:30

## 2025-01-07 RX ADMIN — MAGNESIUM SULFATE HEPTAHYDRATE 2 G: 40 INJECTION, SOLUTION INTRAVENOUS at 11:42

## 2025-01-07 RX ADMIN — ENOXAPARIN SODIUM 40 MG: 40 INJECTION SUBCUTANEOUS at 21:27

## 2025-01-07 RX ADMIN — GABAPENTIN 300 MG: 300 CAPSULE ORAL at 22:00

## 2025-01-07 RX ADMIN — PANTOPRAZOLE SODIUM 40 MG: 40 INJECTION, POWDER, FOR SOLUTION INTRAVENOUS at 15:49

## 2025-01-07 RX ADMIN — ENOXAPARIN SODIUM 40 MG: 40 INJECTION SUBCUTANEOUS at 15:48

## 2025-01-07 RX ADMIN — CEFTRIAXONE SODIUM 2 G: 2 INJECTION, SOLUTION INTRAVENOUS at 14:26

## 2025-01-07 RX ADMIN — KIT FOR THE PREPARATION OF TECHNETIUM TC 99M ALBUMIN AGGREGATED 4.2 MILLICURIE: 2.5 INJECTION, POWDER, FOR SOLUTION INTRAVENOUS at 15:30

## 2025-01-07 RX ADMIN — METHYLPREDNISOLONE SODIUM SUCCINATE 125 MG: 125 INJECTION, POWDER, FOR SOLUTION INTRAMUSCULAR; INTRAVENOUS at 11:43

## 2025-01-07 RX ADMIN — IPRATROPIUM BROMIDE AND ALBUTEROL SULFATE 3 ML: 2.5; .5 SOLUTION RESPIRATORY (INHALATION) at 11:42

## 2025-01-07 SDOH — SOCIAL STABILITY: SOCIAL INSECURITY: DO YOU FEEL ANYONE HAS EXPLOITED OR TAKEN ADVANTAGE OF YOU FINANCIALLY OR OF YOUR PERSONAL PROPERTY?: NO

## 2025-01-07 SDOH — ECONOMIC STABILITY: HOUSING INSECURITY: AT ANY TIME IN THE PAST 12 MONTHS, WERE YOU HOMELESS OR LIVING IN A SHELTER (INCLUDING NOW)?: NO

## 2025-01-07 SDOH — SOCIAL STABILITY: SOCIAL INSECURITY: WERE YOU ABLE TO COMPLETE ALL THE BEHAVIORAL HEALTH SCREENINGS?: YES

## 2025-01-07 SDOH — ECONOMIC STABILITY: HOUSING INSECURITY: IN THE LAST 12 MONTHS, WAS THERE A TIME WHEN YOU WERE NOT ABLE TO PAY THE MORTGAGE OR RENT ON TIME?: NO

## 2025-01-07 SDOH — SOCIAL STABILITY: SOCIAL INSECURITY: ARE YOU OR HAVE YOU BEEN THREATENED OR ABUSED PHYSICALLY, EMOTIONALLY, OR SEXUALLY BY ANYONE?: NO

## 2025-01-07 SDOH — SOCIAL STABILITY: SOCIAL INSECURITY
WITHIN THE LAST YEAR, HAVE YOU BEEN KICKED, HIT, SLAPPED, OR OTHERWISE PHYSICALLY HURT BY YOUR PARTNER OR EX-PARTNER?: NO

## 2025-01-07 SDOH — SOCIAL STABILITY: SOCIAL INSECURITY: ABUSE: ADULT

## 2025-01-07 SDOH — ECONOMIC STABILITY: FOOD INSECURITY: WITHIN THE PAST 12 MONTHS, YOU WORRIED THAT YOUR FOOD WOULD RUN OUT BEFORE YOU GOT THE MONEY TO BUY MORE.: NEVER TRUE

## 2025-01-07 SDOH — ECONOMIC STABILITY: FOOD INSECURITY: WITHIN THE PAST 12 MONTHS, THE FOOD YOU BOUGHT JUST DIDN'T LAST AND YOU DIDN'T HAVE MONEY TO GET MORE.: NEVER TRUE

## 2025-01-07 SDOH — ECONOMIC STABILITY: INCOME INSECURITY: IN THE PAST 12 MONTHS HAS THE ELECTRIC, GAS, OIL, OR WATER COMPANY THREATENED TO SHUT OFF SERVICES IN YOUR HOME?: NO

## 2025-01-07 SDOH — SOCIAL STABILITY: SOCIAL INSECURITY
WITHIN THE LAST YEAR, HAVE YOU BEEN RAPED OR FORCED TO HAVE ANY KIND OF SEXUAL ACTIVITY BY YOUR PARTNER OR EX-PARTNER?: NO

## 2025-01-07 SDOH — ECONOMIC STABILITY: FOOD INSECURITY: HOW HARD IS IT FOR YOU TO PAY FOR THE VERY BASICS LIKE FOOD, HOUSING, MEDICAL CARE, AND HEATING?: NOT VERY HARD

## 2025-01-07 SDOH — SOCIAL STABILITY: SOCIAL INSECURITY: HAVE YOU HAD ANY THOUGHTS OF HARMING ANYONE ELSE?: NO

## 2025-01-07 SDOH — SOCIAL STABILITY: SOCIAL INSECURITY: ARE THERE ANY APPARENT SIGNS OF INJURIES/BEHAVIORS THAT COULD BE RELATED TO ABUSE/NEGLECT?: NO

## 2025-01-07 SDOH — SOCIAL STABILITY: SOCIAL INSECURITY: WITHIN THE LAST YEAR, HAVE YOU BEEN HUMILIATED OR EMOTIONALLY ABUSED IN OTHER WAYS BY YOUR PARTNER OR EX-PARTNER?: NO

## 2025-01-07 SDOH — SOCIAL STABILITY: SOCIAL INSECURITY: DO YOU FEEL UNSAFE GOING BACK TO THE PLACE WHERE YOU ARE LIVING?: NO

## 2025-01-07 SDOH — SOCIAL STABILITY: SOCIAL INSECURITY: DOES ANYONE TRY TO KEEP YOU FROM HAVING/CONTACTING OTHER FRIENDS OR DOING THINGS OUTSIDE YOUR HOME?: NO

## 2025-01-07 SDOH — SOCIAL STABILITY: SOCIAL INSECURITY: WITHIN THE LAST YEAR, HAVE YOU BEEN AFRAID OF YOUR PARTNER OR EX-PARTNER?: NO

## 2025-01-07 SDOH — ECONOMIC STABILITY: TRANSPORTATION INSECURITY: IN THE PAST 12 MONTHS, HAS LACK OF TRANSPORTATION KEPT YOU FROM MEDICAL APPOINTMENTS OR FROM GETTING MEDICATIONS?: NO

## 2025-01-07 SDOH — ECONOMIC STABILITY: HOUSING INSECURITY: IN THE PAST 12 MONTHS, HOW MANY TIMES HAVE YOU MOVED WHERE YOU WERE LIVING?: 0

## 2025-01-07 SDOH — SOCIAL STABILITY: SOCIAL INSECURITY: HAVE YOU HAD THOUGHTS OF HARMING ANYONE ELSE?: NO

## 2025-01-07 SDOH — SOCIAL STABILITY: SOCIAL INSECURITY: HAS ANYONE EVER THREATENED TO HURT YOUR FAMILY OR YOUR PETS?: NO

## 2025-01-07 ASSESSMENT — ENCOUNTER SYMPTOMS
PALPITATIONS: 0
CHILLS: 0
COUGH: 0
ACTIVITY CHANGE: 1
WHEEZING: 1
DIZZINESS: 0
CHEST TIGHTNESS: 0
COUGH: 1
COLOR CHANGE: 1
SORE THROAT: 0
RHINORRHEA: 0
NAUSEA: 1
HEADACHES: 0
ABDOMINAL PAIN: 0
VOMITING: 0
DIAPHORESIS: 0
CONFUSION: 0
CONSTIPATION: 0
FATIGUE: 0
SHORTNESS OF BREATH: 1
HEMATURIA: 0
FEVER: 1
DYSURIA: 0
FREQUENCY: 0
BLOOD IN STOOL: 0

## 2025-01-07 ASSESSMENT — LIFESTYLE VARIABLES
HOW OFTEN DO YOU HAVE A DRINK CONTAINING ALCOHOL: NEVER
EVER FELT BAD OR GUILTY ABOUT YOUR DRINKING: NO
TOTAL SCORE: 0
HAVE YOU EVER FELT YOU SHOULD CUT DOWN ON YOUR DRINKING: NO
HAVE PEOPLE ANNOYED YOU BY CRITICIZING YOUR DRINKING: NO
AUDIT-C TOTAL SCORE: 0
AUDIT-C TOTAL SCORE: 0
HOW MANY STANDARD DRINKS CONTAINING ALCOHOL DO YOU HAVE ON A TYPICAL DAY: PATIENT DOES NOT DRINK
HOW OFTEN DO YOU HAVE 6 OR MORE DRINKS ON ONE OCCASION: NEVER
SKIP TO QUESTIONS 9-10: 1
EVER HAD A DRINK FIRST THING IN THE MORNING TO STEADY YOUR NERVES TO GET RID OF A HANGOVER: NO

## 2025-01-07 ASSESSMENT — COGNITIVE AND FUNCTIONAL STATUS - GENERAL
STANDING UP FROM CHAIR USING ARMS: A LITTLE
DRESSING REGULAR LOWER BODY CLOTHING: A LITTLE
PATIENT BASELINE BEDBOUND: NO
CLIMB 3 TO 5 STEPS WITH RAILING: A LOT
WALKING IN HOSPITAL ROOM: A LOT
MOVING TO AND FROM BED TO CHAIR: A LOT
DRESSING REGULAR UPPER BODY CLOTHING: A LITTLE
MOBILITY SCORE: 15
TURNING FROM BACK TO SIDE WHILE IN FLAT BAD: A LITTLE
DAILY ACTIVITIY SCORE: 20
HELP NEEDED FOR BATHING: A LITTLE
MOVING FROM LYING ON BACK TO SITTING ON SIDE OF FLAT BED WITH BEDRAILS: A LITTLE
TOILETING: A LITTLE

## 2025-01-07 ASSESSMENT — PAIN SCALES - GENERAL
PAINLEVEL_OUTOF10: 0 - NO PAIN

## 2025-01-07 ASSESSMENT — ACTIVITIES OF DAILY LIVING (ADL)
HEARING - RIGHT EAR: FUNCTIONAL
GROOMING: NEEDS ASSISTANCE
JUDGMENT_ADEQUATE_SAFELY_COMPLETE_DAILY_ACTIVITIES: YES
LACK_OF_TRANSPORTATION: NO
ASSISTIVE_DEVICE: WALKER;CANE;EYEGLASSES
TOILETING: NEEDS ASSISTANCE
BATHING: NEEDS ASSISTANCE
HEARING - LEFT EAR: FUNCTIONAL
ADEQUATE_TO_COMPLETE_ADL: YES
PATIENT'S MEMORY ADEQUATE TO SAFELY COMPLETE DAILY ACTIVITIES?: YES
DRESSING YOURSELF: NEEDS ASSISTANCE
LACK_OF_TRANSPORTATION: NO
WALKS IN HOME: NEEDS ASSISTANCE
FEEDING YOURSELF: INDEPENDENT

## 2025-01-07 ASSESSMENT — PAIN - FUNCTIONAL ASSESSMENT
PAIN_FUNCTIONAL_ASSESSMENT: 0-10

## 2025-01-07 ASSESSMENT — PAIN DESCRIPTION - PROGRESSION: CLINICAL_PROGRESSION: NOT CHANGED

## 2025-01-07 NOTE — H&P
History Of Present Illness  Kaylene Feliciano is a 69 y.o. female with medical history of HTN, COPD, DM II, ARCHIE, asthma, HLD, diastolic HF, pulmonary HTN presented to [facility] on 1/7/2025 with complaint of respiratory distress from home with her .  Currently on bipap, HPI limited. Prior medical  records reviewed for past medical HX. and is alert and oriented x 3 currently on BiPAP states that she has been sick since the beginning of December.  Endorses having upper respiratory symptoms including cough with productive sputum.  Sputum is yellow in color.  Endorses shortness of breath with increased wheezing. states she had a fever last 2 days ago.  Has been laying in bed on her left side for at least the last week.  ED nurse reports that patient was full of stool and urine upon arrival to the emergency room.    ED course: From home for resp distress. Place on Bipap. BMP remarkable for a  creat 1.11, which is upper slightly from baseline. CBC with leukocytosis WBC 15.7 and anemia H&H 9.3/32.1, plt 330.  Abg's respiratory acidosis placed on bipap with slight improvement. VQ pending. Flu/covid negative      Past Medical History  Past Medical History:   Diagnosis Date    Asthma 09/15/2023    Chronic bipolar affective disorder (Multi) 09/15/2023    Chronic hypercapnic respiratory failure (Multi)     CKD (chronic kidney disease), stage II 09/15/2023    Depression 09/15/2023    Diabetes mellitus (Multi)     Diastolic HF (heart failure)     Essential hypertension, benign 09/15/2023    Femur fracture (Multi)     Hyperlipidemia 09/15/2023    Hypertension     Poorly controlled type 2 diabetes mellitus with complication (Multi) 09/15/2023    S/P deep brain stimulator placement     Vitamin D deficiency 09/15/2023       Surgical History  Past Surgical History:   Procedure Laterality Date    OTHER SURGICAL HISTORY  01/29/2021    Knee arthroscopy    OTHER SURGICAL HISTORY  01/29/2021    Deep brain stimulation    OTHER SURGICAL  HISTORY  2021    Hysterectomy    OTHER SURGICAL HISTORY  2021    Cholecystectomy    OTHER SURGICAL HISTORY  10/14/2021    Knee surgery    OTHER SURGICAL HISTORY  10/14/2021    Tonsillectomy with adenoidectomy    OTHER SURGICAL HISTORY  10/14/2021    Vaginal sling procedure    OTHER SURGICAL HISTORY  10/19/2021     section        Social History  Social History     Tobacco Use    Smoking status: Never    Smokeless tobacco: Never   Vaping Use    Vaping status: Never Used   Substance Use Topics    Alcohol use: Never    Drug use: Never        Family History  Family History   Problem Relation Name Age of Onset    Diabetes Mother      Hypertension Mother      Thyroid cancer Mother      Heart attack Mother      Osteoporosis Mother      Stroke Father      Hypertension Father      Diabetes Brother      Hypertension Brother      Cancer Brother      Diabetes Other Grandparent         type 2, without complication, unspecified insulin use        Allergies  Sulfa (sulfonamide antibiotics), Belladonna, Ciprofloxacin, Adhesive tape-silicones, Iodinated contrast media, and Tegaderm ag mesh [silver]    Review of Systems   Constitutional:  Positive for fever. Negative for diaphoresis and fatigue.   HENT:  Positive for congestion.    Respiratory:  Positive for cough, shortness of breath and wheezing. Negative for chest tightness.    Cardiovascular:  Negative for chest pain, palpitations and leg swelling.   Gastrointestinal:  Positive for nausea. Negative for abdominal pain, blood in stool, constipation and vomiting.   Genitourinary:  Negative for decreased urine volume, dysuria, frequency and hematuria.   Skin:  Positive for color change and rash.   Neurological:  Negative for dizziness and headaches.   Psychiatric/Behavioral:  Negative for confusion.        Physical Exam  Constitutional:       General: She is not in acute distress.     Appearance: She is ill-appearing and toxic-appearing.      Comments: Currently on  "BiPAP but alert and oriented x 3.  Able to answer questions appropriately   Eyes:      Extraocular Movements: Extraocular movements intact.      Pupils: Pupils are equal, round, and reactive to light.   Cardiovascular:      Rate and Rhythm: Normal rate and regular rhythm.   Pulmonary:      Effort: Respiratory distress present.      Breath sounds: No wheezing or rhonchi.      Comments: Very diminished breath sounds  Abdominal:      General: There is no distension.      Tenderness: There is no abdominal tenderness. There is no guarding or rebound.   Skin:     Capillary Refill: Capillary refill takes less than 2 seconds.      Findings: Erythema, lesion and rash present.      Comments: Left breast with edema, erythema.  Left flank and abdomen with erythema and open scabbed wounds.  Fungal infection noted to left breast and bilateral groin   Neurological:      Mental Status: She is oriented to person, place, and time.   Psychiatric:         Behavior: Behavior normal.         Thought Content: Thought content normal.         Judgment: Judgment normal.         Last Recorded Vitals  Visit Vitals  /56   Pulse 66   Temp 35.9 °C (96.6 °F)   Resp 17   Ht 1.6 m (5' 3\")   Wt 127 kg (280 lb)   SpO2 95%   BMI 49.60 kg/m²   OB Status Postmenopausal   Smoking Status Never   BSA 2.38 m²        Relevant Results  Results for orders placed or performed during the hospital encounter of 01/07/25 (from the past 24 hours)   Lactate   Result Value Ref Range    Lactate 1.8 0.4 - 2.0 mmol/L   CBC and Auto Differential   Result Value Ref Range    WBC 15.9 (H) 4.4 - 11.3 x10*3/uL    nRBC 0.0 0.0 - 0.0 /100 WBCs    RBC 3.69 (L) 4.00 - 5.20 x10*6/uL    Hemoglobin 9.3 (L) 12.0 - 16.0 g/dL    Hematocrit 32.1 (L) 36.0 - 46.0 %    MCV 87 80 - 100 fL    MCH 25.2 (L) 26.0 - 34.0 pg    MCHC 29.0 (L) 32.0 - 36.0 g/dL    RDW 16.1 (H) 11.5 - 14.5 %    Platelets 330 150 - 450 x10*3/uL    Neutrophils % 70.3 40.0 - 80.0 %    Immature Granulocytes %, " Automated 0.4 0.0 - 0.9 %    Lymphocytes % 20.4 13.0 - 44.0 %    Monocytes % 5.4 2.0 - 10.0 %    Eosinophils % 3.0 0.0 - 6.0 %    Basophils % 0.5 0.0 - 2.0 %    Neutrophils Absolute 11.16 (H) 1.20 - 7.70 x10*3/uL    Immature Granulocytes Absolute, Automated 0.07 0.00 - 0.70 x10*3/uL    Lymphocytes Absolute 3.23 1.20 - 4.80 x10*3/uL    Monocytes Absolute 0.85 0.10 - 1.00 x10*3/uL    Eosinophils Absolute 0.47 0.00 - 0.70 x10*3/uL    Basophils Absolute 0.08 0.00 - 0.10 x10*3/uL   Comprehensive Metabolic Panel   Result Value Ref Range    Glucose 118 (H) 74 - 99 mg/dL    Sodium 137 136 - 145 mmol/L    Potassium 4.5 3.5 - 5.3 mmol/L    Chloride 96 (L) 98 - 107 mmol/L    Bicarbonate 33 (H) 21 - 32 mmol/L    Anion Gap 13 10 - 20 mmol/L    Urea Nitrogen 20 6 - 23 mg/dL    Creatinine 1.11 (H) 0.50 - 1.05 mg/dL    eGFR 54 (L) >60 mL/min/1.73m*2    Calcium 8.9 8.6 - 10.3 mg/dL    Albumin 3.9 3.4 - 5.0 g/dL    Alkaline Phosphatase 104 33 - 136 U/L    Total Protein 7.2 6.4 - 8.2 g/dL    AST 19 9 - 39 U/L    Bilirubin, Total 0.7 0.0 - 1.2 mg/dL    ALT 14 7 - 45 U/L   B-Type Natriuretic Peptide   Result Value Ref Range     (H) 0 - 99 pg/mL   Protime-INR   Result Value Ref Range    Protime 13.5 (H) 9.8 - 12.8 seconds    INR 1.2 (H) 0.9 - 1.1   Troponin I, High Sensitivity, Initial   Result Value Ref Range    Troponin I, High Sensitivity 12 0 - 13 ng/L   Creatine Kinase   Result Value Ref Range    Creatine Kinase 46 0 - 215 U/L   Blood Gas Venous Full Panel   Result Value Ref Range    POCT pH, Venous 7.22 (LL) 7.33 - 7.43 pH    POCT pCO2, Venous 86 (HH) 41 - 51 mm Hg    POCT pO2, Venous 27 (L) 35 - 45 mm Hg    POCT SO2, Venous 32 (L) 45 - 75 %    POCT Oxy Hemoglobin, Venous 31.8 (L) 45.0 - 75.0 %    POCT Hematocrit Calculated, Venous 29.0 (L) 36.0 - 46.0 %    POCT Sodium, Venous 135 (L) 136 - 145 mmol/L    POCT Potassium, Venous 4.8 3.5 - 5.3 mmol/L    POCT Chloride, Venous 98 98 - 107 mmol/L    POCT Ionized Calicum, Venous  1.22 1.10 - 1.33 mmol/L    POCT Glucose, Venous 124 (H) 74 - 99 mg/dL    POCT Lactate, Venous 1.2 0.4 - 2.0 mmol/L    POCT Base Excess, Venous 5.6 (H) -2.0 - 3.0 mmol/L    POCT HCO3 Calculated, Venous 35.2 (H) 22.0 - 26.0 mmol/L    POCT Hemoglobin, Venous 9.7 (L) 12.0 - 16.0 g/dL    POCT Anion Gap, Venous 7.0 (L) 10.0 - 25.0 mmol/L    Patient Temperature      FiO2 100 %    Ipap CMH2O 18.0 cm H2O    Epap CMH2O 10.0 cm H2O    Critical Called By THOMAS, RT     Critical Called To DR CLAUDINE MEDINA     Critical Call Time 1154     Critical Read Back Y    Influenza A, and B PCR   Result Value Ref Range    Flu A Result Not Detected Not Detected    Flu B Result Not Detected Not Detected   Sars-CoV-2 PCR   Result Value Ref Range    Coronavirus 2019, PCR Not Detected Not Detected   POCT GLUCOSE   Result Value Ref Range    POCT Glucose 116 (H) 74 - 99 mg/dL   Troponin, High Sensitivity, 1 Hour   Result Value Ref Range    Troponin I, High Sensitivity 12 0 - 13 ng/L   Iron and TIBC   Result Value Ref Range    Iron 27 (L) 35 - 150 ug/dL    UIBC 235 110 - 370 ug/dL    TIBC 262 240 - 445 ug/dL    % Saturation 10 (L) 25 - 45 %   Green Top   Result Value Ref Range    Extra Tube Hold for add-ons.    BLOOD GAS VENOUS FULL PANEL   Result Value Ref Range    POCT pH, Venous 7.23 (LL) 7.33 - 7.43 pH    POCT pCO2, Venous 80 (HH) 41 - 51 mm Hg    POCT pO2, Venous 37 35 - 45 mm Hg    POCT SO2, Venous 54 45 - 75 %    POCT Oxy Hemoglobin, Venous 53.0 45.0 - 75.0 %    POCT Hematocrit Calculated, Venous 36.0 36.0 - 46.0 %    POCT Sodium, Venous 133 (L) 136 - 145 mmol/L    POCT Potassium, Venous 4.8 3.5 - 5.3 mmol/L    POCT Chloride, Venous 98 98 - 107 mmol/L    POCT Ionized Calicum, Venous 1.20 1.10 - 1.33 mmol/L    POCT Glucose, Venous 122 (H) 74 - 99 mg/dL    POCT Lactate, Venous 1.7 0.4 - 2.0 mmol/L    POCT Base Excess, Venous 3.7 (H) -2.0 - 3.0 mmol/L    POCT HCO3 Calculated, Venous 33.5 (H) 22.0 - 26.0 mmol/L    POCT Hemoglobin, Venous 12.0 12.0  - 16.0 g/dL    POCT Anion Gap, Venous 6.0 (L) 10.0 - 25.0 mmol/L    Patient Temperature      FiO2 60 %    Ventilator Mode BiPAP     Ipap CMH2O 18.0 cm H2O    Epap CMH2O 10.0 cm H2O    Critical Called By RT MT     Critical Called To MD HUTCHISON     Critical Call Time 1352     Critical Read Back Y       Imaging:  Lower extremity venous duplex bilateral   Final Result      XR chest 1 view   Final Result   Suggestion of CHF with pulmonary edema. Additional left lung   consolidation could present atelectasis or concurrent pneumonia in   the appropriate clinical context.        MACRO   None        Signed by: Flora Finney 1/7/2025 1:15 PM   Dictation workstation:   [id number]      NM injection no scan    (Results Pending)   CT angio chest for pulmonary embolism    (Results Pending)         Echo:  No results found for this or any previous visit.    Home Medications  Prior to Admission medications    Medication Sig Start Date End Date Taking? Authorizing Provider   amLODIPine (Norvasc) 5 mg tablet Take 2 tablets (10 mg) by mouth once daily. 7/8/24  Yes Cesia Vizcarra MD   menthol-zinc oxide (Calmoseptine - Risamine) 0.44-20.6 % ointment Apply 1 Application topically 4 times a day as needed (moisture associated skin damage). 7/8/24  Yes Cesia Vizcarra MD   metoprolol succinate XL (Toprol-XL) 25 mg 24 hr tablet Take 1 tablet (25 mg) by mouth once daily. Hold for SBP<110 or HR <70 7/8/24  Yes Cesia Vizcarra MD   albuterol 2.5 mg /3 mL (0.083 %) nebulizer solution Take 3 mL (2.5 mg) by nebulization every 4 hours if needed for shortness of breath or wheezing.    Historical Provider, MD   apple cider vinegar 300 mg tablet Take 1 capsule by mouth once daily.    Historical Provider, MD   aspirin 81 mg chewable tablet Chew 1 tablet (81 mg) once daily.    Historical Provider, MD   atorvastatin (Lipitor) 10 mg tablet Take 1 tablet (10 mg) by mouth once daily at bedtime.    Historical Provider, MD   citalopram (CeleXA) 40 mg  tablet Take 1 tablet (40 mg) by mouth once daily.    Historical Provider, MD   cranberry 400 mg capsule Take 1 capsule by mouth once daily.    Historical Provider, MD   furosemide (Lasix) 20 mg tablet Take 1 tablet (20 mg) by mouth once daily.    Historical Provider, MD   gabapentin (Neurontin) 300 mg capsule Take 1 capsule (300 mg) by mouth 2 times a day.    Historical Provider, MD   insulin glargine (Lantus U-100 Insulin) 100 unit/mL injection Inject 50 Units under the skin 2 times a day. 9/17/24 10/17/24  KELLI Dash   insulin lispro (HumaLOG) 100 unit/mL injection Inject 0-15 Units under the skin 3 times a day. 111-150    Give 1 unit  151-200    Give 3 units  201-250    Give 6 units  251-300    Give 9 units  301-350    Give 12 units  351-400    Give 15 units    Historical Provider, MD   oxygen (O2) gas therapy Inhale 1 each once every 24 hours. 7/8/24   Cesia Vizcarra MD   oxygen (O2) gas therapy Inhale 1 each once every 24 hours. 7/8/24   Cesia Vizcarra MD   oxygen (O2) gas therapy Inhale 1 each every 12 hours. 9/18/24   KELLI Foreman   pancrelipase, Lip-Prot-Amyl, (Creon) 6,000-19,000 -30,000 unit capsule Take 2 capsules by mouth 3 times a day before meals.    Historical Provider, MD   ipratropium-albuteroL (Duo-Neb) 0.5-2.5 mg/3 mL nebulizer solution Take 3 mL by nebulization every 4 hours.  1/7/25  Historical Provider, MD   lidocaine 4 % patch Place 1 patch on the skin once daily as needed for moderate pain (4 - 6).  1/7/25  Historical Provider, MD   methocarbamol (Robaxin) 500 mg tablet Take 1 tablet (500 mg) by mouth 2 times a day.  1/7/25  Historical Provider, MD       Medications  Scheduled medications  [START ON 1/8/2025] cefTRIAXone, 2 g, intravenous, q24h  [START ON 1/8/2025] doxycycline, 100 mg, intravenous, q12h  enoxaparin, 40 mg, subcutaneous, q12h YARELI  fluconazole, 200 mg, intravenous, q24h  methylPREDNISolone sodium succinate (PF), 40 mg, intravenous,  q6h  nystatin, 1 Application, Topical, BID  oxygen, , inhalation, Continuous - Inhalation  oxygen, , inhalation, Continuous - Inhalation  pantoprazole, 40 mg, intravenous, Daily  vancomycin, 1,000 mg, intravenous, q12h      Continuous medications     PRN medications  acetaminophen, 650 mg, q4h PRN  vancomycin, , Daily PRN        Assessment/Plan   Assessment & Plan  Acute hypoxic on chronic hypercapnic respiratory failure (Multi)    Acute diastolic heart failure    Pneumonia of both lungs due to infectious organism    Anemia    Leukocytosis    Obesity    Cellulitis of left breast    Fungal infection of skin       Admit to a SD bed, with tele q4 hr vital signs  -Patient is currently awake alert and appropriate on BiPAP will trial on high flow nasal cannula will need BiPAP at nightly though.   -Rocephin/doxy for PNA  -Vancomycin for breast cellulitis  -Diflucan for breast and groin fungal infection  -urine antigens pending'  -BC and urine culture pending  -MRSA swab, pending  --flu/covid negative  -VQ unable to complete V/Q due to body habitus  -CT angio ordered to rule out PE patient has contrast allergy will need premedication with 13-hour prep. CT scheduled for 6am. Pre-medication ordered per protocol   -Venous duplex pending   -occult stool and iron studies ordered  -protonix 40mg daily for GI prophylaxis  -received 20mg lasix in ED, I have ordered an additional 20mg IV      VTE prophylaxis:   DVT prophylaxis: subcutaneous Lovenox and SCDs      See additional orders for further plan of care.   Further evaluation and management per attending and consulting physicians.      Code Status  Full code        I spent a total of 60 minutes on the date of the service in the professional and overall care of this patient.which included preparing to see the patient, face-to-face patient care, completing clinical documentation, and obtaining and/or reviewing separately obtained history.     Ce Lopez, APRN-CNP  Med  House  Pager [phone]

## 2025-01-07 NOTE — CARE PLAN
The patient's goals for the shift include      The clinical goals for the shift include      Over the shift, the patient did not make progress toward the following goals. Barriers to progression include new admit,  BiPap. Recommendations to address these barriers include monitor.

## 2025-01-07 NOTE — ED PROCEDURE NOTE
Procedure  Critical Care    Performed by: Nolan Del Rio MD  Authorized by: Nolan Del Rio MD    Critical care provider statement:     Critical care time (minutes):  61    Critical care time was exclusive of:  Separately billable procedures and treating other patients and teaching time    Critical care was necessary to treat or prevent imminent or life-threatening deterioration of the following conditions:  Shock, respiratory failure, cardiac failure and CNS failure or compromise    Critical care was time spent personally by me on the following activities:  Blood draw for specimens, development of treatment plan with patient or surrogate, evaluation of patient's response to treatment, examination of patient, interpretation of cardiac output measurements, obtaining history from patient or surrogate, ordering and performing treatments and interventions, ordering and review of laboratory studies, ordering and review of radiographic studies, pulse oximetry, re-evaluation of patient's condition and review of old charts    Care discussed with: admitting provider                 Nolan Del Rio MD  01/07/25 2254

## 2025-01-07 NOTE — PROGRESS NOTES
ADDENDUM: Vancomycin Dosing by Pharmacy  INITIAL    Kaylene Feliciano is a 69 y.o. year old female who Pharmacy has been consulted for vancomycin dosing for cellulitis, skin and soft tissue. Based on the patient's indication and renal status this patient will be dosed based on a goal AUC of 400-600.     Renal function is currently WNL, Scr 1.11 (BL 0.8-1.0).    Visit Vitals  BP (!) 106/48   Pulse 64   Temp 35.9 °C (96.6 °F)   Resp 16        Lab Results   Component Value Date    CREATININE 1.11 (H) 2025    CREATININE 0.79 2024    CREATININE 0.84 2024    CREATININE 1.07 (H) 2024      Patient weight is as follows:   Vitals:    25 1130   Weight: 127 kg (280 lb)       Cultures:  No results found for the encounter in last 14 days.     No intake/output data recorded.  I/O during current shift:  No intake/output data recorded.    Temp (24hrs), Av.9 °C (96.6 °F), Min:35.9 °C (96.6 °F), Max:35.9 °C (96.6 °F)     Assessment/Plan   Patient will not be given a loading dose.  Will initiate vancomycin maintenance, 1500 mg every 24 hours.  This dosing regimen is predicted by [employer] to result in the following pharmacokinetic parameters:  Loading dose: N/A  Regimen: 1500 mg IV every 24 hours.  Start time: 18:01 on 2025  Exposure target: AUC24 (range)400-600 mg/L.hr   TEU49-04: 383 mg/L.hr  AUC24,ss: 414 mg/L.hr  Probability of AUC24 > 400: 53 %  Ctrough,ss: 8.7 mg/L  Probability of Ctrough,ss > 20: 17 %  Follow-up level will be ordered on  with AM labs unless clinically indicated sooner. Low threshold for multiple levels within dosing window given BMI 49.6.  Will continue to monitor renal function daily while on vancomycin and order serum creatinine at least every 48 hours if not already ordered.  Follow for continued vancomycin needs, clinical response, and signs/symptoms of toxicity.     Thank you for allowing me to take part in the care of this patient.     Porsha Lira, PharmD,  [facility]  2025 6:02 PM       Vancomycin Dosing by Pharmacy- INITIAL    Kaylene Feliciano is a 69 y.o. year old female who Pharmacy has been consulted for vancomycin dosing for cellulitis, skin and soft tissue. Based on the patient's indication and renal status this patient will be dosed based on a goal AUC of 400-600.     Renal function is currently stable.    Visit Vitals  /56   Pulse 66   Temp 35.9 °C (96.6 °F)   Resp 17        Lab Results   Component Value Date    CREATININE 1.11 (H) 2025    CREATININE 0.79 2024    CREATININE 0.84 2024    CREATININE 1.07 (H) 2024        Patient weight is as follows:   Vitals:    25 1130   Weight: 127 kg (280 lb)       Cultures:  No results found for the encounter in last 14 days.        No intake/output data recorded.  I/O during current shift:  No intake/output data recorded.    Temp (24hrs), Av.9 °C (96.6 °F), Min:35.9 °C (96.6 °F), Max:35.9 °C (96.6 °F)         Assessment/Plan     Patient will not be given a loading dose.  Will initiate vancomycin maintenance,  1000 mg every 12 hours.    This dosing regimen is predicted by [employer] to result in the following pharmacokinetic parameters:  Loading dose: N/A  Regimen: 1000 mg IV every 12 hours.  Start time: 16:44 on 2025  Exposure target: AUC24 (range)400-600 mg/L.hr   CSR89-61: 492 mg/L.hr  AUC24,ss: 552 mg/L.hr  Probability of AUC24 > 400: 74 %  Ctrough,ss: 16.5 mg/L  Probability of Ctrough,ss > 20: 39 %      Follow-up level will be ordered on  at 1700 unless clinically indicated sooner.  Will continue to monitor renal function daily while on vancomycin and order serum creatinine at least every 48 hours if not already ordered.  Follow for continued vancomycin needs, clinical response, and signs/symptoms of toxicity.       Larry Hermosillo, PharmD

## 2025-01-07 NOTE — ED PROVIDER NOTES
EMERGENCY DEPARTMENT ENCOUNTER      Pt Name: Kaylene Feliciano  MRN: [id number]  Birthdate 1955  Date of evaluation: 1/7/2025  Provider: Bautista Dotson DO    CHIEF COMPLAINT       Chief Complaint   Patient presents with    Respiratory Distress         HISTORY OF PRESENT ILLNESS    69-year-old female history of asthma, sleep apnea on CPAP, chronic kidney disease, obesity, diabetes, heart failure complaints emergency room today.  She was at home, been lying in bed and she states for a few days now.  She is unsure why she was lying in bed, is having worsening shortness of breath.  Her  was on scene for EMS but did not provide a history for EMS.  EMS states that she was covered in her own feces and has soiled herself multiple times.  Patient complaining of shortness of breath, denying any chest pain.  No abdominal pain.  Unclear how long she has been down for.      History provided by:  Patient      Nursing Notes were reviewed.    PAST MEDICAL HISTORY     Past Medical History:   Diagnosis Date    Asthma 09/15/2023    Chronic bipolar affective disorder (Multi) 09/15/2023    Chronic hypercapnic respiratory failure (Multi)     CKD (chronic kidney disease), stage II 09/15/2023    Depression 09/15/2023    Diabetes mellitus (Multi)     Diastolic HF (heart failure)     Essential hypertension, benign 09/15/2023    Femur fracture (Multi)     Hyperlipidemia 09/15/2023    Hypertension     Poorly controlled type 2 diabetes mellitus with complication (Multi) 09/15/2023    S/P deep brain stimulator placement     Vitamin D deficiency 09/15/2023         SURGICAL HISTORY       Past Surgical History:   Procedure Laterality Date    OTHER SURGICAL HISTORY  01/29/2021    Knee arthroscopy    OTHER SURGICAL HISTORY  01/29/2021    Deep brain stimulation    OTHER SURGICAL HISTORY  01/29/2021    Hysterectomy    OTHER SURGICAL HISTORY  01/29/2021    Cholecystectomy    OTHER SURGICAL HISTORY  10/14/2021    Knee surgery    OTHER SURGICAL HISTORY   10/14/2021    Tonsillectomy with adenoidectomy    OTHER SURGICAL HISTORY  10/14/2021    Vaginal sling procedure    OTHER SURGICAL HISTORY  10/19/2021     section         CURRENT MEDICATIONS       Previous Medications    AMLODIPINE (NORVASC) 5 MG TABLET    Take 2 tablets (10 mg) by mouth once daily.    APPLE CIDER VINEGAR 300 MG TABLET    Take 1 capsule by mouth once daily.    ASPIRIN 81 MG CHEWABLE TABLET    Chew 1 tablet (81 mg) once daily.    ATORVASTATIN (LIPITOR) 10 MG TABLET    Take 1 tablet (10 mg) by mouth once daily at bedtime.    CITALOPRAM (CELEXA) 40 MG TABLET    Take 1 tablet (40 mg) by mouth once daily.    CRANBERRY 400 MG CAPSULE    Take 1 capsule by mouth once daily.    GABAPENTIN (NEURONTIN) 300 MG CAPSULE    Take 1 capsule (300 mg) by mouth 2 times a day.    INSULIN GLARGINE (LANTUS U-100 INSULIN) 100 UNIT/ML INJECTION    Inject 50 Units under the skin 2 times a day.    INSULIN LISPRO (HUMALOG) 100 UNIT/ML INJECTION    Inject 0-15 Units under the skin 3 times a day. 111-150    Give 1 unit  151-200    Give 3 units  201-250    Give 6 units  251-300    Give 9 units  301-350    Give 12 units  351-400    Give 15 units    IPRATROPIUM-ALBUTEROL (DUO-NEB) 0.5-2.5 MG/3 ML NEBULIZER SOLUTION    Take 3 mL by nebulization every 4 hours.    LIDOCAINE 4 % PATCH    Place 1 patch on the skin once daily as needed for moderate pain (4 - 6).    MENTHOL-ZINC OXIDE (CALMOSEPTINE - RISAMINE) 0.44-20.6 % OINTMENT    Apply 1 Application topically 4 times a day as needed (moisture associated skin damage).    METHOCARBAMOL (ROBAXIN) 500 MG TABLET    Take 1 tablet (500 mg) by mouth 2 times a day.    METOPROLOL SUCCINATE XL (TOPROL-XL) 25 MG 24 HR TABLET    Take 1 tablet (25 mg) by mouth once daily. Hold for SBP<110 or HR <70    OXYGEN (O2) GAS THERAPY    Inhale 1 each once every 24 hours.    OXYGEN (O2) GAS THERAPY    Inhale 1 each once every 24 hours.    OXYGEN (O2) GAS THERAPY    Inhale 1 each every 12 hours.     PANCRELIPASE, LIP-PROT-AMYL, (CREON) 6,000-19,000 -30,000 UNIT CAPSULE    Take 2 capsules by mouth 3 times a day before meals.       ALLERGIES     Sulfa (sulfonamide antibiotics), Belladonna, Ciprofloxacin, Adhesive tape-silicones, Iodinated contrast media, and Tegaderm ag mesh [silver]    FAMILY HISTORY       Family History   Problem Relation Name Age of Onset    Diabetes Mother      Hypertension Mother      Thyroid cancer Mother      Heart attack Mother      Osteoporosis Mother      Stroke Father      Hypertension Father      Diabetes Brother      Hypertension Brother      Cancer Brother      Diabetes Other Grandparent         type 2, without complication, unspecified insulin use          SOCIAL HISTORY       Social History     Socioeconomic History    Marital status:    Tobacco Use    Smoking status: Never    Smokeless tobacco: Never   Vaping Use    Vaping status: Never Used   Substance and Sexual Activity    Alcohol use: Never    Drug use: Never    Sexual activity: Defer     Social Drivers of Health     Financial Resource Strain: Low Risk  (9/13/2024)    Overall Financial Resource Strain (CARDIA)     Difficulty of Paying Living Expenses: Not very hard   Food Insecurity: No Food Insecurity (9/13/2024)    Hunger Vital Sign     Worried About Running Out of Food in the Last Year: Never true     Ran Out of Food in the Last Year: Never true   Transportation Needs: No Transportation Needs (9/16/2024)    PRAPARE - Transportation     Lack of Transportation (Medical): No     Lack of Transportation (Non-Medical): No   Physical Activity: Unknown (7/1/2024)    Exercise Vital Sign     Days of Exercise per Week: 0 days   Stress: Patient Declined (7/1/2024)    [employer] - Occupational Stress Questionnaire     Feeling of Stress : Patient declined   Social Connections: Patient Declined (7/1/2024)    Social Connection and Isolation Panel [NHANES]     Frequency of Communication with Friends and  Family: Patient declined     Frequency of Social Gatherings with Friends and Family: Patient declined     Attends Taoist Services: Patient declined     Active Member of Clubs or Organizations: Patient declined     Attends Club or Organization Meetings: Patient declined     Marital Status: Patient declined   Housing Stability: Low Risk  (9/16/2024)    Housing Stability Vital Sign     Unable to Pay for Housing in the Last Year: No     Number of Times Moved in the Last Year: 0     Homeless in the Last Year: No       SCREENINGS                        PHYSICAL EXAM    (up to 7 for level 4, 8 or more for level 5)     ED Triage Vitals   Temp Pulse Resp BP   -- -- -- --      SpO2 Temp src Heart Rate Source Patient Position   -- -- -- --      BP Location FiO2 (%)     -- --       Physical Exam  Constitutional:       Appearance: She is ill-appearing.   HENT:      Head: Normocephalic and atraumatic.   Cardiovascular:      Rate and Rhythm: Normal rate and regular rhythm.   Pulmonary:      Breath sounds: No stridor. Wheezing present. No rhonchi or rales.      Comments: End expiratory wheezes bilateral  Abdominal:      Tenderness: There is no abdominal tenderness.      Comments: Protuberant abdomen.  No focal tenderness   Skin:     General: Skin is warm and dry.      Comments: There is a stage I and II pressure wound in the patient's left breast, left side of her abdomen, left axilla.  The part of the abdomen covers the entire left side of her abdomen.   Neurological:      Mental Status: Mental status is at baseline.          DIAGNOSTIC RESULTS     LABS:  Labs Reviewed   CBC WITH AUTO DIFFERENTIAL - Abnormal       Result Value    WBC 15.9 (*)     nRBC 0.0      RBC 3.69 (*)     Hemoglobin 9.3 (*)     Hematocrit 32.1 (*)     MCV 87      MCH 25.2 (*)     MCHC 29.0 (*)     RDW 16.1 (*)     Platelets 330      Neutrophils % 70.3      Immature Granulocytes %, Automated 0.4      Lymphocytes % 20.4      Monocytes % 5.4      Eosinophils  % 3.0      Basophils % 0.5      Neutrophils Absolute 11.16 (*)     Immature Granulocytes Absolute, Automated 0.07      Lymphocytes Absolute 3.23      Monocytes Absolute 0.85      Eosinophils Absolute 0.47      Basophils Absolute 0.08     COMPREHENSIVE METABOLIC PANEL - Abnormal    Glucose 118 (*)     Sodium 137      Potassium 4.5      Chloride 96 (*)     Bicarbonate 33 (*)     Anion Gap 13      Urea Nitrogen 20      Creatinine 1.11 (*)     eGFR 54 (*)     Calcium 8.9      Albumin 3.9      Alkaline Phosphatase 104      Total Protein 7.2      AST 19      Bilirubin, Total 0.7      ALT 14     B-TYPE NATRIURETIC PEPTIDE - Abnormal     (*)     Narrative:        <100 pg/mL - Heart failure unlikely  100-299 pg/mL - Intermediate probability of acute heart                  failure exacerbation. Correlate with clinical                  context and patient history.    >=300 pg/mL - Heart Failure likely. Correlate with clinical                  context and patient history.    BNP testing is performed using different testing methodology at [facility] than at other [facility]. Direct result comparisons should only be made within the same method.      PROTIME-INR - Abnormal    Protime 13.5 (*)     INR 1.2 (*)    BLOOD GAS VENOUS FULL PANEL - Abnormal    POCT pH, Venous 7.22 (*)     POCT pCO2, Venous 86 (*)     POCT pO2, Venous 27 (*)     POCT SO2, Venous 32 (*)     POCT Oxy Hemoglobin, Venous 31.8 (*)     POCT Hematocrit Calculated, Venous 29.0 (*)     POCT Sodium, Venous 135 (*)     POCT Potassium, Venous 4.8      POCT Chloride, Venous 98      POCT Ionized Calicum, Venous 1.22      POCT Glucose, Venous 124 (*)     POCT Lactate, Venous 1.2      POCT Base Excess, Venous 5.6 (*)     POCT HCO3 Calculated, Venous 35.2 (*)     POCT Hemoglobin, Venous 9.7 (*)     POCT Anion Gap, Venous 7.0 (*)     Patient Temperature        FiO2 100      Ipap CMH2O 18.0      Epap CMH2O 10.0      Critical Called By THOMAS, RT       Critical Called To DR CLAUDINE MEDINA      Critical Call Time 1154      Critical Read Back Y     BLOOD GAS VENOUS FULL PANEL - Abnormal    POCT pH, Venous 7.23 (*)     POCT pCO2, Venous 80 (*)     POCT pO2, Venous 37      POCT SO2, Venous 54      POCT Oxy Hemoglobin, Venous 53.0      POCT Hematocrit Calculated, Venous 36.0      POCT Sodium, Venous 133 (*)     POCT Potassium, Venous 4.8      POCT Chloride, Venous 98      POCT Ionized Calicum, Venous 1.20      POCT Glucose, Venous 122 (*)     POCT Lactate, Venous 1.7      POCT Base Excess, Venous 3.7 (*)     POCT HCO3 Calculated, Venous 33.5 (*)     POCT Hemoglobin, Venous 12.0      POCT Anion Gap, Venous 6.0 (*)     Patient Temperature        FiO2 60      Ventilator Mode BiPAP      Ipap CMH2O 18.0      Epap CMH2O 10.0      Critical Called By RT MT      Critical Called To MD HUTCHISON      Critical Call Time 1352      Critical Read Back Y     POCT GLUCOSE - Abnormal    POCT Glucose 116 (*)    LACTATE - Normal    Lactate 1.8      Narrative:     Venipuncture immediately after or during the administration of Metamizole may lead to falsely low results. Testing should be performed immediately prior to Metamizole dosing.   SERIAL TROPONIN-INITIAL - Normal    Troponin I, High Sensitivity 12      Narrative:     Less than 99th percentile of normal range cutoff-  Female and children under 18 years old <14 ng/L; Male <21 ng/L: Negative  Repeat testing should be performed if clinically indicated.     Female and children under 18 years old 14-50 ng/L; Male 21-50 ng/L:  Consistent with possible cardiac damage and possible increased clinical   risk. Serial measurements may help to assess extent of myocardial damage.     >50 ng/L: Consistent with cardiac damage, increased clinical risk and  myocardial infarction. Serial measurements may help assess extent of   myocardial damage.      NOTE: Children less than 1 year old may have higher baseline troponin   levels and results should be  interpreted in conjunction with the overall   clinical context.     NOTE: Troponin I testing is performed using a different   testing methodology at [facility] than at other   [facility]. Direct result comparisons should only   be made within the same method.   CREATINE KINASE - Normal    Creatine Kinase 46     SERIAL TROPONIN, 1 HOUR - Normal    Troponin I, High Sensitivity 12      Narrative:     Less than 99th percentile of normal range cutoff-  Female and children under 18 years old <14 ng/L; Male <21 ng/L: Negative  Repeat testing should be performed if clinically indicated.     Female and children under 18 years old 14-50 ng/L; Male 21-50 ng/L:  Consistent with possible cardiac damage and possible increased clinical   risk. Serial measurements may help to assess extent of myocardial damage.     >50 ng/L: Consistent with cardiac damage, increased clinical risk and  myocardial infarction. Serial measurements may help assess extent of   myocardial damage.      NOTE: Children less than 1 year old may have higher baseline troponin   levels and results should be interpreted in conjunction with the overall   clinical context.     NOTE: Troponin I testing is performed using a different   testing methodology at [facility] than at other   [facility]. Direct result comparisons should only   be made within the same method.   INFLUENZA A AND B PCR - Normal    Flu A Result Not Detected      Flu B Result Not Detected      Narrative:     This assay is an in vitro diagnostic multiplex nucleic acid amplification test for the detection and discrimination of Influenza A & B from nasopharyngeal specimens, and has been validated for use at [facility]. Negative results do not preclude Influenza A/B infections, and should not be used as the sole basis for diagnosis, treatment, or other management decisions. If Influenza A/B and RSV PCR results are negative, testing for  Parainfluenza virus, Adenovirus and Metapneumovirus is routinely performed for [facility] pediatric oncology and intensive care inpatients, and is available on other patients by placing an add-on request.   SARS-COV-2 PCR - Normal    Coronavirus 2019, PCR Not Detected      Narrative:     This assay has received FDA Emergency Use Authorization (EUA) and is only authorized for the duration of time that circumstances exist to justify the authorization of the emergency use of in vitro diagnostic tests for the detection of SARS-CoV-2 virus and/or diagnosis of COVID-19 infection under section 564(b)(1) of the Act, 21 U.S.C. 360bbb-3(b)(1). This assay is an in vitro diagnostic nucleic acid amplification test for the qualitative detection of SARS-CoV-2 from nasopharyngeal specimens and has been validated for use at [facility]. Negative results do not preclude COVID-19 infections and should not be used as the sole basis for diagnosis, treatment, or other management decisions.     BLOOD CULTURE   BLOOD CULTURE   TROPONIN SERIES- (INITIAL, 1 HR)    Narrative:     The following orders were created for panel order Troponin I Series, High Sensitivity (0, 1 HR).  Procedure                               Abnormality         Status                     ---------                               -----------         ------                     Troponin I, High Sensiti...[[id number]]  Normal              Final result               Troponin, High Sensitivi...[[id number]]  Normal              Final result                 Please view results for these tests on the individual orders.   GREEN TOP    Extra Tube Hold for add-ons.     URINALYSIS WITH REFLEX CULTURE AND MICROSCOPIC    Narrative:     The following orders were created for panel order Urinalysis with Reflex Culture and Microscopic.  Procedure                               Abnormality         Status                     ---------                               -----------          ------                     Urinalysis with Reflex C...[[id number]]                                                 Extra Urine Gray Tube[[id number]]                                                         Please view results for these tests on the individual orders.   URINALYSIS WITH REFLEX CULTURE AND MICROSCOPIC   EXTRA URINE GRAY TUBE       All other labs were within normal range or not returned as of this dictation.    Imaging  XR chest 1 view   Final Result   Suggestion of CHF with pulmonary edema. Additional left lung   consolidation could present atelectasis or concurrent pneumonia in   the appropriate clinical context.        MACRO   None        Signed by: Flora Finney 1/7/2025 1:15 PM   Dictation workstation:   [id number]      NM Lung perfusion with spect/ct    (Results Pending)        Procedures  Critical Care    Performed by: Bautista Dotson DO  Authorized by: Nolan Del Rio MD    Critical care provider statement:     Critical care time (minutes):  50    Critical care time was exclusive of:  Separately billable procedures and treating other patients    Critical care was necessary to treat or prevent imminent or life-threatening deterioration of the following conditions:  Respiratory failure and cardiac failure    Critical care was time spent personally by me on the following activities:  Blood draw for specimens, development of treatment plan with patient or surrogate, evaluation of patient's response to treatment, examination of patient, vascular access procedures, review of old charts, re-evaluation of patient's condition, pulse oximetry, ordering and review of radiographic studies, ordering and review of laboratory studies and ordering and performing treatments and interventions    Care discussed with: admitting provider         EMERGENCY DEPARTMENT COURSE/MDM:     ED Course as of 01/07/25 1439   Tue Jan 07, 2025   1143 No sacral decubitus ulcer on visual exam [RD]   1437 Critical Read Back: Y  [RD]   1439 Suspicion for sepsis is low, patient does have pneumonia and is being treated for however will not give 30 cc/kg as there is concern for fluid overload [RD]      ED Course User Index  [RD] Bautista Dotson DO         Diagnoses as of 01/07/25 1439   Acute on chronic respiratory failure with hypoxia and hypercapnia (Multi)   Acute hypoxic respiratory failure (Multi)        Medical Decision Making  69-year-old female comes emergency room respiratory distress.  She was hypoxic on CPAP for EMS.  Patient arrived here, was placed on BiPAP 16/8 initially.  She was difficult IV access however and I did place an ultrasound IV in her left AC.  She was placed on cardiac monitor here as well.  Differentials for asthma exacerbation, CHF exacerbation, lower suspicion for pulmonary embolism however initially CT angio PE study was ordered.  Other differentials for ACS, pneumonia, pneumothorax.    On my independent review of labs, CBC does show a leukocytosis, otherwise shows a stable hemoglobin and platelets.  Chemistry shows elevated bicarb level likely secondary chronic lung pathology, shows some renal insufficiency acute on chronic.  CK within normal limits.  She did have pressure wounds on her abdomen and left breast however no obvious signs of infection.  Viral testing was negative here today.  VBG she did show acute hypercapnic respiratory failure, she was on BiPAP.  She started to feel better after being on BiPAP.  Repeat VBG showed slight improvement.  We continued BiPAP at this time.  Troponins are negative, her EKG had significant artifact, but no acute injury pattern seen, given 2 negative troponins I do not believe ACS is the cause of her symptoms today.  Chest x-ray shows possible pneumonia for which we did cover with Rocephin and Doxy for community-acquired coverage.  Her lactic is within normal limits I do not believe this is sepsis here today.  She was given 20 mg IV Lasix for pulmonary edema on chest x-ray  and B-lines on lung ultrasound.  She was given 3 DuoNebs, IV mag, 2 g and 125 mg IV Solu-Medrol as well here in the emergency department.  No signs of pneumothorax on chest x-ray.  She has an allergy to IV contrast so we did order a VQ scan, admitting team agreed to follow-up on it.  Patient was admitted to the hospital of the stepdown unit for further management.        Patient and or family in agreement and understanding of treatment plan.  All questions answered.      I reviewed the case with the attending ED physician. The attending ED physician agrees with the plan. Patient and/or patient´s representative was counseled regarding labs, imaging, likely diagnosis, and plan. All questions were answered.    ED Medications administered this visit:    Medications   oxygen (O2) therapy (100 percent inhalation Start 1/7/25 1144)   cefTRIAXone (Rocephin) 2 g in dextrose (iso) IV 50 mL (2 g intravenous New Bag 1/7/25 1426)   doxycycline (Vibramycin) 100 mg in sodium chloride 0.9%  mL (has no administration in time range)   ipratropium-albuteroL (Duo-Neb) 0.5-2.5 mg/3 mL nebulizer solution 3 mL (3 mL nebulization Given 1/7/25 1150)   methylPREDNISolone sod succinate (SOLU-Medrol) injection 125 mg (125 mg intravenous Given 1/7/25 1143)   magnesium sulfate 2 g in sterile water for injection 50 mL (0 g intravenous Stopped 1/7/25 1220)   furosemide (Lasix) injection 20 mg (20 mg intravenous Given 1/7/25 1427)     Final Impression:   1. Acute hypoxic respiratory failure (Multi)    2. Acute on chronic respiratory failure with hypoxia and hypercapnia (Multi)          (Please note that portions of this note were completed with a voice recognition program.  Efforts were made to edit the dictations but occasionally words are mis-transcribed.)     Bautista Dotson DO  Resident  01/07/25 [id number]

## 2025-01-07 NOTE — CONSULTS
Inpatient consult to Pulmonology  Consult performed by: Erica Sifuentes MD  Consult ordered by: Nargis Will APRN-CNP          Reason For Consult  Continuous BiPAP    History Of Present Illness  Kaylene Feliciano is a 69 y.o. female presenting with chronic hypercapnia and hypoxia 4L, ARCHIE, asthma, HTN, HLD, DM2, HF, CKD, bipolar disorder, depression admitted with SOB and hypoxia.     She notes feeling short of breath since the end of December. She was unable to leave home and transport herself for care. She denies any antibiotics during that time. She had subjective fevers and dyspnea mostly at home. No known sick contacts.     Work up has shown mildly elevated BNP in the setting of obesity, acute on chronic hypercapnia, CRISTOFER, leukocytosis, negative flu and COVID.      Past Medical History  Past Medical History:   Diagnosis Date    Asthma 09/15/2023    Chronic bipolar affective disorder (Multi) 09/15/2023    Chronic hypercapnic respiratory failure (Multi)     CKD (chronic kidney disease), stage II 09/15/2023    Depression 09/15/2023    Diabetes mellitus (Multi)     Diastolic HF (heart failure)     Essential hypertension, benign 09/15/2023    Femur fracture (Multi)     Hyperlipidemia 09/15/2023    Hypertension     Poorly controlled type 2 diabetes mellitus with complication (Multi) 09/15/2023    S/P deep brain stimulator placement     Vitamin D deficiency 09/15/2023       Surgical History  Past Surgical History:   Procedure Laterality Date    OTHER SURGICAL HISTORY  2021    Knee arthroscopy    OTHER SURGICAL HISTORY  2021    Deep brain stimulation    OTHER SURGICAL HISTORY  2021    Hysterectomy    OTHER SURGICAL HISTORY  2021    Cholecystectomy    OTHER SURGICAL HISTORY  10/14/2021    Knee surgery    OTHER SURGICAL HISTORY  10/14/2021    Tonsillectomy with adenoidectomy    OTHER SURGICAL HISTORY  10/14/2021    Vaginal sling procedure    OTHER SURGICAL HISTORY  10/19/2021     section         Social History  Social History     Tobacco Use    Smoking status: Never    Smokeless tobacco: Never   Vaping Use    Vaping status: Never Used   Substance Use Topics    Alcohol use: Never    Drug use: Never       Family History  Family History   Problem Relation Name Age of Onset    Diabetes Mother      Hypertension Mother      Thyroid cancer Mother      Heart attack Mother      Osteoporosis Mother      Stroke Father      Hypertension Father      Diabetes Brother      Hypertension Brother      Cancer Brother      Diabetes Other Grandparent         type 2, without complication, unspecified insulin use       Occupational & Environmental History   Non smoker  Not working     Allergies  Sulfa (sulfonamide antibiotics), Belladonna, Ciprofloxacin, Adhesive tape-silicones, Iodinated contrast media, and Tegaderm ag mesh [silver]    Review of Systems   Constitutional:  Positive for activity change and fever. Negative for chills.   HENT:  Negative for congestion, rhinorrhea and sore throat.    Respiratory:  Positive for shortness of breath and wheezing. Negative for cough.    Cardiovascular:  Negative for chest pain and leg swelling.   Allergic/Immunologic: Negative for immunocompromised state.        Physical Exam  Vitals reviewed.   Constitutional:       Appearance: She is obese.   HENT:      Head: Normocephalic.      Nose: Nose normal.      Mouth/Throat:      Mouth: Mucous membranes are moist.   Eyes:      Extraocular Movements: Extraocular movements intact.   Cardiovascular:      Rate and Rhythm: Normal rate and regular rhythm.      Heart sounds: No murmur heard.     Comments: Diminished heart sounds due to habitus  Pulmonary:      Effort: Pulmonary effort is normal.      Breath sounds: Normal breath sounds.      Comments: Diminished breath sounds  Abdominal:      Palpations: Abdomen is soft.   Musculoskeletal:      Right lower leg: No edema.      Left lower leg: No edema.   Skin:     General: Skin is warm and dry.  "  Neurological:      General: No focal deficit present.      Mental Status: She is alert and oriented to person, place, and time.          Vital Signs  Blood pressure 142/59, pulse 73, temperature 35.9 °C (96.6 °F), resp. rate 20, height 1.6 m (5' 3\"), weight 127 kg (280 lb), SpO2 99%.  Oxygen Therapy  Pulse Ox: 99 %  Medical Gas Therapy: Supplemental oxygen  Medical Gas Delivery Method: CPAP/Bi-PAP mask  FiO2 (%): 100 %    Relevant Results  XR chest 1 view 01/07/2025    Narrative  Interpreted By:  Flora Finney,  STUDY:  XR CHEST 1 VIEW; 1/7/2025 1:02 pm    INDICATION:  Signs/Symptoms:Chest Pain    COMPARISON:  Radiographs 09/16/2024    ACCESSION NUMBER(S):  [id number]    ORDERING CLINICIAN:  SAKINA ARCEO    TECHNIQUE:  Single frontal view of the chest performed.    FINDINGS:  LINES AND DEVICES:  Stable battery packs project over each of the upper reza thoraces.    LUNGS:  There is consolidative opacities in the left lung. Additional  interstitial and airspace opacities are present throughout the right  lung. There is no definite right-sided pleural effusion. The left  costophrenic angle is not well visualized. No pneumothorax.    CARDIOMEDIASTINAL SILHOUETTE:  Stable cardiomegaly.    Impression  Suggestion of CHF with pulmonary edema. Additional left lung  consolidation could present atelectasis or concurrent pneumonia in  the appropriate clinical context.    MACRO  None    Signed by: Flora Finney 1/7/2025 1:15 PM  Dictation workstation:   [id number]       Assessment/Plan     Ms. Feliciano is a 69F chronic hypercapnia and hypoxia 4L, ARCHIE, asthma, HTN, HLD, DM2, HF, CKD, bipolar disorder, depression admitted with SOB and hypoxia.   Consult for continuous BiPAP    #Acute on chronic hypoxic and hypercapnic respiratory failure  #Dyspnea  #Leukocytosis    CXR with diffuse infiltrate and opacities.   Patient alert and conversant on BiPAP with appropriate tidal volumes for height.   BNP mildly elevated with CRISTOFER which " could be a cardiorenal picture.   Agree with antibiotic coverage, consider atypical coverage  PFT uninterpretable but less likely COPD as patterns and habitus more suggestive of restrictive lung disease. Don't think she needs steroids at this time but defer to primary  Recommend CT chest without contrast to evaluate for evidence of PNA  Recommend sending infectious studies with sputum, urine antigens, procalcitonin  Extremity duplex pending. V/Q aborted  If patient unable to wean from BiPAP, would need ICU level care  However, patient tolerated going to V/Q scan on NRB and was alert despite hypercapnia on BiPAP. When she is ready for a BiPAP break, would trial HFNC.   If pCO2 not responding, would check ABG. Patient baseline pCO2 likely in 50-60 range based on prior gases    I spent 40 minutes in the professional and overall care of this patient.      Erica Sifuentes MD

## 2025-01-08 ENCOUNTER — APPOINTMENT (OUTPATIENT)
Dept: RADIOLOGY | Facility: HOSPITAL | Age: 70
End: 2025-01-08
Payer: MEDICARE

## 2025-01-08 LAB
ANION GAP SERPL CALC-SCNC: 16 MMOL/L (ref 10–20)
ATRIAL RATE: 40 BPM
BUN SERPL-MCNC: 28 MG/DL (ref 6–23)
CALCIUM SERPL-MCNC: 8.8 MG/DL (ref 8.6–10.3)
CHLORIDE SERPL-SCNC: 98 MMOL/L (ref 98–107)
CO2 SERPL-SCNC: 29 MMOL/L (ref 21–32)
CREAT SERPL-MCNC: 1.08 MG/DL (ref 0.5–1.05)
EGFRCR SERPLBLD CKD-EPI 2021: 56 ML/MIN/1.73M*2
ERYTHROCYTE [DISTWIDTH] IN BLOOD BY AUTOMATED COUNT: 15.9 % (ref 11.5–14.5)
FOLATE SERPL-MCNC: 9.8 NG/ML
GLUCOSE BLD MANUAL STRIP-MCNC: 179 MG/DL (ref 74–99)
GLUCOSE BLD MANUAL STRIP-MCNC: 194 MG/DL (ref 74–99)
GLUCOSE BLD MANUAL STRIP-MCNC: 371 MG/DL (ref 74–99)
GLUCOSE SERPL-MCNC: 168 MG/DL (ref 74–99)
HCT VFR BLD AUTO: 27.9 % (ref 36–46)
HGB BLD-MCNC: 8.2 G/DL (ref 12–16)
HOLD SPECIMEN: NORMAL
LEGIONELLA AG UR QL: NEGATIVE
MCH RBC QN AUTO: 24.4 PG (ref 26–34)
MCHC RBC AUTO-ENTMCNC: 29.4 G/DL (ref 32–36)
MCV RBC AUTO: 83 FL (ref 80–100)
NRBC BLD-RTO: 0 /100 WBCS (ref 0–0)
P OFFSET: 139 MS
P ONSET: 119 MS
PLATELET # BLD AUTO: 271 X10*3/UL (ref 150–450)
POTASSIUM SERPL-SCNC: 4.9 MMOL/L (ref 3.5–5.3)
Q ONSET: 210 MS
QRS COUNT: 13 BEATS
QRS DURATION: 120 MS
QT INTERVAL: 424 MS
QTC CALCULATION(BAZETT): 483 MS
QTC FREDERICIA: 462 MS
R AXIS: -78 DEGREES
RBC # BLD AUTO: 3.36 X10*6/UL (ref 4–5.2)
S PNEUM AG UR QL: NEGATIVE
SODIUM SERPL-SCNC: 138 MMOL/L (ref 136–145)
T AXIS: 119 DEGREES
T OFFSET: 422 MS
VANCOMYCIN SERPL-MCNC: 12.2 UG/ML (ref 5–20)
VENTRICULAR RATE: 78 BPM
VIT B12 SERPL-MCNC: 401 PG/ML (ref 211–911)
WBC # BLD AUTO: 9.4 X10*3/UL (ref 4.4–11.3)

## 2025-01-08 PROCEDURE — 2060000001 HC INTERMEDIATE ICU ROOM DAILY

## 2025-01-08 PROCEDURE — 87070 CULTURE OTHR SPECIMN AEROBIC: CPT | Mod: STJLAB | Performed by: PHYSICIAN ASSISTANT

## 2025-01-08 PROCEDURE — 85027 COMPLETE CBC AUTOMATED: CPT | Performed by: NURSE PRACTITIONER

## 2025-01-08 PROCEDURE — 2500000004 HC RX 250 GENERAL PHARMACY W/ HCPCS (ALT 636 FOR OP/ED)

## 2025-01-08 PROCEDURE — 36415 COLL VENOUS BLD VENIPUNCTURE: CPT | Performed by: NURSE PRACTITIONER

## 2025-01-08 PROCEDURE — 2500000002 HC RX 250 W HCPCS SELF ADMINISTERED DRUGS (ALT 637 FOR MEDICARE OP, ALT 636 FOR OP/ED): Performed by: NURSE PRACTITIONER

## 2025-01-08 PROCEDURE — 2500000004 HC RX 250 GENERAL PHARMACY W/ HCPCS (ALT 636 FOR OP/ED): Performed by: NURSE PRACTITIONER

## 2025-01-08 PROCEDURE — 2500000005 HC RX 250 GENERAL PHARMACY W/O HCPCS: Performed by: NURSE PRACTITIONER

## 2025-01-08 PROCEDURE — 2500000002 HC RX 250 W HCPCS SELF ADMINISTERED DRUGS (ALT 637 FOR MEDICARE OP, ALT 636 FOR OP/ED): Performed by: PHYSICIAN ASSISTANT

## 2025-01-08 PROCEDURE — 71275 CT ANGIOGRAPHY CHEST: CPT

## 2025-01-08 PROCEDURE — 82947 ASSAY GLUCOSE BLOOD QUANT: CPT

## 2025-01-08 PROCEDURE — 99232 SBSQ HOSP IP/OBS MODERATE 35: CPT | Performed by: INTERNAL MEDICINE

## 2025-01-08 PROCEDURE — 2550000001 HC RX 255 CONTRASTS: Performed by: INTERNAL MEDICINE

## 2025-01-08 PROCEDURE — 87075 CULTR BACTERIA EXCEPT BLOOD: CPT | Mod: STJLAB | Performed by: PHYSICIAN ASSISTANT

## 2025-01-08 PROCEDURE — 71275 CT ANGIOGRAPHY CHEST: CPT | Performed by: RADIOLOGY

## 2025-01-08 PROCEDURE — 80202 ASSAY OF VANCOMYCIN: CPT

## 2025-01-08 PROCEDURE — 2500000001 HC RX 250 WO HCPCS SELF ADMINISTERED DRUGS (ALT 637 FOR MEDICARE OP)

## 2025-01-08 PROCEDURE — 80048 BASIC METABOLIC PNL TOTAL CA: CPT | Performed by: NURSE PRACTITIONER

## 2025-01-08 PROCEDURE — 2500000001 HC RX 250 WO HCPCS SELF ADMINISTERED DRUGS (ALT 637 FOR MEDICARE OP): Performed by: NURSE PRACTITIONER

## 2025-01-08 RX ORDER — VANCOMYCIN HYDROCHLORIDE 1 G/200ML
1000 INJECTION, SOLUTION INTRAVENOUS EVERY 12 HOURS
Status: DISCONTINUED | OUTPATIENT
Start: 2025-01-08 | End: 2025-01-08

## 2025-01-08 RX ORDER — INSULIN LISPRO 100 [IU]/ML
0-10 INJECTION, SOLUTION INTRAVENOUS; SUBCUTANEOUS
Status: DISPENSED | OUTPATIENT
Start: 2025-01-08

## 2025-01-08 RX ORDER — DIPHENHYDRAMINE HCL 25 MG
25 TABLET ORAL ONCE
Status: COMPLETED | OUTPATIENT
Start: 2025-01-08 | End: 2025-01-08

## 2025-01-08 RX ORDER — DOXYCYCLINE 100 MG/1
100 CAPSULE ORAL EVERY 12 HOURS SCHEDULED
Status: DISCONTINUED | OUTPATIENT
Start: 2025-01-08 | End: 2025-01-08

## 2025-01-08 RX ORDER — FLUCONAZOLE 200 MG/1
200 TABLET ORAL EVERY 24 HOURS
Status: DISCONTINUED | OUTPATIENT
Start: 2025-01-08 | End: 2025-01-08

## 2025-01-08 RX ADMIN — INSULIN LISPRO 2 UNITS: 100 INJECTION, SOLUTION INTRAVENOUS; SUBCUTANEOUS at 17:47

## 2025-01-08 RX ADMIN — DOXYCYCLINE 100 MG: 100 INJECTION, POWDER, LYOPHILIZED, FOR SOLUTION INTRAVENOUS at 02:38

## 2025-01-08 RX ADMIN — Medication 6 L/MIN: at 20:22

## 2025-01-08 RX ADMIN — DIPHENHYDRAMINE HYDROCHLORIDE 25 MG: 25 TABLET ORAL at 05:12

## 2025-01-08 RX ADMIN — IOHEXOL 60 ML: 350 INJECTION, SOLUTION INTRAVENOUS at 13:03

## 2025-01-08 RX ADMIN — PREDNISONE 50 MG: 20 TABLET ORAL at 11:20

## 2025-01-08 RX ADMIN — INSULIN LISPRO 2 UNITS: 100 INJECTION, SOLUTION INTRAVENOUS; SUBCUTANEOUS at 04:33

## 2025-01-08 RX ADMIN — Medication 40 PERCENT: at 08:00

## 2025-01-08 RX ADMIN — NYSTATIN 1 APPLICATION: 100000 POWDER TOPICAL at 09:28

## 2025-01-08 RX ADMIN — GABAPENTIN 300 MG: 300 CAPSULE ORAL at 20:20

## 2025-01-08 RX ADMIN — DIPHENHYDRAMINE HYDROCHLORIDE 25 MG: 25 TABLET ORAL at 11:20

## 2025-01-08 RX ADMIN — Medication 6 L/MIN: at 20:00

## 2025-01-08 RX ADMIN — PREDNISONE 50 MG: 20 TABLET ORAL at 05:11

## 2025-01-08 RX ADMIN — PANTOPRAZOLE SODIUM 40 MG: 40 INJECTION, POWDER, FOR SOLUTION INTRAVENOUS at 09:28

## 2025-01-08 RX ADMIN — ATORVASTATIN CALCIUM 10 MG: 10 TABLET, FILM COATED ORAL at 20:20

## 2025-01-08 RX ADMIN — ENOXAPARIN SODIUM 40 MG: 40 INJECTION SUBCUTANEOUS at 09:28

## 2025-01-08 RX ADMIN — INSULIN LISPRO 10 UNITS: 100 INJECTION, SOLUTION INTRAVENOUS; SUBCUTANEOUS at 21:10

## 2025-01-08 RX ADMIN — VANCOMYCIN HYDROCHLORIDE 1000 MG: 1 INJECTION, SOLUTION INTRAVENOUS at 11:35

## 2025-01-08 RX ADMIN — NYSTATIN 1 APPLICATION: 100000 POWDER TOPICAL at 20:20

## 2025-01-08 RX ADMIN — ENOXAPARIN SODIUM 40 MG: 40 INJECTION SUBCUTANEOUS at 20:20

## 2025-01-08 RX ADMIN — Medication 40 PERCENT: at 09:00

## 2025-01-08 ASSESSMENT — PAIN SCALES - GENERAL
PAINLEVEL_OUTOF10: 0 - NO PAIN

## 2025-01-08 ASSESSMENT — PAIN - FUNCTIONAL ASSESSMENT
PAIN_FUNCTIONAL_ASSESSMENT: 0-10

## 2025-01-08 ASSESSMENT — COGNITIVE AND FUNCTIONAL STATUS - GENERAL
DAILY ACTIVITIY SCORE: 18
HELP NEEDED FOR BATHING: A LITTLE
STANDING UP FROM CHAIR USING ARMS: TOTAL
PERSONAL GROOMING: A LITTLE
MOBILITY SCORE: 9
MOVING TO AND FROM BED TO CHAIR: TOTAL
CLIMB 3 TO 5 STEPS WITH RAILING: TOTAL
WALKING IN HOSPITAL ROOM: TOTAL
DRESSING REGULAR LOWER BODY CLOTHING: A LOT
MOVING FROM LYING ON BACK TO SITTING ON SIDE OF FLAT BED WITH BEDRAILS: A LITTLE
TURNING FROM BACK TO SIDE WHILE IN FLAT BAD: A LOT
DRESSING REGULAR UPPER BODY CLOTHING: A LITTLE
TOILETING: A LITTLE

## 2025-01-08 NOTE — H&P
History Of Present Illness  Kaylene Feliciano is a 69 y.o. F with PMH of HTN, COPD, T2DM, ARCHIE, HLD, HFpEF   Presented to ER due to worsening shortness of breath.  Patient apparently was having URI symptoms with productive cough with yellow sputum.  Denied any with fever and chills.  Since her symptoms are getting worse he came to ER from where she was admitted on stepdown unit for further care.            Past Medical History  She has a past medical history of Asthma (09/15/2023), Chronic bipolar affective disorder (Multi) (09/15/2023), Chronic hypercapnic respiratory failure (Multi), CKD (chronic kidney disease), stage II (09/15/2023), Depression (09/15/2023), Diabetes mellitus (Multi), Diastolic HF (heart failure), Essential hypertension, benign (09/15/2023), Femur fracture (Multi), Hyperlipidemia (09/15/2023), Hypertension, Poorly controlled type 2 diabetes mellitus with complication (Multi) (09/15/2023), S/P deep brain stimulator placement, and Vitamin D deficiency (09/15/2023).    Surgical History  She has a past surgical history that includes Other surgical history (01/29/2021); Other surgical history (01/29/2021); Other surgical history (01/29/2021); Other surgical history (01/29/2021); Other surgical history (10/14/2021); Other surgical history (10/14/2021); Other surgical history (10/14/2021); and Other surgical history (10/19/2021).     Social History  She reports that she has never smoked. She has never used smokeless tobacco. She reports that she does not drink alcohol and does not use drugs.    Family History  Family History   Problem Relation Name Age of Onset    Diabetes Mother      Hypertension Mother      Thyroid cancer Mother      Heart attack Mother      Osteoporosis Mother      Stroke Father      Hypertension Father      Diabetes Brother      Hypertension Brother      Cancer Brother      Diabetes Other Grandparent         type 2, without complication, unspecified insulin use        Allergies  Sulfa  (sulfonamide antibiotics), Belladonna, Ciprofloxacin, Adhesive tape-silicones, Iodinated contrast media, and Tegaderm ag mesh [silver]    Current medications:      Current Facility-Administered Medications:     acetaminophen (Tylenol) suppository 650 mg, 650 mg, rectal, q4h PRN, Ce Lopez APRN-CNP    albuterol 2.5 mg /3 mL (0.083 %) nebulizer solution 2.5 mg, 2.5 mg, nebulization, q4h PRN, Ce Lopez APRN-CNP    amLODIPine (Norvasc) tablet 10 mg, 10 mg, oral, Daily, Ce Lopez APRN-CNP    aspirin chewable tablet 81 mg, 81 mg, oral, Daily, Ce Lopez APRN-CNP    atorvastatin (Lipitor) tablet 10 mg, 10 mg, oral, Nightly, Ce Lopez APRN-CNP    cefTRIAXone (Rocephin) 2 g in dextrose (iso) IV 50 mL, 2 g, intravenous, q24h, Ce Lopez APRN-CNP, Stopped at 01/08/25 0031    citalopram (CeleXA) tablet 40 mg, 40 mg, oral, Daily, Ce Lopez APRN-CNP    enoxaparin (Lovenox) syringe 40 mg, 40 mg, subcutaneous, q12h YARELI, Ce Lopez APRN-CNP, 40 mg at 01/08/25 0928    furosemide (Lasix) tablet 20 mg, 20 mg, oral, Daily, Ce Lopez APRN-CNP    gabapentin (Neurontin) capsule 300 mg, 300 mg, oral, BID, Ce Lopez APRN-CNP, 300 mg at 01/07/25 2200    [Held by provider] insulin glargine (Lantus) injection 50 Units, 50 Units, subcutaneous, BID, Ce Lopez APRN-CNP    insulin lispro injection 0-10 Units, 0-10 Units, subcutaneous, q6h, Ce Lopez APRN-CNP, 2 Units at 01/08/25 0433    metoprolol succinate XL (Toprol-XL) 24 hr tablet 25 mg, 25 mg, oral, Daily, Ce Lopez APRN-CNP    nystatin (Mycostatin) 100,000 unit/gram powder 1 Application, 1 Application, Topical, BID, KELLI Foreman, 1 Application at 01/08/25 0928    oxygen (O2) therapy, , inhalation, Continuous - Inhalation, KELLI Foreman, 40 percent at 01/08/25 0900    oxygen (O2) therapy, , inhalation, Continuous - Inhalation, KELLI Foreman, 40 percent at 01/08/25 0800    " pancrelipase (Lip-Prot-Amyl) (Creon) 12,000-38,000 -60,000 unit per capsule 1 capsule, 1 capsule, oral, TID AC, MELQUIADES Foreman-CNP    pantoprazole (ProtoNix) injection 40 mg, 40 mg, intravenous, Daily, Ce MORALES MELQUIADES Lopez-CNP, 40 mg at 01/08/25 0928       Review of Systems       10 organ ROS is pertinent for history mentioned above    Physical Exam  HENT:      Head: Normocephalic.      Mouth/Throat:      Pharynx: Oropharynx is clear.   Eyes:      Conjunctiva/sclera: Conjunctivae normal.   Cardiovascular:      Rate and Rhythm: Regular rhythm.   Pulmonary:      Breath sounds: Normal breath sounds.   Abdominal:      General: Bowel sounds are normal.      Palpations: Abdomen is soft.   Musculoskeletal:         General: Normal range of motion.   Skin:     General: Skin is warm and dry.   Neurological:      General: No focal deficit present.      Mental Status: She is alert.   Psychiatric:         Behavior: Behavior normal.                Last Recorded Vitals      Blood pressure 137/50, pulse 80, temperature 36.3 °C (97.3 °F), temperature source Temporal, resp. rate 23, height 1.6 m (5' 3\"), weight 127 kg (280 lb), SpO2 97%.    Relevant Results     Results for orders placed or performed during the hospital encounter of 01/07/25 (from the past 24 hours)   D-Dimer, VTE Exclusion   Result Value Ref Range    D-Dimer, Quantitative VTE Exclusion 1,318 (H) <=500 ng/mL FEU   Lower extremity venous duplex bilateral   Result Value Ref Range    BSA 2.38 m2   POCT GLUCOSE   Result Value Ref Range    POCT Glucose 178 (H) 74 - 99 mg/dL   Urinalysis with Reflex Culture and Microscopic   Result Value Ref Range    Color, Urine Light-Yellow Light-Yellow, Yellow, Dark-Yellow    Appearance, Urine Clear Clear    Specific Gravity, Urine 1.009 1.005 - 1.035    pH, Urine 5.0 5.0, 5.5, 6.0, 6.5, 7.0, 7.5, 8.0    Protein, Urine NEGATIVE NEGATIVE, 10 (TRACE), 20 (TRACE) mg/dL    Glucose, Urine Normal Normal mg/dL    Blood, Urine 0.03 " (TRACE) (A) NEGATIVE    Ketones, Urine NEGATIVE NEGATIVE mg/dL    Bilirubin, Urine NEGATIVE NEGATIVE    Urobilinogen, Urine Normal Normal mg/dL    Nitrite, Urine NEGATIVE NEGATIVE    Leukocyte Esterase, Urine 75 Valeria/uL (A) NEGATIVE   Extra Urine Gray Tube   Result Value Ref Range    Extra Tube Hold for add-ons.    Drug Screen, Urine   Result Value Ref Range    Amphetamine Screen, Urine Presumptive Negative Presumptive Negative    Barbiturate Screen, Urine Presumptive Negative Presumptive Negative    Benzodiazepines Screen, Urine Presumptive Negative Presumptive Negative    Cannabinoid Screen, Urine Presumptive Negative Presumptive Negative    Cocaine Metabolite Screen, Urine Presumptive Negative Presumptive Negative    Fentanyl Screen, Urine Presumptive Negative Presumptive Negative    Opiate Screen, Urine Presumptive Negative Presumptive Negative    Oxycodone Screen, Urine Presumptive Negative Presumptive Negative    PCP Screen, Urine Presumptive Negative Presumptive Negative    Methadone Screen, Urine Presumptive Negative Presumptive Negative   Microscopic Only, Urine   Result Value Ref Range    WBC, Urine 11-20 (A) 1-5, NONE /HPF    RBC, Urine 3-5 NONE, 1-2, 3-5 /HPF    Bacteria, Urine 1+ (A) NONE SEEN /HPF    Hyaline Casts, Urine OCCASIONAL (A) NONE /LPF   MRSA Surveillance for Vancomycin De-escalation, PCR    Specimen: Anterior Nares; Swab   Result Value Ref Range    MRSA PCR Not Detected Not Detected   POCT GLUCOSE   Result Value Ref Range    POCT Glucose 184 (H) 74 - 99 mg/dL   POCT GLUCOSE   Result Value Ref Range    POCT Glucose 179 (H) 74 - 99 mg/dL   Basic metabolic panel   Result Value Ref Range    Glucose 168 (H) 74 - 99 mg/dL    Sodium 138 136 - 145 mmol/L    Potassium 4.9 3.5 - 5.3 mmol/L    Chloride 98 98 - 107 mmol/L    Bicarbonate 29 21 - 32 mmol/L    Anion Gap 16 10 - 20 mmol/L    Urea Nitrogen 28 (H) 6 - 23 mg/dL    Creatinine 1.08 (H) 0.50 - 1.05 mg/dL    eGFR 56 (L) >60 mL/min/1.73m*2     Calcium 8.8 8.6 - 10.3 mg/dL   CBC   Result Value Ref Range    WBC 9.4 4.4 - 11.3 x10*3/uL    nRBC 0.0 0.0 - 0.0 /100 WBCs    RBC 3.36 (L) 4.00 - 5.20 x10*6/uL    Hemoglobin 8.2 (L) 12.0 - 16.0 g/dL    Hematocrit 27.9 (L) 36.0 - 46.0 %    MCV 83 80 - 100 fL    MCH 24.4 (L) 26.0 - 34.0 pg    MCHC 29.4 (L) 32.0 - 36.0 g/dL    RDW 15.9 (H) 11.5 - 14.5 %    Platelets 271 150 - 450 x10*3/uL   Vancomycin   Result Value Ref Range    Vancomycin 12.2 5.0 - 20.0 ug/mL   POCT GLUCOSE   Result Value Ref Range    POCT Glucose 194 (H) 74 - 99 mg/dL          Radiology  XR CHEST 1 VIEW; 1/7/2025 1:02 pm      INDICATION:  Signs/Symptoms:Chest Pain      COMPARISON:  Radiographs 09/16/2024      ACCESSION NUMBER(S):  [id number]      ORDERING CLINICIAN:  SAKINA ARCEO      TECHNIQUE:  Single frontal view of the chest performed.      FINDINGS:  LINES AND DEVICES:  Stable battery packs project over each of the upper reza thoraces.      LUNGS:  There is consolidative opacities in the left lung. Additional  interstitial and airspace opacities are present throughout the right  lung. There is no definite right-sided pleural effusion. The left  costophrenic angle is not well visualized. No pneumothorax.      CARDIOMEDIASTINAL SILHOUETTE:  Stable cardiomegaly.      IMPRESSION:  Suggestion of CHF with pulmonary edema. Additional left lung  consolidation could present atelectasis or concurrent pneumonia in  the appropriate clinical context.      Assessment/Plan   Assessment & Plan  Acute hypoxic on chronic hypercapnic respiratory failure (Multi)    Acute diastolic heart failure    Pneumonia of both lungs due to infectious organism    Anemia    Leukocytosis    Obesity    Cellulitis of left breast    Fungal infection of skin      PLAN:  patient was admitted on stepdown unit, was started on a BiPAP, continue with IV CTX and doxycycline, continue thyroxine and oxygen, pulmonary consult, continue other home medication, monitor labs, DVT prophylaxisand  discussed with CNP, follow closely.          Favian May MD

## 2025-01-08 NOTE — NURSING NOTE
Attempted to wean patient from bipap.   Patient was unable to remain off of bipap as she began to show signs of labored breathing with pursed lip expiratory breaths.  RT called and patient has been put back on bipap

## 2025-01-08 NOTE — CONSULTS
Consults    Reason For Consult  Pneumonia plus cellulitis     History Of Present Illness  Kaylene Feliciano is a 69 y.o. female  with a past medical hx of HTN, DM2, COPD/asthma, ARCHIE, HFpEF, CKD who presented to the [facility] ED accompanied by her  for shortness of breath. She endorses URI symptoms for approximately 1 month with associated cough with sputum, wheezing, subjective fevers.    Patient arrived to the ED via EMS with CPAP on. Afebrile. Tachypneic in obvious respiratory distress. CBC with leukocytosis 15.9, Hgb 9.3. . CMP with Bun 20, Cr 1.11 (mild increase from baseline). COVID/flu negative. VBG pH 7.22, CO2 86. UA with 75 Leukocytes, 11-20 WBCs, 1+ bacteria, negative nitrites. MRSA PCR negative. CXR with pulmonary edema and L sided consolidation. VQ unable to be performed. Patient was given 20mg Lasix, Solumedrol, Doxycycline, Vancomycin, and Rocephin. Admitted for further management.       Past Medical History  She has a past medical history of Asthma (09/15/2023), Chronic bipolar affective disorder (Multi) (09/15/2023), Chronic hypercapnic respiratory failure (Multi), CKD (chronic kidney disease), stage II (09/15/2023), Depression (09/15/2023), Diabetes mellitus (Multi), Diastolic HF (heart failure), Essential hypertension, benign (09/15/2023), Femur fracture (Multi), Hyperlipidemia (09/15/2023), Hypertension, Poorly controlled type 2 diabetes mellitus with complication (Multi) (09/15/2023), S/P deep brain stimulator placement, and Vitamin D deficiency (09/15/2023).    Surgical History  She has a past surgical history that includes Other surgical history (01/29/2021); Other surgical history (01/29/2021); Other surgical history (01/29/2021); Other surgical history (01/29/2021); Other surgical history (10/14/2021); Other surgical history (10/14/2021); Other surgical history (10/14/2021); and Other surgical history (10/19/2021).     Social History  She reports that she has never smoked. She has never  used smokeless tobacco. She reports that she does not drink alcohol and does not use drugs.    Family History  Family History   Problem Relation Name Age of Onset    Diabetes Mother      Hypertension Mother      Thyroid cancer Mother      Heart attack Mother      Osteoporosis Mother      Stroke Father      Hypertension Father      Diabetes Brother      Hypertension Brother      Cancer Brother      Diabetes Other Grandparent         type 2, without complication, unspecified insulin use        Allergies  Sulfa (sulfonamide antibiotics), Belladonna, Ciprofloxacin, Adhesive tape-silicones, Iodinated contrast media, and Tegaderm ag mesh [silver]    Review of Systems  -----------------------------------------------------------------  Review of Systems     Constitutional: Denies  Fever, Chills    Respiratory: Shortness of Breath    Cardiac: Denies Chest Pain, Syncope, Palpitations    Gastrointestinal: Denies Nausea, Vomiting, Diarrhea, Constipation, Abdominal Pain    Genitourinary: Denies Discharge, Dysuria, Flank Pain    Skin: Excoriations to L flank and abdomen       Physical Exam  General: Awake, alert/oriented x4, well developed, obese, uncomfortable appearing  Head: Atraumatic/Normocephalic  Cardiovascular: RRR, S1/S2, no murmurs, rubs, or gallops, radial pulses +2, no edema of extremities  Pulmonary: Decreased breath sounds bilaterally, ronchi to the left  Abdomen: +BS, soft, non-tender, nondistended, no guarding or rebound, no masses noted  Skin- Multiple abrasions/excoriations to the left abdomen/chest extending to the left flank region with surrounding erythema  Neuro: Alert/oriented x4, no focal motor or sensory deficits     Last Recorded Vitals  /51 (BP Location: Right arm, Patient Position: Lying)   Pulse 76   Temp 36 °C (96.8 °F) (Temporal)   Resp 19   Wt 127 kg (280 lb)   SpO2 96%     Relevant Results  Susceptibility data from last 90 days.  Collected Specimen Info Organism   01/07/25 Blood culture  from Peripheral Venipuncture Proteus mirabilis          Assessment/Plan     #Acute hypoxic/hypercapnic respiratory failure  #Community acquired pneumonia  #Bacteremia-->cultures growing Proteus mirabilis  #Hx COPD/Asthma on CPAP  #Hx HTN, IDDM, HFpEF, CKD    -Follow up blood, urine cultures  -Continue Rocephin  -Stop Doxycycline, Vancomycin, Diflucan   -Continue to monitor for side effects including rash, thrombocytopenia, diarrhea/CDI, CRISTOFER, etc.     Assessment and plan discussed with Dr. Salvador Bosch, DO  Internal medicine, PGY3

## 2025-01-08 NOTE — PROGRESS NOTES
Vancomycin Dosing by Pharmacy- Cessation of Therapy    Consult to pharmacy for vancomycin dosing has been discontinued by the prescriber, pharmacy will sign off at this time.    Please call pharmacy if there are further questions or re-enter a consult if vancomycin is resumed.     Jovanni Sarkar, PharmD

## 2025-01-08 NOTE — CARE PLAN
Pt alert and oriented. Remains on bipap overnight. Received iv antbx as ordered. Given allergy prep medications for contrast allergy. Plan for pt to go for chest CT at noon. Vital signs stable. Safety maintained, will continue to monitor     Problem: Pain - Adult  Goal: Verbalizes/displays adequate comfort level or baseline comfort level  Outcome: Progressing     Problem: Safety - Adult  Goal: Free from fall injury  Outcome: Progressing     Problem: Discharge Planning  Goal: Discharge to home or other facility with appropriate resources  Outcome: Progressing     Problem: Chronic Conditions and Co-morbidities  Goal: Patient's chronic conditions and co-morbidity symptoms are monitored and maintained or improved  Outcome: Progressing     Problem: Skin  Goal: Decreased wound size/increased tissue granulation at next dressing change  Outcome: Progressing  Goal: Participates in plan/prevention/treatment measures  Outcome: Progressing  Goal: Prevent/manage excess moisture  Outcome: Progressing  Goal: Prevent/minimize sheer/friction injuries  Outcome: Progressing  Goal: Promote/optimize nutrition  Outcome: Progressing  Goal: Promote skin healing  1/8/2025 0614 by Soraya Freeman RN  Outcome: Progressing  1/7/2025 2139 by Soraya Freeman RN  Flowsheets (Taken 1/7/2025 2139)  Promote skin healing:   Assess skin/pad under line(s)/device(s)   Turn/reposition every 2 hours/use positioning/transfer devices     Problem: Respiratory  Goal: Minimal/no exertional discomfort or dyspnea this shift  Outcome: Progressing  Goal: No signs of respiratory distress (eg. Use of accessory muscles. Peds grunting)  Outcome: Progressing  Goal: Patent airway maintained this shift  Outcome: Progressing  Goal: Verbalize decreased shortness of breath this shift  Outcome: Progressing  Goal: Wean oxygen to maintain O2 saturation per order/standard this shift  Outcome: Progressing

## 2025-01-08 NOTE — CONSULTS
Wound Care Consult     Visit Date: 1/8/2025      Patient Name: Kaylene Feliciano         MRN: [id number]           YOB: 1955     Reason for Consult: Wound        Wound History: Present on admission     Pertinent Labs:   Albumin   Date Value Ref Range Status   01/07/2025 3.9 3.4 - 5.0 g/dL Final     ALBUMIN (MG/L) IN URINE   Date Value Ref Range Status   05/11/2021 7.3 Not Established mg/L Final       Wound Assessment:  Wound 07/01/24 Pressure Injury Buttocks (Active)       Wound Team Summary Assessment: Received consult to see patient for wound. Skin folds to bilateral breasts red, appears to have fungal rash; Nystatin powder already ordered. Patient has patches of dry skin all over body. Abdomen, particularly the left side, noted to have scabbed scratches that appear to be healing. Bilateral groins and buttocks WNL, no wounds noted.      Wound Team Plan: Recommend applying lotion to dry skin at least daily. Will follow-up as needed.      Lisbet Avelar RN  1/8/2025  10:21 AM

## 2025-01-08 NOTE — NURSING NOTE
2nd attempt this shift to wean patient off of bipap  Put on NC 7 L this time laying on her left side. Patient remained above 95%  However attempted to lay pt in supine position she prepares for her CT scan unable to tolerate on NC   Pt will transport on bipap with charge RN and RT bedside

## 2025-01-08 NOTE — PROGRESS NOTES
Vancomycin Dosing by Pharmacy- FOLLOW UP    Kaylene Feliciano is a 69 y.o. year old female who Pharmacy has been consulted for vancomycin dosing for cellulitis, skin and soft tissue. Based on the patient's indication and renal status this patient is being dosed based on a goal AUC of 400-600.     Renal function is currently stable.    Current vancomycin dose: 1500 mg given every 24 hours    Estimated vancomycin AUC on current dose: 403 mg/L.hr  but prob of achieving 52% and would not be therapeutic for 2-3 more days.    Visit Vitals  /51 (BP Location: Right arm, Patient Position: Lying)   Pulse 76   Temp 36 °C (96.8 °F) (Temporal)   Resp 17        Lab Results   Component Value Date    CREATININE 1.08 (H) 2025    CREATININE 1.11 (H) 2025    CREATININE 0.79 2024    CREATININE 0.84 2024        Patient weight is as follows:   Vitals:    25 1846   Weight: 127 kg (280 lb)       Cultures:  No results found for the encounter in last 14 days.       I/O last 3 completed shifts:  In: 650 (5.1 mL/kg) [IV Piggyback:650]  Out: 100 (0.8 mL/kg) [Urine:100 (0 mL/kg/hr)]  Weight: 127 kg   I/O during current shift:  No intake/output data recorded.    Temp (24hrs), Av.1 °C (96.9 °F), Min:35.9 °C (96.6 °F), Max:36.4 °C (97.5 °F)      Assessment/Plan    Below goal AUC. Orders placed for new vancomcyin regimen of 1000mg every 12 hours to begin at 1000.     This dosing regimen is predicted by [employer] to result in the following pharmacokinetic parameters:  Regimen: 1000 mg IV every 12 hours.  Start time: 10:00 on 2025  Exposure target: AUC24 (range)400-600 mg/L.hr   LYG67-03: 514 mg/L.hr  AUC24,ss: 535 mg/L.hr  Probability of AUC24 > 400: 82 %  Ctrough,ss: 16 mg/L  Probability of Ctrough,ss > 20: 33 %    The next level will be obtained on  at 0500. May be obtained sooner if clinically indicated.   Will continue to monitor renal function daily while on vancomycin and order serum creatinine at  least every 48 hours if not already ordered.  Follow for continued vancomycin needs, clinical response, and signs/symptoms of toxicity.     Karena Aguirre, PharmD, BCPS, BCIDP  Clinical Pharmacy Specialist, Infectious Diseases  [id number]

## 2025-01-08 NOTE — PROGRESS NOTES
"Kaylene Feliciano is a 69 y.o. female on day 1 of admission presenting with Acute hypoxic on chronic hypercapnic respiratory failure (Multi).    Subjective   Ms. Feliciano noted feeling a little better. Blood cultures show bacteremia. She was able to transition off of BiPAP.     Objective     Physical Exam  Vitals reviewed.   Constitutional:       Appearance: She is obese.   HENT:      Head: Normocephalic.      Nose: Nose normal.      Mouth/Throat:      Mouth: Mucous membranes are moist.   Eyes:      Extraocular Movements: Extraocular movements intact.   Cardiovascular:      Rate and Rhythm: Normal rate and regular rhythm.      Heart sounds: No murmur heard.  Pulmonary:      Breath sounds: Normal breath sounds. No wheezing or rhonchi.      Comments: Diminished bilaterally  Abdominal:      Palpations: Abdomen is soft.   Musculoskeletal:      Right lower leg: No edema.      Left lower leg: No edema.   Skin:     General: Skin is warm and dry.   Neurological:      General: No focal deficit present.      Mental Status: She is alert and oriented to person, place, and time.         Last Recorded Vitals  Blood pressure 137/50, pulse 80, temperature 36.3 °C (97.3 °F), temperature source Temporal, resp. rate 23, height 1.6 m (5' 3\"), weight 127 kg (280 lb), SpO2 97%.  Intake/Output last 3 Shifts:  I/O last 3 completed shifts:  In: 650 (5.1 mL/kg) [IV Piggyback:650]  Out: 100 (0.8 mL/kg) [Urine:100 (0 mL/kg/hr)]  Weight: 127 kg     Relevant Results  Scheduled medications  amLODIPine, 10 mg, oral, Daily  aspirin, 81 mg, oral, Daily  atorvastatin, 10 mg, oral, Nightly  cefTRIAXone, 2 g, intravenous, q24h  citalopram, 40 mg, oral, Daily  enoxaparin, 40 mg, subcutaneous, q12h YARELI  furosemide, 20 mg, oral, Daily  gabapentin, 300 mg, oral, BID  [Held by provider] insulin glargine, 50 Units, subcutaneous, BID  insulin lispro, 0-10 Units, subcutaneous, q6h  metoprolol succinate XL, 25 mg, oral, Daily  nystatin, 1 Application, Topical, " BID  oxygen, , inhalation, Continuous - Inhalation  oxygen, , inhalation, Continuous - Inhalation  pancrelipase (Lip-Prot-Amyl), 1 capsule, oral, TID AC  pantoprazole, 40 mg, intravenous, Daily      Continuous medications     PRN medications  PRN medications: acetaminophen, albuterol    Results for orders placed or performed during the hospital encounter of 01/07/25 (from the past 24 hours)   D-Dimer, VTE Exclusion   Result Value Ref Range    D-Dimer, Quantitative VTE Exclusion 1,318 (H) <=500 ng/mL FEU   Lower extremity venous duplex bilateral   Result Value Ref Range    BSA 2.38 m2   POCT GLUCOSE   Result Value Ref Range    POCT Glucose 178 (H) 74 - 99 mg/dL   Urinalysis with Reflex Culture and Microscopic   Result Value Ref Range    Color, Urine Light-Yellow Light-Yellow, Yellow, Dark-Yellow    Appearance, Urine Clear Clear    Specific Gravity, Urine 1.009 1.005 - 1.035    pH, Urine 5.0 5.0, 5.5, 6.0, 6.5, 7.0, 7.5, 8.0    Protein, Urine NEGATIVE NEGATIVE, 10 (TRACE), 20 (TRACE) mg/dL    Glucose, Urine Normal Normal mg/dL    Blood, Urine 0.03 (TRACE) (A) NEGATIVE    Ketones, Urine NEGATIVE NEGATIVE mg/dL    Bilirubin, Urine NEGATIVE NEGATIVE    Urobilinogen, Urine Normal Normal mg/dL    Nitrite, Urine NEGATIVE NEGATIVE    Leukocyte Esterase, Urine 75 Valeria/uL (A) NEGATIVE   Extra Urine Gray Tube   Result Value Ref Range    Extra Tube Hold for add-ons.    Streptococcus pneumoniae Antigen, Urine    Specimen: Urine   Result Value Ref Range    Streptococcus pneumoniae Ag, Urine Negative Negative   Legionella Antigen, Urine    Specimen: Urine   Result Value Ref Range    L. pneumophila Urine Ag Negative Negative   Drug Screen, Urine   Result Value Ref Range    Amphetamine Screen, Urine Presumptive Negative Presumptive Negative    Barbiturate Screen, Urine Presumptive Negative Presumptive Negative    Benzodiazepines Screen, Urine Presumptive Negative Presumptive Negative    Cannabinoid Screen, Urine Presumptive Negative  Presumptive Negative    Cocaine Metabolite Screen, Urine Presumptive Negative Presumptive Negative    Fentanyl Screen, Urine Presumptive Negative Presumptive Negative    Opiate Screen, Urine Presumptive Negative Presumptive Negative    Oxycodone Screen, Urine Presumptive Negative Presumptive Negative    PCP Screen, Urine Presumptive Negative Presumptive Negative    Methadone Screen, Urine Presumptive Negative Presumptive Negative   Microscopic Only, Urine   Result Value Ref Range    WBC, Urine 11-20 (A) 1-5, NONE /HPF    RBC, Urine 3-5 NONE, 1-2, 3-5 /HPF    Bacteria, Urine 1+ (A) NONE SEEN /HPF    Hyaline Casts, Urine OCCASIONAL (A) NONE /LPF   MRSA Surveillance for Vancomycin De-escalation, PCR    Specimen: Anterior Nares; Swab   Result Value Ref Range    MRSA PCR Not Detected Not Detected   POCT GLUCOSE   Result Value Ref Range    POCT Glucose 184 (H) 74 - 99 mg/dL   POCT GLUCOSE   Result Value Ref Range    POCT Glucose 179 (H) 74 - 99 mg/dL   Basic metabolic panel   Result Value Ref Range    Glucose 168 (H) 74 - 99 mg/dL    Sodium 138 136 - 145 mmol/L    Potassium 4.9 3.5 - 5.3 mmol/L    Chloride 98 98 - 107 mmol/L    Bicarbonate 29 21 - 32 mmol/L    Anion Gap 16 10 - 20 mmol/L    Urea Nitrogen 28 (H) 6 - 23 mg/dL    Creatinine 1.08 (H) 0.50 - 1.05 mg/dL    eGFR 56 (L) >60 mL/min/1.73m*2    Calcium 8.8 8.6 - 10.3 mg/dL   CBC   Result Value Ref Range    WBC 9.4 4.4 - 11.3 x10*3/uL    nRBC 0.0 0.0 - 0.0 /100 WBCs    RBC 3.36 (L) 4.00 - 5.20 x10*6/uL    Hemoglobin 8.2 (L) 12.0 - 16.0 g/dL    Hematocrit 27.9 (L) 36.0 - 46.0 %    MCV 83 80 - 100 fL    MCH 24.4 (L) 26.0 - 34.0 pg    MCHC 29.4 (L) 32.0 - 36.0 g/dL    RDW 15.9 (H) 11.5 - 14.5 %    Platelets 271 150 - 450 x10*3/uL   Vancomycin   Result Value Ref Range    Vancomycin 12.2 5.0 - 20.0 ug/mL   POCT GLUCOSE   Result Value Ref Range    POCT Glucose 194 (H) 74 - 99 mg/dL     CT angio chest for pulmonary embolism    Result Date: 1/8/2025  Interpreted By:  Samson  Brodie, STUDY: CT ANGIO CHEST FOR PULMONARY EMBOLISM;  1/8/2025 1:05 pm   INDICATION: Signs/Symptoms:acute hypoxia r/o PE.     COMPARISON: 09/13/2024   ACCESSION NUMBER(S): [id number]   ORDERING CLINICIAN: LINNETTE GAVIN   TECHNIQUE: Helical data acquisition of the chest was obtained with 60 mL Omnipaque 350. Images were reformatted in coronal and sagittal planes. Axial and coronal MIP images were created and reviewed.   FINDINGS: POTENTIAL LIMITATIONS OF THE STUDY: None   HEART AND VESSELS: No discrete filling defects within the main pulmonary artery or its branches.   Main pulmonary artery and its branches are normal in caliber.   The thoracic aorta is of normal course and caliber without vascular calcifications.   Coronary artery calcifications are seen.The study is not optimized for evaluation of coronary arteries.   The cardiac chambers are not enlarged.   No evidence of pericardial effusion.   MEDIASTINUM AND ARAMIS, LOWER NECK AND AXILLA: Subcentimeter peripherally calcified nodule again seen within the left lobe.   No evidence of thoracic lymphadenopathy by CT criteria.   Esophagus appears within normal limits as seen.   LUNGS AND AIRWAYS: The trachea and central airways are patent. No endobronchial lesion.   Bibasilar atelectatic changes. Band like density within the left upper lobe similar to the prior study likely related to underlying scarring. Few patchy areas ground-glass density noted within the right lung most prominent within the right lower lobe centered on image number 183   UPPER ABDOMEN: The visualized subdiaphragmatic structures demonstrate no remarkable findings.   CHEST WALL AND OSSEOUS STRUCTURES: There are no suspicious osseous lesions. Multilevel degenerative changes are present       1.  No pulmonary emboli. 2. Patchy ground-glass densities about the right lung suggestive underlying infectious etiology. Bibasilar atelectatic changes. Band like density persists within the left upper lobe  likely related to scarring.     MACRO: None   Signed by: Brodie Davies 1/8/2025 1:18 PM Dictation workstation:   [id number]    ECG 12 lead    Result Date: 1/8/2025  Wide QRS rhythm Left axis deviation Septal infarct , age undetermined ST & T wave abnormality, consider inferolateral ischemia Abnormal ECG When compared with ECG of 13-SEP-2024 06:11, Wide QRS rhythm has replaced Sinus rhythm    NM injection no scan    Result Date: 1/7/2025  Interpreted By:  Deonte Lan, STUDY: NM INJECTION NO SCAN;  1/7/2025 4:10 pm   INDICATION: Signs/Symptoms:hypoxia.   COMPARISON: Chest x-ray from 01/07/2025   ACCESSION NUMBER(S): [id number]   ORDERING CLINICIAN: NAN MEDINA   TECHNIQUE: DIVISION OF NUCLEAR MEDICINE PERFUSION LUNG SCANS   Intravenous administration of 4.2 mCi of Tc-99m macroaggregated albumin (MAA). .   FINDINGS: Injection only, no images were obtained due to severe hypoxia/respiratory failure, patient is on 15 L oxygen.       1. Injection only, no images were obtained due to severe hypoxia/respiratory failure.   The interpretation above is based on modified PIOPED II and PISAPED criteria.   This study was analyzed and interpreted at [place], [place].   MACRO: None     Signed by: Deonte Lan 1/7/2025 6:04 PM Dictation workstation:   [id number]    Lower extremity venous duplex bilateral    Result Date: 1/7/2025            [facility] [id number] [id number]     Tel [phone] Fax [phone]  Vascular Lab Report  [facility] LOWER EXTREMITY VENOUS DUPLEX BILATERAL Patient Name:     TEJINDER Aguillon Physician: [id number] CHARISSE Healy MD Study Date:       1/7/2025            Ordering Provider: [id number] LINNETTE GAVIN MRN/PID:          [id number]            Fellow: Accession#:       [id number]        Technologist:      Nabila Miller RVT Date of           1955 / 69      Technologist 2: Birth/Age:         years Gender:           F                   Encounter#:        [id number] Admission Status: Inpatient           Location           [facility]                                       Performed:  Diagnosis/ICD: Localized (leg) edema-R60.0; Shortness of breath-R06.02 CPT Codes:     84711 Peripheral venous duplex scan for DVT complete  Pertinent History: HTN and Hyperlipidemia. Morbidly obese, CHF, CKD.  CONCLUSIONS: Right Lower Venous: No evidence of acute deep vein thrombus visualized in the right lower extremity. Cannot rule out thrombus in non-visualized peroneal vein. Femoral vein visualized in segments, cannot rule out thrombus in non-visualized segments. Left Lower Venous: No evidence of acute deep vein thrombus visualized in the left lower extremity. Cannot rule out thrombus in non-visualized iliac vein due to body habitus. Femoral vein visualized in segments, cannot rule out thrombus in non-visualized segments. Additional Findings: Technically difficult study, limited images due to body habitus.  Imaging & Doppler Findings:  Right                 Compressible Thrombus        Flow Distal External Iliac     Yes        None   Spontaneous/Phasic CFV                       Yes        None   Spontaneous/Phasic PFV                       Yes        None FV Proximal               Yes        None   Spontaneous/Phasic FV Mid                    Yes        None FV Distal                 Yes        None   Spontaneous/Phasic Popliteal                 Yes        None   Spontaneous/Phasic Peroneal                  Yes        None PTV                       Yes        None  Left        Compress Thrombus        Flow CFV           Yes      None   Spontaneous/Phasic PFV           Yes      None FV Proximal   Yes      None   Spontaneous/Phasic FV Mid        Yes      None FV Distal     Yes      None Popliteal     Yes      None   Spontaneous/Phasic Peroneal      Yes      None PTV           Yes      None  [id number] CHARISSE Healy MD  Electronically signed by [id number] CHARISSE Healy MD on 1/7/2025 at 4:59:01 PM  ** Final **     XR chest 1 view    Result Date: 1/7/2025  Interpreted By:  Flora Finney, STUDY: XR CHEST 1 VIEW; 1/7/2025 1:02 pm   INDICATION: Signs/Symptoms:Chest Pain   COMPARISON: Radiographs 09/16/2024   ACCESSION NUMBER(S): [id number]   ORDERING CLINICIAN: SAKINA ARCEO   TECHNIQUE: Single frontal view of the chest performed.   FINDINGS: LINES AND DEVICES: Stable battery packs project over each of the upper reza thoraces.   LUNGS: There is consolidative opacities in the left lung. Additional interstitial and airspace opacities are present throughout the right lung. There is no definite right-sided pleural effusion. The left costophrenic angle is not well visualized. No pneumothorax.   CARDIOMEDIASTINAL SILHOUETTE: Stable cardiomegaly.       Suggestion of CHF with pulmonary edema. Additional left lung consolidation could present atelectasis or concurrent pneumonia in the appropriate clinical context.   MACRO None   Signed by: Flora Finney 1/7/2025 1:15 PM Dictation workstation:   [id number]       Assessment/Plan   Assessment & Plan  Acute hypoxic on chronic hypercapnic respiratory failure (Multi)    Acute diastolic heart failure    Pneumonia of both lungs due to infectious organism    Anemia    Leukocytosis    Obesity    Cellulitis of left breast    Fungal infection of skin    Ms. Feliciano is a 69F chronic hypercapnia and hypoxia 4L, ARCHIE, asthma, HTN, HLD, DM2, HF, CKD, bipolar disorder, depression admitted with SOB and hypoxia.   Consult for continuous BiPAP     #Acute on chronic hypoxic and hypercapnic respiratory failure  #Dyspnea  #Leukocytosis  #Bacteremia     CXR with diffuse infiltrate and opacities. Being treated for sepsis at this time.   Patient alert and conversant on BiPAP with appropriate tidal volumes for height.   BNP mildly elevated with CRISTOFER which could be a cardiorenal vs CRISTOFER picture.   Agree with antibiotic  coverage, consider atypical coverage  PFT uninterpretable but less likely COPD as patterns and habitus more suggestive of restrictive lung disease. Don't think she needs steroids at this time but defer to primary  Recommend CT chest  evaluate for evidence of PNA vs edema as has little respiratory symptoms   Recommend sending infectious studies with sputum, urine antigens, procalcitonin  Extremity duplex pending. V/Q aborted  Continue BiPAP at bedtime and with naps. She states she has not received a BiPAP machine. She did not reach out to pulmonary office it seems either. Please review with CM to assist in obtaining prior to discharge.    I spent 35 minutes in the professional and overall care of this patient.      Erica Sifuentes MD

## 2025-01-09 ENCOUNTER — APPOINTMENT (OUTPATIENT)
Dept: RADIOLOGY | Facility: HOSPITAL | Age: 70
End: 2025-01-09
Payer: MEDICARE

## 2025-01-09 PROBLEM — R78.81 BACTEREMIA: Status: ACTIVE | Noted: 2025-01-09

## 2025-01-09 LAB
ANION GAP SERPL CALC-SCNC: 13 MMOL/L (ref 10–20)
BACTERIA UR CULT: NORMAL
BUN SERPL-MCNC: 43 MG/DL (ref 6–23)
CALCIUM SERPL-MCNC: 8.7 MG/DL (ref 8.6–10.3)
CHLORIDE SERPL-SCNC: 95 MMOL/L (ref 98–107)
CO2 SERPL-SCNC: 31 MMOL/L (ref 21–32)
CREAT SERPL-MCNC: 1.15 MG/DL (ref 0.5–1.05)
EGFRCR SERPLBLD CKD-EPI 2021: 52 ML/MIN/1.73M*2
ERYTHROCYTE [DISTWIDTH] IN BLOOD BY AUTOMATED COUNT: 16.1 % (ref 11.5–14.5)
GLUCOSE BLD MANUAL STRIP-MCNC: 182 MG/DL (ref 74–99)
GLUCOSE BLD MANUAL STRIP-MCNC: 189 MG/DL (ref 74–99)
GLUCOSE BLD MANUAL STRIP-MCNC: 248 MG/DL (ref 74–99)
GLUCOSE BLD MANUAL STRIP-MCNC: 257 MG/DL (ref 74–99)
GLUCOSE BLD MANUAL STRIP-MCNC: 326 MG/DL (ref 74–99)
GLUCOSE SERPL-MCNC: 279 MG/DL (ref 74–99)
HCT VFR BLD AUTO: 26.3 % (ref 36–46)
HCT VFR BLD AUTO: 27.9 % (ref 36–46)
HGB BLD-MCNC: 7.8 G/DL (ref 12–16)
HGB BLD-MCNC: 8.6 G/DL (ref 12–16)
MCH RBC QN AUTO: 24.5 PG (ref 26–34)
MCHC RBC AUTO-ENTMCNC: 29.7 G/DL (ref 32–36)
MCV RBC AUTO: 83 FL (ref 80–100)
NRBC BLD-RTO: 0 /100 WBCS (ref 0–0)
PLATELET # BLD AUTO: 275 X10*3/UL (ref 150–450)
POTASSIUM SERPL-SCNC: 4.5 MMOL/L (ref 3.5–5.3)
RBC # BLD AUTO: 3.18 X10*6/UL (ref 4–5.2)
SODIUM SERPL-SCNC: 134 MMOL/L (ref 136–145)
WBC # BLD AUTO: 10.1 X10*3/UL (ref 4.4–11.3)

## 2025-01-09 PROCEDURE — 87040 BLOOD CULTURE FOR BACTERIA: CPT | Mod: STJLAB | Performed by: INTERNAL MEDICINE

## 2025-01-09 PROCEDURE — 2500000005 HC RX 250 GENERAL PHARMACY W/O HCPCS: Performed by: NURSE PRACTITIONER

## 2025-01-09 PROCEDURE — 2500000004 HC RX 250 GENERAL PHARMACY W/ HCPCS (ALT 636 FOR OP/ED): Performed by: NURSE PRACTITIONER

## 2025-01-09 PROCEDURE — 2500000002 HC RX 250 W HCPCS SELF ADMINISTERED DRUGS (ALT 637 FOR MEDICARE OP, ALT 636 FOR OP/ED)

## 2025-01-09 PROCEDURE — 94660 CPAP INITIATION&MGMT: CPT

## 2025-01-09 PROCEDURE — 36415 COLL VENOUS BLD VENIPUNCTURE: CPT | Performed by: PHYSICIAN ASSISTANT

## 2025-01-09 PROCEDURE — 80048 BASIC METABOLIC PNL TOTAL CA: CPT | Performed by: NURSE PRACTITIONER

## 2025-01-09 PROCEDURE — 82947 ASSAY GLUCOSE BLOOD QUANT: CPT

## 2025-01-09 PROCEDURE — 36415 COLL VENOUS BLD VENIPUNCTURE: CPT | Performed by: NURSE PRACTITIONER

## 2025-01-09 PROCEDURE — 85027 COMPLETE CBC AUTOMATED: CPT | Performed by: NURSE PRACTITIONER

## 2025-01-09 PROCEDURE — 85014 HEMATOCRIT: CPT | Performed by: PHYSICIAN ASSISTANT

## 2025-01-09 PROCEDURE — 99232 SBSQ HOSP IP/OBS MODERATE 35: CPT | Performed by: INTERNAL MEDICINE

## 2025-01-09 PROCEDURE — 85018 HEMOGLOBIN: CPT | Performed by: PHYSICIAN ASSISTANT

## 2025-01-09 PROCEDURE — 2500000001 HC RX 250 WO HCPCS SELF ADMINISTERED DRUGS (ALT 637 FOR MEDICARE OP): Performed by: NURSE PRACTITIONER

## 2025-01-09 PROCEDURE — 87075 CULTR BACTERIA EXCEPT BLOOD: CPT | Mod: STJLAB | Performed by: INTERNAL MEDICINE

## 2025-01-09 PROCEDURE — 2060000001 HC INTERMEDIATE ICU ROOM DAILY

## 2025-01-09 PROCEDURE — 2500000002 HC RX 250 W HCPCS SELF ADMINISTERED DRUGS (ALT 637 FOR MEDICARE OP, ALT 636 FOR OP/ED): Performed by: PHYSICIAN ASSISTANT

## 2025-01-09 PROCEDURE — 2500000004 HC RX 250 GENERAL PHARMACY W/ HCPCS (ALT 636 FOR OP/ED): Performed by: INTERNAL MEDICINE

## 2025-01-09 RX ORDER — CEFTRIAXONE 2 G/50ML
2 INJECTION, SOLUTION INTRAVENOUS EVERY 24 HOURS
Status: DISCONTINUED | OUTPATIENT
Start: 2025-01-10 | End: 2025-01-09

## 2025-01-09 RX ORDER — INSULIN LISPRO 100 [IU]/ML
6 INJECTION, SOLUTION INTRAVENOUS; SUBCUTANEOUS ONCE
Status: COMPLETED | OUTPATIENT
Start: 2025-01-09 | End: 2025-01-09

## 2025-01-09 RX ADMIN — INSULIN GLARGINE 50 UNITS: 100 INJECTION, SOLUTION SUBCUTANEOUS at 20:24

## 2025-01-09 RX ADMIN — INSULIN LISPRO 2 UNITS: 100 INJECTION, SOLUTION INTRAVENOUS; SUBCUTANEOUS at 18:03

## 2025-01-09 RX ADMIN — PANTOPRAZOLE SODIUM 40 MG: 40 INJECTION, POWDER, FOR SOLUTION INTRAVENOUS at 09:22

## 2025-01-09 RX ADMIN — FUROSEMIDE 20 MG: 20 TABLET ORAL at 09:21

## 2025-01-09 RX ADMIN — PANCRELIPASE 1 CAPSULE: 60000; 12000; 38000 CAPSULE, DELAYED RELEASE PELLETS ORAL at 06:33

## 2025-01-09 RX ADMIN — ENOXAPARIN SODIUM 40 MG: 40 INJECTION SUBCUTANEOUS at 09:21

## 2025-01-09 RX ADMIN — Medication 4 L/MIN: at 20:31

## 2025-01-09 RX ADMIN — CEFTRIAXONE SODIUM 2 G: 2 INJECTION, SOLUTION INTRAVENOUS at 00:05

## 2025-01-09 RX ADMIN — NYSTATIN 1 APPLICATION: 100000 POWDER TOPICAL at 09:40

## 2025-01-09 RX ADMIN — PIPERACILLIN SODIUM AND TAZOBACTAM SODIUM 3.38 G: 3; .375 INJECTION, SOLUTION INTRAVENOUS at 23:22

## 2025-01-09 RX ADMIN — ATORVASTATIN CALCIUM 10 MG: 10 TABLET, FILM COATED ORAL at 20:24

## 2025-01-09 RX ADMIN — INSULIN LISPRO 4 UNITS: 100 INJECTION, SOLUTION INTRAVENOUS; SUBCUTANEOUS at 12:40

## 2025-01-09 RX ADMIN — INSULIN LISPRO 2 UNITS: 100 INJECTION, SOLUTION INTRAVENOUS; SUBCUTANEOUS at 20:24

## 2025-01-09 RX ADMIN — CITALOPRAM HYDROBROMIDE 40 MG: 20 TABLET ORAL at 09:21

## 2025-01-09 RX ADMIN — GABAPENTIN 300 MG: 300 CAPSULE ORAL at 20:24

## 2025-01-09 RX ADMIN — Medication 5 L/MIN: at 08:00

## 2025-01-09 RX ADMIN — GABAPENTIN 300 MG: 300 CAPSULE ORAL at 09:21

## 2025-01-09 RX ADMIN — INSULIN GLARGINE 50 UNITS: 100 INJECTION, SOLUTION SUBCUTANEOUS at 09:22

## 2025-01-09 RX ADMIN — ENOXAPARIN SODIUM 40 MG: 40 INJECTION SUBCUTANEOUS at 20:24

## 2025-01-09 RX ADMIN — INSULIN LISPRO 6 UNITS: 100 INJECTION, SOLUTION INTRAVENOUS; SUBCUTANEOUS at 09:22

## 2025-01-09 RX ADMIN — INSULIN LISPRO 6 UNITS: 100 INJECTION, SOLUTION INTRAVENOUS; SUBCUTANEOUS at 01:04

## 2025-01-09 RX ADMIN — ASPIRIN 81 MG CHEWABLE TABLET 81 MG: 81 TABLET CHEWABLE at 09:21

## 2025-01-09 RX ADMIN — PANCRELIPASE 1 CAPSULE: 60000; 12000; 38000 CAPSULE, DELAYED RELEASE PELLETS ORAL at 12:40

## 2025-01-09 RX ADMIN — PIPERACILLIN SODIUM AND TAZOBACTAM SODIUM 3.38 G: 3; .375 INJECTION, SOLUTION INTRAVENOUS at 15:30

## 2025-01-09 RX ADMIN — Medication 4 L/MIN: at 20:30

## 2025-01-09 ASSESSMENT — PAIN SCALES - GENERAL
PAINLEVEL_OUTOF10: 0 - NO PAIN

## 2025-01-09 ASSESSMENT — PAIN - FUNCTIONAL ASSESSMENT
PAIN_FUNCTIONAL_ASSESSMENT: 0-10

## 2025-01-09 NOTE — CARE PLAN
Patient remained HDS throughout shift. Blood cultures were positive ; zosyn was started.  Patient remained on NC once removed from AM bipapo2 remained above 90%  Patient will need bipap at home, communicated with charge nurse.  Patient able to turn self, most comfortable on left side.  Patient safety maintained    The clinical goals for the shift include Pt will maintain Sp02>92% throughout shift      Problem: Discharge Planning  Goal: Discharge to home or other facility with appropriate resources  Outcome: Progressing     Problem: Chronic Conditions and Co-morbidities  Goal: Patient's chronic conditions and co-morbidity symptoms are monitored and maintained or improved  Outcome: Progressing     Problem: Skin  Goal: Decreased wound size/increased tissue granulation at next dressing change  Outcome: Progressing  Goal: Participates in plan/prevention/treatment measures  Outcome: Progressing  Goal: Prevent/manage excess moisture  Outcome: Progressing  Goal: Prevent/minimize sheer/friction injuries  Outcome: Progressing  Goal: Promote/optimize nutrition  Outcome: Progressing  Goal: Promote skin healing  Outcome: Progressing     Problem: Respiratory  Goal: Minimize anxiety/maximize coping throughout shift  Outcome: Progressing  Goal: Minimal/no exertional discomfort or dyspnea this shift  Outcome: Progressing  Goal: No signs of respiratory distress (eg. Use of accessory muscles. Peds grunting)  Outcome: Progressing  Goal: Patent airway maintained this shift  Outcome: Progressing  Goal: Verbalize decreased shortness of breath this shift  Outcome: Progressing  Goal: Wean oxygen to maintain O2 saturation per order/standard this shift  Outcome: Progressing     Problem: Diabetes  Goal: Achieve decreasing blood glucose levels by end of shift  Outcome: Progressing  Goal: Increase stability of blood glucose readings by end of shift  Outcome: Progressing  Goal: Decrease in ketones present in urine by end of shift  Outcome:  Progressing  Goal: Maintain electrolyte levels within acceptable range throughout shift  Outcome: Progressing  Goal: Maintain glucose levels >70mg/dl to <250mg/dl throughout shift  Outcome: Progressing  Goal: No changes in neurological exam by end of shift  Outcome: Progressing  Goal: Learn about and adhere to nutrition recommendations by end of shift  Outcome: Progressing  Goal: Vital signs within normal range for age by end of shift  Outcome: Progressing  Goal: Increase self care and/or family involovement by end of shift  Outcome: Progressing  Goal: Receive DSME education by end of shift  Outcome: Progressing

## 2025-01-09 NOTE — PROGRESS NOTES
Kaylene Feliciano is a 69 y.o. female on day 2 of admission presenting with Acute hypoxic on chronic hypercapnic respiratory failure (Multi).      Subjective   No acute events. Afebrile. O2 down to 5L NC. Blood culture growing Proteus, GPC pairs and chains.        Objective     Last Recorded Vitals  BP 90/61 (BP Location: Right arm, Patient Position: Lying)   Pulse 72   Temp 36.3 °C (97.3 °F) (Temporal)   Resp 20   Wt 127 kg (280 lb)   SpO2 97%   Intake/Output last 3 Shifts:    Intake/Output Summary (Last 24 hours) at 1/9/2025 1344  Last data filed at 1/9/2025 1152  Gross per 24 hour   Intake 240 ml   Output 1200 ml   Net -960 ml       Admission Weight  Weight: 127 kg (280 lb) (01/07/25 1130)    Daily Weight  01/07/25 : 127 kg (280 lb)    Image Results  ECG 12 lead  SR with  NIVCD  Left axis deviation  Septal infarct , age undetermined  When compared with ECG of 13-SEP-2024 06:11,  Reconfirmed by Linus Bobby ([id number]) on 1/8/2025 6:53:19 PM  CT angio chest for pulmonary embolism  Narrative: Interpreted By:  Brodie Davies,   STUDY:  CT ANGIO CHEST FOR PULMONARY EMBOLISM;  1/8/2025 1:05 pm      INDICATION:  Signs/Symptoms:acute hypoxia r/o PE.          COMPARISON:  09/13/2024      ACCESSION NUMBER(S):  [id number]      ORDERING CLINICIAN:  LINNETTE GAVIN      TECHNIQUE:  Helical data acquisition of the chest was obtained with 60 mL  Omnipaque 350. Images were reformatted in coronal and sagittal  planes. Axial and coronal MIP images were created and reviewed.      FINDINGS:  POTENTIAL LIMITATIONS OF THE STUDY: None      HEART AND VESSELS:  No discrete filling defects within the main pulmonary artery or its  branches.      Main pulmonary artery and its branches are normal in caliber.      The thoracic aorta is of normal course and caliber without vascular  calcifications.      Coronary artery calcifications are seen.The study is not optimized  for evaluation of coronary arteries.      The cardiac chambers are not  enlarged.      No evidence of pericardial effusion.      MEDIASTINUM AND ARAMIS, LOWER NECK AND AXILLA:  Subcentimeter peripherally calcified nodule again seen within the  left lobe.      No evidence of thoracic lymphadenopathy by CT criteria.      Esophagus appears within normal limits as seen.      LUNGS AND AIRWAYS:  The trachea and central airways are patent. No endobronchial lesion.      Bibasilar atelectatic changes. Band like density within the left  upper lobe similar to the prior study likely related to underlying  scarring. Few patchy areas ground-glass density noted within the  right lung most prominent within the right lower lobe centered on  image number 183      UPPER ABDOMEN:  The visualized subdiaphragmatic structures demonstrate no remarkable  findings.      CHEST WALL AND OSSEOUS STRUCTURES:  There are no suspicious osseous lesions. Multilevel degenerative  changes are present      Impression: 1.  No pulmonary emboli.  2. Patchy ground-glass densities about the right lung suggestive  underlying infectious etiology. Bibasilar atelectatic changes. Band  like density persists within the left upper lobe likely related to  scarring.          MACRO:  None      Signed by: Brodie Davies 1/8/2025 1:18 PM  Dictation workstation:   [id number]      Physical Exam    General: Awake, alert/oriented x4, well developed, no acute distress  Head: Atraumatic/Normocephalic  Cardiovascular: RRR, S1/S2, no murmurs, rubs, or gallops, radial pulses +2, no edema of extremities  Pulmonary: Diminished breath sounds. Ronchi noted bilaterally.   Abdomen: +BS, soft, non-tender, nondistended, no guarding or rebound, no masses noted  Skin- No lesions, contusions, or erythema.  Neuro: Alert/oriented x4, no focal motor or sensory deficits    Relevant Results               Assessment/Plan                  Assessment & Plan    #Acute hypoxic/hypercapnic respiratory failure  #Community acquired pneumonia  #Bacteremia-->cultures growing  Proteus mirabilis, Enterococcus faecalis  #Hx COPD/Asthma on CPAP  #Hx HTN, IDDM, HFpEF, CKD    -Urine cultures negative  -Sputum cultures negative  -Blood cultures from admission 1/7 growing Proteus mirabilis, Enterococcus faecalis  -Will escalate coverage to Zosyn  -Obtain CT abdomen/pelvis to assess for intraabdominal sources for bacteremia  -Repeat blood cultures collected    ID will continue to follow            Assessment and plan discussed with Dr. Salvador Bosch, DO  Internal medicine, PGY3

## 2025-01-09 NOTE — PROGRESS NOTES
"Kaylene Feliciano is a 69 y.o. female on day 2 of admission presenting with Acute hypoxic on chronic hypercapnic respiratory failure (Multi).    Subjective   Ms. Feliciano noted feeling better. She denied any chest pain, cough.     Objective     Physical Exam  Vitals reviewed.   Constitutional:       Appearance: She is obese.   HENT:      Head: Normocephalic.      Nose: Nose normal.      Mouth/Throat:      Mouth: Mucous membranes are moist.   Eyes:      Extraocular Movements: Extraocular movements intact.   Cardiovascular:      Rate and Rhythm: Normal rate and regular rhythm.      Heart sounds: No murmur heard.  Pulmonary:      Breath sounds: Normal breath sounds. No wheezing or rhonchi.      Comments: Diminished bilaterally  Abdominal:      Palpations: Abdomen is soft.   Musculoskeletal:      Right lower leg: No edema.      Left lower leg: No edema.   Skin:     General: Skin is warm and dry.   Neurological:      General: No focal deficit present.      Mental Status: She is alert and oriented to person, place, and time.         Last Recorded Vitals  Blood pressure 90/61, pulse 72, temperature 36.3 °C (97.3 °F), temperature source Temporal, resp. rate 20, height 1.6 m (5' 3\"), weight 127 kg (280 lb), SpO2 97%.  Intake/Output last 3 Shifts:  I/O last 3 completed shifts:  In: 890 (7 mL/kg) [P.O.:240; IV Piggyback:650]  Out: 600 (4.7 mL/kg) [Urine:600 (0.1 mL/kg/hr)]  Weight: 127 kg     Relevant Results  Scheduled medications  amLODIPine, 10 mg, oral, Daily  aspirin, 81 mg, oral, Daily  atorvastatin, 10 mg, oral, Nightly  [START ON 1/10/2025] cefTRIAXone, 2 g, intravenous, q24h  citalopram, 40 mg, oral, Daily  enoxaparin, 40 mg, subcutaneous, q12h YARELI  furosemide, 20 mg, oral, Daily  gabapentin, 300 mg, oral, BID  insulin glargine, 50 Units, subcutaneous, BID  insulin lispro, 0-10 Units, subcutaneous, Before meals & nightly  metoprolol succinate XL, 25 mg, oral, Daily  nystatin, 1 Application, Topical, BID  oxygen, , " inhalation, Continuous - Inhalation  oxygen, , inhalation, Continuous - Inhalation  pancrelipase (Lip-Prot-Amyl), 1 capsule, oral, TID AC  pantoprazole, 40 mg, intravenous, Daily      Continuous medications     PRN medications  PRN medications: acetaminophen, albuterol    Results for orders placed or performed during the hospital encounter of 01/07/25 (from the past 24 hours)   Respiratory Culture/Smear    Specimen: SPUTUM; Fluid   Result Value Ref Range    Gram Stain       Gram stain indicates specimen consists of lower respiratory tract secretions.    Gram Stain No predominant organism    POCT GLUCOSE   Result Value Ref Range    POCT Glucose 371 (H) 74 - 99 mg/dL   POCT GLUCOSE   Result Value Ref Range    POCT Glucose 326 (H) 74 - 99 mg/dL   Basic metabolic panel   Result Value Ref Range    Glucose 279 (H) 74 - 99 mg/dL    Sodium 134 (L) 136 - 145 mmol/L    Potassium 4.5 3.5 - 5.3 mmol/L    Chloride 95 (L) 98 - 107 mmol/L    Bicarbonate 31 21 - 32 mmol/L    Anion Gap 13 10 - 20 mmol/L    Urea Nitrogen 43 (H) 6 - 23 mg/dL    Creatinine 1.15 (H) 0.50 - 1.05 mg/dL    eGFR 52 (L) >60 mL/min/1.73m*2    Calcium 8.7 8.6 - 10.3 mg/dL   CBC   Result Value Ref Range    WBC 10.1 4.4 - 11.3 x10*3/uL    nRBC 0.0 0.0 - 0.0 /100 WBCs    RBC 3.18 (L) 4.00 - 5.20 x10*6/uL    Hemoglobin 7.8 (L) 12.0 - 16.0 g/dL    Hematocrit 26.3 (L) 36.0 - 46.0 %    MCV 83 80 - 100 fL    MCH 24.5 (L) 26.0 - 34.0 pg    MCHC 29.7 (L) 32.0 - 36.0 g/dL    RDW 16.1 (H) 11.5 - 14.5 %    Platelets 275 150 - 450 x10*3/uL   POCT GLUCOSE   Result Value Ref Range    POCT Glucose 257 (H) 74 - 99 mg/dL   Hemoglobin and Hematocrit, Blood   Result Value Ref Range    Hemoglobin 8.6 (L) 12.0 - 16.0 g/dL    Hematocrit 27.9 (L) 36.0 - 46.0 %   POCT GLUCOSE   Result Value Ref Range    POCT Glucose 248 (H) 74 - 99 mg/dL     CT angio chest for pulmonary embolism    Result Date: 1/8/2025  Interpreted By:  Brodie Davies, STUDY: CT ANGIO CHEST FOR PULMONARY EMBOLISM;   1/8/2025 1:05 pm   INDICATION: Signs/Symptoms:acute hypoxia r/o PE.     COMPARISON: 09/13/2024   ACCESSION NUMBER(S): [id number]   ORDERING CLINICIAN: LINNETTE GAVIN   TECHNIQUE: Helical data acquisition of the chest was obtained with 60 mL Omnipaque 350. Images were reformatted in coronal and sagittal planes. Axial and coronal MIP images were created and reviewed.   FINDINGS: POTENTIAL LIMITATIONS OF THE STUDY: None   HEART AND VESSELS: No discrete filling defects within the main pulmonary artery or its branches.   Main pulmonary artery and its branches are normal in caliber.   The thoracic aorta is of normal course and caliber without vascular calcifications.   Coronary artery calcifications are seen.The study is not optimized for evaluation of coronary arteries.   The cardiac chambers are not enlarged.   No evidence of pericardial effusion.   MEDIASTINUM AND ARAMIS, LOWER NECK AND AXILLA: Subcentimeter peripherally calcified nodule again seen within the left lobe.   No evidence of thoracic lymphadenopathy by CT criteria.   Esophagus appears within normal limits as seen.   LUNGS AND AIRWAYS: The trachea and central airways are patent. No endobronchial lesion.   Bibasilar atelectatic changes. Band like density within the left upper lobe similar to the prior study likely related to underlying scarring. Few patchy areas ground-glass density noted within the right lung most prominent within the right lower lobe centered on image number 183   UPPER ABDOMEN: The visualized subdiaphragmatic structures demonstrate no remarkable findings.   CHEST WALL AND OSSEOUS STRUCTURES: There are no suspicious osseous lesions. Multilevel degenerative changes are present       1.  No pulmonary emboli. 2. Patchy ground-glass densities about the right lung suggestive underlying infectious etiology. Bibasilar atelectatic changes. Band like density persists within the left upper lobe likely related to scarring.     MACRO: None   Signed by:  Brodie Davies 1/8/2025 1:18 PM Dictation workstation:   [id number]    NM injection no scan    Result Date: 1/7/2025  Interpreted By:  Deonte Lan, STUDY: NM INJECTION NO SCAN;  1/7/2025 4:10 pm   INDICATION: Signs/Symptoms:hypoxia.   COMPARISON: Chest x-ray from 01/07/2025   ACCESSION NUMBER(S): [id number]   ORDERING CLINICIAN: NAN MEDINA   TECHNIQUE: DIVISION OF NUCLEAR MEDICINE PERFUSION LUNG SCANS   Intravenous administration of 4.2 mCi of Tc-99m macroaggregated albumin (MAA). .   FINDINGS: Injection only, no images were obtained due to severe hypoxia/respiratory failure, patient is on 15 L oxygen.       1. Injection only, no images were obtained due to severe hypoxia/respiratory failure.   The interpretation above is based on modified PIOPED II and PISAPED criteria.   This study was analyzed and interpreted at [place], [place].   MACRO: None     Signed by: Deonte Lan 1/7/2025 6:04 PM Dictation workstation:   [id number]    Lower extremity venous duplex bilateral    Result Date: 1/7/2025            [facility] [id number] [id number]     Tel [phone] Fax [phone]  Vascular Lab Report  [facility] LOWER EXTREMITY VENOUS DUPLEX BILATERAL Patient Name:     TEJINDER Aguillon Physician: [id number] CHARISSE Healy MD Study Date:       1/7/2025            Ordering Provider: [id number] LINNETTE GAVIN MRN/PID:          [id number]            Fellow: Accession#:       [id number]        Technologist:      Nabila Miller RVT Date of           1955 / 69      Technologist 2: Birth/Age:        years Gender:           F                   Encounter#:        [id number] Admission Status: Inpatient           Location           [facility]                                       Performed:  Diagnosis/ICD: Localized (leg) edema-R60.0; Shortness of breath-R06.02 CPT Codes:     07654 Peripheral venous duplex scan for  DVT complete  Pertinent History: HTN and Hyperlipidemia. Morbidly obese, CHF, CKD.  CONCLUSIONS: Right Lower Venous: No evidence of acute deep vein thrombus visualized in the right lower extremity. Cannot rule out thrombus in non-visualized peroneal vein. Femoral vein visualized in segments, cannot rule out thrombus in non-visualized segments. Left Lower Venous: No evidence of acute deep vein thrombus visualized in the left lower extremity. Cannot rule out thrombus in non-visualized iliac vein due to body habitus. Femoral vein visualized in segments, cannot rule out thrombus in non-visualized segments. Additional Findings: Technically difficult study, limited images due to body habitus.  Imaging & Doppler Findings:  Right                 Compressible Thrombus        Flow Distal External Iliac     Yes        None   Spontaneous/Phasic CFV                       Yes        None   Spontaneous/Phasic PFV                       Yes        None FV Proximal               Yes        None   Spontaneous/Phasic FV Mid                    Yes        None FV Distal                 Yes        None   Spontaneous/Phasic Popliteal                 Yes        None   Spontaneous/Phasic Peroneal                  Yes        None PTV                       Yes        None  Left        Compress Thrombus        Flow CFV           Yes      None   Spontaneous/Phasic PFV           Yes      None FV Proximal   Yes      None   Spontaneous/Phasic FV Mid        Yes      None FV Distal     Yes      None Popliteal     Yes      None   Spontaneous/Phasic Peroneal      Yes      None PTV           Yes      None  [id number] CHARISSE Healy MD Electronically signed by [id number] CHARISSE Healy MD on 1/7/2025 at 4:59:01 PM  ** Final **        Assessment/Plan   Assessment & Plan  Acute hypoxic on chronic hypercapnic respiratory failure (Multi)    Acute diastolic heart failure    Pneumonia of both lungs due to infectious  organism    Anemia    Leukocytosis    Obesity    Cellulitis of left breast    Fungal infection of skin    Bacteremia    Ms. Feliciano is a 69F chronic hypercapnia and hypoxia 4L, ARCHIE, asthma, HTN, HLD, DM2, HF, CKD, bipolar disorder, depression admitted with SOB and hypoxia.   Consult for continuous BiPAP     #Acute on chronic hypoxic and hypercapnic respiratory failure  #Dyspnea  #Leukocytosis  #Bacteremia     CXR with diffuse infiltrate and opacities. Being treated for sepsis at this time.   Patient alert and conversant on BiPAP with appropriate tidal volumes for height.   CT similar in appearance to 9/2024 imaging without significant consolidation and minimal GGO on R. Atelectasis similar to prior.   Agree with antibiotic coverage, consider atypical coverage  PFT uninterpretable but less likely COPD as patterns and habitus more suggestive of restrictive lung disease. Don't think she needs steroids at this time but defer to primary  Extremity duplex negative  Continue BiPAP at bedtime and with naps.   She states she has not received a BiPAP machine. She did not reach out to pulmonary office it seems either. Please review with CM to assist in obtaining prior to discharge.    I spent 35 minutes in the professional and overall care of this patient.      Erica Sifuentes MD

## 2025-01-09 NOTE — CARE PLAN
Pt remained safe throughout shift. Vitals stable, Sp02 maintained>92% on 6L NC while awake, BiPAP while sleeping. Pt denies pain, endorses SOB when off of left side. Blood cx positive. [facility] [place] paged. See provider communication.

## 2025-01-09 NOTE — PROGRESS NOTES
Spiritual Care Visit  Spiritual Care Request    Reason for Visit:  Routine Visit: Introduction  Continue Visiting: Yes     Request Received From:       Focus of Care:  Visited With: Patient         Refer to :          Spiritual Care Assessment    Spiritual Assessment:                      Care Provided:  Intended Effects: Establish rapport and connectedness, Marianne affirmation, Build relationship of care and support, Demonstrate caring and concern, Meaning-making    Sense of Community and or Lutheran Affiliation:  Taoist         Addressed Needs/Concerns and/or Julissa Through:  Lutheran Encounters  Lutheran Needs: Prayer, Literature  Sacramental Encounters  Communion: Patient wants communion  Communion Given Indicator: No  Sacrament of Sick-Anointing: Anointed, Patient requested anointing    Outcome:  Outcome of Spiritual Care Visit: [place], Affirmation, Trust in self/others/God, Peace/gratitude, Spirituality connected     Advance Directives:         Spiritual Care Annotation    Annotation:

## 2025-01-10 ENCOUNTER — APPOINTMENT (OUTPATIENT)
Dept: RADIOLOGY | Facility: HOSPITAL | Age: 70
End: 2025-01-10
Payer: MEDICARE

## 2025-01-10 LAB
ANION GAP SERPL CALC-SCNC: 12 MMOL/L (ref 10–20)
BUN SERPL-MCNC: 37 MG/DL (ref 6–23)
CALCIUM SERPL-MCNC: 8.9 MG/DL (ref 8.6–10.3)
CHLORIDE SERPL-SCNC: 98 MMOL/L (ref 98–107)
CO2 SERPL-SCNC: 34 MMOL/L (ref 21–32)
CREAT SERPL-MCNC: 1.14 MG/DL (ref 0.5–1.05)
EGFRCR SERPLBLD CKD-EPI 2021: 52 ML/MIN/1.73M*2
ERYTHROCYTE [DISTWIDTH] IN BLOOD BY AUTOMATED COUNT: 16 % (ref 11.5–14.5)
GLUCOSE BLD MANUAL STRIP-MCNC: 105 MG/DL (ref 74–99)
GLUCOSE BLD MANUAL STRIP-MCNC: 113 MG/DL (ref 74–99)
GLUCOSE BLD MANUAL STRIP-MCNC: 150 MG/DL (ref 74–99)
GLUCOSE BLD MANUAL STRIP-MCNC: 94 MG/DL (ref 74–99)
GLUCOSE SERPL-MCNC: 94 MG/DL (ref 74–99)
HCT VFR BLD AUTO: 27.8 % (ref 36–46)
HGB BLD-MCNC: 8 G/DL (ref 12–16)
MCH RBC QN AUTO: 24.1 PG (ref 26–34)
MCHC RBC AUTO-ENTMCNC: 28.8 G/DL (ref 32–36)
MCV RBC AUTO: 84 FL (ref 80–100)
NRBC BLD-RTO: 0 /100 WBCS (ref 0–0)
PLATELET # BLD AUTO: 241 X10*3/UL (ref 150–450)
POTASSIUM SERPL-SCNC: 4.5 MMOL/L (ref 3.5–5.3)
RBC # BLD AUTO: 3.32 X10*6/UL (ref 4–5.2)
SODIUM SERPL-SCNC: 139 MMOL/L (ref 136–145)
WBC # BLD AUTO: 9 X10*3/UL (ref 4.4–11.3)

## 2025-01-10 PROCEDURE — 2500000004 HC RX 250 GENERAL PHARMACY W/ HCPCS (ALT 636 FOR OP/ED): Performed by: NURSE PRACTITIONER

## 2025-01-10 PROCEDURE — 97166 OT EVAL MOD COMPLEX 45 MIN: CPT | Mod: GO | Performed by: OCCUPATIONAL THERAPIST

## 2025-01-10 PROCEDURE — 2500000005 HC RX 250 GENERAL PHARMACY W/O HCPCS: Performed by: NURSE PRACTITIONER

## 2025-01-10 PROCEDURE — 74176 CT ABD & PELVIS W/O CONTRAST: CPT

## 2025-01-10 PROCEDURE — 76830 TRANSVAGINAL US NON-OB: CPT | Performed by: RADIOLOGY

## 2025-01-10 PROCEDURE — 97161 PT EVAL LOW COMPLEX 20 MIN: CPT | Mod: GP

## 2025-01-10 PROCEDURE — 2060000001 HC INTERMEDIATE ICU ROOM DAILY

## 2025-01-10 PROCEDURE — 2500000001 HC RX 250 WO HCPCS SELF ADMINISTERED DRUGS (ALT 637 FOR MEDICARE OP): Performed by: NURSE PRACTITIONER

## 2025-01-10 PROCEDURE — 99232 SBSQ HOSP IP/OBS MODERATE 35: CPT | Performed by: INTERNAL MEDICINE

## 2025-01-10 PROCEDURE — 76856 US EXAM PELVIC COMPLETE: CPT

## 2025-01-10 PROCEDURE — 36415 COLL VENOUS BLD VENIPUNCTURE: CPT | Performed by: NURSE PRACTITIONER

## 2025-01-10 PROCEDURE — 82947 ASSAY GLUCOSE BLOOD QUANT: CPT

## 2025-01-10 PROCEDURE — 2500000002 HC RX 250 W HCPCS SELF ADMINISTERED DRUGS (ALT 637 FOR MEDICARE OP, ALT 636 FOR OP/ED)

## 2025-01-10 PROCEDURE — 2500000005 HC RX 250 GENERAL PHARMACY W/O HCPCS: Performed by: INTERNAL MEDICINE

## 2025-01-10 PROCEDURE — 2500000004 HC RX 250 GENERAL PHARMACY W/ HCPCS (ALT 636 FOR OP/ED): Performed by: INTERNAL MEDICINE

## 2025-01-10 PROCEDURE — 71045 X-RAY EXAM CHEST 1 VIEW: CPT | Performed by: STUDENT IN AN ORGANIZED HEALTH CARE EDUCATION/TRAINING PROGRAM

## 2025-01-10 PROCEDURE — 94660 CPAP INITIATION&MGMT: CPT

## 2025-01-10 PROCEDURE — 85027 COMPLETE CBC AUTOMATED: CPT | Performed by: NURSE PRACTITIONER

## 2025-01-10 PROCEDURE — 80048 BASIC METABOLIC PNL TOTAL CA: CPT | Performed by: NURSE PRACTITIONER

## 2025-01-10 PROCEDURE — 76856 US EXAM PELVIC COMPLETE: CPT | Performed by: RADIOLOGY

## 2025-01-10 PROCEDURE — 71045 X-RAY EXAM CHEST 1 VIEW: CPT

## 2025-01-10 RX ORDER — FERROUS SULFATE 325(65) MG
65 TABLET ORAL
Status: DISPENSED | OUTPATIENT
Start: 2025-01-10

## 2025-01-10 RX ORDER — PANTOPRAZOLE SODIUM 40 MG/1
40 TABLET, DELAYED RELEASE ORAL
Status: DISPENSED | OUTPATIENT
Start: 2025-01-11

## 2025-01-10 RX ORDER — ACETAMINOPHEN 325 MG/1
650 TABLET ORAL EVERY 6 HOURS PRN
Status: DISPENSED | OUTPATIENT
Start: 2025-01-10

## 2025-01-10 RX ORDER — NYSTATIN 100000 [USP'U]/G
1 POWDER TOPICAL 2 TIMES DAILY
Qty: 15 G | Refills: 0 | OUTPATIENT
Start: 2025-01-10 | End: 2025-02-09

## 2025-01-10 RX ORDER — FERROUS SULFATE 325(65) MG
65 TABLET ORAL
Qty: 30 TABLET | Refills: 0 | OUTPATIENT
Start: 2025-01-11 | End: 2025-02-10

## 2025-01-10 RX ORDER — HYDRALAZINE HYDROCHLORIDE 20 MG/ML
10 INJECTION INTRAMUSCULAR; INTRAVENOUS EVERY 6 HOURS PRN
Status: DISPENSED | OUTPATIENT
Start: 2025-01-10

## 2025-01-10 RX ADMIN — FUROSEMIDE 20 MG: 20 TABLET ORAL at 09:26

## 2025-01-10 RX ADMIN — PANCRELIPASE 1 CAPSULE: 60000; 12000; 38000 CAPSULE, DELAYED RELEASE PELLETS ORAL at 11:49

## 2025-01-10 RX ADMIN — Medication 4 L/MIN: at 20:00

## 2025-01-10 RX ADMIN — ENOXAPARIN SODIUM 40 MG: 40 INJECTION SUBCUTANEOUS at 20:24

## 2025-01-10 RX ADMIN — Medication 40 PERCENT: at 21:59

## 2025-01-10 RX ADMIN — ATORVASTATIN CALCIUM 10 MG: 10 TABLET, FILM COATED ORAL at 20:24

## 2025-01-10 RX ADMIN — PANCRELIPASE 1 CAPSULE: 60000; 12000; 38000 CAPSULE, DELAYED RELEASE PELLETS ORAL at 09:26

## 2025-01-10 RX ADMIN — Medication 40 PERCENT: at 01:21

## 2025-01-10 RX ADMIN — FERROUS SULFATE TAB 325 MG (65 MG ELEMENTAL FE) 325 MG: 325 (65 FE) TAB at 09:26

## 2025-01-10 RX ADMIN — PIPERACILLIN SODIUM AND TAZOBACTAM SODIUM 4.5 G: 4; .5 INJECTION, SOLUTION INTRAVENOUS at 13:48

## 2025-01-10 RX ADMIN — Medication 4 L/MIN: at 09:26

## 2025-01-10 RX ADMIN — GABAPENTIN 300 MG: 300 CAPSULE ORAL at 20:24

## 2025-01-10 RX ADMIN — NYSTATIN 1 APPLICATION: 100000 POWDER TOPICAL at 09:28

## 2025-01-10 RX ADMIN — INSULIN GLARGINE 50 UNITS: 100 INJECTION, SOLUTION SUBCUTANEOUS at 09:28

## 2025-01-10 RX ADMIN — PIPERACILLIN SODIUM AND TAZOBACTAM SODIUM 4.5 G: 4; .5 INJECTION, SOLUTION INTRAVENOUS at 23:16

## 2025-01-10 RX ADMIN — GABAPENTIN 300 MG: 300 CAPSULE ORAL at 09:26

## 2025-01-10 RX ADMIN — PIPERACILLIN SODIUM AND TAZOBACTAM SODIUM 3.38 G: 3; .375 INJECTION, SOLUTION INTRAVENOUS at 06:02

## 2025-01-10 RX ADMIN — CITALOPRAM HYDROBROMIDE 40 MG: 20 TABLET ORAL at 09:26

## 2025-01-10 RX ADMIN — AMLODIPINE BESYLATE 10 MG: 10 TABLET ORAL at 09:26

## 2025-01-10 RX ADMIN — ACETAMINOPHEN 650 MG: 325 TABLET ORAL at 17:42

## 2025-01-10 RX ADMIN — PANCRELIPASE 1 CAPSULE: 60000; 12000; 38000 CAPSULE, DELAYED RELEASE PELLETS ORAL at 17:42

## 2025-01-10 RX ADMIN — INSULIN GLARGINE 50 UNITS: 100 INJECTION, SOLUTION SUBCUTANEOUS at 20:24

## 2025-01-10 RX ADMIN — Medication 4 L/MIN: at 09:27

## 2025-01-10 RX ADMIN — Medication 40 PERCENT: at 05:42

## 2025-01-10 RX ADMIN — METOPROLOL SUCCINATE 25 MG: 25 TABLET, EXTENDED RELEASE ORAL at 09:26

## 2025-01-10 RX ADMIN — ASPIRIN 81 MG CHEWABLE TABLET 81 MG: 81 TABLET CHEWABLE at 09:26

## 2025-01-10 RX ADMIN — ENOXAPARIN SODIUM 40 MG: 40 INJECTION SUBCUTANEOUS at 09:26

## 2025-01-10 RX ADMIN — Medication 4 L/MIN: at 20:32

## 2025-01-10 RX ADMIN — PANTOPRAZOLE SODIUM 40 MG: 40 INJECTION, POWDER, FOR SOLUTION INTRAVENOUS at 09:26

## 2025-01-10 RX ADMIN — HYDRALAZINE HYDROCHLORIDE 10 MG: 20 INJECTION INTRAMUSCULAR; INTRAVENOUS at 12:47

## 2025-01-10 SDOH — ECONOMIC STABILITY: HOUSING INSECURITY: AT ANY TIME IN THE PAST 12 MONTHS, WERE YOU HOMELESS OR LIVING IN A SHELTER (INCLUDING NOW)?: NO

## 2025-01-10 SDOH — ECONOMIC STABILITY: TRANSPORTATION INSECURITY: IN THE PAST 12 MONTHS, HAS LACK OF TRANSPORTATION KEPT YOU FROM MEDICAL APPOINTMENTS OR FROM GETTING MEDICATIONS?: NO

## 2025-01-10 SDOH — ECONOMIC STABILITY: HOUSING INSECURITY: IN THE LAST 12 MONTHS, WAS THERE A TIME WHEN YOU WERE NOT ABLE TO PAY THE MORTGAGE OR RENT ON TIME?: NO

## 2025-01-10 SDOH — ECONOMIC STABILITY: FOOD INSECURITY: WITHIN THE PAST 12 MONTHS, THE FOOD YOU BOUGHT JUST DIDN'T LAST AND YOU DIDN'T HAVE MONEY TO GET MORE.: NEVER TRUE

## 2025-01-10 SDOH — ECONOMIC STABILITY: HOUSING INSECURITY: IN THE PAST 12 MONTHS, HOW MANY TIMES HAVE YOU MOVED WHERE YOU WERE LIVING?: 0

## 2025-01-10 SDOH — ECONOMIC STABILITY: FOOD INSECURITY: WITHIN THE PAST 12 MONTHS, YOU WORRIED THAT YOUR FOOD WOULD RUN OUT BEFORE YOU GOT THE MONEY TO BUY MORE.: NEVER TRUE

## 2025-01-10 SDOH — ECONOMIC STABILITY: INCOME INSECURITY: IN THE PAST 12 MONTHS HAS THE ELECTRIC, GAS, OIL, OR WATER COMPANY THREATENED TO SHUT OFF SERVICES IN YOUR HOME?: NO

## 2025-01-10 SDOH — ECONOMIC STABILITY: FOOD INSECURITY: HOW HARD IS IT FOR YOU TO PAY FOR THE VERY BASICS LIKE FOOD, HOUSING, MEDICAL CARE, AND HEATING?: NOT VERY HARD

## 2025-01-10 ASSESSMENT — PAIN - FUNCTIONAL ASSESSMENT
PAIN_FUNCTIONAL_ASSESSMENT: 0-10

## 2025-01-10 ASSESSMENT — COGNITIVE AND FUNCTIONAL STATUS - GENERAL
WALKING IN HOSPITAL ROOM: A LOT
MOVING FROM LYING ON BACK TO SITTING ON SIDE OF FLAT BED WITH BEDRAILS: A LOT
TOILETING: A LOT
PERSONAL GROOMING: A LITTLE
MOVING TO AND FROM BED TO CHAIR: A LITTLE
STANDING UP FROM CHAIR USING ARMS: TOTAL
TURNING FROM BACK TO SIDE WHILE IN FLAT BAD: A LOT
CLIMB 3 TO 5 STEPS WITH RAILING: A LOT
DRESSING REGULAR LOWER BODY CLOTHING: TOTAL
WALKING IN HOSPITAL ROOM: TOTAL
MOBILITY SCORE: 16
HELP NEEDED FOR BATHING: A LITTLE
EATING MEALS: A LITTLE
PERSONAL GROOMING: A LITTLE
MOVING FROM LYING ON BACK TO SITTING ON SIDE OF FLAT BED WITH BEDRAILS: A LITTLE
DAILY ACTIVITIY SCORE: 19
DAILY ACTIVITIY SCORE: 12
STANDING UP FROM CHAIR USING ARMS: A LITTLE
TOILETING: A LITTLE
TURNING FROM BACK TO SIDE WHILE IN FLAT BAD: A LITTLE
CLIMB 3 TO 5 STEPS WITH RAILING: TOTAL
DRESSING REGULAR UPPER BODY CLOTHING: A LOT
DRESSING REGULAR UPPER BODY CLOTHING: A LITTLE
MOBILITY SCORE: 8
DRESSING REGULAR LOWER BODY CLOTHING: A LITTLE
HELP NEEDED FOR BATHING: TOTAL
MOVING TO AND FROM BED TO CHAIR: TOTAL

## 2025-01-10 ASSESSMENT — ACTIVITIES OF DAILY LIVING (ADL): LACK_OF_TRANSPORTATION: NO

## 2025-01-10 ASSESSMENT — PAIN SCALES - GENERAL
PAINLEVEL_OUTOF10: 0 - NO PAIN
PAINLEVEL_OUTOF10: 2
PAINLEVEL_OUTOF10: 7
PAINLEVEL_OUTOF10: 7
PAINLEVEL_OUTOF10: 0 - NO PAIN
PAINLEVEL_OUTOF10: 0 - NO PAIN
PAINLEVEL_OUTOF10: 3
PAINLEVEL_OUTOF10: 0 - NO PAIN
PAINLEVEL_OUTOF10: 0 - NO PAIN

## 2025-01-10 ASSESSMENT — PAIN DESCRIPTION - LOCATION: LOCATION: LEG

## 2025-01-10 ASSESSMENT — PAIN DESCRIPTION - ORIENTATION: ORIENTATION: LEFT

## 2025-01-10 NOTE — PROGRESS NOTES
Occupational Therapy  Evaluation    Patient Name: Kaylene Feliciano  MRN: [id number]  Today's Date: 1/10/2025  Time Calculation  Start Time: 0937  Stop Time: 0959  Time Calculation (min): 22 min  2110/2110-A    Assessment  IP OT Assessment  OT Assessment: Patient demonstrates decreased independence with self care, decreased independence with functional transfers, decresaed endurance, decreased strength and decreased balance.  Patient will benefit from skilled OT to address deficits.  Patient below baseline level of function.  Anticipate patient will benefit from moderate intensity therapy post hospital stay.  Prognosis: Good  Barriers to Discharge Home: Physical needs  Physical Needs: 24hr ADL assistance needed, 24hr mobility assistance needed  Evaluation/Treatment Tolerance: Patient tolerated treatment well  Medical Staff Made Aware: Yes    Plan:  Treatment Interventions: ADL retraining, Functional transfer training, UE strengthening/ROM, Endurance training, Patient/family training  OT Frequency: 3 times per week  OT Discharge Recommendations: Moderate intensity level of continued care  Equipment Recommended upon Discharge: Wheelchair, Lift  OT Recommended Transfer Status: Maximum assist, Assist of 2  OT - OK to Discharge: Yes (to next level of care when medically cleared by physician)    Subjective     Current Problem:  1. Acute hypoxic on chronic hypercapnic respiratory failure (Multi)        2. Acute on chronic respiratory failure with hypoxia and hypercapnia (Multi)  Critical Care    Critical Care      3. Acute hypoxic respiratory failure (Multi)        4. Lower extremity edema  Lower extremity venous duplex bilateral    Lower extremity venous duplex bilateral      5. Shortness of breath  Lower extremity venous duplex bilateral          General:  General  Family/Caregiver Present: No  Preferred Learning Style: verbal, visual      Pain:  Pain Assessment  Pain Interventions: Repositioned, Medication (See  MAR)    Objective     Cognition: WFL     Home Living:  Lives with spouse in apartment with elevator with tub  Bathroom Equipment: Tub transfer bench, Grab bars in shower     Prior Function:  Level of [place]: Needs assistance with ADLs, Needs assistance with homemaking, Needs assistance with functional transfers  Receives Help From:  ()   assist with slide board transfers, assist with LE dressing, patient reports completing UE dressing and bathing once in tub.    ADL:  LE Dressing Assistance: Total    Activity Tolerance:  Endurance: Tolerates 10 - 20 min exercise with multiple rests    Bed Mobility/Transfers:       Supine to sitting and sitting to supine with max A X 2, needed assist with bilateral Les and head of bed elevated.    Ambulation/Gait Training:  Functional Mobility  Functional Mobility Performed: No    Sitting Balance:  Static Sitting Balance  Static Sitting-Balance Support: Bilateral upper extremity supported, Feet supported  Static Sitting-Level of Assistance: Contact guard  Static Sitting-Comment/Number of Minutes: sat edge of bed for ~5 minutes         Extremities: RUE   RUE : Within Functional Limits and LUE   LUE: Within Functional Limits    Outcome Measures: [facility] Daily Activity  Putting on and taking off regular lower body clothing: Total  Bathing (including washing, rinsing, drying): Total  Putting on and taking off regular upper body clothing: A lot  Toileting, which includes using toilet, bedpan or urinal: A lot  Taking care of personal grooming such as brushing teeth: A little  Eating Meals: A little  Daily Activity - Total Score: 12               EDUCATION:  Education  Individual(s) Educated: Patient  Education Provided: Fall precautons, Risk and benefits of OT discussed with patient or other  Plan of Care Discussed and Agreed Upon: yes  Patient Response to Education: Patient/Caregiver Verbalized Understanding of Information      Goals:   Encounter Problems       Encounter  Problems (Active)       OT Goals       Patient will complete functional transfer with Milly Ware with mod A. (Progressing)       Start:  01/10/25    Expected End:  01/24/25            Patient will complete grooming while seated with set up. (Progressing)       Start:  01/10/25    Expected End:  01/24/25            Patient will complete bed mobility with mod A. (Progressing)       Start:  01/10/25    Expected End:  01/24/25

## 2025-01-10 NOTE — PROGRESS NOTES
Physical Therapy    Physical Therapy Evaluation    Patient Name: Kaylene Feliciano  MRN: [id number]  Department: [facility] 2  Room: 2110211A  Today's Date: 1/10/2025   Time Calculation  Start Time: 0937  Stop Time: 0959  Time Calculation (min): 22 min    Assessment/Plan   PT Assessment  PT Assessment Results: Decreased strength, Decreased range of motion, Decreased endurance, Decreased mobility, Impaired balance, Pain, Obesity  Rehab Prognosis: Fair  Barriers to Discharge Home: Physical needs  Physical Needs: 24hr mobility assistance needed, 24hr ADL assistance needed  Evaluation/Treatment Tolerance: Patient limited by pain  Medical Staff Made Aware: Yes  End of Session Communication: Bedside nurse Assessment Comment: Pt's impairments include generalized weakness, impaired balance and decreased activity tolerance. Pt's functional limitations include bed mobility and transfers. Pt would benefit from continued acute care PT during hospital LOS and upon discharge at a moderate level intensity.  End of Session Patient Position: Bed, 3 rail up, Alarm on (Simultaneous filing. User may not have seen previous data.)  IP OR SWING BED PT PLAN  Inpatient or Swing Bed: Inpatient  PT Plan  Treatment/Interventions: Bed mobility, Transfer training, Gait training, Stair training, Balance training, Neuromuscular re-education, Strengthening, Endurance training, Range of motion, Therapeutic exercise, Therapeutic activity, Home exercise program, Positioning, Postural re-education  PT Plan: Ongoing PT  PT Frequency: 3 times per week  PT Discharge Recommendations: Moderate intensity level of continued care  Equipment Recommended upon Discharge: Wheelchair, Lift  PT Recommended Transfer Status: Total assist  PT - OK to Discharge: Yes (Pt ok to dc from acute care PT to next level of care once cleared by medical team.)    Subjective   General Visit Information:  General  Reason for Referral: a 69 y.o. F with PMH of HTN, COPD, T2DM, ARCHIE, HLD, HFpEF    Presented to ER due to worsening shortness of breath.  Patient apparently was having URI symptoms with productive cough with yellow sputum.  Denied any with fever and chills.  Since her symptoms are getting worse he came to ER from where she was admitted on stepdown unit for further care.   Referred By: Lalo   Past Medical History Relevant to Rehab:   Past Medical History:   Diagnosis Date    Asthma 09/15/2023    Chronic bipolar affective disorder (Multi) 09/15/2023    Chronic hypercapnic respiratory failure (Multi)     CKD (chronic kidney disease), stage II 09/15/2023    Depression 09/15/2023    Diabetes mellitus (Multi)     Diastolic HF (heart failure)     Essential hypertension, benign 09/15/2023    Femur fracture (Multi)     Hyperlipidemia 09/15/2023    Hypertension     Poorly controlled type 2 diabetes mellitus with complication (Multi) 09/15/2023    S/P deep brain stimulator placement     Vitamin D deficiency 09/15/2023       Family/Caregiver Present: No  Co-Treatment: OT   Co-Treatment Reason: dc plan Prior to Session Communication: Bedside nurse   Patient Position Received: Alarm on, Bed, 3 rail up Preferred Learning Style: verbal, visual  General Comment: Pt agreeable to PT assessment.   Home Living:  Home Living  Type of Home: Apartment (Simultaneous filing. User may not have seen previous data.)  Lives With: Spouse (Home Adaptive Equipment: Wheelchair-manual, Wheelchair-power, Hospital bed (slide board  Simultaneous filing. User may not have seen previous data.)  Home Layout: One level (  Home Access: Level entry, Elevator Bathroom Shower/Tub: Tub/shower unit   Bathroom Equipment: Tub transfer bench, Grab bars in shower  Prior Level of Function:  Prior Function Per Pt/Caregiver Report  Level of Chaves: Needs assistance with ADLs, Needs assistance with homemaking, Needs assistance with functional transfers  Receives Help From:  ()  Prior Function Comments: Pt is non-ambulatory at baseline  since R femur fracture. Pt reports since her injury she is wheelchair bound (electric vs. manual). Pt uses slide board to transfer to electric chair from bed or transfer to commode. Pt stand pivots to manual wheelchair with physical assist. Spouse places slide board and holds slide board but pt is typically able to slide/transfer without physical assist. Pt requires assist for lower body ADLs. Typically sponge bathes. (Simultaneous filing. User may not have seen previous data.)  Precautions:  Precautions  Medical Precautions: Fall precautions   Objective   Pain:  Pain Assessment  Pain Assessment: 0-10 0-10 (Numeric) Pain Score: 7   Pain Type: Acute pain Pain Location: Leg Pain Orientation: Right, Left   Cognition:  Cognition  Overall Cognitive Status: Within Functional Limits     Activity Tolerance  Endurance: Tolerates 10 - 20 min exercise with multiple rests    Static Sitting Balance  Static Sitting-Comment/Number of Minutes: SBA; midline posture  Dynamic Sitting Balance  Dynamic Sitting-Comments: MaxAx1 to scoot laterally       Functional Assessments:  Bed Mobility  Bed Mobility: Yes (Simultaneous filing. User may not have seen previous data.)  Bed Mobility 1  Bed Mobility 1: Supine to sitting, Sitting to supine (Simultaneous filing. User may not have seen previous data.)  Level of Assistance 1: Maximum assistance, +2 (Simultaneous filing. User may not have seen previous data.)  Bed Mobility Comments 1: Cues for sequencing and safety. Cues for bed rail. Pt denies dizziness in sitting. (Simultaneous filing. User may not have seen previous data.)    Transfers  Transfer: Yes (Simultaneous filing. User may not have seen previous data.)  Transfer 1  Trials/Comments 1: Attempts STS with use of larry steady device. Pt unable to clear hips from bed surface. Transfer not performed. Pt reports pain limiting transfer. (Simultaneous filing. User may not have seen previous data.)       Extremity/Trunk Assessments:  RLE   RLE :  Exceptions to WFL  Strength RLE  RLE Overall Strength:  (Grossly 2-/5.)  LLE   LLE : Exceptions to WFL  Strength LLE  LLE Overall Strength:  (Grossly 2-/5.)  Outcome Measures:  [facility] Basic Mobility  Turning from your back to your side while in a flat bed without using bedrails: A lot  Moving from lying on your back to sitting on the side of a flat bed without using bedrails: A lot  Moving to and from bed to chair (including a wheelchair): Total  Standing up from a chair using your arms (e.g. wheelchair or bedside chair): Total  To walk in hospital room: Total  Climbing 3-5 steps with railing: Total  Basic Mobility - Total Score: 8    Encounter Problems       Encounter Problems (Active)       PT Problem       Bed mobility (Progressing)       Start:  01/10/25    Expected End:  01/24/25       Pt will perform supine<>sit with HOB flat, SBAx1.           Transfers (Progressing)       Start:  01/10/25    Expected End:  01/24/25       Pt will perform all transfers with LRAD, ModAx1.           Slide board transfer (Progressing)       Start:  01/10/25    Expected End:  01/24/25       Pt will perform all transfers with LRAD, SBA with slide board.               Pain - Adult              Education Documentation  Handouts, taught by Nargis Tellez PT at 1/10/2025 12:17 PM.  Learner: Patient  Readiness: Acceptance  Method: Explanation  Response: Verbalizes Understanding, Demonstrated Understanding    Precautions, taught by Nargis Tellez PT at 1/10/2025 12:17 PM.  Learner: Patient  Readiness: Acceptance  Method: Explanation  Response: Verbalizes Understanding, Demonstrated Understanding    Body Mechanics, taught by Nargis Tellez PT at 1/10/2025 12:17 PM.  Learner: Patient  Readiness: Acceptance  Method: Explanation  Response: Verbalizes Understanding, Demonstrated Understanding    Home Exercise Program, taught by Nargis Tellez PT at 1/10/2025 12:17 PM.  Learner: Patient  Readiness: Acceptance  Method:  Explanation  Response: Verbalizes Understanding, Demonstrated Understanding    Mobility Training, taught by Nargis Tellez PT at 1/10/2025 12:17 PM.  Learner: Patient  Readiness: Acceptance  Method: Explanation  Response: Verbalizes Understanding, Demonstrated Understanding    Education Comments  No comments found.

## 2025-01-10 NOTE — PROGRESS NOTES
Kaylene Feliciano is a 69 y.o. female on day 3 of admission presenting with Acute hypoxic on chronic hypercapnic respiratory failure (Multi).      Subjective   CT reviewed, bilateral adnexal cysts noted otherwise without obvious intraabdominal source for infection. Patient afebrile. No leukocytosis. Blood cultures from admission growing Proteus mirabilis, Enterococcus faecalis, Alcaligenes faecalis. Repeat cultures pending. CXR overnight slightly improved from previous.        Objective     Last Recorded Vitals  BP (!) 188/61   Pulse 74   Temp 36.2 °C (97.2 °F) (Temporal)   Resp 20   Wt 127 kg (280 lb)   SpO2 98%   Intake/Output last 3 Shifts:    Intake/Output Summary (Last 24 hours) at 1/10/2025 1351  Last data filed at 1/10/2025 1242  Gross per 24 hour   Intake 50 ml   Output 2700 ml   Net -2650 ml       Admission Weight  Weight: 127 kg (280 lb) (01/07/25 1130)    Daily Weight  01/07/25 : 127 kg (280 lb)    Image Results  CT abdomen pelvis wo IV contrast  Narrative: Interpreted By:  Hanh Jensen,   STUDY:  CT ABDOMEN PELVIS WO IV CONTRAST;  1/10/2025 8:46 am      INDICATION:  Signs/Symptoms:bacteremia.          COMPARISON:  CT chest 01/08/2025 and 09/13/2024      ACCESSION NUMBER(S):  [id number]      ORDERING CLINICIAN:  AWA HUERTA      TECHNIQUE:  CT of the abdomen and pelvis was performed. Contiguous axial images  were obtained at 3 mm slice thickness through the abdomen and pelvis.  Coronal and sagittal reconstructions at 3 mm slice thickness were  performed.  No intravenous or oral contrast agents were administered.      FINDINGS:  Please note that the evaluation of vessels, lymph nodes and organs is  limited without intravenous contrast.      LOWER CHEST:  Unchanged cardiomegaly with trace pericardial fluid.  Multifocal patchy ground-glass to mild consolidative opacities are  again noted. Stable trace bilateral pleural effusions. Bibasilar  atelectasis, left-greater-than-right. The main pulmonary  artery is  again enlarged, measuring 4.1 cm. Visualized distal esophagus appears  normal.      ABDOMEN:      LIVER:  The liver is normal in size without evidence of focal liver lesions.      BILE DUCTS:  No biliary duct dilatation.      GALLBLADDER:  Cholecystectomy      PANCREAS:  Fatty infiltration of the pancreas is noted, most predominant in the  head and uncinate process. The pancreas appears otherwise  unremarkable without evidence of ductal dilatation or masses.      SPLEEN:  The spleen is normal in size without focal lesions.      ADRENAL GLANDS:  A 12 mm right adrenal lesion is seen on series 2, image 52, with  macroscopic fat, compatible with a myelolipoma. No left adrenal  nodularity or thickening.      KIDNEYS AND URETERS:  The kidneys are normal in size. Hypoattenuating lesions are noted in  the right kidney, incompletely characterized without intravenous  contrast, likely benign. No hydroureteronephrosis or  nephroureterolithiasis is identified.      PELVIS:      BLADDER:  The urinary bladder appears normal without abnormal wall thickening.      REPRODUCTIVE ORGANS:  Status post hysterectomy. Bilateral adnexal cysts are noted,  measuring 5.1 x 5.1 cm on the right and 3.8 x 2.0 cm on the left on  series 2, image 114.      BOWEL:  The stomach is unremarkable. The small and large bowel are normal in  caliber and demonstrate no wall thickening. Normal appendix.  Prominent sigmoid diverticulosis without acute diverticulitis.      VESSELS:  There is no aneurysmal dilatation of the abdominal aorta. The IVC  appears normal.      PERITONEUM/RETROPERITONEUM/LYMPH NODES:  No ascites or fluid collection. The retroperitoneum is unremarkable.  No abdominopelvic lymphadenopathy is present.      ABDOMINAL WALL:  Skin thickening and subcutaneous soft tissue thickening of the  anterior abdominal wall is noted.      BONES:  No suspicious osseous lesions are identified. Multilevel endplate  irregularities noted. Diffuse  osteopenia. Degenerative discogenic  disease is noted in the lower thoracic and lumbar spine.      Impression: 1. Multifocal patchy ground-glass to mild consolidative opacities are  again noted, compatible with infection with superimposed edema not  excluded.  2. Skin thickening and subcutaneous soft tissue thickening of the  anterior abdominal wall is noted, please correlate with physical exam  to evaluate for panniculitis.  3. Bilateral adnexal cysts are noted, the cyst on the right is  enlarged for age, for which further evaluation with pelvic ultrasound  is recommended.  4. Unchanged enlargement of the main pulmonary artery, please  correlate for pulmonary hypertension.  5. Persistent cardiomegaly.  6. Unchanged trace bilateral pleural effusions.      MACRO:  None      Signed by: Hanh Jensen 1/10/2025 9:08 AM  Dictation workstation:   [id number]  XR chest 1 view  Narrative: Interpreted By:  Lisa Lindo,   STUDY:  XR CHEST 1 VIEW;  1/10/2025 1:51 am      INDICATION:  Signs/Symptoms:SOB.          COMPARISON:  Radiographs of the chest dated 01/07/2025; CT chest dated 01/08/2025.      ACCESSION NUMBER(S):  [id number]      ORDERING CLINICIAN:  TRICIA PEREIRA      FINDINGS:  AP radiograph of the chest was provided.      Bilateral deep brain stimulators are in place.      CARDIOMEDIASTINAL SILHOUETTE:  Cardiomediastinal silhouette is enlarged, similar to prior exam.      LUNGS:  Low lung volumes are present with bibasilar atelectasis and pulmonary  vascular congestion. Left basilar opacity may represent small  effusion with the associated atelectasis/consolidation.      ABDOMEN:  No remarkable upper abdominal findings.      BONES:  No acute osseous changes.      Impression: 1.  Mildly with screws bibasilar atelectasis and pulmonary vascular  congestion. Left basilar opacity likely represents a layering  effusion with the associated atelectasis/consolidation.              MACRO:  None      Signed by:  Katiju Kcmchunataly 1/10/2025 1:54 AM  Dictation workstation:   [id number]      Physical Exam    General: Awake, alert/oriented x4, well developed, no acute distress  Head: Atraumatic/Normocephalic  Cardiovascular: RRR, S1/S2, no murmurs, rubs, or gallops, radial pulses +2, no edema of extremities  Pulmonary: Diminished breath sounds  Abdomen: +BS, soft, non-tender, nondistended, no guarding or rebound, no masses noted  Skin- Multiple abrasions/excoriations to the left abdomen/chest extending to the left flank region with surrounding erythema   Neuro: Alert/oriented x4, no focal motor or sensory deficits    Relevant Results               Assessment/Plan                  Assessment & Plan    #Acute hypoxic/hypercapnic respiratory failure  #Community acquired pneumonia  #Bacteremia-->cultures growing Proteus mirabilis, Enterococcus faecalis, Alcaligenes faecalis  #Hx COPD/Asthma on CPAP  #Hx HTN, IDDM, HFpEF, CKD    -Blood cultures from 1/7 on admission reviewed  -Repeat blood cultures 1/9 pending  -Continue Zosyn for now      Assessment and plan discussed with Dr. Salvador Bosch, DO  Internal medicine, PGY3

## 2025-01-10 NOTE — PROGRESS NOTES
Kaylene Feliciano is a 69 y.o. female on day 2 of admission presenting with Acute hypoxic on chronic hypercapnic respiratory failure (Multi).  Patient is slowly improving however still complaining of shortness of breath and cough    Subjective   SOB       Objective         Current Facility-Administered Medications:     acetaminophen (Tylenol) suppository 650 mg, 650 mg, rectal, q4h PRN, Ce Lopez, APRN-CNP    albuterol 2.5 mg /3 mL (0.083 %) nebulizer solution 2.5 mg, 2.5 mg, nebulization, q4h PRN, Ce Geos, APRN-CNP    amLODIPine (Norvasc) tablet 10 mg, 10 mg, oral, Daily, Ce Lopez, APRN-CNP    aspirin chewable tablet 81 mg, 81 mg, oral, Daily, Ce M Bartos, APRN-CNP, 81 mg at 01/09/25 0921    atorvastatin (Lipitor) tablet 10 mg, 10 mg, oral, Nightly, Ce Lopez, APRN-CNP, 10 mg at 01/08/25 2020    citalopram (CeleXA) tablet 40 mg, 40 mg, oral, Daily, Ce Lopez, APRN-CNP, 40 mg at 01/09/25 0921    enoxaparin (Lovenox) syringe 40 mg, 40 mg, subcutaneous, q12h YARELI, Cekwame Lopez, APRN-CNP, 40 mg at 01/09/25 0921    furosemide (Lasix) tablet 20 mg, 20 mg, oral, Daily, Ce Lopez, APRN-CNP, 20 mg at 01/09/25 0921    gabapentin (Neurontin) capsule 300 mg, 300 mg, oral, BID, Ce Lopez, APRN-CNP, 300 mg at 01/09/25 0921    insulin glargine (Lantus) injection 50 Units, 50 Units, subcutaneous, BID, Kayla Mcclelland, APRN-CNP, 50 Units at 01/09/25 0922    insulin lispro injection 0-10 Units, 0-10 Units, subcutaneous, Before meals & nightly, Paul Love PA-C, 2 Units at 01/09/25 1803    metoprolol succinate XL (Toprol-XL) 24 hr tablet 25 mg, 25 mg, oral, Daily, KELLI Foreman    nystatin (Mycostatin) 100,000 unit/gram powder 1 Application, 1 Application, Topical, BID, KELLI Foreman, 1 Application at 01/09/25 0940    oxygen (O2) therapy, , inhalation, Continuous - Inhalation, KELLI Foreman, 5 L/min at 01/09/25 0800    oxygen (O2) therapy, ,  "inhalation, Continuous - Inhalation, KELLI Foreman, 6 L/min at 01/08/25 2022    pancrelipase (Lip-Prot-Amyl) (Creon) 12,000-38,000 -60,000 unit per capsule 1 capsule, 1 capsule, oral, TID AC, KELLI Foreman, 1 capsule at 01/09/25 1240    pantoprazole (ProtoNix) injection 40 mg, 40 mg, intravenous, Daily, KELLI Foreman, 40 mg at 01/09/25 0922    piperacillin-tazobactam (Zosyn) 3.375 g in dextrose (iso) IV 50 mL, 3.375 g, intravenous, q8h, Funmi Franco MD, 3.375 g at 01/09/25 1530       Physical Exam  HENT:      Head: Normocephalic.      Mouth/Throat:      Pharynx: Oropharynx is clear.   Eyes:      Conjunctiva/sclera: Conjunctivae normal.   Cardiovascular:      Rate and Rhythm: Regular rhythm.   Pulmonary:      Breath sounds: Normal breath sounds.   Abdominal:      General: Bowel sounds are normal.      Palpations: Abdomen is soft.   Musculoskeletal:         General: Normal range of motion.   Skin:     General: Skin is warm and dry.   Neurological:      General: No focal deficit present.      Mental Status: She is alert.   Psychiatric:         Behavior: Behavior normal.           Last Recorded Vitals  Blood pressure 139/58, pulse 68, temperature 36.3 °C (97.3 °F), temperature source Temporal, resp. rate 16, height 1.6 m (5' 3\"), weight 127 kg (280 lb), SpO2 96%.  Intake/Output last 3 Shifts:  I/O last 3 completed shifts:  In: 240 (1.9 mL/kg) [P.O.:240]  Out: 1200 (9.4 mL/kg) [Urine:1200 (0.3 mL/kg/hr)]  Weight: 127 kg     Labs:       Results for orders placed or performed during the hospital encounter of 01/07/25 (from the past 24 hours)   POCT GLUCOSE   Result Value Ref Range    POCT Glucose 371 (H) 74 - 99 mg/dL   POCT GLUCOSE   Result Value Ref Range    POCT Glucose 326 (H) 74 - 99 mg/dL   Basic metabolic panel   Result Value Ref Range    Glucose 279 (H) 74 - 99 mg/dL    Sodium 134 (L) 136 - 145 mmol/L    Potassium 4.5 3.5 - 5.3 mmol/L    Chloride 95 (L) 98 - 107 mmol/L    " Bicarbonate 31 21 - 32 mmol/L    Anion Gap 13 10 - 20 mmol/L    Urea Nitrogen 43 (H) 6 - 23 mg/dL    Creatinine 1.15 (H) 0.50 - 1.05 mg/dL    eGFR 52 (L) >60 mL/min/1.73m*2    Calcium 8.7 8.6 - 10.3 mg/dL   CBC   Result Value Ref Range    WBC 10.1 4.4 - 11.3 x10*3/uL    nRBC 0.0 0.0 - 0.0 /100 WBCs    RBC 3.18 (L) 4.00 - 5.20 x10*6/uL    Hemoglobin 7.8 (L) 12.0 - 16.0 g/dL    Hematocrit 26.3 (L) 36.0 - 46.0 %    MCV 83 80 - 100 fL    MCH 24.5 (L) 26.0 - 34.0 pg    MCHC 29.7 (L) 32.0 - 36.0 g/dL    RDW 16.1 (H) 11.5 - 14.5 %    Platelets 275 150 - 450 x10*3/uL   POCT GLUCOSE   Result Value Ref Range    POCT Glucose 257 (H) 74 - 99 mg/dL   Hemoglobin and Hematocrit, Blood   Result Value Ref Range    Hemoglobin 8.6 (L) 12.0 - 16.0 g/dL    Hematocrit 27.9 (L) 36.0 - 46.0 %   POCT GLUCOSE   Result Value Ref Range    POCT Glucose 248 (H) 74 - 99 mg/dL   Blood Culture    Specimen: Peripheral Venipuncture; Blood culture   Result Value Ref Range    Blood Culture Loaded on Instrument - Culture in progress    Blood Culture    Specimen: Peripheral Venipuncture; Blood culture   Result Value Ref Range    Blood Culture Loaded on Instrument - Culture in progress    POCT GLUCOSE   Result Value Ref Range    POCT Glucose 189 (H) 74 - 99 mg/dL     Radiology:    CT ANGIO CHEST FOR PULMONARY EMBOLISM;  1/8/2025 1:05 pm      INDICATION:  Signs/Symptoms:acute hypoxia r/o PE.          COMPARISON:  09/13/2024      ACCESSION NUMBER(S):  [id number]      ORDERING CLINICIAN:  LINNETTE GAVIN      TECHNIQUE:  Helical data acquisition of the chest was obtained with 60 mL  Omnipaque 350. Images were reformatted in coronal and sagittal  planes. Axial and coronal MIP images were created and reviewed.      FINDINGS:  POTENTIAL LIMITATIONS OF THE STUDY: None      HEART AND VESSELS:  No discrete filling defects within the main pulmonary artery or its  branches.      Main pulmonary artery and its branches are normal in caliber.      The thoracic aorta is  of normal course and caliber without vascular  calcifications.      Coronary artery calcifications are seen.The study is not optimized  for evaluation of coronary arteries.      The cardiac chambers are not enlarged.      No evidence of pericardial effusion.      MEDIASTINUM AND ARAMIS, LOWER NECK AND AXILLA:  Subcentimeter peripherally calcified nodule again seen within the  left lobe.      No evidence of thoracic lymphadenopathy by CT criteria.      Esophagus appears within normal limits as seen.      LUNGS AND AIRWAYS:  The trachea and central airways are patent. No endobronchial lesion.      Bibasilar atelectatic changes. Band like density within the left  upper lobe similar to the prior study likely related to underlying  scarring. Few patchy areas ground-glass density noted within the  right lung most prominent within the right lower lobe centered on  image number [id number]      UPPER ABDOMEN:  The visualized subdiaphragmatic structures demonstrate no remarkable  findings.      CHEST WALL AND OSSEOUS STRUCTURES:  There are no suspicious osseous lesions. Multilevel degenerative  changes are present      IMPRESSION:  1.  No pulmonary emboli.  2. Patchy ground-glass densities about the right lung suggestive  underlying infectious etiology. Bibasilar atelectatic changes. Band  like density persists within the left upper lobe likely related to  scarring.               Assessment/Plan   Assessment & Plan  Acute hypoxic on chronic hypercapnic respiratory failure (Multi)    Acute diastolic heart failure    Pneumonia of both lungs due to infectious organism    Anemia    Leukocytosis    Obesity    Cellulitis of left breast    Fungal infection of skin    Bacteremia        PLAN: Patient is still complaining of cough and shortness of breath,, patient blood culture is growing gram-negative bacilli and gram-positive cocci in pairs and chains, her antibiotic was collected to IV Zosyn,  c/w  BiPAP, titrate FiO2 to keep pulse ox over  90%, med list and labs reviewed, for now continue with the current Rx , Follow closely.            Favian May MD

## 2025-01-10 NOTE — PROGRESS NOTES
01/10/25 1643   Discharge Planning   Living Arrangements Spouse/significant other   Support Systems Spouse/significant other;Children   Assistance Needed needs assistance with ADL's   Type of Residence Private residence   Number of Stairs to Enter Residence 0   Number of Stairs Within Residence 0   Do you have animals or pets at home? No   Who is requesting discharge planning? Provider   Home or Post Acute Services Post acute facilities (Rehab/SNF/etc)   Type of Post Acute Facility Services Skilled nursing   Expected Discharge Disposition SNF   Does the patient need discharge transport arranged? Yes   RoundTrip coordination needed? Yes   Has discharge transport been arranged? No   Financial Resource Strain   How hard is it for you to pay for the very basics like food, housing, medical care, and heating? Not very   Housing Stability   In the last 12 months, was there a time when you were not able to pay the mortgage or rent on time? N   In the past 12 months, how many times have you moved where you were living? 0   At any time in the past 12 months, were you homeless or living in a shelter (including now)? N   Transportation Needs   In the past 12 months, has lack of transportation kept you from medical appointments or from getting medications? no   In the past 12 months, has lack of transportation kept you from meetings, work, or from getting things needed for daily living? No   Patient Choice   Patient / Family choosing to utilize agency / facility established prior to hospitalization No   Stroke Family Assessment   Stroke Family Assessment Needed No   Intensity of Service   Intensity of Service 0-30 min     Met with patient at bedside. Introduced self and role in hospital. Verified address and PCP is Dr. Linda Srivastava whom she sees on a regular basis. Uses [facility] in [place] for medications and has no issues obtaining/paying for them and manages her own medications. Patient is not very ambulatory, uses  a motorized wheelchair mostly but patient is able to transfer self with a slide board. Patient does not drive, spouse transports and does the grocery shopping for the home. Per patient, spouse is the primary caregiver. Therapy is recommending SNF and patient is agreeable. She would like to go to 1. [facility] and 2. [facility] [place]. Will request [facility] place referrals for acceptance. Will need pre cert when accepted. ADOD is 1-2 days. CT team to follow patient for discharge needs.

## 2025-01-10 NOTE — CARE PLAN
The clinical goals for the shift include patient will remain HDS throughout shift. The patient met this goal     S: Patient admitted 1/7 for acute hypoxic on chronic hypercapnic respiratory failure   B: History of: HTN, HLD, COPD, ARCHIE, CHF, CKD, diabetes   A: Patient A&Ox4, remains in NSR with 1st degree AV block and on 4 L NC/BIPAP HS. Medicated per orders throughout shift.   R: Patient from home with , PT/OT to [facility] for discharge planning

## 2025-01-10 NOTE — PROGRESS NOTES
Kaylene Feliciano is a 69 y.o. female on day 3 of admission presenting with Acute hypoxic on chronic hypercapnic respiratory failure (Multi).  Patient is slowly improving     Subjective   Mild SOB       Objective         Current Facility-Administered Medications:     acetaminophen (Tylenol) suppository 650 mg, 650 mg, rectal, q4h PRN, Ce Lopez, APRN-CNP    albuterol 2.5 mg /3 mL (0.083 %) nebulizer solution 2.5 mg, 2.5 mg, nebulization, q4h PRN, Ce Lopez, APRN-CNP    amLODIPine (Norvasc) tablet 10 mg, 10 mg, oral, Daily, eC Lopez, APRN-CNP, 10 mg at 01/10/25 0926    aspirin chewable tablet 81 mg, 81 mg, oral, Daily, Ce Lopez, APRN-CNP, 81 mg at 01/10/25 0926    atorvastatin (Lipitor) tablet 10 mg, 10 mg, oral, Nightly, Ce Lopez, APRN-CNP, 10 mg at 01/09/25 2024    citalopram (CeleXA) tablet 40 mg, 40 mg, oral, Daily, Ce Lopez, APRN-CNP, 40 mg at 01/10/25 0926    enoxaparin (Lovenox) syringe 40 mg, 40 mg, subcutaneous, q12h YARELI, Cekwame Lopez, APRN-CNP, 40 mg at 01/10/25 0926    ferrous sulfate (325 mg ferrous sulfate) tablet 325 mg, 65 mg of iron, oral, Daily with breakfast, Velma Haines, APRN-CNP, 325 mg at 01/10/25 0926    furosemide (Lasix) tablet 20 mg, 20 mg, oral, Daily, Ce Lopez, APRN-CNP, 20 mg at 01/10/25 0926    gabapentin (Neurontin) capsule 300 mg, 300 mg, oral, BID, Ce Lopez, APRN-CNP, 300 mg at 01/10/25 0926    insulin glargine (Lantus) injection 50 Units, 50 Units, subcutaneous, BID, Kayla Mcclelland, APRN-CNP, 50 Units at 01/10/25 0928    insulin lispro injection 0-10 Units, 0-10 Units, subcutaneous, Before meals & nightly, Paul Love PA-C, 2 Units at 01/09/25 2024    metoprolol succinate XL (Toprol-XL) 24 hr tablet 25 mg, 25 mg, oral, Daily, KELLI Foreman, 25 mg at 01/10/25 0926    nystatin (Mycostatin) 100,000 unit/gram powder 1 Application, 1 Application, Topical, BID, KELLI Foreman, 1 Application at 01/10/25  "0928    oxygen (O2) therapy, , inhalation, Continuous - Inhalation, KELLI Foreman, 4 L/min at 01/10/25 0927    oxygen (O2) therapy, , inhalation, Continuous - Inhalation, Favian May MD, 4 L/min at 01/10/25 0926    oxygen (O2) therapy, , inhalation, Continuous PRN - O2/gases, Favian May MD, 40 percent at 01/10/25 0542    pancrelipase (Lip-Prot-Amyl) (Creon) 12,000-38,000 -60,000 unit per capsule 1 capsule, 1 capsule, oral, TID AC, KELLI Foreman, 1 capsule at 01/10/25 0926    pantoprazole (ProtoNix) injection 40 mg, 40 mg, intravenous, Daily, KELLI Foreman, 40 mg at 01/10/25 0926    piperacillin-tazobactam (Zosyn) 4.5 g in dextrose (iso)  mL, 4.5 g, intravenous, q8h, Funmi Franco MD       Physical Exam  HENT:      Head: Normocephalic.      Mouth/Throat:      Pharynx: Oropharynx is clear.   Eyes:      Conjunctiva/sclera: Conjunctivae normal.   Cardiovascular:      Rate and Rhythm: Regular rhythm.   Pulmonary:      Breath sounds: Normal breath sounds.   Abdominal:      General: Bowel sounds are normal.      Palpations: Abdomen is soft.   Musculoskeletal:         General: Normal range of motion.      Right lower leg: Edema present.      Left lower leg: Edema present.   Skin:     General: Skin is warm and dry.   Neurological:      General: No focal deficit present.      Mental Status: She is alert.   Psychiatric:         Behavior: Behavior normal.           Last Recorded Vitals  Blood pressure 160/69, pulse 74, temperature 36.4 °C (97.5 °F), temperature source Temporal, resp. rate 19, height 1.6 m (5' 3\"), weight 127 kg (280 lb), SpO2 96%.  Intake/Output last 3 Shifts:  I/O last 3 completed shifts:  In: 290 (2.3 mL/kg) [P.O.:240; IV Piggyback:50]  Out: 2700 (21.3 mL/kg) [Urine:2700 (0.6 mL/kg/hr)]  Weight: 127 kg     Labs:       Results for orders placed or performed during the hospital encounter of 01/07/25 (from the past 24 hours)   POCT GLUCOSE   Result " Value Ref Range    POCT Glucose 248 (H) 74 - 99 mg/dL   Blood Culture    Specimen: Peripheral Venipuncture; Blood culture   Result Value Ref Range    Blood Culture Loaded on Instrument - Culture in progress    Blood Culture    Specimen: Peripheral Venipuncture; Blood culture   Result Value Ref Range    Blood Culture Loaded on Instrument - Culture in progress    POCT GLUCOSE   Result Value Ref Range    POCT Glucose 189 (H) 74 - 99 mg/dL   POCT GLUCOSE   Result Value Ref Range    POCT Glucose 182 (H) 74 - 99 mg/dL   Basic metabolic panel   Result Value Ref Range    Glucose 94 74 - 99 mg/dL    Sodium 139 136 - 145 mmol/L    Potassium 4.5 3.5 - 5.3 mmol/L    Chloride 98 98 - 107 mmol/L    Bicarbonate 34 (H) 21 - 32 mmol/L    Anion Gap 12 10 - 20 mmol/L    Urea Nitrogen 37 (H) 6 - 23 mg/dL    Creatinine 1.14 (H) 0.50 - 1.05 mg/dL    eGFR 52 (L) >60 mL/min/1.73m*2    Calcium 8.9 8.6 - 10.3 mg/dL   CBC   Result Value Ref Range    WBC 9.0 4.4 - 11.3 x10*3/uL    nRBC 0.0 0.0 - 0.0 /100 WBCs    RBC 3.32 (L) 4.00 - 5.20 x10*6/uL    Hemoglobin 8.0 (L) 12.0 - 16.0 g/dL    Hematocrit 27.8 (L) 36.0 - 46.0 %    MCV 84 80 - 100 fL    MCH 24.1 (L) 26.0 - 34.0 pg    MCHC 28.8 (L) 32.0 - 36.0 g/dL    RDW 16.0 (H) 11.5 - 14.5 %    Platelets 241 150 - 450 x10*3/uL   POCT GLUCOSE   Result Value Ref Range    POCT Glucose 94 74 - 99 mg/dL      Radiology:     CT ABDOMEN PELVIS WO IV CONTRAST;  1/10/2025 8:46 am      INDICATION:  Signs/Symptoms:bacteremia.          COMPARISON:  CT chest 01/08/2025 and 09/13/2024      ACCESSION NUMBER(S):  [id number]      ORDERING CLINICIAN:  AWA HUERTA      TECHNIQUE:  CT of the abdomen and pelvis was performed. Contiguous axial images  were obtained at 3 mm slice thickness through the abdomen and pelvis.  Coronal and sagittal reconstructions at 3 mm slice thickness were  performed.  No intravenous or oral contrast agents were administered.      FINDINGS:  Please note that the evaluation of vessels,  lymph nodes and organs is  limited without intravenous contrast.      LOWER CHEST:  Unchanged cardiomegaly with trace pericardial fluid.  Multifocal patchy ground-glass to mild consolidative opacities are  again noted. Stable trace bilateral pleural effusions. Bibasilar  atelectasis, left-greater-than-right. The main pulmonary artery is  again enlarged, measuring 4.1 cm. Visualized distal esophagus appears  normal.      ABDOMEN:      LIVER:  The liver is normal in size without evidence of focal liver lesions.      BILE DUCTS:  No biliary duct dilatation.      GALLBLADDER:  Cholecystectomy      PANCREAS:  Fatty infiltration of the pancreas is noted, most predominant in the  head and uncinate process. The pancreas appears otherwise  unremarkable without evidence of ductal dilatation or masses.      SPLEEN:  The spleen is normal in size without focal lesions.      ADRENAL GLANDS:  A 12 mm right adrenal lesion is seen on series 2, image 52, with  macroscopic fat, compatible with a myelolipoma. No left adrenal  nodularity or thickening.      KIDNEYS AND URETERS:  The kidneys are normal in size. Hypoattenuating lesions are noted in  the right kidney, incompletely characterized without intravenous  contrast, likely benign. No hydroureteronephrosis or  nephroureterolithiasis is identified.      PELVIS:      BLADDER:  The urinary bladder appears normal without abnormal wall thickening.      REPRODUCTIVE ORGANS:  Status post hysterectomy. Bilateral adnexal cysts are noted,  measuring 5.1 x 5.1 cm on the right and 3.8 x 2.0 cm on the left on  series 2, image 114.      BOWEL:  The stomach is unremarkable. The small and large bowel are normal in  caliber and demonstrate no wall thickening. Normal appendix.  Prominent sigmoid diverticulosis without acute diverticulitis.      VESSELS:  There is no aneurysmal dilatation of the abdominal aorta. The IVC  appears normal.      PERITONEUM/RETROPERITONEUM/LYMPH NODES:  No ascites or  fluid collection. The retroperitoneum is unremarkable.  No abdominopelvic lymphadenopathy is present.      ABDOMINAL WALL:  Skin thickening and subcutaneous soft tissue thickening of the  anterior abdominal wall is noted.      BONES:  No suspicious osseous lesions are identified. Multilevel endplate  irregularities noted. Diffuse osteopenia. Degenerative discogenic  disease is noted in the lower thoracic and lumbar spine.      IMPRESSION:  1. Multifocal patchy ground-glass to mild consolidative opacities are  again noted, compatible with infection with superimposed edema not  excluded.  2. Skin thickening and subcutaneous soft tissue thickening of the  anterior abdominal wall is noted, please correlate with physical exam  to evaluate for panniculitis.  3. Bilateral adnexal cysts are noted, the cyst on the right is  enlarged for age, for which further evaluation with pelvic ultrasound  is recommended.  4. Unchanged enlargement of the main pulmonary artery, please  correlate for pulmonary hypertension.  5. Persistent cardiomegaly.  6. Unchanged trace bilateral pleural effusions.       Assessment/Plan   Assessment & Plan  Acute hypoxic on chronic hypercapnic respiratory failure (Multi)    Acute diastolic heart failure    Pneumonia of both lungs due to infectious organism    Anemia    Leukocytosis    Obesity    Cellulitis of left breast    Fungal infection of skin    Bacteremia        PLAN: Patient is slowly improving, continue with IV Zosyn, continue with aerosol and oxygen, titrate FiO2 to keep pulse ox over 90%, continue with nocturnal BiPAP, med list and labs reviewed, discussed with nursing, follow closely, I have coordinated the care.               Favian May MD

## 2025-01-10 NOTE — PROGRESS NOTES
"Kaylene Feliciano is a 69 y.o. female on day 3 of admission presenting with Acute hypoxic on chronic hypercapnic respiratory failure (Multi).    Subjective   Ms. Feliciano had no complaints today.     Objective     Physical Exam  Vitals reviewed.   Constitutional:       Appearance: She is obese.   HENT:      Head: Normocephalic.      Nose: Nose normal.      Mouth/Throat:      Mouth: Mucous membranes are moist.   Eyes:      Extraocular Movements: Extraocular movements intact.   Cardiovascular:      Rate and Rhythm: Normal rate and regular rhythm.      Heart sounds: No murmur heard.  Pulmonary:      Breath sounds: Normal breath sounds. No wheezing or rhonchi.      Comments: Diminished bilaterally  Abdominal:      Palpations: Abdomen is soft.   Musculoskeletal:      Right lower leg: No edema.      Left lower leg: No edema.   Skin:     General: Skin is warm and dry.   Neurological:      General: No focal deficit present.      Mental Status: She is alert and oriented to person, place, and time.         Last Recorded Vitals  Blood pressure 158/56, pulse 74, temperature 36.2 °C (97.2 °F), temperature source Temporal, resp. rate 25, height 1.6 m (5' 3\"), weight 127 kg (280 lb), SpO2 95%.  Intake/Output last 3 Shifts:  I/O last 3 completed shifts:  In: 290 (2.3 mL/kg) [P.O.:240; IV Piggyback:50]  Out: 2700 (21.3 mL/kg) [Urine:2700 (0.6 mL/kg/hr)]  Weight: 127 kg     Relevant Results  Scheduled medications  amLODIPine, 10 mg, oral, Daily  aspirin, 81 mg, oral, Daily  atorvastatin, 10 mg, oral, Nightly  citalopram, 40 mg, oral, Daily  enoxaparin, 40 mg, subcutaneous, q12h YARELI  ferrous sulfate (325 mg ferrous sulfate), 65 mg of iron, oral, Daily with breakfast  furosemide, 20 mg, oral, Daily  gabapentin, 300 mg, oral, BID  insulin glargine, 50 Units, subcutaneous, BID  insulin lispro, 0-10 Units, subcutaneous, Before meals & nightly  metoprolol succinate XL, 25 mg, oral, Daily  nystatin, 1 Application, Topical, BID  oxygen, , " inhalation, Continuous - Inhalation  oxygen, , inhalation, Continuous - Inhalation  pancrelipase (Lip-Prot-Amyl), 1 capsule, oral, TID AC  [START ON 1/11/2025] pantoprazole, 40 mg, oral, Daily before breakfast  piperacillin-tazobactam, 4.5 g, intravenous, q8h      Continuous medications     PRN medications  PRN medications: acetaminophen, albuterol, hydrALAZINE, oxygen    Results for orders placed or performed during the hospital encounter of 01/07/25 (from the past 24 hours)   POCT GLUCOSE   Result Value Ref Range    POCT Glucose 189 (H) 74 - 99 mg/dL   POCT GLUCOSE   Result Value Ref Range    POCT Glucose 182 (H) 74 - 99 mg/dL   Basic metabolic panel   Result Value Ref Range    Glucose 94 74 - 99 mg/dL    Sodium 139 136 - 145 mmol/L    Potassium 4.5 3.5 - 5.3 mmol/L    Chloride 98 98 - 107 mmol/L    Bicarbonate 34 (H) 21 - 32 mmol/L    Anion Gap 12 10 - 20 mmol/L    Urea Nitrogen 37 (H) 6 - 23 mg/dL    Creatinine 1.14 (H) 0.50 - 1.05 mg/dL    eGFR 52 (L) >60 mL/min/1.73m*2    Calcium 8.9 8.6 - 10.3 mg/dL   CBC   Result Value Ref Range    WBC 9.0 4.4 - 11.3 x10*3/uL    nRBC 0.0 0.0 - 0.0 /100 WBCs    RBC 3.32 (L) 4.00 - 5.20 x10*6/uL    Hemoglobin 8.0 (L) 12.0 - 16.0 g/dL    Hematocrit 27.8 (L) 36.0 - 46.0 %    MCV 84 80 - 100 fL    MCH 24.1 (L) 26.0 - 34.0 pg    MCHC 28.8 (L) 32.0 - 36.0 g/dL    RDW 16.0 (H) 11.5 - 14.5 %    Platelets 241 150 - 450 x10*3/uL   POCT GLUCOSE   Result Value Ref Range    POCT Glucose 94 74 - 99 mg/dL   POCT GLUCOSE   Result Value Ref Range    POCT Glucose 105 (H) 74 - 99 mg/dL     CT abdomen pelvis wo IV contrast    Result Date: 1/10/2025  Interpreted By:  Hanh Jensen, STUDY: CT ABDOMEN PELVIS WO IV CONTRAST;  1/10/2025 8:46 am   INDICATION: Signs/Symptoms:bacteremia.     COMPARISON: CT chest 01/08/2025 and 09/13/2024   ACCESSION NUMBER(S): [id number]   ORDERING CLINICIAN: AWA HUERTA   TECHNIQUE: CT of the abdomen and pelvis was performed. Contiguous axial images were obtained  at 3 mm slice thickness through the abdomen and pelvis. Coronal and sagittal reconstructions at 3 mm slice thickness were performed.  No intravenous or oral contrast agents were administered.   FINDINGS: Please note that the evaluation of vessels, lymph nodes and organs is limited without intravenous contrast.   LOWER CHEST: Unchanged cardiomegaly with trace pericardial fluid. Multifocal patchy ground-glass to mild consolidative opacities are again noted. Stable trace bilateral pleural effusions. Bibasilar atelectasis, left-greater-than-right. The main pulmonary artery is again enlarged, measuring 4.1 cm. Visualized distal esophagus appears normal.   ABDOMEN:   LIVER: The liver is normal in size without evidence of focal liver lesions.   BILE DUCTS: No biliary duct dilatation.   GALLBLADDER: Cholecystectomy   PANCREAS: Fatty infiltration of the pancreas is noted, most predominant in the head and uncinate process. The pancreas appears otherwise unremarkable without evidence of ductal dilatation or masses.   SPLEEN: The spleen is normal in size without focal lesions.   ADRENAL GLANDS: A 12 mm right adrenal lesion is seen on series 2, image 52, with macroscopic fat, compatible with a myelolipoma. No left adrenal nodularity or thickening.   KIDNEYS AND URETERS: The kidneys are normal in size. Hypoattenuating lesions are noted in the right kidney, incompletely characterized without intravenous contrast, likely benign. No hydroureteronephrosis or nephroureterolithiasis is identified.   PELVIS:   BLADDER: The urinary bladder appears normal without abnormal wall thickening.   REPRODUCTIVE ORGANS: Status post hysterectomy. Bilateral adnexal cysts are noted, measuring 5.1 x 5.1 cm on the right and 3.8 x 2.0 cm on the left on series 2, image 114.   BOWEL: The stomach is unremarkable. The small and large bowel are normal in caliber and demonstrate no wall thickening. Normal appendix. Prominent sigmoid diverticulosis without  acute diverticulitis.   VESSELS: There is no aneurysmal dilatation of the abdominal aorta. The IVC appears normal.   PERITONEUM/RETROPERITONEUM/LYMPH NODES: No ascites or fluid collection. The retroperitoneum is unremarkable. No abdominopelvic lymphadenopathy is present.   ABDOMINAL WALL: Skin thickening and subcutaneous soft tissue thickening of the anterior abdominal wall is noted.   BONES: No suspicious osseous lesions are identified. Multilevel endplate irregularities noted. Diffuse osteopenia. Degenerative discogenic disease is noted in the lower thoracic and lumbar spine.       1. Multifocal patchy ground-glass to mild consolidative opacities are again noted, compatible with infection with superimposed edema not excluded. 2. Skin thickening and subcutaneous soft tissue thickening of the anterior abdominal wall is noted, please correlate with physical exam to evaluate for panniculitis. 3. Bilateral adnexal cysts are noted, the cyst on the right is enlarged for age, for which further evaluation with pelvic ultrasound is recommended. 4. Unchanged enlargement of the main pulmonary artery, please correlate for pulmonary hypertension. 5. Persistent cardiomegaly. 6. Unchanged trace bilateral pleural effusions.   MACRO: None   Signed by: Hanh Jensen 1/10/2025 9:08 AM Dictation workstation:   [id number]    XR chest 1 view    Result Date: 1/10/2025  Interpreted By:  Lisa Lindo, STUDY: XR CHEST 1 VIEW;  1/10/2025 1:51 am   INDICATION: Signs/Symptoms:SOB.     COMPARISON: Radiographs of the chest dated 01/07/2025; CT chest dated 01/08/2025.   ACCESSION NUMBER(S): [id number]   ORDERING CLINICIAN: TRICIA PEREIRA   FINDINGS: AP radiograph of the chest was provided.   Bilateral deep brain stimulators are in place.   CARDIOMEDIASTINAL SILHOUETTE: Cardiomediastinal silhouette is enlarged, similar to prior exam.   LUNGS: Low lung volumes are present with bibasilar atelectasis and pulmonary vascular congestion.  Left basilar opacity may represent small effusion with the associated atelectasis/consolidation.   ABDOMEN: No remarkable upper abdominal findings.   BONES: No acute osseous changes.       1.  Mildly with screws bibasilar atelectasis and pulmonary vascular congestion. Left basilar opacity likely represents a layering effusion with the associated atelectasis/consolidation.       MACRO: None   Signed by: Lisa Lindo 1/10/2025 1:54 AM Dictation workstation:   [id number]       Assessment/Plan   Assessment & Plan  Acute hypoxic on chronic hypercapnic respiratory failure (Multi)    Acute diastolic heart failure    Pneumonia of both lungs due to infectious organism    Anemia    Leukocytosis    Obesity    Cellulitis of left breast    Fungal infection of skin    Bacteremia    Ms. Feliciano is a 69F chronic hypercapnia and hypoxia 4L, ARCHIE, asthma, HTN, HLD, DM2, HF, CKD, bipolar disorder, depression admitted with SOB and hypoxia.   Consult for continuous BiPAP     #Acute on chronic hypoxic and hypercapnic respiratory failure  #Dyspnea  #Leukocytosis  #Bacteremia     CXR with diffuse infiltrate and opacities. Being treated for sepsis at this time.   CT similar in appearance to 9/2024 imaging without significant consolidation and minimal GGO on R. Atelectasis similar to prior.   Agree with antibiotic coverage, consider atypical coverage  PFT uninterpretable but less likely COPD as patterns and habitus more suggestive of restrictive lung disease. Don't think she needs steroids at this time but defer to primary  Extremity duplex negative  Continue BiPAP at bedtime and with naps.   She states she has not received a BiPAP machine. She did not reach out to pulmonary office it seems either. Please review with CM to assist in obtaining prior to discharge.    I spent 35 minutes in the professional and overall care of this patient.      Erica Sifuentes MD

## 2025-01-11 PROBLEM — D50.9 IDA (IRON DEFICIENCY ANEMIA): Status: ACTIVE | Noted: 2025-01-11

## 2025-01-11 LAB
ANION GAP SERPL CALC-SCNC: 13 MMOL/L (ref 10–20)
BACTERIA BLD AEROBE CULT: ABNORMAL
BACTERIA BLD CULT: ABNORMAL
BACTERIA SPEC RESP CULT: NORMAL
BUN SERPL-MCNC: 27 MG/DL (ref 6–23)
CALCIUM SERPL-MCNC: 8.6 MG/DL (ref 8.6–10.3)
CHLORIDE SERPL-SCNC: 99 MMOL/L (ref 98–107)
CO2 SERPL-SCNC: 32 MMOL/L (ref 21–32)
CREAT SERPL-MCNC: 0.97 MG/DL (ref 0.5–1.05)
EGFRCR SERPLBLD CKD-EPI 2021: 63 ML/MIN/1.73M*2
ERYTHROCYTE [DISTWIDTH] IN BLOOD BY AUTOMATED COUNT: 16.2 % (ref 11.5–14.5)
GLUCOSE BLD MANUAL STRIP-MCNC: 104 MG/DL (ref 74–99)
GLUCOSE BLD MANUAL STRIP-MCNC: 118 MG/DL (ref 74–99)
GLUCOSE BLD MANUAL STRIP-MCNC: 142 MG/DL (ref 74–99)
GLUCOSE BLD MANUAL STRIP-MCNC: 201 MG/DL (ref 74–99)
GLUCOSE BLD MANUAL STRIP-MCNC: 55 MG/DL (ref 74–99)
GLUCOSE BLD MANUAL STRIP-MCNC: 75 MG/DL (ref 74–99)
GLUCOSE BLD MANUAL STRIP-MCNC: 99 MG/DL (ref 74–99)
GLUCOSE SERPL-MCNC: 62 MG/DL (ref 74–99)
GRAM STAIN: ABNORMAL
GRAM STN SPEC: ABNORMAL
GRAM STN SPEC: NORMAL
GRAM STN SPEC: NORMAL
HCT VFR BLD AUTO: 30.5 % (ref 36–46)
HGB BLD-MCNC: 8.8 G/DL (ref 12–16)
MCH RBC QN AUTO: 24.4 PG (ref 26–34)
MCHC RBC AUTO-ENTMCNC: 28.9 G/DL (ref 32–36)
MCV RBC AUTO: 85 FL (ref 80–100)
NRBC BLD-RTO: 0 /100 WBCS (ref 0–0)
PLATELET # BLD AUTO: 250 X10*3/UL (ref 150–450)
POTASSIUM SERPL-SCNC: 4.1 MMOL/L (ref 3.5–5.3)
RBC # BLD AUTO: 3.6 X10*6/UL (ref 4–5.2)
SODIUM SERPL-SCNC: 140 MMOL/L (ref 136–145)
WBC # BLD AUTO: 7.3 X10*3/UL (ref 4.4–11.3)

## 2025-01-11 PROCEDURE — 2500000005 HC RX 250 GENERAL PHARMACY W/O HCPCS: Performed by: INTERNAL MEDICINE

## 2025-01-11 PROCEDURE — 85027 COMPLETE CBC AUTOMATED: CPT | Performed by: NURSE PRACTITIONER

## 2025-01-11 PROCEDURE — 2500000004 HC RX 250 GENERAL PHARMACY W/ HCPCS (ALT 636 FOR OP/ED): Performed by: NURSE PRACTITIONER

## 2025-01-11 PROCEDURE — 36415 COLL VENOUS BLD VENIPUNCTURE: CPT | Performed by: NURSE PRACTITIONER

## 2025-01-11 PROCEDURE — 2500000001 HC RX 250 WO HCPCS SELF ADMINISTERED DRUGS (ALT 637 FOR MEDICARE OP): Performed by: NURSE PRACTITIONER

## 2025-01-11 PROCEDURE — 1200000002 HC GENERAL ROOM WITH TELEMETRY DAILY

## 2025-01-11 PROCEDURE — 94660 CPAP INITIATION&MGMT: CPT

## 2025-01-11 PROCEDURE — 2500000002 HC RX 250 W HCPCS SELF ADMINISTERED DRUGS (ALT 637 FOR MEDICARE OP, ALT 636 FOR OP/ED): Performed by: NURSE PRACTITIONER

## 2025-01-11 PROCEDURE — 2500000005 HC RX 250 GENERAL PHARMACY W/O HCPCS: Performed by: NURSE PRACTITIONER

## 2025-01-11 PROCEDURE — 2500000004 HC RX 250 GENERAL PHARMACY W/ HCPCS (ALT 636 FOR OP/ED): Performed by: INTERNAL MEDICINE

## 2025-01-11 PROCEDURE — 80048 BASIC METABOLIC PNL TOTAL CA: CPT | Performed by: NURSE PRACTITIONER

## 2025-01-11 PROCEDURE — 82947 ASSAY GLUCOSE BLOOD QUANT: CPT

## 2025-01-11 PROCEDURE — 2500000002 HC RX 250 W HCPCS SELF ADMINISTERED DRUGS (ALT 637 FOR MEDICARE OP, ALT 636 FOR OP/ED): Performed by: PHYSICIAN ASSISTANT

## 2025-01-11 RX ORDER — INSULIN GLARGINE 100 [IU]/ML
40 INJECTION, SOLUTION SUBCUTANEOUS 2 TIMES DAILY
Status: DISPENSED | OUTPATIENT
Start: 2025-01-11

## 2025-01-11 RX ADMIN — AMLODIPINE BESYLATE 10 MG: 10 TABLET ORAL at 09:08

## 2025-01-11 RX ADMIN — METOPROLOL SUCCINATE 25 MG: 25 TABLET, EXTENDED RELEASE ORAL at 09:08

## 2025-01-11 RX ADMIN — ATORVASTATIN CALCIUM 10 MG: 10 TABLET, FILM COATED ORAL at 21:28

## 2025-01-11 RX ADMIN — GABAPENTIN 300 MG: 300 CAPSULE ORAL at 21:28

## 2025-01-11 RX ADMIN — Medication 40 PERCENT: at 05:44

## 2025-01-11 RX ADMIN — Medication 40 PERCENT: at 01:06

## 2025-01-11 RX ADMIN — Medication 4 L/MIN: at 21:34

## 2025-01-11 RX ADMIN — PIPERACILLIN SODIUM AND TAZOBACTAM SODIUM 4.5 G: 4; .5 INJECTION, SOLUTION INTRAVENOUS at 06:22

## 2025-01-11 RX ADMIN — PANCRELIPASE 1 CAPSULE: 60000; 12000; 38000 CAPSULE, DELAYED RELEASE PELLETS ORAL at 17:18

## 2025-01-11 RX ADMIN — PANTOPRAZOLE SODIUM 40 MG: 40 TABLET, DELAYED RELEASE ORAL at 06:22

## 2025-01-11 RX ADMIN — Medication 4 L/MIN: at 21:26

## 2025-01-11 RX ADMIN — NYSTATIN 1 APPLICATION: 100000 POWDER TOPICAL at 22:53

## 2025-01-11 RX ADMIN — ENOXAPARIN SODIUM 40 MG: 40 INJECTION SUBCUTANEOUS at 09:00

## 2025-01-11 RX ADMIN — FUROSEMIDE 20 MG: 20 TABLET ORAL at 09:00

## 2025-01-11 RX ADMIN — PIPERACILLIN SODIUM AND TAZOBACTAM SODIUM 4.5 G: 4; .5 INJECTION, SOLUTION INTRAVENOUS at 22:54

## 2025-01-11 RX ADMIN — INSULIN GLARGINE 40 UNITS: 100 INJECTION, SOLUTION SUBCUTANEOUS at 21:28

## 2025-01-11 RX ADMIN — FERROUS SULFATE TAB 325 MG (65 MG ELEMENTAL FE) 325 MG: 325 (65 FE) TAB at 09:00

## 2025-01-11 RX ADMIN — GABAPENTIN 300 MG: 300 CAPSULE ORAL at 09:00

## 2025-01-11 RX ADMIN — PANCRELIPASE 1 CAPSULE: 60000; 12000; 38000 CAPSULE, DELAYED RELEASE PELLETS ORAL at 06:22

## 2025-01-11 RX ADMIN — CITALOPRAM HYDROBROMIDE 40 MG: 20 TABLET ORAL at 09:00

## 2025-01-11 RX ADMIN — PANCRELIPASE 1 CAPSULE: 60000; 12000; 38000 CAPSULE, DELAYED RELEASE PELLETS ORAL at 11:30

## 2025-01-11 RX ADMIN — INSULIN LISPRO 4 UNITS: 100 INJECTION, SOLUTION INTRAVENOUS; SUBCUTANEOUS at 17:18

## 2025-01-11 RX ADMIN — PIPERACILLIN SODIUM AND TAZOBACTAM SODIUM 4.5 G: 4; .5 INJECTION, SOLUTION INTRAVENOUS at 14:42

## 2025-01-11 RX ADMIN — ASPIRIN 81 MG CHEWABLE TABLET 81 MG: 81 TABLET CHEWABLE at 09:00

## 2025-01-11 RX ADMIN — ENOXAPARIN SODIUM 40 MG: 40 INJECTION SUBCUTANEOUS at 21:26

## 2025-01-11 RX ADMIN — NYSTATIN 1 APPLICATION: 100000 POWDER TOPICAL at 09:00

## 2025-01-11 RX ADMIN — ACETAMINOPHEN 650 MG: 325 TABLET ORAL at 17:17

## 2025-01-11 ASSESSMENT — COGNITIVE AND FUNCTIONAL STATUS - GENERAL
DAILY ACTIVITIY SCORE: 19
STANDING UP FROM CHAIR USING ARMS: A LITTLE
CLIMB 3 TO 5 STEPS WITH RAILING: A LOT
TURNING FROM BACK TO SIDE WHILE IN FLAT BAD: A LITTLE
MOBILITY SCORE: 16
WALKING IN HOSPITAL ROOM: A LOT
MOVING TO AND FROM BED TO CHAIR: A LITTLE
DRESSING REGULAR UPPER BODY CLOTHING: A LITTLE
MOVING FROM LYING ON BACK TO SITTING ON SIDE OF FLAT BED WITH BEDRAILS: A LITTLE
HELP NEEDED FOR BATHING: A LITTLE
TOILETING: A LITTLE
PERSONAL GROOMING: A LITTLE
DRESSING REGULAR LOWER BODY CLOTHING: A LITTLE

## 2025-01-11 ASSESSMENT — PAIN - FUNCTIONAL ASSESSMENT
PAIN_FUNCTIONAL_ASSESSMENT: 0-10

## 2025-01-11 ASSESSMENT — PAIN SCALES - GENERAL
PAINLEVEL_OUTOF10: 5 - MODERATE PAIN
PAINLEVEL_OUTOF10: 3
PAINLEVEL_OUTOF10: 0 - NO PAIN

## 2025-01-11 ASSESSMENT — PAIN DESCRIPTION - LOCATION: LOCATION: ABDOMEN

## 2025-01-11 ASSESSMENT — PAIN DESCRIPTION - DESCRIPTORS: DESCRIPTORS: TENDER

## 2025-01-11 NOTE — CARE PLAN
The clinical goals for the shift include patient will remain HDS throughout shift. The patient met this goal      S: Patient admitted 1/7 for acute hypoxic on chronic hypercapnic respiratory failure   B: History of: HTN, HLD, COPD, ARCHIE, CHF, CKD, diabetes   A: Patient A&Ox4, remains in NSR with 1st degree AV block and on 4 L NC/BIPAP HS. Medicated per orders throughout shift.   R: PT/OT recommending SNF at discharge, awaiting placement

## 2025-01-11 NOTE — CARE PLAN
The patient's goals for the shift include     Problem: Discharge Planning  Goal: Discharge to home or other facility with appropriate resources  Outcome: Progressing     Problem: Chronic Conditions and Co-morbidities  Goal: Patient's chronic conditions and co-morbidity symptoms are monitored and maintained or improved  Outcome: Progressing     Problem: Skin  Goal: Decreased wound size/increased tissue granulation at next dressing change  Outcome: Progressing  Goal: Participates in plan/prevention/treatment measures  Outcome: Progressing  Goal: Prevent/manage excess moisture  Outcome: Progressing  Goal: Prevent/minimize sheer/friction injuries  Outcome: Progressing  Goal: Promote/optimize nutrition  Outcome: Progressing  Goal: Promote skin healing  Outcome: Progressing     Problem: Respiratory  Goal: Minimize anxiety/maximize coping throughout shift  Outcome: Progressing  Goal: Minimal/no exertional discomfort or dyspnea this shift  Outcome: Progressing  Goal: No signs of respiratory distress (eg. Use of accessory muscles. Peds grunting)  Outcome: Progressing  Goal: Patent airway maintained this shift  Outcome: Progressing  Goal: Verbalize decreased shortness of breath this shift  Outcome: Progressing  Goal: Wean oxygen to maintain O2 saturation per order/standard this shift  Outcome: Progressing     Problem: Diabetes  Goal: Achieve decreasing blood glucose levels by end of shift  Outcome: Progressing  Goal: Increase stability of blood glucose readings by end of shift  Outcome: Progressing  Goal: Decrease in ketones present in urine by end of shift  Outcome: Progressing  Goal: Maintain electrolyte levels within acceptable range throughout shift  Outcome: Progressing  Goal: Maintain glucose levels >70mg/dl to <250mg/dl throughout shift  Outcome: Progressing  Goal: No changes in neurological exam by end of shift  Outcome: Progressing  Goal: Learn about and adhere to nutrition recommendations by end of shift  Outcome:  Progressing  Goal: Vital signs within normal range for age by end of shift  Outcome: Progressing  Goal: Increase self care and/or family involovement by end of shift  Outcome: Progressing  Goal: Receive DSME education by end of shift  Outcome: Progressing        The clinical goals for the shift include patient will remain HDS throughout shift

## 2025-01-11 NOTE — CARE PLAN
The patient's goals for the shift include      The clinical goals for the shift include Pt will remain HDS throughout shift      Problem: Chronic Conditions and Co-morbidities  Goal: Patient's chronic conditions and co-morbidity symptoms are monitored and maintained or improved  Outcome: Progressing     Pt remained stable throughout the shift.  PO intake good and voiding without difficulty.

## 2025-01-11 NOTE — NURSING NOTE
0602: glucose on AM labs noted to be 62  0606: glucose check = 55. Patient alert and oriented. Asymptomatic. Orange juice given   0639: glucose recheck = 75. Another glass of orange juice given   0716: glucose recheck = 104. Patient ordering breakfast.

## 2025-01-12 VITALS
BODY MASS INDEX: 49.61 KG/M2 | TEMPERATURE: 97.7 F | WEIGHT: 279.98 LBS | RESPIRATION RATE: 18 BRPM | DIASTOLIC BLOOD PRESSURE: 60 MMHG | SYSTOLIC BLOOD PRESSURE: 154 MMHG | HEIGHT: 63 IN | OXYGEN SATURATION: 94 % | HEART RATE: 80 BPM

## 2025-01-12 LAB
ANION GAP SERPL CALC-SCNC: 9 MMOL/L (ref 10–20)
BACTERIA BLD AEROBE CULT: ABNORMAL
BACTERIA BLD CULT: ABNORMAL
BACTERIA BLD CULT: NORMAL
BACTERIA BLD CULT: NORMAL
BUN SERPL-MCNC: 17 MG/DL (ref 6–23)
CALCIUM SERPL-MCNC: 9 MG/DL (ref 8.6–10.3)
CHLORIDE SERPL-SCNC: 98 MMOL/L (ref 98–107)
CO2 SERPL-SCNC: 37 MMOL/L (ref 21–32)
CREAT SERPL-MCNC: 0.9 MG/DL (ref 0.5–1.05)
EGFRCR SERPLBLD CKD-EPI 2021: 69 ML/MIN/1.73M*2
ERYTHROCYTE [DISTWIDTH] IN BLOOD BY AUTOMATED COUNT: 16 % (ref 11.5–14.5)
GLUCOSE BLD MANUAL STRIP-MCNC: 131 MG/DL (ref 74–99)
GLUCOSE BLD MANUAL STRIP-MCNC: 150 MG/DL (ref 74–99)
GLUCOSE BLD MANUAL STRIP-MCNC: 74 MG/DL (ref 74–99)
GLUCOSE BLD MANUAL STRIP-MCNC: 92 MG/DL (ref 74–99)
GLUCOSE SERPL-MCNC: 88 MG/DL (ref 74–99)
GRAM STN SPEC: ABNORMAL
HCT VFR BLD AUTO: 31.4 % (ref 36–46)
HGB BLD-MCNC: 9.3 G/DL (ref 12–16)
MAGNESIUM SERPL-MCNC: 1.99 MG/DL (ref 1.6–2.4)
MCH RBC QN AUTO: 24.9 PG (ref 26–34)
MCHC RBC AUTO-ENTMCNC: 29.6 G/DL (ref 32–36)
MCV RBC AUTO: 84 FL (ref 80–100)
NRBC BLD-RTO: 0 /100 WBCS (ref 0–0)
PHOSPHATE SERPL-MCNC: 3.3 MG/DL (ref 2.5–4.9)
PLATELET # BLD AUTO: 243 X10*3/UL (ref 150–450)
POTASSIUM SERPL-SCNC: 4.1 MMOL/L (ref 3.5–5.3)
RBC # BLD AUTO: 3.73 X10*6/UL (ref 4–5.2)
SODIUM SERPL-SCNC: 140 MMOL/L (ref 136–145)
WBC # BLD AUTO: 7 X10*3/UL (ref 4.4–11.3)

## 2025-01-12 PROCEDURE — 80048 BASIC METABOLIC PNL TOTAL CA: CPT | Performed by: NURSE PRACTITIONER

## 2025-01-12 PROCEDURE — 36415 COLL VENOUS BLD VENIPUNCTURE: CPT | Performed by: NURSE PRACTITIONER

## 2025-01-12 PROCEDURE — 2500000004 HC RX 250 GENERAL PHARMACY W/ HCPCS (ALT 636 FOR OP/ED): Performed by: INTERNAL MEDICINE

## 2025-01-12 PROCEDURE — 83735 ASSAY OF MAGNESIUM: CPT | Performed by: NURSE PRACTITIONER

## 2025-01-12 PROCEDURE — 84100 ASSAY OF PHOSPHORUS: CPT | Performed by: NURSE PRACTITIONER

## 2025-01-12 PROCEDURE — 85027 COMPLETE CBC AUTOMATED: CPT | Performed by: NURSE PRACTITIONER

## 2025-01-12 PROCEDURE — 2500000004 HC RX 250 GENERAL PHARMACY W/ HCPCS (ALT 636 FOR OP/ED): Performed by: NURSE PRACTITIONER

## 2025-01-12 PROCEDURE — 94660 CPAP INITIATION&MGMT: CPT

## 2025-01-12 PROCEDURE — 82947 ASSAY GLUCOSE BLOOD QUANT: CPT

## 2025-01-12 PROCEDURE — 2500000001 HC RX 250 WO HCPCS SELF ADMINISTERED DRUGS (ALT 637 FOR MEDICARE OP): Performed by: NURSE PRACTITIONER

## 2025-01-12 PROCEDURE — 2500000002 HC RX 250 W HCPCS SELF ADMINISTERED DRUGS (ALT 637 FOR MEDICARE OP, ALT 636 FOR OP/ED): Performed by: NURSE PRACTITIONER

## 2025-01-12 PROCEDURE — 2500000005 HC RX 250 GENERAL PHARMACY W/O HCPCS: Performed by: NURSE PRACTITIONER

## 2025-01-12 PROCEDURE — 2500000005 HC RX 250 GENERAL PHARMACY W/O HCPCS: Performed by: INTERNAL MEDICINE

## 2025-01-12 PROCEDURE — 1200000002 HC GENERAL ROOM WITH TELEMETRY DAILY

## 2025-01-12 RX ADMIN — AMLODIPINE BESYLATE 10 MG: 10 TABLET ORAL at 10:38

## 2025-01-12 RX ADMIN — PANCRELIPASE 1 CAPSULE: 60000; 12000; 38000 CAPSULE, DELAYED RELEASE PELLETS ORAL at 06:19

## 2025-01-12 RX ADMIN — PANCRELIPASE 1 CAPSULE: 60000; 12000; 38000 CAPSULE, DELAYED RELEASE PELLETS ORAL at 12:31

## 2025-01-12 RX ADMIN — Medication 4 L/MIN: at 20:44

## 2025-01-12 RX ADMIN — FUROSEMIDE 20 MG: 20 TABLET ORAL at 10:37

## 2025-01-12 RX ADMIN — PANTOPRAZOLE SODIUM 40 MG: 40 TABLET, DELAYED RELEASE ORAL at 06:19

## 2025-01-12 RX ADMIN — METOPROLOL SUCCINATE 25 MG: 25 TABLET, EXTENDED RELEASE ORAL at 10:38

## 2025-01-12 RX ADMIN — GABAPENTIN 300 MG: 300 CAPSULE ORAL at 10:38

## 2025-01-12 RX ADMIN — PANCRELIPASE 1 CAPSULE: 60000; 12000; 38000 CAPSULE, DELAYED RELEASE PELLETS ORAL at 16:44

## 2025-01-12 RX ADMIN — ATORVASTATIN CALCIUM 10 MG: 10 TABLET, FILM COATED ORAL at 20:31

## 2025-01-12 RX ADMIN — Medication 4 L/MIN: at 10:00

## 2025-01-12 RX ADMIN — Medication 40 PERCENT: at 09:00

## 2025-01-12 RX ADMIN — PIPERACILLIN SODIUM AND TAZOBACTAM SODIUM 4.5 G: 4; .5 INJECTION, SOLUTION INTRAVENOUS at 05:54

## 2025-01-12 RX ADMIN — NYSTATIN 1 APPLICATION: 100000 POWDER TOPICAL at 10:46

## 2025-01-12 RX ADMIN — FERROUS SULFATE TAB 325 MG (65 MG ELEMENTAL FE) 325 MG: 325 (65 FE) TAB at 10:37

## 2025-01-12 RX ADMIN — Medication 40 PERCENT: at 08:59

## 2025-01-12 RX ADMIN — NYSTATIN 1 APPLICATION: 100000 POWDER TOPICAL at 20:32

## 2025-01-12 RX ADMIN — ASPIRIN 81 MG CHEWABLE TABLET 81 MG: 81 TABLET CHEWABLE at 10:38

## 2025-01-12 RX ADMIN — INSULIN GLARGINE 40 UNITS: 100 INJECTION, SOLUTION SUBCUTANEOUS at 20:29

## 2025-01-12 RX ADMIN — ENOXAPARIN SODIUM 40 MG: 40 INJECTION SUBCUTANEOUS at 20:31

## 2025-01-12 RX ADMIN — ENOXAPARIN SODIUM 40 MG: 40 INJECTION SUBCUTANEOUS at 10:37

## 2025-01-12 RX ADMIN — PIPERACILLIN SODIUM AND TAZOBACTAM SODIUM 4.5 G: 4; .5 INJECTION, SOLUTION INTRAVENOUS at 21:32

## 2025-01-12 RX ADMIN — GABAPENTIN 300 MG: 300 CAPSULE ORAL at 20:31

## 2025-01-12 RX ADMIN — CITALOPRAM HYDROBROMIDE 40 MG: 20 TABLET ORAL at 10:37

## 2025-01-12 RX ADMIN — PIPERACILLIN SODIUM AND TAZOBACTAM SODIUM 4.5 G: 4; .5 INJECTION, SOLUTION INTRAVENOUS at 13:49

## 2025-01-12 ASSESSMENT — COGNITIVE AND FUNCTIONAL STATUS - GENERAL
CLIMB 3 TO 5 STEPS WITH RAILING: TOTAL
DRESSING REGULAR LOWER BODY CLOTHING: A LOT
MOBILITY SCORE: 14
TURNING FROM BACK TO SIDE WHILE IN FLAT BAD: A LITTLE
DRESSING REGULAR UPPER BODY CLOTHING: A LOT
DAILY ACTIVITIY SCORE: 15
MOVING FROM LYING ON BACK TO SITTING ON SIDE OF FLAT BED WITH BEDRAILS: A LITTLE
DAILY ACTIVITIY SCORE: 15
DRESSING REGULAR LOWER BODY CLOTHING: A LOT
HELP NEEDED FOR BATHING: A LOT
PERSONAL GROOMING: A LITTLE
CLIMB 3 TO 5 STEPS WITH RAILING: TOTAL
DRESSING REGULAR LOWER BODY CLOTHING: A LOT
STANDING UP FROM CHAIR USING ARMS: A LITTLE
WALKING IN HOSPITAL ROOM: TOTAL
PERSONAL GROOMING: A LITTLE
MOVING FROM LYING ON BACK TO SITTING ON SIDE OF FLAT BED WITH BEDRAILS: A LITTLE
MOVING TO AND FROM BED TO CHAIR: A LITTLE
WALKING IN HOSPITAL ROOM: TOTAL
DRESSING REGULAR UPPER BODY CLOTHING: A LOT
PERSONAL GROOMING: A LITTLE
WALKING IN HOSPITAL ROOM: TOTAL
DAILY ACTIVITIY SCORE: 15
MOVING TO AND FROM BED TO CHAIR: A LITTLE
CLIMB 3 TO 5 STEPS WITH RAILING: TOTAL
TURNING FROM BACK TO SIDE WHILE IN FLAT BAD: A LITTLE
STANDING UP FROM CHAIR USING ARMS: A LITTLE
TOILETING: A LOT
DRESSING REGULAR UPPER BODY CLOTHING: A LOT
HELP NEEDED FOR BATHING: A LOT
TOILETING: A LOT
MOVING FROM LYING ON BACK TO SITTING ON SIDE OF FLAT BED WITH BEDRAILS: A LITTLE
MOBILITY SCORE: 14
TOILETING: A LOT
HELP NEEDED FOR BATHING: A LOT
MOBILITY SCORE: 14
TURNING FROM BACK TO SIDE WHILE IN FLAT BAD: A LITTLE
MOVING TO AND FROM BED TO CHAIR: A LITTLE
STANDING UP FROM CHAIR USING ARMS: A LITTLE

## 2025-01-12 ASSESSMENT — PAIN SCALES - GENERAL
PAINLEVEL_OUTOF10: 0 - NO PAIN

## 2025-01-12 ASSESSMENT — PAIN - FUNCTIONAL ASSESSMENT: PAIN_FUNCTIONAL_ASSESSMENT: 0-10

## 2025-01-12 NOTE — CARE PLAN
The patient's goals for the shift include  pneumonia    The clinical goals for the shift include see poc    Over the shift, the patient did not make progress toward the following goals. Barriers to progression include bedridden, lack of activity. Recommendations to address these barriers include antibiotics .

## 2025-01-12 NOTE — NURSING NOTE
EOS:  Received from CCU at 0045 on NC O2 at 4lpm, oriented x4 and pleasant, external catheter in place.  Slept well through the night, placed on BIPAP by RT while sleeping, tolerating well.  VSS.

## 2025-01-12 NOTE — DISCHARGE INSTRUCTIONS
Please follow-up with [facility] for a repeat pelvic ultrasound in 6 months regarding a right ovarian cyst.

## 2025-01-12 NOTE — PROGRESS NOTES
Internal Medicine Progress Note    Patient Name: Kaylene Feliciano          MRN: [id number]  Today's Date: January 11, 2025          Attending: Favian May MD    Subjective:  Patient was seen and examined at bedside, she feels better, respiratory status improved, currently she is on baseline 4L/min via NC, she denies headache, chest pain, abdominal pain, nausea or vomiting.    Review Of Systems:  GENERAL: No malaise or fevers.  CARDIOVASCULAR: Negative for chest pain  RESPIRATORY: Improving shortness of breath  GI: No nausea, vomiting, or diarrhea    Objective:  Vitals:    01/11/25 1130 01/11/25 1200 01/11/25 1600 01/11/25 2000   BP: 168/65      BP Location: Right arm      Patient Position: Lying      Pulse: 66 66 64 66   Resp: 21 (!) 31 15 23   Temp: 36.2 °C (97.2 °F)      TempSrc: Temporal      SpO2: 98% 99% 97% 99%   Weight:       Height:         Physical Exam:   General appearance: Morbidly obese alert, in no acute distress   Lungs: Diminished breath sounds  Heart: RRR without murmur, gallop, or rubs  Abdomen: Soft, non-tender. Bowel sounds normal.  Extremities: No deformity, no edema  Neuro: Alert oriented x3, no focal deficit.    Labs:  Results for orders placed or performed during the hospital encounter of 01/07/25 (from the past 24 hours)   Basic metabolic panel   Result Value Ref Range    Glucose 62 (L) 74 - 99 mg/dL    Sodium 140 136 - 145 mmol/L    Potassium 4.1 3.5 - 5.3 mmol/L    Chloride 99 98 - 107 mmol/L    Bicarbonate 32 21 - 32 mmol/L    Anion Gap 13 10 - 20 mmol/L    Urea Nitrogen 27 (H) 6 - 23 mg/dL    Creatinine 0.97 0.50 - 1.05 mg/dL    eGFR 63 >60 mL/min/1.73m*2    Calcium 8.6 8.6 - 10.3 mg/dL   CBC   Result Value Ref Range    WBC 7.3 4.4 - 11.3 x10*3/uL    nRBC 0.0 0.0 - 0.0 /100 WBCs    RBC 3.60 (L) 4.00 - 5.20 x10*6/uL    Hemoglobin 8.8 (L) 12.0 - 16.0 g/dL    Hematocrit 30.5 (L) 36.0 - 46.0 %    MCV 85 80 - 100 fL    MCH 24.4 (L) 26.0 - 34.0 pg    MCHC 28.9 (L) 32.0 - 36.0 g/dL    RDW  16.2 (H) 11.5 - 14.5 %    Platelets 250 150 - 450 x10*3/uL   POCT GLUCOSE   Result Value Ref Range    POCT Glucose 55 (L) 74 - 99 mg/dL   POCT GLUCOSE   Result Value Ref Range    POCT Glucose 75 74 - 99 mg/dL   POCT GLUCOSE   Result Value Ref Range    POCT Glucose 104 (H) 74 - 99 mg/dL   POCT GLUCOSE   Result Value Ref Range    POCT Glucose 99 74 - 99 mg/dL   POCT GLUCOSE   Result Value Ref Range    POCT Glucose 118 (H) 74 - 99 mg/dL   POCT GLUCOSE   Result Value Ref Range    POCT Glucose 201 (H) 74 - 99 mg/dL   POCT GLUCOSE   Result Value Ref Range    POCT Glucose 142 (H) 74 - 99 mg/dL       Medications:  Scheduled medications  amLODIPine, 10 mg, oral, Daily  aspirin, 81 mg, oral, Daily  atorvastatin, 10 mg, oral, Nightly  citalopram, 40 mg, oral, Daily  enoxaparin, 40 mg, subcutaneous, q12h YARELI  ferrous sulfate (325 mg ferrous sulfate), 65 mg of iron, oral, Daily with breakfast  furosemide, 20 mg, oral, Daily  gabapentin, 300 mg, oral, BID  insulin glargine, 40 Units, subcutaneous, BID  insulin lispro, 0-10 Units, subcutaneous, Before meals & nightly  metoprolol succinate XL, 25 mg, oral, Daily  nystatin, 1 Application, Topical, BID  oxygen, , inhalation, Continuous - Inhalation  oxygen, , inhalation, Continuous - Inhalation  pancrelipase (Lip-Prot-Amyl), 1 capsule, oral, TID AC  pantoprazole, 40 mg, oral, Daily before breakfast  piperacillin-tazobactam, 4.5 g, intravenous, q8h      Continuous medications     PRN medications  PRN medications: acetaminophen, acetaminophen, albuterol, hydrALAZINE, oxygen      Assessment/Plan:  Assessment & Plan  Acute hypoxic on chronic hypercapnic respiratory failure (Multi)  Improving  Continue Zosyn and breathing treatments  Pulmonology is following  Acute diastolic heart failure  Continue Lasix and Metoprolol  Pneumonia of both lungs due to infectious organism  Continue Zosyn  Anemia  Monitor blood count  Obesity    Fungal infection of skin  Continue Nystatin  Bacteremia  Blood  "culture on 1/7/2025 showed gram positive cocci    LISA (iron deficiency anemia)  Iron supplements    Discussed with patient, RN    Gilbert Ritter MD   Date: 01/11/25  Time: 8:52 PM    This note was partially created using voice recognition software and is inherently subject to errors including those of syntax and \"sound-alike\" substitutions which may escape proofreading. In such instances, original meaning may be extrapolated by contextual derivation    "

## 2025-01-12 NOTE — NURSING NOTE
2000- THE PT WAS INCONTINENT OF URINE. A COMPLETE BED CHANGE AND BATH WAS GIVEN AND A NEW PURWICK WAS PLACED. THE PT IS A+O X3. BREATH SOUNDS ARE CTA , DIM IN BLL. EQUAL CHEST RISE NOTED WITH RESPS. THE PT  DOES GET SOB WITH EXERTION OF TURNING BUT SHE IS  OF LARGE STATURE. READILY RESOLVES WITH REST. THE PT IS PRESENTLY  ON 4 L NC. NO DISTRESS OBSERVED. EKG-1ST DEGREE HB; W/O ECTOPY. NO CP,DIZZINESS, NAUSEA, PALPITATIONS OR HA VOICED. THE PT VOICES SOME TENDERNESS AND SENSITIVITY ON THE LT SIDE OF HER ABD , WHICH IS NOTED TO HAVE MULT SCABBED AREAS AND A 2 CM EXCORIATION WHICH IS ALSO SCABBED. THE PT STATES IT WAS FROM HER BED AT HOME, FROM THE SIDERAIL HITTING HER SKIN.  IT WAS DOCUMENTED IN THE PTS CHART. SHE IS ALSO NOTED TO HAVE A CRUSTY VERTICAL AREA IN LINE OF HER UMBILICOUS.  THAT TOO WAS DOCUMENTED AND PLACED IN THE CHART. HER ABD IS NON TENDER. BS +. HER BS  TONITE.  DM EDUCATION WAS REVIEWED. THE PT HAS BEEN DM SINCE 2005. SHE HAS A GOOD UNDERSTANDING OF HER DIABETES. HER LANTUS INSULIN DOSAGE WAS ADJUSTED STARTING TONITES DOSE. . SHE DUENAS X4 UPPER> LOWER. THE PT STATES SHE IS NON AMBULATORY . SHE STATES CHRONIC NEUROPATHY IN BOTH HANDS AND FEET. PERIPHERAL PULSES ARE 2 + TO PALPATE. THE PT APPEARS PALE; HER SKIN IS COOL AND DRY TO TOUCH. SHE IS VERY P[LERASANT TO CONTACT. IS PRESENTLY WATCHING HER FOOTBALL AND EATING HER PM SNACKS. NO DISTRESS IS   OBSERVED.  2400- [facility] REPORT WAS CALLED TO THE FLOOR. THE PT IS PRESENTLY ASLEEP ON HER BIPAP. O2 SATS 100%; bp 143/17-16-%. NO PAIN VOICERD UPON AWAKENING. RESPS ARE EVEN AND UNLABORED. RESP THERAPY CALLED . THE PT WAS PLACED BACK ON 4 L NASAL CANNULA. FOR TRANSPORT.  SHE HAS HER ZOSYN INFUSING AT THIS TIME. NO DISTRESS OBSERVED.

## 2025-01-12 NOTE — PROGRESS NOTES
Internal Medicine Progress Note    Patient Name: Kaylene Feliciano          MRN: [id number]  Today's Date: January 12, 2025          Attending: Favian May MD    Subjective:  Patient was seen and examined at bedside, she lays down in bed on 4 L oxygen via nasal cannula, she denies headache, chest pain, abdominal pain, nausea or vomiting.    Review Of Systems:  CARDIOVASCULAR: Negative for chest pain  RESPIRATORY: Improving shortness of breath  GI: No nausea, vomiting, or diarrhea    Objective:  Vitals:    01/12/25 0800 01/12/25 0859 01/12/25 1000 01/12/25 1200   BP: 123/55   129/50   BP Location: Right arm   Right arm   Patient Position: Lying   Lying   Pulse: 62   70   Resp: 16   17   Temp: 35.9 °C (96.6 °F)   36.1 °C (97 °F)   TempSrc: Temporal   Temporal   SpO2: 97% 97% 98% 92%   Weight:       Height:         Physical Exam:   General appearance: Morbidly obese alert, in no acute distress   Lungs: Diminished breath sounds  Heart: RRR without murmur, gallop, or rubs  Abdomen: Soft, non-tender. Bowel sounds normal.  Neuro: Alert oriented x3, no focal deficit.    Labs:  Results for orders placed or performed during the hospital encounter of 01/07/25 (from the past 24 hours)   POCT GLUCOSE   Result Value Ref Range    POCT Glucose 201 (H) 74 - 99 mg/dL   POCT GLUCOSE   Result Value Ref Range    POCT Glucose 142 (H) 74 - 99 mg/dL   Basic metabolic panel   Result Value Ref Range    Glucose 88 74 - 99 mg/dL    Sodium 140 136 - 145 mmol/L    Potassium 4.1 3.5 - 5.3 mmol/L    Chloride 98 98 - 107 mmol/L    Bicarbonate 37 (H) 21 - 32 mmol/L    Anion Gap 9 (L) 10 - 20 mmol/L    Urea Nitrogen 17 6 - 23 mg/dL    Creatinine 0.90 0.50 - 1.05 mg/dL    eGFR 69 >60 mL/min/1.73m*2    Calcium 9.0 8.6 - 10.3 mg/dL   CBC   Result Value Ref Range    WBC 7.0 4.4 - 11.3 x10*3/uL    nRBC 0.0 0.0 - 0.0 /100 WBCs    RBC 3.73 (L) 4.00 - 5.20 x10*6/uL    Hemoglobin 9.3 (L) 12.0 - 16.0 g/dL    Hematocrit 31.4 (L) 36.0 - 46.0 %    MCV 84 80 -  100 fL    MCH 24.9 (L) 26.0 - 34.0 pg    MCHC 29.6 (L) 32.0 - 36.0 g/dL    RDW 16.0 (H) 11.5 - 14.5 %    Platelets 243 150 - 450 x10*3/uL   Magnesium   Result Value Ref Range    Magnesium 1.99 1.60 - 2.40 mg/dL   Phosphorus   Result Value Ref Range    Phosphorus 3.3 2.5 - 4.9 mg/dL   POCT GLUCOSE   Result Value Ref Range    POCT Glucose 92 74 - 99 mg/dL   POCT GLUCOSE   Result Value Ref Range    POCT Glucose 74 74 - 99 mg/dL       Medications:  Scheduled medications  amLODIPine, 10 mg, oral, Daily  aspirin, 81 mg, oral, Daily  atorvastatin, 10 mg, oral, Nightly  citalopram, 40 mg, oral, Daily  enoxaparin, 40 mg, subcutaneous, q12h YARELI  ferrous sulfate (325 mg ferrous sulfate), 65 mg of iron, oral, Daily with breakfast  furosemide, 20 mg, oral, Daily  gabapentin, 300 mg, oral, BID  insulin glargine, 40 Units, subcutaneous, BID  insulin lispro, 0-10 Units, subcutaneous, Before meals & nightly  metoprolol succinate XL, 25 mg, oral, Daily  nystatin, 1 Application, Topical, BID  oxygen, , inhalation, Continuous - Inhalation  oxygen, , inhalation, Continuous - Inhalation  pancrelipase (Lip-Prot-Amyl), 1 capsule, oral, TID AC  pantoprazole, 40 mg, oral, Daily before breakfast  piperacillin-tazobactam, 4.5 g, intravenous, q8h      Continuous medications     PRN medications  PRN medications: acetaminophen, acetaminophen, albuterol, hydrALAZINE, oxygen      Assessment/Plan:  Assessment & Plan  Acute hypoxic on chronic hypercapnic respiratory failure (Multi)  Continue Zosyn and breathing treatments  Pulmonology is following  Acute diastolic heart failure  Continue Lasix and Metoprolol  Pneumonia of both lungs due to infectious organism  Continue Zosyn  Anemia  Monitor blood count  Obesity    Fungal infection of skin  Continue Nystatin  Bacteremia  Blood culture on 1/7/2025 showed gram positive cocci    LISA (iron deficiency anemia)  Iron supplements    Discussed with patient, RN    Gilbert Ritter MD   Date: 01/12/25  Time: 3:29  "PM    This note was partially created using voice recognition software and is inherently subject to errors including those of syntax and \"sound-alike\" substitutions which may escape proofreading. In such instances, original meaning may be extrapolated by contextual derivation    "

## 2025-01-12 NOTE — CARE PLAN
The patient's goals for the shift include  get some sleep    The clinical goals for the shift include the pt will wear her bipap all night while maintaining o2 sats > 92% and the patient will remain HDS throughout shift

## 2025-01-12 NOTE — CARE PLAN
I spoke with Dr Karena Clement regarding the patient's pelvic ultrasound. She informed me that after looking at the patient's chart and ultrasound it appears to be a simple cyst, however she recommends to have the patient schedule a follow-up with [facility] for a repeat pelvic ultrasound in 6 months. As this was an incidental finding and the patient is not having any issues related to this finding at this time it would be okay for her to just follow-up outpatient. If the attending would still like her to see the patient in person, she will be happy to just let her know. The follow-up recommendation was placed on the patient's discharge paperwork.

## 2025-01-13 LAB
ANION GAP SERPL CALC-SCNC: 9 MMOL/L (ref 10–20)
BACTERIA BLD AEROBE CULT: ABNORMAL
BACTERIA BLD CULT: ABNORMAL
BACTERIA BLD CULT: NORMAL
BACTERIA BLD CULT: NORMAL
BUN SERPL-MCNC: 13 MG/DL (ref 6–23)
C DIF TOX TCDA+TCDB STL QL NAA+PROBE: NOT DETECTED
CALCIUM SERPL-MCNC: 9.1 MG/DL (ref 8.6–10.3)
CHLORIDE SERPL-SCNC: 98 MMOL/L (ref 98–107)
CO2 SERPL-SCNC: 37 MMOL/L (ref 21–32)
CREAT SERPL-MCNC: 0.9 MG/DL (ref 0.5–1.05)
EGFRCR SERPLBLD CKD-EPI 2021: 69 ML/MIN/1.73M*2
ERYTHROCYTE [DISTWIDTH] IN BLOOD BY AUTOMATED COUNT: 16.1 % (ref 11.5–14.5)
GLUCOSE BLD MANUAL STRIP-MCNC: 125 MG/DL (ref 74–99)
GLUCOSE BLD MANUAL STRIP-MCNC: 166 MG/DL (ref 74–99)
GLUCOSE BLD MANUAL STRIP-MCNC: 173 MG/DL (ref 74–99)
GLUCOSE BLD MANUAL STRIP-MCNC: 235 MG/DL (ref 74–99)
GLUCOSE SERPL-MCNC: 123 MG/DL (ref 74–99)
GRAM STAIN: ABNORMAL
GRAM STN SPEC: ABNORMAL
HCT VFR BLD AUTO: 34.7 % (ref 36–46)
HEMOCCULT SP1 STL QL: NEGATIVE
HGB BLD-MCNC: 10.2 G/DL (ref 12–16)
MAGNESIUM SERPL-MCNC: 1.92 MG/DL (ref 1.6–2.4)
MCH RBC QN AUTO: 24.8 PG (ref 26–34)
MCHC RBC AUTO-ENTMCNC: 29.4 G/DL (ref 32–36)
MCV RBC AUTO: 84 FL (ref 80–100)
NRBC BLD-RTO: 0 /100 WBCS (ref 0–0)
PLATELET # BLD AUTO: 249 X10*3/UL (ref 150–450)
POTASSIUM SERPL-SCNC: 4.1 MMOL/L (ref 3.5–5.3)
RBC # BLD AUTO: 4.11 X10*6/UL (ref 4–5.2)
SODIUM SERPL-SCNC: 140 MMOL/L (ref 136–145)
WBC # BLD AUTO: 8.5 X10*3/UL (ref 4.4–11.3)

## 2025-01-13 PROCEDURE — 2500000002 HC RX 250 W HCPCS SELF ADMINISTERED DRUGS (ALT 637 FOR MEDICARE OP, ALT 636 FOR OP/ED): Performed by: NURSE PRACTITIONER

## 2025-01-13 PROCEDURE — 2500000001 HC RX 250 WO HCPCS SELF ADMINISTERED DRUGS (ALT 637 FOR MEDICARE OP): Performed by: NURSE PRACTITIONER

## 2025-01-13 PROCEDURE — 2500000002 HC RX 250 W HCPCS SELF ADMINISTERED DRUGS (ALT 637 FOR MEDICARE OP, ALT 636 FOR OP/ED): Performed by: PHYSICIAN ASSISTANT

## 2025-01-13 PROCEDURE — 97530 THERAPEUTIC ACTIVITIES: CPT | Mod: GP,CQ

## 2025-01-13 PROCEDURE — 82374 ASSAY BLOOD CARBON DIOXIDE: CPT | Performed by: NURSE PRACTITIONER

## 2025-01-13 PROCEDURE — 83735 ASSAY OF MAGNESIUM: CPT | Performed by: NURSE PRACTITIONER

## 2025-01-13 PROCEDURE — 85027 COMPLETE CBC AUTOMATED: CPT | Performed by: NURSE PRACTITIONER

## 2025-01-13 PROCEDURE — 2500000004 HC RX 250 GENERAL PHARMACY W/ HCPCS (ALT 636 FOR OP/ED): Performed by: NURSE PRACTITIONER

## 2025-01-13 PROCEDURE — 94660 CPAP INITIATION&MGMT: CPT

## 2025-01-13 PROCEDURE — 36415 COLL VENOUS BLD VENIPUNCTURE: CPT | Performed by: NURSE PRACTITIONER

## 2025-01-13 PROCEDURE — 82947 ASSAY GLUCOSE BLOOD QUANT: CPT

## 2025-01-13 PROCEDURE — 2500000005 HC RX 250 GENERAL PHARMACY W/O HCPCS: Performed by: NURSE PRACTITIONER

## 2025-01-13 PROCEDURE — 97110 THERAPEUTIC EXERCISES: CPT | Mod: GP,CQ

## 2025-01-13 PROCEDURE — 1200000002 HC GENERAL ROOM WITH TELEMETRY DAILY

## 2025-01-13 PROCEDURE — 97535 SELF CARE MNGMENT TRAINING: CPT | Mod: GO,CO

## 2025-01-13 PROCEDURE — 82270 OCCULT BLOOD FECES: CPT | Performed by: NURSE PRACTITIONER

## 2025-01-13 PROCEDURE — 2500000004 HC RX 250 GENERAL PHARMACY W/ HCPCS (ALT 636 FOR OP/ED): Performed by: INTERNAL MEDICINE

## 2025-01-13 PROCEDURE — 87493 C DIFF AMPLIFIED PROBE: CPT | Mod: STJLAB | Performed by: NURSE PRACTITIONER

## 2025-01-13 PROCEDURE — 2500000005 HC RX 250 GENERAL PHARMACY W/O HCPCS: Performed by: INTERNAL MEDICINE

## 2025-01-13 RX ORDER — BISMUTH SUBSALICYLATE 262 MG/1
524 TABLET ORAL ONCE
Status: COMPLETED | OUTPATIENT
Start: 2025-01-13 | End: 2025-01-13

## 2025-01-13 RX ORDER — CHOLESTYRAMINE 4 G/4.8G
4 POWDER, FOR SUSPENSION ORAL 2 TIMES DAILY PRN
Status: DISCONTINUED | OUTPATIENT
Start: 2025-01-13 | End: 2025-01-15 | Stop reason: HOSPADM

## 2025-01-13 RX ADMIN — CITALOPRAM HYDROBROMIDE 40 MG: 20 TABLET ORAL at 10:10

## 2025-01-13 RX ADMIN — PIPERACILLIN SODIUM AND TAZOBACTAM SODIUM 4.5 G: 4; .5 INJECTION, SOLUTION INTRAVENOUS at 21:37

## 2025-01-13 RX ADMIN — INSULIN GLARGINE 40 UNITS: 100 INJECTION, SOLUTION SUBCUTANEOUS at 10:10

## 2025-01-13 RX ADMIN — ASPIRIN 81 MG CHEWABLE TABLET 81 MG: 81 TABLET CHEWABLE at 10:11

## 2025-01-13 RX ADMIN — Medication 4 L/MIN: at 21:38

## 2025-01-13 RX ADMIN — FUROSEMIDE 20 MG: 20 TABLET ORAL at 10:10

## 2025-01-13 RX ADMIN — AMLODIPINE BESYLATE 10 MG: 10 TABLET ORAL at 10:10

## 2025-01-13 RX ADMIN — PIPERACILLIN SODIUM AND TAZOBACTAM SODIUM 4.5 G: 4; .5 INJECTION, SOLUTION INTRAVENOUS at 06:14

## 2025-01-13 RX ADMIN — GABAPENTIN 300 MG: 300 CAPSULE ORAL at 21:37

## 2025-01-13 RX ADMIN — BISMUTH SUBSALICYLATE 524 MG: 262 TABLET, CHEWABLE ORAL at 03:00

## 2025-01-13 RX ADMIN — PIPERACILLIN SODIUM AND TAZOBACTAM SODIUM 4.5 G: 4; .5 INJECTION, SOLUTION INTRAVENOUS at 14:00

## 2025-01-13 RX ADMIN — Medication 4 L/MIN: at 10:00

## 2025-01-13 RX ADMIN — ENOXAPARIN SODIUM 40 MG: 40 INJECTION SUBCUTANEOUS at 10:11

## 2025-01-13 RX ADMIN — PANCRELIPASE 1 CAPSULE: 60000; 12000; 38000 CAPSULE, DELAYED RELEASE PELLETS ORAL at 06:14

## 2025-01-13 RX ADMIN — ENOXAPARIN SODIUM 40 MG: 40 INJECTION SUBCUTANEOUS at 21:37

## 2025-01-13 RX ADMIN — PANCRELIPASE 1 CAPSULE: 60000; 12000; 38000 CAPSULE, DELAYED RELEASE PELLETS ORAL at 10:23

## 2025-01-13 RX ADMIN — INSULIN LISPRO 2 UNITS: 100 INJECTION, SOLUTION INTRAVENOUS; SUBCUTANEOUS at 17:58

## 2025-01-13 RX ADMIN — ATORVASTATIN CALCIUM 10 MG: 10 TABLET, FILM COATED ORAL at 21:37

## 2025-01-13 RX ADMIN — INSULIN LISPRO 2 UNITS: 100 INJECTION, SOLUTION INTRAVENOUS; SUBCUTANEOUS at 21:36

## 2025-01-13 RX ADMIN — PANTOPRAZOLE SODIUM 40 MG: 40 TABLET, DELAYED RELEASE ORAL at 06:14

## 2025-01-13 RX ADMIN — GABAPENTIN 300 MG: 300 CAPSULE ORAL at 10:11

## 2025-01-13 RX ADMIN — FERROUS SULFATE TAB 325 MG (65 MG ELEMENTAL FE) 325 MG: 325 (65 FE) TAB at 10:11

## 2025-01-13 RX ADMIN — PANCRELIPASE 1 CAPSULE: 60000; 12000; 38000 CAPSULE, DELAYED RELEASE PELLETS ORAL at 17:58

## 2025-01-13 RX ADMIN — Medication 4 L/MIN: at 07:00

## 2025-01-13 RX ADMIN — INSULIN GLARGINE 40 UNITS: 100 INJECTION, SOLUTION SUBCUTANEOUS at 21:36

## 2025-01-13 RX ADMIN — METOPROLOL SUCCINATE 25 MG: 25 TABLET, EXTENDED RELEASE ORAL at 10:10

## 2025-01-13 RX ADMIN — NYSTATIN 1 APPLICATION: 100000 POWDER TOPICAL at 10:20

## 2025-01-13 RX ADMIN — INSULIN LISPRO 4 UNITS: 100 INJECTION, SOLUTION INTRAVENOUS; SUBCUTANEOUS at 14:00

## 2025-01-13 ASSESSMENT — COGNITIVE AND FUNCTIONAL STATUS - GENERAL
DRESSING REGULAR LOWER BODY CLOTHING: TOTAL
PERSONAL GROOMING: A LITTLE
STANDING UP FROM CHAIR USING ARMS: TOTAL
DAILY ACTIVITIY SCORE: 15
DRESSING REGULAR LOWER BODY CLOTHING: A LOT
MOVING FROM LYING ON BACK TO SITTING ON SIDE OF FLAT BED WITH BEDRAILS: A LITTLE
DAILY ACTIVITIY SCORE: 14
MOBILITY SCORE: 11
WALKING IN HOSPITAL ROOM: TOTAL
STANDING UP FROM CHAIR USING ARMS: TOTAL
DRESSING REGULAR UPPER BODY CLOTHING: A LOT
DAILY ACTIVITIY SCORE: 15
TOILETING: TOTAL
MOVING TO AND FROM BED TO CHAIR: A LITTLE
PERSONAL GROOMING: A LITTLE
CLIMB 3 TO 5 STEPS WITH RAILING: TOTAL
TURNING FROM BACK TO SIDE WHILE IN FLAT BAD: A LOT
MOVING FROM LYING ON BACK TO SITTING ON SIDE OF FLAT BED WITH BEDRAILS: A LOT
HELP NEEDED FOR BATHING: A LOT
MOVING FROM LYING ON BACK TO SITTING ON SIDE OF FLAT BED WITH BEDRAILS: A LITTLE
DRESSING REGULAR UPPER BODY CLOTHING: A LOT
MOBILITY SCORE: 12
PERSONAL GROOMING: A LITTLE
MOVING TO AND FROM BED TO CHAIR: A LOT
STANDING UP FROM CHAIR USING ARMS: A LOT
TOILETING: A LOT
MOBILITY SCORE: 12
CLIMB 3 TO 5 STEPS WITH RAILING: TOTAL
DRESSING REGULAR UPPER BODY CLOTHING: A LITTLE
WALKING IN HOSPITAL ROOM: TOTAL
TURNING FROM BACK TO SIDE WHILE IN FLAT BAD: A LITTLE
HELP NEEDED FOR BATHING: A LOT
DRESSING REGULAR LOWER BODY CLOTHING: A LOT
TOILETING: A LOT
TURNING FROM BACK TO SIDE WHILE IN FLAT BAD: A LITTLE
WALKING IN HOSPITAL ROOM: A LOT
HELP NEEDED FOR BATHING: A LOT
MOVING TO AND FROM BED TO CHAIR: A LITTLE
CLIMB 3 TO 5 STEPS WITH RAILING: TOTAL

## 2025-01-13 ASSESSMENT — PAIN - FUNCTIONAL ASSESSMENT
PAIN_FUNCTIONAL_ASSESSMENT: 0-10

## 2025-01-13 ASSESSMENT — ACTIVITIES OF DAILY LIVING (ADL): HOME_MANAGEMENT_TIME_ENTRY: 40

## 2025-01-13 ASSESSMENT — PAIN SCALES - GENERAL
PAINLEVEL_OUTOF10: 0 - NO PAIN

## 2025-01-13 NOTE — PROGRESS NOTES
Kaylene Feliciano is a 69 y.o. female on day 6 of admission presenting with Acute hypoxic on chronic hypercapnic respiratory failure (Multi).  Patient is slowly improving however was noted to have gram-negative bacteremia also having some diarrhea.    Subjective   Mild SOB       Objective         Current Facility-Administered Medications:     acetaminophen (Tylenol) suppository 650 mg, 650 mg, rectal, q4h PRN, Ce Lopez, APRN-CNP    acetaminophen (Tylenol) tablet 650 mg, 650 mg, oral, q6h PRN, Velma R Haines, APRN-CNP, 650 mg at 01/11/25 1717    albuterol 2.5 mg /3 mL (0.083 %) nebulizer solution 2.5 mg, 2.5 mg, nebulization, q4h PRN, Ce Geos, APRN-CNP    amLODIPine (Norvasc) tablet 10 mg, 10 mg, oral, Daily, Ce Lopez, APRN-CNP, 10 mg at 01/13/25 1010    aspirin chewable tablet 81 mg, 81 mg, oral, Daily, Ce Lopez, APRN-CNP, 81 mg at 01/13/25 1011    atorvastatin (Lipitor) tablet 10 mg, 10 mg, oral, Nightly, Ce Lopez, APRN-CNP, 10 mg at 01/12/25 2031    citalopram (CeleXA) tablet 40 mg, 40 mg, oral, Daily, Ce Lopez, APRN-CNP, 40 mg at 01/13/25 1010    enoxaparin (Lovenox) syringe 40 mg, 40 mg, subcutaneous, q12h YARELI, Ce Lopez, APRN-CNP, 40 mg at 01/13/25 1011    ferrous sulfate (325 mg ferrous sulfate) tablet 325 mg, 65 mg of iron, oral, Daily with breakfast, Velma R Haines, APRN-CNP, 325 mg at 01/13/25 1011    furosemide (Lasix) tablet 20 mg, 20 mg, oral, Daily, Ce Lopez, APRN-CNP, 20 mg at 01/13/25 1010    gabapentin (Neurontin) capsule 300 mg, 300 mg, oral, BID, Ce Lopez, APRN-CNP, 300 mg at 01/13/25 1011    hydrALAZINE (Apresoline) injection 10 mg, 10 mg, intravenous, q6h PRN, Genny Holly, APRN-CNP, 10 mg at 01/10/25 1247    insulin glargine (Lantus) injection 40 Units, 40 Units, subcutaneous, BID, Ce Lopez, APRN-CNP, 40 Units at 01/13/25 1010    insulin lispro injection 0-10 Units, 0-10 Units, subcutaneous, Before meals & nightly,  "Paul Love PA-C, 4 Units at 01/11/25 1718    metoprolol succinate XL (Toprol-XL) 24 hr tablet 25 mg, 25 mg, oral, Daily, Ce M Bartos, APRN-CNP, 25 mg at 01/13/25 1010    nystatin (Mycostatin) 100,000 unit/gram powder 1 Application, 1 Application, Topical, BID, KELLI Foreman, 1 Application at 01/13/25 1020    oxygen (O2) therapy, , inhalation, Continuous - Inhalation, MELQUIADES Foreman-CNP, 4 L/min at 01/13/25 1000    oxygen (O2) therapy, , inhalation, Continuous - Inhalation, Favian May MD, 4 L/min at 01/13/25 0700    oxygen (O2) therapy, , inhalation, Continuous PRN - O2/gases, Favian May MD, 4 L/min at 01/12/25 1000    pancrelipase (Lip-Prot-Amyl) (Creon) 12,000-38,000 -60,000 unit per capsule 1 capsule, 1 capsule, oral, TID AC, MELQUIADES Foreman-CNP, 1 capsule at 01/13/25 1023    pantoprazole (ProtoNix) EC tablet 40 mg, 40 mg, oral, Daily before breakfast, Genny Holly, MELQUIADES-CNP, 40 mg at 01/13/25 0614    piperacillin-tazobactam (Zosyn) 4.5 g in dextrose (iso)  mL, 4.5 g, intravenous, q8h, Funmi Franco MD, Stopped at 01/13/25 1048       Physical Exam  HENT:      Head: Normocephalic.   Eyes:      Conjunctiva/sclera: Conjunctivae normal.   Cardiovascular:      Rate and Rhythm: Regular rhythm.   Pulmonary:      Breath sounds: Normal breath sounds.   Abdominal:      General: Bowel sounds are normal.      Palpations: Abdomen is soft.   Musculoskeletal:         General: Normal range of motion.   Skin:     General: Skin is warm and dry.   Neurological:      General: No focal deficit present.      Mental Status: She is alert.   Psychiatric:         Behavior: Behavior normal.           Last Recorded Vitals  Blood pressure 159/72, pulse 69, temperature 36 °C (96.8 °F), temperature source Temporal, resp. rate 18, height 1.6 m (5' 3\"), weight 127 kg (279 lb 15.8 oz), SpO2 99%.  Intake/Output last 3 Shifts:  I/O last 3 completed shifts:  In: 805 (6.3 mL/kg) " [P.O.:480; IV Piggyback:325]  Out: 2800 (22 mL/kg) [Urine:2800 (0.6 mL/kg/hr)]  Weight: 127 kg     Labs:       Results for orders placed or performed during the hospital encounter of 01/07/25 (from the past 24 hours)   POCT GLUCOSE   Result Value Ref Range    POCT Glucose 74 74 - 99 mg/dL   POCT GLUCOSE   Result Value Ref Range    POCT Glucose 150 (H) 74 - 99 mg/dL   POCT GLUCOSE   Result Value Ref Range    POCT Glucose 131 (H) 74 - 99 mg/dL   Occult Blood, Stool    Specimen: Stool   Result Value Ref Range    Occult Blood, Stool X1 Negative Negative   Basic metabolic panel   Result Value Ref Range    Glucose 123 (H) 74 - 99 mg/dL    Sodium 140 136 - 145 mmol/L    Potassium 4.1 3.5 - 5.3 mmol/L    Chloride 98 98 - 107 mmol/L    Bicarbonate 37 (H) 21 - 32 mmol/L    Anion Gap 9 (L) 10 - 20 mmol/L    Urea Nitrogen 13 6 - 23 mg/dL    Creatinine 0.90 0.50 - 1.05 mg/dL    eGFR 69 >60 mL/min/1.73m*2    Calcium 9.1 8.6 - 10.3 mg/dL   CBC   Result Value Ref Range    WBC 8.5 4.4 - 11.3 x10*3/uL    nRBC 0.0 0.0 - 0.0 /100 WBCs    RBC 4.11 4.00 - 5.20 x10*6/uL    Hemoglobin 10.2 (L) 12.0 - 16.0 g/dL    Hematocrit 34.7 (L) 36.0 - 46.0 %    MCV 84 80 - 100 fL    MCH 24.8 (L) 26.0 - 34.0 pg    MCHC 29.4 (L) 32.0 - 36.0 g/dL    RDW 16.1 (H) 11.5 - 14.5 %    Platelets 249 150 - 450 x10*3/uL   Magnesium   Result Value Ref Range    Magnesium 1.92 1.60 - 2.40 mg/dL   POCT GLUCOSE   Result Value Ref Range    POCT Glucose 125 (H) 74 - 99 mg/dL              Assessment/Plan   Assessment & Plan  Acute hypoxic on chronic hypercapnic respiratory failure (Multi)    Acute diastolic heart failure    Pneumonia of both lungs due to infectious organism    Anemia    Obesity    Fungal infection of skin    Bacteremia      LISA (iron deficiency anemia)    Leukocytosis    Cellulitis of left breast        PLAN: Patient is slowly improving, patient was also noted to have gram-negative bacteremia, c/w IV Zosyn, aerosol and oxygen, also noted to have  diarrhea, stool was sent for C. difficile, med list and labs reviewed, for now continue with current Rx, if stable consider for DC in a day or 2.       Favian May MD

## 2025-01-13 NOTE — PROGRESS NOTES
Occupational Therapy    OT Treatment    Patient Name: Kaylene Feliciano  MRN: [id number]  Today's Date: 1/13/2025  Time Calculation  Start Time: 1045  Stop Time: 1125  Time Calculation (min): 40 min       3021/3021-A    Assessment:  End of Session Communication: Bedside nurse  End of Session Patient Position: Up in chair, Alarm on       Plan:  OT Frequency: 3 times per week  OT Discharge Recommendations: Moderate intensity level of continued care     Subjective        01/13/25 1045   OT Last Visit   OT Received On 01/13/25   General   Prior to Session Communication Bedside nurse   Patient Position Received Bed, 3 rail up;Alarm on   Preferred Learning Style verbal;visual   General Comment Pt pleasant and cooperative, RN cleared for therapy.   Precautions   Precautions Comment C-Diff  (rule out)   Pain Assessment   Pain Assessment 0-10   0-10 (Numeric) Pain Score 0 - No pain   Cognition   Overall Cognitive Status WFL   Orientation Level Oriented X4   UE Bathing   UE Bathing Level of Assistance Minimum assistance   UE Bathing Where Assessed Edge of bed   UE Dressing   UE Dressing Level of Assistance Minimum assistance   UE Dressing Where Assessed Edge of bed   LE Dressing   LE Dressing Yes   Sock Level of Assistance Dependent   LE Dressing Where Assessed Edge of bed   Toileting   Toileting Level of Assistance Dependent   Toileting Comments Incontinent of stool and urine.   Bed Mobility   Bed Mobility Yes   Bed Mobility 1   Bed Mobility 1 Supine to sitting   Level of Assistance 1 Moderate assistance;+2   Transfers   Transfer Yes   Transfer 1   Transfer From 1 Sit to   Transfer to 1 Stand   Technique 1 Sit to stand;Stand to sit   Transfer Device 1 Milly Stedy;Gait belt   Transfer Level of Assistance 1 Minimum assistance;Minimal verbal cues   Transfers 2   Transfer From 2 Bed to   Transfer to 2 Chair with arms   Transfer Device 2 Milly Stedy   Transfer Level of Assistance 2 Minimum assistance;+2   IP OT Assessment   End of Session  Communication Bedside nurse   End of Session Patient Position Up in chair;Alarm on   Inpatient Plan   OT Frequency 3 times per week   OT Discharge Recommendations Moderate intensity level of continued care       Outcome Measures:[facility] Daily Activity  Putting on and taking off regular lower body clothing: Total  Bathing (including washing, rinsing, drying): A lot  Putting on and taking off regular upper body clothing: A little  Toileting, which includes using toilet, bedpan or urinal: Total  Taking care of personal grooming such as brushing teeth: A little  Eating Meals: None  Daily Activity - Total Score: 14  Education Documentation  Body Mechanics, taught by MITZY Smith at 1/13/2025  1:19 PM.  Learner: Patient  Readiness: Acceptance  Method: Explanation, Demonstration  Response: Verbalizes Understanding, Demonstrated Understanding, Needs Reinforcement    Precautions, taught by MITZY Smith at 1/13/2025  1:19 PM.  Learner: Patient  Readiness: Acceptance  Method: Explanation, Demonstration  Response: Verbalizes Understanding, Demonstrated Understanding, Needs Reinforcement    ADL Training, taught by MITZY Smith at 1/13/2025  1:19 PM.  Learner: Patient  Readiness: Acceptance  Method: Explanation, Demonstration  Response: Verbalizes Understanding, Demonstrated Understanding, Needs Reinforcement    Education Comments  No comments found.            Goals:  Encounter Problems       Encounter Problems (Active)       OT Goals       Patient will complete functional transfer with Milly Ware with mod A. (Progressing)       Start:  01/10/25    Expected End:  01/24/25            Patient will complete grooming while seated with set up. (Progressing)       Start:  01/10/25    Expected End:  01/24/25            Patient will complete bed mobility with mod A. (Progressing)       Start:  01/10/25    Expected End:  01/24/25

## 2025-01-13 NOTE — CARE PLAN
The patient's goals for the shift include sleep.    The clinical goals for the shift include see poc

## 2025-01-13 NOTE — PROGRESS NOTES
01/13/25 0953   Discharge Planning   Assistance Needed yes   Type of Residence Private residence   Home or Post Acute Services Post acute facilities (Rehab/SNF/etc)   Type of Post Acute Facility Services Skilled nursing   Expected Discharge Disposition SNF   Does the patient need discharge transport arranged? Yes   RoundTrip coordination needed? Yes   Has discharge transport been arranged? No   Stroke Family Assessment   Stroke Family Assessment Needed No   Intensity of Service   Intensity of Service 0-30 min     Met with the patient in the room, notified her that [facility] unable to accept, she is accepting of [facility]. [facility] had questions prior to accepting. Will need approval of insurance prior to discharge.  3:25 pm Requested discharge support to submit for skilled approval for skilled stay at [facility].

## 2025-01-13 NOTE — PROGRESS NOTES
Pre-cert received for [facility]. Pre-cert is good for 01/13/2025 - 01/20/2025. SW to follow.    EDWARD Cho

## 2025-01-13 NOTE — CARE PLAN
The patient's goals for the shift include  going home    The clinical goals for the shift include no signs of resp distress.

## 2025-01-13 NOTE — NURSING NOTE
EOS:  Slept well through the night.  Remains on O2 at 4lpm via NC, BIPAP at night when sleeping.  External catheter in place for urinary incontinence.  Incontinent of watery brown stool twice this shift, medicated with Pepto chewable tablets as ordered with good results and stool specimen sent to r/o c-diff.  Placed in contact plus precautions until c-diff ruled out.

## 2025-01-13 NOTE — NURSING NOTE
1000 removed bypap, replaced with 4L NC. Pt ordering breakfast. A&O, w/o complaint. Refused SCD's as she sts that they bother her legs. Pt repositioned for safety and comfort. Call light w/I reach.   1100 PT/OT at bedside.  1600 pt cleaned d/t incontinent of stool. Pt back to bed with the larry degroot.

## 2025-01-13 NOTE — PROGRESS NOTES
Physical Therapy    Physical Therapy Treatment    Patient Name: Kaylene Feliciano  MRN: [id number]  Today's Date: 1/13/2025  Time Calculation  Start Time: 1030  Stop Time: 1110  Time Calculation (min): 40 min     3021/3021-A       01/13/25 1030   PT  Visit   PT Received On 01/13/25   Response to Previous Treatment Patient with no complaints from previous session.   General   Reason for Referral a 69 y.o. F with PMH of HTN, COPD, T2DM, ARCHIE, HLD, HFpEF   Presented to ER due to worsening shortness of breath.  Patient apparently was having URI symptoms with productive cough with yellow sputum.  Denied any with fever and chills.  Since her symptoms are getting worse he came to ER from where she was admitted on stepdown unit for further care.   Referred By Lalo   Prior to Session Communication Bedside nurse   Patient Position Received Bed, 3 rail up;Alarm on   Preferred Learning Style verbal;visual   General Comment patient agreeable to get up and try sera stedy this session   Precautions   Medical Precautions Fall precautions   Precautions Comment C-Diff   Cognition   Overall Cognitive Status WFL   Orientation Level Oriented X4   Coordination   Movements are Fluid and Coordinated Yes   Postural Control   Postural Control Impaired   Righting Reactions fair+   Protective Responses fair+   Static Sitting Balance   Static Sitting-Balance Support Bilateral upper extremity supported   Static Sitting-Level of Assistance Contact guard;Close supervision   Dynamic Sitting Balance   Dynamic Sitting-Balance Support Right upper extremity supported;Left upper extremity supported   Dynamic Sitting-Level of Assistance Close supervision   Dynamic Sitting-Balance Lateral lean;Forward lean;Reaching across midline   Static Standing Balance   Static Standing-Balance Support Bilateral upper extremity supported   Static Standing-Level of Assistance Minimum assistance   Static Standing-Comment/Number of Minutes in sera stedy   Therapeutic  Exercise   Therapeutic Exercise Performed Yes   Therapeutic Exercise Activity 1 PF/DF x10   Therapeutic Exercise Activity 2 marches x10   Therapeutic Exercise Activity 3 LAQ x10   Bed Mobility   Bed Mobility Yes   Bed Mobility 1   Bed Mobility 1 Supine to sitting   Level of Assistance 1 Moderate assistance;+2   Transfers   Transfer Yes   Transfer 1   Transfer From 1 Bed to   Transfer to 1 Chair with arms   Technique 1 Sit to stand;Stand to sit   Transfer Device 1 Milly Stedy;Gait belt   Transfer Level of Assistance 1 Minimum assistance;Minimal verbal cues   Activity Tolerance   Endurance Tolerates 30 min exercise with multiple rests   Early Mobility/Exercise Safety Screen Proceed with mobilization - No exclusion criteria met   PT Assessment   PT Assessment Results Decreased strength;Decreased endurance;Impaired balance;Decreased mobility   Rehab Prognosis Good   Evaluation/Treatment Tolerance Patient tolerated treatment well;Patient limited by fatigue   End of Session Communication Bedside nurse   End of Session Patient Position Up in chair;Alarm on   Outpatient Education   Individual(s) Educated Patient   Education Provided Fall Risk;Body Mechanics;POC;Posture  (sera stedy use)   Risk and Benefits Discussed with Patient/Caregiver/Other yes   Patient/Caregiver Demonstrated Understanding yes   Plan of Care Discussed and Agreed Upon yes   Patient Response to Education Patient/Caregiver Verbalized Understanding of Information   PT Plan   Inpatient/Swing Bed or Outpatient Inpatient   PT Plan   Treatment/Interventions Bed mobility;Transfer training;Balance training;Therapeutic activity   PT Plan Ongoing PT   PT Frequency 3 times per week   PT Discharge Recommendations Moderate intensity level of continued care   Equipment Recommended upon Discharge Wheelchair;Lift  (sera stedy lift)   PT Recommended Transfer Status Assist x2;Assistive device         Outcome Measures:  [facility] Basic Mobility  Turning from your back to your  side while in a flat bed without using bedrails: A lot  Moving from lying on your back to sitting on the side of a flat bed without using bedrails: A lot  Moving to and from bed to chair (including a wheelchair): A lot  Standing up from a chair using your arms (e.g. wheelchair or bedside chair): A lot  To walk in hospital room: A lot  Climbing 3-5 steps with railing: Total  Basic Mobility - Total Score: 11          EDUCATION:  Outpatient Education  Individual(s) Educated: Patient  Education Provided: Fall Risk, Body Mechanics, POC, Posture (sera stedy use)  Risk and Benefits Discussed with Patient/Caregiver/Other: yes  Patient/Caregiver Demonstrated Understanding: yes  Plan of Care Discussed and Agreed Upon: yes  Patient Response to Education: Patient/Caregiver Verbalized Understanding of Information      GOALS:  Encounter Problems       Encounter Problems (Active)       PT Problem       Bed mobility (Progressing)       Start:  01/10/25    Expected End:  01/24/25       Pt will perform supine<>sit with HOB flat, SBAx1.           Transfers (Progressing)       Start:  01/10/25    Expected End:  01/24/25       Pt will perform all transfers with LRAD, ModAx1.           Slide board transfer (Progressing)       Start:  01/10/25    Expected End:  01/24/25       Pt will perform all transfers with LRAD, SBA with slide board.               Pain - Adult

## 2025-01-13 NOTE — PROGRESS NOTES
INPATIENT PROGRESS NOTES    PATIENT NAME: Kaylene Feliciano    MRN: [id number]  SERVICE DATE:  1/13/2025   SERVICE TIME:  1:41 PM    SIGNATURE: Funmi Franco MD      ASSESSMENT :   #Acute hypoxic/hypercapnic respiratory failure  #Pneumonia  #Bacteremia-->cultures growing Proteus mirabilis, Enterococcus faecalis, Alcaligenes faecalis, providencia  #Positive blood culture for Coryneform likely contamination  #Hx COPD/Asthma on CPAP  #Hx HTN, IDDM, HFpEF, CKD    PLAN:  -Continue Zosyn  -Day #6/10 of antibiotics treatment  -Closely monitor for antibiotics side effects including rash, Diarrhea/CDI, thrombocytopenia, CRISTOFER, etc.        SUBJECTIVE  Afebrile  On 4 L of oxygen      OBJECTIVE  PHYSICAL EXAM:   Patient Vitals for the past 24 hrs:   BP Temp Temp src Pulse Resp SpO2   01/13/25 1200 123/58 36.4 °C (97.5 °F) Temporal 82 18 98 %   01/13/25 0800 159/72 36 °C (96.8 °F) Temporal 69 18 99 %   01/13/25 0400 157/68 36.3 °C (97.3 °F) Temporal 74 18 92 %   01/13/25 0000 154/58 36.4 °C (97.5 °F) Temporal 76 18 91 %   01/12/25 2000 154/60 36.5 °C (97.7 °F) Temporal 80 18 94 %   01/12/25 1600 150/60 36.2 °C (97.2 °F) Temporal 77 18 90 %         Gen: NAD  Neck: symmetric, no mass  Cardiovascular: RRR  Respiratory: On 4 L of oxygen    Labs:  Lab Results   Component Value Date    WBC 8.5 01/13/2025    HGB 10.2 (L) 01/13/2025    HCT 34.7 (L) 01/13/2025    MCV 84 01/13/2025     01/13/2025     Lab Results   Component Value Date    GLUCOSE 123 (H) 01/13/2025    CALCIUM 9.1 01/13/2025     01/13/2025    K 4.1 01/13/2025    CO2 37 (H) 01/13/2025    CL 98 01/13/2025    BUN 13 01/13/2025    CREATININE 0.90 01/13/2025   ESR: --  Lab Results   Component Value Date    SEDRATE 84 (H) 12/04/2020     Lab Results   Component Value Date    CRP 16.41 (A) 12/04/2020     Lab Results   Component Value Date    ALT 14 01/07/2025    AST 19 01/07/2025    ALKPHOS 104 01/07/2025    BILITOT 0.7 01/07/2025         DATA:   Diagnostic tests reviewed  "for today's visit:    Labs this admission reviewed  Imagings this admission reviewed  Cultures: Reviewed        Funmi Franco MD.   Infectious Disease Attending            This note was partially created using voice recognition software and is inherently subject to errors including those of syntax and \"sound-alike\" substitutions which may escape proofreading. In such instances, original meaning may be extrapolated by contextual derivation       "

## 2025-01-14 LAB
ANION GAP SERPL CALC-SCNC: 10 MMOL/L (ref 10–20)
BACTERIA BLD AEROBE CULT: ABNORMAL
BACTERIA BLD CULT: ABNORMAL
BUN SERPL-MCNC: 14 MG/DL (ref 6–23)
CALCIUM SERPL-MCNC: 8.9 MG/DL (ref 8.6–10.3)
CHLORIDE SERPL-SCNC: 99 MMOL/L (ref 98–107)
CO2 SERPL-SCNC: 35 MMOL/L (ref 21–32)
CREAT SERPL-MCNC: 0.97 MG/DL (ref 0.5–1.05)
EGFRCR SERPLBLD CKD-EPI 2021: 63 ML/MIN/1.73M*2
ERYTHROCYTE [DISTWIDTH] IN BLOOD BY AUTOMATED COUNT: 16.1 % (ref 11.5–14.5)
GLUCOSE BLD MANUAL STRIP-MCNC: 115 MG/DL (ref 74–99)
GLUCOSE BLD MANUAL STRIP-MCNC: 123 MG/DL (ref 74–99)
GLUCOSE BLD MANUAL STRIP-MCNC: 123 MG/DL (ref 74–99)
GLUCOSE BLD MANUAL STRIP-MCNC: 150 MG/DL (ref 74–99)
GLUCOSE SERPL-MCNC: 115 MG/DL (ref 74–99)
GRAM STN SPEC: ABNORMAL
HCT VFR BLD AUTO: 32.7 % (ref 36–46)
HGB BLD-MCNC: 9.6 G/DL (ref 12–16)
MAGNESIUM SERPL-MCNC: 1.87 MG/DL (ref 1.6–2.4)
MCH RBC QN AUTO: 24.8 PG (ref 26–34)
MCHC RBC AUTO-ENTMCNC: 29.4 G/DL (ref 32–36)
MCV RBC AUTO: 85 FL (ref 80–100)
NRBC BLD-RTO: 0 /100 WBCS (ref 0–0)
PLATELET # BLD AUTO: 213 X10*3/UL (ref 150–450)
POTASSIUM SERPL-SCNC: 3.9 MMOL/L (ref 3.5–5.3)
RBC # BLD AUTO: 3.87 X10*6/UL (ref 4–5.2)
SODIUM SERPL-SCNC: 140 MMOL/L (ref 136–145)
WBC # BLD AUTO: 9.4 X10*3/UL (ref 4.4–11.3)

## 2025-01-14 PROCEDURE — 2500000001 HC RX 250 WO HCPCS SELF ADMINISTERED DRUGS (ALT 637 FOR MEDICARE OP): Performed by: NURSE PRACTITIONER

## 2025-01-14 PROCEDURE — 2500000004 HC RX 250 GENERAL PHARMACY W/ HCPCS (ALT 636 FOR OP/ED): Performed by: INTERNAL MEDICINE

## 2025-01-14 PROCEDURE — 94660 CPAP INITIATION&MGMT: CPT

## 2025-01-14 PROCEDURE — 36415 COLL VENOUS BLD VENIPUNCTURE: CPT | Performed by: NURSE PRACTITIONER

## 2025-01-14 PROCEDURE — 2500000005 HC RX 250 GENERAL PHARMACY W/O HCPCS: Performed by: NURSE PRACTITIONER

## 2025-01-14 PROCEDURE — 1200000002 HC GENERAL ROOM WITH TELEMETRY DAILY

## 2025-01-14 PROCEDURE — 2500000004 HC RX 250 GENERAL PHARMACY W/ HCPCS (ALT 636 FOR OP/ED): Performed by: NURSE PRACTITIONER

## 2025-01-14 PROCEDURE — 85027 COMPLETE CBC AUTOMATED: CPT | Performed by: NURSE PRACTITIONER

## 2025-01-14 PROCEDURE — 2500000002 HC RX 250 W HCPCS SELF ADMINISTERED DRUGS (ALT 637 FOR MEDICARE OP, ALT 636 FOR OP/ED): Performed by: NURSE PRACTITIONER

## 2025-01-14 PROCEDURE — 83735 ASSAY OF MAGNESIUM: CPT | Performed by: NURSE PRACTITIONER

## 2025-01-14 PROCEDURE — 82947 ASSAY GLUCOSE BLOOD QUANT: CPT

## 2025-01-14 PROCEDURE — 2500000001 HC RX 250 WO HCPCS SELF ADMINISTERED DRUGS (ALT 637 FOR MEDICARE OP): Performed by: INTERNAL MEDICINE

## 2025-01-14 PROCEDURE — 80048 BASIC METABOLIC PNL TOTAL CA: CPT | Performed by: NURSE PRACTITIONER

## 2025-01-14 PROCEDURE — 2500000005 HC RX 250 GENERAL PHARMACY W/O HCPCS: Performed by: INTERNAL MEDICINE

## 2025-01-14 RX ORDER — LOPERAMIDE HYDROCHLORIDE 2 MG/1
2 CAPSULE ORAL 4 TIMES DAILY PRN
Status: DISCONTINUED | OUTPATIENT
Start: 2025-01-14 | End: 2025-01-15 | Stop reason: HOSPADM

## 2025-01-14 RX ORDER — L. ACIDOPHILUS/L.BULGARICUS 1MM CELL
1 TABLET ORAL 2 TIMES DAILY
Status: DISCONTINUED | OUTPATIENT
Start: 2025-01-14 | End: 2025-01-15 | Stop reason: HOSPADM

## 2025-01-14 RX ADMIN — ASPIRIN 81 MG CHEWABLE TABLET 81 MG: 81 TABLET CHEWABLE at 10:03

## 2025-01-14 RX ADMIN — FERROUS SULFATE TAB 325 MG (65 MG ELEMENTAL FE) 325 MG: 325 (65 FE) TAB at 10:03

## 2025-01-14 RX ADMIN — ENOXAPARIN SODIUM 40 MG: 40 INJECTION SUBCUTANEOUS at 21:12

## 2025-01-14 RX ADMIN — GABAPENTIN 300 MG: 300 CAPSULE ORAL at 21:12

## 2025-01-14 RX ADMIN — CITALOPRAM HYDROBROMIDE 40 MG: 20 TABLET ORAL at 10:03

## 2025-01-14 RX ADMIN — ATORVASTATIN CALCIUM 10 MG: 10 TABLET, FILM COATED ORAL at 21:12

## 2025-01-14 RX ADMIN — INSULIN GLARGINE 40 UNITS: 100 INJECTION, SOLUTION SUBCUTANEOUS at 10:05

## 2025-01-14 RX ADMIN — FUROSEMIDE 20 MG: 20 TABLET ORAL at 10:03

## 2025-01-14 RX ADMIN — Medication 4 L/MIN: at 09:50

## 2025-01-14 RX ADMIN — INSULIN GLARGINE 40 UNITS: 100 INJECTION, SOLUTION SUBCUTANEOUS at 21:13

## 2025-01-14 RX ADMIN — Medication 4 L/MIN: at 07:00

## 2025-01-14 RX ADMIN — CHOLESTYRAMINE 4 G: 4 POWDER, FOR SUSPENSION ORAL at 17:09

## 2025-01-14 RX ADMIN — NYSTATIN 1 APPLICATION: 100000 POWDER TOPICAL at 10:05

## 2025-01-14 RX ADMIN — PANCRELIPASE 1 CAPSULE: 60000; 12000; 38000 CAPSULE, DELAYED RELEASE PELLETS ORAL at 16:59

## 2025-01-14 RX ADMIN — ENOXAPARIN SODIUM 40 MG: 40 INJECTION SUBCUTANEOUS at 10:02

## 2025-01-14 RX ADMIN — Medication 4 L/MIN: at 04:06

## 2025-01-14 RX ADMIN — NYSTATIN 1 APPLICATION: 100000 POWDER TOPICAL at 21:13

## 2025-01-14 RX ADMIN — LOPERAMIDE HYDROCHLORIDE 2 MG: 2 CAPSULE ORAL at 16:59

## 2025-01-14 RX ADMIN — GABAPENTIN 300 MG: 300 CAPSULE ORAL at 10:03

## 2025-01-14 RX ADMIN — PANTOPRAZOLE SODIUM 40 MG: 40 TABLET, DELAYED RELEASE ORAL at 06:24

## 2025-01-14 RX ADMIN — Medication 1 TABLET: at 21:12

## 2025-01-14 RX ADMIN — AMLODIPINE BESYLATE 10 MG: 10 TABLET ORAL at 10:03

## 2025-01-14 RX ADMIN — PANCRELIPASE 1 CAPSULE: 60000; 12000; 38000 CAPSULE, DELAYED RELEASE PELLETS ORAL at 10:16

## 2025-01-14 RX ADMIN — Medication 4 L/MIN: at 00:51

## 2025-01-14 RX ADMIN — Medication 4 L/MIN: at 23:59

## 2025-01-14 RX ADMIN — METOPROLOL SUCCINATE 25 MG: 25 TABLET, EXTENDED RELEASE ORAL at 10:02

## 2025-01-14 RX ADMIN — PIPERACILLIN SODIUM AND TAZOBACTAM SODIUM 4.5 G: 4; .5 INJECTION, SOLUTION INTRAVENOUS at 06:25

## 2025-01-14 RX ADMIN — PANCRELIPASE 1 CAPSULE: 60000; 12000; 38000 CAPSULE, DELAYED RELEASE PELLETS ORAL at 06:24

## 2025-01-14 RX ADMIN — Medication 1 TABLET: at 10:15

## 2025-01-14 ASSESSMENT — COGNITIVE AND FUNCTIONAL STATUS - GENERAL
DRESSING REGULAR UPPER BODY CLOTHING: A LOT
TOILETING: A LOT
HELP NEEDED FOR BATHING: A LOT
STANDING UP FROM CHAIR USING ARMS: A LOT
PERSONAL GROOMING: A LITTLE
WALKING IN HOSPITAL ROOM: TOTAL
CLIMB 3 TO 5 STEPS WITH RAILING: TOTAL
MOBILITY SCORE: 13
DAILY ACTIVITIY SCORE: 15
TURNING FROM BACK TO SIDE WHILE IN FLAT BAD: A LITTLE
MOVING TO AND FROM BED TO CHAIR: A LITTLE
DRESSING REGULAR LOWER BODY CLOTHING: A LOT
MOVING FROM LYING ON BACK TO SITTING ON SIDE OF FLAT BED WITH BEDRAILS: A LITTLE

## 2025-01-14 ASSESSMENT — PAIN SCALES - GENERAL
PAINLEVEL_OUTOF10: 0 - NO PAIN
PAINLEVEL_OUTOF10: 0 - NO PAIN

## 2025-01-14 ASSESSMENT — PAIN - FUNCTIONAL ASSESSMENT
PAIN_FUNCTIONAL_ASSESSMENT: 0-10
PAIN_FUNCTIONAL_ASSESSMENT: 0-10

## 2025-01-14 NOTE — CARE PLAN
The patient's goals for the shift include      The clinical goals for the shift include See plan of care      Problem: Discharge Planning  Goal: Discharge to home or other facility with appropriate resources  Outcome: Progressing     Problem: Chronic Conditions and Co-morbidities  Goal: Patient's chronic conditions and co-morbidity symptoms are monitored and maintained or improved  Outcome: Progressing     Problem: Skin  Goal: Decreased wound size/increased tissue granulation at next dressing change  Outcome: Progressing  Goal: Participates in plan/prevention/treatment measures  Outcome: Progressing  Flowsheets (Taken 1/13/2025 2348)  Participates in plan/prevention/treatment measures: Elevate heels  Goal: Prevent/manage excess moisture  Outcome: Progressing  Flowsheets (Taken 1/13/2025 2348)  Prevent/manage excess moisture:   Cleanse incontinence/protect with barrier cream   Monitor for/manage infection if present  Goal: Prevent/minimize sheer/friction injuries  Outcome: Progressing  Flowsheets (Taken 1/13/2025 2348)  Prevent/minimize sheer/friction injuries:   Use pull sheet   Turn/reposition every 2 hours/use positioning/transfer devices   Complete micro-shifts as needed if patient unable. Adjust patient position to relieve pressure points, not a full turn   HOB 30 degrees or less  Goal: Promote/optimize nutrition  Outcome: Progressing  Flowsheets (Taken 1/13/2025 2348)  Promote/optimize nutrition: Monitor/record intake including meals  Goal: Promote skin healing  Outcome: Progressing  Flowsheets (Taken 1/13/2025 2348)  Promote skin healing: Assess skin/pad under line(s)/device(s)     Problem: Respiratory  Goal: Minimize anxiety/maximize coping throughout shift  Outcome: Progressing  Goal: Minimal/no exertional discomfort or dyspnea this shift  Outcome: Progressing  Goal: No signs of respiratory distress (eg. Use of accessory muscles. Peds grunting)  Outcome: Progressing  Goal: Patent airway maintained this  shift  Outcome: Progressing  Goal: Verbalize decreased shortness of breath this shift  Outcome: Progressing  Goal: Wean oxygen to maintain O2 saturation per order/standard this shift  Outcome: Progressing     Problem: Diabetes  Goal: Achieve decreasing blood glucose levels by end of shift  Outcome: Progressing  Goal: Increase stability of blood glucose readings by end of shift  Outcome: Progressing  Goal: Decrease in ketones present in urine by end of shift  Outcome: Progressing  Goal: Maintain electrolyte levels within acceptable range throughout shift  Outcome: Progressing  Goal: Maintain glucose levels >70mg/dl to <250mg/dl throughout shift  Outcome: Progressing  Goal: No changes in neurological exam by end of shift  Outcome: Progressing  Goal: Learn about and adhere to nutrition recommendations by end of shift  Outcome: Progressing  Goal: Vital signs within normal range for age by end of shift  Outcome: Progressing  Goal: Increase self care and/or family involovement by end of shift  Outcome: Progressing  Goal: Receive DSME education by end of shift  Outcome: Progressing     Patient has had an uneventful night. Patient vitals signs stable. Patient safety maintained.

## 2025-01-14 NOTE — PROGRESS NOTES
INPATIENT PROGRESS NOTES    PATIENT NAME: Kaylene Feliciano    MRN: [id number]  SERVICE DATE:  1/14/2025   SERVICE TIME:  12:45 PM    SIGNATURE: Funmi Franco MD      ASSESSMENT :   #Acute hypoxic/hypercapnic respiratory failure  #Pneumonia  #Bacteremia-->cultures growing Proteus mirabilis, Enterococcus faecalis, Alcaligenes faecalis, providencia  #Positive blood culture for Coryneform likely contamination  #Hx COPD/Asthma on CPAP  #Hx HTN, IDDM, HFpEF, CKD    PLAN:  -Continue Zosyn day #7 of antibiotics treatment  -The patient is having significant diarrhea  -The patient completed the course of low-grade polymicrobial bacteremia and can discontinue Zosyn      ID team will sign off please call back if any questions        SUBJECTIVE  Afebrile  On 4 L of oxygen  She is having multiple bowel movements    OBJECTIVE  PHYSICAL EXAM:   Patient Vitals for the past 24 hrs:   BP Temp Temp src Pulse Resp SpO2   01/14/25 1200 123/73 36.3 °C (97.3 °F) -- 65 18 97 %   01/14/25 0800 141/50 36.2 °C (97.2 °F) -- 66 18 100 %   01/14/25 0406 -- -- -- -- 12 --   01/14/25 0400 158/72 36.2 °C (97.2 °F) Temporal 64 16 98 %   01/14/25 0051 -- -- -- -- 16 --   01/14/25 0000 141/60 36.1 °C (97 °F) Temporal 67 18 96 %   01/13/25 2000 133/53 36.2 °C (97.2 °F) Temporal 68 18 100 %   01/13/25 1600 107/62 36.1 °C (97 °F) Temporal 72 18 100 %         Gen: NAD  Neck: symmetric, no mass  Cardiovascular: RRR  Respiratory: On 4 L of oxygen    Labs:  Lab Results   Component Value Date    WBC 9.4 01/14/2025    HGB 9.6 (L) 01/14/2025    HCT 32.7 (L) 01/14/2025    MCV 85 01/14/2025     01/14/2025     Lab Results   Component Value Date    GLUCOSE 115 (H) 01/14/2025    CALCIUM 8.9 01/14/2025     01/14/2025    K 3.9 01/14/2025    CO2 35 (H) 01/14/2025    CL 99 01/14/2025    BUN 14 01/14/2025    CREATININE 0.97 01/14/2025   ESR: --  Lab Results   Component Value Date    SEDRATE 84 (H) 12/04/2020     Lab Results   Component Value Date    CRP 16.41  "(A) 12/04/2020     Lab Results   Component Value Date    ALT 14 01/07/2025    AST 19 01/07/2025    ALKPHOS 104 01/07/2025    BILITOT 0.7 01/07/2025         DATA:   Diagnostic tests reviewed for today's visit:    Labs this admission reviewed  Imagings this admission reviewed  Cultures: Reviewed        Funmi Franco MD.   Infectious Disease Attending            This note was partially created using voice recognition software and is inherently subject to errors including those of syntax and \"sound-alike\" substitutions which may escape proofreading. In such instances, original meaning may be extrapolated by contextual derivation       "

## 2025-01-14 NOTE — CARE PLAN
The patient's goals for the shift include  no more diarrhea    The clinical goals for the shift include pt will  have decreased diarrhea.

## 2025-01-14 NOTE — PROGRESS NOTES
Kaylene Feliciano is a 69 y.o. female on day 7 of admission presenting with Acute hypoxic on chronic hypercapnic respiratory failure (Multi).  Patient was complaining of some loose bowel movement, however C. difficile negative so far, could be due to antibiotic associated.      Subjective   Diarrhea       Objective         Current Facility-Administered Medications:     acetaminophen (Tylenol) suppository 650 mg, 650 mg, rectal, q4h PRN, Ce Lopez, APRN-CNP    acetaminophen (Tylenol) tablet 650 mg, 650 mg, oral, q6h PRN, Velma R Haines, APRN-CNP, 650 mg at 01/11/25 1717    albuterol 2.5 mg /3 mL (0.083 %) nebulizer solution 2.5 mg, 2.5 mg, nebulization, q4h PRN, Ce Lopez APRN-CNP    amLODIPine (Norvasc) tablet 10 mg, 10 mg, oral, Daily, Ce Lopez APRN-CNP, 10 mg at 01/14/25 1003    aspirin chewable tablet 81 mg, 81 mg, oral, Daily, Ce Lopez, APRN-CNP, 81 mg at 01/14/25 1003    atorvastatin (Lipitor) tablet 10 mg, 10 mg, oral, Nightly, Ce Lopez, APRN-CNP, 10 mg at 01/13/25 2137    cholestyramine light (Prevalite) 4 gram packet 4 g, 4 g, oral, BID PRN, Favian May MD    citalopram (CeleXA) tablet 40 mg, 40 mg, oral, Daily, Ce Lopez APRN-CNP, 40 mg at 01/14/25 1003    enoxaparin (Lovenox) syringe 40 mg, 40 mg, subcutaneous, q12h YARELI, Ce Lopez, APRN-CNP, 40 mg at 01/14/25 1002    ferrous sulfate (325 mg ferrous sulfate) tablet 325 mg, 65 mg of iron, oral, Daily with breakfast, Velma Haines APRN-CNP, 325 mg at 01/14/25 1003    furosemide (Lasix) tablet 20 mg, 20 mg, oral, Daily, Ce Lopez APRN-CNP, 20 mg at 01/14/25 1003    gabapentin (Neurontin) capsule 300 mg, 300 mg, oral, BID, Ce Lopez, APRN-CNP, 300 mg at 01/14/25 1003    hydrALAZINE (Apresoline) injection 10 mg, 10 mg, intravenous, q6h PRN, Genny Holly, APRN-CNP, 10 mg at 01/10/25 1247    insulin glargine (Lantus) injection 40 Units, 40 Units, subcutaneous, BID, Ce Lopez,  APRN-CNP, 40 Units at 01/14/25 1005    insulin lispro injection 0-10 Units, 0-10 Units, subcutaneous, Before meals & nightly, Paul Love PA-C, 2 Units at 01/13/25 2136    lactobacillus acidophilus tablet 1 tablet, 1 tablet, oral, BID, Ashwini Pengkazleonard, APRN-CNP, 1 tablet at 01/14/25 1015    metoprolol succinate XL (Toprol-XL) 24 hr tablet 25 mg, 25 mg, oral, Daily, MELQUIADES Foreman-CNP, 25 mg at 01/14/25 1002    nystatin (Mycostatin) 100,000 unit/gram powder 1 Application, 1 Application, Topical, BID, MELQUIADES Foreman-CNP, 1 Application at 01/14/25 1005    oxygen (O2) therapy, , inhalation, Continuous - Inhalation, MELQUIADES Foreman-CNP, 4 L/min at 01/14/25 0950    oxygen (O2) therapy, , inhalation, Continuous - Inhalation, Favian May MD, 4 L/min at 01/14/25 0700    oxygen (O2) therapy, , inhalation, Continuous PRN - O2/gases, Favian May MD, 4 L/min at 01/14/25 0406    pancrelipase (Lip-Prot-Amyl) (Creon) 12,000-38,000 -60,000 unit per capsule 1 capsule, 1 capsule, oral, TID AC, Ce Lopez APRN-CNP, 1 capsule at 01/14/25 1016    pantoprazole (ProtoNix) EC tablet 40 mg, 40 mg, oral, Daily before breakfast, Genny Holly, APRN-CNP, 40 mg at 01/14/25 0624    piperacillin-tazobactam (Zosyn) 4.5 g in dextrose (iso)  mL, 4.5 g, intravenous, q8h, Funmi Franco MD, Stopped at 01/14/25 1137       Physical Exam  HENT:      Head: Normocephalic.   Eyes:      Conjunctiva/sclera: Conjunctivae normal.   Cardiovascular:      Rate and Rhythm: Regular rhythm.   Pulmonary:      Effort: Respiratory distress present.      Comments: Air entry slightly diminished at bases, laterally  Abdominal:      General: Bowel sounds are normal.      Palpations: Abdomen is soft.   Musculoskeletal:         General: Normal range of motion.   Skin:     General: Skin is warm and dry.   Neurological:      General: No focal deficit present.      Mental Status: She is alert.   Psychiatric:          "Behavior: Behavior normal.           Last Recorded Vitals  Blood pressure 123/73, pulse 65, temperature 36.3 °C (97.3 °F), resp. rate 18, height 1.6 m (5' 3\"), weight 127 kg (279 lb 15.8 oz), SpO2 97%.  Intake/Output last 3 Shifts:  I/O last 3 completed shifts:  In: 500 (3.9 mL/kg) [IV Piggyback:500]  Out: 1000 (7.9 mL/kg) [Urine:1000 (0.2 mL/kg/hr)]  Weight: 127 kg     Labs:       Results for orders placed or performed during the hospital encounter of 01/07/25 (from the past 24 hours)   POCT GLUCOSE   Result Value Ref Range    POCT Glucose 235 (H) 74 - 99 mg/dL   POCT GLUCOSE   Result Value Ref Range    POCT Glucose 173 (H) 74 - 99 mg/dL   POCT GLUCOSE   Result Value Ref Range    POCT Glucose 166 (H) 74 - 99 mg/dL   Basic metabolic panel   Result Value Ref Range    Glucose 115 (H) 74 - 99 mg/dL    Sodium 140 136 - 145 mmol/L    Potassium 3.9 3.5 - 5.3 mmol/L    Chloride 99 98 - 107 mmol/L    Bicarbonate 35 (H) 21 - 32 mmol/L    Anion Gap 10 10 - 20 mmol/L    Urea Nitrogen 14 6 - 23 mg/dL    Creatinine 0.97 0.50 - 1.05 mg/dL    eGFR 63 >60 mL/min/1.73m*2    Calcium 8.9 8.6 - 10.3 mg/dL   CBC   Result Value Ref Range    WBC 9.4 4.4 - 11.3 x10*3/uL    nRBC 0.0 0.0 - 0.0 /100 WBCs    RBC 3.87 (L) 4.00 - 5.20 x10*6/uL    Hemoglobin 9.6 (L) 12.0 - 16.0 g/dL    Hematocrit 32.7 (L) 36.0 - 46.0 %    MCV 85 80 - 100 fL    MCH 24.8 (L) 26.0 - 34.0 pg    MCHC 29.4 (L) 32.0 - 36.0 g/dL    RDW 16.1 (H) 11.5 - 14.5 %    Platelets 213 150 - 450 x10*3/uL   Magnesium   Result Value Ref Range    Magnesium 1.87 1.60 - 2.40 mg/dL   POCT GLUCOSE   Result Value Ref Range    POCT Glucose 115 (H) 74 - 99 mg/dL   POCT GLUCOSE   Result Value Ref Range    POCT Glucose 123 (H) 74 - 99 mg/dL              Assessment/Plan   Assessment & Plan  Acute hypoxic on chronic hypercapnic respiratory failure (Multi)     Acute diastolic heart failure     Pneumonia of both lungs due to infectious organism     Anemia     Obesity    Fungal infection of skin   "   Bacteremia      LISA (iron deficiency anemia)     Leukocytosis    Cellulitis of left breast        Plan.  Patient is having diarrhea, C. difficile so far is negative, possibly due to IV Zosyn, request was made to ID for possibly stopping the antibiotic, c/w aerosol and oxygen, titrate FiO2 to keep pulse ox above 90%, med list and labs reviewed, discussed with CNP, if stable consider for DC in a day or 2.            Favian May MD

## 2025-01-15 ENCOUNTER — TELEPHONE (OUTPATIENT)
Facility: CLINIC | Age: 70
End: 2025-01-15
Payer: MEDICARE

## 2025-01-15 VITALS
HEART RATE: 66 BPM | RESPIRATION RATE: 16 BRPM | BODY MASS INDEX: 49.61 KG/M2 | HEIGHT: 63 IN | OXYGEN SATURATION: 97 % | SYSTOLIC BLOOD PRESSURE: 142 MMHG | TEMPERATURE: 97 F | WEIGHT: 279.98 LBS | DIASTOLIC BLOOD PRESSURE: 67 MMHG

## 2025-01-15 LAB
ANION GAP SERPL CALC-SCNC: 9 MMOL/L (ref 10–20)
BUN SERPL-MCNC: 13 MG/DL (ref 6–23)
CALCIUM SERPL-MCNC: 8.8 MG/DL (ref 8.6–10.3)
CHLORIDE SERPL-SCNC: 100 MMOL/L (ref 98–107)
CO2 SERPL-SCNC: 34 MMOL/L (ref 21–32)
CREAT SERPL-MCNC: 0.92 MG/DL (ref 0.5–1.05)
EGFRCR SERPLBLD CKD-EPI 2021: 68 ML/MIN/1.73M*2
ERYTHROCYTE [DISTWIDTH] IN BLOOD BY AUTOMATED COUNT: 16.2 % (ref 11.5–14.5)
GLUCOSE BLD MANUAL STRIP-MCNC: 101 MG/DL (ref 74–99)
GLUCOSE BLD MANUAL STRIP-MCNC: 128 MG/DL (ref 74–99)
GLUCOSE BLD MANUAL STRIP-MCNC: 95 MG/DL (ref 74–99)
GLUCOSE SERPL-MCNC: 91 MG/DL (ref 74–99)
HCT VFR BLD AUTO: 31.4 % (ref 36–46)
HGB BLD-MCNC: 9.3 G/DL (ref 12–16)
MAGNESIUM SERPL-MCNC: 1.78 MG/DL (ref 1.6–2.4)
MCH RBC QN AUTO: 24.9 PG (ref 26–34)
MCHC RBC AUTO-ENTMCNC: 29.6 G/DL (ref 32–36)
MCV RBC AUTO: 84 FL (ref 80–100)
NRBC BLD-RTO: 0 /100 WBCS (ref 0–0)
PLATELET # BLD AUTO: 236 X10*3/UL (ref 150–450)
POTASSIUM SERPL-SCNC: 3.4 MMOL/L (ref 3.5–5.3)
RBC # BLD AUTO: 3.74 X10*6/UL (ref 4–5.2)
SODIUM SERPL-SCNC: 140 MMOL/L (ref 136–145)
WBC # BLD AUTO: 8.8 X10*3/UL (ref 4.4–11.3)

## 2025-01-15 PROCEDURE — 2500000005 HC RX 250 GENERAL PHARMACY W/O HCPCS: Performed by: NURSE PRACTITIONER

## 2025-01-15 PROCEDURE — 36415 COLL VENOUS BLD VENIPUNCTURE: CPT | Performed by: NURSE PRACTITIONER

## 2025-01-15 PROCEDURE — 82947 ASSAY GLUCOSE BLOOD QUANT: CPT

## 2025-01-15 PROCEDURE — 83735 ASSAY OF MAGNESIUM: CPT | Performed by: NURSE PRACTITIONER

## 2025-01-15 PROCEDURE — 2500000004 HC RX 250 GENERAL PHARMACY W/ HCPCS (ALT 636 FOR OP/ED): Performed by: PHYSICIAN ASSISTANT

## 2025-01-15 PROCEDURE — 2500000005 HC RX 250 GENERAL PHARMACY W/O HCPCS: Performed by: INTERNAL MEDICINE

## 2025-01-15 PROCEDURE — 2500000001 HC RX 250 WO HCPCS SELF ADMINISTERED DRUGS (ALT 637 FOR MEDICARE OP): Performed by: NURSE PRACTITIONER

## 2025-01-15 PROCEDURE — 80048 BASIC METABOLIC PNL TOTAL CA: CPT | Performed by: NURSE PRACTITIONER

## 2025-01-15 PROCEDURE — 85027 COMPLETE CBC AUTOMATED: CPT | Performed by: NURSE PRACTITIONER

## 2025-01-15 PROCEDURE — 2500000002 HC RX 250 W HCPCS SELF ADMINISTERED DRUGS (ALT 637 FOR MEDICARE OP, ALT 636 FOR OP/ED): Performed by: PHYSICIAN ASSISTANT

## 2025-01-15 PROCEDURE — 2500000004 HC RX 250 GENERAL PHARMACY W/ HCPCS (ALT 636 FOR OP/ED): Performed by: NURSE PRACTITIONER

## 2025-01-15 PROCEDURE — 2500000002 HC RX 250 W HCPCS SELF ADMINISTERED DRUGS (ALT 637 FOR MEDICARE OP, ALT 636 FOR OP/ED): Performed by: NURSE PRACTITIONER

## 2025-01-15 RX ORDER — FERROUS SULFATE 325(65) MG
65 TABLET ORAL
Start: 2025-01-16

## 2025-01-15 RX ORDER — NYSTATIN 100000 [USP'U]/G
1 POWDER TOPICAL 2 TIMES DAILY
Start: 2025-01-15

## 2025-01-15 RX ORDER — POTASSIUM CHLORIDE 20 MEQ/1
40 TABLET, EXTENDED RELEASE ORAL ONCE
Status: COMPLETED | OUTPATIENT
Start: 2025-01-15 | End: 2025-01-15

## 2025-01-15 RX ORDER — INSULIN GLARGINE 100 [IU]/ML
40 INJECTION, SOLUTION SUBCUTANEOUS 2 TIMES DAILY
Start: 2025-01-15

## 2025-01-15 RX ORDER — CHOLESTYRAMINE 4 G/4.8G
4 POWDER, FOR SUSPENSION ORAL 2 TIMES DAILY PRN
Start: 2025-01-15 | End: 2025-01-22

## 2025-01-15 RX ORDER — LANOLIN ALCOHOL/MO/W.PET/CERES
800 CREAM (GRAM) TOPICAL ONCE
Status: COMPLETED | OUTPATIENT
Start: 2025-01-15 | End: 2025-01-15

## 2025-01-15 RX ORDER — PANTOPRAZOLE SODIUM 40 MG/1
40 TABLET, DELAYED RELEASE ORAL
Start: 2025-01-16 | End: 2025-02-15

## 2025-01-15 RX ORDER — L. ACIDOPHILUS/L.BULGARICUS 1MM CELL
1 TABLET ORAL 2 TIMES DAILY
Start: 2025-01-15

## 2025-01-15 RX ADMIN — Medication 1 TABLET: at 09:12

## 2025-01-15 RX ADMIN — INSULIN GLARGINE 40 UNITS: 100 INJECTION, SOLUTION SUBCUTANEOUS at 09:11

## 2025-01-15 RX ADMIN — METOPROLOL SUCCINATE 25 MG: 25 TABLET, EXTENDED RELEASE ORAL at 09:12

## 2025-01-15 RX ADMIN — ASPIRIN 81 MG CHEWABLE TABLET 81 MG: 81 TABLET CHEWABLE at 09:12

## 2025-01-15 RX ADMIN — PANCRELIPASE 1 CAPSULE: 60000; 12000; 38000 CAPSULE, DELAYED RELEASE PELLETS ORAL at 09:12

## 2025-01-15 RX ADMIN — PANTOPRAZOLE SODIUM 40 MG: 40 TABLET, DELAYED RELEASE ORAL at 06:49

## 2025-01-15 RX ADMIN — FUROSEMIDE 20 MG: 20 TABLET ORAL at 09:12

## 2025-01-15 RX ADMIN — ENOXAPARIN SODIUM 40 MG: 40 INJECTION SUBCUTANEOUS at 09:12

## 2025-01-15 RX ADMIN — Medication 4 L/MIN: at 09:10

## 2025-01-15 RX ADMIN — NYSTATIN 1 APPLICATION: 100000 POWDER TOPICAL at 09:11

## 2025-01-15 RX ADMIN — Medication 4 L/MIN: at 04:18

## 2025-01-15 RX ADMIN — POTASSIUM CHLORIDE 40 MEQ: 1500 TABLET, EXTENDED RELEASE ORAL at 10:55

## 2025-01-15 RX ADMIN — PANCRELIPASE 1 CAPSULE: 60000; 12000; 38000 CAPSULE, DELAYED RELEASE PELLETS ORAL at 16:17

## 2025-01-15 RX ADMIN — Medication 4 L/MIN: at 10:54

## 2025-01-15 RX ADMIN — FERROUS SULFATE TAB 325 MG (65 MG ELEMENTAL FE) 325 MG: 325 (65 FE) TAB at 09:12

## 2025-01-15 RX ADMIN — Medication 800 MG: at 10:55

## 2025-01-15 RX ADMIN — PANCRELIPASE 1 CAPSULE: 60000; 12000; 38000 CAPSULE, DELAYED RELEASE PELLETS ORAL at 10:55

## 2025-01-15 RX ADMIN — GABAPENTIN 300 MG: 300 CAPSULE ORAL at 09:12

## 2025-01-15 RX ADMIN — CITALOPRAM HYDROBROMIDE 40 MG: 20 TABLET ORAL at 09:12

## 2025-01-15 RX ADMIN — AMLODIPINE BESYLATE 10 MG: 10 TABLET ORAL at 09:12

## 2025-01-15 ASSESSMENT — COGNITIVE AND FUNCTIONAL STATUS - GENERAL
DAILY ACTIVITIY SCORE: 17
DRESSING REGULAR LOWER BODY CLOTHING: A LOT
MOBILITY SCORE: 13
MOVING FROM LYING ON BACK TO SITTING ON SIDE OF FLAT BED WITH BEDRAILS: A LITTLE
PERSONAL GROOMING: A LITTLE
TURNING FROM BACK TO SIDE WHILE IN FLAT BAD: A LITTLE
HELP NEEDED FOR BATHING: A LITTLE
WALKING IN HOSPITAL ROOM: TOTAL
STANDING UP FROM CHAIR USING ARMS: A LOT
DRESSING REGULAR UPPER BODY CLOTHING: A LITTLE
MOVING TO AND FROM BED TO CHAIR: A LITTLE
TOILETING: A LOT
CLIMB 3 TO 5 STEPS WITH RAILING: TOTAL

## 2025-01-15 ASSESSMENT — PAIN SCALES - GENERAL: PAINLEVEL_OUTOF10: 0 - NO PAIN

## 2025-01-15 NOTE — NURSING NOTE
1643: Report was called to Genny at [facility].     1715: Pt had no acute changes noted this shift. Pt is currently awaiting discharged. Pt remains on 4L NC. Pt safety been maintained.     1858: Pt IV and tele was removed. Vital sign taken.

## 2025-01-15 NOTE — PROGRESS NOTES
01/15/25 1450   Discharge Planning   Expected Discharge Disposition SNF  ([facility])   Patient Choice   Provider Choice list and CMS website (https://medicare.gov/care-compare#search) for post-acute Quality and Resource Measure Data were provided and reviewed with: Patient   Intensity of Service   Intensity of Service >30 min     Patient ready to d/c per medical team. Request sent to [facility] to send gold form/discharge orders, complete HENS, and arrange stretcher transport with 4L O2. Updated patient at bedside. Patient states she will call her  to update once transport time is confirmed.     1510- Stretcher transport confirmed for 6:30pm. LSW updated patient. Nursing and facility aware. Patient to call her .

## 2025-01-15 NOTE — CARE PLAN
Problem: Discharge Planning  Goal: Discharge to home or other facility with appropriate resources  1/14/2025 2336 by Vrena Cedillo RN  Outcome: Progressing  1/14/2025 2335 by Verna Cedillo RN  Outcome: Progressing  1/14/2025 2335 by Verna Cedillo RN  Outcome: Progressing     Problem: Chronic Conditions and Co-morbidities  Goal: Patient's chronic conditions and co-morbidity symptoms are monitored and maintained or improved  1/14/2025 2336 by Verna Cedillo RN  Outcome: Progressing  1/14/2025 2335 by Verna Cedillo RN  Outcome: Progressing  1/14/2025 2335 by Verna Cedillo RN  Outcome: Progressing     Problem: Skin  Goal: Decreased wound size/increased tissue granulation at next dressing change  1/14/2025 2336 by Verna Cedillo RN  Outcome: Progressing  1/14/2025 2335 by Verna Cedillo RN  Outcome: Progressing  1/14/2025 2335 by Verna Cedillo RN  Outcome: Progressing  Goal: Participates in plan/prevention/treatment measures  1/14/2025 2336 by Verna Cedillo RN  Outcome: Progressing  1/14/2025 2335 by Verna Cedillo RN  Outcome: Progressing  1/14/2025 2335 by Verna Cedillo RN  Outcome: Progressing  Goal: Prevent/manage excess moisture  1/14/2025 2336 by Verna Cedillo RN  Outcome: Progressing  1/14/2025 2335 by Verna Cedillo RN  Outcome: Progressing  1/14/2025 2335 by Verna Cedillo RN  Outcome: Progressing  Goal: Prevent/minimize sheer/friction injuries  1/14/2025 2336 by Verna Cedillo RN  Outcome: Progressing  Flowsheets (Taken 1/14/2025 2336)  Prevent/minimize sheer/friction injuries: Use pull sheet  1/14/2025 2335 by Verna Cedillo RN  Outcome: Progressing  1/14/2025 2335 by Verna Cedillo RN  Outcome: Progressing  Flowsheets (Taken 1/14/2025 2335)  Prevent/minimize sheer/friction injuries: Use pull sheet  Goal: Promote/optimize nutrition  1/14/2025 2336 by Verna Cedillo RN  Outcome: Progressing  1/14/2025 2335 by Verna Cedillo RN  Outcome: Progressing  1/14/2025 2335 by Verna  ARYA Cedillo  Outcome: Progressing  Goal: Promote skin healing  1/14/2025 2336 by Verna Cedillo RN  Outcome: Progressing  1/14/2025 2335 by Verna Cedillo RN  Outcome: Progressing  1/14/2025 2335 by Verna Cedillo RN  Outcome: Progressing     Problem: Respiratory  Goal: Minimize anxiety/maximize coping throughout shift  1/14/2025 2336 by Verna Cedillo RN  Outcome: Progressing  1/14/2025 2335 by Verna Cedillo RN  Outcome: Progressing  1/14/2025 2335 by Verna Cedillo RN  Outcome: Progressing  Goal: Minimal/no exertional discomfort or dyspnea this shift  1/14/2025 2336 by Verna Cedillo RN  Outcome: Progressing  1/14/2025 2335 by Verna Cedillo RN  Outcome: Progressing  1/14/2025 2335 by Verna Cedillo RN  Outcome: Progressing  Goal: No signs of respiratory distress (eg. Use of accessory muscles. Peds grunting)  1/14/2025 2336 by Verna Cedillo RN  Outcome: Progressing  1/14/2025 2335 by Verna Cedillo RN  Outcome: Progressing  1/14/2025 2335 by Verna Cedillo RN  Outcome: Progressing  Goal: Patent airway maintained this shift  1/14/2025 2336 by Verna Cedillo RN  Outcome: Progressing  1/14/2025 2335 by Verna Cedillo RN  Outcome: Progressing  1/14/2025 2335 by Verna Cedillo RN  Outcome: Progressing  Goal: Verbalize decreased shortness of breath this shift  1/14/2025 2336 by Verna Cedillo RN  Outcome: Progressing  1/14/2025 2335 by Verna Cedillo RN  Outcome: Progressing  1/14/2025 2335 by Verna Cedillo RN  Outcome: Progressing  Goal: Wean oxygen to maintain O2 saturation per order/standard this shift  1/14/2025 2336 by Verna Cedillo RN  Outcome: Progressing  1/14/2025 2335 by Verna Cedillo RN  Outcome: Progressing  1/14/2025 2335 by Verna Cedillo RN  Outcome: Progressing     Problem: Diabetes  Goal: Achieve decreasing blood glucose levels by end of shift  1/14/2025 2336 by Verna Cedillo RN  Outcome: Progressing  1/14/2025 2335 by Verna Cedillo, RN  Outcome: Progressing  1/14/2025 2339  by Verna Cedillo RN  Outcome: Progressing  Goal: Increase stability of blood glucose readings by end of shift  1/14/2025 2336 by Verna Cedillo RN  Outcome: Progressing  1/14/2025 2335 by Verna Cedillo RN  Outcome: Progressing  1/14/2025 2335 by Verna Cedillo RN  Outcome: Progressing  Goal: Decrease in ketones present in urine by end of shift  1/14/2025 2336 by Verna Cedillo RN  Outcome: Progressing  1/14/2025 2335 by Verna Cedillo RN  Outcome: Progressing  1/14/2025 2335 by Verna Cedillo RN  Outcome: Progressing  Goal: Maintain electrolyte levels within acceptable range throughout shift  1/14/2025 2336 by Verna Cedillo RN  Outcome: Progressing  1/14/2025 2335 by Verna Cedillo RN  Outcome: Progressing  1/14/2025 2335 by Verna Cedillo RN  Outcome: Progressing  Goal: Maintain glucose levels >70mg/dl to <250mg/dl throughout shift  1/14/2025 2336 by Verna Cedillo RN  Outcome: Progressing  1/14/2025 2335 by Verna Cedillo RN  Outcome: Progressing  1/14/2025 2335 by Verna Cedillo RN  Outcome: Progressing  Goal: No changes in neurological exam by end of shift  1/14/2025 2336 by Verna Cedillo RN  Outcome: Progressing  1/14/2025 2335 by Verna Cedillo RN  Outcome: Progressing  1/14/2025 2335 by Verna Cedillo RN  Outcome: Progressing  Goal: Learn about and adhere to nutrition recommendations by end of shift  1/14/2025 2336 by Verna Cedillo RN  Outcome: Progressing  1/14/2025 2335 by Verna Cedillo RN  Outcome: Progressing  1/14/2025 2335 by Verna Cedillo RN  Outcome: Progressing  Goal: Vital signs within normal range for age by end of shift  1/14/2025 2336 by Verna Cedillo RN  Outcome: Progressing  1/14/2025 2335 by Verna Cedillo RN  Outcome: Progressing  1/14/2025 2335 by Verna Cedillo RN  Outcome: Progressing  Goal: Increase self care and/or family involovement by end of shift  1/14/2025 2336 by Verna Nguyen, RN  Outcome: Progressing  1/14/2025 2335 by Verna Cedillo, RN  Outcome:  Progressing  1/14/2025 2335 by Verna Cedillo RN  Outcome: Progressing  Goal: Receive DSME education by end of shift  1/14/2025 2336 by Verna Cedillo RN  Outcome: Progressing  1/14/2025 2335 by Verna Cedillo RN  Outcome: Progressing  1/14/2025 2335 by Verna Cedillo RN  Outcome: Progressing   The patient's goals for the shift include      The clinical goals for the shift include Patient will remain safe this shift.

## 2025-01-15 NOTE — TELEPHONE ENCOUNTER
Patient is currently admitted to hospital and needs to attend rehab afterwards. Was told for her to go home she needs BiPap after rehab stay. Who is to order this? [facility] wants to have Dr. Graham rx [place]. Please advise

## 2025-01-16 ENCOUNTER — APPOINTMENT (OUTPATIENT)
Dept: CARDIOLOGY | Facility: CLINIC | Age: 70
End: 2025-01-16
Payer: MEDICARE

## 2025-01-16 NOTE — DISCHARGE SUMMARY
Discharge Diagnosis  Acute hypoxic on chronic hypercapnic respiratory failure (Multi)    Issues Requiring Follow-Up  ***    Discharge Meds     Medication List      START taking these medications     cholestyramine light 4 gram packet; Commonly known as: Prevalite; Take 1   packet (4 g) by mouth 2 times a day as needed (for diarrhea) for up to 7   days.   ferrous sulfate (325 mg ferrous sulfate) tablet; Take 1 tablet (325 mg)   by mouth once daily with breakfast.; Start taking on: January 16, 2025   lactobacillus acidophilus tablet tablet; Take 1 tablet by mouth 2 times   a day.   nystatin 100,000 unit/gram powder; Commonly known as: Mycostatin; Apply   1 Application topically 2 times a day. Apply to breast and groin   pantoprazole 40 mg EC tablet; Commonly known as: ProtoNix; Take 1 tablet   (40 mg) by mouth once daily in the morning. Take before meals. FOR THIRTY   DAYS, TO BE RENEWED BY FACILITY PROVIDER IF NEEDED Do not crush, chew, or   split.; Start taking on: January 16, 2025     CHANGE how you take these medications     Lantus U-100 Insulin 100 unit/mL injection; Generic drug: insulin   glargine; Inject 40 Units under the skin 2 times a day. Take as directed   per insulin instructions.; What changed: how much to take, additional   instructions     CONTINUE taking these medications     albuterol 2.5 mg /3 mL (0.083 %) nebulizer solution   amLODIPine 5 mg tablet; Commonly known as: Norvasc; Take 2 tablets (10   mg) by mouth once daily.   apple cider vinegar 300 mg tablet   aspirin 81 mg chewable tablet   atorvastatin 10 mg tablet; Commonly known as: Lipitor   citalopram 40 mg tablet; Commonly known as: CeleXA   cranberry 400 mg capsule   Creon 6,000-19,000 -30,000 unit capsule; Generic drug: pancrelipase   (Lip-Prot-Amyl)   furosemide 20 mg tablet; Commonly known as: Lasix   gabapentin 300 mg capsule; Commonly known as: Neurontin   insulin lispro 100 unit/mL injection   menthol-zinc oxide 0.44-20.6 % ointment;  Commonly known as: Calmoseptine   - Risamine; Apply 1 Application topically 4 times a day as needed   (moisture associated skin damage).   metoprolol succinate XL 25 mg 24 hr tablet; Commonly known as:   Toprol-XL; Take 1 tablet (25 mg) by mouth once daily. Hold for SBP<110 or   HR <70   oxygen gas therapy; Commonly known as: O2; Inhale 1 each once every 24   hours.   oxygen gas therapy; Commonly known as: O2; Inhale 1 each once every 24   hours.   oxygen gas therapy; Commonly known as: O2; Inhale 1 each every 12 hours.       Test Results Pending At Discharge  Pending Labs       No current pending labs.            Hospital Course   ***    Pertinent Physical Exam At Time of Discharge  Physical Exam    Outpatient Follow-Up  Future Appointments   Date Time Provider Department [place]   1/16/2025 12:00 PM Tracy M Schwab, APRN-CNP [id number] [place]   1/27/2025 11:30 AM Velma Graham MD [id number] None         Favian May MD

## 2025-01-17 NOTE — DOCUMENTATION CLARIFICATION NOTE
"    PATIENT:               TEJINDER GONZALES  ACCT #:                  [id number]  MRN:                       [id number]  :                       1955  ADMIT DATE:       2025 11:15 AM  DISCH DATE:        1/15/2025 7:30 PM  RESPONDING PROVIDER #:        [id number]          PROVIDER RESPONSE TEXT:    Gram Negative PNA    CDI QUERY TEXT:    Clarification      Instruction:    Based on your assessment of the patient and the clinical information, please provide the requested documentation by clicking on the appropriate radio button and enter any additional information if prompted.    Question: Please further specify the type of pneumonia being treated    When answering this query, please exercise your independent professional judgment. The fact that a question is being asked, does not imply that any particular answer is desired or expected.    The patient's clinical indicators include:  Clinical Information: 68 yo female admitted with pneumonia and acute respiratory failure    Clinical Indicators:   vital signs: T 35.9 HR83 R38 /75 98% Cpap  Streptococcus pneumoniae AG neg  L. pneumophilia AG neg  Resp. culture normal throat corrie   chest xray:\" Impression:  Suggestion of CHF with pulmonary edema. Additional left lung  consolidation could present atelectasis or concurrent pneumonia in  the appropriate clinical context. \"    Treatment:  Rocephin 2 gm IV Q24hrs -1/10  Vancomycin IV -  Doxycyclne 100 mg IV Q12 hrs -  Zosyn 4.5 gm IV Q8 hrs 1/10- present      Risk Factors: Pneumonia, Sepsis Acute hypoxic and hypercapnic respiratory failure  Options provided:  -- Gram Negative PNA  -- Other - I will add my own diagnosis  -- Refer to Clinical Documentation Reviewer    Query created by: Neli Bergman on 2025 3:08 PM      Electronically signed by:  TRICIA PEREIRA MD 2025 11:58 PM          "

## 2025-01-17 NOTE — DOCUMENTATION CLARIFICATION NOTE
"    PATIENT:               TEJINDER GONZALES  ACCT #:                  [id number]  MRN:                       [id number]  :                       1955  ADMIT DATE:       2025 11:15 AM  DISCH DATE:        1/15/2025 7:30 PM  RESPONDING PROVIDER #:        [id number]          PROVIDER RESPONSE TEXT:    Sepsis with renal organ dysfunction of CRISTOFER    CDI QUERY TEXT:    Clarification        Instruction:    Based on your assessment of the patient and the clinical information, please provide the requested documentation by clicking on the appropriate radio button and enter any additional information if prompted.    Question: Please further clarify if a relationship exists between the Sepsis and acute organ dysfunction    When answering this query, please exercise your independent professional judgment. The fact that a question is being asked, does not imply that any particular answer is desired or expected.    The patient's clinical indicators include:  Clinical Information: 70 yo female admitted with pneumonia and acute respiratory failure    Clinical Indicators:   vital signs: T 35.9 HR83 R38 /75 98% Cpap   SIRS POA WBC 15.9 R38 T35.9  ED:\" Suspicion for sepsis is low, patient does have pneumonia and is being treated for however will not give 30 cc/kg as there is concern for fluid overload\"   note pulmonary:\" CXR with diffuse infiltrate and opacities. Being treated for sepsis at this time.\"   ID note:\" Bacteremia-->cultures growing Proteus mirabilis\"    Treatment:  Rocephin 2 gm IV Q24hrs -1/10  Vancomycin IV -  Doxycyclne 100 mg IV Q12 hrs -  Zosyn 4.5 gm IV Q8 hrs 1/10- present    Risk Factors: Pneumonia, Sepsis Acute hypoxic and hypercapnic respiratory failure  Options provided:  -- Sepsis with renal organ dysfunction of CRISTOFER  -- Sepsis with respiratory organ dysfunction of acute hypercapneic/hypoxic respiratory failure  -- Sepsis with multi-system organ dysfunction of CRISTOFER and acute " hypercapneic/hypoxic respiratory failure  -- Other - I will add my own diagnosis  -- Refer to Clinical Documentation Reviewer    Query created by: Neli Bergman on 1/12/2025 11:05 AM      Electronically signed by:  TRICIA PEREIRA MD 1/16/2025 11:58 PM

## 2025-01-27 ENCOUNTER — APPOINTMENT (OUTPATIENT)
Dept: PULMONOLOGY | Facility: CLINIC | Age: 70
End: 2025-01-27
Payer: MEDICARE

## 2025-02-17 ENCOUNTER — APPOINTMENT (OUTPATIENT)
Facility: CLINIC | Age: 70
End: 2025-02-17
Payer: MEDICARE

## 2025-06-13 ENCOUNTER — APPOINTMENT (OUTPATIENT)
Dept: RADIOLOGY | Facility: HOSPITAL | Age: 70
DRG: 291 | End: 2025-06-13
Payer: MEDICARE

## 2025-06-13 ENCOUNTER — HOSPITAL ENCOUNTER (INPATIENT)
Facility: HOSPITAL | Age: 70
DRG: 291 | End: 2025-06-13
Attending: STUDENT IN AN ORGANIZED HEALTH CARE EDUCATION/TRAINING PROGRAM | Admitting: INTERNAL MEDICINE
Payer: MEDICARE

## 2025-06-13 DIAGNOSIS — R10.9 LEFT SIDED ABDOMINAL PAIN: ICD-10-CM

## 2025-06-13 DIAGNOSIS — E11.22 TYPE 2 DIABETES MELLITUS WITH STAGE 3A CHRONIC KIDNEY DISEASE, WITH LONG-TERM CURRENT USE OF INSULIN (MULTI): Chronic | ICD-10-CM

## 2025-06-13 DIAGNOSIS — J96.22 ACUTE ON CHRONIC RESPIRATORY FAILURE WITH HYPOXIA AND HYPERCAPNIA: Primary | ICD-10-CM

## 2025-06-13 DIAGNOSIS — Z79.4 TYPE 2 DIABETES MELLITUS WITH STAGE 3A CHRONIC KIDNEY DISEASE, WITH LONG-TERM CURRENT USE OF INSULIN (MULTI): Chronic | ICD-10-CM

## 2025-06-13 DIAGNOSIS — J96.21 ACUTE ON CHRONIC RESPIRATORY FAILURE WITH HYPOXIA AND HYPERCAPNIA: Primary | ICD-10-CM

## 2025-06-13 DIAGNOSIS — I50.1 ACUTE CARDIOGENIC PULMONARY EDEMA: ICD-10-CM

## 2025-06-13 DIAGNOSIS — I50.9 HEART FAILURE, UNSPECIFIED: ICD-10-CM

## 2025-06-13 DIAGNOSIS — B37.2 CANDIDAL INTERTRIGO: ICD-10-CM

## 2025-06-13 DIAGNOSIS — N18.31 TYPE 2 DIABETES MELLITUS WITH STAGE 3A CHRONIC KIDNEY DISEASE, WITH LONG-TERM CURRENT USE OF INSULIN (MULTI): Chronic | ICD-10-CM

## 2025-06-13 DIAGNOSIS — L30.4 INTERTRIGO: ICD-10-CM

## 2025-06-13 LAB
ALBUMIN SERPL BCP-MCNC: 4.1 G/DL (ref 3.4–5)
ALP SERPL-CCNC: 90 U/L (ref 33–136)
ALT SERPL W P-5'-P-CCNC: 4 U/L (ref 7–45)
ANION GAP BLDV CALCULATED.4IONS-SCNC: 5 MMOL/L (ref 10–25)
ANION GAP SERPL CALC-SCNC: 14 MMOL/L (ref 10–20)
APPEARANCE UR: CLEAR
AST SERPL W P-5'-P-CCNC: 14 U/L (ref 9–39)
BASE EXCESS BLDV CALC-SCNC: 6.1 MMOL/L (ref -2–3)
BASOPHILS # BLD AUTO: 0.05 X10*3/UL (ref 0–0.1)
BASOPHILS NFR BLD AUTO: 0.4 %
BILIRUB SERPL-MCNC: 0.6 MG/DL (ref 0–1.2)
BILIRUB UR STRIP.AUTO-MCNC: NEGATIVE MG/DL
BNP SERPL-MCNC: 266 PG/ML (ref 0–99)
BODY TEMPERATURE: ABNORMAL
BUN SERPL-MCNC: 30 MG/DL (ref 6–23)
CA-I BLDV-SCNC: 1.14 MMOL/L (ref 1.1–1.33)
CALCIUM SERPL-MCNC: 9.1 MG/DL (ref 8.6–10.3)
CARDIAC TROPONIN I PNL SERPL HS: 6 NG/L (ref 0–13)
CHLORIDE BLDV-SCNC: 104 MMOL/L (ref 98–107)
CHLORIDE SERPL-SCNC: 101 MMOL/L (ref 98–107)
CO2 SERPL-SCNC: 25 MMOL/L (ref 21–32)
COLOR UR: ABNORMAL
CREAT SERPL-MCNC: 1.17 MG/DL (ref 0.5–1.05)
CRITICAL CALL TIME: 2155
CRITICAL CALLED BY: ABNORMAL
CRITICAL CALLED TO: ABNORMAL
CRITICAL READ BACK: ABNORMAL
EGFRCR SERPLBLD CKD-EPI 2021: 50 ML/MIN/1.73M*2
EOSINOPHIL # BLD AUTO: 0.44 X10*3/UL (ref 0–0.7)
EOSINOPHIL NFR BLD AUTO: 3.9 %
ERYTHROCYTE [DISTWIDTH] IN BLOOD BY AUTOMATED COUNT: 15.2 % (ref 11.5–14.5)
GLUCOSE BLDV-MCNC: 117 MG/DL (ref 74–99)
GLUCOSE SERPL-MCNC: 123 MG/DL (ref 74–99)
GLUCOSE UR STRIP.AUTO-MCNC: NORMAL MG/DL
HCO3 BLDV-SCNC: 32.1 MMOL/L (ref 22–26)
HCT VFR BLD AUTO: 29.5 % (ref 36–46)
HCT VFR BLD EST: 29 % (ref 36–46)
HGB BLD-MCNC: 8.9 G/DL (ref 12–16)
HGB BLDV-MCNC: 9.8 G/DL (ref 12–16)
IMM GRANULOCYTES # BLD AUTO: 0.1 X10*3/UL (ref 0–0.7)
IMM GRANULOCYTES NFR BLD AUTO: 0.9 % (ref 0–0.9)
INHALED O2 CONCENTRATION: 40 %
KETONES UR STRIP.AUTO-MCNC: NEGATIVE MG/DL
LACTATE BLDV-SCNC: 0.9 MMOL/L (ref 0.4–2)
LEUKOCYTE ESTERASE UR QL STRIP.AUTO: NEGATIVE
LIPASE SERPL-CCNC: 5 U/L (ref 9–82)
LYMPHOCYTES # BLD AUTO: 2.02 X10*3/UL (ref 1.2–4.8)
LYMPHOCYTES NFR BLD AUTO: 18 %
MAGNESIUM SERPL-MCNC: 2.4 MG/DL (ref 1.6–2.4)
MCH RBC QN AUTO: 28.2 PG (ref 26–34)
MCHC RBC AUTO-ENTMCNC: 30.2 G/DL (ref 32–36)
MCV RBC AUTO: 93 FL (ref 80–100)
MONOCYTES # BLD AUTO: 0.65 X10*3/UL (ref 0.1–1)
MONOCYTES NFR BLD AUTO: 5.8 %
NEUTROPHILS # BLD AUTO: 7.97 X10*3/UL (ref 1.2–7.7)
NEUTROPHILS NFR BLD AUTO: 71 %
NITRITE UR QL STRIP.AUTO: NEGATIVE
NRBC BLD-RTO: 0 /100 WBCS (ref 0–0)
OXYHGB MFR BLDV: 77.4 % (ref 45–75)
PCO2 BLDV: 53 MM HG (ref 41–51)
PH BLDV: 7.39 PH (ref 7.33–7.43)
PH UR STRIP.AUTO: 6 [PH]
PHOSPHATE SERPL-MCNC: 4.2 MG/DL (ref 2.5–4.9)
PLATELET # BLD AUTO: 169 X10*3/UL (ref 150–450)
PO2 BLDV: 44 MM HG (ref 35–45)
POTASSIUM BLDV-SCNC: 8.1 MMOL/L (ref 3.5–5.3)
POTASSIUM SERPL-SCNC: 5.4 MMOL/L (ref 3.5–5.3)
PROT SERPL-MCNC: 7.1 G/DL (ref 6.4–8.2)
PROT UR STRIP.AUTO-MCNC: NEGATIVE MG/DL
RBC # BLD AUTO: 3.16 X10*6/UL (ref 4–5.2)
RBC # UR STRIP.AUTO: ABNORMAL MG/DL
RBC #/AREA URNS AUTO: NORMAL /HPF
SAO2 % BLDV: 80 % (ref 45–75)
SODIUM BLDV-SCNC: 133 MMOL/L (ref 136–145)
SODIUM SERPL-SCNC: 135 MMOL/L (ref 136–145)
SP GR UR STRIP.AUTO: 1.01
SQUAMOUS #/AREA URNS AUTO: NORMAL /HPF
UROBILINOGEN UR STRIP.AUTO-MCNC: NORMAL MG/DL
WBC # BLD AUTO: 11.2 X10*3/UL (ref 4.4–11.3)
WBC #/AREA URNS AUTO: NORMAL /HPF

## 2025-06-13 PROCEDURE — 83880 ASSAY OF NATRIURETIC PEPTIDE: CPT | Performed by: STUDENT IN AN ORGANIZED HEALTH CARE EDUCATION/TRAINING PROGRAM

## 2025-06-13 PROCEDURE — 82306 VITAMIN D 25 HYDROXY: CPT | Mod: STJLAB | Performed by: NURSE PRACTITIONER

## 2025-06-13 PROCEDURE — 74176 CT ABD & PELVIS W/O CONTRAST: CPT | Performed by: STUDENT IN AN ORGANIZED HEALTH CARE EDUCATION/TRAINING PROGRAM

## 2025-06-13 PROCEDURE — 71045 X-RAY EXAM CHEST 1 VIEW: CPT | Performed by: STUDENT IN AN ORGANIZED HEALTH CARE EDUCATION/TRAINING PROGRAM

## 2025-06-13 PROCEDURE — 94660 CPAP INITIATION&MGMT: CPT

## 2025-06-13 PROCEDURE — 85025 COMPLETE CBC W/AUTO DIFF WBC: CPT

## 2025-06-13 PROCEDURE — 71045 X-RAY EXAM CHEST 1 VIEW: CPT

## 2025-06-13 PROCEDURE — 99291 CRITICAL CARE FIRST HOUR: CPT | Performed by: STUDENT IN AN ORGANIZED HEALTH CARE EDUCATION/TRAINING PROGRAM

## 2025-06-13 PROCEDURE — 96374 THER/PROPH/DIAG INJ IV PUSH: CPT

## 2025-06-13 PROCEDURE — 36415 COLL VENOUS BLD VENIPUNCTURE: CPT

## 2025-06-13 PROCEDURE — 2500000005 HC RX 250 GENERAL PHARMACY W/O HCPCS: Performed by: STUDENT IN AN ORGANIZED HEALTH CARE EDUCATION/TRAINING PROGRAM

## 2025-06-13 PROCEDURE — 84484 ASSAY OF TROPONIN QUANT: CPT | Performed by: STUDENT IN AN ORGANIZED HEALTH CARE EDUCATION/TRAINING PROGRAM

## 2025-06-13 PROCEDURE — 84132 ASSAY OF SERUM POTASSIUM: CPT | Performed by: STUDENT IN AN ORGANIZED HEALTH CARE EDUCATION/TRAINING PROGRAM

## 2025-06-13 PROCEDURE — 82435 ASSAY OF BLOOD CHLORIDE: CPT | Performed by: STUDENT IN AN ORGANIZED HEALTH CARE EDUCATION/TRAINING PROGRAM

## 2025-06-13 PROCEDURE — 81001 URINALYSIS AUTO W/SCOPE: CPT

## 2025-06-13 PROCEDURE — 71250 CT THORAX DX C-: CPT | Performed by: STUDENT IN AN ORGANIZED HEALTH CARE EDUCATION/TRAINING PROGRAM

## 2025-06-13 PROCEDURE — 74176 CT ABD & PELVIS W/O CONTRAST: CPT

## 2025-06-13 PROCEDURE — 83735 ASSAY OF MAGNESIUM: CPT | Performed by: STUDENT IN AN ORGANIZED HEALTH CARE EDUCATION/TRAINING PROGRAM

## 2025-06-13 PROCEDURE — 2500000002 HC RX 250 W HCPCS SELF ADMINISTERED DRUGS (ALT 637 FOR MEDICARE OP, ALT 636 FOR OP/ED): Performed by: STUDENT IN AN ORGANIZED HEALTH CARE EDUCATION/TRAINING PROGRAM

## 2025-06-13 PROCEDURE — 87636 SARSCOV2 & INF A&B AMP PRB: CPT | Performed by: STUDENT IN AN ORGANIZED HEALTH CARE EDUCATION/TRAINING PROGRAM

## 2025-06-13 PROCEDURE — 99291 CRITICAL CARE FIRST HOUR: CPT | Mod: 25 | Performed by: STUDENT IN AN ORGANIZED HEALTH CARE EDUCATION/TRAINING PROGRAM

## 2025-06-13 PROCEDURE — 36415 COLL VENOUS BLD VENIPUNCTURE: CPT | Performed by: STUDENT IN AN ORGANIZED HEALTH CARE EDUCATION/TRAINING PROGRAM

## 2025-06-13 PROCEDURE — 2500000004 HC RX 250 GENERAL PHARMACY W/ HCPCS (ALT 636 FOR OP/ED): Performed by: STUDENT IN AN ORGANIZED HEALTH CARE EDUCATION/TRAINING PROGRAM

## 2025-06-13 PROCEDURE — 83690 ASSAY OF LIPASE: CPT | Performed by: STUDENT IN AN ORGANIZED HEALTH CARE EDUCATION/TRAINING PROGRAM

## 2025-06-13 PROCEDURE — 84100 ASSAY OF PHOSPHORUS: CPT | Performed by: STUDENT IN AN ORGANIZED HEALTH CARE EDUCATION/TRAINING PROGRAM

## 2025-06-13 PROCEDURE — 80053 COMPREHEN METABOLIC PANEL: CPT

## 2025-06-13 PROCEDURE — 5A09357 ASSISTANCE WITH RESPIRATORY VENTILATION, LESS THAN 24 CONSECUTIVE HOURS, CONTINUOUS POSITIVE AIRWAY PRESSURE: ICD-10-PCS | Performed by: NURSE PRACTITIONER

## 2025-06-13 PROCEDURE — 94640 AIRWAY INHALATION TREATMENT: CPT

## 2025-06-13 RX ORDER — IPRATROPIUM BROMIDE AND ALBUTEROL SULFATE 2.5; .5 MG/3ML; MG/3ML
3 SOLUTION RESPIRATORY (INHALATION) EVERY 20 MIN
Status: COMPLETED | OUTPATIENT
Start: 2025-06-13 | End: 2025-06-13

## 2025-06-13 RX ORDER — FUROSEMIDE 10 MG/ML
40 INJECTION INTRAMUSCULAR; INTRAVENOUS ONCE
Status: COMPLETED | OUTPATIENT
Start: 2025-06-13 | End: 2025-06-13

## 2025-06-13 RX ORDER — NYSTATIN 100000 [USP'U]/G
1 POWDER TOPICAL ONCE
Status: DISCONTINUED | OUTPATIENT
Start: 2025-06-13 | End: 2025-06-14

## 2025-06-13 RX ADMIN — Medication 40 PERCENT: at 20:40

## 2025-06-13 RX ADMIN — IPRATROPIUM BROMIDE AND ALBUTEROL SULFATE 3 ML: 2.5; .5 SOLUTION RESPIRATORY (INHALATION) at 20:58

## 2025-06-13 RX ADMIN — FUROSEMIDE 40 MG: 10 INJECTION, SOLUTION INTRAMUSCULAR; INTRAVENOUS at 22:26

## 2025-06-13 RX ADMIN — IPRATROPIUM BROMIDE AND ALBUTEROL SULFATE 3 ML: 2.5; .5 SOLUTION RESPIRATORY (INHALATION) at 20:56

## 2025-06-13 RX ADMIN — IPRATROPIUM BROMIDE AND ALBUTEROL SULFATE 3 ML: 2.5; .5 SOLUTION RESPIRATORY (INHALATION) at 20:57

## 2025-06-13 ASSESSMENT — LIFESTYLE VARIABLES
HAVE YOU EVER FELT YOU SHOULD CUT DOWN ON YOUR DRINKING: NO
EVER HAD A DRINK FIRST THING IN THE MORNING TO STEADY YOUR NERVES TO GET RID OF A HANGOVER: NO
TOTAL SCORE: 0
EVER FELT BAD OR GUILTY ABOUT YOUR DRINKING: NO
HAVE PEOPLE ANNOYED YOU BY CRITICIZING YOUR DRINKING: NO

## 2025-06-13 ASSESSMENT — PAIN DESCRIPTION - LOCATION: LOCATION: GENERALIZED

## 2025-06-13 ASSESSMENT — PAIN - FUNCTIONAL ASSESSMENT: PAIN_FUNCTIONAL_ASSESSMENT: 0-10

## 2025-06-13 ASSESSMENT — PAIN SCALES - GENERAL: PAINLEVEL_OUTOF10: 4

## 2025-06-14 ENCOUNTER — APPOINTMENT (OUTPATIENT)
Dept: CARDIOLOGY | Facility: HOSPITAL | Age: 70
DRG: 291 | End: 2025-06-14
Payer: MEDICARE

## 2025-06-14 PROBLEM — F32.A DEPRESSION: Chronic | Status: RESOLVED | Noted: 2023-09-15 | Resolved: 2025-06-14

## 2025-06-14 PROBLEM — R78.81 BACTEREMIA: Status: RESOLVED | Noted: 2025-01-09 | Resolved: 2025-06-14

## 2025-06-14 PROBLEM — E66.01 MORBID OBESITY (MULTI): Chronic | Status: RESOLVED | Noted: 2023-09-15 | Resolved: 2025-06-14

## 2025-06-14 PROBLEM — I50.31 ACUTE DIASTOLIC HEART FAILURE: Status: RESOLVED | Noted: 2025-01-07 | Resolved: 2025-06-14

## 2025-06-14 PROBLEM — J45.909 ASTHMA: Chronic | Status: RESOLVED | Noted: 2023-09-15 | Resolved: 2025-06-14

## 2025-06-14 PROBLEM — E11.65 POORLY CONTROLLED TYPE 2 DIABETES MELLITUS WITH COMPLICATION (MULTI): Status: RESOLVED | Noted: 2023-09-15 | Resolved: 2025-06-14

## 2025-06-14 PROBLEM — R53.81 PHYSICAL DECONDITIONING: Status: RESOLVED | Noted: 2023-09-15 | Resolved: 2025-06-14

## 2025-06-14 PROBLEM — R06.02 SHORTNESS OF BREATH: Status: RESOLVED | Noted: 2024-06-15 | Resolved: 2025-06-14

## 2025-06-14 PROBLEM — N18.9 CKD (CHRONIC KIDNEY DISEASE): Status: RESOLVED | Noted: 2023-09-15 | Resolved: 2025-06-14

## 2025-06-14 PROBLEM — J18.9 PNEUMONIA DUE TO INFECTIOUS ORGANISM: Status: RESOLVED | Noted: 2024-07-09 | Resolved: 2025-06-14

## 2025-06-14 PROBLEM — D50.9 IDA (IRON DEFICIENCY ANEMIA): Status: RESOLVED | Noted: 2025-01-11 | Resolved: 2025-06-14

## 2025-06-14 PROBLEM — R10.9 LEFT FLANK PAIN: Status: ACTIVE | Noted: 2025-06-14

## 2025-06-14 PROBLEM — R09.02 HYPOXIA: Status: RESOLVED | Noted: 2024-06-15 | Resolved: 2025-06-14

## 2025-06-14 PROBLEM — B37.31 CANDIDIASIS OF VAGINA: Status: RESOLVED | Noted: 2023-09-15 | Resolved: 2025-06-14

## 2025-06-14 PROBLEM — E66.2 OBESITY HYPOVENTILATION SYNDROME (MULTI): Status: RESOLVED | Noted: 2024-06-25 | Resolved: 2025-06-14

## 2025-06-14 PROBLEM — E66.9 OBESITY: Status: RESOLVED | Noted: 2025-01-07 | Resolved: 2025-06-14

## 2025-06-14 PROBLEM — F31.9: Status: RESOLVED | Noted: 2023-09-15 | Resolved: 2025-06-14

## 2025-06-14 PROBLEM — R19.7 DIARRHEA: Status: RESOLVED | Noted: 2024-09-13 | Resolved: 2025-06-14

## 2025-06-14 PROBLEM — R30.0 DYSURIA: Status: RESOLVED | Noted: 2023-09-15 | Resolved: 2025-06-14

## 2025-06-14 PROBLEM — J41.8 MIXED SIMPLE AND MUCOPURULENT CHRONIC BRONCHITIS (MULTI): Status: RESOLVED | Noted: 2024-10-29 | Resolved: 2025-06-14

## 2025-06-14 PROBLEM — R80.9 PROTEINURIA: Status: RESOLVED | Noted: 2023-09-15 | Resolved: 2025-06-14

## 2025-06-14 PROBLEM — E87.70 FLUID OVERLOAD: Status: ACTIVE | Noted: 2025-06-14

## 2025-06-14 PROBLEM — K86.89 SECONDARY PANCREATIC INSUFFICIENCY (HHS-HCC): Status: RESOLVED | Noted: 2023-09-15 | Resolved: 2025-06-14

## 2025-06-14 PROBLEM — N18.2 CKD (CHRONIC KIDNEY DISEASE), STAGE II: Status: RESOLVED | Noted: 2023-09-15 | Resolved: 2025-06-14

## 2025-06-14 PROBLEM — D72.828 NEUTROPHILIC LEUKOCYTOSIS: Status: RESOLVED | Noted: 2024-09-13 | Resolved: 2025-06-14

## 2025-06-14 PROBLEM — I50.33 ACUTE ON CHRONIC DIASTOLIC CONGESTIVE HEART FAILURE: Status: RESOLVED | Noted: 2024-10-29 | Resolved: 2025-06-14

## 2025-06-14 PROBLEM — J96.01 ACUTE HYPOXIC ON CHRONIC HYPERCAPNIC RESPIRATORY FAILURE: Status: RESOLVED | Noted: 2025-01-07 | Resolved: 2025-06-14

## 2025-06-14 PROBLEM — E11.9 DIABETES MELLITUS (MULTI): Chronic | Status: RESOLVED | Noted: 2023-09-15 | Resolved: 2025-06-14

## 2025-06-14 PROBLEM — J96.12 CHRONIC RESPIRATORY FAILURE WITH HYPERCAPNIA: Status: RESOLVED | Noted: 2024-07-22 | Resolved: 2025-06-14

## 2025-06-14 PROBLEM — E11.8 POORLY CONTROLLED TYPE 2 DIABETES MELLITUS WITH COMPLICATION (MULTI): Status: RESOLVED | Noted: 2023-09-15 | Resolved: 2025-06-14

## 2025-06-14 PROBLEM — E11.42 DIABETES, POLYNEUROPATHY (MULTI): Chronic | Status: RESOLVED | Noted: 2023-09-15 | Resolved: 2025-06-14

## 2025-06-14 PROBLEM — G47.33 OSA (OBSTRUCTIVE SLEEP APNEA): Status: RESOLVED | Noted: 2024-07-15 | Resolved: 2025-06-14

## 2025-06-14 PROBLEM — F48.9: Status: RESOLVED | Noted: 2023-09-15 | Resolved: 2025-06-14

## 2025-06-14 PROBLEM — E55.9 VITAMIN D DEFICIENCY: Status: RESOLVED | Noted: 2023-09-15 | Resolved: 2025-06-14

## 2025-06-14 PROBLEM — Z91.199 NON-COMPLIANCE: Status: RESOLVED | Noted: 2023-09-15 | Resolved: 2025-06-14

## 2025-06-14 PROBLEM — I10 ESSENTIAL HYPERTENSION, BENIGN: Chronic | Status: RESOLVED | Noted: 2023-09-15 | Resolved: 2025-06-14

## 2025-06-14 PROBLEM — J96.12 ACUTE HYPOXIC ON CHRONIC HYPERCAPNIC RESPIRATORY FAILURE: Status: RESOLVED | Noted: 2025-01-07 | Resolved: 2025-06-14

## 2025-06-14 PROBLEM — J18.9 PNEUMONIA OF BOTH LUNGS DUE TO INFECTIOUS ORGANISM: Status: RESOLVED | Noted: 2025-01-07 | Resolved: 2025-06-14

## 2025-06-14 PROBLEM — M48.00 SPINAL STENOSIS, MULTILEVEL: Status: RESOLVED | Noted: 2023-09-15 | Resolved: 2025-06-14

## 2025-06-14 PROBLEM — B36.9 FUNGAL INFECTION OF SKIN: Status: RESOLVED | Noted: 2025-01-07 | Resolved: 2025-06-14

## 2025-06-14 PROBLEM — E78.5 HYPERLIPIDEMIA: Chronic | Status: RESOLVED | Noted: 2023-09-15 | Resolved: 2025-06-14

## 2025-06-14 PROBLEM — D64.9 ANEMIA: Status: RESOLVED | Noted: 2025-01-07 | Resolved: 2025-06-14

## 2025-06-14 PROBLEM — I50.1 PULMONARY EDEMA WITH CONGESTIVE HEART FAILURE WITH PRESERVED LEFT VENTRICULAR FUNCTION: Status: ACTIVE | Noted: 2025-06-14

## 2025-06-14 PROBLEM — D72.829 LEUKOCYTOSIS: Status: RESOLVED | Noted: 2025-01-07 | Resolved: 2025-06-14

## 2025-06-14 PROBLEM — R79.89 ELEVATED LFTS: Status: RESOLVED | Noted: 2024-07-09 | Resolved: 2025-06-14

## 2025-06-14 PROBLEM — N61.0 CELLULITIS OF LEFT BREAST: Status: RESOLVED | Noted: 2025-01-07 | Resolved: 2025-06-14

## 2025-06-14 PROBLEM — N28.9 RENAL INSUFFICIENCY: Status: ACTIVE | Noted: 2025-06-14

## 2025-06-14 LAB
25(OH)D3 SERPL-MCNC: 16 NG/ML (ref 30–100)
ANION GAP BLDA CALCULATED.4IONS-SCNC: 8 MMO/L (ref 10–25)
ANION GAP SERPL CALC-SCNC: 10 MMOL/L (ref 10–20)
ARTERIAL PATENCY WRIST A: ABNORMAL
ARTERIAL PATENCY WRIST A: POSITIVE
BASE EXCESS BLDA CALC-SCNC: 6.4 MMOL/L (ref -2–3)
BODY TEMPERATURE: ABNORMAL
BUN SERPL-MCNC: 29 MG/DL (ref 6–23)
CA-I BLDA-SCNC: 1.23 MMOL/L (ref 1.1–1.33)
CALCIUM SERPL-MCNC: 9.1 MG/DL (ref 8.6–10.3)
CARDIAC TROPONIN I PNL SERPL HS: 6 NG/L (ref 0–13)
CHLORIDE BLDA-SCNC: 102 MMOL/L (ref 98–107)
CHLORIDE SERPL-SCNC: 101 MMOL/L (ref 98–107)
CO2 SERPL-SCNC: 33 MMOL/L (ref 21–32)
CREAT SERPL-MCNC: 1.18 MG/DL (ref 0.5–1.05)
EGFRCR SERPLBLD CKD-EPI 2021: 50 ML/MIN/1.73M*2
EPAP CMH2O: 10 CM H2O
ERYTHROCYTE [DISTWIDTH] IN BLOOD BY AUTOMATED COUNT: 15.4 % (ref 11.5–14.5)
FLUAV RNA RESP QL NAA+PROBE: NOT DETECTED
FLUBV RNA RESP QL NAA+PROBE: NOT DETECTED
GLUCOSE BLD MANUAL STRIP-MCNC: 165 MG/DL (ref 74–99)
GLUCOSE BLD MANUAL STRIP-MCNC: 184 MG/DL (ref 74–99)
GLUCOSE BLD MANUAL STRIP-MCNC: 66 MG/DL (ref 74–99)
GLUCOSE BLD MANUAL STRIP-MCNC: 82 MG/DL (ref 74–99)
GLUCOSE BLD MANUAL STRIP-MCNC: 86 MG/DL (ref 74–99)
GLUCOSE BLD MANUAL STRIP-MCNC: 90 MG/DL (ref 74–99)
GLUCOSE BLD MANUAL STRIP-MCNC: 94 MG/DL (ref 74–99)
GLUCOSE BLDA-MCNC: 191 MG/DL (ref 74–99)
GLUCOSE SERPL-MCNC: 98 MG/DL (ref 74–99)
HCO3 BLDA-SCNC: 32.2 MMOL/L (ref 22–26)
HCT VFR BLD AUTO: 28.5 % (ref 36–46)
HCT VFR BLD EST: 29 % (ref 36–46)
HGB BLD-MCNC: 8.6 G/DL (ref 12–16)
HGB BLDA-MCNC: 9.5 G/DL (ref 12–16)
HOLD SPECIMEN: NORMAL
INHALED O2 CONCENTRATION: 40 %
IPAP CMH2O: 16 CM H2O
LACTATE BLDA-SCNC: 1.2 MMOL/L (ref 0.4–2)
MAGNESIUM SERPL-MCNC: 2.02 MG/DL (ref 1.6–2.4)
MCH RBC QN AUTO: 26.1 PG (ref 26–34)
MCHC RBC AUTO-ENTMCNC: 30.2 G/DL (ref 32–36)
MCV RBC AUTO: 86 FL (ref 80–100)
NRBC BLD-RTO: 0 /100 WBCS (ref 0–0)
OXYHGB MFR BLDA: 92.6 % (ref 94–98)
PCO2 BLDA: 52 MM HG (ref 38–42)
PH BLDA: 7.4 PH (ref 7.38–7.42)
PLATELET # BLD AUTO: 222 X10*3/UL (ref 150–450)
PO2 BLDA: 66 MM HG (ref 85–95)
POTASSIUM BLDA-SCNC: 4.7 MMOL/L (ref 3.5–5.3)
POTASSIUM SERPL-SCNC: 4.3 MMOL/L (ref 3.5–5.3)
POTASSIUM SERPL-SCNC: 4.9 MMOL/L (ref 3.5–5.3)
RBC # BLD AUTO: 3.3 X10*6/UL (ref 4–5.2)
SAO2 % BLDA: 95 % (ref 94–100)
SARS-COV-2 RNA RESP QL NAA+PROBE: NOT DETECTED
SITE OF ARTERIAL PUNCTURE: ABNORMAL
SODIUM BLDA-SCNC: 137 MMOL/L (ref 136–145)
SODIUM SERPL-SCNC: 140 MMOL/L (ref 136–145)
SPECIMEN DRAWN FROM PATIENT: ABNORMAL
WBC # BLD AUTO: 12.2 X10*3/UL (ref 4.4–11.3)

## 2025-06-14 PROCEDURE — 83735 ASSAY OF MAGNESIUM: CPT | Performed by: NURSE PRACTITIONER

## 2025-06-14 PROCEDURE — 2500000004 HC RX 250 GENERAL PHARMACY W/ HCPCS (ALT 636 FOR OP/ED): Performed by: NURSE PRACTITIONER

## 2025-06-14 PROCEDURE — 85027 COMPLETE CBC AUTOMATED: CPT | Performed by: NURSE PRACTITIONER

## 2025-06-14 PROCEDURE — 2060000001 HC INTERMEDIATE ICU ROOM DAILY

## 2025-06-14 PROCEDURE — 2500000002 HC RX 250 W HCPCS SELF ADMINISTERED DRUGS (ALT 637 FOR MEDICARE OP, ALT 636 FOR OP/ED): Performed by: NURSE PRACTITIONER

## 2025-06-14 PROCEDURE — 36415 COLL VENOUS BLD VENIPUNCTURE: CPT | Performed by: NURSE PRACTITIONER

## 2025-06-14 PROCEDURE — C8924 2D TTE W OR W/O FOL W/CON,FU: HCPCS

## 2025-06-14 PROCEDURE — 82947 ASSAY GLUCOSE BLOOD QUANT: CPT

## 2025-06-14 PROCEDURE — 80048 BASIC METABOLIC PNL TOTAL CA: CPT | Performed by: NURSE PRACTITIONER

## 2025-06-14 PROCEDURE — 96372 THER/PROPH/DIAG INJ SC/IM: CPT | Performed by: NURSE PRACTITIONER

## 2025-06-14 PROCEDURE — 2500000005 HC RX 250 GENERAL PHARMACY W/O HCPCS: Performed by: NURSE PRACTITIONER

## 2025-06-14 PROCEDURE — 99223 1ST HOSP IP/OBS HIGH 75: CPT | Performed by: NURSE PRACTITIONER

## 2025-06-14 PROCEDURE — 82435 ASSAY OF BLOOD CHLORIDE: CPT | Performed by: NURSE PRACTITIONER

## 2025-06-14 PROCEDURE — 36600 WITHDRAWAL OF ARTERIAL BLOOD: CPT

## 2025-06-14 PROCEDURE — 94660 CPAP INITIATION&MGMT: CPT

## 2025-06-14 PROCEDURE — 93308 TTE F-UP OR LMTD: CPT | Performed by: INTERNAL MEDICINE

## 2025-06-14 PROCEDURE — 2500000001 HC RX 250 WO HCPCS SELF ADMINISTERED DRUGS (ALT 637 FOR MEDICARE OP): Performed by: NURSE PRACTITIONER

## 2025-06-14 RX ORDER — INSULIN LISPRO 100 [IU]/ML
0-15 INJECTION, SOLUTION INTRAVENOUS; SUBCUTANEOUS
Status: DISCONTINUED | OUTPATIENT
Start: 2025-06-14 | End: 2025-06-14

## 2025-06-14 RX ORDER — NYSTATIN 100000 U/G
OINTMENT TOPICAL 2 TIMES DAILY
Status: DISCONTINUED | OUTPATIENT
Start: 2025-06-14 | End: 2025-06-17

## 2025-06-14 RX ORDER — SODIUM CHLORIDE 0.9 % (FLUSH) 0.9 %
10 SYRINGE (ML) INJECTION EVERY 8 HOURS SCHEDULED
Status: DISCONTINUED | OUTPATIENT
Start: 2025-06-14 | End: 2025-06-18 | Stop reason: HOSPADM

## 2025-06-14 RX ORDER — ACETAMINOPHEN 325 MG/1
650 TABLET ORAL EVERY 4 HOURS PRN
Status: DISCONTINUED | OUTPATIENT
Start: 2025-06-14 | End: 2025-06-18 | Stop reason: HOSPADM

## 2025-06-14 RX ORDER — L. ACIDOPHILUS/L.BULGARICUS 1MM CELL
1 TABLET ORAL 2 TIMES DAILY
Status: DISCONTINUED | OUTPATIENT
Start: 2025-06-14 | End: 2025-06-18 | Stop reason: HOSPADM

## 2025-06-14 RX ORDER — TALC
3 POWDER (GRAM) TOPICAL NIGHTLY PRN
Status: DISCONTINUED | OUTPATIENT
Start: 2025-06-14 | End: 2025-06-18 | Stop reason: HOSPADM

## 2025-06-14 RX ORDER — SPIRONOLACTONE 25 MG/1
25 TABLET ORAL DAILY
COMMUNITY

## 2025-06-14 RX ORDER — INSULIN LISPRO 100 [IU]/ML
0-10 INJECTION, SOLUTION INTRAVENOUS; SUBCUTANEOUS
Status: DISCONTINUED | OUTPATIENT
Start: 2025-06-14 | End: 2025-06-18 | Stop reason: HOSPADM

## 2025-06-14 RX ORDER — ACETAMINOPHEN 160 MG/5ML
650 SOLUTION ORAL EVERY 4 HOURS PRN
Status: DISCONTINUED | OUTPATIENT
Start: 2025-06-14 | End: 2025-06-18 | Stop reason: HOSPADM

## 2025-06-14 RX ORDER — HEPARIN SODIUM 5000 [USP'U]/ML
7500 INJECTION, SOLUTION INTRAVENOUS; SUBCUTANEOUS EVERY 8 HOURS SCHEDULED
Status: DISCONTINUED | OUTPATIENT
Start: 2025-06-14 | End: 2025-06-18 | Stop reason: HOSPADM

## 2025-06-14 RX ORDER — ALBUTEROL SULFATE 0.83 MG/ML
2.5 SOLUTION RESPIRATORY (INHALATION) EVERY 4 HOURS PRN
Status: DISCONTINUED | OUTPATIENT
Start: 2025-06-14 | End: 2025-06-18 | Stop reason: HOSPADM

## 2025-06-14 RX ORDER — METOPROLOL SUCCINATE 25 MG/1
25 TABLET, EXTENDED RELEASE ORAL DAILY
Status: DISCONTINUED | OUTPATIENT
Start: 2025-06-14 | End: 2025-06-18 | Stop reason: HOSPADM

## 2025-06-14 RX ORDER — AMLODIPINE BESYLATE 10 MG/1
10 TABLET ORAL DAILY
Status: DISCONTINUED | OUTPATIENT
Start: 2025-06-14 | End: 2025-06-18 | Stop reason: HOSPADM

## 2025-06-14 RX ORDER — DEXTROSE 50 % IN WATER (D50W) INTRAVENOUS SYRINGE
25
Status: DISCONTINUED | OUTPATIENT
Start: 2025-06-14 | End: 2025-06-18 | Stop reason: HOSPADM

## 2025-06-14 RX ORDER — DEXTROSE 50 % IN WATER (D50W) INTRAVENOUS SYRINGE
12.5
Status: DISCONTINUED | OUTPATIENT
Start: 2025-06-14 | End: 2025-06-18 | Stop reason: HOSPADM

## 2025-06-14 RX ORDER — FUROSEMIDE 20 MG/1
20 TABLET ORAL DAILY
Status: DISCONTINUED | OUTPATIENT
Start: 2025-06-14 | End: 2025-06-17

## 2025-06-14 RX ORDER — NYSTATIN 100000 [USP'U]/G
1 POWDER TOPICAL 3 TIMES DAILY
Status: DISCONTINUED | OUTPATIENT
Start: 2025-06-14 | End: 2025-06-18 | Stop reason: HOSPADM

## 2025-06-14 RX ORDER — ONDANSETRON 4 MG/1
4 TABLET, ORALLY DISINTEGRATING ORAL EVERY 8 HOURS PRN
Status: DISCONTINUED | OUTPATIENT
Start: 2025-06-14 | End: 2025-06-18 | Stop reason: HOSPADM

## 2025-06-14 RX ORDER — ACETAMINOPHEN 650 MG/1
650 SUPPOSITORY RECTAL EVERY 4 HOURS PRN
Status: DISCONTINUED | OUTPATIENT
Start: 2025-06-14 | End: 2025-06-18 | Stop reason: HOSPADM

## 2025-06-14 RX ORDER — FUROSEMIDE 10 MG/ML
40 INJECTION INTRAMUSCULAR; INTRAVENOUS 2 TIMES DAILY
Status: DISCONTINUED | OUTPATIENT
Start: 2025-06-14 | End: 2025-06-17

## 2025-06-14 RX ORDER — CITALOPRAM 40 MG/1
40 TABLET ORAL DAILY
Status: DISCONTINUED | OUTPATIENT
Start: 2025-06-14 | End: 2025-06-18 | Stop reason: HOSPADM

## 2025-06-14 RX ORDER — FERROUS SULFATE 325(65) MG
65 TABLET ORAL
Status: DISCONTINUED | OUTPATIENT
Start: 2025-06-14 | End: 2025-06-18 | Stop reason: HOSPADM

## 2025-06-14 RX ORDER — NAPROXEN SODIUM 220 MG/1
81 TABLET, FILM COATED ORAL DAILY
Status: DISCONTINUED | OUTPATIENT
Start: 2025-06-14 | End: 2025-06-18 | Stop reason: HOSPADM

## 2025-06-14 RX ORDER — SPIRONOLACTONE 25 MG/1
25 TABLET ORAL ONCE
Status: COMPLETED | OUTPATIENT
Start: 2025-06-14 | End: 2025-06-14

## 2025-06-14 RX ORDER — ATORVASTATIN CALCIUM 10 MG/1
10 TABLET, FILM COATED ORAL NIGHTLY
Status: DISCONTINUED | OUTPATIENT
Start: 2025-06-14 | End: 2025-06-18 | Stop reason: HOSPADM

## 2025-06-14 RX ORDER — ONDANSETRON HYDROCHLORIDE 2 MG/ML
4 INJECTION, SOLUTION INTRAVENOUS EVERY 8 HOURS PRN
Status: DISCONTINUED | OUTPATIENT
Start: 2025-06-14 | End: 2025-06-18 | Stop reason: HOSPADM

## 2025-06-14 RX ORDER — GABAPENTIN 300 MG/1
300 CAPSULE ORAL 2 TIMES DAILY
Status: DISCONTINUED | OUTPATIENT
Start: 2025-06-14 | End: 2025-06-18 | Stop reason: HOSPADM

## 2025-06-14 RX ORDER — POLYETHYLENE GLYCOL 3350 17 G/17G
17 POWDER, FOR SOLUTION ORAL DAILY PRN
Status: DISCONTINUED | OUTPATIENT
Start: 2025-06-14 | End: 2025-06-18 | Stop reason: HOSPADM

## 2025-06-14 RX ORDER — INSULIN GLARGINE 100 [IU]/ML
40 INJECTION, SOLUTION SUBCUTANEOUS 2 TIMES DAILY
Status: DISCONTINUED | OUTPATIENT
Start: 2025-06-14 | End: 2025-06-18 | Stop reason: HOSPADM

## 2025-06-14 RX ORDER — NYSTATIN 100000 [USP'U]/G
1 POWDER TOPICAL 2 TIMES DAILY
Status: CANCELLED | OUTPATIENT
Start: 2025-06-14

## 2025-06-14 RX ADMIN — Medication 40 PERCENT: at 06:12

## 2025-06-14 RX ADMIN — FUROSEMIDE 40 MG: 10 INJECTION, SOLUTION INTRAMUSCULAR; INTRAVENOUS at 22:45

## 2025-06-14 RX ADMIN — Medication 1 TABLET: at 22:36

## 2025-06-14 RX ADMIN — HEPARIN SODIUM 7500 UNITS: 5000 INJECTION INTRAVENOUS; SUBCUTANEOUS at 22:35

## 2025-06-14 RX ADMIN — Medication 4 L/MIN: at 10:06

## 2025-06-14 RX ADMIN — NYSTATIN 1 APPLICATION: 100000 POWDER TOPICAL at 22:37

## 2025-06-14 RX ADMIN — ASPIRIN 81 MG CHEWABLE TABLET 81 MG: 81 TABLET CHEWABLE at 10:01

## 2025-06-14 RX ADMIN — AMLODIPINE BESYLATE 10 MG: 10 TABLET ORAL at 10:01

## 2025-06-14 RX ADMIN — INSULIN GLARGINE 40 UNITS: 100 INJECTION, SOLUTION SUBCUTANEOUS at 10:01

## 2025-06-14 RX ADMIN — GABAPENTIN 300 MG: 300 CAPSULE ORAL at 22:35

## 2025-06-14 RX ADMIN — Medication 10 ML: at 06:13

## 2025-06-14 RX ADMIN — Medication 1 TABLET: at 10:01

## 2025-06-14 RX ADMIN — HEPARIN SODIUM 7500 UNITS: 5000 INJECTION INTRAVENOUS; SUBCUTANEOUS at 06:08

## 2025-06-14 RX ADMIN — PANCRELIPASE 1 CAPSULE: 60000; 12000; 38000 CAPSULE, DELAYED RELEASE PELLETS ORAL at 10:01

## 2025-06-14 RX ADMIN — GABAPENTIN 300 MG: 300 CAPSULE ORAL at 10:01

## 2025-06-14 RX ADMIN — Medication 10 ML: at 22:00

## 2025-06-14 RX ADMIN — FERROUS SULFATE TAB 325 MG (65 MG ELEMENTAL FE) 1 TABLET: 325 (65 FE) TAB at 10:01

## 2025-06-14 RX ADMIN — NYSTATIN: 100000 OINTMENT TOPICAL at 10:01

## 2025-06-14 RX ADMIN — INSULIN LISPRO 2 UNITS: 100 INJECTION, SOLUTION INTRAVENOUS; SUBCUTANEOUS at 11:51

## 2025-06-14 RX ADMIN — CITALOPRAM 40 MG: 40 TABLET, FILM COATED ORAL at 10:01

## 2025-06-14 RX ADMIN — ATORVASTATIN CALCIUM 10 MG: 10 TABLET, FILM COATED ORAL at 22:35

## 2025-06-14 RX ADMIN — FUROSEMIDE 20 MG: 20 TABLET ORAL at 10:01

## 2025-06-14 RX ADMIN — Medication 4 L/MIN: at 20:00

## 2025-06-14 RX ADMIN — SPIRONOLACTONE 25 MG: 25 TABLET ORAL at 06:11

## 2025-06-14 RX ADMIN — SALINE NASAL SPRAY 1 SPRAY: 1.5 SOLUTION NASAL at 11:51

## 2025-06-14 RX ADMIN — METOPROLOL SUCCINATE 25 MG: 25 TABLET, EXTENDED RELEASE ORAL at 10:03

## 2025-06-14 RX ADMIN — PERFLUTREN 3 ML OF DILUTION: 6.52 INJECTION, SUSPENSION INTRAVENOUS at 14:32

## 2025-06-14 RX ADMIN — Medication 40 PERCENT: at 11:48

## 2025-06-14 SDOH — SOCIAL STABILITY: SOCIAL INSECURITY: ARE YOU OR HAVE YOU BEEN THREATENED OR ABUSED PHYSICALLY, EMOTIONALLY, OR SEXUALLY BY ANYONE?: NO

## 2025-06-14 SDOH — SOCIAL STABILITY: SOCIAL INSECURITY: WITHIN THE LAST YEAR, HAVE YOU BEEN HUMILIATED OR EMOTIONALLY ABUSED IN OTHER WAYS BY YOUR PARTNER OR EX-PARTNER?: NO

## 2025-06-14 SDOH — SOCIAL STABILITY: SOCIAL INSECURITY: WITHIN THE LAST YEAR, HAVE YOU BEEN AFRAID OF YOUR PARTNER OR EX-PARTNER?: NO

## 2025-06-14 SDOH — SOCIAL STABILITY: SOCIAL INSECURITY: WERE YOU ABLE TO COMPLETE ALL THE BEHAVIORAL HEALTH SCREENINGS?: YES

## 2025-06-14 SDOH — ECONOMIC STABILITY: FOOD INSECURITY: WITHIN THE PAST 12 MONTHS, THE FOOD YOU BOUGHT JUST DIDN'T LAST AND YOU DIDN'T HAVE MONEY TO GET MORE.: NEVER TRUE

## 2025-06-14 SDOH — ECONOMIC STABILITY: FOOD INSECURITY: WITHIN THE PAST 12 MONTHS, YOU WORRIED THAT YOUR FOOD WOULD RUN OUT BEFORE YOU GOT THE MONEY TO BUY MORE.: NEVER TRUE

## 2025-06-14 SDOH — ECONOMIC STABILITY: INCOME INSECURITY: IN THE PAST 12 MONTHS HAS THE ELECTRIC, GAS, OIL, OR WATER COMPANY THREATENED TO SHUT OFF SERVICES IN YOUR HOME?: NO

## 2025-06-14 SDOH — SOCIAL STABILITY: SOCIAL INSECURITY: DO YOU FEEL ANYONE HAS EXPLOITED OR TAKEN ADVANTAGE OF YOU FINANCIALLY OR OF YOUR PERSONAL PROPERTY?: NO

## 2025-06-14 SDOH — SOCIAL STABILITY: SOCIAL INSECURITY: DO YOU FEEL UNSAFE GOING BACK TO THE PLACE WHERE YOU ARE LIVING?: NO

## 2025-06-14 SDOH — SOCIAL STABILITY: SOCIAL INSECURITY: DOES ANYONE TRY TO KEEP YOU FROM HAVING/CONTACTING OTHER FRIENDS OR DOING THINGS OUTSIDE YOUR HOME?: NO

## 2025-06-14 SDOH — SOCIAL STABILITY: SOCIAL INSECURITY: HAS ANYONE EVER THREATENED TO HURT YOUR FAMILY OR YOUR PETS?: NO

## 2025-06-14 SDOH — SOCIAL STABILITY: SOCIAL INSECURITY: ARE THERE ANY APPARENT SIGNS OF INJURIES/BEHAVIORS THAT COULD BE RELATED TO ABUSE/NEGLECT?: NO

## 2025-06-14 SDOH — SOCIAL STABILITY: SOCIAL INSECURITY: HAVE YOU HAD THOUGHTS OF HARMING ANYONE ELSE?: NO

## 2025-06-14 ASSESSMENT — ENCOUNTER SYMPTOMS
VOMITING: 0
CHILLS: 0
FEVER: 1
PALPITATIONS: 0
SHORTNESS OF BREATH: 1
ACTIVITY CHANGE: 1
COLOR CHANGE: 1
WEAKNESS: 1
BACK PAIN: 1
DIZZINESS: 0
COUGH: 0
CHEST TIGHTNESS: 0
WOUND: 0
NAUSEA: 1
NECK PAIN: 0
CONFUSION: 0
NECK STIFFNESS: 0
TROUBLE SWALLOWING: 0
HEMATURIA: 0
FLANK PAIN: 1
DYSURIA: 0
ABDOMINAL PAIN: 0
HEADACHES: 0

## 2025-06-14 ASSESSMENT — PATIENT HEALTH QUESTIONNAIRE - PHQ9
SUM OF ALL RESPONSES TO PHQ9 QUESTIONS 1 & 2: 0
2. FEELING DOWN, DEPRESSED OR HOPELESS: NOT AT ALL
1. LITTLE INTEREST OR PLEASURE IN DOING THINGS: NOT AT ALL

## 2025-06-14 ASSESSMENT — PAIN - FUNCTIONAL ASSESSMENT
PAIN_FUNCTIONAL_ASSESSMENT: 0-10
PAIN_FUNCTIONAL_ASSESSMENT: 0-10

## 2025-06-14 ASSESSMENT — ACTIVITIES OF DAILY LIVING (ADL)
GROOMING: NEEDS ASSISTANCE
FEEDING YOURSELF: INDEPENDENT
DRESSING YOURSELF: NEEDS ASSISTANCE
BATHING: NEEDS ASSISTANCE
TOILETING: NEEDS ASSISTANCE
LACK_OF_TRANSPORTATION: NO
HEARING - LEFT EAR: FUNCTIONAL
WALKS IN HOME: DEPENDENT
ADEQUATE_TO_COMPLETE_ADL: YES
JUDGMENT_ADEQUATE_SAFELY_COMPLETE_DAILY_ACTIVITIES: YES
PATIENT'S MEMORY ADEQUATE TO SAFELY COMPLETE DAILY ACTIVITIES?: YES
HEARING - RIGHT EAR: FUNCTIONAL
ASSISTIVE_DEVICE: WHEELCHAIR

## 2025-06-14 ASSESSMENT — LIFESTYLE VARIABLES
HOW OFTEN DO YOU HAVE A DRINK CONTAINING ALCOHOL: NEVER
HOW MANY STANDARD DRINKS CONTAINING ALCOHOL DO YOU HAVE ON A TYPICAL DAY: PATIENT DOES NOT DRINK
SKIP TO QUESTIONS 9-10: 1
HOW OFTEN DO YOU HAVE 6 OR MORE DRINKS ON ONE OCCASION: NEVER
AUDIT-C TOTAL SCORE: 0
AUDIT-C TOTAL SCORE: 0

## 2025-06-14 ASSESSMENT — PAIN SCALES - GENERAL
PAINLEVEL_OUTOF10: 4
PAINLEVEL_OUTOF10: 4

## 2025-06-14 ASSESSMENT — PAIN DESCRIPTION - DESCRIPTORS: DESCRIPTORS: TENDER

## 2025-06-14 ASSESSMENT — COGNITIVE AND FUNCTIONAL STATUS - GENERAL: PATIENT BASELINE BEDBOUND: YES

## 2025-06-14 NOTE — PROGRESS NOTES
Advanced Practice Admit Note    Kaylene Feliciano is a 70 y.o. female presenting to [facility] on 6/13/2025 with Acute on chronic respiratory failure with hypoxia and hypercapnia. Medical history is significant for obesity hypoventilation syndrome with asthma and severe obstructive sleep apnea causing chronic hypoxic hypercapnic respiratory failure with supplemental oxygen dependence and nightly BiPAP, insulin-dependent type 2 diabetes with associated peripheral neuropathy and stage II chronic kidney disease, pulmonary hypertension, heart failure with diastolic dysfunction and preserved ejection fraction of 55 to 60% as of June 2024, and ambulatory dysfunction/limited mobility associated with her BMI of 50.    Kaylene comes in tonight with multiple complaints.  She says that for the past week or so she has had worsening shortness of breath, a feeling that she is swollen particularly on her left side, decreased urine output, and notes that she has been needing to use her BiPAP frequently even during the day due to severe shortness of breath.  She also complains of pain on her left side, in the area of her flank/hip that she says has been present for about a day at this point.  She also complains of feeling warm but notes that she did not take her temperature and is nauseous but she has not had any vomiting.  When asked specifically, she says that she has had more difficulty with her mobility over the past few days even than her baseline, noting that she has been essentially unable to get out of bed for the past 3 to 4 days.    Chart review indicates that she was hospitalized here at [facility] earlier this year January 7 through January 15 for acute on chronic hypoxic hypercapnic respiratory failure.  During that hospitalization she was switched from CPAP back to BiPAP which she had been on previously.  She was treated for bilateral pneumonia and left breast cellulitis.  Of note, there are occasional mentions in her chart of a  COPD diagnosis however review of documentation from the last 3 pulmonologist she has seen suggest that this is not the case, and that she likely has restrictive lung disease associated with her obesity and that there is no evidence of COPD on her imaging or test results.    When she arrived in the emergency department tonight she was noted to be in respiratory distress, so she was placed on BiPAP at 16/8 and 40%.  She was tolerating this at the time of my examination in the emergency department and said that she feels less short of breath than she did when she came in initially.    Past Medical History  Past Medical History:   Diagnosis Date    Adnexal cyst     Right    Ambulatory dysfunction     Anxiety and depression     Bipolar disorder     Chronic back pain     Chronic kidney disease, stage II (mild)     Chronic respiratory failure with hypoxia and hypercapnia     Diabetic peripheral neuropathy (Multi)     Diastolic heart failure     LVEF 55 to 60% 2024    Diverticulosis     Gastroesophageal reflux disease     Hepatic steatosis     Hepatomegaly     History of femur fracture     Right    Hyperlipidemia     Hypertension     Insulin dependent type 2 diabetes mellitus (Multi)     Iron deficiency anemia     Kidney stones     Long term current use of aspirin     Multiple lung nodules     Obesity hypoventilation syndrome (Multi)     Obesity, morbid, BMI 50 or higher (Multi)     Obstructive sleep apnea treated with BiPAP     Osteoarthritis     Osteoporosis     Pulmonary hypertension (Multi)     Renal cyst, right     Secondary pancreatic insufficiency (HHS-HCC)     Spinal stenosis     Vitamin D deficiency     Wheelchair dependence        Surgical History  Past Surgical History:   Procedure Laterality Date    ADENOIDECTOMY      BLADDER SUSPENSION      BREAST BIOPSY       SECTION, LOW TRANSVERSE      CHOLECYSTECTOMY      DEEP BRAIN STIMULATOR PLACEMENT      HYSTERECTOMY      KNEE SURGERY      LITHOTRIPSY       ORIF FEMUR FRACTURE Right     OVARIAN CYST REMOVAL      TONSILLECTOMY          Social History  Social History     Tobacco Use    Smoking status: Never    Smokeless tobacco: Never   Vaping Use    Vaping status: Never Used   Substance Use Topics    Alcohol use: Never    Drug use: Never        Family History  Family History   Problem Relation Name Age of Onset    Diabetes Mother      Hypertension Mother      Thyroid cancer Mother      Heart attack Mother      Osteoporosis Mother      Stroke Father      Hypertension Father      Diabetes Brother      Hypertension Brother      Cancer Brother          Allergies  Sulfa (sulfonamide antibiotics), Adhesive tape-silicones, Belladonna, Benzoin, Ciprofloxacin, Iodinated contrast media, and Tegaderm ag mesh [silver]    Review of Systems   Constitutional:  Positive for activity change and fever. Negative for chills.   HENT:  Negative for trouble swallowing.    Eyes:  Negative for visual disturbance.   Respiratory:  Positive for shortness of breath. Negative for cough and chest tightness.    Cardiovascular:  Positive for leg swelling. Negative for chest pain and palpitations.   Gastrointestinal:  Positive for nausea. Negative for abdominal pain and vomiting.   Genitourinary:  Positive for decreased urine volume and flank pain. Negative for dysuria and hematuria.   Musculoskeletal:  Positive for back pain. Negative for neck pain and neck stiffness.   Skin:  Positive for color change and rash. Negative for wound.   Neurological:  Positive for weakness. Negative for dizziness and headaches.   Psychiatric/Behavioral:  Negative for confusion.        Physical Exam  Constitutional:       Appearance: She is ill-appearing. She is not toxic-appearing.   HENT:      Head: Normocephalic.      Right Ear: External ear normal.      Left Ear: External ear normal.      Nose: Nose normal.      Mouth/Throat:      Mouth: Mucous membranes are moist.   Eyes:      General: No scleral  "icterus.  Cardiovascular:      Rate and Rhythm: Normal rate.      Pulses: Normal pulses.   Pulmonary:      Effort: Pulmonary effort is normal. No respiratory distress.      Breath sounds: No wheezing or rhonchi.      Comments: On BiPAP, diminished breath sounds with poor air movement bilaterally  Abdominal:      General: Bowel sounds are normal. There is no distension.      Palpations: Abdomen is soft.      Tenderness: There is no abdominal tenderness.   Musculoskeletal:      Cervical back: No rigidity or tenderness.      Right lower leg: Edema present.      Left lower leg: Edema present.   Skin:     Capillary Refill: Capillary refill takes less than 2 seconds.      Findings: Rash present. No erythema or lesion.      Comments: Fungal infiltration to skin folds, reported erythema over left hip/flank   Neurological:      General: No focal deficit present.      Mental Status: She is alert and oriented to person, place, and time. Mental status is at baseline.   Psychiatric:         Mood and Affect: Mood normal.         Behavior: Behavior normal.       Last Recorded Vitals  Blood pressure 180/87, pulse 62, temperature 36.5 °C (97.7 °F), temperature source Temporal, resp. rate 17, height 1.6 m (5' 3\"), weight 127 kg (280 lb), SpO2 96%.  Intake/Output last 3 Shifts:  No intake/output data recorded.    Relevant Results  Results for orders placed or performed during the hospital encounter of 06/13/25 (from the past 24 hours)   CBC and Auto Differential   Result Value Ref Range    WBC 11.2 4.4 - 11.3 x10*3/uL    nRBC 0.0 0.0 - 0.0 /100 WBCs    RBC 3.16 (L) 4.00 - 5.20 x10*6/uL    Hemoglobin 8.9 (L) 12.0 - 16.0 g/dL    Hematocrit 29.5 (L) 36.0 - 46.0 %    MCV 93 80 - 100 fL    MCH 28.2 26.0 - 34.0 pg    MCHC 30.2 (L) 32.0 - 36.0 g/dL    RDW 15.2 (H) 11.5 - 14.5 %    Platelets 169 150 - 450 x10*3/uL    Neutrophils % 71.0 40.0 - 80.0 %    Immature Granulocytes %, Automated 0.9 0.0 - 0.9 %    Lymphocytes % 18.0 13.0 - 44.0 %    " Monocytes % 5.8 2.0 - 10.0 %    Eosinophils % 3.9 0.0 - 6.0 %    Basophils % 0.4 0.0 - 2.0 %    Neutrophils Absolute 7.97 (H) 1.20 - 7.70 x10*3/uL    Immature Granulocytes Absolute, Automated 0.10 0.00 - 0.70 x10*3/uL    Lymphocytes Absolute 2.02 1.20 - 4.80 x10*3/uL    Monocytes Absolute 0.65 0.10 - 1.00 x10*3/uL    Eosinophils Absolute 0.44 0.00 - 0.70 x10*3/uL    Basophils Absolute 0.05 0.00 - 0.10 x10*3/uL   Comprehensive metabolic panel   Result Value Ref Range    Glucose 123 (H) 74 - 99 mg/dL    Sodium 135 (L) 136 - 145 mmol/L    Potassium 5.4 (H) 3.5 - 5.3 mmol/L    Chloride 101 98 - 107 mmol/L    Bicarbonate 25 21 - 32 mmol/L    Anion Gap 14 10 - 20 mmol/L    Urea Nitrogen 30 (H) 6 - 23 mg/dL    Creatinine 1.17 (H) 0.50 - 1.05 mg/dL    eGFR 50 (L) >60 mL/min/1.73m*2    Calcium 9.1 8.6 - 10.3 mg/dL    Albumin 4.1 3.4 - 5.0 g/dL    Alkaline Phosphatase 90 33 - 136 U/L    Total Protein 7.1 6.4 - 8.2 g/dL    AST 14 9 - 39 U/L    Bilirubin, Total 0.6 0.0 - 1.2 mg/dL    ALT 4 (L) 7 - 45 U/L   Magnesium   Result Value Ref Range    Magnesium 2.40 1.60 - 2.40 mg/dL   Lipase   Result Value Ref Range    Lipase 5 (L) 9 - 82 U/L   Phosphorus   Result Value Ref Range    Phosphorus 4.2 2.5 - 4.9 mg/dL   B-Type Natriuretic Peptide   Result Value Ref Range     (H) 0 - 99 pg/mL   Troponin I, High Sensitivity, Initial   Result Value Ref Range    Troponin I, High Sensitivity 6 0 - 13 ng/L   Blood Gas Venous Full Panel   Result Value Ref Range    POCT pH, Venous 7.39 7.33 - 7.43 pH    POCT pCO2, Venous 53 (H) 41 - 51 mm Hg    POCT pO2, Venous 44 35 - 45 mm Hg    POCT SO2, Venous 80 (H) 45 - 75 %    POCT Oxy Hemoglobin, Venous 77.4 (H) 45.0 - 75.0 %    POCT Hematocrit Calculated, Venous 29.0 (L) 36.0 - 46.0 %    POCT Sodium, Venous 133 (L) 136 - 145 mmol/L    POCT Potassium, Venous 8.1 (HH) 3.5 - 5.3 mmol/L    POCT Chloride, Venous 104 98 - 107 mmol/L    POCT Ionized Calicum, Venous 1.14 1.10 - 1.33 mmol/L    POCT Glucose,  Venous 117 (H) 74 - 99 mg/dL    POCT Lactate, Venous 0.9 0.4 - 2.0 mmol/L    POCT Base Excess, Venous 6.1 (H) -2.0 - 3.0 mmol/L    POCT HCO3 Calculated, Venous 32.1 (H) 22.0 - 26.0 mmol/L    POCT Hemoglobin, Venous 9.8 (L) 12.0 - 16.0 g/dL    POCT Anion Gap, Venous 5.0 (L) 10.0 - 25.0 mmol/L    Patient Temperature      FiO2 40 %    Critical Called By REHGARDENIAT     Critical Called To DR OLIVO     Critical Call Time [id number]     Critical Read Back Y    Troponin, High Sensitivity, 1 Hour   Result Value Ref Range    Troponin I, High Sensitivity 6 0 - 13 ng/L   Potassium   Result Value Ref Range    Potassium 4.9 3.5 - 5.3 mmol/L   Sars-CoV-2 and Influenza A/B PCR   Result Value Ref Range    Flu A Result Not Detected Not Detected    Flu B Result Not Detected Not Detected    Coronavirus 2019, PCR Not Detected Not Detected   Urinalysis with Reflex Culture and Microscopic   Result Value Ref Range    Color, Urine Light-Yellow Light-Yellow, Yellow, Dark-Yellow    Appearance, Urine Clear Clear    Specific Gravity, Urine 1.011 1.005 - 1.035    pH, Urine 6.0 5.0, 5.5, 6.0, 6.5, 7.0, 7.5, 8.0    Protein, Urine NEGATIVE NEGATIVE, 10 (TRACE), 20 (TRACE) mg/dL    Glucose, Urine Normal Normal mg/dL    Blood, Urine 0.1 (1+) (A) NEGATIVE mg/dL    Ketones, Urine NEGATIVE NEGATIVE mg/dL    Bilirubin, Urine NEGATIVE NEGATIVE mg/dL    Urobilinogen, Urine Normal Normal mg/dL    Nitrite, Urine NEGATIVE NEGATIVE    Leukocyte Esterase, Urine NEGATIVE NEGATIVE   Urinalysis Microscopic   Result Value Ref Range    WBC, Urine 1-5 1-5, NONE /HPF    RBC, Urine 3-5 NONE, 1-2, 3-5 /HPF    Squamous Epithelial Cells, Urine 1-9 (SPARSE) Reference range not established. /HPF        Medications  Scheduled medications  amLODIPine, 10 mg, oral, Daily  aspirin, 81 mg, oral, Daily  atorvastatin, 10 mg, oral, Nightly  citalopram, 40 mg, oral, Daily  ferrous sulfate, 65 mg of elemental iron, oral, Daily with breakfast  furosemide, 20 mg, oral, Daily  gabapentin, 300  mg, oral, BID  heparin (porcine), 7,500 Units, subcutaneous, q8h YARELI  insulin glargine, 40 Units, subcutaneous, BID  insulin lispro, 0-15 Units, subcutaneous, TID AC  lactobacillus acidophilus, 1 tablet, oral, BID  lipase-protease-amylase, 1 capsule, oral, TID AC  metoprolol succinate XL, 25 mg, oral, Daily  nystatin, , Topical, BID  oxygen, , inhalation, Continuous - Inhalation  sodium chloride 0.9%, 10 mL, intravenous, q8h YARELI  spironolactone, 25 mg, oral, Once      Continuous medications     PRN medications  acetaminophen, 650 mg, q4h PRN   Or  acetaminophen, 650 mg, q4h PRN   Or  acetaminophen, 650 mg, q4h PRN  albuterol, 2.5 mg, q4h PRN  melatonin, 3 mg, Nightly PRN  ondansetron ODT, 4 mg, q8h PRN   Or  ondansetron, 4 mg, q8h PRN  oxygen, , Continuous PRN - O2/gases  polyethylene glycol, 17 g, Daily PRN      Imaging  CT chest abdomen pelvis wo IV contrast  Result Date: 6/13/2025  CHEST 1.  Chronic bibasilar bandlike atelectasis and mild scarring versus atelectasis in the bilateral lung apices. No pleural effusion. Mild interseptal thickening and mosaic attenuation of the lung fields which may represent mild interstitial edema. Mild enlargement of main pulmonary trunk similar to prior.   ABDOMEN - PELVIS 1.  No acute abnormality in the abdomen/pelvis. Unchanged right lower pole renal cyst. Sigmoid diverticulosis without diverticulitis. Mild hepatomegaly and hepatic steatosis. Nonspecific edema in the anterior chest and abdominal wall, clinical correlation for fluid overload versus cellulitis recommended. 2. Unchanged 4.5 cm right adnexal cyst, follow-up pelvic ultrasound in 6 months is recommended.     MACRO: None   Signed by: Daniel Laurent 6/13/2025 11:21 PM Dictation workstation:   [id number]    XR chest 1 view  Result Date: 6/13/2025  1.  Bibasilar chronic subsegmental opacities which may represent atelectasis. There is new small to moderate-sized left pleural effusion raising possibility of pneumonia.      Signed by: Daniel Laurent 6/13/2025 9:41 PM Dictation workstation:   [id number]      Cardiology, Vascular, and Other Imaging  No other imaging results found for the past 7 days       Assessment & Plan  Acute on chronic respiratory failure with hypoxia and hypercapnia    Pulmonary edema with congestive heart failure with preserved left ventricular function    Fluid overload    Renal insufficiency    Left flank pain    Labs tonight are noteworthy for white blood cell count of 11.2 with neutrophilic predominance, relatively stable chronic iron deficiency anemia with hemoglobin of 8.9 compared to 9.3 in January, renal insufficiency without meeting criteria for acute kidney injury superimposed on her stage II chronic kidney disease with creatinine of 1.17 and GFR 50 compared to 0.92 and 68 on 1/15/2025, BNP only modestly elevated at 266, troponin negative at 6/2 serial measurements, normal pH at 7.39, modestly elevated venous pCO2 at 53 with concomitant elevated bicarb at 31.2 and venous PO2 of 44 on 40% oxygen.  Nasal swabs were negative for influenza and COVID.    Chest x-ray gave suggestion of left-sided pleural effusion  As well as bilateral chronic subsegmental opacities so CT chest was undertaken which does not suggest pleural effusion but instead shows intraseptal thickening and mosaic attenuation concerning for interstitial edema with small vessel or small airway disease also in the differential.    She is tolerating BiPAP for now, will defer to respiratory therapy on when to trial her on nasal cannula oxygen.  She received 40 mg IV furosemide in the emergency department with output of only 400 mL of urine so she was given a dose of spironolactone as well which she has been on in the past.  Given her dependent edema with bilateral leg swelling and interstitial edema on chest imaging this is likely an exacerbation of her congestive heart failure which have combined to produce fluid overload causing an acute on  chronic hypoxic hypercapnic respiratory failure.  We will obtain a new echo to reassess her left ventricular systolic and diastolic function.  Anticipate her GFR will improve with diuresis.    Of note, Kaylene is complaining of pain over her left hip/flank/abdomen area.  She says that she typically lays on her left side when she cannot breathe, and notes that she has been doing so almost continuously for the past 3 to 4 days because she has not been able to get out of bed.  Interestingly, her dependent edema is shifted towards the left on exam and on her chest imaging as well.  Emergency department staff notes that there was some erythema and a wound on the left side of her body however Kaylene was unable to reposition herself so that I could examine her skin.  Orders were placed for a bariatric bed for her as well as an air mattress overlay in hopes that we can get her off of that left side to allow for more thorough examination of her skin.  Consult was placed to wound care nurse for assistance.    I spent 60 minutes in the professional and overall care of this patient.    Code Status: Full code    See orders for additional plan elements, full history and physical to follow from attending physician.    Casi Rodrigues, APRN-CNP  [facility] [phone]  Attending physician ANDREW Mccloud MD

## 2025-06-14 NOTE — ED NOTES
Patient has TENS unit implanted in her chest. We have attempted to get an EKG. Dr. Calhoun at bedside. Unable to obtain at this time. Several leads do not read.  attempted to turn off the unit but states that it won't shut off because the batteries in the unit are too low.      Cherelle Hitchcock RN  06/13/25 [id number]

## 2025-06-14 NOTE — ED PROCEDURE NOTE
Procedure  Critical Care    Performed by: Giorgio Calhoun MD  Authorized by: Giorgio Calhoun MD    Critical care provider statement:     Critical care time (minutes):  35    Critical care time was exclusive of:  Separately billable procedures and treating other patients and teaching time    Critical care was necessary to treat or prevent imminent or life-threatening deterioration of the following conditions:  Respiratory failure    Critical care was time spent personally by me on the following activities:  Blood draw for specimens, development of treatment plan with patient or surrogate, ordering and performing treatments and interventions, pulse oximetry, re-evaluation of patient's condition, evaluation of patient's response to treatment, examination of patient, obtaining history from patient or surrogate and review of old charts  Comments:      Volume overload, pulmonary edema, increased work of breathing  Placed on BiPAP, given IV Lasix.               Giorgio Calhoun MD  06/13/25 [id number]

## 2025-06-14 NOTE — ED PROVIDER NOTES
History of Present Illness     History provided by: Patient  Limitations to History: None  External Records Reviewed with Brief Summary: Discharge Summary from 1/15/2025 which showed patient was admitted to the hospital for acute hypoxic on chronic hypercapnic respiratory failure with bilateral pneumonia and bacteremia as well as cellulitis of the left breast.  She received antibiotics in the form of ceftriaxone and doxycycline    HPI:  Kaylene Feliciano is a 70 y.o. female with past medical history of asthma, CKD stage II, DM 2, HTN, HLD, pancreatic insufficiency, spinal stenosis, ARCHIE on BiPAP and 4 L O2 at baseline, chronic hypercapnic respiratory failure, presenting to the ED today with a complaint of left-sided abdominal pain that started today as well as new onset shortness of breath and increased oxygen requirement.  Patient described the pain as sharp, nonradiating, on and off on the left side of her abdomen that has been ongoing over the last week.  No specific to that increase or decrease the pain.  She reported having history of kidney stone a few years ago that required removal with procedure.  She reported feeling warm in her skin especially around the area of the pain however she did not measure her temperature.  She also reported feeling nauseous however she did not have any vomiting episode.  She denied having any lightheadedness, dizziness, chest pain, change in bowel habits, or urinary symptoms.    Physical Exam   Triage vitals:  T 36.5 °C (97.7 °F)  HR 67  /82  RR 20  O2 100 % Supplemental oxygen    Physical Exam  Constitutional:       Appearance: Normal appearance. She is morbidly obese.   HENT:      Head: Normocephalic and atraumatic.      Right Ear: External ear normal.      Left Ear: External ear normal.      Nose: Nose normal. No rhinorrhea.      Mouth/Throat:      Mouth: Mucous membranes are moist.      Pharynx: Oropharynx is clear. No oropharyngeal exudate or posterior oropharyngeal  erythema.   Eyes:      General:         Right eye: No discharge.         Left eye: No discharge.      Extraocular Movements: Extraocular movements intact.      Conjunctiva/sclera: Conjunctivae normal.      Pupils: Pupils are equal, round, and reactive to light.   Cardiovascular:      Rate and Rhythm: Normal rate and regular rhythm.      Pulses: Normal pulses.      Heart sounds: Normal heart sounds.   Pulmonary:      Effort: Respiratory distress present.      Breath sounds: Normal breath sounds. No wheezing, rhonchi or rales.   Abdominal:      General: Abdomen is flat. Bowel sounds are normal.      Palpations: Abdomen is soft.      Tenderness: There is abdominal tenderness (Tenderness over the left side of her abdomen). There is no right CVA tenderness, left CVA tenderness, guarding or rebound.   Musculoskeletal:         General: Normal range of motion.      Cervical back: Normal range of motion and neck supple.      Right lower leg: No edema.      Left lower leg: No edema.   Skin:     General: Skin is warm and dry.      Coloration: Skin is not jaundiced or pale.      Findings: Rash (Under bilateral breast as well as skin folds of the neck) present.   Neurological:      General: No focal deficit present.      Mental Status: She is alert and oriented to person, place, and time.   Psychiatric:         Mood and Affect: Mood normal.         Behavior: Behavior normal.          Medical Decision Making & ED Course   Medical Decision Makin y.o. female presenting to the ED with left sided abdominal pain and shortness of breath that has been ongoing for the last week.  Physical examination showed skin changes in the form of thickening, redness, and warmth on the left side of the abdomen.  Patient is mostly lying down on her left side for breathing difficulty in other positions.  There was tenderness to the left side of the abdomen.  Laboratory workup was ordered in the form of CBC, CMP, troponin, VBG, UA, BNP, lipase,  COVID-19/flu A/flu B PCR.  CBC did not show leukocytosis however was positive for normocytic anemia.  CMP showed mild hyponatremia with sodium level of 135, potassium 5.4, elevated BUN and creatinine (30/1.17, baseline 0.90).  BNP was elevated 266, VBG showed pH of 7.39, lactate of 0.1, pCO2 of 53, PO2 of 44.  Her viral swab came back negative.  UA was also negative for infection.  Troponin came back negative, lipase came back negative.  CT chest abdomen and pelvis was ordered which showed    IMPRESSION:  CHEST  1.  Chronic bibasilar bandlike atelectasis and mild scarring versus atelectasis in the bilateral lung apices. No pleural effusion. Mild interseptal thickening and mosaic attenuation of the lung fields which may represent mild interstitial edema. Mild enlargement of main pulmonary trunk similar to prior.      ABDOMEN - PELVIS  1.  No acute abnormality in the abdomen/pelvis. Unchanged right lower pole renal cyst. Sigmoid diverticulosis without diverticulitis. Mild hepatomegaly and hepatic steatosis. Nonspecific edema in the anterior chest and abdominal wall, clinical correlation for fluid overload versus cellulitis recommended.  2. Unchanged 4.5 cm right adnexal cyst, follow-up pelvic ultrasound in 6 months is recommended.    Given suspicion for volume overload patient was given Lasix 40 mg.  Additionally she was started on BiPAP to help with her breathing and had one of the fluid.  She was also given breathing treatment in the form of DuoNeb as well as O2 support.  A bladder scan was ordered however the procedure was not feasible given the patient body habitus.  Given the patient increased oxygen requirement as well as need for more diuresis the case was discussed with the inpatient team and the patient will be admitted to the hospital for further evaluation by cardiology/pulmonology.  Additionally patient may need further diuresis.  Plan was discussed with the patient which she was agreeable to      Differential diagnoses considered include but are not limited to: Interstitial pulmonary edema, PE, heart failure exacerbation, kidney stone     Social Determinants of Health which Significantly Impact Care: None identified     EKG Independent Interpretation: EKG interpreted by myself. Please see ED Course for full interpretation.    Independent Result Review and Interpretation: Relevant laboratory and radiographic results were reviewed and independently interpreted by myself.  As necessary, they are commented on in the ED Course.    Chronic conditions affecting the patient's care: As documented above in [facility]    The patient was discussed with the following consultants/services: Dr. Snider    Care Considerations: As documented above in [facility]    ED Course:  ED Course as of 06/14/25 0306   Fri Jun 13, 2025 2032 [facility] office visit 9/15/2014    Past medical history  Kidney stones removed with laser 2011  Hysterectomy 1994, has ovaries  Cholecystectomy early 2000's  Deep brain stimulator placed 2005 [facility] for depression Pacemakers in place to regulate this bilateral chest wall 2006 and 2012 Has 10 year batteries now  Breast biopsy bilateral, right excisional 2008, Left breast biopsy 2006, and 2008 all benign    [FN]   2049 External record review  7/8/2024 discharge summary : Admitted for acute on chronic hypoxic and hypercapnic respiratory failure  BiPAP settings 16/8, 40% FiO2, goal O2 saturation 90% [FN]   2112 Attending note  70F PMH of HTN, COPD, T2DM, ARCHIE, HLD, HFpEF, asthma,  DM, HLD presenting with 1 week of shortness of breath.  Having left-sided flank pain.  Notes that she has had to use her BiPAP more frequently.  Typically uses it only at night but over the last 1 week she has had shortness of breath that is only improved with BiPAP.  She typically lays on her left side and notes left-sided flank pain now.  It is constant   She also notes a decrease in her urine output over the last 1 week.  Does feel  swollen however unsure if this is baseline.  On exam appears uncomfortable, speaking in short sentences.  Labored breathing.  No wheezing on exam but poor air movement  No CVA tenderness bilaterally.  Left flank does have a chronic healing wound.  The wound is not tender to touch.  However the skin of the left flank is very edematous.  No pitting edema.    MDM  70-year-old female history of HFpEF, COPD, diabetes, morbid obesity presenting with shortness of breath, decreased urine output, left flank pain.  Symptoms are consistent with volume overload.  Given the patient typically lays on her left flank this may be be related to dependent edema  Given her difficulty breathing in the ED will restart her home BiPAP now.  Given concern for volume overload will give Lasix [FN]   2137 On my interpretation chest x-ray shows diffuse pulmonary edema, Worse on the left side [FN]   2144 Initially plan to have the patient's  come to turn off the brain stimulator in order to get a good-quality ECG.  However after the  arrived he found that the brain stimulator cannot be turned off because it is too low on battery to turn off.  We will attempt an ECG again with brain stimulator on.  It would be suboptimal but we should be able to evaluate some leads [FN]   2149 XR chest 1 view  Chest x-ray read as concern for pneumonia however patient has no other infectious complaints  At this time will wait for CT chest abdomen pelvis before beginning antibiotics.  At this time do not suspect sepsis as there is no clear evidence of infection.  All patient's symptoms may be related to volume overload [FN]   2152 Of note patient has a history of contrast allergy.  We initially ordered CT with IV contrast However given the contrast allergy CT was changed to noncontrast study.  We did consider premedication with steroids and Benadryl however given patient's body habitus fat stranding would be visible and steroid treatment may be more  harmful than beneficial at this time [FN]   2200 Attempted to get ECG however unable to get leads to  despite multiple attempts  This is likely related to brain stimulator still being on [FN]   2203 POCT pH, Venous: 7.39  No respiratory acidosis [FN]   2204 POCT Potassium, Venous(!!): 8.1  Hemolyzed [FN]   2204 Patient is noted to have concern for candidal infection under both breasts.  There is erythema and satellite lesions.  Lacy white discharge in appearance.  Consistent with candidal infection.  Will treat with topical nystatin [FN]   2207  on my interpretation for shortness of breath EKG obtained at 1207 shows Significant artifact.  Vent 66, narrow QRS complex. [FN]   2210 Patient placed on BiPAP on settings 16/8, 40% FiO2.  Tolerating well [FN]   2221 POTASSIUM(!): 5.4  Mildly hemolyzed-giving Lasix, already received albuterol  [FN]   2228 Troponin I, High Sensitivity: 6 [FN]   2230 Patient signed out to me at this time concern for fluid overload with Lasix ordered and to be given shortly.  Increasing shortness of breath and use of BiPAP at home.  Will plan for admission pending CT result. [TL]   2303 Patient reevaluated.  On positive pressure no acute distress saturating 98% on room air.  Respiratory rate in the low 20s. [TL]   Sat Jun 14, 2025   0118 Will plan for admission for fluid overload state.  Diuresis is already been initiated.  Patient currently on BiPAP. [TL]      ED Course User Index  [FN] Giorgio Calhoun MD  [TL] Christophe Snider DO         Diagnoses as of 06/14/25 0306   Candidal intertrigo   Left sided abdominal pain   Acute on chronic respiratory failure with hypoxia and hypercapnia     Disposition   As a result of their workup, the patient will require admission to the hospital.  The patient was informed of her diagnosis.  The patient was given the opportunity to ask questions and I answered them. The patient agreed to be admitted to the hospital.    Procedures   Procedures    Patient  seen and discussed with ED attending physician.    Dax Verduzco MD  Emergency Medicine     Dax Verduzco MD  Resident  06/14/25 [id number]

## 2025-06-14 NOTE — ASSESSMENT & PLAN NOTE
Labs tonight are noteworthy for white blood cell count of 11.2 with neutrophilic predominance, relatively stable chronic iron deficiency anemia with hemoglobin of 8.9 compared to 9.3 in January, renal insufficiency without meeting criteria for acute kidney injury superimposed on her stage II chronic kidney disease with creatinine of 1.17 and GFR 50 compared to 0.92 and 68 on 1/15/2025, BNP only modestly elevated at 266, troponin negative at 6/2 serial measurements, normal pH at 7.39, modestly elevated venous pCO2 at 53 with concomitant elevated bicarb at 31.2 and venous PO2 of 44 on 40% oxygen.  Nasal swabs were negative for influenza and COVID.    Chest x-ray gave suggestion of left-sided pleural effusion  As well as bilateral chronic subsegmental opacities so CT chest was undertaken which does not suggest pleural effusion but instead shows intraseptal thickening and mosaic attenuation concerning for interstitial edema with small vessel or small airway disease also in the differential.    She is tolerating BiPAP for now, will defer to respiratory therapy on when to trial her on nasal cannula oxygen.  She received 40 mg IV furosemide in the emergency department with output of only 400 mL of urine so she was given a dose of spironolactone as well which she has been on in the past.  Given her dependent edema with bilateral leg swelling and interstitial edema on chest imaging this is likely an exacerbation of her congestive heart failure which have combined to produce fluid overload causing an acute on chronic hypoxic hypercapnic respiratory failure.  We will obtain a new echo to reassess her left ventricular systolic and diastolic function.  Anticipate her GFR will improve with diuresis.    Of note, Kaylene is complaining of pain over her left hip/flank/abdomen area.  She says that she typically lays on her left side when she cannot breathe, and notes that she has been doing so almost continuously for the past 3 to 4  days because she has not been able to get out of bed.  Interestingly, her dependent edema is shifted towards the left on exam and on her chest imaging as well.  Emergency department staff notes that there was some erythema and a wound on the left side of her body however Kaylene was unable to reposition herself so that I could examine her skin.  Orders were placed for a bariatric bed for her as well as an air mattress overlay in hopes that we can get her off of that left side to allow for more thorough examination of her skin.  Consult was placed to wound care nurse for assistance.

## 2025-06-15 ENCOUNTER — APPOINTMENT (OUTPATIENT)
Dept: RADIOLOGY | Facility: HOSPITAL | Age: 70
DRG: 291 | End: 2025-06-15
Payer: MEDICARE

## 2025-06-15 LAB
ANION GAP SERPL CALC-SCNC: 11 MMOL/L (ref 10–20)
AORTIC VALVE PEAK VELOCITY: 1.57 M/S
AV PEAK GRADIENT: 10 MMHG
AVA (PEAK VEL): 2.91 CM2
BUN SERPL-MCNC: 23 MG/DL (ref 6–23)
CALCIUM SERPL-MCNC: 9 MG/DL (ref 8.6–10.3)
CHLORIDE SERPL-SCNC: 99 MMOL/L (ref 98–107)
CO2 SERPL-SCNC: 32 MMOL/L (ref 21–32)
CREAT SERPL-MCNC: 1.19 MG/DL (ref 0.5–1.05)
EGFRCR SERPLBLD CKD-EPI 2021: 49 ML/MIN/1.73M*2
EJECTION FRACTION: 58 %
ERYTHROCYTE [DISTWIDTH] IN BLOOD BY AUTOMATED COUNT: 15.3 % (ref 11.5–14.5)
GLUCOSE BLD MANUAL STRIP-MCNC: 133 MG/DL (ref 74–99)
GLUCOSE BLD MANUAL STRIP-MCNC: 183 MG/DL (ref 74–99)
GLUCOSE BLD MANUAL STRIP-MCNC: 197 MG/DL (ref 74–99)
GLUCOSE BLD MANUAL STRIP-MCNC: 249 MG/DL (ref 74–99)
GLUCOSE SERPL-MCNC: 147 MG/DL (ref 74–99)
HCT VFR BLD AUTO: 30 % (ref 36–46)
HGB BLD-MCNC: 9 G/DL (ref 12–16)
LEFT VENTRICULAR OUTFLOW TRACT DIAMETER: 2.2 CM
MAGNESIUM SERPL-MCNC: 1.92 MG/DL (ref 1.6–2.4)
MCH RBC QN AUTO: 25.6 PG (ref 26–34)
MCHC RBC AUTO-ENTMCNC: 30 G/DL (ref 32–36)
MCV RBC AUTO: 86 FL (ref 80–100)
MITRAL VALVE E/A RATIO: 1.16
NRBC BLD-RTO: 0 /100 WBCS (ref 0–0)
PLATELET # BLD AUTO: 224 X10*3/UL (ref 150–450)
POTASSIUM SERPL-SCNC: 4 MMOL/L (ref 3.5–5.3)
RBC # BLD AUTO: 3.51 X10*6/UL (ref 4–5.2)
RIGHT VENTRICLE FREE WALL PEAK S': 11.4 CM/S
SODIUM SERPL-SCNC: 138 MMOL/L (ref 136–145)
TRICUSPID ANNULAR PLANE SYSTOLIC EXCURSION: 2.3 CM
WBC # BLD AUTO: 9.2 X10*3/UL (ref 4.4–11.3)

## 2025-06-15 PROCEDURE — 2500000005 HC RX 250 GENERAL PHARMACY W/O HCPCS: Performed by: NURSE PRACTITIONER

## 2025-06-15 PROCEDURE — 80048 BASIC METABOLIC PNL TOTAL CA: CPT | Performed by: NURSE PRACTITIONER

## 2025-06-15 PROCEDURE — 2500000001 HC RX 250 WO HCPCS SELF ADMINISTERED DRUGS (ALT 637 FOR MEDICARE OP): Performed by: NURSE PRACTITIONER

## 2025-06-15 PROCEDURE — 2500000005 HC RX 250 GENERAL PHARMACY W/O HCPCS: Performed by: INTERNAL MEDICINE

## 2025-06-15 PROCEDURE — 83735 ASSAY OF MAGNESIUM: CPT | Performed by: NURSE PRACTITIONER

## 2025-06-15 PROCEDURE — 2500000002 HC RX 250 W HCPCS SELF ADMINISTERED DRUGS (ALT 637 FOR MEDICARE OP, ALT 636 FOR OP/ED): Performed by: NURSE PRACTITIONER

## 2025-06-15 PROCEDURE — 74018 RADEX ABDOMEN 1 VIEW: CPT

## 2025-06-15 PROCEDURE — 2060000001 HC INTERMEDIATE ICU ROOM DAILY

## 2025-06-15 PROCEDURE — 2500000004 HC RX 250 GENERAL PHARMACY W/ HCPCS (ALT 636 FOR OP/ED): Performed by: NURSE PRACTITIONER

## 2025-06-15 PROCEDURE — 82947 ASSAY GLUCOSE BLOOD QUANT: CPT

## 2025-06-15 PROCEDURE — 74018 RADEX ABDOMEN 1 VIEW: CPT | Performed by: RADIOLOGY

## 2025-06-15 PROCEDURE — 36415 COLL VENOUS BLD VENIPUNCTURE: CPT | Performed by: NURSE PRACTITIONER

## 2025-06-15 PROCEDURE — 85027 COMPLETE CBC AUTOMATED: CPT | Performed by: NURSE PRACTITIONER

## 2025-06-15 PROCEDURE — 99223 1ST HOSP IP/OBS HIGH 75: CPT | Performed by: INTERNAL MEDICINE

## 2025-06-15 PROCEDURE — 94660 CPAP INITIATION&MGMT: CPT

## 2025-06-15 RX ORDER — LANOLIN ALCOHOL/MO/W.PET/CERES
400 CREAM (GRAM) TOPICAL DAILY
Status: COMPLETED | OUTPATIENT
Start: 2025-06-15 | End: 2025-06-15

## 2025-06-15 RX ADMIN — NYSTATIN: 100000 OINTMENT TOPICAL at 21:36

## 2025-06-15 RX ADMIN — Medication 1 TABLET: at 11:26

## 2025-06-15 RX ADMIN — NYSTATIN: 100000 OINTMENT TOPICAL at 11:30

## 2025-06-15 RX ADMIN — HEPARIN SODIUM 7500 UNITS: 5000 INJECTION INTRAVENOUS; SUBCUTANEOUS at 21:34

## 2025-06-15 RX ADMIN — INSULIN LISPRO 2 UNITS: 100 INJECTION, SOLUTION INTRAVENOUS; SUBCUTANEOUS at 13:42

## 2025-06-15 RX ADMIN — FUROSEMIDE 40 MG: 10 INJECTION, SOLUTION INTRAMUSCULAR; INTRAVENOUS at 21:34

## 2025-06-15 RX ADMIN — Medication 10 ML: at 15:21

## 2025-06-15 RX ADMIN — HEPARIN SODIUM 7500 UNITS: 5000 INJECTION INTRAVENOUS; SUBCUTANEOUS at 13:42

## 2025-06-15 RX ADMIN — CITALOPRAM 40 MG: 40 TABLET, FILM COATED ORAL at 15:20

## 2025-06-15 RX ADMIN — GABAPENTIN 300 MG: 300 CAPSULE ORAL at 21:33

## 2025-06-15 RX ADMIN — AMLODIPINE BESYLATE 10 MG: 10 TABLET ORAL at 11:26

## 2025-06-15 RX ADMIN — INSULIN GLARGINE 40 UNITS: 100 INJECTION, SOLUTION SUBCUTANEOUS at 21:35

## 2025-06-15 RX ADMIN — INSULIN LISPRO 2 UNITS: 100 INJECTION, SOLUTION INTRAVENOUS; SUBCUTANEOUS at 17:39

## 2025-06-15 RX ADMIN — NYSTATIN: 100000 OINTMENT TOPICAL at 00:11

## 2025-06-15 RX ADMIN — METOPROLOL SUCCINATE 25 MG: 25 TABLET, EXTENDED RELEASE ORAL at 11:26

## 2025-06-15 RX ADMIN — ATORVASTATIN CALCIUM 10 MG: 10 TABLET, FILM COATED ORAL at 21:33

## 2025-06-15 RX ADMIN — HEPARIN SODIUM 7500 UNITS: 5000 INJECTION INTRAVENOUS; SUBCUTANEOUS at 07:00

## 2025-06-15 RX ADMIN — FUROSEMIDE 40 MG: 10 INJECTION, SOLUTION INTRAMUSCULAR; INTRAVENOUS at 11:25

## 2025-06-15 RX ADMIN — Medication 1 TABLET: at 21:33

## 2025-06-15 RX ADMIN — PANCRELIPASE 1 CAPSULE: 60000; 12000; 38000 CAPSULE, DELAYED RELEASE PELLETS ORAL at 11:26

## 2025-06-15 RX ADMIN — INSULIN GLARGINE 40 UNITS: 100 INJECTION, SOLUTION SUBCUTANEOUS at 00:33

## 2025-06-15 RX ADMIN — ASPIRIN 81 MG CHEWABLE TABLET 81 MG: 81 TABLET CHEWABLE at 11:25

## 2025-06-15 RX ADMIN — FERROUS SULFATE TAB 325 MG (65 MG ELEMENTAL FE) 1 TABLET: 325 (65 FE) TAB at 11:26

## 2025-06-15 RX ADMIN — ACETAMINOPHEN 650 MG: 325 TABLET ORAL at 21:34

## 2025-06-15 RX ADMIN — Medication 10 ML: at 21:36

## 2025-06-15 RX ADMIN — NYSTATIN 1 APPLICATION: 100000 POWDER TOPICAL at 11:30

## 2025-06-15 RX ADMIN — INSULIN GLARGINE 40 UNITS: 100 INJECTION, SOLUTION SUBCUTANEOUS at 11:25

## 2025-06-15 RX ADMIN — NYSTATIN 1 APPLICATION: 100000 POWDER TOPICAL at 21:36

## 2025-06-15 RX ADMIN — Medication 5 L/MIN: at 11:23

## 2025-06-15 RX ADMIN — NYSTATIN 1 APPLICATION: 100000 POWDER TOPICAL at 15:21

## 2025-06-15 RX ADMIN — GABAPENTIN 300 MG: 300 CAPSULE ORAL at 11:26

## 2025-06-15 RX ADMIN — Medication 10 ML: at 07:00

## 2025-06-15 RX ADMIN — PANCRELIPASE 1 CAPSULE: 60000; 12000; 38000 CAPSULE, DELAYED RELEASE PELLETS ORAL at 15:20

## 2025-06-15 ASSESSMENT — PAIN SCALES - GENERAL
PAINLEVEL_OUTOF10: 0 - NO PAIN
PAINLEVEL_OUTOF10: 3
PAINLEVEL_OUTOF10: 0 - NO PAIN
PAINLEVEL_OUTOF10: 0 - NO PAIN
PAINLEVEL_OUTOF10: 8
PAINLEVEL_OUTOF10: 0 - NO PAIN
PAINLEVEL_OUTOF10: 8
PAINLEVEL_OUTOF10: 0 - NO PAIN
PAINLEVEL_OUTOF10: 0 - NO PAIN

## 2025-06-15 ASSESSMENT — PAIN DESCRIPTION - DESCRIPTORS
DESCRIPTORS: SHARP
DESCRIPTORS: SHARP;TENDER

## 2025-06-15 ASSESSMENT — PAIN - FUNCTIONAL ASSESSMENT
PAIN_FUNCTIONAL_ASSESSMENT: 0-10

## 2025-06-15 ASSESSMENT — PAIN DESCRIPTION - LOCATION: LOCATION: RIB CAGE

## 2025-06-15 ASSESSMENT — PAIN DESCRIPTION - ORIENTATION: ORIENTATION: LEFT

## 2025-06-15 NOTE — ASSESSMENT & PLAN NOTE
Acute respiratory failure on chronic respiratory failure with hypoxia and hypercapnia patient on BiPAP right now consult pulmonary for management  Acute pulmonary edema patient started on Lasix echocardiogram was ordered to assess cardiac function  Chronic kidney disease stage III continue to monitor  Mild abdominal distention we will get a KUB

## 2025-06-15 NOTE — CARE PLAN
The patient's goals for the shift include  GET SOME REAL FOOD    The clinical goals for the shift include THE PT MARGY;L BE HDS, MAINTAIN BS > 80;  HAVE  URINE OUTPUT > 30ML/HR; EXPERIENCE NO SOB, TOLERATE WEARING HER BIPAP FOR > 6 HRS AND HER SKIN RASH  WILL CONTINUE TO HEAL WITH THE OINTMENTS APPLIED, ALL BY THE END OF THIS SHIFT.

## 2025-06-15 NOTE — H&P
History Of Present Illness  Kaylene Feliciano is a 70 y.o. female presenting with past medical history of chronic respiratory failure on BiPAP at home at night morbid obesity obstructive sleep apnea hypoventilation syndrome morbid obesity history of type 2 diabetes admitted to the hospital with increased shortness of breath and work of breathing for the past week patient normally uses BiPAP at night and she has been using it during the day related to excessive shortness of breath patient seen in the emergency room x-ray consistent with pulmonary edema.     Past Medical History  She has a past medical history of Adnexal cyst, Ambulatory dysfunction, Anxiety and depression, Bipolar disorder, Chronic back pain, Chronic kidney disease, stage II (mild), Chronic respiratory failure with hypoxia and hypercapnia, Diabetic peripheral neuropathy (Multi), Diastolic heart failure, Diverticulosis, Gastroesophageal reflux disease, Hepatic steatosis, Hepatomegaly, History of femur fracture, Hyperlipidemia, Hypertension, Insulin dependent type 2 diabetes mellitus (Multi), Iron deficiency anemia, Kidney stones, Long term current use of aspirin, Multiple lung nodules, Obesity hypoventilation syndrome (Multi), Obesity, morbid, BMI 50 or higher (Multi), Obstructive sleep apnea treated with BiPAP, Osteoarthritis, Osteoporosis, Pulmonary hypertension (Multi), Renal cyst, right, Secondary pancreatic insufficiency (HHS-HCC), Spinal stenosis, Vitamin D deficiency, and Wheelchair dependence.    Surgical History  She has a past surgical history that includes Lithotripsy; Hysterectomy; Cholecystectomy; Breast biopsy;  section, low transverse; Deep brain stimulator placement; ORIF femur fracture (Right); Ovarian cyst removal; Knee surgery; Tonsillectomy; Adenoidectomy; and Bladder suspension.     Social History  She reports that she has never smoked. She has never used smokeless tobacco. She reports that she does not drink alcohol and does  not use drugs.    Family History  Family History[1]     Allergies  Sulfa (sulfonamide antibiotics), Adhesive tape-silicones, Belladonna, Benzoin, Ciprofloxacin, Iodinated contrast media, and Tegaderm ag mesh [silver]    Review of Systems   Constitutional:  Negative for diaphoresis and fatigue.   HENT:  Negative for ear pain, facial swelling, tinnitus and trouble swallowing.    Eyes:  Negative for photophobia and visual disturbance.   Respiratory: Positive shortness of breath cardiovascular:  Negative for chest pain and palpitations.   Gastrointestinal:  Negative for abdominal pain, blood in stool and diarrhea.   Endocrine: Negative for cold intolerance, heat intolerance, polydipsia and polyuria.   Musculoskeletal:  Negative for back pain and joint swelling.   Skin:  Negative for color change and rash.   Allergic/Immunologic: Negative for food allergies.   Neurological:  Negative for tremors, facial asymmetry and weakness.   Psychiatric/Behavioral:  The patient is not hyperactive.       Physical Exam  HENT:      Right Ear: External ear normal.      Left Ear: External ear normal.      Mouth/Throat:      Mouth: Mucous membranes are moist.   Cardiovascular:      Rate and Rhythm: Normal rate and regular rhythm.      Heart sounds: No murmur heard.     No friction rub. No gallop.   Pulmonary:      Effort: No accessory muscle usage or respiratory distress.      Breath sounds: No stridor. No wheezing or rhonchi.   Chest:      Chest wall: No tenderness.   Abdominal:      General: There is no distension.      Palpations: There is no mass.      Tenderness: There is no abdominal tenderness. There is no guarding or rebound.   Musculoskeletal:         General: No deformity or signs of injury.      Cervical back: No rigidity or tenderness. Normal range of motion.      Right lower leg: No edema.      Left lower leg: No edema.   Skin:     Coloration: Skin is not jaundiced or pale.      Findings: No lesion.   Neurological:       "General: No focal deficit present.      Mental Status: He is alert, oriented to person, place, and time and easily aroused.      Cranial Nerves: No cranial nerve deficit.      Sensory: No sensory deficit.      Motor: No weakness.        Last Recorded Vitals  Blood pressure 140/64, pulse 64, temperature 36.7 °C (98.1 °F), temperature source Temporal, resp. rate 15, height 1.626 m (5' 4\"), weight 141 kg (311 lb 8.2 oz), SpO2 99%.    Labs    Admission on 06/13/2025   Component Date Value Ref Range Status    WBC 06/13/2025 11.2  4.4 - 11.3 x10*3/uL Final    nRBC 06/13/2025 0.0  0.0 - 0.0 /100 WBCs Final    RBC 06/13/2025 3.16 (L)  4.00 - 5.20 x10*6/uL Final    Hemoglobin 06/13/2025 8.9 (L)  12.0 - 16.0 g/dL Final    Hematocrit 06/13/2025 29.5 (L)  36.0 - 46.0 % Final    MCV 06/13/2025 93  80 - 100 fL Final    MCH 06/13/2025 28.2  26.0 - 34.0 pg Final    MCHC 06/13/2025 30.2 (L)  32.0 - 36.0 g/dL Final    RDW 06/13/2025 15.2 (H)  11.5 - 14.5 % Final    Platelets 06/13/2025 169  150 - 450 x10*3/uL Final    Neutrophils % 06/13/2025 71.0  40.0 - 80.0 % Final    Immature Granulocytes %, Automated 06/13/2025 0.9  0.0 - 0.9 % Final    Immature Granulocyte Count (IG) includes promyelocytes, myelocytes and metamyelocytes but does not include bands. Percent differential counts (%) should be interpreted in the context of the absolute cell counts (cells/UL).    Lymphocytes % 06/13/2025 18.0  13.0 - 44.0 % Final    Monocytes % 06/13/2025 5.8  2.0 - 10.0 % Final    Eosinophils % 06/13/2025 3.9  0.0 - 6.0 % Final    Basophils % 06/13/2025 0.4  0.0 - 2.0 % Final    Neutrophils Absolute 06/13/2025 7.97 (H)  1.20 - 7.70 x10*3/uL Final    Percent differential counts (%) should be interpreted in the context of the absolute cell counts (cells/uL).    Immature Granulocytes Absolute, Au* 06/13/2025 0.10  0.00 - 0.70 x10*3/uL Final    Lymphocytes Absolute 06/13/2025 2.02  1.20 - 4.80 x10*3/uL Final    Monocytes Absolute 06/13/2025 0.65  0.10 - " 1.00 x10*3/uL Final    Eosinophils Absolute 06/13/2025 0.44  0.00 - 0.70 x10*3/uL Final    Basophils Absolute 06/13/2025 0.05  0.00 - 0.10 x10*3/uL Final    Glucose 06/13/2025 123 (H)  74 - 99 mg/dL Final    Sodium 06/13/2025 135 (L)  136 - 145 mmol/L Final    Potassium 06/13/2025 5.4 (H)  3.5 - 5.3 mmol/L Final    MILD HEMOLYSIS DETECTED. The result may be falsely elevated due to hemolysis or other interferents. Clinical correlation is recommended. Repeat testing may be considered.    Chloride 06/13/2025 101  98 - 107 mmol/L Final    Bicarbonate 06/13/2025 25  21 - 32 mmol/L Final    Anion Gap 06/13/2025 14  10 - 20 mmol/L Final    Urea Nitrogen 06/13/2025 30 (H)  6 - 23 mg/dL Final    Creatinine 06/13/2025 1.17 (H)  0.50 - 1.05 mg/dL Final    eGFR 06/13/2025 50 (L)  >60 mL/min/1.73m*2 Final    Calculations of estimated GFR are performed using the 2021 CKD-EPI Study Refit equation without the race variable for the IDMS-Traceable creatinine methods.  https://jasn.asnjournals.org/content/early/2021/09/22/ASN.7302072551    Calcium 06/13/2025 9.1  8.6 - 10.3 mg/dL Final    Albumin 06/13/2025 4.1  3.4 - 5.0 g/dL Final    Alkaline Phosphatase 06/13/2025 90  33 - 136 U/L Final    Total Protein 06/13/2025 7.1  6.4 - 8.2 g/dL Final    AST 06/13/2025 14  9 - 39 U/L Final    MILD HEMOLYSIS DETECTED. The result may be falsely elevated due to hemolysis or other interferents. Clinical correlation is recommended. Repeat testing may be considered.    Bilirubin, Total 06/13/2025 0.6  0.0 - 1.2 mg/dL Final    ALT 06/13/2025 4 (L)  7 - 45 U/L Final    Patients treated with Sulfasalazine may generate falsely decreased results for ALT.    Color, Urine 06/13/2025 Light-Yellow  Light-Yellow, Yellow, Dark-Yellow Final    Appearance, Urine 06/13/2025 Clear  Clear Final    Specific Gravity, Urine 06/13/2025 1.011  1.005 - 1.035 Final    pH, Urine 06/13/2025 6.0  5.0, 5.5, 6.0, 6.5, 7.0, 7.5, 8.0 Final    Protein, Urine 06/13/2025 NEGATIVE   NEGATIVE, 10 (TRACE), 20 (TRACE) mg/dL Final    Glucose, Urine 06/13/2025 Normal  Normal mg/dL Final    Blood, Urine 06/13/2025 0.1 (1+) (A)  NEGATIVE mg/dL Final    Ketones, Urine 06/13/2025 NEGATIVE  NEGATIVE mg/dL Final    Bilirubin, Urine 06/13/2025 NEGATIVE  NEGATIVE mg/dL Final    Urobilinogen, Urine 06/13/2025 Normal  Normal mg/dL Final    Nitrite, Urine 06/13/2025 NEGATIVE  NEGATIVE Final    Leukocyte Esterase, Urine 06/13/2025 NEGATIVE  NEGATIVE Final    Extra Tube 06/13/2025 Hold for add-ons.   Final    Auto resulted.    Magnesium 06/13/2025 2.40  1.60 - 2.40 mg/dL Final    Lipase 06/13/2025 5 (L)  9 - 82 U/L Final    Phosphorus 06/13/2025 4.2  2.5 - 4.9 mg/dL Final    MILD HEMOLYSIS DETECTED. The result may be falsely elevated due to hemolysis or other interferents. Clinical correlation is recommended. Repeat testing may be considered.    BNP 06/13/2025 266 (H)  0 - 99 pg/mL Final    POCT pH, Venous 06/13/2025 7.39  7.33 - 7.43 pH Final    POCT pCO2, Venous 06/13/2025 53 (H)  41 - 51 mm Hg Final    POCT pO2, Venous 06/13/2025 44  35 - 45 mm Hg Final    POCT SO2, Venous 06/13/2025 80 (H)  45 - 75 % Final    POCT Oxy Hemoglobin, Venous 06/13/2025 77.4 (H)  45.0 - 75.0 % Final    POCT Hematocrit Calculated, Venous 06/13/2025 29.0 (L)  36.0 - 46.0 % Final    POCT Sodium, Venous 06/13/2025 133 (L)  136 - 145 mmol/L Final    POCT Potassium, Venous 06/13/2025 8.1 (HH)  3.5 - 5.3 mmol/L Final    POCT Chloride, Venous 06/13/2025 104  98 - 107 mmol/L Final    POCT Ionized Calicum, Venous 06/13/2025 1.14  1.10 - 1.33 mmol/L Final    POCT Glucose, Venous 06/13/2025 117 (H)  74 - 99 mg/dL Final    POCT Lactate, Venous 06/13/2025 0.9  0.4 - 2.0 mmol/L Final    POCT Base Excess, Venous 06/13/2025 6.1 (H)  -2.0 - 3.0 mmol/L Final    POCT HCO3 Calculated, Venous 06/13/2025 32.1 (H)  22.0 - 26.0 mmol/L Final    POCT Hemoglobin, Venous 06/13/2025 9.8 (L)  12.0 - 16.0 g/dL Final    POCT Anion Gap, Venous 06/13/2025 5.0 (L)   10.0 - 25.0 mmol/L Final    Patient Temperature 06/13/2025    Final    NOTE: Patient Results are Not Corrected for Temperature    FiO2 06/13/2025 40  % Final    Critical Called By 06/13/2025 REHNERT   Final    Critical Called To 06/13/2025 DR OLIVO   Final    Critical Call Time 06/13/2025 2155   Final    Critical Read Back 06/13/2025 Y   Final    Troponin I, High Sensitivity 06/13/2025 6  0 - 13 ng/L Final    Troponin I, High Sensitivity 06/13/2025 6  0 - 13 ng/L Final    Potassium 06/13/2025 4.9  3.5 - 5.3 mmol/L Final    MILD HEMOLYSIS DETECTED. The result may be falsely elevated due to hemolysis or other interferents. Clinical correlation is recommended. Repeat testing may be considered.    Flu A Result 06/13/2025 Not Detected  Not Detected Final    Flu B Result 06/13/2025 Not Detected  Not Detected Final    Coronavirus 2019, PCR 06/13/2025 Not Detected  Not Detected Final    WBC, Urine 06/13/2025 1-5  1-5, NONE /HPF Final    RBC, Urine 06/13/2025 3-5  NONE, 1-2, 3-5 /HPF Final    Squamous Epithelial Cells, Urine 06/13/2025 1-9 (SPARSE)  Reference range not established. /HPF Final    Vitamin D, 25-Hydroxy, Total 06/13/2025 16 (L)  30 - 100 ng/mL Final    WBC 06/14/2025 12.2 (H)  4.4 - 11.3 x10*3/uL Final    nRBC 06/14/2025 0.0  0.0 - 0.0 /100 WBCs Final    RBC 06/14/2025 3.30 (L)  4.00 - 5.20 x10*6/uL Final    Hemoglobin 06/14/2025 8.6 (L)  12.0 - 16.0 g/dL Final    Hematocrit 06/14/2025 28.5 (L)  36.0 - 46.0 % Final    MCV 06/14/2025 86  80 - 100 fL Final    MCH 06/14/2025 26.1  26.0 - 34.0 pg Final    MCHC 06/14/2025 30.2 (L)  32.0 - 36.0 g/dL Final    RDW 06/14/2025 15.4 (H)  11.5 - 14.5 % Final    Platelets 06/14/2025 222  150 - 450 x10*3/uL Final    Glucose 06/14/2025 98  74 - 99 mg/dL Final    Sodium 06/14/2025 140  136 - 145 mmol/L Final    Potassium 06/14/2025 4.3  3.5 - 5.3 mmol/L Final    Chloride 06/14/2025 101  98 - 107 mmol/L Final    Bicarbonate 06/14/2025 33 (H)  21 - 32 mmol/L Final    Anion Gap  06/14/2025 10  10 - 20 mmol/L Final    Urea Nitrogen 06/14/2025 29 (H)  6 - 23 mg/dL Final    Creatinine 06/14/2025 1.18 (H)  0.50 - 1.05 mg/dL Final    eGFR 06/14/2025 50 (L)  >60 mL/min/1.73m*2 Final    Calculations of estimated GFR are performed using the 2021 CKD-EPI Study Refit equation without the race variable for the IDMS-Traceable creatinine methods.  https://jasn.asnjournals.org/content/early/2021/09/22/ASN.9738806219    Calcium 06/14/2025 9.1  8.6 - 10.3 mg/dL Final    Magnesium 06/14/2025 2.02  1.60 - 2.40 mg/dL Final    BSA 06/14/2025 2.38  m2 In process    POCT Glucose 06/14/2025 90  74 - 99 mg/dL Final    POCT pH, Arterial 06/14/2025 7.40  7.38 - 7.42 pH Final    POCT pCO2, Arterial 06/14/2025 52 (H)  38 - 42 mm Hg Final    POCT pO2, Arterial 06/14/2025 66 (L)  85 - 95 mm Hg Final    POCT SO2, Arterial 06/14/2025 95  94 - 100 % Final    POCT Oxy Hemoglobin, Arterial 06/14/2025 92.6 (L)  94.0 - 98.0 % Final    POCT Hematocrit Calculated, Arteri* 06/14/2025 29.0 (L)  36.0 - 46.0 % Final    POCT Sodium, Arterial 06/14/2025 137  136 - 145 mmol/L Final    POCT Potassium, Arterial 06/14/2025 4.7  3.5 - 5.3 mmol/L Final    POCT Chloride, Arterial 06/14/2025 102  98 - 107 mmol/L Final    POCT Ionized Calcium, Arterial 06/14/2025 1.23  1.10 - 1.33 mmol/L Final    POCT Glucose, Arterial 06/14/2025 191 (H)  74 - 99 mg/dL Final    POCT Lactate, Arterial 06/14/2025 1.2  0.4 - 2.0 mmol/L Final    POCT Base Excess, Arterial 06/14/2025 6.4 (H)  -2.0 - 3.0 mmol/L Final    POCT HCO3 Calculated, Arterial 06/14/2025 32.2 (H)  22.0 - 26.0 mmol/L Final    POCT Hemoglobin, Arterial 06/14/2025 9.5 (L)  12.0 - 16.0 g/dL Final    POCT Anion Gap, Arterial 06/14/2025 8 (L)  10 - 25 mmo/L Final    Patient Temperature 06/14/2025    Final    NOTE: Patient Results are Not Corrected for Temperature    FiO2 06/14/2025 40  % Final    Ipap CMH2O 06/14/2025 16.0  cm H2O Final    Epap CMH2O 06/14/2025 10.0  cm H2O Final    Site of  Arterial Puncture 06/14/2025 Radial Right   Final    Jaden's Test 06/14/2025 Positive   Final    Site of Arterial Puncture 06/14/2025 N/A   Final    Jaden's Test 06/14/2025 N/A   Final    POCT Glucose 06/14/2025 94  74 - 99 mg/dL Final    POCT Glucose 06/14/2025 184 (H)  74 - 99 mg/dL Final    POCT Glucose 06/14/2025 66 (L)  74 - 99 mg/dL Final    POCT Glucose 06/14/2025 86  74 - 99 mg/dL Final    WBC 06/15/2025 9.2  4.4 - 11.3 x10*3/uL Final    nRBC 06/15/2025 0.0  0.0 - 0.0 /100 WBCs Final    RBC 06/15/2025 3.51 (L)  4.00 - 5.20 x10*6/uL Final    Hemoglobin 06/15/2025 9.0 (L)  12.0 - 16.0 g/dL Final    Hematocrit 06/15/2025 30.0 (L)  36.0 - 46.0 % Final    MCV 06/15/2025 86  80 - 100 fL Final    MCH 06/15/2025 25.6 (L)  26.0 - 34.0 pg Final    MCHC 06/15/2025 30.0 (L)  32.0 - 36.0 g/dL Final    RDW 06/15/2025 15.3 (H)  11.5 - 14.5 % Final    Platelets 06/15/2025 224  150 - 450 x10*3/uL Final    Glucose 06/15/2025 147 (H)  74 - 99 mg/dL Final    Sodium 06/15/2025 138  136 - 145 mmol/L Final    Potassium 06/15/2025 4.0  3.5 - 5.3 mmol/L Final    Chloride 06/15/2025 99  98 - 107 mmol/L Final    Bicarbonate 06/15/2025 32  21 - 32 mmol/L Final    Anion Gap 06/15/2025 11  10 - 20 mmol/L Final    Urea Nitrogen 06/15/2025 23  6 - 23 mg/dL Final    Creatinine 06/15/2025 1.19 (H)  0.50 - 1.05 mg/dL Final    eGFR 06/15/2025 49 (L)  >60 mL/min/1.73m*2 Final    Calculations of estimated GFR are performed using the 2021 CKD-EPI Study Refit equation without the race variable for the IDMS-Traceable creatinine methods.  https://jasn.asnjournals.org/content/early/2021/09/22/ASN.8124841087    Calcium 06/15/2025 9.0  8.6 - 10.3 mg/dL Final    Magnesium 06/15/2025 1.92  1.60 - 2.40 mg/dL Final    POCT Glucose 06/14/2025 82  74 - 99 mg/dL Final    POCT Glucose 06/14/2025 165 (H)  74 - 99 mg/dL Final    POCT Glucose 06/15/2025 133 (H)  74 - 99 mg/dL Final         Imaging     CT chest abdomen pelvis wo IV contrast  Narrative: Interpreted  By:  Daniel Laurent,   STUDY:  CT CHEST ABDOMEN PELVIS WO CONTRAST;  6/13/2025 10:53 pm      INDICATION:  Signs/Symptoms:Left flank pain, shortness of breath, volume overload          COMPARISON:  CT abdomen pelvis 01/10/2025.      ACCESSION NUMBER(S):  [id number]      ORDERING CLINICIAN:  NOLAN KENNEDY      TECHNIQUE:  CT of the chest, abdomen, and pelvis was performed without  intravenous contrast.. Coronal and sagittal reconstructions at  3 mm  slice thickness were performed.      FINDINGS:  CHEST:      LUNG/PLEURA/LARGE AIRWAYS:  Central tracheobronchial tree is patent. No evidence of pleural  effusion. Bibasilar subsegmental band like atelectasis.      There are a few subpleural nodules in the right lung apex, for  example measuring up to 5 mm on axial image 25 and in anterior right  upper lobe measuring 5 mm on axial image 34 and 31. There is also  mild atelectasis versus scarring in the left lung apex, axial image  20.      Mild interseptal thickening and mosaic attenuation of the lung fields  which may represent interstitial edema or small airways or small  vessel disease.      VESSELS:  The thoracic aorta is of normal course and caliber.  Main pulmonary  trunk is mildly enlarged measuring 3.2 cm in caliber raising  possibility of pulmonary arterial hypertension.. No coronary artery  calcifications are seen.      HEART:  The heart is normal in size.   There is no pericardial effusion.      MEDIASTINUM AND ARAMIS:  No pneumomediastinum, abnormal mediastinal fluid collection or  mediastinal hematoma are appreciated.  No mediastinal, hilar or  biaxillary adenopathy is present. Multiple subcentimeter mediastinal  lymph nodes. Small calcified left thyroid nodule.      CHEST WALL AND LOWER NECK:  No acute fracture or dislocation of the included osseous structures  are appreciated.  No suspicious osseous lesions are identified.  Partially visualized right anterior upper chest wall battery pack.      ABDOMEN:       LIVER:  There is hepatomegaly and hepatic steatosis. Liver is normal in  contour.      GALLBLADDER:  The gallbladder is surgically absent.      BILE DUCTS:  The intahepatic and extrahepatic bile ducts are not dilated.      PANCREAS:  The pancreas appears unremarkable.      SPLEEN:  Normal-size.      ADRENAL GLANDS:  The bilateral adrenal glands are unremarkable in appearance.      KIDNEYS AND URETERS:  Unchanged 4.6 x 4.0 cm right lower pole renal cyst. No  hydroureteronephrosis or nephroureterolithiasis is present.      PELVIS:      BLADDER:  The urinary bladder appears within normal limits.      REPRODUCTIVE ORGANS:  Status post hysterectomy. Unchanged 4.5 cm right adnexal cyst.      BOWEL:  The stomach is unremarkable.  The small bowel is normal in caliber  without evidence of focal wall thickening or obstruction.  There is  no evidence of focal wall thickening or dilatation of the large  bowel.  Appendix not visualized without evidence of appendicitis.  Scattered sigmoid diverticulosis without diverticulitis.      VESSELS:  The aorta and IVC are within normal limits.  The principal  vasculature of the abdomen and pelvis is patent.      PERITONEUM/RETROPERITONEUM/LYMPH NODES:  There is no evidence of intra- or retroperitoneal hematoma.  There is  no free or loculated fluid collection, no free intraperitoneal air.  No abdominopelvic lymphadenopathy is present.      BONES AND ABDOMINAL WALL:  No evidence of acute fracture or dislocation of the included osseous  structures.  No suspicious osseous lesions are identified.  Diffuse  edema of the anterior chest wall and anterior abdominal wall  nonspecific, similar to prior, possibly due to fluid overload.      Impression: CHEST  1.  Chronic bibasilar bandlike atelectasis and mild scarring versus  atelectasis in the bilateral lung apices. No pleural effusion. Mild  interseptal thickening and mosaic attenuation of the lung fields  which may represent mild interstitial  edema. Mild enlargement of main  pulmonary trunk similar to prior.      ABDOMEN - PELVIS  1.  No acute abnormality in the abdomen/pelvis. Unchanged right lower  pole renal cyst. Sigmoid diverticulosis without diverticulitis. Mild  hepatomegaly and hepatic steatosis. Nonspecific edema in the anterior  chest and abdominal wall, clinical correlation for fluid overload  versus cellulitis recommended.  2. Unchanged 4.5 cm right adnexal cyst, follow-up pelvic ultrasound  in 6 months is recommended.          MACRO:  None      Signed by: Daniel Laurent 6/13/2025 11:21 PM  Dictation workstation:   [id number]  XR chest 1 view  Narrative: Interpreted By:  Daniel Laurent,   STUDY:  XR CHEST 1 VIEW;  6/13/2025 9:30 pm      INDICATION:  Signs/Symptoms:L low chest pain and sob.      COMPARISON:  Chest radiograph 01/10/2025.      ACCESSION NUMBER(S):  [id number]      ORDERING CLINICIAN:  NOLAN KENNEDY      FINDINGS:      CARDIOMEDIASTINAL SILHOUETTE:  Cardiomediastinal silhouette is enlarged and stable.      LUNGS/PLEURA:  Bibasilar subsegmental opacities which may represent atelectasis or  pneumonia. There is loculated small to moderate-sized left pleural  effusion.. There is no demonstrated pneumothorax.          BONES: No evidence of acute osseous abnormality.      Impression: 1.  Bibasilar chronic subsegmental opacities which may represent  atelectasis. There is new small to moderate-sized left pleural  effusion raising possibility of pneumonia.          Signed by: Daniel Laurent 6/13/2025 9:41 PM  Dictation workstation:   [id number]       Problem List[2]      Assessment/Plan   Assessment & Plan  Acute on chronic respiratory failure with hypoxia and hypercapnia    Pulmonary edema with congestive heart failure with preserved left ventricular function    Fluid overload    Renal insufficiency    Left flank pain    Acute respiratory failure on chronic respiratory failure with hypoxia and hypercapnia patient on BiPAP right now  consult pulmonary for management  Acute pulmonary edema patient started on Lasix echocardiogram was ordered to assess cardiac function  Chronic kidney disease stage III continue to monitor  Mild abdominal distention we will get a KUB           I spent 55 minutes in the professional and overall care of this patient.      ANDREW Mccloud MD         [1]   Family History  Problem Relation Name Age of Onset    Diabetes Mother      Hypertension Mother      Thyroid cancer Mother      Heart attack Mother      Osteoporosis Mother      Stroke Father      Hypertension Father      Diabetes Brother      Hypertension Brother      Cancer Brother     [2]   Patient Active Problem List  Diagnosis    Acute on chronic respiratory failure with hypoxia and hypercapnia    Pulmonary edema with congestive heart failure with preserved left ventricular function    Fluid overload    Renal insufficiency    Left flank pain

## 2025-06-15 NOTE — CARE PLAN
Problem: Heart Failure  Goal: Improved gas exchange this shift  Outcome: Progressing  Goal: Improved urinary output this shift  Outcome: Progressing  Goal: Reduction in peripheral edema within 24 hours  Outcome: Progressing  Goal: Report improvement of dyspnea/breathlessness this shift  Outcome: Progressing  Goal: Weight from fluid excess reduced over 2-3 days, then stabilize  Outcome: Progressing  Goal: Increase self care and/or family involvement in 24 hours  Outcome: Progressing     Problem: Pain - Adult  Goal: Verbalizes/displays adequate comfort level or baseline comfort level  Outcome: Progressing     Problem: Safety - Adult  Goal: Free from fall injury  Outcome: Progressing     Problem: Discharge Planning  Goal: Discharge to home or other facility with appropriate resources  Outcome: Progressing     Problem: Chronic Conditions and Co-morbidities  Goal: Patient's chronic conditions and co-morbidity symptoms are monitored and maintained or improved  Outcome: Progressing     Problem: Nutrition  Goal: Nutrient intake appropriate for maintaining nutritional needs  Outcome: Progressing     Problem: Skin  Goal: Decreased wound size/increased tissue granulation at next dressing change  Outcome: Progressing  Flowsheets (Taken 6/15/2025 1406)  Decreased wound size/increased tissue granulation at next dressing change: Promote sleep for wound healing  Goal: Participates in plan/prevention/treatment measures  Outcome: Progressing  Flowsheets (Taken 6/15/2025 1406)  Participates in plan/prevention/treatment measures: Elevate heels  Goal: Prevent/manage excess moisture  Outcome: Progressing  Flowsheets (Taken 6/15/2025 1406)  Prevent/manage excess moisture:   Cleanse incontinence/protect with barrier cream   Moisturize dry skin  Note: Nystatin powder; nystatin ointment  Goal: Prevent/minimize sheer/friction injuries  Outcome: Progressing  Goal: Promote/optimize nutrition  Outcome: Progressing  Goal: Promote skin  healing  Outcome: Progressing  Flowsheets (Taken 6/15/2025 1406)  Promote skin healing:   Assess skin/pad under line(s)/device(s)   Turn/reposition every 2 hours/use positioning/transfer devices     Problem: Respiratory  Goal: Clear secretions with interventions this shift  Outcome: Progressing  Goal: Minimize anxiety/maximize coping throughout shift  Outcome: Progressing  Goal: Minimal/no exertional discomfort or dyspnea this shift  Outcome: Progressing  Goal: No signs of respiratory distress (eg. Use of accessory muscles. Peds grunting)  Outcome: Progressing  Goal: Patent airway maintained this shift  Outcome: Progressing  Goal: Tolerate mechanical ventilation evidenced by VS/agitation level this shift  Outcome: Progressing  Goal: Tolerate pulmonary toileting this shift  Outcome: Progressing  Goal: Verbalize decreased shortness of breath this shift  Outcome: Progressing  Goal: Wean oxygen to maintain O2 saturation per order/standard this shift  Outcome: Progressing  Goal: Increase self care and/or family involvement in next 24 hours  Outcome: Progressing     Problem: Fall/Injury  Goal: Not fall by end of shift  Outcome: Progressing  Goal: Be free from injury by end of the shift  Outcome: Progressing  Goal: Verbalize understanding of personal risk factors for fall in the hospital  Outcome: Progressing  Goal: Pace activities to prevent fatigue by end of the shift  Outcome: Progressing     Problem: Diabetes  Goal: Increase stability of blood glucose readings by end of shift  Outcome: Progressing  Goal: Maintain electrolyte levels within acceptable range throughout shift  Outcome: Progressing  Goal: Maintain glucose levels >70mg/dl to <250mg/dl throughout shift  Outcome: Progressing  Goal: No changes in neurological exam by end of shift  Outcome: Progressing  Goal: Vital signs within normal range for age by end of shift  Outcome: Progressing   The patient's goals for the shift include      The clinical goals for the  shift include Pt will remain HDS throughout shift.

## 2025-06-15 NOTE — CONSULTS
Inpatient consult to Pulmonology  Consult performed by: Brenda Buckner MD  Consult ordered by: Velma Haines, APRN-CNP  Reason for consult: pulm edema        Pulmonary Disease Consult    PATIENT NAME: Kaylene Feliciano    MRN: [id number]  SERVICE DATE:  6/15/2025   SERVICE TIME:  3:46 PM        PRIMARY CARE PHYSICIAN: Linda Srivastava MD  REASON FOR CONSULT: Pulmonary edema  REQUESTING PHYSICIAN: ingrid        ASSESSMENT :     Acute on chronic hypercapnic hypoxic respiratory failure  Restrictive lung disease with bilateral basal atelectasis worse in the left side  Morbid obesity with OHS and ARCHIE  Bilateral lung infiltrate most likely due to pulmonary edema  Possible underlying obstructive lung disease but no exacerbation    Ct chest 6/13   Central tracheobronchial tree is patent. No evidence of pleural  effusion. Bibasilar subsegmental band like atelectasis.    There are a few subpleural nodules in the right lung apex, for  example measuring up to 5 mm on axial image 25 and in anterior right  upper lobe measuring 5 mm on axial image 34 and 31. There is also  mild atelectasis versus scarring in the left lung apex, axial image  20.    Mild interseptal thickening and mosaic attenuation of the lung fields  which may represent interstitial edema or small airways or small  vessel disease.  Overall picture is suggestive of pulmonary edema as patient has improved since admission and being on diuretics and noninvasive ventilation with the BiPAP and oxygen  No symptoms or signs to suggest an active chest infection or flareup of exacerbation  Continue the current treatment  Patient will need to be followed up as outpatient to optimize management          HPI     Kaylene Feliciano is a 70 y.o. female non-smoker who is on chronic home oxygen continuously 4 L/min at rest, and fortunately been bedridden for the last 2 months and her  state cares of her.  past medical history of chronic respiratory failure on BiPAP at home at  night morbid obesity obstructive sleep apnea hypoventilation syndrome morbid obesity history admitted to the hospital with increased shortness of breath and work of breathing for the past week.  She denies history of any cough wheezing chest pain.  She is compliant to her BiPAP and oxygen and she cannot sleep without it.  Denies history of fever chills or night sweats  patient normally uses BiPAP at night and she has been using it during the day related to excessive shortness of breath patient seen in the emergency room x-ray consistent with pulmonary edema.  And patient was admitted for further management and consult requested  Other past medical history include  type 2 diabetes  Adnexal cyst, Ambulatory dysfunction, Anxiety and depression, Bipolar disorder, Chronic back pain, Chronic kidney disease, stage II (mild), Chronic respiratory failure with hypoxia and hypercapnia, Diabetic peripheral neuropathy (Multi), Diastolic heart failure, Diverticulosis, Gastroesophageal reflux disease, Hepatic steatosis, Hepatomegaly, History of femur fracture, Hyperlipidemia, Hypertension, Insulin dependent type 2 diabetes mellitus (Multi), Iron deficiency anemia, Kidney stones, Long term current use of aspirin, Multiple lung nodules, Obesity hypoventilation syndrome (Multi), Obesity, morbid, BMI 50 or higher (Multi), Obstructive sleep apnea treated with BiPAP, Osteoarthritis, Osteoporosis, Pulmonary hypertension (Multi), Renal cyst, right, Secondary pancreatic insufficiency (HHS-HCC), Spinal stenosis, Vitamin D deficiency, and Wheelchair dependence.     PAST MEDICAL HISTORY: Medical History[1]  PAST SURGICAL HISTORY: Surgical History[2]  FAMILY HISTORY: Family History[3]  SOCIAL HISTORY: Social History[4]  CURRENT ALLERGIES:   Allergies as of 06/13/2025 - Reviewed 06/13/2025   Allergen Reaction Noted    Sulfa (sulfonamide antibiotics) Anaphylaxis 09/15/2023    Belladonna Other 09/15/2023    Ciprofloxacin Diarrhea and  Nausea/vomiting 09/15/2023    Adhesive tape-silicones Itching 09/15/2023    Iodinated contrast media Itching 09/15/2023    Tegaderm ag mesh [silver] Itching 09/15/2023       MEDICATIONS:  Scheduled medications  Scheduled Medications[5]  Continuous medications  Continuous Medications[6]  PRN medications  PRN Medications[7]      ROS  Review of Systems      Physical Exam  Vitals reviewed.   Constitutional:       Appearance: Normal appearance. She is obese.  On continuous oxygen 4 L/min  HENT:      Head: Normocephalic and atraumatic.   Eyes:      Extraocular Movements: Extraocular movements intact.      Pupils: Pupils are equal, round, and reactive to light.   Cardiovascular:      Rate and Rhythm: Normal rate and regular rhythm.      Heart sounds: No murmur heard.  Pulmonary:      Effort: No respiratory distress present.      Breath sounds: No stridor.  No wheezing and rhonchi present.   Chest:      Chest wall: No tenderness.   Abdominal:      General: Bowel sounds are normal. There is no distension.      Palpations: There is no mass.      Tenderness: There is no abdominal tenderness. There is no rebound.   Musculoskeletal:         General: Swelling present. No tenderness or deformity.      Cervical back: No rigidity.   Lymphadenopathy:      Cervical: No cervical adenopathy.   Skin:     General: Skin is warm.      Coloration: Skin is pale. Skin is not jaundiced.      Findings: No bruising.   Neurological:      General: No focal deficit present.      Mental Status: She is alert and oriented to person, place, and time. Mental status is at baseline.      Cranial Nerves: No cranial nerve deficit.      Motor: Bedridden with fracture in the right leg and generalized weakness present.   Psychiatric:         Mood and Affect: Mood normal.         Behavior: Behavior normal.     Patient Vitals for the past 24 hrs:   BP Temp Temp src Pulse Resp SpO2 Weight   06/15/25 1200 -- -- -- 72 (!) 27 96 % --   06/15/25 1123 138/58 36.4 °C  (97.5 °F) Temporal 68 25 95 % --   06/15/25 0811 130/61 36.5 °C (97.7 °F) Temporal 68 17 99 % --   06/15/25 0800 -- -- -- 64 15 -- --   06/15/25 0435 140/64 36.7 °C (98.1 °F) Temporal 66 18 97 % 141 kg (311 lb 8.2 oz)   06/15/25 0400 -- -- -- 58 14 97 % --   06/15/25 0000 130/54 36.4 °C (97.5 °F) Temporal 66 21 99 % --   06/14/25 2000 129/61 36.2 °C (97.2 °F) Temporal 67 (!) 34 97 % --   06/14/25 1600 -- -- -- 68 24 95 % --     Body mass index is 53.47 kg/m².      Gen: NAD  Neck: symmetric, no mass  Cardiovascular: RRR  Respiratory: No distress   GI: Abd soft, nontender, non-distended  Extremities: no leg edema  Skin: Warm and dry.  Neuro: alert and oriented times 3  : no oliveira     Labs:  Lab Results   Component Value Date    WBC 9.2 06/15/2025    HGB 9.0 (L) 06/15/2025    HCT 30.0 (L) 06/15/2025    MCV 86 06/15/2025     06/15/2025     Lab Results   Component Value Date    GLUCOSE 147 (H) 06/15/2025    CALCIUM 9.0 06/15/2025     06/15/2025    K 4.0 06/15/2025    CO2 32 06/15/2025    CL 99 06/15/2025    BUN 23 06/15/2025    CREATININE 1.19 (H) 06/15/2025   ESR: --  Lab Results   Component Value Date    SEDRATE 84 (H) 12/04/2020     Lab Results   Component Value Date    CRP 16.41 (A) 12/04/2020     Lab Results   Component Value Date    ALT 4 (L) 06/13/2025    AST 14 06/13/2025    ALKPHOS 90 06/13/2025    BILITOT 0.6 06/13/2025   @ABG@      DATA:   Diagnostic tests reviewed for today's visit:    Labs this admission reviewed  Imagings this admission reviewed  Cultures: Reviewed    XR abdomen 1 view   Final Result   No acute abdominal radiographic findings.        Signed by: Chu Laguna 6/15/2025 10:57 AM   Dictation workstation:   [id number]      Transthoracic Echo Complete   Final Result      CT chest abdomen pelvis wo IV contrast   Final Result   CHEST   1.  Chronic bibasilar bandlike atelectasis and mild scarring versus   atelectasis in the bilateral lung apices. No pleural effusion. Mild    interseptal thickening and mosaic attenuation of the lung fields   which may represent mild interstitial edema. Mild enlargement of main   pulmonary trunk similar to prior.        ABDOMEN - PELVIS   1.  No acute abnormality in the abdomen/pelvis. Unchanged right lower   pole renal cyst. Sigmoid diverticulosis without diverticulitis. Mild   hepatomegaly and hepatic steatosis. Nonspecific edema in the anterior   chest and abdominal wall, clinical correlation for fluid overload   versus cellulitis recommended.   2. Unchanged 4.5 cm right adnexal cyst, follow-up pelvic ultrasound   in 6 months is recommended.             MACRO:   None        Signed by: Daniel Laurent 6/13/2025 11:21 PM   Dictation workstation:   [id number]      XR chest 1 view   Final Result   1.  Bibasilar chronic subsegmental opacities which may represent   atelectasis. There is new small to moderate-sized left pleural   effusion raising possibility of pneumonia.             Signed by: Daniel Laurent 6/13/2025 9:41 PM   Dictation workstation:   [id number]               Will continue to follow     Thank you so much for this consultation     Brenda Buckner MD            [1]   Past Medical History:  Diagnosis Date    Adnexal cyst     Right    Ambulatory dysfunction     Anxiety and depression     Bipolar disorder     Chronic back pain     Chronic kidney disease, stage II (mild)     Chronic respiratory failure with hypoxia and hypercapnia     Diabetic peripheral neuropathy (Multi)     Diastolic heart failure     LVEF 55 to 60% June 2024    Diverticulosis     Gastroesophageal reflux disease     Hepatic steatosis     Hepatomegaly     History of femur fracture     Right    Hyperlipidemia     Hypertension     Insulin dependent type 2 diabetes mellitus (Multi)     Iron deficiency anemia     Kidney stones     Long term current use of aspirin     Multiple lung nodules     Obesity hypoventilation syndrome (Multi)     Obesity, morbid, BMI 50 or higher (Multi)      Obstructive sleep apnea treated with BiPAP     Osteoarthritis     Osteoporosis     Pulmonary hypertension (Multi)     Renal cyst, right     Secondary pancreatic insufficiency (HHS-HCC)     Spinal stenosis     Vitamin D deficiency     Wheelchair dependence    [2]   Past Surgical History:  Procedure Laterality Date    ADENOIDECTOMY      BLADDER SUSPENSION      BREAST BIOPSY       SECTION, LOW TRANSVERSE      CHOLECYSTECTOMY      DEEP BRAIN STIMULATOR PLACEMENT      HYSTERECTOMY      KNEE SURGERY      LITHOTRIPSY      ORIF FEMUR FRACTURE Right     OVARIAN CYST REMOVAL      TONSILLECTOMY     [3]   Family History  Problem Relation Name Age of Onset    Diabetes Mother      Hypertension Mother      Thyroid cancer Mother      Heart attack Mother      Osteoporosis Mother      Stroke Father      Hypertension Father      Diabetes Brother      Hypertension Brother      Cancer Brother     [4]   Social History  Tobacco Use    Smoking status: Never    Smokeless tobacco: Never   Vaping Use    Vaping status: Never Used   Substance Use Topics    Alcohol use: Never    Drug use: Never   [5] amLODIPine, 10 mg, oral, Daily  aspirin, 81 mg, oral, Daily  atorvastatin, 10 mg, oral, Nightly  citalopram, 40 mg, oral, Daily  ferrous sulfate, 65 mg of elemental iron, oral, Daily with breakfast  furosemide, 40 mg, intravenous, BID  [Held by provider] furosemide, 20 mg, oral, Daily  gabapentin, 300 mg, oral, BID  heparin (porcine), 7,500 Units, subcutaneous, q8h YARELI  insulin glargine, 40 Units, subcutaneous, BID  insulin lispro, 0-10 Units, subcutaneous, TID AC  lactobacillus acidophilus, 1 tablet, oral, BID  lipase-protease-amylase, 1 capsule, oral, TID AC  metoprolol succinate XL, 25 mg, oral, Daily  nystatin, 1 Application, Topical, TID  nystatin, , Topical, BID  sodium chloride 0.9%, 10 mL, intravenous, q8h YARELI  [6]    [7] PRN medications: acetaminophen **OR** acetaminophen **OR** acetaminophen, albuterol, dextrose, dextrose,  glucagon, glucagon, melatonin, ondansetron ODT **OR** ondansetron, oxygen, oxygen, polyethylene glycol, sodium chloride

## 2025-06-16 VITALS
DIASTOLIC BLOOD PRESSURE: 79 MMHG | BODY MASS INDEX: 50.02 KG/M2 | SYSTOLIC BLOOD PRESSURE: 138 MMHG | TEMPERATURE: 97.5 F | HEART RATE: 64 BPM | HEIGHT: 64 IN | RESPIRATION RATE: 16 BRPM | WEIGHT: 293 LBS | OXYGEN SATURATION: 97 %

## 2025-06-16 LAB
ANION GAP SERPL CALC-SCNC: 13 MMOL/L (ref 10–20)
BUN SERPL-MCNC: 21 MG/DL (ref 6–23)
CALCIUM SERPL-MCNC: 9.2 MG/DL (ref 8.6–10.3)
CHLORIDE SERPL-SCNC: 95 MMOL/L (ref 98–107)
CO2 SERPL-SCNC: 34 MMOL/L (ref 21–32)
CREAT SERPL-MCNC: 1.12 MG/DL (ref 0.5–1.05)
EGFRCR SERPLBLD CKD-EPI 2021: 53 ML/MIN/1.73M*2
ERYTHROCYTE [DISTWIDTH] IN BLOOD BY AUTOMATED COUNT: 15 % (ref 11.5–14.5)
GLUCOSE BLD MANUAL STRIP-MCNC: 158 MG/DL (ref 74–99)
GLUCOSE BLD MANUAL STRIP-MCNC: 179 MG/DL (ref 74–99)
GLUCOSE BLD MANUAL STRIP-MCNC: 202 MG/DL (ref 74–99)
GLUCOSE BLD MANUAL STRIP-MCNC: 204 MG/DL (ref 74–99)
GLUCOSE SERPL-MCNC: 161 MG/DL (ref 74–99)
HCT VFR BLD AUTO: 30.5 % (ref 36–46)
HGB BLD-MCNC: 9.3 G/DL (ref 12–16)
HOLD SPECIMEN: NORMAL
MAGNESIUM SERPL-MCNC: 1.83 MG/DL (ref 1.6–2.4)
MCH RBC QN AUTO: 25.4 PG (ref 26–34)
MCHC RBC AUTO-ENTMCNC: 30.5 G/DL (ref 32–36)
MCV RBC AUTO: 83 FL (ref 80–100)
NRBC BLD-RTO: 0 /100 WBCS (ref 0–0)
PLATELET # BLD AUTO: 246 X10*3/UL (ref 150–450)
POTASSIUM SERPL-SCNC: 3.9 MMOL/L (ref 3.5–5.3)
RBC # BLD AUTO: 3.66 X10*6/UL (ref 4–5.2)
SODIUM SERPL-SCNC: 138 MMOL/L (ref 136–145)
WBC # BLD AUTO: 9 X10*3/UL (ref 4.4–11.3)

## 2025-06-16 PROCEDURE — 97161 PT EVAL LOW COMPLEX 20 MIN: CPT | Mod: GP | Performed by: PHYSICAL THERAPIST

## 2025-06-16 PROCEDURE — 83735 ASSAY OF MAGNESIUM: CPT | Performed by: NURSE PRACTITIONER

## 2025-06-16 PROCEDURE — 2500000001 HC RX 250 WO HCPCS SELF ADMINISTERED DRUGS (ALT 637 FOR MEDICARE OP): Performed by: NURSE PRACTITIONER

## 2025-06-16 PROCEDURE — 85027 COMPLETE CBC AUTOMATED: CPT | Performed by: NURSE PRACTITIONER

## 2025-06-16 PROCEDURE — 36415 COLL VENOUS BLD VENIPUNCTURE: CPT | Performed by: NURSE PRACTITIONER

## 2025-06-16 PROCEDURE — 2500000002 HC RX 250 W HCPCS SELF ADMINISTERED DRUGS (ALT 637 FOR MEDICARE OP, ALT 636 FOR OP/ED): Performed by: NURSE PRACTITIONER

## 2025-06-16 PROCEDURE — 94660 CPAP INITIATION&MGMT: CPT

## 2025-06-16 PROCEDURE — 97165 OT EVAL LOW COMPLEX 30 MIN: CPT | Mod: GO | Performed by: OCCUPATIONAL THERAPIST

## 2025-06-16 PROCEDURE — 2060000001 HC INTERMEDIATE ICU ROOM DAILY

## 2025-06-16 PROCEDURE — 82947 ASSAY GLUCOSE BLOOD QUANT: CPT

## 2025-06-16 PROCEDURE — 80048 BASIC METABOLIC PNL TOTAL CA: CPT | Performed by: NURSE PRACTITIONER

## 2025-06-16 PROCEDURE — 2500000005 HC RX 250 GENERAL PHARMACY W/O HCPCS: Performed by: INTERNAL MEDICINE

## 2025-06-16 PROCEDURE — 2500000004 HC RX 250 GENERAL PHARMACY W/ HCPCS (ALT 636 FOR OP/ED): Performed by: NURSE PRACTITIONER

## 2025-06-16 PROCEDURE — 99232 SBSQ HOSP IP/OBS MODERATE 35: CPT | Performed by: INTERNAL MEDICINE

## 2025-06-16 RX ADMIN — INSULIN GLARGINE 40 UNITS: 100 INJECTION, SOLUTION SUBCUTANEOUS at 10:24

## 2025-06-16 RX ADMIN — ACETAMINOPHEN 650 MG: 325 TABLET ORAL at 15:28

## 2025-06-16 RX ADMIN — INSULIN GLARGINE 40 UNITS: 100 INJECTION, SOLUTION SUBCUTANEOUS at 21:32

## 2025-06-16 RX ADMIN — Medication 1 TABLET: at 10:23

## 2025-06-16 RX ADMIN — METOPROLOL SUCCINATE 25 MG: 25 TABLET, EXTENDED RELEASE ORAL at 10:23

## 2025-06-16 RX ADMIN — NYSTATIN 1 APPLICATION: 100000 POWDER TOPICAL at 15:35

## 2025-06-16 RX ADMIN — HEPARIN SODIUM 7500 UNITS: 5000 INJECTION INTRAVENOUS; SUBCUTANEOUS at 06:41

## 2025-06-16 RX ADMIN — GABAPENTIN 300 MG: 300 CAPSULE ORAL at 10:22

## 2025-06-16 RX ADMIN — HEPARIN SODIUM 7500 UNITS: 5000 INJECTION INTRAVENOUS; SUBCUTANEOUS at 21:48

## 2025-06-16 RX ADMIN — PANCRELIPASE 1 CAPSULE: 60000; 12000; 38000 CAPSULE, DELAYED RELEASE PELLETS ORAL at 17:17

## 2025-06-16 RX ADMIN — PANCRELIPASE 1 CAPSULE: 60000; 12000; 38000 CAPSULE, DELAYED RELEASE PELLETS ORAL at 10:21

## 2025-06-16 RX ADMIN — FUROSEMIDE 40 MG: 10 INJECTION, SOLUTION INTRAMUSCULAR; INTRAVENOUS at 21:32

## 2025-06-16 RX ADMIN — Medication 10 ML: at 21:38

## 2025-06-16 RX ADMIN — Medication 10 ML: at 06:42

## 2025-06-16 RX ADMIN — PANCRELIPASE 1 CAPSULE: 60000; 12000; 38000 CAPSULE, DELAYED RELEASE PELLETS ORAL at 14:00

## 2025-06-16 RX ADMIN — CITALOPRAM 40 MG: 40 TABLET, FILM COATED ORAL at 10:20

## 2025-06-16 RX ADMIN — INSULIN LISPRO 2 UNITS: 100 INJECTION, SOLUTION INTRAVENOUS; SUBCUTANEOUS at 17:17

## 2025-06-16 RX ADMIN — AMLODIPINE BESYLATE 10 MG: 10 TABLET ORAL at 10:21

## 2025-06-16 RX ADMIN — HEPARIN SODIUM 7500 UNITS: 5000 INJECTION INTRAVENOUS; SUBCUTANEOUS at 14:01

## 2025-06-16 RX ADMIN — INSULIN LISPRO 4 UNITS: 100 INJECTION, SOLUTION INTRAVENOUS; SUBCUTANEOUS at 14:00

## 2025-06-16 RX ADMIN — ASPIRIN 81 MG CHEWABLE TABLET 81 MG: 81 TABLET CHEWABLE at 10:21

## 2025-06-16 RX ADMIN — NYSTATIN: 100000 OINTMENT TOPICAL at 21:32

## 2025-06-16 RX ADMIN — Medication 1 TABLET: at 21:33

## 2025-06-16 RX ADMIN — ATORVASTATIN CALCIUM 10 MG: 10 TABLET, FILM COATED ORAL at 21:33

## 2025-06-16 RX ADMIN — Medication 40 PERCENT: at 23:23

## 2025-06-16 RX ADMIN — Medication 4 L/MIN: at 08:52

## 2025-06-16 RX ADMIN — GABAPENTIN 300 MG: 300 CAPSULE ORAL at 21:33

## 2025-06-16 RX ADMIN — INSULIN LISPRO 2 UNITS: 100 INJECTION, SOLUTION INTRAVENOUS; SUBCUTANEOUS at 10:24

## 2025-06-16 RX ADMIN — NYSTATIN: 100000 OINTMENT TOPICAL at 10:29

## 2025-06-16 RX ADMIN — Medication 10 ML: at 14:05

## 2025-06-16 RX ADMIN — FUROSEMIDE 40 MG: 10 INJECTION, SOLUTION INTRAMUSCULAR; INTRAVENOUS at 10:23

## 2025-06-16 RX ADMIN — NYSTATIN 1 APPLICATION: 100000 POWDER TOPICAL at 10:28

## 2025-06-16 RX ADMIN — NYSTATIN 1 APPLICATION: 100000 POWDER TOPICAL at 21:32

## 2025-06-16 RX ADMIN — FERROUS SULFATE TAB 325 MG (65 MG ELEMENTAL FE) 1 TABLET: 325 (65 FE) TAB at 10:22

## 2025-06-16 ASSESSMENT — COGNITIVE AND FUNCTIONAL STATUS - GENERAL
MOVING FROM LYING ON BACK TO SITTING ON SIDE OF FLAT BED WITH BEDRAILS: A LOT
DRESSING REGULAR UPPER BODY CLOTHING: A LOT
STANDING UP FROM CHAIR USING ARMS: TOTAL
MOVING TO AND FROM BED TO CHAIR: A LOT
DAILY ACTIVITIY SCORE: 14
MOBILITY SCORE: 9
TURNING FROM BACK TO SIDE WHILE IN FLAT BAD: A LOT
STANDING UP FROM CHAIR USING ARMS: TOTAL
MOVING TO AND FROM BED TO CHAIR: TOTAL
CLIMB 3 TO 5 STEPS WITH RAILING: TOTAL
TOILETING: A LOT
DAILY ACTIVITIY SCORE: 14
MOBILITY SCORE: 8
PERSONAL GROOMING: A LITTLE
MOBILITY SCORE: 9
DRESSING REGULAR LOWER BODY CLOTHING: TOTAL
STANDING UP FROM CHAIR USING ARMS: TOTAL
HELP NEEDED FOR BATHING: A LOT
WALKING IN HOSPITAL ROOM: TOTAL
PERSONAL GROOMING: A LITTLE
HELP NEEDED FOR BATHING: TOTAL
WALKING IN HOSPITAL ROOM: TOTAL
TURNING FROM BACK TO SIDE WHILE IN FLAT BAD: A LOT
HELP NEEDED FOR BATHING: A LOT
CLIMB 3 TO 5 STEPS WITH RAILING: TOTAL
DRESSING REGULAR LOWER BODY CLOTHING: TOTAL
MOVING FROM LYING ON BACK TO SITTING ON SIDE OF FLAT BED WITH BEDRAILS: A LOT
MOVING TO AND FROM BED TO CHAIR: A LOT
TURNING FROM BACK TO SIDE WHILE IN FLAT BAD: A LOT
DAILY ACTIVITIY SCORE: 11
DRESSING REGULAR LOWER BODY CLOTHING: TOTAL
MOVING FROM LYING ON BACK TO SITTING ON SIDE OF FLAT BED WITH BEDRAILS: A LOT
DRESSING REGULAR UPPER BODY CLOTHING: TOTAL
TOILETING: A LOT
CLIMB 3 TO 5 STEPS WITH RAILING: TOTAL
PERSONAL GROOMING: A LITTLE
WALKING IN HOSPITAL ROOM: TOTAL
DRESSING REGULAR UPPER BODY CLOTHING: A LOT
TOILETING: TOTAL

## 2025-06-16 ASSESSMENT — PAIN - FUNCTIONAL ASSESSMENT
PAIN_FUNCTIONAL_ASSESSMENT: 0-10

## 2025-06-16 ASSESSMENT — PAIN SCALES - GENERAL
PAINLEVEL_OUTOF10: 0 - NO PAIN
PAINLEVEL_OUTOF10: 0 - NO PAIN
PAINLEVEL_OUTOF10: 5 - MODERATE PAIN
PAINLEVEL_OUTOF10: 10 - WORST POSSIBLE PAIN
PAINLEVEL_OUTOF10: 0 - NO PAIN

## 2025-06-16 ASSESSMENT — PAIN DESCRIPTION - LOCATION: LOCATION: LEG

## 2025-06-16 ASSESSMENT — PAIN DESCRIPTION - ORIENTATION: ORIENTATION: RIGHT

## 2025-06-16 ASSESSMENT — ACTIVITIES OF DAILY LIVING (ADL): ADL_ASSISTANCE: NEEDS ASSISTANCE

## 2025-06-16 NOTE — PROGRESS NOTES
Tejinder Feliciano is a 70 y.o. female on day 2 of admission presenting with Acute on chronic respiratory failure with hypoxia and hypercapnia.      Subjective   Patient seen and examined at the bedside this morning. No overnight events. Patient stated she feels better. Otherwise she denies fever, chest pain or cough.      Objective     Last Recorded Vitals  /65 (BP Location: Left arm, Patient Position: Lying)   Pulse 62   Temp 36.2 °C (97.2 °F) (Temporal)   Resp 13   Wt 134 kg (295 lb 3.1 oz)   SpO2 96%   Intake/Output last 3 Shifts:    Intake/Output Summary (Last 24 hours) at 6/16/2025 1126  Last data filed at 6/16/2025 1036  Gross per 24 hour   Intake 1050 ml   Output 900 ml   Net 150 ml       Admission Weight  Weight: 127 kg (280 lb) (06/13/25 1946)    Daily Weight  06/16/25 : 134 kg (295 lb 3.1 oz)    Image Results  Transthoracic Echo Complete              [facility]  [address] [address]     Tel [phone] Fax [phone]    TRANSTHORACIC ECHOCARDIOGRAM REPORT    Patient Name:       TEJINDER FELICIANO         Reading Physician:    [id number] Thomas Abreu MD  Study Date:         6/14/2025            Ordering Provider:    [id number] JENNIFER NEFF  MRN/PID:            [id number]             Fellow:  Accession#:         [id number]         Nurse:  Date of Birth/Age:  1955 / 70 years Sonographer:          Ksenia Thompson RDCS  Gender Assigned at  F                    Additional Staff:  Birth:  Height:             160.00 cm            Admit Date:  Weight:             127.01 kg            Admission Status:     Inpatient -                                                                 Routine  BSA / BMI:          2.23 m2 / 49.61      Department Location:  [facility] Echo Lab                       kg/m2  Blood Pressure: 144 /47 mmHg    Study Type:    TRANSTHORACIC ECHO (TTE) COMPLETE  Diagnosis/ICD: Heart failure, unspecified-I50.9  Indication:    heart failure  CPT Codes:     Echo Complete w Full Doppler-41497   Study Detail: The following Echo studies were performed: 2D, Doppler and color                flow. Technically challenging study due to body habitus, poor                acoustic windows and prominent lung artifact. Definity used as a                contrast agent for endocardial border definition. Total contrast                used for this procedure was 3 mL via IV push.       PHYSICIAN INTERPRETATION:  Left Ventricle: The left ventricular systolic function is normal with a visually estimated ejection fraction of 55-60%. There are no regional wall motion abnormalities. The left ventricular cavity size was not assessed. Spectral Doppler shows a normal pattern of left ventricular diastolic filling.  Left Atrium: The left atrial size was not assessed.  Right Ventricle: The right ventricle is normal in size. There is normal right ventricular global systolic function.  Right Atrium: The right atrial size is normal.  Aortic Valve: The aortic valve was not well visualized. There is no evidence of aortic valve regurgitation.  Mitral Valve: The mitral valve is normal in structure. There is no evidence of mitral valve regurgitation. The E Vmax is 1.15 m/s.  Tricuspid Valve: The tricuspid valve is structurally normal. No evidence of tricuspid regurgitation.  Pulmonic Valve: The pulmonic valve is not well visualized. There is no indication of pulmonic valve regurgitation.  Pericardium: No pericardial effusion noted.  Aorta: The aortic root is normal.  Systemic Veins: The inferior vena cava was not well visualized, IVC inspiratory collapse is not well visualized.       CONCLUSIONS:   1. Poorly visualized anatomical structures due to suboptimal image quality.   2. The left ventricular systolic function is  normal with a visually estimated ejection fraction of 55-60%.   3. There is normal right ventricular global systolic function.    QUANTITATIVE DATA SUMMARY:     LV SYSTOLIC FUNCTION:                       Normal Ranges:  EF-Visual:      58 %  LV EF Reported: 58 %       LV DIASTOLIC FUNCTION:          Normal Ranges:  MV Peak E:             1.15 m/s (0.7-1.2 m/s)  MV Peak A:             0.99 m/s (0.42-0.7 m/s)  E/A Ratio:             1.16     (1.0-2.2)  MV lateral e'          0.07 m/s       MITRAL VALVE:          Normal Ranges:  MV DT:        195 msec (150-240msec)       AORTIC VALVE:           Normal Ranges:  AoV Vmax:      1.57 m/s (<=1.7m/s)  AoV Peak P.9 mmHg (<20mmHg)  LVOT Max Rojelio:  1.20 m/s (<=1.1m/s)  LVOT Diameter: 2.20 cm  (1.8-2.4cm)  AoV Area,Vmax: 2.91 cm2 (2.5-4.5cm2)       RIGHT VENTRICLE:  TAPSE: 22.5 mm  RV s'  0.11 m/s       TRICUSPID VALVE/RVSP:        Normal Ranges:  Est. RA Pressure:     3 mmHg       [id number] Thomas Abreu MD  Electronically signed on 6/15/2025 at 11:22:50 AM       ** Final **  XR abdomen 1 view  Narrative: Interpreted By:  Chu Laguna,   STUDY:  XR ABDOMEN 1 VIEW      INDICATION:  Signs/Symptoms:abd distention, pain.      COMPARISON:   CT abdomen      ACCESSION NUMBER(S):  [id number]      ORDERING CLINICIAN:  LIANG BERUMEN      FINDINGS:  Nonobstructive gas pattern.      Moderate stool throughout the colon.      Surgical clips right upper quadrant.      Impression: No acute abdominal radiographic findings.      Signed by: Chu Laguna 6/15/2025 10:57 AM  Dictation workstation:   [id number]      Physical Exam  HENT:      Head: Normocephalic and atraumatic.   Eyes:      Extraocular Movements: Extraocular movements intact.   Cardiovascular:      Rate and Rhythm: Normal rate and regular rhythm.      Heart sounds: Normal heart sounds.   Pulmonary:      Breath sounds: Normal breath sounds.   Abdominal:      General: Bowel sounds are normal.      Palpations: Abdomen is  soft.   Musculoskeletal:         General: Normal range of motion.      Right lower leg: No edema.      Left lower leg: No edema.   Skin:     Findings: No rash.   Neurological:      General: No focal deficit present.      Mental Status: She is alert and oriented to person, place, and time.   Psychiatric:         Mood and Affect: Mood normal.         Behavior: Behavior normal.         Relevant Results  Results for orders placed or performed during the hospital encounter of 06/13/25 (from the past 24 hours)   POCT GLUCOSE   Result Value Ref Range    POCT Glucose 183 (H) 74 - 99 mg/dL   POCT GLUCOSE   Result Value Ref Range    POCT Glucose 197 (H) 74 - 99 mg/dL   POCT GLUCOSE   Result Value Ref Range    POCT Glucose 249 (H) 74 - 99 mg/dL   CBC   Result Value Ref Range    WBC 9.0 4.4 - 11.3 x10*3/uL    nRBC 0.0 0.0 - 0.0 /100 WBCs    RBC 3.66 (L) 4.00 - 5.20 x10*6/uL    Hemoglobin 9.3 (L) 12.0 - 16.0 g/dL    Hematocrit 30.5 (L) 36.0 - 46.0 %    MCV 83 80 - 100 fL    MCH 25.4 (L) 26.0 - 34.0 pg    MCHC 30.5 (L) 32.0 - 36.0 g/dL    RDW 15.0 (H) 11.5 - 14.5 %    Platelets 246 150 - 450 x10*3/uL   Basic metabolic panel   Result Value Ref Range    Glucose 161 (H) 74 - 99 mg/dL    Sodium 138 136 - 145 mmol/L    Potassium 3.9 3.5 - 5.3 mmol/L    Chloride 95 (L) 98 - 107 mmol/L    Bicarbonate 34 (H) 21 - 32 mmol/L    Anion Gap 13 10 - 20 mmol/L    Urea Nitrogen 21 6 - 23 mg/dL    Creatinine 1.12 (H) 0.50 - 1.05 mg/dL    eGFR 53 (L) >60 mL/min/1.73m*2    Calcium 9.2 8.6 - 10.3 mg/dL   Magnesium   Result Value Ref Range    Magnesium 1.83 1.60 - 2.40 mg/dL   POCT GLUCOSE   Result Value Ref Range    POCT Glucose 158 (H) 74 - 99 mg/dL     Transthoracic Echo Complete  Result Date: 6/15/2025            [facility] [address] [facility], [facility] [address]    Tel [phone] Fax [phone] TRANSTHORACIC ECHOCARDIOGRAM REPORT Patient Name:       TEJINDER Aguillon Physician:    [id number] Thomas                                                                 Brady NATHAN Study Date:         6/14/2025            Ordering Provider:    [id number] JENNIFER NEFF MRN/PID:            [id number]             Fellow: Accession#:         [id number]         Nurse: Date of Birth/Age:  1955 / 70 years Sonographer:          Ksenia Thompson RDCS Gender Assigned at  F                    Additional Staff: Birth: Height:             160.00 cm            Admit Date: Weight:             127.01 kg            Admission Status:     Inpatient -                                                                Routine BSA / BMI:          2.23 m2 / 49.61      Department Location:  [facility] Echo Lab                     kg/m2 Blood Pressure: 144 /47 mmHg Study Type:    TRANSTHORACIC ECHO (TTE) COMPLETE Diagnosis/ICD: Heart failure, unspecified-I50.9 Indication:    heart failure CPT Codes:     Echo Complete w Full Doppler-[id number]  Study Detail: The following Echo studies were performed: 2D, Doppler and color               flow. Technically challenging study due to body habitus, poor               acoustic windows and prominent lung artifact. Definity used as a               contrast agent for endocardial border definition. Total contrast               used for this procedure was 3 mL via IV push.  PHYSICIAN INTERPRETATION: Left Ventricle: The left ventricular systolic function is normal with a visually estimated ejection fraction of 55-60%. There are no regional wall motion abnormalities. The left ventricular cavity size was not assessed. Spectral Doppler shows a normal pattern of left ventricular diastolic filling. Left Atrium: The left atrial size was not assessed. Right Ventricle: The right ventricle is normal in size. There is normal right ventricular global systolic function. Right Atrium: The right atrial size is normal. Aortic Valve: The  aortic valve was not well visualized. There is no evidence of aortic valve regurgitation. Mitral Valve: The mitral valve is normal in structure. There is no evidence of mitral valve regurgitation. The E Vmax is 1.15 m/s. Tricuspid Valve: The tricuspid valve is structurally normal. No evidence of tricuspid regurgitation. Pulmonic Valve: The pulmonic valve is not well visualized. There is no indication of pulmonic valve regurgitation. Pericardium: No pericardial effusion noted. Aorta: The aortic root is normal. Systemic Veins: The inferior vena cava was not well visualized, IVC inspiratory collapse is not well visualized.  CONCLUSIONS:  1. Poorly visualized anatomical structures due to suboptimal image quality.  2. The left ventricular systolic function is normal with a visually estimated ejection fraction of 55-60%.  3. There is normal right ventricular global systolic function. QUANTITATIVE DATA SUMMARY:  LV SYSTOLIC FUNCTION:                      Normal Ranges: EF-Visual:      58 % LV EF Reported: 58 %  LV DIASTOLIC FUNCTION:          Normal Ranges: MV Peak E:             1.15 m/s (0.7-1.2 m/s) MV Peak A:             0.99 m/s (0.42-0.7 m/s) E/A Ratio:             1.16     (1.0-2.2) MV lateral e'          0.07 m/s  MITRAL VALVE:          Normal Ranges: MV DT:        195 msec (150-240msec)  AORTIC VALVE:           Normal Ranges: AoV Vmax:      1.57 m/s (<=1.7m/s) AoV Peak P.9 mmHg (<20mmHg) LVOT Max Rojelio:  1.20 m/s (<=1.1m/s) LVOT Diameter: 2.20 cm  (1.8-2.4cm) AoV Area,Vmax: 2.91 cm2 (2.5-4.5cm2)  RIGHT VENTRICLE: TAPSE: 22.5 mm RV s'  0.11 m/s  TRICUSPID VALVE/RVSP:        Normal Ranges: Est. RA Pressure:     3 mmHg  [id number] Thomas Abreu MD Electronically signed on 6/15/2025 at 11:22:50 AM  ** Final **     XR abdomen 1 view  Result Date: 6/15/2025  Interpreted By:  Chu Laguna, STUDY: XR ABDOMEN 1 VIEW   INDICATION: Signs/Symptoms:abd distention, pain.   COMPARISON:  CT abdomen   ACCESSION NUMBER(S):  [id number]   ORDERING CLINICIAN: LIANG BERUMEN   FINDINGS: Nonobstructive gas pattern.   Moderate stool throughout the colon.   Surgical clips right upper quadrant.       No acute abdominal radiographic findings.   Signed by: Chu Laguna 6/15/2025 10:57 AM Dictation workstation:   [id number]    CT chest abdomen pelvis wo IV contrast  Result Date: 6/13/2025  Interpreted By:  Daniel Laurent, STUDY: CT CHEST ABDOMEN PELVIS WO CONTRAST;  6/13/2025 10:53 pm   INDICATION: Signs/Symptoms:Left flank pain, shortness of breath, volume overload     COMPARISON: CT abdomen pelvis 01/10/2025.   ACCESSION NUMBER(S): [id number]   ORDERING CLINICIAN: NOLAN KENNEDY   TECHNIQUE: CT of the chest, abdomen, and pelvis was performed without intravenous contrast.. Coronal and sagittal reconstructions at  3 mm slice thickness were performed.   FINDINGS: CHEST:   LUNG/PLEURA/LARGE AIRWAYS: Central tracheobronchial tree is patent. No evidence of pleural effusion. Bibasilar subsegmental band like atelectasis.   There are a few subpleural nodules in the right lung apex, for example measuring up to 5 mm on axial image 25 and in anterior right upper lobe measuring 5 mm on axial image 34 and 31. There is also mild atelectasis versus scarring in the left lung apex, axial image 20.   Mild interseptal thickening and mosaic attenuation of the lung fields which may represent interstitial edema or small airways or small vessel disease.   VESSELS: The thoracic aorta is of normal course and caliber.  Main pulmonary trunk is mildly enlarged measuring 3.2 cm in caliber raising possibility of pulmonary arterial hypertension.. No coronary artery calcifications are seen.   HEART: The heart is normal in size.   There is no pericardial effusion.   MEDIASTINUM AND ARAMIS: No pneumomediastinum, abnormal mediastinal fluid collection or mediastinal hematoma are appreciated.  No mediastinal, hilar or biaxillary adenopathy is present. Multiple subcentimeter  mediastinal lymph nodes. Small calcified left thyroid nodule.   CHEST WALL AND LOWER NECK: No acute fracture or dislocation of the included osseous structures are appreciated.  No suspicious osseous lesions are identified. Partially visualized right anterior upper chest wall battery pack.   ABDOMEN:   LIVER: There is hepatomegaly and hepatic steatosis. Liver is normal in contour.   GALLBLADDER: The gallbladder is surgically absent.   BILE DUCTS: The intahepatic and extrahepatic bile ducts are not dilated.   PANCREAS: The pancreas appears unremarkable.   SPLEEN: Normal-size.   ADRENAL GLANDS: The bilateral adrenal glands are unremarkable in appearance.   KIDNEYS AND URETERS: Unchanged 4.6 x 4.0 cm right lower pole renal cyst. No hydroureteronephrosis or nephroureterolithiasis is present.   PELVIS:   BLADDER: The urinary bladder appears within normal limits.   REPRODUCTIVE ORGANS: Status post hysterectomy. Unchanged 4.5 cm right adnexal cyst.   BOWEL: The stomach is unremarkable.  The small bowel is normal in caliber without evidence of focal wall thickening or obstruction.  There is no evidence of focal wall thickening or dilatation of the large bowel.  Appendix not visualized without evidence of appendicitis. Scattered sigmoid diverticulosis without diverticulitis.   VESSELS: The aorta and IVC are within normal limits.  The principal vasculature of the abdomen and pelvis is patent.   PERITONEUM/RETROPERITONEUM/LYMPH NODES: There is no evidence of intra- or retroperitoneal hematoma.  There is no free or loculated fluid collection, no free intraperitoneal air. No abdominopelvic lymphadenopathy is present.   BONES AND ABDOMINAL WALL: No evidence of acute fracture or dislocation of the included osseous structures.  No suspicious osseous lesions are identified.  Diffuse edema of the anterior chest wall and anterior abdominal wall nonspecific, similar to prior, possibly due to fluid overload.       CHEST 1.  Chronic  bibasilar bandlike atelectasis and mild scarring versus atelectasis in the bilateral lung apices. No pleural effusion. Mild interseptal thickening and mosaic attenuation of the lung fields which may represent mild interstitial edema. Mild enlargement of main pulmonary trunk similar to prior.   ABDOMEN - PELVIS 1.  No acute abnormality in the abdomen/pelvis. Unchanged right lower pole renal cyst. Sigmoid diverticulosis without diverticulitis. Mild hepatomegaly and hepatic steatosis. Nonspecific edema in the anterior chest and abdominal wall, clinical correlation for fluid overload versus cellulitis recommended. 2. Unchanged 4.5 cm right adnexal cyst, follow-up pelvic ultrasound in 6 months is recommended.     MACRO: None   Signed by: Daniel Luarent 6/13/2025 11:21 PM Dictation workstation:   [id number]    XR chest 1 view  Result Date: 6/13/2025  Interpreted By:  Daniel Laurent, STUDY: XR CHEST 1 VIEW;  6/13/2025 9:30 pm   INDICATION: Signs/Symptoms:L low chest pain and sob.   COMPARISON: Chest radiograph 01/10/2025.   ACCESSION NUMBER(S): [id number]   ORDERING CLINICIAN: NOLAN KENNEDY   FINDINGS:   CARDIOMEDIASTINAL SILHOUETTE: Cardiomediastinal silhouette is enlarged and stable.   LUNGS/PLEURA: Bibasilar subsegmental opacities which may represent atelectasis or pneumonia. There is loculated small to moderate-sized left pleural effusion.. There is no demonstrated pneumothorax.     BONES: No evidence of acute osseous abnormality.       1.  Bibasilar chronic subsegmental opacities which may represent atelectasis. There is new small to moderate-sized left pleural effusion raising possibility of pneumonia.     Signed by: Daniel Laurent 6/13/2025 9:41 PM Dictation workstation:   [id number]                  Assessment & Plan  Acute on chronic respiratory failure with hypoxia and hypercapnia    Pulmonary edema with congestive heart failure with preserved left ventricular function    Fluid overload    Renal  insufficiency    Left flank pain    # Acute on chronic hypercapnic respiratory failure  # Restrictive lung disease with bilateral basal atelectasis worse in the left side  # Morbid obesity with ARCHIE and OHS  # Bilateral lung infiltrates most likely due to pulmonary edema  - Patient is on IV lasix and HDS stable. Patient stated she feels better and denies chest pain, nausea or vomiting. She denies leg swelling  - Continue oxygen therapy  - Monitor vital signs  - Pulmonology will sign off    Discussed with attending physician    Efraín Douglass MD  PGY I Internal Medicine resident    Attending Addendum:    I saw and evaluated the patient. I personally obtained the key and critical portions of the history and physical exam or was physically present for key and critical portions performed by the resident/fellow. I reviewed the resident/fellow's documentation and discussed the patient with the resident/fellow. I agree with the resident/fellow's medical decision making as documented in the note.

## 2025-06-16 NOTE — PROGRESS NOTES
Occupational Therapy  Evaluation    Patient Name: Kaylene Feliciano  MRN: [id number]  Today's Date: 6/16/2025  Time Calculation  Start Time: 1027  Stop Time: 1042  Time Calculation (min): 15 min  2112/2112-A    Assessment  IP OT Assessment  OT Assessment: Patient close to baseline level of function as patient is niko lift at baseline and assist for all self care. Patient not ambulatory.  Anticipate safe discharge to prior level of living.  No acute OT needs.  Prognosis: Fair  Barriers to Discharge Home: Physical needs  Physical Needs: 24hr mobility assistance needed, 24hr ADL assistance needed  Evaluation/Treatment Tolerance: Patient tolerated treatment well  Medical Staff Made Aware: Yes  End of Session Communication: Bedside nurse  End of Session Patient Position: Bed, 3 rail up, Alarm on    Plan:  No Skilled OT: At baseline function  OT Frequency: OT eval only  OT Discharge Recommendations: No OT needed after discharge  Equipment Recommended upon Discharge: Lift  OT Recommended Transfer Status: Dependent, Assist of 2  OT - OK to Discharge: Yes (to next level of care when medically cleared by physician)    Subjective     Current Problem:  1. Acute on chronic respiratory failure with hypoxia and hypercapnia        2. Candidal intertrigo        3. Left sided abdominal pain        4. Acute cardiogenic pulmonary edema  Transthoracic Echo Complete    Transthoracic Echo Complete      5. Heart failure, unspecified  Transthoracic Echo Complete          General:  General  Reason for Referral: impaired self care  Referred By: Dr. Mccloud  Past Medical History Relevant to Rehab: a 70 y.o. female with past medical history of asthma, CKD stage II, DM 2, HTN, HLD, pancreatic insufficiency, spinal stenosis, ARCHIE on BiPAP and 4 L O2 at baseline, chronic hypercapnic respiratory failure, presenting to the ED today with a complaint of left-sided abdominal pain that started today as well as new onset shortness of breath and increased  oxygen requirement.  Co-Treatment: PT  Co-Treatment Reason: 2 person assist  Prior to Session Communication: Bedside nurse, Care Coordinator  Patient Position Received: Bed, 3 rail up, Alarm on  Preferred Learning Style: verbal, visual  General Comment: Patient agreeable to therapy.    Precautions:  Medical Precautions: Fall precautions      Pain:  Pain Assessment  Pain Assessment: 0-10  0-10 (Numeric) Pain Score: 0 - No pain    Objective     Cognition:  Overall Cognitive Status: Within Functional Limits     Home Living:  Type of Home: House  Lives With: Spouse  Home Adaptive Equipment: Walker rolling or standard, Wheelchair-manual, Wheelchair-power, Hospital bed, Other (Comment) (niko lift)  Home Layout: One level     Prior Function:  Prior Function Comments: Patient reports that  is primary caregiver and assists for all ADLs.   hoyers patient into wheelchair.  Patient reports using niko since February.         ADL:  LE Dressing Assistance: Total  Toileting Assistance with Device: Total    Activity Tolerance:  Endurance: Decreased tolerance for upright activites    Bed Mobility/Transfers:   Bed Mobility  Bed Mobility: Yes  Bed Mobility 1  Bed Mobility 1: Rolling right, Rolling left  Level of Assistance 1: Moderate assistance, Minimum assistance  Bed Mobility Comments 1: uses bed rails to assist  Transfers  Transfer: No    Ambulation/Gait Training:  Functional Mobility  Functional Mobility Performed: No      Strength:  Strength Comments: bilateral UEs WFL      Outcome Measures: [facility] Daily Activity  Putting on and taking off regular lower body clothing: Total  Bathing (including washing, rinsing, drying): Total  Putting on and taking off regular upper body clothing: Total  Toileting, which includes using toilet, bedpan or urinal: Total  Taking care of personal grooming such as brushing teeth: A little  Eating Meals: None  Daily Activity - Total Score: 11        EDUCATION:  Education  Individual(s)  Educated: Patient  Education Provided: Fall precautons, Risk and benefits of OT discussed with patient or other  Plan of Care Discussed and Agreed Upon: yes  Patient Response to Education: Patient/Caregiver Verbalized Understanding of Information    Goals: No goals

## 2025-06-16 NOTE — CARE PLAN
The patient's goals for the shift include      The clinical goals for the shift include Pt will remain HDS throughout shift. AND WEAR HER BIPAP FOR > 6 HRS.

## 2025-06-16 NOTE — PROGRESS NOTES
"Subjective  Patient had uneventful night does not complain about any chest pain shortness of breath continue to be on BiPAP overnight  Nursing staff was interviewed  Objectives    Last Recorded Vitals  Blood pressure 155/64, pulse 62, temperature 36.2 °C (97.2 °F), temperature source Temporal, resp. rate 13, height 1.626 m (5' 4\"), weight 134 kg (295 lb 3.1 oz), SpO2 94%.    Physical Exam  HENT:      Right Ear: External ear normal.      Left Ear: External ear normal.      Mouth/Throat:      Mouth: Mucous membranes are moist.   Cardiovascular:      Rate and Rhythm: Normal rate and regular rhythm.      Heart sounds: No murmur heard.     No friction rub. No gallop.   Pulmonary:      Effort: No accessory muscle usage or respiratory distress.      Breath sounds: No stridor. No wheezing or rhonchi.   Chest:      Chest wall: No tenderness.   Abdominal:      General: There is no distension.      Palpations: There is no mass.      Tenderness: There is no abdominal tenderness. There is no guarding or rebound.   Musculoskeletal:         General: No deformity or signs of injury.      Cervical back: No rigidity or tenderness. Normal range of motion.      Right lower leg: No edema.      Left lower leg: No edema.   Skin:     Coloration: Skin is not jaundiced or pale.      Findings: No lesion.   Neurological:      General: No focal deficit present.      Mental Status: He is alert, oriented to person, place, and time and easily aroused.      Cranial Nerves: No cranial nerve deficit.      Sensory: No sensory deficit.      Motor: No weakness.        Labs    Admission on 06/13/2025   Component Date Value Ref Range Status    WBC 06/13/2025 11.2  4.4 - 11.3 x10*3/uL Final    nRBC 06/13/2025 0.0  0.0 - 0.0 /100 WBCs Final    RBC 06/13/2025 3.16 (L)  4.00 - 5.20 x10*6/uL Final    Hemoglobin 06/13/2025 8.9 (L)  12.0 - 16.0 g/dL Final    Hematocrit 06/13/2025 29.5 (L)  36.0 - 46.0 % Final    MCV 06/13/2025 93  80 - 100 fL Final    MCH " 06/13/2025 28.2  26.0 - 34.0 pg Final    MCHC 06/13/2025 30.2 (L)  32.0 - 36.0 g/dL Final    RDW 06/13/2025 15.2 (H)  11.5 - 14.5 % Final    Platelets 06/13/2025 169  150 - 450 x10*3/uL Final    Neutrophils % 06/13/2025 71.0  40.0 - 80.0 % Final    Immature Granulocytes %, Automated 06/13/2025 0.9  0.0 - 0.9 % Final    Immature Granulocyte Count (IG) includes promyelocytes, myelocytes and metamyelocytes but does not include bands. Percent differential counts (%) should be interpreted in the context of the absolute cell counts (cells/UL).    Lymphocytes % 06/13/2025 18.0  13.0 - 44.0 % Final    Monocytes % 06/13/2025 5.8  2.0 - 10.0 % Final    Eosinophils % 06/13/2025 3.9  0.0 - 6.0 % Final    Basophils % 06/13/2025 0.4  0.0 - 2.0 % Final    Neutrophils Absolute 06/13/2025 7.97 (H)  1.20 - 7.70 x10*3/uL Final    Percent differential counts (%) should be interpreted in the context of the absolute cell counts (cells/uL).    Immature Granulocytes Absolute, Au* 06/13/2025 0.10  0.00 - 0.70 x10*3/uL Final    Lymphocytes Absolute 06/13/2025 2.02  1.20 - 4.80 x10*3/uL Final    Monocytes Absolute 06/13/2025 0.65  0.10 - 1.00 x10*3/uL Final    Eosinophils Absolute 06/13/2025 0.44  0.00 - 0.70 x10*3/uL Final    Basophils Absolute 06/13/2025 0.05  0.00 - 0.10 x10*3/uL Final    Glucose 06/13/2025 123 (H)  74 - 99 mg/dL Final    Sodium 06/13/2025 135 (L)  136 - 145 mmol/L Final    Potassium 06/13/2025 5.4 (H)  3.5 - 5.3 mmol/L Final    MILD HEMOLYSIS DETECTED. The result may be falsely elevated due to hemolysis or other interferents. Clinical correlation is recommended. Repeat testing may be considered.    Chloride 06/13/2025 101  98 - 107 mmol/L Final    Bicarbonate 06/13/2025 25  21 - 32 mmol/L Final    Anion Gap 06/13/2025 14  10 - 20 mmol/L Final    Urea Nitrogen 06/13/2025 30 (H)  6 - 23 mg/dL Final    Creatinine 06/13/2025 1.17 (H)  0.50 - 1.05 mg/dL Final    eGFR 06/13/2025 50 (L)  >60 mL/min/1.73m*2 Final    Calculations  of estimated GFR are performed using the 2021 CKD-EPI Study Refit equation without the race variable for the IDMS-Traceable creatinine methods.  https://jasn.asnjournals.org/content/early/2021/09/22/ASN.3290219230    Calcium 06/13/2025 9.1  8.6 - 10.3 mg/dL Final    Albumin 06/13/2025 4.1  3.4 - 5.0 g/dL Final    Alkaline Phosphatase 06/13/2025 90  33 - 136 U/L Final    Total Protein 06/13/2025 7.1  6.4 - 8.2 g/dL Final    AST 06/13/2025 14  9 - 39 U/L Final    MILD HEMOLYSIS DETECTED. The result may be falsely elevated due to hemolysis or other interferents. Clinical correlation is recommended. Repeat testing may be considered.    Bilirubin, Total 06/13/2025 0.6  0.0 - 1.2 mg/dL Final    ALT 06/13/2025 4 (L)  7 - 45 U/L Final    Patients treated with Sulfasalazine may generate falsely decreased results for ALT.    Color, Urine 06/13/2025 Light-Yellow  Light-Yellow, Yellow, Dark-Yellow Final    Appearance, Urine 06/13/2025 Clear  Clear Final    Specific Gravity, Urine 06/13/2025 1.011  1.005 - 1.035 Final    pH, Urine 06/13/2025 6.0  5.0, 5.5, 6.0, 6.5, 7.0, 7.5, 8.0 Final    Protein, Urine 06/13/2025 NEGATIVE  NEGATIVE, 10 (TRACE), 20 (TRACE) mg/dL Final    Glucose, Urine 06/13/2025 Normal  Normal mg/dL Final    Blood, Urine 06/13/2025 0.1 (1+) (A)  NEGATIVE mg/dL Final    Ketones, Urine 06/13/2025 NEGATIVE  NEGATIVE mg/dL Final    Bilirubin, Urine 06/13/2025 NEGATIVE  NEGATIVE mg/dL Final    Urobilinogen, Urine 06/13/2025 Normal  Normal mg/dL Final    Nitrite, Urine 06/13/2025 NEGATIVE  NEGATIVE Final    Leukocyte Esterase, Urine 06/13/2025 NEGATIVE  NEGATIVE Final    Extra Tube 06/13/2025 Hold for add-ons.   Final    Auto resulted.    Magnesium 06/13/2025 2.40  1.60 - 2.40 mg/dL Final    Lipase 06/13/2025 5 (L)  9 - 82 U/L Final    Phosphorus 06/13/2025 4.2  2.5 - 4.9 mg/dL Final    MILD HEMOLYSIS DETECTED. The result may be falsely elevated due to hemolysis or other interferents. Clinical correlation is  recommended. Repeat testing may be considered.    BNP 06/13/2025 266 (H)  0 - 99 pg/mL Final    POCT pH, Venous 06/13/2025 7.39  7.33 - 7.43 pH Final    POCT pCO2, Venous 06/13/2025 53 (H)  41 - 51 mm Hg Final    POCT pO2, Venous 06/13/2025 44  35 - 45 mm Hg Final    POCT SO2, Venous 06/13/2025 80 (H)  45 - 75 % Final    POCT Oxy Hemoglobin, Venous 06/13/2025 77.4 (H)  45.0 - 75.0 % Final    POCT Hematocrit Calculated, Venous 06/13/2025 29.0 (L)  36.0 - 46.0 % Final    POCT Sodium, Venous 06/13/2025 133 (L)  136 - 145 mmol/L Final    POCT Potassium, Venous 06/13/2025 8.1 (HH)  3.5 - 5.3 mmol/L Final    POCT Chloride, Venous 06/13/2025 104  98 - 107 mmol/L Final    POCT Ionized Calicum, Venous 06/13/2025 1.14  1.10 - 1.33 mmol/L Final    POCT Glucose, Venous 06/13/2025 117 (H)  74 - 99 mg/dL Final    POCT Lactate, Venous 06/13/2025 0.9  0.4 - 2.0 mmol/L Final    POCT Base Excess, Venous 06/13/2025 6.1 (H)  -2.0 - 3.0 mmol/L Final    POCT HCO3 Calculated, Venous 06/13/2025 32.1 (H)  22.0 - 26.0 mmol/L Final    POCT Hemoglobin, Venous 06/13/2025 9.8 (L)  12.0 - 16.0 g/dL Final    POCT Anion Gap, Venous 06/13/2025 5.0 (L)  10.0 - 25.0 mmol/L Final    Patient Temperature 06/13/2025    Final    NOTE: Patient Results are Not Corrected for Temperature    FiO2 06/13/2025 40  % Final    Critical Called By 06/13/2025 REHNERT   Final    Critical Called To 06/13/2025 DR OLIVO   Final    Critical Call Time 06/13/2025 2155   Final    Critical Read Back 06/13/2025 Y   Final    Troponin I, High Sensitivity 06/13/2025 6  0 - 13 ng/L Final    Troponin I, High Sensitivity 06/13/2025 6  0 - 13 ng/L Final    Extra Tube 06/13/2025 Hold for add-ons.   Final    Auto resulted.    Potassium 06/13/2025 4.9  3.5 - 5.3 mmol/L Final    MILD HEMOLYSIS DETECTED. The result may be falsely elevated due to hemolysis or other interferents. Clinical correlation is recommended. Repeat testing may be considered.    Flu A Result 06/13/2025 Not Detected   Not Detected Final    Flu B Result 06/13/2025 Not Detected  Not Detected Final    Coronavirus 2019, PCR 06/13/2025 Not Detected  Not Detected Final    WBC, Urine 06/13/2025 1-5  1-5, NONE /HPF Final    RBC, Urine 06/13/2025 3-5  NONE, 1-2, 3-5 /HPF Final    Squamous Epithelial Cells, Urine 06/13/2025 1-9 (SPARSE)  Reference range not established. /HPF Final    Vitamin D, 25-Hydroxy, Total 06/13/2025 16 (L)  30 - 100 ng/mL Final    WBC 06/14/2025 12.2 (H)  4.4 - 11.3 x10*3/uL Final    nRBC 06/14/2025 0.0  0.0 - 0.0 /100 WBCs Final    RBC 06/14/2025 3.30 (L)  4.00 - 5.20 x10*6/uL Final    Hemoglobin 06/14/2025 8.6 (L)  12.0 - 16.0 g/dL Final    Hematocrit 06/14/2025 28.5 (L)  36.0 - 46.0 % Final    MCV 06/14/2025 86  80 - 100 fL Final    MCH 06/14/2025 26.1  26.0 - 34.0 pg Final    MCHC 06/14/2025 30.2 (L)  32.0 - 36.0 g/dL Final    RDW 06/14/2025 15.4 (H)  11.5 - 14.5 % Final    Platelets 06/14/2025 222  150 - 450 x10*3/uL Final    Glucose 06/14/2025 98  74 - 99 mg/dL Final    Sodium 06/14/2025 140  136 - 145 mmol/L Final    Potassium 06/14/2025 4.3  3.5 - 5.3 mmol/L Final    Chloride 06/14/2025 101  98 - 107 mmol/L Final    Bicarbonate 06/14/2025 33 (H)  21 - 32 mmol/L Final    Anion Gap 06/14/2025 10  10 - 20 mmol/L Final    Urea Nitrogen 06/14/2025 29 (H)  6 - 23 mg/dL Final    Creatinine 06/14/2025 1.18 (H)  0.50 - 1.05 mg/dL Final    eGFR 06/14/2025 50 (L)  >60 mL/min/1.73m*2 Final    Calculations of estimated GFR are performed using the 2021 CKD-EPI Study Refit equation without the race variable for the IDMS-Traceable creatinine methods.  https://jasn.asnjournals.org/content/early/2021/09/22/ASN.8111666837    Calcium 06/14/2025 9.1  8.6 - 10.3 mg/dL Final    Magnesium 06/14/2025 2.02  1.60 - 2.40 mg/dL Final    AV pk romeo 06/14/2025 1.57  m/s Final    LVOT diam 06/14/2025 2.20  cm Final    MV E/A ratio 06/14/2025 1.16   Final    Tricuspid annular plane systolic e* 06/14/2025 2.3  cm Final    LV EF 06/14/2025 58   % Final    RV free wall pk S' 06/14/2025 11.40  cm/s Final    Aortic Valve Area by Continuity of* 06/14/2025 2.91  cm2 Final    AV pk grad 06/14/2025 10  mmHg Final    POCT Glucose 06/14/2025 90  74 - 99 mg/dL Final    POCT pH, Arterial 06/14/2025 7.40  7.38 - 7.42 pH Final    POCT pCO2, Arterial 06/14/2025 52 (H)  38 - 42 mm Hg Final    POCT pO2, Arterial 06/14/2025 66 (L)  85 - 95 mm Hg Final    POCT SO2, Arterial 06/14/2025 95  94 - 100 % Final    POCT Oxy Hemoglobin, Arterial 06/14/2025 92.6 (L)  94.0 - 98.0 % Final    POCT Hematocrit Calculated, Arteri* 06/14/2025 29.0 (L)  36.0 - 46.0 % Final    POCT Sodium, Arterial 06/14/2025 137  136 - 145 mmol/L Final    POCT Potassium, Arterial 06/14/2025 4.7  3.5 - 5.3 mmol/L Final    POCT Chloride, Arterial 06/14/2025 102  98 - 107 mmol/L Final    POCT Ionized Calcium, Arterial 06/14/2025 1.23  1.10 - 1.33 mmol/L Final    POCT Glucose, Arterial 06/14/2025 191 (H)  74 - 99 mg/dL Final    POCT Lactate, Arterial 06/14/2025 1.2  0.4 - 2.0 mmol/L Final    POCT Base Excess, Arterial 06/14/2025 6.4 (H)  -2.0 - 3.0 mmol/L Final    POCT HCO3 Calculated, Arterial 06/14/2025 32.2 (H)  22.0 - 26.0 mmol/L Final    POCT Hemoglobin, Arterial 06/14/2025 9.5 (L)  12.0 - 16.0 g/dL Final    POCT Anion Gap, Arterial 06/14/2025 8 (L)  10 - 25 mmo/L Final    Patient Temperature 06/14/2025    Final    NOTE: Patient Results are Not Corrected for Temperature    FiO2 06/14/2025 40  % Final    Ipap CMH2O 06/14/2025 16.0  cm H2O Final    Epap CMH2O 06/14/2025 10.0  cm H2O Final    Site of Arterial Puncture 06/14/2025 Radial Right   Final    Jaden's Test 06/14/2025 Positive   Final    Site of Arterial Puncture 06/14/2025 N/A   Final    Jaden's Test 06/14/2025 N/A   Final    POCT Glucose 06/14/2025 94  74 - 99 mg/dL Final    POCT Glucose 06/14/2025 184 (H)  74 - 99 mg/dL Final    POCT Glucose 06/14/2025 66 (L)  74 - 99 mg/dL Final    POCT Glucose 06/14/2025 86  74 - 99 mg/dL Final    WBC 06/15/2025  9.2  4.4 - 11.3 x10*3/uL Final    nRBC 06/15/2025 0.0  0.0 - 0.0 /100 WBCs Final    RBC 06/15/2025 3.51 (L)  4.00 - 5.20 x10*6/uL Final    Hemoglobin 06/15/2025 9.0 (L)  12.0 - 16.0 g/dL Final    Hematocrit 06/15/2025 30.0 (L)  36.0 - 46.0 % Final    MCV 06/15/2025 86  80 - 100 fL Final    MCH 06/15/2025 25.6 (L)  26.0 - 34.0 pg Final    MCHC 06/15/2025 30.0 (L)  32.0 - 36.0 g/dL Final    RDW 06/15/2025 15.3 (H)  11.5 - 14.5 % Final    Platelets 06/15/2025 224  150 - 450 x10*3/uL Final    Glucose 06/15/2025 147 (H)  74 - 99 mg/dL Final    Sodium 06/15/2025 138  136 - 145 mmol/L Final    Potassium 06/15/2025 4.0  3.5 - 5.3 mmol/L Final    Chloride 06/15/2025 99  98 - 107 mmol/L Final    Bicarbonate 06/15/2025 32  21 - 32 mmol/L Final    Anion Gap 06/15/2025 11  10 - 20 mmol/L Final    Urea Nitrogen 06/15/2025 23  6 - 23 mg/dL Final    Creatinine 06/15/2025 1.19 (H)  0.50 - 1.05 mg/dL Final    eGFR 06/15/2025 49 (L)  >60 mL/min/1.73m*2 Final    Calculations of estimated GFR are performed using the 2021 CKD-EPI Study Refit equation without the race variable for the IDMS-Traceable creatinine methods.  https://jasn.asnjournals.org/content/early/2021/09/22/ASN.2829385162    Calcium 06/15/2025 9.0  8.6 - 10.3 mg/dL Final    Magnesium 06/15/2025 1.92  1.60 - 2.40 mg/dL Final    POCT Glucose 06/14/2025 82  74 - 99 mg/dL Final    POCT Glucose 06/14/2025 165 (H)  74 - 99 mg/dL Final    POCT Glucose 06/15/2025 133 (H)  74 - 99 mg/dL Final    POCT Glucose 06/15/2025 183 (H)  74 - 99 mg/dL Final    POCT Glucose 06/15/2025 197 (H)  74 - 99 mg/dL Final    WBC 06/16/2025 9.0  4.4 - 11.3 x10*3/uL Final    nRBC 06/16/2025 0.0  0.0 - 0.0 /100 WBCs Final    RBC 06/16/2025 3.66 (L)  4.00 - 5.20 x10*6/uL Final    Hemoglobin 06/16/2025 9.3 (L)  12.0 - 16.0 g/dL Final    Hematocrit 06/16/2025 30.5 (L)  36.0 - 46.0 % Final    MCV 06/16/2025 83  80 - 100 fL Final    MCH 06/16/2025 25.4 (L)  26.0 - 34.0 pg Final    MCHC 06/16/2025 30.5 (L)   32.0 - 36.0 g/dL Final    RDW 06/16/2025 15.0 (H)  11.5 - 14.5 % Final    Platelets 06/16/2025 246  150 - 450 x10*3/uL Final    Glucose 06/16/2025 161 (H)  74 - 99 mg/dL Final    Sodium 06/16/2025 138  136 - 145 mmol/L Final    Potassium 06/16/2025 3.9  3.5 - 5.3 mmol/L Final    Chloride 06/16/2025 95 (L)  98 - 107 mmol/L Final    Bicarbonate 06/16/2025 34 (H)  21 - 32 mmol/L Final    Anion Gap 06/16/2025 13  10 - 20 mmol/L Final    Urea Nitrogen 06/16/2025 21  6 - 23 mg/dL Final    Creatinine 06/16/2025 1.12 (H)  0.50 - 1.05 mg/dL Final    eGFR 06/16/2025 53 (L)  >60 mL/min/1.73m*2 Final    Calculations of estimated GFR are performed using the 2021 CKD-EPI Study Refit equation without the race variable for the IDMS-Traceable creatinine methods.  https://jasn.asnjournals.org/content/early/2021/09/22/ASN.2459188223    Calcium 06/16/2025 9.2  8.6 - 10.3 mg/dL Final    Magnesium 06/16/2025 1.83  1.60 - 2.40 mg/dL Final    POCT Glucose 06/15/2025 249 (H)  74 - 99 mg/dL Final    POCT Glucose 06/16/2025 158 (H)  74 - 99 mg/dL Final         Imaging     Transthoracic Echo Complete              [facility] [place]  [address] [id number]     Tel [phone] Fax [phone]    TRANSTHORACIC ECHOCARDIOGRAM REPORT    Patient Name:       TEJINDER Aguillon Physician:    [id number] Thomas Abreu MD  Study Date:         6/14/2025            Ordering Provider:    [id number] JENNIFER NEFF  MRN/PID:            [id number]             Fellow:  Accession#:         [id number]         Nurse:  Date of Birth/Age:  1955 / 70 years Sonographer:          Ksenia Thompson RD  Gender Assigned at  F                    Additional Staff:  Birth:  Height:             160.00 cm            Admit Date:  Weight:             127.01 kg             Admission Status:     Inpatient -                                                                 Routine  BSA / BMI:          2.23 m2 / 49.61      Department Location:  [facility] Echo Lab                      kg/m2  Blood Pressure: 144 /47 mmHg    Study Type:    TRANSTHORACIC ECHO (TTE) COMPLETE  Diagnosis/ICD: Heart failure, unspecified-I50.9  Indication:    heart failure  CPT Codes:     Echo Complete w Full Doppler-[id number]   Study Detail: The following Echo studies were performed: 2D, Doppler and color                flow. Technically challenging study due to body habitus, poor                acoustic windows and prominent lung artifact. Definity used as a                contrast agent for endocardial border definition. Total contrast                used for this procedure was 3 mL via IV push.       PHYSICIAN INTERPRETATION:  Left Ventricle: The left ventricular systolic function is normal with a visually estimated ejection fraction of 55-60%. There are no regional wall motion abnormalities. The left ventricular cavity size was not assessed. Spectral Doppler shows a normal pattern of left ventricular diastolic filling.  Left Atrium: The left atrial size was not assessed.  Right Ventricle: The right ventricle is normal in size. There is normal right ventricular global systolic function.  Right Atrium: The right atrial size is normal.  Aortic Valve: The aortic valve was not well visualized. There is no evidence of aortic valve regurgitation.  Mitral Valve: The mitral valve is normal in structure. There is no evidence of mitral valve regurgitation. The E Vmax is 1.15 m/s.  Tricuspid Valve: The tricuspid valve is structurally normal. No evidence of tricuspid regurgitation.  Pulmonic Valve: The pulmonic valve is not well visualized. There is no indication of pulmonic valve regurgitation.  Pericardium: No pericardial effusion noted.  Aorta: The aortic root is normal.  Systemic Veins: The inferior vena cava was not  well visualized, IVC inspiratory collapse is not well visualized.       CONCLUSIONS:   1. Poorly visualized anatomical structures due to suboptimal image quality.   2. The left ventricular systolic function is normal with a visually estimated ejection fraction of 55-60%.   3. There is normal right ventricular global systolic function.    QUANTITATIVE DATA SUMMARY:     LV SYSTOLIC FUNCTION:                       Normal Ranges:  EF-Visual:      58 %  LV EF Reported: 58 %       LV DIASTOLIC FUNCTION:          Normal Ranges:  MV Peak E:             1.15 m/s (0.7-1.2 m/s)  MV Peak A:             0.99 m/s (0.42-0.7 m/s)  E/A Ratio:             1.16     (1.0-2.2)  MV lateral e'          0.07 m/s       MITRAL VALVE:          Normal Ranges:  MV DT:        195 msec (150-240msec)       AORTIC VALVE:           Normal Ranges:  AoV Vmax:      1.57 m/s (<=1.7m/s)  AoV Peak P.9 mmHg (<20mmHg)  LVOT Max Rojelio:  1.20 m/s (<=1.1m/s)  LVOT Diameter: 2.20 cm  (1.8-2.4cm)  AoV Area,Vmax: 2.91 cm2 (2.5-4.5cm2)       RIGHT VENTRICLE:  TAPSE: 22.5 mm  RV s'  0.11 m/s       TRICUSPID VALVE/RVSP:        Normal Ranges:  Est. RA Pressure:     3 mmHg       [id number] Thomas Abreu MD  Electronically signed on 6/15/2025 at 11:22:50 AM       ** Final **  XR abdomen 1 view  Narrative: Interpreted By:  Chu Laguna,   STUDY:  XR ABDOMEN 1 VIEW      INDICATION:  Signs/Symptoms:abd distention, pain.      COMPARISON:   CT abdomen      ACCESSION NUMBER(S):  [id number]      ORDERING CLINICIAN:  LIANG BERUMEN      FINDINGS:  Nonobstructive gas pattern.      Moderate stool throughout the colon.      Surgical clips right upper quadrant.      Impression: No acute abdominal radiographic findings.      Signed by: Chu Laguna 6/15/2025 10:57 AM  Dictation workstation:   [id number]       Problem List[1]      Assessment/Plan   Assessment & Plan  Acute on chronic respiratory failure with hypoxia and hypercapnia    Pulmonary edema with congestive  heart failure with preserved left ventricular function    Fluid overload    Renal insufficiency    Left flank pain    Acute respiratory failure related to pulmonary edema continue with IV Lasix  Continue with BiPAP pulmonary note was reviewed echocardiogram reviewed  CRISTOFER doing better           I spent 25 minutes in the professional and overall care of this patient.      ANDREW Mccloud MD         [1]   Patient Active Problem List  Diagnosis    Acute on chronic respiratory failure with hypoxia and hypercapnia    Pulmonary edema with congestive heart failure with preserved left ventricular function    Fluid overload    Renal insufficiency    Left flank pain

## 2025-06-16 NOTE — ASSESSMENT & PLAN NOTE
Acute respiratory failure related to pulmonary edema continue with IV Lasix  Continue with BiPAP pulmonary note was reviewed echocardiogram reviewed  CRISTOFER doing better

## 2025-06-16 NOTE — PROGRESS NOTES
Physical Therapy    Physical Therapy Evaluation    Patient Name: Kaylene Feliciano  MRN: [id number]  Today's Date: 6/16/2025   Time Calculation  Start Time: 1028  Stop Time: 1043  Time Calculation (min): 15 min  2112/2112-A    Assessment/Plan   PT Assessment  PT Assessment Results: Decreased strength, Decreased range of motion, Decreased endurance, Impaired balance, Decreased mobility  Rehab Prognosis: Good  Evaluation/Treatment Tolerance: Patient tolerated treatment well  Medical Staff Made Aware: Yes  End of Session Communication: Bedside nurse  Assessment Comment: Pt is a 70 y.o. female admitted for Candidal intertrigo [B37.2]  Acute cardiogenic pulmonary edema [I50.1]  Left sided abdominal pain [R10.9]  Acute on chronic respiratory failure with hypoxia and hypercapnia [J96.21, J96.22] on 6/13/2025. Pt at functional level and will benefit from skilled therapy during stay to improve overall functional mobility, strength, ROM, endurance and safety awareness. Upon discharge pt will benefit from low intensity therapy for continued improvement in functional mobility. Therapy will continue to follow and reassess each session.      End of Session Patient Position: Bed, 3 rail up, Alarm on  IP OR SWING BED PT PLAN  Inpatient or Swing Bed: Inpatient  PT Plan  PT Plan: PT Eval only  PT Eval Only Reason: No acute PT needs identified  PT Frequency: PT eval only  PT Discharge Recommendations:  (Pt may benefit from home care services.)  Equipment Recommended upon Discharge: Lift  PT Recommended Transfer Status: Assist x2  PT - OK to Discharge: Yes    Subjective     Current Problem:  1. Acute on chronic respiratory failure with hypoxia and hypercapnia        2. Candidal intertrigo        3. Left sided abdominal pain        4. Acute cardiogenic pulmonary edema  Transthoracic Echo Complete    Transthoracic Echo Complete      5. Heart failure, unspecified  Transthoracic Echo Complete        Problem List[1]    General Visit  Information:  General  Reason for Referral: impaired mobility  Referred By: Dr. Mccloud  Past Medical History Relevant to Rehab: non-smoker who is on chronic home oxygen continuously 4 L/min at rest, and fortunately been bedridden for the last 2 months and her  state cares of her.   # Acute on chronic hypercapnic respiratory failure  # Restrictive lung disease with bilateral basal atelectasis worse in the left side  # Morbid obesity with ARCHIE and OHS  # Bilateral lung infiltrates most likely due to pulmonary edema  Co-Treatment: OT  Co-Treatment Reason: 2 person assist  Prior to Session Communication: Bedside nurse  Patient Position Received: Bed, 3 rail up, Alarm on  Preferred Learning Style: verbal, visual  General Comment: Patient agreeable to therapy.    Home Living:  Home Living  Type of Home: House  Lives With: Spouse  Home Adaptive Equipment: Walker rolling or standard, Wheelchair-manual, Wheelchair-power, Hospital bed, Other (Comment) (Melvin lift)  Home Layout: One level    Prior Level of Function:  Prior Function Per Pt/Caregiver Report  Level of [place]: Needs assistance with ADLs, Needs assistance with homemaking, Needs assistance with functional transfers  Receives Help From: Family  ADL Assistance: Needs assistance  Homemaking Assistance: Needs assistance  Prior Function Comments: Pt has been bedbound for the last 2 years when she broke her leg. She never did rehab, re broke her leg in February and now requires a Melvin lift for transfers. She uses a bedpan for toileting.    Precautions:  Precautions  Medical Precautions: Fall precautions    Vital Signs:     Objective     Pain:  Pain Assessment  Pain Assessment: 0-10  0-10 (Numeric) Pain Score: 0 - No pain    Cognition:  Cognition  Overall Cognitive Status: Within Functional Limits    General Assessments:      Activity Tolerance  Endurance: Decreased tolerance for upright activites     Functional Assessments:     Bed Mobility  Bed Mobility:  Yes  Bed Mobility 1  Bed Mobility 1: Rolling right, Rolling left, Scooting  Level of Assistance 1: Moderate assistance, Maximum assistance  Bed Mobility Comments 1: bed rails to assist  Transfers  Transfer: No             Extremity/Trunk Assessments:        RLE   RLE :  (grossly 3+/5)  LLE   LLE :  (grossly 3+/5)    Outcome Measures:     [facility] Basic Mobility  Turning from your back to your side while in a flat bed without using bedrails: A lot  Moving from lying on your back to sitting on the side of a flat bed without using bedrails: A lot  Moving to and from bed to chair (including a wheelchair): Total  Standing up from a chair using your arms (e.g. wheelchair or bedside chair): Total  To walk in hospital room: Total  Climbing 3-5 steps with railing: Total  Basic Mobility - Total Score: 8     Education Documentation  No documentation found.  Education Comments  No comments found.               [1]   Patient Active Problem List  Diagnosis    Acute on chronic respiratory failure with hypoxia and hypercapnia    Pulmonary edema with congestive heart failure with preserved left ventricular function    Fluid overload    Renal insufficiency    Left flank pain

## 2025-06-16 NOTE — CONSULTS
Wound Care Consult     Visit Date: 6/16/2025      Patient Name: Kaylene Feliciano         MRN: [id number]             Reason for Consult: PI left hip        Wound History: Present on admission     Pertinent Labs:       Assessment: Saw pictures, spoke to nurse who said she did not see any PI. In picture no opened area noted, picture shows brown discoloration.      Wound Plan: Will continue to follow.      Kathia Bradford RN  6/16/2025  7:24 PM

## 2025-06-16 NOTE — PROGRESS NOTES
"Nutrition Initial Assessment:   Nutrition Assessment    Reason for Assessment: Admission nursing screening (\"decrease appetite over the past couple weeks\")    Patient is a 70 y.o. female presenting with acute respiratory failure on chronic respiratory failure with hypoxia and hypercapnia.     Past medical history of chronic respiratory failure on BiPAP at home at night morbid obesity obstructive sleep apnea hypoventilation syndrome morbid obesity history of type 2 diabetes     Nutrition History:  Energy Intake: Good > 75 %  Food and Nutrient History: Pt denies needing any nutrition inervention at this time. She has been navigating the menu and calling in her meal orders. Nursing report she has been consuming 100% of her meal orders. She does admit that lately she simply does not have much interest in eating and this is becoming her new norm.  Vitamin/Herbal Supplement Use: Pt states she takes Apple Cider Vinegar every morning to prevent UTI's  Food Allergy:  (no known)  Food Intolerance:  (none)       Anthropometrics:  Height: 162.6 cm (5' 4\")   Weight: 134 kg (295 lb 3.1 oz)   BMI (Calculated): 50.65  IBW/kg (Dietitian Calculated): 54.43 kg  Percent of IBW: 258.12 %                      Weight History:   Wt Readings from Last 15 Encounters:   06/16/25 134 kg (295 lb 3.1 oz)   01/12/25 127 kg (279 lb 15.8 oz)   09/14/24 132 kg (292 lb 1.8 oz)   08/13/24 129 kg (284 lb)   07/25/24 132 kg (289 lb 14.5 oz)   07/23/24 134 kg (295 lb)   07/19/24 133 kg (294 lb)   07/18/24 132 kg (290 lb)   07/16/24 134 kg (296 lb)   07/11/24 134 kg (296 lb)   07/09/24 136 kg (299 lb)   07/08/24 134 kg (295 lb 3.1 oz)   06/25/24 135 kg (298 lb)   06/20/24 141 kg (311 lb)   06/17/24 141 kg (311 lb 15.2 oz)       Weight Change %:  Significant Weight Loss: No    Nutrition Focused Physical Exam Findings:    Subcutaneous Fat Loss:   Orbital Fat Pads: Well nourished (slightly bulging fat pads)  Buccal Fat Pads: Well nourished (full, rounded " cheeks)  Triceps: Well nourished (ample fat tissue)  Ribs: Well nourished (chest is full, ribs do not show, slight to no protrusion of the iliac crest)  Muscle Wasting:  Temporalis: Well nourished (well-defined muscle)  Pectoralis (Clavicular Region): Well nourished (clavicle not visible)  Deltoid/Trapezius: Well nourished (rounded appearance at arm, shoulder, neck)  Interosseous: Well nourished (muscle bulges)  Trapezius/Infraspinatus/Supraspinatus (Scapular Region): Defer  Quadriceps: Defer  Gastrocnemius: Defer  Edema:  Edema Location: gneralized; non-pitting  Physical Findings:  Skin: Positive (skin compromised to left upper abdomen per chart)    Nutrition Significant Labs:  CBC Trend:   Results from last 7 days   Lab Units 06/16/25  0651 06/15/25  0333 06/14/25  0620 06/13/25  2020   WBC AUTO x10*3/uL 9.0 9.2 12.2* 11.2   RBC AUTO x10*6/uL 3.66* 3.51* 3.30* 3.16*   HEMOGLOBIN g/dL 9.3* 9.0* 8.6* 8.9*   HEMATOCRIT % 30.5* 30.0* 28.5* 29.5*   MCV fL 83 86 86 93   PLATELETS AUTO x10*3/uL 246 224 222 169    , BMP Trend:   Results from last 7 days   Lab Units 06/16/25  0651 06/15/25  0333 06/14/25 0620 06/13/25 2335 06/13/25 2020   GLUCOSE mg/dL 161* 147* 98  --  123*   CALCIUM mg/dL 9.2 9.0 9.1  --  9.1   SODIUM mmol/L 138 138 140  --  135*   POTASSIUM mmol/L 3.9 4.0 4.3   < > 5.4*   CO2 mmol/L 34* 32 33*  --  25   CHLORIDE mmol/L 95* 99 101  --  101   BUN mg/dL 21 23 29*  --  30*   CREATININE mg/dL 1.12* 1.19* 1.18*  --  1.17*    < > = values in this interval not displayed.    , A1C:  Lab Results   Component Value Date    HGBA1C 8.8 (H) 08/07/2023   , BG POCT trend:   Results from last 7 days   Lab Units 06/16/25  1158 06/16/25  0756 06/15/25  1930 06/15/25  1653 06/15/25  1130   POCT GLUCOSE mg/dL 204* 158* 249* 197* 183*        Nutrition Specific Medications:  Scheduled medications  Scheduled Medications[1]  Continuous medications  Continuous Medications[2]  PRN medications  PRN Medications[3]      I/O:    Last BM Date: 06/15/25; Stool Appearance: Soft (06/16/25 1100)    Dietary Orders (From admission, onward)       Start     Ordered    06/14/25 2243  Adult diet Cardiac, Consistent Carb; CCD 75 gm/meal; 70 gm fat; 2 - 3 grams Sodium  Diet effective now        Question Answer Comment   Diet type Cardiac    Diet type Consistent Carb    Carb diet selection: CCD 75 gm/meal    Fat restriction: 70 gm fat    Sodium restriction: 2 - 3 grams Sodium        06/14/25 2242 06/14/25 1513  May Participate in Room Service  ( ROOM SERVICE MAY PARTICIPATE)  Once        Question:  .  Answer:  Yes    06/14/25 1512                     Estimated Needs:      Method for Estimating Needs: 8254-2439 kcal (18-20 kcal/kg)     Method for Estimating 24 Hour Protein Needs: 107-134g protein (0.8-1.0g/kg)     Method for Estimating 24 Hour Fluid Needs: 1ml/kcal or per physician        Nutrition Diagnosis   Malnutrition Diagnosis  Patient has Malnutrition Diagnosis: No    Nutrition Diagnosis  Patient has Nutrition Diagnosis: Yes  Diagnosis Status (1): New  Nutrition Diagnosis 1: Obesity, adult or pediatric  Related to (1): lifestyle choices  As Evidenced by (1): BMI >50       Nutrition Interventions/Recommendations   Nutrition prescription for oral nutrition    Nutrition Recommendations:  Individualized Nutrition Prescription Provided for : continue therapeutic diet as ordered for disease management    Nutrition Interventions/Goals:   Goal: Suggested small portions as desired to encourage pt not to skip meals.      Education Documentation  Nutrition Related Education, taught by Neli Dobbins RDN, LD at 6/16/2025  3:12 PM.  Learner: Patient  Readiness: Acceptance  Method: Explanation  Response: Verbalizes Understanding  Comment: Pt denies needung any further explanation for her therapeutic diet. She appreciates the assistance of the diet clerks.              Nutrition Monitoring and Evaluation   Intake / Amount of food: Consumes at least 75% or  more of meals/snacks/supplements         Glucose/Endocrine Profile: Glucose within normal limits ( mg/dL)         Goal Status: New goal(s) identified    Time Spent (min): 30 minutes              [1] amLODIPine, 10 mg, oral, Daily  aspirin, 81 mg, oral, Daily  atorvastatin, 10 mg, oral, Nightly  citalopram, 40 mg, oral, Daily  ferrous sulfate, 65 mg of elemental iron, oral, Daily with breakfast  furosemide, 40 mg, intravenous, BID  [Held by provider] furosemide, 20 mg, oral, Daily  gabapentin, 300 mg, oral, BID  heparin (porcine), 7,500 Units, subcutaneous, q8h YARELI  insulin glargine, 40 Units, subcutaneous, BID  insulin lispro, 0-10 Units, subcutaneous, TID AC  lactobacillus acidophilus, 1 tablet, oral, BID  lipase-protease-amylase, 1 capsule, oral, TID AC  metoprolol succinate XL, 25 mg, oral, Daily  nystatin, 1 Application, Topical, TID  nystatin, , Topical, BID  sodium chloride 0.9%, 10 mL, intravenous, q8h YARELI     [2]    [3] PRN medications: acetaminophen **OR** acetaminophen **OR** acetaminophen, albuterol, dextrose, dextrose, glucagon, glucagon, melatonin, ondansetron ODT **OR** ondansetron, oxygen, oxygen, polyethylene glycol, sodium chloride

## 2025-06-17 LAB
ANION GAP SERPL CALC-SCNC: 12 MMOL/L (ref 10–20)
ATRIAL RATE: 65 BPM
BUN SERPL-MCNC: 24 MG/DL (ref 6–23)
CALCIUM SERPL-MCNC: 9.3 MG/DL (ref 8.6–10.3)
CHLORIDE SERPL-SCNC: 94 MMOL/L (ref 98–107)
CO2 SERPL-SCNC: 37 MMOL/L (ref 21–32)
CREAT SERPL-MCNC: 1.16 MG/DL (ref 0.5–1.05)
EGFRCR SERPLBLD CKD-EPI 2021: 51 ML/MIN/1.73M*2
ERYTHROCYTE [DISTWIDTH] IN BLOOD BY AUTOMATED COUNT: 15 % (ref 11.5–14.5)
GLUCOSE BLD MANUAL STRIP-MCNC: 165 MG/DL (ref 74–99)
GLUCOSE BLD MANUAL STRIP-MCNC: 180 MG/DL (ref 74–99)
GLUCOSE BLD MANUAL STRIP-MCNC: 217 MG/DL (ref 74–99)
GLUCOSE BLD MANUAL STRIP-MCNC: 227 MG/DL (ref 74–99)
GLUCOSE BLD MANUAL STRIP-MCNC: 242 MG/DL (ref 74–99)
GLUCOSE SERPL-MCNC: 184 MG/DL (ref 74–99)
HCT VFR BLD AUTO: 31 % (ref 36–46)
HGB BLD-MCNC: 9.6 G/DL (ref 12–16)
MAGNESIUM SERPL-MCNC: 1.71 MG/DL (ref 1.6–2.4)
MCH RBC QN AUTO: 26 PG (ref 26–34)
MCHC RBC AUTO-ENTMCNC: 31 G/DL (ref 32–36)
MCV RBC AUTO: 84 FL (ref 80–100)
NRBC BLD-RTO: 0 /100 WBCS (ref 0–0)
P AXIS: -7 DEGREES
P OFFSET: 165 MS
P ONSET: 78 MS
PLATELET # BLD AUTO: 229 X10*3/UL (ref 150–450)
POTASSIUM SERPL-SCNC: 3.9 MMOL/L (ref 3.5–5.3)
PR INTERVAL: 178 MS
Q ONSET: 167 MS
QRS COUNT: 10 BEATS
QRS DURATION: 164 MS
QT INTERVAL: 488 MS
QTC CALCULATION(BAZETT): 507 MS
QTC FREDERICIA: 501 MS
R AXIS: 6 DEGREES
RBC # BLD AUTO: 3.69 X10*6/UL (ref 4–5.2)
SODIUM SERPL-SCNC: 139 MMOL/L (ref 136–145)
T AXIS: 22 DEGREES
T OFFSET: 411 MS
VENTRICULAR RATE: 65 BPM
WBC # BLD AUTO: 8.5 X10*3/UL (ref 4.4–11.3)

## 2025-06-17 PROCEDURE — 2500000004 HC RX 250 GENERAL PHARMACY W/ HCPCS (ALT 636 FOR OP/ED): Performed by: NURSE PRACTITIONER

## 2025-06-17 PROCEDURE — 2500000001 HC RX 250 WO HCPCS SELF ADMINISTERED DRUGS (ALT 637 FOR MEDICARE OP): Performed by: NURSE PRACTITIONER

## 2025-06-17 PROCEDURE — 36415 COLL VENOUS BLD VENIPUNCTURE: CPT | Performed by: NURSE PRACTITIONER

## 2025-06-17 PROCEDURE — 83735 ASSAY OF MAGNESIUM: CPT | Performed by: NURSE PRACTITIONER

## 2025-06-17 PROCEDURE — 2500000005 HC RX 250 GENERAL PHARMACY W/O HCPCS: Performed by: INTERNAL MEDICINE

## 2025-06-17 PROCEDURE — 85027 COMPLETE CBC AUTOMATED: CPT | Performed by: NURSE PRACTITIONER

## 2025-06-17 PROCEDURE — 2500000002 HC RX 250 W HCPCS SELF ADMINISTERED DRUGS (ALT 637 FOR MEDICARE OP, ALT 636 FOR OP/ED): Performed by: NURSE PRACTITIONER

## 2025-06-17 PROCEDURE — 82374 ASSAY BLOOD CARBON DIOXIDE: CPT | Performed by: NURSE PRACTITIONER

## 2025-06-17 PROCEDURE — 82947 ASSAY GLUCOSE BLOOD QUANT: CPT

## 2025-06-17 PROCEDURE — 1100000001 HC PRIVATE ROOM DAILY

## 2025-06-17 PROCEDURE — 94660 CPAP INITIATION&MGMT: CPT

## 2025-06-17 RX ORDER — MAGNESIUM SULFATE HEPTAHYDRATE 40 MG/ML
2 INJECTION, SOLUTION INTRAVENOUS ONCE
Status: COMPLETED | OUTPATIENT
Start: 2025-06-17 | End: 2025-06-17

## 2025-06-17 RX ORDER — POTASSIUM CHLORIDE 20 MEQ/1
20 TABLET, EXTENDED RELEASE ORAL ONCE
Status: COMPLETED | OUTPATIENT
Start: 2025-06-17 | End: 2025-06-17

## 2025-06-17 RX ORDER — FUROSEMIDE 40 MG/1
40 TABLET ORAL
Status: DISCONTINUED | OUTPATIENT
Start: 2025-06-17 | End: 2025-06-18 | Stop reason: HOSPADM

## 2025-06-17 RX ADMIN — HEPARIN SODIUM 7500 UNITS: 5000 INJECTION INTRAVENOUS; SUBCUTANEOUS at 14:34

## 2025-06-17 RX ADMIN — NYSTATIN 1 APPLICATION: 100000 POWDER TOPICAL at 14:34

## 2025-06-17 RX ADMIN — Medication 1 TABLET: at 20:31

## 2025-06-17 RX ADMIN — INSULIN LISPRO 2 UNITS: 100 INJECTION, SOLUTION INTRAVENOUS; SUBCUTANEOUS at 14:34

## 2025-06-17 RX ADMIN — Medication 40 PERCENT: at 02:30

## 2025-06-17 RX ADMIN — Medication 40 PERCENT: at 23:41

## 2025-06-17 RX ADMIN — HEPARIN SODIUM 7500 UNITS: 5000 INJECTION INTRAVENOUS; SUBCUTANEOUS at 06:23

## 2025-06-17 RX ADMIN — CITALOPRAM 40 MG: 40 TABLET, FILM COATED ORAL at 09:39

## 2025-06-17 RX ADMIN — NYSTATIN 1 APPLICATION: 100000 POWDER TOPICAL at 22:00

## 2025-06-17 RX ADMIN — INSULIN GLARGINE 40 UNITS: 100 INJECTION, SOLUTION SUBCUTANEOUS at 09:40

## 2025-06-17 RX ADMIN — INSULIN GLARGINE 40 UNITS: 100 INJECTION, SOLUTION SUBCUTANEOUS at 20:31

## 2025-06-17 RX ADMIN — HEPARIN SODIUM 7500 UNITS: 5000 INJECTION INTRAVENOUS; SUBCUTANEOUS at 22:06

## 2025-06-17 RX ADMIN — Medication 10 ML: at 22:06

## 2025-06-17 RX ADMIN — ATORVASTATIN CALCIUM 10 MG: 10 TABLET, FILM COATED ORAL at 20:31

## 2025-06-17 RX ADMIN — POTASSIUM CHLORIDE 20 MEQ: 1500 TABLET, EXTENDED RELEASE ORAL at 09:38

## 2025-06-17 RX ADMIN — GABAPENTIN 300 MG: 300 CAPSULE ORAL at 20:31

## 2025-06-17 RX ADMIN — FERROUS SULFATE TAB 325 MG (65 MG ELEMENTAL FE) 1 TABLET: 325 (65 FE) TAB at 09:39

## 2025-06-17 RX ADMIN — ASPIRIN 81 MG CHEWABLE TABLET 81 MG: 81 TABLET CHEWABLE at 09:38

## 2025-06-17 RX ADMIN — PANCRELIPASE 1 CAPSULE: 60000; 12000; 38000 CAPSULE, DELAYED RELEASE PELLETS ORAL at 09:39

## 2025-06-17 RX ADMIN — FUROSEMIDE 40 MG: 40 TABLET ORAL at 09:39

## 2025-06-17 RX ADMIN — Medication 1 TABLET: at 09:38

## 2025-06-17 RX ADMIN — AMLODIPINE BESYLATE 10 MG: 10 TABLET ORAL at 09:38

## 2025-06-17 RX ADMIN — GABAPENTIN 300 MG: 300 CAPSULE ORAL at 09:39

## 2025-06-17 RX ADMIN — Medication 40 PERCENT: at 05:00

## 2025-06-17 RX ADMIN — MAGNESIUM SULFATE HEPTAHYDRATE 2 G: 40 INJECTION, SOLUTION INTRAVENOUS at 09:42

## 2025-06-17 RX ADMIN — METOPROLOL SUCCINATE 25 MG: 25 TABLET, EXTENDED RELEASE ORAL at 09:38

## 2025-06-17 RX ADMIN — NYSTATIN 1 APPLICATION: 100000 POWDER TOPICAL at 11:28

## 2025-06-17 RX ADMIN — INSULIN LISPRO 4 UNITS: 100 INJECTION, SOLUTION INTRAVENOUS; SUBCUTANEOUS at 16:51

## 2025-06-17 RX ADMIN — FUROSEMIDE 40 MG: 40 TABLET ORAL at 16:51

## 2025-06-17 RX ADMIN — INSULIN LISPRO 2 UNITS: 100 INJECTION, SOLUTION INTRAVENOUS; SUBCUTANEOUS at 09:39

## 2025-06-17 SDOH — ECONOMIC STABILITY: FOOD INSECURITY: WITHIN THE PAST 12 MONTHS, YOU WORRIED THAT YOUR FOOD WOULD RUN OUT BEFORE YOU GOT THE MONEY TO BUY MORE.: NEVER TRUE

## 2025-06-17 SDOH — ECONOMIC STABILITY: FOOD INSECURITY: WITHIN THE PAST 12 MONTHS, THE FOOD YOU BOUGHT JUST DIDN'T LAST AND YOU DIDN'T HAVE MONEY TO GET MORE.: NEVER TRUE

## 2025-06-17 SDOH — ECONOMIC STABILITY: HOUSING INSECURITY: AT ANY TIME IN THE PAST 12 MONTHS, WERE YOU HOMELESS OR LIVING IN A SHELTER (INCLUDING NOW)?: NO

## 2025-06-17 SDOH — ECONOMIC STABILITY: HOUSING INSECURITY: IN THE PAST 12 MONTHS, HOW MANY TIMES HAVE YOU MOVED WHERE YOU WERE LIVING?: 0

## 2025-06-17 SDOH — ECONOMIC STABILITY: HOUSING INSECURITY: IN THE LAST 12 MONTHS, WAS THERE A TIME WHEN YOU WERE NOT ABLE TO PAY THE MORTGAGE OR RENT ON TIME?: NO

## 2025-06-17 SDOH — ECONOMIC STABILITY: INCOME INSECURITY: IN THE PAST 12 MONTHS HAS THE ELECTRIC, GAS, OIL, OR WATER COMPANY THREATENED TO SHUT OFF SERVICES IN YOUR HOME?: NO

## 2025-06-17 SDOH — ECONOMIC STABILITY: TRANSPORTATION INSECURITY: IN THE PAST 12 MONTHS, HAS LACK OF TRANSPORTATION KEPT YOU FROM MEDICAL APPOINTMENTS OR FROM GETTING MEDICATIONS?: NO

## 2025-06-17 SDOH — ECONOMIC STABILITY: FOOD INSECURITY: HOW HARD IS IT FOR YOU TO PAY FOR THE VERY BASICS LIKE FOOD, HOUSING, MEDICAL CARE, AND HEATING?: NOT HARD AT ALL

## 2025-06-17 ASSESSMENT — COGNITIVE AND FUNCTIONAL STATUS - GENERAL
TURNING FROM BACK TO SIDE WHILE IN FLAT BAD: TOTAL
MOVING TO AND FROM BED TO CHAIR: TOTAL
WALKING IN HOSPITAL ROOM: TOTAL
WALKING IN HOSPITAL ROOM: TOTAL
DRESSING REGULAR LOWER BODY CLOTHING: TOTAL
CLIMB 3 TO 5 STEPS WITH RAILING: TOTAL
CLIMB 3 TO 5 STEPS WITH RAILING: TOTAL
MOBILITY SCORE: 9
STANDING UP FROM CHAIR USING ARMS: TOTAL
CLIMB 3 TO 5 STEPS WITH RAILING: TOTAL
TOILETING: A LOT
TOILETING: A LOT
PERSONAL GROOMING: A LITTLE
EATING MEALS: A LITTLE
DRESSING REGULAR UPPER BODY CLOTHING: A LOT
MOVING TO AND FROM BED TO CHAIR: TOTAL
MOBILITY SCORE: 9
MOVING TO AND FROM BED TO CHAIR: TOTAL
TURNING FROM BACK TO SIDE WHILE IN FLAT BAD: TOTAL
WALKING IN HOSPITAL ROOM: TOTAL
DAILY ACTIVITIY SCORE: 14
TURNING FROM BACK TO SIDE WHILE IN FLAT BAD: TOTAL
MOBILITY SCORE: 9
STANDING UP FROM CHAIR USING ARMS: TOTAL
STANDING UP FROM CHAIR USING ARMS: TOTAL
PERSONAL GROOMING: A LITTLE
HELP NEEDED FOR BATHING: A LOT
HELP NEEDED FOR BATHING: A LOT
DRESSING REGULAR UPPER BODY CLOTHING: A LOT
DRESSING REGULAR LOWER BODY CLOTHING: TOTAL
DAILY ACTIVITIY SCORE: 13

## 2025-06-17 ASSESSMENT — PAIN - FUNCTIONAL ASSESSMENT: PAIN_FUNCTIONAL_ASSESSMENT: 0-10

## 2025-06-17 ASSESSMENT — ACTIVITIES OF DAILY LIVING (ADL)
LACK_OF_TRANSPORTATION: NO
LACK_OF_TRANSPORTATION: NO

## 2025-06-17 ASSESSMENT — PAIN SCALES - GENERAL: PAINLEVEL_OUTOF10: 0 - NO PAIN

## 2025-06-17 NOTE — ASSESSMENT & PLAN NOTE
Acute respiratory failure related to pulmonary edema echocardiogram was reviewed patient had ejection fraction 55% most likely restrictive lung disease and diastolic congestive heart failure patient started on Lasix with improvement of her symptoms  She still need to use BiPAP at night  Switch to oral Lasix  Pulmonary note was reviewed patient will be transferred to regular medical floor anticipate possible discharge back home tomorrow

## 2025-06-17 NOTE — PROGRESS NOTES
06/17/25 1031   Discharge Planning   Living Arrangements Spouse/significant other   Support Systems Spouse/significant other   Assistance Needed needs assistance with some ADL's / uses a wheelchair   Type of Residence Private residence   Number of Stairs to Enter Residence 0   Number of Stairs Within Residence 0   Do you have animals or pets at home? No   Who is requesting discharge planning? Provider   Home or Post Acute Services In home services   Type of Home Care Services Home OT;Home PT;Home health aide   Expected Discharge Disposition Home   Does the patient need discharge transport arranged? Yes   RoundTrip coordination needed? Yes   Has discharge transport been arranged? No   Financial Resource Strain   How hard is it for you to pay for the very basics like food, housing, medical care, and heating? Not hard   Housing Stability   In the last 12 months, was there a time when you were not able to pay the mortgage or rent on time? N   In the past 12 months, how many times have you moved where you were living? 0   At any time in the past 12 months, were you homeless or living in a shelter (including now)? N   Transportation Needs   In the past 12 months, has lack of transportation kept you from medical appointments or from getting medications? no   In the past 12 months, has lack of transportation kept you from meetings, work, or from getting things needed for daily living? No   Patient Choice   Provider Choice list and CMS website (https://medicare.gov/care-compare#search) for post-acute Quality and Resource Measure Data were provided and reviewed with: Patient   Patient / Family choosing to utilize agency / facility established prior to hospitalization No   Stroke Family Assessment   Stroke Family Assessment Needed No   Intensity of Service   Intensity of Service >30 min     Met with patient at bedside. Introduced self and role in hospital.   Verified address, insurance and PCP is Dr. Srivastava.   Pharmacy is  [facility], has no issues obtaining medications and manages her own medications.   Patient is in a wheelchair, does not ambulate, does not drive and needs some assistance with ADL's.   Spouse does the transporting, grocery shopping and assisting patient with ADL's.   Denies any issues paying bills or getting food into the home.   Patient does wear O2 @ home at 4 liters.   Patient states that it is getting very hard to get out to the PCP and is asking about any Physicians that might come to the home. This writer got some information on Visiting Physicians for patient and discussed the agencies with patient. Patient feels that she would like some HC on discharge and has used [facility] in the past and would like them again. Will print choice list and submit referral to [facility] for acceptance.   Patient will discharge home with HC.   CT team will follow patient for any discharge needs.

## 2025-06-17 NOTE — CARE PLAN
Problem: Safety - Adult  Goal: Free from fall injury  Outcome: Progressing     The patient's goals for the shift include comfort/safety    The clinical goals for the shift include see POC

## 2025-06-17 NOTE — PROGRESS NOTES
"Subjective  Patient had uneventful night does not complain about any chest pain shortness of breath patient on BiPAP currently only overnight  Nursing staff was interviewed  Objectives    Last Recorded Vitals  Blood pressure 135/68, pulse 62, temperature 36.3 °C (97.3 °F), temperature source Temporal, resp. rate 13, height 1.626 m (5' 4\"), weight 135 kg (297 lb), SpO2 99%.    Physical Exam  HENT:      Right Ear: External ear normal.      Left Ear: External ear normal.      Mouth/Throat:      Mouth: Mucous membranes are moist.   Cardiovascular:      Rate and Rhythm: Normal rate and regular rhythm.      Heart sounds: No murmur heard.     No friction rub. No gallop.   Pulmonary:      Effort: No accessory muscle usage or respiratory distress.      Breath sounds: No stridor. No wheezing or rhonchi.   Chest:      Chest wall: No tenderness.   Abdominal:      General: There is no distension.      Palpations: There is no mass.      Tenderness: There is no abdominal tenderness. There is no guarding or rebound.   Musculoskeletal:         General: No deformity or signs of injury.      Cervical back: No rigidity or tenderness. Normal range of motion.      Right lower leg: No edema.      Left lower leg: No edema.   Skin:     Coloration: Skin is not jaundiced or pale.      Findings: No lesion.   Neurological:      General: No focal deficit present.      Mental Status: He is alert, oriented to person, place, and time and easily aroused.      Cranial Nerves: No cranial nerve deficit.      Sensory: No sensory deficit.      Motor: No weakness.        Labs    No results displayed because visit has over 200 results.            Imaging     Transthoracic Echo Complete              [facility]  [id number] [address], [facility] [address]     Tel [phone] Fax [phone]    TRANSTHORACIC ECHOCARDIOGRAM REPORT    Patient Name:       TEJINDER GONZALES         Reading Physician:    [id number] Thomas                                             "                     Brady NATHAN  Study Date:         6/14/2025            Ordering Provider:    [id number] JENNIFER NEFF  MRN/PID:            [id number]             Fellow:  Accession#:         [id number]         Nurse:  Date of Birth/Age:  1955 / 70 years Sonographer:          Ksenia Thompson RDCS  Gender Assigned at  F                    Additional Staff:  Birth:  Height:             160.00 cm            Admit Date:  Weight:             127.01 kg            Admission Status:     Inpatient -                                                                 Routine  BSA / BMI:          2.23 m2 / 49.61      Department Location:  [facility] Echo Lab                      kg/m2  Blood Pressure: 144 /47 mmHg    Study Type:    TRANSTHORACIC ECHO (TTE) COMPLETE  Diagnosis/ICD: Heart failure, unspecified-I50.9  Indication:    heart failure  CPT Codes:     Echo Complete w Full Doppler-70080   Study Detail: The following Echo studies were performed: 2D, Doppler and color                flow. Technically challenging study due to body habitus, poor                acoustic windows and prominent lung artifact. Definity used as a                contrast agent for endocardial border definition. Total contrast                used for this procedure was 3 mL via IV push.       PHYSICIAN INTERPRETATION:  Left Ventricle: The left ventricular systolic function is normal with a visually estimated ejection fraction of 55-60%. There are no regional wall motion abnormalities. The left ventricular cavity size was not assessed. Spectral Doppler shows a normal pattern of left ventricular diastolic filling.  Left Atrium: The left atrial size was not assessed.  Right Ventricle: The right ventricle is normal in size. There is normal right ventricular global systolic function.  Right Atrium: The right atrial size is  normal.  Aortic Valve: The aortic valve was not well visualized. There is no evidence of aortic valve regurgitation.  Mitral Valve: The mitral valve is normal in structure. There is no evidence of mitral valve regurgitation. The E Vmax is 1.15 m/s.  Tricuspid Valve: The tricuspid valve is structurally normal. No evidence of tricuspid regurgitation.  Pulmonic Valve: The pulmonic valve is not well visualized. There is no indication of pulmonic valve regurgitation.  Pericardium: No pericardial effusion noted.  Aorta: The aortic root is normal.  Systemic Veins: The inferior vena cava was not well visualized, IVC inspiratory collapse is not well visualized.       CONCLUSIONS:   1. Poorly visualized anatomical structures due to suboptimal image quality.   2. The left ventricular systolic function is normal with a visually estimated ejection fraction of 55-60%.   3. There is normal right ventricular global systolic function.    QUANTITATIVE DATA SUMMARY:     LV SYSTOLIC FUNCTION:                       Normal Ranges:  EF-Visual:      58 %  LV EF Reported: 58 %       LV DIASTOLIC FUNCTION:          Normal Ranges:  MV Peak E:             1.15 m/s (0.7-1.2 m/s)  MV Peak A:             0.99 m/s (0.42-0.7 m/s)  E/A Ratio:             1.16     (1.0-2.2)  MV lateral e'          0.07 m/s       MITRAL VALVE:          Normal Ranges:  MV DT:        195 msec (150-240msec)       AORTIC VALVE:           Normal Ranges:  AoV Vmax:      1.57 m/s (<=1.7m/s)  AoV Peak P.9 mmHg (<20mmHg)  LVOT Max Rojelio:  1.20 m/s (<=1.1m/s)  LVOT Diameter: 2.20 cm  (1.8-2.4cm)  AoV Area,Vmax: 2.91 cm2 (2.5-4.5cm2)       RIGHT VENTRICLE:  TAPSE: 22.5 mm  RV s'  0.11 m/s       TRICUSPID VALVE/RVSP:        Normal Ranges:  Est. RA Pressure:     3 mmHg       [id number] Thomas Abreu MD  Electronically signed on 6/15/2025 at 11:22:50 AM       ** Final **  XR abdomen 1 view  Narrative: Interpreted By:  Chu Laguna,   STUDY:  XR ABDOMEN 1 VIEW       INDICATION:  Signs/Symptoms:abd distention, pain.      COMPARISON:  June 13 CT abdomen      ACCESSION NUMBER(S):  [id number]      ORDERING CLINICIAN:  LIANG BERUMEN      FINDINGS:  Nonobstructive gas pattern.      Moderate stool throughout the colon.      Surgical clips right upper quadrant.      Impression: No acute abdominal radiographic findings.      Signed by: Chu Laguna 6/15/2025 10:57 AM  Dictation workstation:   [id number]       Problem List[1]      Assessment/Plan   Assessment & Plan  Acute on chronic respiratory failure with hypoxia and hypercapnia    Pulmonary edema with congestive heart failure with preserved left ventricular function    Fluid overload    Renal insufficiency    Left flank pain    Acute respiratory failure related to pulmonary edema echocardiogram was reviewed patient had ejection fraction 55% most likely restrictive lung disease and diastolic congestive heart failure patient started on Lasix with improvement of her symptoms  She still need to use BiPAP at night  Switch to oral Lasix  Pulmonary note was reviewed patient will be transferred to regular medical floor anticipate possible discharge back home tomorrow             I spent 25 minutes in the professional and overall care of this patient.      ANDREW Mccloud MD         [1]   Patient Active Problem List  Diagnosis    Acute on chronic respiratory failure with hypoxia and hypercapnia    Pulmonary edema with congestive heart failure with preserved left ventricular function    Fluid overload    Renal insufficiency    Left flank pain

## 2025-06-17 NOTE — CARE PLAN
EOS Note Pt has remained HDS throughout shift. She remains AO x 4, 4L O2 (chronic), avb/tele. Pt was transferred to the floor from CCU with CPAP machine. Pt is a niko lift at baseline but does roll well so a 2 assist is needed. Pt is currently strict I's and O's at this time. Pt is resting comfortably in bed, alarms on and call light within reach.      The patient's goals for the shift include see poc    The clinical goals for the shift include See POC

## 2025-06-18 ENCOUNTER — PHARMACY VISIT (OUTPATIENT)
Dept: PHARMACY | Facility: CLINIC | Age: 70
End: 2025-06-18
Payer: MEDICARE

## 2025-06-18 VITALS
DIASTOLIC BLOOD PRESSURE: 68 MMHG | HEART RATE: 68 BPM | HEIGHT: 64 IN | WEIGHT: 293 LBS | RESPIRATION RATE: 20 BRPM | BODY MASS INDEX: 50.02 KG/M2 | SYSTOLIC BLOOD PRESSURE: 133 MMHG | TEMPERATURE: 97.9 F | OXYGEN SATURATION: 97 %

## 2025-06-18 LAB
ANION GAP SERPL CALC-SCNC: 12 MMOL/L (ref 10–20)
BUN SERPL-MCNC: 24 MG/DL (ref 6–23)
CALCIUM SERPL-MCNC: 9.4 MG/DL (ref 8.6–10.3)
CHLORIDE SERPL-SCNC: 95 MMOL/L (ref 98–107)
CO2 SERPL-SCNC: 36 MMOL/L (ref 21–32)
CREAT SERPL-MCNC: 1.2 MG/DL (ref 0.5–1.05)
EGFRCR SERPLBLD CKD-EPI 2021: 49 ML/MIN/1.73M*2
ERYTHROCYTE [DISTWIDTH] IN BLOOD BY AUTOMATED COUNT: 14.9 % (ref 11.5–14.5)
GLUCOSE BLD MANUAL STRIP-MCNC: 134 MG/DL (ref 74–99)
GLUCOSE BLD MANUAL STRIP-MCNC: 206 MG/DL (ref 74–99)
GLUCOSE BLD MANUAL STRIP-MCNC: 229 MG/DL (ref 74–99)
GLUCOSE SERPL-MCNC: 129 MG/DL (ref 74–99)
HCT VFR BLD AUTO: 30.6 % (ref 36–46)
HGB BLD-MCNC: 9.4 G/DL (ref 12–16)
MAGNESIUM SERPL-MCNC: 1.69 MG/DL (ref 1.6–2.4)
MCH RBC QN AUTO: 26 PG (ref 26–34)
MCHC RBC AUTO-ENTMCNC: 30.7 G/DL (ref 32–36)
MCV RBC AUTO: 85 FL (ref 80–100)
NRBC BLD-RTO: 0 /100 WBCS (ref 0–0)
PLATELET # BLD AUTO: 233 X10*3/UL (ref 150–450)
POTASSIUM SERPL-SCNC: 4 MMOL/L (ref 3.5–5.3)
RBC # BLD AUTO: 3.61 X10*6/UL (ref 4–5.2)
SODIUM SERPL-SCNC: 139 MMOL/L (ref 136–145)
WBC # BLD AUTO: 9.5 X10*3/UL (ref 4.4–11.3)

## 2025-06-18 PROCEDURE — 82947 ASSAY GLUCOSE BLOOD QUANT: CPT

## 2025-06-18 PROCEDURE — 2500000004 HC RX 250 GENERAL PHARMACY W/ HCPCS (ALT 636 FOR OP/ED): Performed by: NURSE PRACTITIONER

## 2025-06-18 PROCEDURE — RXMED WILLOW AMBULATORY MEDICATION CHARGE

## 2025-06-18 PROCEDURE — 2500000005 HC RX 250 GENERAL PHARMACY W/O HCPCS: Performed by: INTERNAL MEDICINE

## 2025-06-18 PROCEDURE — 2500000002 HC RX 250 W HCPCS SELF ADMINISTERED DRUGS (ALT 637 FOR MEDICARE OP, ALT 636 FOR OP/ED): Performed by: NURSE PRACTITIONER

## 2025-06-18 PROCEDURE — 36415 COLL VENOUS BLD VENIPUNCTURE: CPT | Performed by: NURSE PRACTITIONER

## 2025-06-18 PROCEDURE — 2500000001 HC RX 250 WO HCPCS SELF ADMINISTERED DRUGS (ALT 637 FOR MEDICARE OP): Performed by: NURSE PRACTITIONER

## 2025-06-18 PROCEDURE — 85027 COMPLETE CBC AUTOMATED: CPT | Performed by: NURSE PRACTITIONER

## 2025-06-18 PROCEDURE — 80048 BASIC METABOLIC PNL TOTAL CA: CPT | Performed by: NURSE PRACTITIONER

## 2025-06-18 PROCEDURE — 83735 ASSAY OF MAGNESIUM: CPT | Performed by: NURSE PRACTITIONER

## 2025-06-18 PROCEDURE — 94660 CPAP INITIATION&MGMT: CPT

## 2025-06-18 RX ORDER — INSULIN LISPRO 100 [IU]/ML
0-15 INJECTION, SOLUTION INTRAVENOUS; SUBCUTANEOUS 3 TIMES DAILY
Qty: 15 ML | Refills: 0 | Status: SHIPPED | OUTPATIENT
Start: 2025-06-18

## 2025-06-18 RX ORDER — LANOLIN ALCOHOL/MO/W.PET/CERES
400 CREAM (GRAM) TOPICAL 2 TIMES DAILY
Status: DISCONTINUED | OUTPATIENT
Start: 2025-06-18 | End: 2025-06-18 | Stop reason: HOSPADM

## 2025-06-18 RX ORDER — NYSTATIN 100000 [USP'U]/G
1 POWDER TOPICAL 2 TIMES DAILY
Qty: 60 G | Refills: 1 | Status: SHIPPED | OUTPATIENT
Start: 2025-06-18

## 2025-06-18 RX ORDER — FUROSEMIDE 40 MG/1
40 TABLET ORAL DAILY
Qty: 30 TABLET | Refills: 0 | Status: SHIPPED | OUTPATIENT
Start: 2025-06-18

## 2025-06-18 RX ORDER — INSULIN GLARGINE 100 [IU]/ML
40 INJECTION, SOLUTION SUBCUTANEOUS 2 TIMES DAILY
Qty: 30 ML | Refills: 0 | Status: SHIPPED | OUTPATIENT
Start: 2025-06-18

## 2025-06-18 RX ADMIN — Medication 10 ML: at 06:24

## 2025-06-18 RX ADMIN — AMLODIPINE BESYLATE 10 MG: 10 TABLET ORAL at 09:02

## 2025-06-18 RX ADMIN — Medication 1 TABLET: at 09:03

## 2025-06-18 RX ADMIN — Medication 40 PERCENT: at 03:18

## 2025-06-18 RX ADMIN — GABAPENTIN 300 MG: 300 CAPSULE ORAL at 09:03

## 2025-06-18 RX ADMIN — METOPROLOL SUCCINATE 25 MG: 25 TABLET, EXTENDED RELEASE ORAL at 09:02

## 2025-06-18 RX ADMIN — PANCRELIPASE 1 CAPSULE: 60000; 12000; 38000 CAPSULE, DELAYED RELEASE PELLETS ORAL at 06:19

## 2025-06-18 RX ADMIN — NYSTATIN 1 APPLICATION: 100000 POWDER TOPICAL at 09:05

## 2025-06-18 RX ADMIN — INSULIN LISPRO 4 UNITS: 100 INJECTION, SOLUTION INTRAVENOUS; SUBCUTANEOUS at 13:50

## 2025-06-18 RX ADMIN — ASPIRIN 81 MG CHEWABLE TABLET 81 MG: 81 TABLET CHEWABLE at 09:04

## 2025-06-18 RX ADMIN — CITALOPRAM 40 MG: 40 TABLET, FILM COATED ORAL at 09:05

## 2025-06-18 RX ADMIN — FUROSEMIDE 40 MG: 40 TABLET ORAL at 18:17

## 2025-06-18 RX ADMIN — HEPARIN SODIUM 7500 UNITS: 5000 INJECTION INTRAVENOUS; SUBCUTANEOUS at 13:50

## 2025-06-18 RX ADMIN — PANCRELIPASE 1 CAPSULE: 60000; 12000; 38000 CAPSULE, DELAYED RELEASE PELLETS ORAL at 13:57

## 2025-06-18 RX ADMIN — INSULIN LISPRO 4 UNITS: 100 INJECTION, SOLUTION INTRAVENOUS; SUBCUTANEOUS at 18:17

## 2025-06-18 RX ADMIN — FERROUS SULFATE TAB 325 MG (65 MG ELEMENTAL FE) 1 TABLET: 325 (65 FE) TAB at 09:03

## 2025-06-18 RX ADMIN — FUROSEMIDE 40 MG: 40 TABLET ORAL at 09:02

## 2025-06-18 RX ADMIN — Medication 10 ML: at 18:13

## 2025-06-18 RX ADMIN — INSULIN GLARGINE 40 UNITS: 100 INJECTION, SOLUTION SUBCUTANEOUS at 09:03

## 2025-06-18 RX ADMIN — PANCRELIPASE 1 CAPSULE: 60000; 12000; 38000 CAPSULE, DELAYED RELEASE PELLETS ORAL at 18:17

## 2025-06-18 RX ADMIN — HEPARIN SODIUM 7500 UNITS: 5000 INJECTION INTRAVENOUS; SUBCUTANEOUS at 06:19

## 2025-06-18 ASSESSMENT — COGNITIVE AND FUNCTIONAL STATUS - GENERAL
DRESSING REGULAR LOWER BODY CLOTHING: TOTAL
WALKING IN HOSPITAL ROOM: TOTAL
MOVING TO AND FROM BED TO CHAIR: TOTAL
TOILETING: A LOT
DAILY ACTIVITIY SCORE: 13
STANDING UP FROM CHAIR USING ARMS: TOTAL
MOBILITY SCORE: 9
CLIMB 3 TO 5 STEPS WITH RAILING: TOTAL
HELP NEEDED FOR BATHING: A LOT
DRESSING REGULAR UPPER BODY CLOTHING: A LOT
PERSONAL GROOMING: A LITTLE
EATING MEALS: A LITTLE
TURNING FROM BACK TO SIDE WHILE IN FLAT BAD: TOTAL

## 2025-06-18 ASSESSMENT — PAIN SCALES - GENERAL: PAINLEVEL_OUTOF10: 0 - NO PAIN

## 2025-06-18 NOTE — CARE PLAN
The patient's goals for the shift include see poc.    Problem: Pain - Adult  Goal: Verbalizes/displays adequate comfort level or baseline comfort level  6/18/2025 0645 by Sissy Emerson RN  Outcome: Progressing  6/18/2025 0645 by Sissy Emerson RN  Outcome: Progressing     Problem: Safety - Adult  Goal: Free from fall injury  6/18/2025 0645 by Sissy Emerson RN  Outcome: Progressing  6/18/2025 0645 by Sissy Emerson RN  Outcome: Progressing     Problem: Chronic Conditions and Co-morbidities  Goal: Patient's chronic conditions and co-morbidity symptoms are monitored and maintained or improved  6/18/2025 0645 by Sissy Emerson RN  Outcome: Progressing  6/18/2025 0645 by Sissy Emerson RN  Outcome: Progressing     Problem: Skin  Goal: Prevent/manage excess moisture  6/18/2025 0645 by Sissy Emerson RN  Outcome: Progressing  Flowsheets (Taken 6/16/2025 2212 by Enriqueta Templeton RN)  Prevent/manage excess moisture:   Cleanse incontinence/protect with barrier cream   Moisturize dry skin  6/18/2025 0645 by Sissy Emerson RN  Outcome: Progressing     Problem: Respiratory  Goal: Minimize anxiety/maximize coping throughout shift  6/18/2025 0645 by Sissy Emerson RN  Outcome: Progressing  6/18/2025 0645 by Sissy Emerson RN  Outcome: Progressing       The clinical goals for the shift include See POC.    EOS note: Pt remains A&Ox4, no complaints of pain this shift, bed bound at baseline, 4L chronic O2, pure wick in place, uses bedpan. No acute changes this shift, safety maintained, call light within reach.

## 2025-06-18 NOTE — DISCHARGE SUMMARY
Discharge Diagnosis  Acute on chronic respiratory failure with hypoxia and hypercapnia           Issues Requiring Follow-Up  Discharge patient home    Discharge Meds     Medication List      ASK your doctor about these medications     albuterol 2.5 mg /3 mL (0.083 %) nebulizer solution   amLODIPine 5 mg tablet; Commonly known as: Norvasc; Take 2 tablets (10   mg) by mouth once daily.   apple cider vinegar 300 mg tablet   aspirin 81 mg chewable tablet   atorvastatin 10 mg tablet; Commonly known as: Lipitor   cholestyramine light 4 gram packet; Commonly known as: Prevalite; Take 1   packet (4 g) by mouth 2 times a day as needed (for diarrhea) for up to 7   days.   citalopram 40 mg tablet; Commonly known as: CeleXA   cranberry fruit 400 mg capsule   Creon 6,000-19,000 -30,000 unit capsule; Generic drug:   lipase-protease-amylase   ferrous sulfate 325 mg (65 mg elemental) tablet; Take 1 tablet (325 mg)   by mouth once daily with breakfast.   furosemide 20 mg tablet; Commonly known as: Lasix   gabapentin 300 mg capsule; Commonly known as: Neurontin   insulin lispro 100 unit/mL injection   lactobacillus acidophilus tablet tablet; Take 1 tablet by mouth 2 times   a day.   Lantus U-100 Insulin 100 unit/mL injection; Generic drug: insulin   glargine; Inject 40 Units under the skin 2 times a day. Take as directed   per insulin instructions.   menthol-zinc oxide 0.44-20.6 % ointment; Commonly known as: Calmoseptine   - Risamine; Apply 1 Application topically 4 times a day as needed   (moisture associated skin damage).   metoprolol succinate XL 25 mg 24 hr tablet; Commonly known as:   Toprol-XL; Take 1 tablet (25 mg) by mouth once daily. Hold for SBP<110 or   HR <70   nystatin 100,000 unit/gram powder; Commonly known as: Mycostatin; Apply   1 Application topically 2 times a day. Apply to breast and groin   oxygen gas therapy; Commonly known as: O2; Inhale 1 each once every 24   hours.   oxygen gas therapy; Commonly known as:  O2; Inhale 1 each once every 24   hours.   oxygen gas therapy; Commonly known as: O2; Inhale 1 each every 12 hours.   pantoprazole 40 mg EC tablet; Commonly known as: ProtoNix; Take 1 tablet   (40 mg) by mouth once daily in the morning. Take before meals. FOR THIRTY   DAYS, TO BE RENEWED BY FACILITY PROVIDER IF NEEDED Do not crush, chew, or   split.   spironolactone 25 mg tablet; Commonly known as: Aldactone       Test Results Pending At Discharge  Pending Labs       No current pending labs.            Hospital Course   Patient was admitted to the hospital with increased shortness of breath found to have acute pulmonary edema cardiogram showed normal ejection fraction patient was diagnosed with acute diastolic congestive heart failure placed on BiPAP then switched to baseline oxygen 4 L  Patient has a history of chronic respiratory failure on 4 L patient will be discharged on Lasix 40 daily with BiPAP at home at night    Pertinent Physical Exam At Time of Discharge  Physical Exam  HENT:      Right Ear: External ear normal.      Left Ear: External ear normal.      Mouth/Throat:      Mouth: Mucous membranes are moist.   Cardiovascular:      Rate and Rhythm: Normal rate and regular rhythm.      Heart sounds: No murmur heard.     No friction rub. No gallop.   Pulmonary:      Effort: No accessory muscle usage or respiratory distress.      Breath sounds: No stridor. No wheezing or rhonchi.   Chest:      Chest wall: No tenderness.   Abdominal:      General: There is no distension.      Palpations: There is no mass.      Tenderness: There is no abdominal tenderness. There is no guarding or rebound.   Musculoskeletal:         General: No deformity or signs of injury.      Cervical back: No rigidity or tenderness. Normal range of motion.      Right lower leg: No edema.      Left lower leg: No edema.   Skin:     Coloration: Skin is not jaundiced or pale.      Findings: No lesion.   Neurological:      General: No focal deficit  present.      Mental Status: She is alert, oriented to person, place, and time and easily aroused.      Cranial Nerves: No cranial nerve deficit.      Sensory: No sensory deficit.      Motor: No weakness.         Outpatient Follow-Up  No future appointments.      ANDREW Mccloud MD

## 2025-06-18 NOTE — CONSULTS
Heart Failure Nurse Navigator    Patient came to the /25 pm with complaints of left-sided abdominal pain along with new onset SOB x1 week, decreased urination, and increased O2 requirement. Left abdomen kin thickened with tenderness, erythema, and increased warmth. She is non-ambulatory and often lays on her left side. Candida infection found under bilateral breasts. . Trop 6. BUN/creat 30/1/17. K 5.4, . CXR with diffuse pulmonary edema L>R. CT shows basilar atelectasis, mild main pulmonary trunk enlargement (unchanged), nonspecific abd wall an d anterior chest edema, She was treated with Lasix 40 mg IV and placed on BIPAP (for increased work of breathing) along with Duoneb. POCT K (hemolyzed) 8.1 (5.4 on lab draw). EKG artifact related to brain stimulator.   Echo 6/14 has poorly visualized anatomical structures due to suboptimal image quality, EF 55-60%, normal left ventricular diastolic filling, normal right-side function.  6/14-6/17: Diuresis with Lasix 40 mg IV BID, changed to oral 6/17 at 40 mg oral BID (increased from daily at home). Home Spironolactone on hold entire admission due to elevated K in ER. Secure chat sent to team to reevaluate in light of only one elevated K on admit, once in past, and increase in home Lasix. The primary team decided to leave Spironolactone on hold on dc MRF.    PMH: HTN, COPD, T2DM, ARCHIE, HLD, HFpEF, asthma, DM, HLD, morbid obesity, non-ambulatory, brain stimulator.    GDMT: Metoprolol Succinate 25 mg daily, Lasix 40 mg oral BID (increased from home daily), Spironolactone on hold on discharge (was 25 mg daily), along with Amlodipine 10 mg daily,ASA, Atorvastatin, Ferrous Sulfate.    I met with patient 6/18 to discuss diastolic HF (echo 6/16/24 did reveal impaired relaxation of LV diastolic filling). Heart failure disease etiology, medication management including indications and side effects, daily self-assessment including weight, BP, pulse, and symptoms,  sodium and fluid restriction, importance of one-week follow-up cardiology appointment, and exercise to tolerance discussed. Patient provided with heart failure education materials including the  Living with Heart Failure Booklet and weight calendars. Patient is non-ambulatory, therefore daily weights are not an option for her. She is able to take her own vital signs and has a BP cuff and pulse oximeter. She agrees to daily self-monitoring including blood pressure, heart rate, and HF symptoms. Patient now understands yellow-zone symptoms and agrees to notify cardiologist if they develop. She states they eat mostly low sodium at home when  cooks but he does bring in a moderate amount of food from fast-food restaurants, He was on the phone for most of this session and low sodium alternatives to fast food were discussed. I have added customized HF instructions to the AVS for the patient to follow at home.. She is unable to get to outpatient offices. Her former cardiologist is Dr. Wu but she has been unable to follow up with him recently. She met with case management and intends to follow up with visiting physicians. Patient has my contact information for any concerns/questions.

## 2025-06-18 NOTE — PROGRESS NOTES
06/18/25 1149   Discharge Planning   Home or Post Acute Services In home services   Type of Home Care Services Home health aide;Home nursing visits   Expected Discharge Disposition Home H  ([facility])   Intensity of Service   Intensity of Service >30 min     [facility] confirmed a SOC for tomorrow.

## 2025-06-18 NOTE — DISCHARGE INSTRUCTIONS
General Heart Failure Guidelines;     EVERY DAY  Monitor your weight (know what your dry weight is)  -Keep record of your weight on a calendar.   -Call your heart failure provider if you weight 4 pounds more than your dry weight     Take your medications   -Be sure to take your medications every day and notify your provider immediately if there is any reason you are not able to take your medications    Check for swelling   -Call your provider if you have new or increased swelling, even if you feel fine    Eat a low sodium diet   -2grams maximum, continue to work with your /dietician    Exercise   -Try to get some form of exercise in every single day        Heart Failure Zones:  Green Zone   -This zone is your goal. Your symptoms are absent, mild or stable    Yellow Zone (WARNING ZONE)   -Call your HF provider if:    -You gain or lose 4 of more pounds from your dry weight OR if you gain 2 lb in 24 hours or 5 lb in 1 week.    -Have new or worsening shortness of breath    -Have minimal chest pain    -Having to use additional pillows to sleep comfortably    -Notice swelling of your feet ankles, legs or stomach    -Feel more tired than usual    -Have dizziness that lasts more than a minute    -Feel uneasy or that something isn't right    -Have a decreased appetite    -Have new or worsened cough    Red Zone (Emergency)   -Call 911 and go to your nearest Emergency Department    -If you are struggling to breath or have shortness of breath at rest    -Have new or worsening chest pain    -Are confused and cannot think clearly    -If you are passing out or fainting

## 2025-06-18 NOTE — CARE PLAN
The patient's goals for the shift include see poc    The clinical goals for the shift include See POC

## 2025-06-18 NOTE — DISCHARGE INSTR - AVS FIRST PAGE
Heart Failure Discharge Instructions    First 10 minutes of your day:  1. Check yourself for any subtle change of symptoms (slight increase in swelling, slight shortness of breath, a new intolerance to lying flat, a new cough). If present, be sure to call healthy at home or visiting physicians right away - do not wait.  2. Next, check your blood pressure and heart rate and record (after sitting a full 5 minutes). Then take your morning meds.    Rest of the Day:  3. Check blood pressure and heart rate a few hours later and record on your calendars. If heart rate higher than 100 despite morning meds, notify healthy at home or visiting physicians.  4. Follow a low sodium diet. No more than 700 mg per meal (or 2000 mg per day).  5. Limit total fluids to no more than 8 cups (or 2 liters) per day - this includes all fluids (water, coffee, juice, milk, tea, etc.), and no less than 6 cups per day.  6. Stay as active as you can tolerate.     Important Follow-Up:  9. Be sure to set up visiting physicians to begin your care.  10. Have your heart rate and blood pressure readings available to review with healthy at home and visiting physicians.  11. Be sure review and take any new prescriptions as directed.  Be sure to obtain refills for all new cardiac medicines at your follow-up appointment with visiting physicians.  12. If you have any questions or concerns, feel free to call Marcella Brower, heart failure navigator at [phone].   
[phone]

## 2025-06-19 NOTE — NURSING NOTE
[facility]. Patient insulin sent with paramedics and instructed to re

## 2025-06-19 NOTE — NURSING NOTE
End of shift note, no acute changes this shift. Patient to be discharged home tonight awaiting transport. Patient tolerating meds and diet well. Patient repositioned multiple times this shift. Vitals stable remains on 4 liters chronic oxygen

## 2025-06-21 ENCOUNTER — PATIENT OUTREACH (OUTPATIENT)
Dept: CARE COORDINATION | Age: 70
End: 2025-06-21
Payer: MEDICARE

## 2025-06-22 ENCOUNTER — PATIENT OUTREACH (OUTPATIENT)
Dept: CARE COORDINATION | Age: 70
End: 2025-06-22
Payer: MEDICARE

## 2025-07-01 ENCOUNTER — OFFICE VISIT (OUTPATIENT)
Dept: GERIATRIC MEDICINE | Facility: CLINIC | Age: 70
End: 2025-07-01
Payer: MEDICARE

## 2025-07-01 DIAGNOSIS — N18.31 CKD STAGE 3A, GFR 45-59 ML/MIN (MULTI): ICD-10-CM

## 2025-07-01 DIAGNOSIS — I10 ESSENTIAL HYPERTENSION, BENIGN: Chronic | ICD-10-CM

## 2025-07-01 DIAGNOSIS — D50.9 IRON DEFICIENCY ANEMIA, UNSPECIFIED IRON DEFICIENCY ANEMIA TYPE: ICD-10-CM

## 2025-07-01 DIAGNOSIS — I50.30 HYPERTENSIVE HEART DISEASE WITH CONGESTIVE HEART FAILURE WITH PRESERVED LEFT VENTRICULAR FUNCTION: ICD-10-CM

## 2025-07-01 DIAGNOSIS — N18.31 TYPE 2 DIABETES MELLITUS WITH STAGE 3A CHRONIC KIDNEY DISEASE, WITH LONG-TERM CURRENT USE OF INSULIN (MULTI): Chronic | ICD-10-CM

## 2025-07-01 DIAGNOSIS — E66.01 MORBID OBESITY (MULTI): Chronic | ICD-10-CM

## 2025-07-01 DIAGNOSIS — Z79.4 TYPE 2 DIABETES MELLITUS WITH STAGE 3A CHRONIC KIDNEY DISEASE, WITH LONG-TERM CURRENT USE OF INSULIN (MULTI): Chronic | ICD-10-CM

## 2025-07-01 DIAGNOSIS — E11.43 TYPE 2 DIABETES MELLITUS WITH DIABETIC AUTONOMIC NEUROPATHY, WITH LONG-TERM CURRENT USE OF INSULIN: ICD-10-CM

## 2025-07-01 DIAGNOSIS — F31.76 BIPOLAR DISORDER, IN FULL REMISSION, MOST RECENT EPISODE DEPRESSED (MULTI): ICD-10-CM

## 2025-07-01 DIAGNOSIS — B37.2 SKIN CANDIDIASIS: ICD-10-CM

## 2025-07-01 DIAGNOSIS — J96.11 CHRONIC RESPIRATORY FAILURE WITH HYPOXIA AND HYPERCAPNIA: Primary | ICD-10-CM

## 2025-07-01 DIAGNOSIS — E55.9 VITAMIN D DEFICIENCY: ICD-10-CM

## 2025-07-01 DIAGNOSIS — I11.0 HYPERTENSIVE HEART DISEASE WITH CONGESTIVE HEART FAILURE WITH PRESERVED LEFT VENTRICULAR FUNCTION: ICD-10-CM

## 2025-07-01 DIAGNOSIS — Z79.4 TYPE 2 DIABETES MELLITUS WITH DIABETIC AUTONOMIC NEUROPATHY, WITH LONG-TERM CURRENT USE OF INSULIN: ICD-10-CM

## 2025-07-01 DIAGNOSIS — J96.12 CHRONIC RESPIRATORY FAILURE WITH HYPOXIA AND HYPERCAPNIA: Primary | ICD-10-CM

## 2025-07-01 DIAGNOSIS — E11.22 TYPE 2 DIABETES MELLITUS WITH STAGE 3A CHRONIC KIDNEY DISEASE, WITH LONG-TERM CURRENT USE OF INSULIN (MULTI): Chronic | ICD-10-CM

## 2025-07-01 DIAGNOSIS — M48.062 SPINAL STENOSIS OF LUMBAR REGION WITH NEUROGENIC CLAUDICATION: ICD-10-CM

## 2025-07-01 DIAGNOSIS — K21.9 GASTROESOPHAGEAL REFLUX DISEASE WITHOUT ESOPHAGITIS: ICD-10-CM

## 2025-07-02 RX ORDER — INSULIN LISPRO 100 [IU]/ML
0-15 INJECTION, SOLUTION INTRAVENOUS; SUBCUTANEOUS 3 TIMES DAILY
Qty: 40.5 ML | Refills: 3 | Status: SHIPPED | OUTPATIENT
Start: 2025-07-02 | End: 2026-07-02

## 2025-07-03 VITALS
RESPIRATION RATE: 20 BRPM | DIASTOLIC BLOOD PRESSURE: 50 MMHG | TEMPERATURE: 97.9 F | HEART RATE: 78 BPM | SYSTOLIC BLOOD PRESSURE: 142 MMHG

## 2025-07-03 PROBLEM — N28.9 RENAL INSUFFICIENCY: Status: RESOLVED | Noted: 2025-06-14 | Resolved: 2025-07-03

## 2025-07-03 PROBLEM — J96.21 ACUTE ON CHRONIC RESPIRATORY FAILURE WITH HYPOXIA AND HYPERCAPNIA: Status: RESOLVED | Noted: 2024-09-13 | Resolved: 2025-07-03

## 2025-07-03 PROBLEM — M48.062 SPINAL STENOSIS OF LUMBAR REGION WITH NEUROGENIC CLAUDICATION: Status: ACTIVE | Noted: 2023-09-15

## 2025-07-03 PROBLEM — K44.9 HIATAL HERNIA WITHOUT GANGRENE AND OBSTRUCTION: Status: ACTIVE | Noted: 2025-07-03

## 2025-07-03 PROBLEM — I11.0: Status: ACTIVE | Noted: 2025-07-03

## 2025-07-03 PROBLEM — K21.9 GASTROESOPHAGEAL REFLUX DISEASE WITHOUT ESOPHAGITIS: Status: ACTIVE | Noted: 2025-07-03

## 2025-07-03 PROBLEM — F31.76 BIPOLAR DISORDER, IN FULL REMISSION, MOST RECENT EPISODE DEPRESSED (MULTI): Status: ACTIVE | Noted: 2025-07-03

## 2025-07-03 PROBLEM — N18.31 CKD STAGE 3A, GFR 45-59 ML/MIN (MULTI): Status: ACTIVE | Noted: 2025-07-03

## 2025-07-03 PROBLEM — R10.9 LEFT FLANK PAIN: Status: RESOLVED | Noted: 2025-06-14 | Resolved: 2025-07-03

## 2025-07-03 PROBLEM — J96.22 ACUTE ON CHRONIC RESPIRATORY FAILURE WITH HYPOXIA AND HYPERCAPNIA: Status: RESOLVED | Noted: 2024-09-13 | Resolved: 2025-07-03

## 2025-07-03 PROBLEM — E11.22 TYPE 2 DIABETES MELLITUS WITH STAGE 3A CHRONIC KIDNEY DISEASE, WITH LONG-TERM CURRENT USE OF INSULIN (MULTI): Status: ACTIVE | Noted: 2023-09-15

## 2025-07-03 PROBLEM — E87.70 FLUID OVERLOAD: Status: RESOLVED | Noted: 2025-06-14 | Resolved: 2025-07-03

## 2025-07-03 PROBLEM — E11.43 TYPE 2 DIABETES MELLITUS WITH AUTONOMIC NEUROPATHY: Status: ACTIVE | Noted: 2025-07-03

## 2025-07-03 PROBLEM — I50.30: Status: ACTIVE | Noted: 2025-07-03

## 2025-07-03 PROBLEM — N18.31 TYPE 2 DIABETES MELLITUS WITH STAGE 3A CHRONIC KIDNEY DISEASE, WITH LONG-TERM CURRENT USE OF INSULIN (MULTI): Status: ACTIVE | Noted: 2023-09-15

## 2025-07-03 PROBLEM — B37.2 SKIN CANDIDIASIS: Status: ACTIVE | Noted: 2025-01-07

## 2025-07-03 PROBLEM — I50.1 PULMONARY EDEMA WITH CONGESTIVE HEART FAILURE WITH PRESERVED LEFT VENTRICULAR FUNCTION: Status: RESOLVED | Noted: 2025-06-14 | Resolved: 2025-07-03

## 2025-07-03 PROBLEM — Z79.4 TYPE 2 DIABETES MELLITUS WITH STAGE 3A CHRONIC KIDNEY DISEASE, WITH LONG-TERM CURRENT USE OF INSULIN (MULTI): Status: ACTIVE | Noted: 2023-09-15

## 2025-07-03 RX ORDER — ERGOCALCIFEROL 1.25 MG/1
1.25 CAPSULE ORAL WEEKLY
Qty: 12 CAPSULE | Refills: 0 | Status: SHIPPED | OUTPATIENT
Start: 2025-07-03 | End: 2025-10-01

## 2025-07-03 RX ORDER — METOPROLOL SUCCINATE 25 MG/1
25 TABLET, EXTENDED RELEASE ORAL 2 TIMES DAILY
Qty: 60 TABLET | Refills: 11 | Status: SHIPPED | OUTPATIENT
Start: 2025-07-03 | End: 2026-07-03

## 2025-07-03 RX ORDER — INSULIN GLARGINE 100 [IU]/ML
42 INJECTION, SOLUTION SUBCUTANEOUS 2 TIMES DAILY
Qty: 30 ML | Refills: 0 | Status: SHIPPED | OUTPATIENT
Start: 2025-07-03

## 2025-07-03 RX ORDER — METOPROLOL SUCCINATE 25 MG/1
25 TABLET, EXTENDED RELEASE ORAL 2 TIMES DAILY
Qty: 60 TABLET | Refills: 11 | Status: SHIPPED | OUTPATIENT
Start: 2025-07-03 | End: 2025-07-03

## 2025-07-03 NOTE — PROGRESS NOTES
"Reason for visit: initial visit to establish care    Reason for homebound status: oxygen dependence and generalized weakness with wheelchair dependence which is taxing on family to transport    HPI: Kaylene is a 69yo female with hx of several episodes of urosepsis, respiratory failure with hypoxia and hypercapnea, chf with preserved EF, ckd 3a, and spinal stenosis with right leg weakness. Kaylene lost a lot of her mobility 2 yrs ago when she had a fall and fractured her right femur and shattered her knee cap. Since then she has had multiple hospital stays. It has become very difficult and risky to take her from the home for dr scales.    2 weeks ago she was hospitalized for chf and was taken off of spironolactone due to an elevated K in the ED only.     Chief Complaint: \"I am doing ok, just can't get any strength back\"      Medical History[1]reveiwed    Surgical History[2]reviewed    FMH: reviewed    Social Hx:  once, lives with  Travis; 4 children, 1  (suicide), daughter in [place] and son in [place], other daughter in [place]. Worked as  for few years before going on disability for bipolar. Lives in a apartment and is content here. No financial concerns. Hard to get into car for yordy      Code Status: Full Code    Review of Systems   General: feels fine today, no fever or chills, sleeping off and on from about 2am til 9am and then takes an afternoon nap; appetite is good  Skin: no lesions, bruising, itching; intermittent rash/irritation in folds but ok currently  EENT:  vision is adequate with no recent vision changes, no eye pain or drainage; hearing is adequate and no ear pain or drainage; no nasal drainage or congestion; no soreness of throat  Mouth: no oral pain, no lesions; teeth without problems; no trouble chewing or swallowing  Respiratory: no coughing, no congestion currently; gets shortness of breath with rolling in bed or sitting up; wears oxygen 4L at all times and at times " when breathing is worse will wear bipap during day; wears it at night at all times; breathing seems to be at baseline currently  Cardiac: no chest pain or palpitations  Gastrointestinal: no abdominal pain, nausea, vomiting, heartburn, diarrhea or constipation; BM every day without straining, uses bedpan; no rectal pain or bleeding; no hemorrhoids  Genitourinary: continent, uses bedpan; no discomfort with urination; voids about every 2 hours; normal color of urine; no discharge or itching  Musculoskeletal: chronic low back pain and bilateral knee pain; cannot stand anymore; does not sit up on side of bed either, too hard; eats in bed with head raised; decent ROM of arms; right thumb painful and pops; is righthanded; no swelling  Neurologic: long and short term memory intact; no headaches, dizziness or lightheadedness; no tremors or involuntary movements; right foot is numb; no unilateral weakness  Psychiatric: hasn't felt depressed, anxious, frustrated or lonely; luisito well    Physical Examination   /50   Pulse 78   Temp 36.6 °C (97.9 °F)   Resp 20   General: resting in hospital bed in living room, appears comfortable, obese, oxygen on; good conversationalist  Neurologic: alert, oriented x4 with what appears to be good executive function; speech clear and fluent; facial features symmetrical and tongue midline; equal peripheral strength; positive sensation in both feet and hands  Skin: no abnormalities of hair or nails; no lesions, discolorations, or excessive dryness  HEENT: no trauma, bruises, swelling; adequate hearing of close conversation; adequate vision for ADLs; no eye/ear/nasal drainage or irritation; oral mucosa moist and pink w/o lesions or discolorations; no coughing while swallowing; natural teeth appear in good condition  Respiratory: unlabored at rest, a little winded when talking a lot; chest symmetrical; no coughing, breath sounds equal and clear throughout but diminished  Heart: regular  rhythm, rate controlled; normal heart sounds w/ no murmurs, rubs and gallops  Abdomen: nondistended but obese, nontender, bowel sounds active x4, no masses/hernias  Musculoskeletal: adequate ROM of arms and neck, no tenderness, effusion, erythema or deformity, moves legs but seems legs are weak; no kyphosis or scoliosis but hard to assess in bed; not able to sit up  PV: skin warm, dry, no cyanosis, no clubbing of nails; no edema; palpable radial and pedal pulses  : deferred  Psych: pleasant and cooperative; good conversationalist; appropriate behaviors    Lab Results   Component Value Date    WBC 9.5 06/18/2025    HGB 9.4 (L) 06/18/2025    HCT 30.6 (L) 06/18/2025    MCV 85 06/18/2025     06/18/2025       Chemistry    Lab Results   Component Value Date/Time     06/18/2025 0559    K 4.0 06/18/2025 0559    CL 95 (L) 06/18/2025 0559    CO2 36 (H) 06/18/2025 0559    BUN 24 (H) 06/18/2025 0559    CREATININE 1.20 (H) 06/18/2025 0559    Lab Results   Component Value Date/Time    CALCIUM 9.4 06/18/2025 0559    ALKPHOS 90 06/13/2025 2020    AST 14 06/13/2025 2020    ALT 4 (L) 06/13/2025 2020    BILITOT 0.6 06/13/2025 2020          Diagnoses and Plan:   1. Chronic respiratory failure with hypoxia and hypercapnia (Primary)  Hospital stay 2 weeks ago; lasix increased and spironolactone stopped due to high K; will recheck; uses oxygen 4L during day and bipap at night and prn; stable currently; has albuterol neb she uses prn but not every day    2. Hypertensive heart disease with congestive heart failure with preserved left ventricular function  As noted above    3. Type 2 diabetes mellitus with stage 3a chronic kidney disease, with long-term current use of insulin (Multi)  States BS readings always above 150, usually not higher than low 200s; instructed to increase from 40 to 42un bid   - insulin lispro (HumaLOG) 100 unit/mL pen; Inject 0-15 Units under the skin 3 times a day. For blood sugar of 111-150, Give 1  unit. For 151-200, Give 3 units. For 201-250, Give 6 units. For 251-300, Give 9 units. Fot 301-350, Give 12 units. For 351-400, Give 15 units  Dispense: 40.5 mL; Refill: 3  - insulin glargine (Lantus Solostar U-100 Insulin) 100 unit/mL (3 mL) pen; Inject 42 Units under the skin 2 times a day. Take as directed per insulin instructions.  Dispense: 30 mL; Refill: 0    4. Type 2 diabetes mellitus with diabetic autonomic neuropathy, with long-term current use of insulin  Takes gabapentin bid; says it is effective    5. Essential hypertension, benign  Metoprolol succinate has been supplied by the pharmacy bid but in her records it says daily; BP and HR good; will change order to be bid since that is what she is taking and tolerating; also on amlodipine and lasix  - metoprolol succinate XL (Toprol-XL) 25 mg 24 hr tablet; Take 1 tablet (25 mg) by mouth 2 times a day. Hold for SBP<110 or HR <70  Dispense: 60 tablet; Refill: 11    6. CKD stage 3a, GFR 45-59 ml/min (Multi)  Will check BMP soon for K level and baseline renal function    7. Spinal stenosis of lumbar region with neurogenic claudication  Takes gabapentin; quite debilitated from it    8. Fe deficiency anemia   Was on an Fe supplement but stopped; will see what hgb is with labs and go from there    9. Vitamin D deficiency  Level in the teens; will start weekly high dose; she is not sure why she is not on a supplement    10. Gastroesophageal reflux disease without esophagitis w/ hiatal hernia and pancreatic insufficiency   Says she is good with just taking the Creon    11. Bipolar disorder, in full remission, most recent episode depressed (Multi)  Takes citalopram but has been very stable since brain stimulator put in more than a decade ago    12. Skin candidiasis  Prn nystatin powder that is effective; very mild irritation currently    13. Morbid obesity   Did not discuss wt loss    14. DNR discussion  Wants full resuscitation, primarily due to age    Will order  labs through her home care agency then will follow up in 2 months         [1]   Past Medical History:  Diagnosis Date    Adnexal cyst     Right    Ambulatory dysfunction     Anxiety and depression     Bipolar disorder     Chronic back pain     Chronic kidney disease, stage II (mild)     Chronic respiratory failure with hypoxia and hypercapnia     Diabetic peripheral neuropathy (Multi)     Diastolic heart failure     LVEF 55 to 60% 2025    Diverticulosis     Gastroesophageal reflux disease     Hepatic steatosis     Hepatomegaly     History of femur fracture     Right    Hyperlipidemia     Hypertension     Insulin dependent type 2 diabetes mellitus (Multi)     Iron deficiency anemia     Kidney stones     Long term current use of aspirin     Multiple lung nodules     Obesity hypoventilation syndrome (Multi)     Obesity, morbid, BMI 50 or higher (Multi)     Obstructive sleep apnea treated with BiPAP     Osteoarthritis     Osteoporosis     Pulmonary edema with congestive heart failure with preserved left ventricular function 2025    Pulmonary hypertension (Multi)     Renal cyst, right     Secondary pancreatic insufficiency ([facility])     Spinal stenosis     Vitamin D deficiency     Wheelchair dependence    [2]   Past Surgical History:  Procedure Laterality Date    ADENOIDECTOMY      BLADDER SUSPENSION      BREAST BIOPSY       SECTION, LOW TRANSVERSE      CHOLECYSTECTOMY      DEEP BRAIN STIMULATOR PLACEMENT      HYSTERECTOMY      KNEE SURGERY      LITHOTRIPSY      ORIF FEMUR FRACTURE Right     OVARIAN CYST REMOVAL      TONSILLECTOMY

## 2025-07-03 NOTE — PATIENT INSTRUCTIONS
"Keep spironolactone for \"as needed\" if increased shortness of breath; but it was removed from the med list  Increase Lantus to 42units twice a day; if all BS still run above 120 you can go up to 43units twice a day after about a week  Vitamin D supplement ordered from [facility]; will be once a month for 3 months then you'll have to take 2000 units D3 over the counter after the 3 months  PT reordered through [facility]   Follow up with Kathryn in 2 months; call sooner if any needs/concerns  "

## 2025-07-07 ENCOUNTER — TELEPHONE (OUTPATIENT)
Dept: GERIATRIC MEDICINE | Facility: CLINIC | Age: 70
End: 2025-07-07
Payer: MEDICARE

## 2025-07-07 NOTE — TELEPHONE ENCOUNTER
Call to the prior authorization department and per the patient insurance it is to soon to fill the medication as it was just filled.

## 2025-07-07 NOTE — TELEPHONE ENCOUNTER
Call placed to the patient to clarify the name of the home health care agency that they would like to use. Voicemail message left with office number provided.

## 2025-07-08 ENCOUNTER — TELEPHONE (OUTPATIENT)
Dept: GERIATRIC MEDICINE | Facility: CLINIC | Age: 70
End: 2025-07-08
Payer: MEDICARE

## 2025-07-08 NOTE — TELEPHONE ENCOUNTER
Call to the patient to verify the name of the home care agency that they  have used in the past and wanted to return to. Voicemail message left with office number provided.

## 2025-07-10 ENCOUNTER — TELEPHONE (OUTPATIENT)
Dept: GERIATRIC MEDICINE | Facility: CLINIC | Age: 70
End: 2025-07-10
Payer: MEDICARE

## 2025-07-10 NOTE — TELEPHONE ENCOUNTER
Call to the patient to inquire about what home care agency that they would like for the home care referral be sent to. Voicemail message left with office number provided.

## 2025-07-10 NOTE — TELEPHONE ENCOUNTER
Call to the patient home care agency at , per the home care agency they have tried to contact the patient with no success. Several attempts have been made to contact the patient with no success. Provider notified.

## 2025-07-10 NOTE — TELEPHONE ENCOUNTER
Call to the patient home care provider to provider them with the patient corrected phone number. Provider notified.

## 2025-07-15 ENCOUNTER — TELEPHONE (OUTPATIENT)
Dept: GERIATRIC MEDICINE | Facility: CLINIC | Age: 70
End: 2025-07-15
Payer: MEDICARE

## 2025-07-15 DIAGNOSIS — R52 ACUTE PAIN: Primary | ICD-10-CM

## 2025-07-15 RX ORDER — IBUPROFEN 800 MG/1
800 TABLET, FILM COATED ORAL 3 TIMES DAILY
Qty: 15 TABLET | Refills: 0 | Status: SHIPPED | OUTPATIENT
Start: 2025-07-15 | End: 2025-07-20

## 2025-07-15 NOTE — TELEPHONE ENCOUNTER
Call to [facility] home care, message that they are currently closed for lunch and will return at 1pm.

## 2025-07-15 NOTE — TELEPHONE ENCOUNTER
Call to the office to inquire when they would be out to see the patient, per [facility] they stated that she was a non admit and then it was verbalized that the new order was faxed over 2 weeks ago. Then placed on hold while they tried to figure out what happened.

## 2025-07-22 ENCOUNTER — TELEPHONE (OUTPATIENT)
Dept: GERIATRIC MEDICINE | Facility: CLINIC | Age: 70
End: 2025-07-22
Payer: MEDICARE

## 2025-07-24 ENCOUNTER — APPOINTMENT (OUTPATIENT)
Dept: RADIOLOGY | Facility: HOSPITAL | Age: 70
DRG: 291 | End: 2025-07-24
Payer: MEDICARE

## 2025-07-24 ENCOUNTER — DOCUMENTATION (OUTPATIENT)
Dept: GERIATRIC MEDICINE | Facility: CLINIC | Age: 70
End: 2025-07-24
Payer: MEDICARE

## 2025-07-24 ENCOUNTER — HOSPITAL ENCOUNTER (INPATIENT)
Facility: HOSPITAL | Age: 70
DRG: 291 | End: 2025-07-24
Attending: STUDENT IN AN ORGANIZED HEALTH CARE EDUCATION/TRAINING PROGRAM | Admitting: INTERNAL MEDICINE
Payer: MEDICARE

## 2025-07-24 ENCOUNTER — APPOINTMENT (OUTPATIENT)
Dept: CARDIOLOGY | Facility: HOSPITAL | Age: 70
DRG: 291 | End: 2025-07-24
Payer: MEDICARE

## 2025-07-24 ENCOUNTER — TELEPHONE (OUTPATIENT)
Dept: GERIATRIC MEDICINE | Facility: CLINIC | Age: 70
End: 2025-07-24
Payer: MEDICARE

## 2025-07-24 DIAGNOSIS — N18.31 TYPE 2 DIABETES MELLITUS WITH STAGE 3A CHRONIC KIDNEY DISEASE, WITH LONG-TERM CURRENT USE OF INSULIN (MULTI): ICD-10-CM

## 2025-07-24 DIAGNOSIS — I50.9 HEART FAILURE, UNSPECIFIED: ICD-10-CM

## 2025-07-24 DIAGNOSIS — Z79.4 TYPE 2 DIABETES MELLITUS WITH STAGE 3A CHRONIC KIDNEY DISEASE, WITH LONG-TERM CURRENT USE OF INSULIN (MULTI): ICD-10-CM

## 2025-07-24 DIAGNOSIS — R53.1 WEAKNESS: ICD-10-CM

## 2025-07-24 DIAGNOSIS — J96.22 ACUTE ON CHRONIC RESPIRATORY FAILURE WITH HYPOXIA AND HYPERCAPNIA: ICD-10-CM

## 2025-07-24 DIAGNOSIS — E11.22 TYPE 2 DIABETES MELLITUS WITH STAGE 3A CHRONIC KIDNEY DISEASE, WITH LONG-TERM CURRENT USE OF INSULIN (MULTI): ICD-10-CM

## 2025-07-24 DIAGNOSIS — J18.9 PNEUMONIA OF LEFT LOWER LOBE DUE TO INFECTIOUS ORGANISM: ICD-10-CM

## 2025-07-24 DIAGNOSIS — I50.9 ACUTE ON CHRONIC CONGESTIVE HEART FAILURE, UNSPECIFIED HEART FAILURE TYPE: Primary | ICD-10-CM

## 2025-07-24 DIAGNOSIS — E66.01 MORBID OBESITY (MULTI): Chronic | ICD-10-CM

## 2025-07-24 DIAGNOSIS — N18.31 CKD STAGE 3A, GFR 45-59 ML/MIN (MULTI): ICD-10-CM

## 2025-07-24 DIAGNOSIS — J96.21 ACUTE ON CHRONIC RESPIRATORY FAILURE WITH HYPOXIA AND HYPERCAPNIA: ICD-10-CM

## 2025-07-24 PROBLEM — E11.9 TYPE 2 DIABETES MELLITUS, WITH LONG-TERM CURRENT USE OF INSULIN: Status: ACTIVE | Noted: 2025-07-24

## 2025-07-24 LAB
ALBUMIN SERPL BCP-MCNC: 4.2 G/DL (ref 3.4–5)
ALP SERPL-CCNC: 96 U/L (ref 33–136)
ALT SERPL W P-5'-P-CCNC: 7 U/L (ref 7–45)
ANION GAP BLDV CALCULATED.4IONS-SCNC: -2 MMOL/L (ref 10–25)
ANION GAP SERPL CALC-SCNC: 11 MMOL/L (ref 10–20)
AST SERPL W P-5'-P-CCNC: 10 U/L (ref 9–39)
BASE EXCESS BLDV CALC-SCNC: 6.3 MMOL/L (ref -2–3)
BASOPHILS # BLD AUTO: 0.04 X10*3/UL (ref 0–0.1)
BASOPHILS NFR BLD AUTO: 0.3 %
BILIRUB SERPL-MCNC: 0.7 MG/DL (ref 0–1.2)
BNP SERPL-MCNC: 383 PG/ML (ref 0–99)
BODY TEMPERATURE: ABNORMAL
BUN SERPL-MCNC: 24 MG/DL (ref 6–23)
CA-I BLDV-SCNC: 1.1 MMOL/L (ref 1.1–1.33)
CALCIUM SERPL-MCNC: 9.4 MG/DL (ref 8.6–10.3)
CARDIAC TROPONIN I PNL SERPL HS: 7 NG/L (ref 0–13)
CARDIAC TROPONIN I PNL SERPL HS: 9 NG/L (ref 0–13)
CHLORIDE BLDV-SCNC: 96 MMOL/L (ref 98–107)
CHLORIDE SERPL-SCNC: 97 MMOL/L (ref 98–107)
CO2 SERPL-SCNC: 36 MMOL/L (ref 21–32)
CREAT SERPL-MCNC: 1.14 MG/DL (ref 0.5–1.05)
EGFRCR SERPLBLD CKD-EPI 2021: 52 ML/MIN/1.73M*2
EOSINOPHIL # BLD AUTO: 0.34 X10*3/UL (ref 0–0.7)
EOSINOPHIL NFR BLD AUTO: 2.6 %
ERYTHROCYTE [DISTWIDTH] IN BLOOD BY AUTOMATED COUNT: 14.7 % (ref 11.5–14.5)
FLUAV RNA RESP QL NAA+PROBE: NOT DETECTED
FLUBV RNA RESP QL NAA+PROBE: NOT DETECTED
GLUCOSE BLDV-MCNC: 176 MG/DL (ref 74–99)
GLUCOSE SERPL-MCNC: 184 MG/DL (ref 74–99)
HCO3 BLDV-SCNC: 35.9 MMOL/L (ref 22–26)
HCT VFR BLD AUTO: 31.1 % (ref 36–46)
HCT VFR BLD EST: 59 % (ref 36–46)
HGB BLD-MCNC: 9.4 G/DL (ref 12–16)
HGB BLDV-MCNC: 19.5 G/DL (ref 12–16)
IMM GRANULOCYTES # BLD AUTO: 0.1 X10*3/UL (ref 0–0.7)
IMM GRANULOCYTES NFR BLD AUTO: 0.8 % (ref 0–0.9)
INHALED O2 CONCENTRATION: 30 %
INR PPP: 1.2 (ref 0.9–1.1)
LACTATE BLDV-SCNC: 1.1 MMOL/L (ref 0.4–2)
LYMPHOCYTES # BLD AUTO: 1.86 X10*3/UL (ref 1.2–4.8)
LYMPHOCYTES NFR BLD AUTO: 14.3 %
MAGNESIUM SERPL-MCNC: 2.31 MG/DL (ref 1.6–2.4)
MCH RBC QN AUTO: 25.5 PG (ref 26–34)
MCHC RBC AUTO-ENTMCNC: 30.2 G/DL (ref 32–36)
MCV RBC AUTO: 84 FL (ref 80–100)
MONOCYTES # BLD AUTO: 0.58 X10*3/UL (ref 0.1–1)
MONOCYTES NFR BLD AUTO: 4.5 %
NEUTROPHILS # BLD AUTO: 10.1 X10*3/UL (ref 1.2–7.7)
NEUTROPHILS NFR BLD AUTO: 77.5 %
NRBC BLD-RTO: 0 /100 WBCS (ref 0–0)
OXYHGB MFR BLDV: 61.7 % (ref 45–75)
PCO2 BLDV: 68 MM HG (ref 41–51)
PH BLDV: 7.33 PH (ref 7.33–7.43)
PLATELET # BLD AUTO: 239 X10*3/UL (ref 150–450)
PO2 BLDV: 40 MM HG (ref 35–45)
POTASSIUM BLDV-SCNC: 3.6 MMOL/L (ref 3.5–5.3)
POTASSIUM SERPL-SCNC: 3.9 MMOL/L (ref 3.5–5.3)
PROT SERPL-MCNC: 7.5 G/DL (ref 6.4–8.2)
PROTHROMBIN TIME: 13.6 SECONDS (ref 9.8–12.4)
RBC # BLD AUTO: 3.69 X10*6/UL (ref 4–5.2)
SAO2 % BLDV: 63 % (ref 45–75)
SARS-COV-2 RNA RESP QL NAA+PROBE: NOT DETECTED
SODIUM BLDV-SCNC: 126 MMOL/L (ref 136–145)
SODIUM SERPL-SCNC: 140 MMOL/L (ref 136–145)
WBC # BLD AUTO: 13 X10*3/UL (ref 4.4–11.3)

## 2025-07-24 PROCEDURE — 80053 COMPREHEN METABOLIC PANEL: CPT | Performed by: EMERGENCY MEDICINE

## 2025-07-24 PROCEDURE — 85610 PROTHROMBIN TIME: CPT | Performed by: EMERGENCY MEDICINE

## 2025-07-24 PROCEDURE — 99285 EMERGENCY DEPT VISIT HI MDM: CPT | Performed by: STUDENT IN AN ORGANIZED HEALTH CARE EDUCATION/TRAINING PROGRAM

## 2025-07-24 PROCEDURE — 5A09357 ASSISTANCE WITH RESPIRATORY VENTILATION, LESS THAN 24 CONSECUTIVE HOURS, CONTINUOUS POSITIVE AIRWAY PRESSURE: ICD-10-PCS | Performed by: INTERNAL MEDICINE

## 2025-07-24 PROCEDURE — 71045 X-RAY EXAM CHEST 1 VIEW: CPT

## 2025-07-24 PROCEDURE — 36415 COLL VENOUS BLD VENIPUNCTURE: CPT | Performed by: EMERGENCY MEDICINE

## 2025-07-24 PROCEDURE — 85025 COMPLETE CBC W/AUTO DIFF WBC: CPT | Performed by: EMERGENCY MEDICINE

## 2025-07-24 PROCEDURE — 84132 ASSAY OF SERUM POTASSIUM: CPT | Performed by: EMERGENCY MEDICINE

## 2025-07-24 PROCEDURE — 93005 ELECTROCARDIOGRAM TRACING: CPT

## 2025-07-24 PROCEDURE — 87636 SARSCOV2 & INF A&B AMP PRB: CPT | Performed by: EMERGENCY MEDICINE

## 2025-07-24 PROCEDURE — 84484 ASSAY OF TROPONIN QUANT: CPT | Performed by: EMERGENCY MEDICINE

## 2025-07-24 PROCEDURE — 83735 ASSAY OF MAGNESIUM: CPT | Performed by: EMERGENCY MEDICINE

## 2025-07-24 PROCEDURE — 83880 ASSAY OF NATRIURETIC PEPTIDE: CPT | Performed by: EMERGENCY MEDICINE

## 2025-07-24 PROCEDURE — 2500000004 HC RX 250 GENERAL PHARMACY W/ HCPCS (ALT 636 FOR OP/ED): Performed by: EMERGENCY MEDICINE

## 2025-07-24 PROCEDURE — 71045 X-RAY EXAM CHEST 1 VIEW: CPT | Performed by: STUDENT IN AN ORGANIZED HEALTH CARE EDUCATION/TRAINING PROGRAM

## 2025-07-24 RX ORDER — FUROSEMIDE 10 MG/ML
40 INJECTION INTRAMUSCULAR; INTRAVENOUS ONCE
Status: COMPLETED | OUTPATIENT
Start: 2025-07-24 | End: 2025-07-24

## 2025-07-24 RX ORDER — CEFTRIAXONE 2 G/50ML
2 INJECTION, SOLUTION INTRAVENOUS ONCE
Status: COMPLETED | OUTPATIENT
Start: 2025-07-24 | End: 2025-07-25

## 2025-07-24 RX ADMIN — FUROSEMIDE 40 MG: 10 INJECTION, SOLUTION INTRAMUSCULAR; INTRAVENOUS at 23:25

## 2025-07-24 RX ADMIN — CEFTRIAXONE 2 G: 2 INJECTION, SOLUTION INTRAVENOUS at 23:25

## 2025-07-24 ASSESSMENT — PAIN - FUNCTIONAL ASSESSMENT: PAIN_FUNCTIONAL_ASSESSMENT: 0-10

## 2025-07-24 NOTE — TELEPHONE ENCOUNTER
Call from the patient stating that she feels that she is having an allergic reaction to motrin. The patient is having facial swelling and has verbalized that she is not having difficulty swallowing or breathing. Sent to covering NP Ade Simmons.

## 2025-07-24 NOTE — TELEPHONE ENCOUNTER
Spoke with patient and she stated that she woke up with the swelling and does not want to go to the emergency room if she does not have to, stated that she will try benadryl and then when her son gets there in 2 hours if it is not better she will go to the Emergency room.

## 2025-07-24 NOTE — PROGRESS NOTES
"Telephone  7/24/2025  [facility]       Ce Nazario RN  Hematology and Oncology     All Conversations  (Oldest Message First)             Ce Nazario RN      7/24/25  3:18 PM  Note     Call from the patient stating that she feels that she is having an allergic reaction to motrin. The patient is having facial swelling and has verbalized that she is not having difficulty swallowing or breathing. Sent to covering NP Ade Simmons.           7/24/2025 at 4:21 PM  NP spoke with patient  States she was prescribed 800 mg tid for gout  States she now has swollen eyes and a runny nose  Her throat feels \"grainy\".  But, she denies feeling like her throat is closing off  Denies difficulty breathing or wheezing. Denies difficulty swallowing.   She is able to complete her sentences  No itching or hives.     Plan   Patient has Benadryl 50 mg in the home.         Advised to take one now and may repeat in 6 hours if needed         She does not drive          Made aware that if she develops CP, SOB, Wheezing, etc. 911.          Stated she would           Also, she is aware that this NP is on call this week after hours and over the weekend.          Advised her to call back anytime if things change.          She was grateful and stated she would.           DB      "

## 2025-07-25 ENCOUNTER — APPOINTMENT (OUTPATIENT)
Dept: RADIOLOGY | Facility: HOSPITAL | Age: 70
DRG: 291 | End: 2025-07-25
Payer: MEDICARE

## 2025-07-25 PROBLEM — J96.22 ACUTE ON CHRONIC RESPIRATORY FAILURE WITH HYPOXIA AND HYPERCAPNIA: Status: ACTIVE | Noted: 2024-07-22

## 2025-07-25 PROBLEM — J96.21 ACUTE ON CHRONIC RESPIRATORY FAILURE WITH HYPOXIA AND HYPERCAPNIA: Status: ACTIVE | Noted: 2024-07-22

## 2025-07-25 PROBLEM — I50.9 ACUTE ON CHRONIC CONGESTIVE HEART FAILURE, UNSPECIFIED HEART FAILURE TYPE: Status: RESOLVED | Noted: 2025-07-24 | Resolved: 2025-07-25

## 2025-07-25 LAB
ANION GAP SERPL CALC-SCNC: 12 MMOL/L (ref 10–20)
APPEARANCE UR: CLEAR
ATRIAL RATE: 78 BPM
ATRIAL RATE: 90 BPM
BILIRUB UR STRIP.AUTO-MCNC: NEGATIVE MG/DL
BUN SERPL-MCNC: 23 MG/DL (ref 6–23)
CALCIUM SERPL-MCNC: 8.7 MG/DL (ref 8.6–10.3)
CHLORIDE SERPL-SCNC: 97 MMOL/L (ref 98–107)
CO2 SERPL-SCNC: 35 MMOL/L (ref 21–32)
COLOR UR: ABNORMAL
CREAT SERPL-MCNC: 1.11 MG/DL (ref 0.5–1.05)
EGFRCR SERPLBLD CKD-EPI 2021: 54 ML/MIN/1.73M*2
ERYTHROCYTE [DISTWIDTH] IN BLOOD BY AUTOMATED COUNT: 14.6 % (ref 11.5–14.5)
GLUCOSE BLD MANUAL STRIP-MCNC: 142 MG/DL (ref 74–99)
GLUCOSE BLD MANUAL STRIP-MCNC: 179 MG/DL (ref 74–99)
GLUCOSE BLD MANUAL STRIP-MCNC: 212 MG/DL (ref 74–99)
GLUCOSE BLD MANUAL STRIP-MCNC: 254 MG/DL (ref 74–99)
GLUCOSE SERPL-MCNC: 183 MG/DL (ref 74–99)
GLUCOSE UR STRIP.AUTO-MCNC: NORMAL MG/DL
HCT VFR BLD AUTO: 27.7 % (ref 36–46)
HGB BLD-MCNC: 8.2 G/DL (ref 12–16)
HYALINE CASTS #/AREA URNS AUTO: ABNORMAL /LPF
KETONES UR STRIP.AUTO-MCNC: NEGATIVE MG/DL
LEGIONELLA AG UR QL: NEGATIVE
LEUKOCYTE ESTERASE UR QL STRIP.AUTO: NEGATIVE
MAGNESIUM SERPL-MCNC: 2.06 MG/DL (ref 1.6–2.4)
MCH RBC QN AUTO: 25.2 PG (ref 26–34)
MCHC RBC AUTO-ENTMCNC: 29.6 G/DL (ref 32–36)
MCV RBC AUTO: 85 FL (ref 80–100)
MRSA DNA SPEC QL NAA+PROBE: NOT DETECTED
MUCOUS THREADS #/AREA URNS AUTO: ABNORMAL /LPF
NITRITE UR QL STRIP.AUTO: NEGATIVE
NRBC BLD-RTO: 0 /100 WBCS (ref 0–0)
P AXIS: 79 DEGREES
P AXIS: 94 DEGREES
P OFFSET: 139 MS
P OFFSET: 165 MS
P ONSET: 116 MS
P ONSET: 88 MS
PH UR STRIP.AUTO: 5.5 [PH]
PLATELET # BLD AUTO: 214 X10*3/UL (ref 150–450)
POTASSIUM SERPL-SCNC: 3.8 MMOL/L (ref 3.5–5.3)
PR INTERVAL: 210 MS
PR INTERVAL: 234 MS
PROCALCITONIN SERPL-MCNC: 0.12 NG/ML
PROT UR STRIP.AUTO-MCNC: ABNORMAL MG/DL
Q ONSET: 205 MS
Q ONSET: 221 MS
QRS COUNT: 13 BEATS
QRS COUNT: 15 BEATS
QRS DURATION: 114 MS
QRS DURATION: 118 MS
QT INTERVAL: 370 MS
QT INTERVAL: 396 MS
QTC CALCULATION(BAZETT): 451 MS
QTC CALCULATION(BAZETT): 452 MS
QTC FREDERICIA: 423 MS
QTC FREDERICIA: 432 MS
R AXIS: -51 DEGREES
R AXIS: -57 DEGREES
RBC # BLD AUTO: 3.25 X10*6/UL (ref 4–5.2)
RBC # UR STRIP.AUTO: ABNORMAL MG/DL
RBC #/AREA URNS AUTO: ABNORMAL /HPF
S PNEUM AG UR QL: NEGATIVE
SODIUM SERPL-SCNC: 140 MMOL/L (ref 136–145)
SP GR UR STRIP.AUTO: 1.01
SQUAMOUS #/AREA URNS AUTO: ABNORMAL /HPF
T AXIS: 102 DEGREES
T AXIS: 85 DEGREES
T OFFSET: 403 MS
T OFFSET: 406 MS
UROBILINOGEN UR STRIP.AUTO-MCNC: NORMAL MG/DL
VENTRICULAR RATE: 78 BPM
VENTRICULAR RATE: 90 BPM
WBC # BLD AUTO: 12.1 X10*3/UL (ref 4.4–11.3)
WBC #/AREA URNS AUTO: ABNORMAL /HPF

## 2025-07-25 PROCEDURE — 87449 NOS EACH ORGANISM AG IA: CPT | Mod: STJLAB

## 2025-07-25 PROCEDURE — 85027 COMPLETE CBC AUTOMATED: CPT

## 2025-07-25 PROCEDURE — 87640 STAPH A DNA AMP PROBE: CPT

## 2025-07-25 PROCEDURE — 36415 COLL VENOUS BLD VENIPUNCTURE: CPT

## 2025-07-25 PROCEDURE — 2500000001 HC RX 250 WO HCPCS SELF ADMINISTERED DRUGS (ALT 637 FOR MEDICARE OP): Performed by: NURSE PRACTITIONER

## 2025-07-25 PROCEDURE — 84145 PROCALCITONIN (PCT): CPT | Mod: STJLAB

## 2025-07-25 PROCEDURE — 2500000002 HC RX 250 W HCPCS SELF ADMINISTERED DRUGS (ALT 637 FOR MEDICARE OP, ALT 636 FOR OP/ED): Performed by: NURSE PRACTITIONER

## 2025-07-25 PROCEDURE — 80048 BASIC METABOLIC PNL TOTAL CA: CPT

## 2025-07-25 PROCEDURE — 1200000002 HC GENERAL ROOM WITH TELEMETRY DAILY

## 2025-07-25 PROCEDURE — 2500000002 HC RX 250 W HCPCS SELF ADMINISTERED DRUGS (ALT 637 FOR MEDICARE OP, ALT 636 FOR OP/ED)

## 2025-07-25 PROCEDURE — 99223 1ST HOSP IP/OBS HIGH 75: CPT

## 2025-07-25 PROCEDURE — 94640 AIRWAY INHALATION TREATMENT: CPT

## 2025-07-25 PROCEDURE — 87899 AGENT NOS ASSAY W/OPTIC: CPT | Mod: STJLAB

## 2025-07-25 PROCEDURE — 94660 CPAP INITIATION&MGMT: CPT

## 2025-07-25 PROCEDURE — 2500000005 HC RX 250 GENERAL PHARMACY W/O HCPCS: Performed by: INTERNAL MEDICINE

## 2025-07-25 PROCEDURE — 2500000004 HC RX 250 GENERAL PHARMACY W/ HCPCS (ALT 636 FOR OP/ED): Performed by: NURSE PRACTITIONER

## 2025-07-25 PROCEDURE — 82947 ASSAY GLUCOSE BLOOD QUANT: CPT

## 2025-07-25 PROCEDURE — 81001 URINALYSIS AUTO W/SCOPE: CPT | Performed by: EMERGENCY MEDICINE

## 2025-07-25 PROCEDURE — 83735 ASSAY OF MAGNESIUM: CPT

## 2025-07-25 PROCEDURE — 87075 CULTR BACTERIA EXCEPT BLOOD: CPT | Mod: STJLAB

## 2025-07-25 PROCEDURE — 2500000004 HC RX 250 GENERAL PHARMACY W/ HCPCS (ALT 636 FOR OP/ED)

## 2025-07-25 PROCEDURE — 2500000004 HC RX 250 GENERAL PHARMACY W/ HCPCS (ALT 636 FOR OP/ED): Performed by: EMERGENCY MEDICINE

## 2025-07-25 PROCEDURE — 92610 EVALUATE SWALLOWING FUNCTION: CPT | Mod: GN | Performed by: STUDENT IN AN ORGANIZED HEALTH CARE EDUCATION/TRAINING PROGRAM

## 2025-07-25 PROCEDURE — 96372 THER/PROPH/DIAG INJ SC/IM: CPT

## 2025-07-25 RX ORDER — ERGOCALCIFEROL 1.25 MG/1
1.25 CAPSULE ORAL WEEKLY
Status: DISCONTINUED | OUTPATIENT
Start: 2025-07-25 | End: 2025-07-28 | Stop reason: HOSPADM

## 2025-07-25 RX ORDER — CITALOPRAM 20 MG/1
40 TABLET ORAL DAILY
Status: DISCONTINUED | OUTPATIENT
Start: 2025-07-25 | End: 2025-07-28 | Stop reason: HOSPADM

## 2025-07-25 RX ORDER — NYSTATIN 100000 [USP'U]/G
1 POWDER TOPICAL 2 TIMES DAILY PRN
Status: DISCONTINUED | OUTPATIENT
Start: 2025-07-25 | End: 2025-07-28 | Stop reason: HOSPADM

## 2025-07-25 RX ORDER — INSULIN LISPRO 100 [IU]/ML
0-10 INJECTION, SOLUTION INTRAVENOUS; SUBCUTANEOUS
Status: DISCONTINUED | OUTPATIENT
Start: 2025-07-25 | End: 2025-07-28 | Stop reason: HOSPADM

## 2025-07-25 RX ORDER — AMLODIPINE BESYLATE 10 MG/1
10 TABLET ORAL DAILY
Status: DISCONTINUED | OUTPATIENT
Start: 2025-07-25 | End: 2025-07-28 | Stop reason: HOSPADM

## 2025-07-25 RX ORDER — INSULIN GLARGINE 100 [IU]/ML
42 INJECTION, SOLUTION SUBCUTANEOUS 2 TIMES DAILY
Status: DISCONTINUED | OUTPATIENT
Start: 2025-07-25 | End: 2025-07-28 | Stop reason: HOSPADM

## 2025-07-25 RX ORDER — DOXYCYCLINE 100 MG/1
100 CAPSULE ORAL EVERY 12 HOURS SCHEDULED
Status: DISCONTINUED | OUTPATIENT
Start: 2025-07-25 | End: 2025-07-28 | Stop reason: HOSPADM

## 2025-07-25 RX ORDER — METOPROLOL SUCCINATE 25 MG/1
25 TABLET, EXTENDED RELEASE ORAL 2 TIMES DAILY
Status: DISCONTINUED | OUTPATIENT
Start: 2025-07-25 | End: 2025-07-28 | Stop reason: HOSPADM

## 2025-07-25 RX ORDER — GABAPENTIN 300 MG/1
300 CAPSULE ORAL 2 TIMES DAILY
Status: DISCONTINUED | OUTPATIENT
Start: 2025-07-25 | End: 2025-07-28 | Stop reason: HOSPADM

## 2025-07-25 RX ORDER — ACETAMINOPHEN 650 MG/1
650 SUPPOSITORY RECTAL EVERY 6 HOURS PRN
Status: DISCONTINUED | OUTPATIENT
Start: 2025-07-25 | End: 2025-07-26

## 2025-07-25 RX ORDER — DEXTROSE 50 % IN WATER (D50W) INTRAVENOUS SYRINGE
12.5
Status: DISCONTINUED | OUTPATIENT
Start: 2025-07-25 | End: 2025-07-28 | Stop reason: HOSPADM

## 2025-07-25 RX ORDER — ATORVASTATIN CALCIUM 10 MG/1
10 TABLET, FILM COATED ORAL NIGHTLY
Status: DISCONTINUED | OUTPATIENT
Start: 2025-07-25 | End: 2025-07-28 | Stop reason: HOSPADM

## 2025-07-25 RX ORDER — DEXTROSE 50 % IN WATER (D50W) INTRAVENOUS SYRINGE
25
Status: DISCONTINUED | OUTPATIENT
Start: 2025-07-25 | End: 2025-07-28 | Stop reason: HOSPADM

## 2025-07-25 RX ORDER — FUROSEMIDE 40 MG/1
40 TABLET ORAL DAILY
Status: DISCONTINUED | OUTPATIENT
Start: 2025-07-25 | End: 2025-07-28 | Stop reason: HOSPADM

## 2025-07-25 RX ORDER — BUDESONIDE 0.5 MG/2ML
0.5 INHALANT ORAL
Status: DISCONTINUED | OUTPATIENT
Start: 2025-07-25 | End: 2025-07-28 | Stop reason: HOSPADM

## 2025-07-25 RX ORDER — IPRATROPIUM BROMIDE AND ALBUTEROL SULFATE 2.5; .5 MG/3ML; MG/3ML
3 SOLUTION RESPIRATORY (INHALATION) EVERY 4 HOURS PRN
Status: DISCONTINUED | OUTPATIENT
Start: 2025-07-25 | End: 2025-07-28 | Stop reason: HOSPADM

## 2025-07-25 RX ORDER — CEFTRIAXONE 2 G/50ML
2 INJECTION, SOLUTION INTRAVENOUS EVERY 24 HOURS
Status: DISCONTINUED | OUTPATIENT
Start: 2025-07-25 | End: 2025-07-28 | Stop reason: HOSPADM

## 2025-07-25 RX ORDER — NAPROXEN SODIUM 220 MG/1
81 TABLET, FILM COATED ORAL DAILY
Status: DISCONTINUED | OUTPATIENT
Start: 2025-07-25 | End: 2025-07-28 | Stop reason: HOSPADM

## 2025-07-25 RX ORDER — IPRATROPIUM BROMIDE AND ALBUTEROL SULFATE 2.5; .5 MG/3ML; MG/3ML
3 SOLUTION RESPIRATORY (INHALATION) 4 TIMES DAILY
Status: DISCONTINUED | OUTPATIENT
Start: 2025-07-26 | End: 2025-07-27

## 2025-07-25 RX ORDER — POLYETHYLENE GLYCOL 3350 17 G/17G
17 POWDER, FOR SOLUTION ORAL DAILY PRN
Status: DISCONTINUED | OUTPATIENT
Start: 2025-07-25 | End: 2025-07-28 | Stop reason: HOSPADM

## 2025-07-25 RX ORDER — HEPARIN SODIUM 5000 [USP'U]/ML
7500 INJECTION, SOLUTION INTRAVENOUS; SUBCUTANEOUS EVERY 8 HOURS SCHEDULED
Status: DISCONTINUED | OUTPATIENT
Start: 2025-07-25 | End: 2025-07-28 | Stop reason: HOSPADM

## 2025-07-25 RX ORDER — FUROSEMIDE 10 MG/ML
40 INJECTION INTRAMUSCULAR; INTRAVENOUS 2 TIMES DAILY
Status: DISCONTINUED | OUTPATIENT
Start: 2025-07-25 | End: 2025-07-27

## 2025-07-25 RX ORDER — TALC
3 POWDER (GRAM) TOPICAL NIGHTLY PRN
Status: DISCONTINUED | OUTPATIENT
Start: 2025-07-25 | End: 2025-07-28 | Stop reason: HOSPADM

## 2025-07-25 RX ORDER — ACETAMINOPHEN 325 MG/1
650 TABLET ORAL EVERY 6 HOURS PRN
Status: DISCONTINUED | OUTPATIENT
Start: 2025-07-25 | End: 2025-07-26

## 2025-07-25 RX ORDER — IPRATROPIUM BROMIDE AND ALBUTEROL SULFATE 2.5; .5 MG/3ML; MG/3ML
3 SOLUTION RESPIRATORY (INHALATION)
Status: DISCONTINUED | OUTPATIENT
Start: 2025-07-25 | End: 2025-07-25

## 2025-07-25 RX ORDER — ACETAMINOPHEN 160 MG/5ML
650 SOLUTION ORAL EVERY 6 HOURS PRN
Status: DISCONTINUED | OUTPATIENT
Start: 2025-07-25 | End: 2025-07-26

## 2025-07-25 RX ADMIN — AMLODIPINE BESYLATE 10 MG: 10 TABLET ORAL at 11:51

## 2025-07-25 RX ADMIN — METOPROLOL SUCCINATE 25 MG: 25 TABLET, EXTENDED RELEASE ORAL at 20:58

## 2025-07-25 RX ADMIN — INSULIN LISPRO 6 UNITS: 100 INJECTION, SOLUTION INTRAVENOUS; SUBCUTANEOUS at 20:58

## 2025-07-25 RX ADMIN — ASPIRIN 81 MG CHEWABLE TABLET 81 MG: 81 TABLET CHEWABLE at 11:51

## 2025-07-25 RX ADMIN — HEPARIN SODIUM 7500 UNITS: 5000 INJECTION INTRAVENOUS; SUBCUTANEOUS at 14:52

## 2025-07-25 RX ADMIN — HEPARIN SODIUM 7500 UNITS: 5000 INJECTION INTRAVENOUS; SUBCUTANEOUS at 21:03

## 2025-07-25 RX ADMIN — DOXYCYCLINE HYCLATE 100 MG: 100 CAPSULE ORAL at 18:42

## 2025-07-25 RX ADMIN — Medication: at 01:32

## 2025-07-25 RX ADMIN — BUDESONIDE 0.5 MG: 0.5 INHALANT RESPIRATORY (INHALATION) at 08:50

## 2025-07-25 RX ADMIN — Medication: at 06:15

## 2025-07-25 RX ADMIN — METOPROLOL SUCCINATE 25 MG: 25 TABLET, EXTENDED RELEASE ORAL at 11:51

## 2025-07-25 RX ADMIN — INSULIN GLARGINE 42 UNITS: 100 INJECTION, SOLUTION SUBCUTANEOUS at 11:51

## 2025-07-25 RX ADMIN — FUROSEMIDE 40 MG: 10 INJECTION, SOLUTION INTRAMUSCULAR; INTRAVENOUS at 09:38

## 2025-07-25 RX ADMIN — IPRATROPIUM BROMIDE AND ALBUTEROL SULFATE 3 ML: 2.5; .5 SOLUTION RESPIRATORY (INHALATION) at 20:40

## 2025-07-25 RX ADMIN — INSULIN LISPRO 4 UNITS: 100 INJECTION, SOLUTION INTRAVENOUS; SUBCUTANEOUS at 18:42

## 2025-07-25 RX ADMIN — AZITHROMYCIN MONOHYDRATE 500 MG: 500 INJECTION, POWDER, LYOPHILIZED, FOR SOLUTION INTRAVENOUS at 00:14

## 2025-07-25 RX ADMIN — Medication: at 20:47

## 2025-07-25 RX ADMIN — CITALOPRAM HYDROBROMIDE 40 MG: 20 TABLET ORAL at 11:51

## 2025-07-25 RX ADMIN — Medication: at 15:32

## 2025-07-25 RX ADMIN — PANCRELIPASE 1 CAPSULE: 60000; 12000; 38000 CAPSULE, DELAYED RELEASE PELLETS ORAL at 11:51

## 2025-07-25 RX ADMIN — IPRATROPIUM BROMIDE AND ALBUTEROL SULFATE 3 ML: 2.5; .5 SOLUTION RESPIRATORY (INHALATION) at 08:51

## 2025-07-25 RX ADMIN — INSULIN LISPRO 2 UNITS: 100 INJECTION, SOLUTION INTRAVENOUS; SUBCUTANEOUS at 11:51

## 2025-07-25 RX ADMIN — GABAPENTIN 300 MG: 300 CAPSULE ORAL at 20:58

## 2025-07-25 RX ADMIN — ERGOCALCIFEROL 1.25 MG: 1.25 CAPSULE ORAL at 11:51

## 2025-07-25 RX ADMIN — Medication: at 23:30

## 2025-07-25 RX ADMIN — IPRATROPIUM BROMIDE AND ALBUTEROL SULFATE 3 ML: 2.5; .5 SOLUTION RESPIRATORY (INHALATION) at 15:30

## 2025-07-25 RX ADMIN — Medication 1 DOSE: at 08:53

## 2025-07-25 RX ADMIN — BUDESONIDE 0.5 MG: 0.5 INHALANT RESPIRATORY (INHALATION) at 20:40

## 2025-07-25 RX ADMIN — GABAPENTIN 300 MG: 300 CAPSULE ORAL at 11:51

## 2025-07-25 RX ADMIN — FUROSEMIDE 40 MG: 10 INJECTION, SOLUTION INTRAMUSCULAR; INTRAVENOUS at 21:17

## 2025-07-25 RX ADMIN — DOXYCYCLINE 100 MG: 100 INJECTION, POWDER, LYOPHILIZED, FOR SOLUTION INTRAVENOUS at 01:18

## 2025-07-25 RX ADMIN — BUDESONIDE 0.5 MG: 0.5 INHALANT RESPIRATORY (INHALATION) at 01:12

## 2025-07-25 RX ADMIN — IPRATROPIUM BROMIDE AND ALBUTEROL SULFATE 3 ML: 2.5; .5 SOLUTION RESPIRATORY (INHALATION) at 01:13

## 2025-07-25 RX ADMIN — Medication: at 01:13

## 2025-07-25 RX ADMIN — ATORVASTATIN CALCIUM 10 MG: 10 TABLET, FILM COATED ORAL at 20:58

## 2025-07-25 RX ADMIN — Medication 1 DOSE: at 02:52

## 2025-07-25 RX ADMIN — HEPARIN SODIUM 7500 UNITS: 5000 INJECTION INTRAVENOUS; SUBCUTANEOUS at 06:42

## 2025-07-25 RX ADMIN — HEPARIN SODIUM 7500 UNITS: 5000 INJECTION INTRAVENOUS; SUBCUTANEOUS at 01:17

## 2025-07-25 RX ADMIN — INSULIN GLARGINE 42 UNITS: 100 INJECTION, SOLUTION SUBCUTANEOUS at 20:59

## 2025-07-25 SDOH — SOCIAL STABILITY: SOCIAL INSECURITY: WITHIN THE LAST YEAR, HAVE YOU BEEN HUMILIATED OR EMOTIONALLY ABUSED IN OTHER WAYS BY YOUR PARTNER OR EX-PARTNER?: NO

## 2025-07-25 SDOH — ECONOMIC STABILITY: HOUSING INSECURITY: IN THE LAST 12 MONTHS, WAS THERE A TIME WHEN YOU WERE NOT ABLE TO PAY THE MORTGAGE OR RENT ON TIME?: NO

## 2025-07-25 SDOH — ECONOMIC STABILITY: FOOD INSECURITY: WITHIN THE PAST 12 MONTHS, THE FOOD YOU BOUGHT JUST DIDN'T LAST AND YOU DIDN'T HAVE MONEY TO GET MORE.: NEVER TRUE

## 2025-07-25 SDOH — SOCIAL STABILITY: SOCIAL INSECURITY: ARE YOU OR HAVE YOU BEEN THREATENED OR ABUSED PHYSICALLY, EMOTIONALLY, OR SEXUALLY BY ANYONE?: NO

## 2025-07-25 SDOH — ECONOMIC STABILITY: INCOME INSECURITY: IN THE PAST 12 MONTHS HAS THE ELECTRIC, GAS, OIL, OR WATER COMPANY THREATENED TO SHUT OFF SERVICES IN YOUR HOME?: NO

## 2025-07-25 SDOH — ECONOMIC STABILITY: FOOD INSECURITY: WITHIN THE PAST 12 MONTHS, YOU WORRIED THAT YOUR FOOD WOULD RUN OUT BEFORE YOU GOT THE MONEY TO BUY MORE.: NEVER TRUE

## 2025-07-25 SDOH — ECONOMIC STABILITY: HOUSING INSECURITY: AT ANY TIME IN THE PAST 12 MONTHS, WERE YOU HOMELESS OR LIVING IN A SHELTER (INCLUDING NOW)?: NO

## 2025-07-25 SDOH — ECONOMIC STABILITY: HOUSING INSECURITY: IN THE PAST 12 MONTHS, HOW MANY TIMES HAVE YOU MOVED WHERE YOU WERE LIVING?: 0

## 2025-07-25 SDOH — SOCIAL STABILITY: SOCIAL INSECURITY: WERE YOU ABLE TO COMPLETE ALL THE BEHAVIORAL HEALTH SCREENINGS?: YES

## 2025-07-25 SDOH — SOCIAL STABILITY: SOCIAL INSECURITY: DO YOU FEEL ANYONE HAS EXPLOITED OR TAKEN ADVANTAGE OF YOU FINANCIALLY OR OF YOUR PERSONAL PROPERTY?: NO

## 2025-07-25 SDOH — ECONOMIC STABILITY: FOOD INSECURITY: HOW HARD IS IT FOR YOU TO PAY FOR THE VERY BASICS LIKE FOOD, HOUSING, MEDICAL CARE, AND HEATING?: NOT HARD AT ALL

## 2025-07-25 SDOH — SOCIAL STABILITY: SOCIAL INSECURITY: WITHIN THE LAST YEAR, HAVE YOU BEEN AFRAID OF YOUR PARTNER OR EX-PARTNER?: NO

## 2025-07-25 SDOH — SOCIAL STABILITY: SOCIAL INSECURITY: HAS ANYONE EVER THREATENED TO HURT YOUR FAMILY OR YOUR PETS?: NO

## 2025-07-25 SDOH — SOCIAL STABILITY: SOCIAL INSECURITY: ARE THERE ANY APPARENT SIGNS OF INJURIES/BEHAVIORS THAT COULD BE RELATED TO ABUSE/NEGLECT?: NO

## 2025-07-25 SDOH — SOCIAL STABILITY: SOCIAL INSECURITY: HAVE YOU HAD THOUGHTS OF HARMING ANYONE ELSE?: NO

## 2025-07-25 SDOH — SOCIAL STABILITY: SOCIAL INSECURITY: ABUSE: ADULT

## 2025-07-25 SDOH — ECONOMIC STABILITY: TRANSPORTATION INSECURITY: IN THE PAST 12 MONTHS, HAS LACK OF TRANSPORTATION KEPT YOU FROM MEDICAL APPOINTMENTS OR FROM GETTING MEDICATIONS?: NO

## 2025-07-25 SDOH — SOCIAL STABILITY: SOCIAL INSECURITY: DO YOU FEEL UNSAFE GOING BACK TO THE PLACE WHERE YOU ARE LIVING?: NO

## 2025-07-25 SDOH — SOCIAL STABILITY: SOCIAL INSECURITY: DOES ANYONE TRY TO KEEP YOU FROM HAVING/CONTACTING OTHER FRIENDS OR DOING THINGS OUTSIDE YOUR HOME?: NO

## 2025-07-25 ASSESSMENT — ENCOUNTER SYMPTOMS
PALPITATIONS: 0
DIARRHEA: 0
COLOR CHANGE: 0
WOUND: 0
SORE THROAT: 0
CHEST TIGHTNESS: 0
HEMATURIA: 0
FLANK PAIN: 0
FATIGUE: 1
CONFUSION: 0
DYSURIA: 0
CONSTIPATION: 0
FREQUENCY: 0
FEVER: 1
BACK PAIN: 0
WEAKNESS: 1
DYSPHORIC MOOD: 0
ABDOMINAL PAIN: 0
LIGHT-HEADEDNESS: 1
AGITATION: 0
DIZZINESS: 0
TROUBLE SWALLOWING: 0
NERVOUS/ANXIOUS: 0
NAUSEA: 0
SHORTNESS OF BREATH: 1
COUGH: 0
CHILLS: 1
VOMITING: 0

## 2025-07-25 ASSESSMENT — PAIN - FUNCTIONAL ASSESSMENT
PAIN_FUNCTIONAL_ASSESSMENT: 0-10

## 2025-07-25 ASSESSMENT — LIFESTYLE VARIABLES
HOW MANY STANDARD DRINKS CONTAINING ALCOHOL DO YOU HAVE ON A TYPICAL DAY: PATIENT DOES NOT DRINK
HOW OFTEN DO YOU HAVE 6 OR MORE DRINKS ON ONE OCCASION: NEVER
AUDIT-C TOTAL SCORE: 0
AUDIT-C TOTAL SCORE: 0
HOW OFTEN DO YOU HAVE A DRINK CONTAINING ALCOHOL: NEVER
SKIP TO QUESTIONS 9-10: 1

## 2025-07-25 ASSESSMENT — PAIN SCALES - GENERAL
PAINLEVEL_OUTOF10: 0 - NO PAIN

## 2025-07-25 ASSESSMENT — ACTIVITIES OF DAILY LIVING (ADL)
LACK_OF_TRANSPORTATION: NO
ASSISTIVE_DEVICE: WHEELCHAIR
JUDGMENT_ADEQUATE_SAFELY_COMPLETE_DAILY_ACTIVITIES: YES
FEEDING YOURSELF: INDEPENDENT
TOILETING: NEEDS ASSISTANCE
BATHING: NEEDS ASSISTANCE
DRESSING YOURSELF: NEEDS ASSISTANCE
HEARING - LEFT EAR: FUNCTIONAL
GROOMING: NEEDS ASSISTANCE
LACK_OF_TRANSPORTATION: NO
PATIENT'S MEMORY ADEQUATE TO SAFELY COMPLETE DAILY ACTIVITIES?: YES
ADEQUATE_TO_COMPLETE_ADL: YES
HEARING - RIGHT EAR: FUNCTIONAL
WALKS IN HOME: DEPENDENT

## 2025-07-25 ASSESSMENT — VISUAL ACUITY: OU: 1

## 2025-07-25 ASSESSMENT — COGNITIVE AND FUNCTIONAL STATUS - GENERAL: PATIENT BASELINE BEDBOUND: YES

## 2025-07-25 NOTE — ED PROVIDER NOTES
EMERGENCY DEPARTMENT ENCOUNTER      Pt Name: Kaylene Feliciano  MRN: [id number]  Birthdate 1955  Date of evaluation: 7/24/2025  Provider: Alvaro Starks DO    CHIEF COMPLAINT       Chief Complaint   Patient presents with    Shortness of Breath    Leg Swelling     HISTORY OF PRESENT ILLNESS    Kaylene Feliciano is a 70 y.o. year old female who presents to the ER for 2 days of facial swelling and shortness of breath.  On Friday she was prescribed ibuprofen 800 mg 3 times daily by the home health nurse, since starting she believes that she may have an allergy because of her swelling.  Denies fevers, chest pain, abdominal pain, vomiting, changes to urine or stool.  Does report using her home BiPAP daytime for the last 2 days to treat her shortness of breath    Does report taking Lasix 40 mg daily, has been compliant with her medications.  She also reports that her son believes she has been slightly confused and may have gained weight over the last week.    PMH CHF, COPD, sleep apnea on BiPAP, diabetes, depression  PSH deep brain stimulator, hysterectomy, bilateral knee surgery  Allergic to IVP dye, sulfa, belladonna, Cipro     PAST MEDICAL HISTORY   Medical History[1]  CURRENT MEDICATIONS       Current Discharge Medication List        CONTINUE these medications which have NOT CHANGED    Details   albuterol 2.5 mg /3 mL (0.083 %) nebulizer solution Take 3 mL (2.5 mg) by nebulization every 4 hours if needed for shortness of breath or wheezing.      amLODIPine (Norvasc) 5 mg tablet Take 2 tablets (10 mg) by mouth once daily.    Associated Diagnoses: Essential hypertension, benign      apple cider vinegar 300 mg tablet Take 1 capsule by mouth once daily.      aspirin 81 mg chewable tablet Chew 1 tablet (81 mg) once daily.      atorvastatin (Lipitor) 10 mg tablet Take 1 tablet (10 mg) by mouth once daily at bedtime.      citalopram (CeleXA) 40 mg tablet Take 1 tablet (40 mg) by mouth once daily.      ergocalciferol (Vitamin D-2)  1250 mcg (50,000 units) capsule Take 1 capsule (1.25 mg) by mouth 1 (one) time per week.  Qty: 12 capsule, Refills: 0    Associated Diagnoses: Vitamin D deficiency      furosemide (Lasix) 40 mg tablet Take 1 tablet (40 mg) by mouth once daily.  Qty: 30 tablet, Refills: 0    Associated Diagnoses: Heart failure, unspecified      gabapentin (Neurontin) 300 mg capsule Take 1 capsule (300 mg) by mouth 2 times a day.      insulin glargine (Lantus Solostar U-100 Insulin) 100 unit/mL (3 mL) pen Inject 42 Units under the skin 2 times a day. Take as directed per insulin instructions.  Qty: 30 mL, Refills: 0    Associated Diagnoses: Type 2 diabetes mellitus with stage 3a chronic kidney disease, with long-term current use of insulin (Multi)      insulin lispro (HumaLOG) 100 unit/mL pen Inject 0-15 Units under the skin 3 times a day. For blood sugar of 111-150, Give 1 unit. For 151-200, Give 3 units. For 201-250, Give 6 units. For 251-300, Give 9 units. Fot 301-350, Give 12 units. For 351-400, Give 15 units  Qty: 40.5 mL, Refills: 3    Associated Diagnoses: Type 2 diabetes mellitus with stage 3a chronic kidney disease, with long-term current use of insulin (Multi)      menthol-zinc oxide (Calmoseptine - Risamine) 0.44-20.6 % ointment Apply 1 Application topically 4 times a day as needed (moisture associated skin damage).    Associated Diagnoses: BMI 45.0-49.9, adult (Multi)      metoprolol succinate XL (Toprol-XL) 25 mg 24 hr tablet Take 1 tablet (25 mg) by mouth 2 times a day. Hold for SBP<110 or HR <70  Qty: 60 tablet, Refills: 11    Associated Diagnoses: Essential hypertension, benign      nystatin (Mycostatin) 100,000 unit/gram powder Apply 1 Application topically 2 times a day. Apply to neck, breast and groin fold as well as left abdomen  Qty: 60 g, Refills: 1    Associated Diagnoses: Intertrigo      oxygen (O2) gas therapy Inhale 1 each once every 24 hours.    Associated Diagnoses: Obesity hypoventilation syndrome (Multi)       pancrelipase, Lip-Prot-Amyl, (Creon) 6,000-19,000 -30,000 unit capsule Take 2 capsules by mouth 3 times a day before meals.           SURGICAL HISTORY     Surgical History[2]  ALLERGIES     Sulfa (sulfonamide antibiotics), Adhesive tape-silicones, Belladonna, Benzoin, Ciprofloxacin, Iodinated contrast media, and Tegaderm ag mesh [silver]  FAMILY HISTORY     Family History[3]  SOCIAL HISTORY     Social History[4]  PHYSICAL EXAM  (up to 7 for level 4, 8 or more for level 5)     ED Triage Vitals [07/24/25 2107]   Temperature Heart Rate Respirations BP   36.9 °C (98.5 °F) 80 20 166/82      Pulse Ox Temp Source Heart Rate Source Patient Position   95 % Oral Monitor --      BP Location FiO2 (%)     -- --       Physical Exam  Vitals and nursing note reviewed.   Constitutional:       General: She is not in acute distress.     Appearance: Normal appearance. She is not ill-appearing.   HENT:      Head: Normocephalic and atraumatic. No raccoon eyes, Alvarez's sign, contusion or laceration.      Jaw: No trismus or malocclusion.      Right Ear: External ear normal.      Left Ear: External ear normal.      Mouth/Throat:      Mouth: Mucous membranes are moist.      Pharynx: Oropharynx is clear.     Eyes:      Extraocular Movements: Extraocular movements intact.      Pupils: Pupils are equal, round, and reactive to light.     Neck:      Trachea: No tracheal deviation.     Cardiovascular:      Rate and Rhythm: Normal rate and regular rhythm.      Pulses: Normal pulses.   Pulmonary:      Effort: No respiratory distress.      Breath sounds: Decreased breath sounds present. No wheezing, rhonchi or rales.   Chest:      Chest wall: No tenderness.   Abdominal:      General: Abdomen is flat.      Palpations: Abdomen is soft. There is no mass.      Tenderness: There is no abdominal tenderness.     Musculoskeletal:         General: No tenderness or signs of injury.      Right lower leg: No edema.      Left lower leg: No edema.      Skin:     Coloration: Skin is not jaundiced or pale.      Findings: No petechiae, rash or wound.     Neurological:      Mental Status: She is alert.      Sensory: No sensory deficit.      Motor: No weakness.        DIAGNOSTIC RESULTS   LABS:  Labs Reviewed   CBC WITH AUTO DIFFERENTIAL - Abnormal       Result Value    WBC 13.0 (*)     nRBC 0.0      RBC 3.69 (*)     Hemoglobin 9.4 (*)     Hematocrit 31.1 (*)     MCV 84      MCH 25.5 (*)     MCHC 30.2 (*)     RDW 14.7 (*)     Platelets 239      Neutrophils % 77.5      Immature Granulocytes %, Automated 0.8      Lymphocytes % 14.3      Monocytes % 4.5      Eosinophils % 2.6      Basophils % 0.3      Neutrophils Absolute 10.10 (*)     Immature Granulocytes Absolute, Automated 0.10      Lymphocytes Absolute 1.86      Monocytes Absolute 0.58      Eosinophils Absolute 0.34      Basophils Absolute 0.04     COMPREHENSIVE METABOLIC PANEL - Abnormal    Glucose 184 (*)     Sodium 140      Potassium 3.9      Chloride 97 (*)     Bicarbonate 36 (*)     Anion Gap 11      Urea Nitrogen 24 (*)     Creatinine 1.14 (*)     eGFR 52 (*)     Calcium 9.4      Albumin 4.2      Alkaline Phosphatase 96      Total Protein 7.5      AST 10      Bilirubin, Total 0.7      ALT 7     PROTIME-INR - Abnormal    Protime 13.6 (*)     INR 1.2 (*)    B-TYPE NATRIURETIC PEPTIDE - Abnormal     (*)     Narrative:        <100 pg/mL - Heart failure unlikely  100-299 pg/mL - Intermediate probability of acute heart                  failure exacerbation. Correlate with clinical                  context and patient history.    >=300 pg/mL - Heart Failure likely. Correlate with clinical                  context and patient history.    BNP testing is performed using different testing methodology at [facility] than at other [facility]. Direct result comparisons should only be made within the same method.      URINALYSIS WITH REFLEX CULTURE AND MICROSCOPIC - Abnormal    Color, Urine  Light-Yellow      Appearance, Urine Clear      Specific Gravity, Urine 1.009      pH, Urine 5.5      Protein, Urine 10 (TRACE)      Glucose, Urine Normal      Blood, Urine 0.2 (2+) (*)     Ketones, Urine NEGATIVE      Bilirubin, Urine NEGATIVE      Urobilinogen, Urine Normal      Nitrite, Urine NEGATIVE      Leukocyte Esterase, Urine NEGATIVE     BLOOD GAS VENOUS FULL PANEL - Abnormal    POCT pH, Venous 7.33      POCT pCO2, Venous 68 (*)     POCT pO2, Venous 40      POCT SO2, Venous 63      POCT Oxy Hemoglobin, Venous 61.7      POCT Hematocrit Calculated, Venous 59.0 (*)     POCT Sodium, Venous 126 (*)     POCT Potassium, Venous 3.6      POCT Chloride, Venous 96 (*)     POCT Ionized Calicum, Venous 1.10      POCT Glucose, Venous 176 (*)     POCT Lactate, Venous 1.1      POCT Base Excess, Venous 6.3 (*)     POCT HCO3 Calculated, Venous 35.9 (*)     POCT Hemoglobin, Venous 19.5 (*)     POCT Anion Gap, Venous -2.0 (*)     Patient Temperature        FiO2 30     BASIC METABOLIC PANEL - Abnormal    Glucose 183 (*)     Sodium 140      Potassium 3.8      Chloride 97 (*)     Bicarbonate 35 (*)     Anion Gap 12      Urea Nitrogen 23      Creatinine 1.11 (*)     eGFR 54 (*)     Calcium 8.7     CBC - Abnormal    WBC 12.1 (*)     nRBC 0.0      RBC 3.25 (*)     Hemoglobin 8.2 (*)     Hematocrit 27.7 (*)     MCV 85      MCH 25.2 (*)     MCHC 29.6 (*)     RDW 14.6 (*)     Platelets 214     URINALYSIS MICROSCOPIC WITH REFLEX CULTURE - Abnormal    WBC, Urine NONE      RBC, Urine 6-10 (*)     Squamous Epithelial Cells, Urine 1-9 (SPARSE)      Mucus, Urine FEW      Hyaline Casts, Urine 1+ (*)    MRSA SURVEILLANCE FOR VANCOMYCIN DE-ESCALATION, PCR - Normal    MRSA PCR Not Detected      Narrative:     This assay is an FDA-approved in vitro diagnostic nucleic acid amplification test for the detection of methicillin-resistant Staphylococcus aureus (MRSA) DNA directly from nasal swabs in patients at risk for nasal colonization. MRSA NxG is  intended to aid in the prevention and control of MRSA infections in healthcare settings. This assay is NOT intended to diagnose, guide, or monitor treatment for MRSA infections, or provide results of susceptibility to methicillin. A negative result does not preclude MRSA nasal colonization. Test performance has not been evaluated in patients less than two years of age.   MAGNESIUM - Normal    Magnesium 2.31     SERIAL TROPONIN-INITIAL - Normal    Troponin I, High Sensitivity 9      Narrative:     Less than 99th percentile of normal range cutoff-  Female and children under 18 years old <14 ng/L; Male <21 ng/L: Negative  Repeat testing should be performed if clinically indicated.     Female and children under 18 years old 14-50 ng/L; Male 21-50 ng/L:  Consistent with possible cardiac damage and possible increased clinical   risk. Serial measurements may help to assess extent of myocardial damage.     >50 ng/L: Consistent with cardiac damage, increased clinical risk and  myocardial infarction. Serial measurements may help assess extent of   myocardial damage.      NOTE: Children less than 1 year old may have higher baseline troponin   levels and results should be interpreted in conjunction with the overall   clinical context.     NOTE: Troponin I testing is performed using a different   testing methodology at [facility] than at other   [facility]. Direct result comparisons should only   be made within the same method.   SARS-COV-2 AND INFLUENZA A/B PCR - Normal    Flu A Result Not Detected      Flu B Result Not Detected      Coronavirus 2019, PCR Not Detected      Narrative:     This assay is an FDA-cleared, in vitro diagnostic nucleic acid amplification test for the qualitative detection and differentiation of SARS CoV-2/ Influenza A/B from nasopharyngeal specimens collected from individuals with signs and symptoms of respiratory tract infections, and has been validated for use at [facility]. Negative results do not preclude COVID-19/ Influenza A/B infections and should not be used as the sole basis for diagnosis, treatment, or other management decisions. Testing for SARS CoV-2 is recommended only for patients who meet current clinical and/or epidemiological criteria defined by federal, state, or local public health directives.   SERIAL TROPONIN, 1 HOUR - Normal    Troponin I, High Sensitivity 7      Narrative:     Less than 99th percentile of normal range cutoff-  Female and children under 18 years old <14 ng/L; Male <21 ng/L: Negative  Repeat testing should be performed if clinically indicated.     Female and children under 18 years old 14-50 ng/L; Male 21-50 ng/L:  Consistent with possible cardiac damage and possible increased clinical   risk. Serial measurements may help to assess extent of myocardial damage.     >50 ng/L: Consistent with cardiac damage, increased clinical risk and  myocardial infarction. Serial measurements may help assess extent of   myocardial damage.      NOTE: Children less than 1 year old may have higher baseline troponin   levels and results should be interpreted in conjunction with the overall   clinical context.     NOTE: Troponin I testing is performed using a different   testing methodology at [facility] than at [facility]. Direct result comparisons should only   be made within the same method.   MAGNESIUM - Normal    Magnesium 2.06     BLOOD CULTURE   BLOOD CULTURE   LEGIONELLA ANTIGEN, URINE   STREPTOCOCCUS PNEUMONIAE ANTIGEN, URINE   TROPONIN SERIES- (INITIAL, 1 HR)    Narrative:     The following orders were created for panel order Troponin I Series, High Sensitivity (0, 1 HR).  Procedure                               Abnormality         Status                     ---------                               -----------         ------                     Troponin I, High Sensiti...[[id number]]  Normal              Final  "result               Troponin, High Sensitivi...[[id number]]  Normal              Final result                 Please view results for these tests on the individual orders.   URINALYSIS WITH REFLEX CULTURE AND MICROSCOPIC    Narrative:     The following orders were created for panel order Urinalysis with Reflex Culture and Microscopic.  Procedure                               Abnormality         Status                     ---------                               -----------         ------                     Urinalysis with Reflex C...[[id number]]  Abnormal            Final result               Extra Urine Gray Tube[[id number]]                            In process                   Please view results for these tests on the individual orders.   EXTRA URINE GRAY TUBE   PROCALCITONIN     All other labs were within normal range or not returned as of this dictation.  Imaging  XR chest 1 view   Final Result   Compared to 06/13/2025:        Cardiomegaly with increased interstitial prominence and vascular   congestion suggestive of worsening pulmonary edema.        Small left pleural effusion persists. Left lower lobe consolidation   is unchanged and may represent pneumonia, aspiration, or atelectasis.        MACRO:   None        Signed by: Phill Helton 7/24/2025 11:43 PM   Dictation workstation:   [id number]      NM Lung perfusion with spect    (Results Pending)      Procedure  Procedures  EMERGENCY DEPARTMENT COURSE/MDM:   Medical Decision Making    Vitals:    Vitals:    07/25/25 0245 07/25/25 0252 07/25/25 0400 07/25/25 0615   BP: 138/66      BP Location: Right arm      Patient Position: Lying      Pulse: 66  62    Resp: 17 14 18    Temp: 36.8 °C (98.2 °F)      TempSrc: Temporal      SpO2: 100% 98% 100% 99%   Weight: 140 kg (309 lb 4.9 oz)      Height: 1.676 m (5' 6\")        Kaylene Feliciano is a female 70 y.o. who presents to the ER for shortness of breath. On arrival the patients vital signs were: Afebrile, regular heart " rate, normotensive, regular respiration rate, normoxic on room air. History obtained from: patient and Son.  Given increase shortness of breath, facial swelling plan for cardiac workup.  No tachycardia, no hypoxia, do not suspect PE, D-dimer deferred.  No subcutaneous emphysema, no hypotension, no JVD, do not suspect Boer[place]ave or pericardial tamponade at this time.  Not hypertensive, no syncope, no chest pain, do not suspect aortic dissection.  No wheezes on exam, satting well on room air, COPD exacerbation is less likely, breathing treatments deferred.  ED Course as of 07/25/25 0640   Thu Jul 24, 2025 2150 Patient with several days of increasing shortness of breath.  She has required increasing her BiPAP to intermittent use during the day as well.  Son reports increasing facial edema.  No peripheral edema in the feet. [TL]   2216 Venous blood gas reveals chronic respiratory acidosis with compensatory metabolic alkalosis [TL]   2226 Coronavirus 2019, PCR: Not Detected [CB]   2226 Flu A Result: Not Detected [CB]   2226 Flu B Result: Not Detected [CB]   2226 Troponin I, High Sensitivity: 9  Not elevated doubt acs or myopericarditis [CB]   2226 BLOOD GAS VENOUS FULL PANEL(!)  CO2 chronically elevated, however greater than 1 month ago.  No significant acid-base derangement. [CB]   2227 CBC and Auto Differential(!)  Leukocytosis without left shift, redemonstration of chronic anemia, no acute thrombocytosis or thrombocytopenia [CB]   2227 Comprehensive Metabolic Panel(!)  Normoglycemic, no acute electrolyte or hepatorenal abnormality [CB]   2257 BNP very minimally elevated from baseline. [TL]   2306 Troponin I, High Sensitivity: 7  Not elevated up to 15 points relative to initial troponin, doubt doubt acs or myopericarditis [CB]   2307 Patient requesting BiPAP for respiratory effort, conversationally dyspneic, tachypneic, not hypoxic, will escalate to Ventimask [CB]   2311 Given leukocytosis, increased work of  breathing, chest x-ray concerning for left lower lobe pneumonia will provide Rocephin and azithromycin.  Will also provide 40 mg IV Lasix for treatment of pleural effusion, suspected exacerbation of congestive heart failure. [CB]   2355 XR chest 1 view  Cardiomegaly with increased interstitial prominence and vascular  congestion suggestive of worsening pulmonary edema.      Small left pleural effusion persists. Left lower lobe consolidation  is unchanged and may represent pneumonia, aspiration, or atelectasis. [CB]      ED Course User Index  [CB] Alvaro Starks DO  [TL] Christophe Snider DO         Diagnoses as of 07/25/25 0640   Acute on chronic congestive heart failure, unspecified heart failure type   Pneumonia of left lower lobe due to infectious organism     External Records Reviewed: I reviewed recent and relevant outside records including inpatient notes, outpatient records    Shared decision making for disposition  Patient and/or patient´s representative was counseled regarding labs, imaging, likely diagnosis. All questions were answered. Recommendation was made   for Admission given the need for further escalation of care to inpatient management. Patient agreed and was admitted in stable condition. Admitting team was notified of any pending labs or imaging to ensure continuity of care.     ED Medications administered this visit:    Medications   oxygen (O2) therapy (has no administration in time range)   budesonide (Pulmicort) 0.5 mg/2 mL nebulizer solution 0.5 mg (0.5 mg nebulization Given 7/25/25 0112)   cefTRIAXone (Rocephin) 2 g in dextrose (iso) IV 50 mL (has no administration in time range)   insulin lispro injection 0-10 Units (has no administration in time range)   glucagon (Glucagen) injection 1 mg (has no administration in time range)   dextrose 50 % injection 25 g (has no administration in time range)   glucagon (Glucagen) injection 1 mg (has no administration in time range)   dextrose 50 %  injection 12.5 g (has no administration in time range)   furosemide (Lasix) injection 40 mg (has no administration in time range)   acetaminophen (Tylenol) tablet 650 mg (has no administration in time range)     Or   acetaminophen (Tylenol) oral liquid 650 mg (has no administration in time range)     Or   acetaminophen (Tylenol) suppository 650 mg (has no administration in time range)   melatonin tablet 3 mg (has no administration in time range)   polyethylene glycol (Glycolax, Miralax) packet 17 g (has no administration in time range)   heparin (porcine) injection 7,500 Units (7,500 Units subcutaneous Given 7/25/25 0117)   oxygen (O2) therapy ( inhalation Rate Verify Medical Gas 7/25/25 0132)   ipratropium-albuteroL (Duo-Neb) 0.5-2.5 mg/3 mL nebulizer solution 3 mL (3 mL nebulization Not Given 7/25/25 0113)   ipratropium-albuteroL (Duo-Neb) 0.5-2.5 mg/3 mL nebulizer solution 3 mL (3 mL nebulization Given 7/25/25 0113)   doxycycline (Vibramycin) 100 mg in sodium chloride 0.9%  mL (0 mg intravenous Stopped 7/25/25 0230)   oxygen (O2) therapy ( inhalation Rate Verify Medical Gas 7/25/25 0615)   furosemide (Lasix) injection 40 mg (40 mg intravenous Given 7/24/25 2325)   cefTRIAXone (Rocephin) 2 g in dextrose (iso) IV 50 mL (0 g intravenous Stopped 7/25/25 0000)        Final Impression:   1. Acute on chronic congestive heart failure, unspecified heart failure type    2. Pneumonia of left lower lobe due to infectious organism          Please excuse any misspellings or unintended errors related to the Dragon speech recognition software used to dictate this note.    I reviewed the case with the attending ED physician. The attending ED physician agrees with the plan.        Alvaro Starks DO  Resident  07/24/25 3217         Alvaro Starks DO  Resident  07/24/25 4206         [1]   Past Medical History:  Diagnosis Date    Adnexal cyst     Right    Ambulatory dysfunction     Anxiety and depression     Bipolar  disorder     Chronic back pain     Chronic kidney disease, stage II (mild)     Chronic respiratory failure with hypoxia and hypercapnia     Diabetic peripheral neuropathy (Multi)     Diastolic heart failure     LVEF 55 to 60% 2025    Diverticulosis     Gastroesophageal reflux disease     Hepatic steatosis     Hepatomegaly     History of femur fracture     Right    Hyperlipidemia     Hypertension     Insulin dependent type 2 diabetes mellitus (Multi)     Iron deficiency anemia     Kidney stones     Long term current use of aspirin     Multiple lung nodules     Obesity hypoventilation syndrome (Multi)     Obesity, morbid, BMI 50 or higher (Multi)     Obstructive sleep apnea treated with BiPAP     Osteoarthritis     Osteoporosis     Pulmonary edema with congestive heart failure with preserved left ventricular function 2025    Pulmonary hypertension (Multi)     Renal cyst, right     Secondary pancreatic insufficiency (HHS-HCC)     Spinal stenosis     Vitamin D deficiency     Wheelchair dependence    [2]   Past Surgical History:  Procedure Laterality Date    ADENOIDECTOMY      BLADDER SUSPENSION      BREAST BIOPSY       SECTION, LOW TRANSVERSE      CHOLECYSTECTOMY      DEEP BRAIN STIMULATOR PLACEMENT      HYSTERECTOMY      KNEE SURGERY      LITHOTRIPSY      ORIF FEMUR FRACTURE Right     OVARIAN CYST REMOVAL      TONSILLECTOMY     [3]   Family History  Problem Relation Name Age of Onset    Diabetes Mother      Hypertension Mother      Thyroid cancer Mother      Heart attack Mother      Osteoporosis Mother      Stroke Father      Hypertension Father      Diabetes Brother      Hypertension Brother      Cancer Brother     [4]   Social History  Tobacco Use    Smoking status: Never    Smokeless tobacco: Never   Vaping Use    Vaping status: Never Used   Substance Use Topics    Alcohol use: Never    Drug use: Never        Alvaro Starks DO  Resident  [phone]

## 2025-07-25 NOTE — PROGRESS NOTES
"Speech-Language Pathology    SLP Adult Inpatient Speech-Language Pathology Clinical Swallow Evaluation    Patient Name: Kaylene Feliciano  MRN: [id number]  Today's Date: 7/25/2025   Time Calculation  Start Time: 1359  Stop Time: 1408  Time Calculation (min): 9 min         Current Problem:   1. Acute on chronic congestive heart failure, unspecified heart failure type        2. Pneumonia of left lower lobe due to infectious organism          Recommendations:      Solid Diet Recommendations : Regular (IDDSI Level 7)   Liquid Diet Recommendations: Thin (IDDSI Level 0)   Compensatory Swallowing Strategies: Upright 90 degrees as possible for all oral intake, Remain upright for 20-30 minutes after meals, Alternate solids and liquids, Small bites/sips, Eat/feed slowly     Medication Administration Recommendations: Whole, With Liquid      Assessment:  Consistencies Trialed: Consistencies Trialed: Thin (IDDSI Level 0) - Straw, Pureed/extremely thick (IDDSI Level 4), Regular (IDDSI Level 7)   Oral Motor: Within Functional Limits      Oral/Motor Assessment  Dentition: Adequate/Natural  Oral Motor: Within Functional Limits      Swallow Impression:  Patient presents with suspected functional oropharyngeal swallow upon completion of clinical swallow evaluation. Examination of oral mechanism was unremarkable. Patient \"passed\" [place] Swallow Protocol (3 oz water challenge). Patient tolerated PO trials of thin liquids via straw, pureed solids, and regular solids absent of overt s/s of aspiration. Per this assessment, patient is appropriate to continue baseline diet as medically indicated with standard swallow precautions. Although s/s of aspiration were not observed during the CSE, silent aspiration can only be ruled out with instrumental assessment, but is not highly suspected at this time. ST will follow for an additional visit to ensure diet tolerance with and without worsening respiratory status.    Prognosis: Good   Medical Staff Made " Aware: Yes     Baseline Assessment:  Baseline Assessment  Respiratory Status: Oxygen via nasal cannula  Patient Positioning: Partially/Semi Reclined     Relevant Imagin25 CXR IMPRESSION:  Compared to 2025:      Cardiomegaly with increased interstitial prominence and vascular  congestion suggestive of worsening pulmonary edema.      Small left pleural effusion persists. Left lower lobe consolidation  is unchanged and may represent pneumonia, aspiration, or atelectasis.    Subjective   Patient A&Ox4. Pleasant and cooperative for the evaluation.    Plan:  Plan  Inpatient/Swing Bed or Outpatient: Inpatient  Treatment/Interventions: Assess diet tolerance  SLP Plan: Skilled SLP  SLP Frequency: Follow-up visit only  Duration: 1 week  SLP Discharge Recommendations:  (SLP services not likely indicated post discharge)  Next Treatment Priority: diet silvio v MBSS  Discussed POC: Patient, Nursing  Discussed Risks/Benefits: Yes  Patient/Caregiver Agreeable: Yes  SLP - OK to Discharge: Yes    Skilled speech therapy for dysphagia treatment is warranted in order to provide training and instruction regarding the use of compensatory swallow strategies, oropharyngeal strengthening exercises, and pt/caregiver education in order to reduce risk of aspiration, dehydration and malnutrition. Patient will be discontinued from speech therapy when no further skilled needs are identified in this setting.      Goals: Established 24  Patient will:  Patient/Caregiver will demonstrate knowledge of taught compensatory strategies and dietary consistency recommendations to optimize swallow function without overt clinical signs and symptoms of aspiration or dysphagia  Complete a modified barium swallow study (MBSS) as warranted upon further assessment/discussion with medical team for objective assessment of oropharyngeal swallow function, to assess for aspiration, and to guide further recommendations and treatment plan.     General  Visit Information:  Pt. Admitted  7/24/25  Past medical history: Past Medical History Relevant to Rehab: 70 y.o. female presenting with past medical history of chronic respiratory failure on 4 L of oxygen history of obstructive sleep apnea on BiPAP hypertension diastolic congestive heart failure admitted to the hospital with increased shortness of breath coughing congestion for the past few days patient has been using her BiPAP during the day at home patient seen in emergency room chest x-ray consistent with pneumonia and acute pulmonary edema patient admitted to telemetry started on IV antibiotic and IV Lasix.    Living Environment: Home, Live with __ (spouse)           Reason for Referral: Patient was seen for a clinical swallow evaluation (CSE) to assess swallow function, determine least restrictive diet, determine if a modified barium swallow study is warranted and develop appropriate POC for the current setting.  [place] Diet: regular/thin   Current Diet : regular/thin   Prior to Session Communication: Bedside nurse     Pain:  Pain Assessment  Pain Assessment: 0-10  0-10 (Numeric) Pain Score: 0 - No pain     Treatment Completed with evaluation:   No    Education:  Learner:  Patient  Barriers to Learning: None  Method: Verbal  Education - Topic: ST provided patient education regarding role of ST, purpose of assessment, clinical impressions, goals of treatment, and plan of care. Patient verbalized full comprehension, consistent with cognitive status. Education will be reinforced. ST further coordinated with RN regarding recommendations and precautions per this assessment, with RN verbalizing understanding.  Outcome:  Verbalized understanding and agreement, Meets goals/outcomes

## 2025-07-25 NOTE — ASSESSMENT & PLAN NOTE
- CXR: left lower lobe consolidation that is unchanged and may represent pneumonia, aspiration or atelectasis  - Given IV azithromycin/Rocephin in the ER.  Continue Rocephin, but azithromycin has been switched to doxycycline due to interaction with patient's home dose of citalopram  - Pulmicort nebulizer twice daily, DuoNeb Q6H scheduled and Q4H PRN for sob/wheezing  - Blood cultures x 2, procalcitonin, urine antigens, MRSA swab  - Monitor WBC on daily CBC

## 2025-07-25 NOTE — PROGRESS NOTES
Occupational Therapy                 Therapy Communication Note    Patient Name: Kaylene Feliciano  MRN: [id number]  Department: [facility]  Room: 2116/2116-A  Today's Date: 7/25/2025     Discipline: Occupational Therapy    Missed Visit:   Yes    Missed Visit Reason:  OT orders received and chart reviewed. Spoke to pt. States she declines to attempt EOB today. States at home she will sit EOB when she feels well and is a niko lift to  for 2-3 years. Will attempt again as able.    Missed Time: Attempt    Comment:

## 2025-07-25 NOTE — NURSING NOTE
Attempted to call report to 3N nurse twice (1400, 1440) but was unsuccessful. 3N nurse will return call when available.

## 2025-07-25 NOTE — DISCHARGE INSTR - AVS FIRST PAGE
Heart Failure Discharge Instructions    First 10 minutes of your day:  1. Check yourself for any subtle change of symptoms (slight increase in swelling, slight shortness of breath, a new intolerance to lying flat, a new cough). If present, be sure to call healthy at home or visiting physicians right away - do not wait.  2. Next, check your blood pressure and heart rate and record (after sitting a full 5 minutes). Then take your morning meds.    Rest of the Day:  3. Check blood pressure and heart rate a few hours later and record on your calendars. If heart rate higher than 100 despite morning meds, notify healthy at home or visiting physicians.  4. Follow a low sodium diet. No more than 700 mg per meal (or 2000 mg per day).  5. Limit total fluids to no more than 8 cups (or 2 liters) per day - this includes all fluids (water, coffee, juice, milk, tea, etc.), and no less than 6 cups per day.  6. Stay as active as you can tolerate.   7. No NSAIDS (Ibuprofen, Motrin, Aleve, Celebrex, etc) as these can worsen heart failure!    Important Follow-Up:  8. Be sure to set up visiting physicians to begin your care.  9. Have your heart rate and blood pressure readings available to review with healthy at home and visiting physicians.  10. Be sure review and take any new prescriptions as directed.  Be sure to obtain refills for all new cardiac medicines at your follow-up appointment with visiting physicians.  11. If you have any questions or concerns, feel free to call Marcella Brower, heart failure navigator at [phone].

## 2025-07-25 NOTE — PROGRESS NOTES
Physical Therapy                 Therapy Communication Note    Patient Name: Kaylene Feliciano  MRN: [id number]  Department: [facility]  Room: 2116/2116-A  Today's Date: 7/25/2025     Discipline: Physical Therapy    Missed Visit: Yes    Missed Visit Reason: Orders received and chart review completed. Attempted to evaluate pt at 1026 and pt politely refusing therapy this date 2/2 difficulty breathing yesterday and now being fatigued. Will continue to follow up with pt.    Missed Time: Attempt    Comment: Pt reports she lives at home with her spouse who will be returning home on Monday from the [facility]. First floor set up at home. Pt has been bed bound for ~2 years and utilizes niko lift to get into w/c. Uses bedpan and receives assist for all ADLs and IADLs from family. Pt reports interest in therapy and notes she is able to sit EOB.

## 2025-07-25 NOTE — PROGRESS NOTES
07/25/25 1609   Discharge Planning   Living Arrangements Spouse/significant other   Support Systems Spouse/significant other;Family members;Children   Assistance Needed patient is bedbound, uses a niko lift as needed.   Type of Residence Private residence   Number of Stairs to Enter Residence 0   Number of Stairs Within Residence 0   Do you have animals or pets at home? No   Who is requesting discharge planning? Provider   Home or Post Acute Services In home services   Type of Home Care Services Home health aide;Home OT;Home PT   Expected Discharge Disposition Home H   Does the patient need discharge transport arranged? Yes   [facility] [employer] coordination needed? Yes   Has discharge transport been arranged? No   Financial Resource Strain   How hard is it for you to pay for the very basics like food, housing, medical care, and heating? Not hard   Housing Stability   In the last 12 months, was there a time when you were not able to pay the mortgage or rent on time? N   In the past 12 months, how many times have you moved where you were living? 0   At any time in the past 12 months, were you homeless or living in a shelter (including now)? N   Transportation Needs   In the past 12 months, has lack of transportation kept you from medical appointments or from getting medications? no   In the past 12 months, has lack of transportation kept you from meetings, work, or from getting things needed for daily living? No   Patient Choice   Provider Choice list and CMS website (https://medicare.gov/care-compare#search) for post-acute Quality and Resource Measure Data were provided and reviewed with: Patient   Patient / Family choosing to utilize agency / facility established prior to hospitalization No   Stroke Family Assessment   Stroke Family Assessment Needed No   Intensity of Service   Intensity of Service 0-30 min     Met with patient at bedside. Introduced self and role in hospital.  Verified address, insurance and PCP is  Kathryn Bingham [facility].   Pharmacy is [facility] and also [facility], has no issues obtaining medications and she is able to manage her own medications.   Patient is bed bound, sometimes uses a wheelchair, no driving, will order groceries and children pick them up and states that she is able to dress and bathe self most of the time.  is main caregiver but has been in rehab so family has gotten a home aide to help patient out at home. Private duty care giver comes out MWF from 10-2 to assist patient as needed and then family is available to help the other days.   Patient is asking about HHC to be able to come out to give some therapy and would like [facility] due to having some issues with other agencies. Will request attending place an internal referral for home care.   CT team to follow patient for discharge needs.

## 2025-07-25 NOTE — ASSESSMENT & PLAN NOTE
-Shortness of breath and increased BiPAP requirements are likely secondary to worsening pulmonary edema in the setting of heart failure and left lower lobe pneumonia  - Continue BiPAP nightly and intermittently as needed for the time being. Goal to wean down patient to baseline of 4L O2 via nasal cannula and only nightly biPAP  - Will obtain VQ scan to rule out PE as patient has limited mobility and is largely bed bound   - Continuous pulse oximetry  - Continue breathing treatments

## 2025-07-25 NOTE — NURSING NOTE
1745 Pt arrived to 3N on 6L, pt weaned back down to 4L with spo2 95%. Pt denies SOB at this time. Pt sts that she needs to stay on her right side d/t feeling increased sob on right side. VSS. Pt sts that she has not ambulated since February of this year. Call light w/I reach.

## 2025-07-25 NOTE — PROGRESS NOTES
Advanced Practice Admission Note    History Of Present Illness  Kaylene Feliciano is a 70 y.o. female with past medical history significant for HTN, HLD, HFpEF (EF 55 to 60% 6/2025), pulmonary hypertension, chronic respiratory failure with hypoxia and hypercapnia (4L O2 at baseline and nightly BiPAP), severe obstructive sleep apnea treated with BiPAP, asthma, IDDM 2 with peripheral neuropathy, CKD 3a, bipolar disorder, anxiety, depression, history of deep brain stimulator insertion, iron deficiency anemia, and osteoarthritis, presenting to [facility] on 7/24/2025 from home with complaints of shortness of breath.    Patient reports she has been having worsening shortness of breath over the last 2 to 3 days to the point where she has been needing to wear her BiPAP during the day.  She has been unable to lay flat or on her right side without feeling significantly short of breath.  Also states that shortness of breath is exacerbated with minimal activity or movement.  She believes her symptoms were related to taking ibuprofen at home, but when shortness of breath did not improve she was brought to the ER for further evaluation.  Patient has been compliant with all of her medications, including daily Lasix.  And when asked about weight gain she reports she may have gained some weight but is unsure as to how much. She denies any worsening leg swelling. She also reports recent fever, chills, weakness, and lightheadedness which started at same time as SOB. She denies any recent headaches, vision changes, dizziness, chest pain, palpitations, abdominal pain, nausea, vomiting, diarrhea, constipation, dysuria, pneumaturia.    ED Course:  Vital Signs: Temp 98.5 F, HR bpm, RR 20, /82, SpO2 95% on supplemental O2 via nasal cannula  CMP: Glucose 184, Cl- 97, bicarb 36, BUN 24, creat 1.14, EGFR 52  Troponin: 9, 7  BNP: 383  CBC w/diff: WBC 13.0, neutrophils 10.10.  H&H 9.4/31.1, MCH 25.5, MCHC 30.2, RDW 14.7 -findings consistent with  iron deficiency anemia.  Influenza A/B and COVID-19 negative  VBG: pH 7.33, PCO2 68, PO2 40, bicarb 35.9.  #.  Chronic respiratory failure with hypoxia and hypercapnia however CO2 is slightly elevated to 68 from prior of 53 during last admission.  UA: Pending collection  EKG: Rhythm with first-degree AV block, ventricular rate 78 bpm,  ms, QTc 451 ms.  No acute ST elevation or depression noted.  CXR: Cardiomegaly with increased interstitial prominence and vascular congestion suggestive of worsening pulmonary edema.  Small left pleural effusion persists.  Left lower lobe consolidation is unchanged and may represent pneumonia versus atelectasis versus aspiration  Medications Given: Lasix 40 mg IV x 1, IV Rocephin, IV azithromycin  Disposition: Patient is being admitted for acute on chronic heart failure exacerbation, acute on chronic respiratory failure with hypoxia and hypercapnia, and left lower lobe pneumonia    Past Medical History  Past Medical History:   Diagnosis Date    Adnexal cyst     Right    Ambulatory dysfunction     Anxiety and depression     Bipolar disorder     Chronic back pain     Chronic kidney disease, stage II (mild)     Chronic respiratory failure with hypoxia and hypercapnia     Diabetic peripheral neuropathy (Multi)     Diastolic heart failure     LVEF 55 to 60% June 2025    Diverticulosis     Gastroesophageal reflux disease     Hepatic steatosis     Hepatomegaly     History of femur fracture     Right    Hyperlipidemia     Hypertension     Insulin dependent type 2 diabetes mellitus (Multi)     Iron deficiency anemia     Kidney stones     Long term current use of aspirin     Multiple lung nodules     Obesity hypoventilation syndrome (Multi)     Obesity, morbid, BMI 50 or higher (Multi)     Obstructive sleep apnea treated with BiPAP     Osteoarthritis     Osteoporosis     Pulmonary edema with congestive heart failure with preserved left ventricular function 06/14/2025    Pulmonary  hypertension (Multi)     Renal cyst, right     Secondary pancreatic insufficiency (HHS-HCC)     Spinal stenosis     Vitamin D deficiency     Wheelchair dependence        Surgical History  Past Surgical History:   Procedure Laterality Date    ADENOIDECTOMY      BLADDER SUSPENSION      BREAST BIOPSY       SECTION, LOW TRANSVERSE      CHOLECYSTECTOMY      DEEP BRAIN STIMULATOR PLACEMENT      HYSTERECTOMY      KNEE SURGERY      LITHOTRIPSY      ORIF FEMUR FRACTURE Right     OVARIAN CYST REMOVAL      TONSILLECTOMY          Social History  She reports that she has never smoked. She has never used smokeless tobacco. She reports that she does not drink alcohol and does not use drugs.    Family History  Family History   Problem Relation Name Age of Onset    Diabetes Mother      Hypertension Mother      Thyroid cancer Mother      Heart attack Mother      Osteoporosis Mother      Stroke Father      Hypertension Father      Diabetes Brother      Hypertension Brother      Cancer Brother       Allergies  Sulfa (sulfonamide antibiotics), Adhesive tape-silicones, Belladonna, Benzoin, Ciprofloxacin, Iodinated contrast media, and Tegaderm ag mesh [silver]    Review of Systems   Constitutional:  Positive for chills, fatigue and fever.   HENT:  Negative for hearing loss, sore throat and trouble swallowing.    Eyes:  Negative for visual disturbance.   Respiratory:  Positive for shortness of breath (Orthopnea and LENTZ). Negative for cough and chest tightness.    Cardiovascular:  Negative for chest pain and palpitations.   Gastrointestinal:  Negative for abdominal pain, constipation, diarrhea, nausea and vomiting.   Genitourinary:  Negative for dysuria, flank pain, frequency, hematuria and urgency.   Musculoskeletal:  Negative for back pain and gait problem.   Skin:  Negative for color change, rash and wound.   Neurological:  Positive for weakness and light-headedness. Negative for dizziness and syncope.   Psychiatric/Behavioral:   "Negative for agitation, confusion and dysphoric mood. The patient is not nervous/anxious.         Physical Exam  Vitals reviewed.   Constitutional:       General: She is not in acute distress.     Appearance: She is obese. She is ill-appearing.   HENT:      Head: Normocephalic and atraumatic.      Right Ear: External ear normal.      Left Ear: External ear normal.      Nose: Nose normal.      Mouth/Throat:      Mouth: Mucous membranes are moist.     Eyes:      General: Lids are normal. Vision grossly intact.       Cardiovascular:      Rate and Rhythm: Normal rate and regular rhythm.      Pulses:           Radial pulses are 2+ on the right side and 2+ on the left side.        Dorsalis pedis pulses are 2+ on the right side and 2+ on the left side.      Heart sounds: S1 normal and S2 normal.   Pulmonary:      Effort: No respiratory distress.      Breath sounds: Decreased breath sounds present. No wheezing, rhonchi or rales.   Abdominal:      General: Bowel sounds are normal. There is no distension.      Palpations: Abdomen is soft.      Tenderness: There is no abdominal tenderness.     Musculoskeletal:      Right lower le+ Pitting Edema present.      Left lower le+ Pitting Edema present.     Skin:     General: Skin is warm and dry.      Capillary Refill: Capillary refill takes less than 2 seconds.     Neurological:      Mental Status: She is alert and oriented to person, place, and time. Mental status is at baseline.      Motor: Weakness (generalized) present.     Psychiatric:         Attention and Perception: Attention normal.         Mood and Affect: Mood normal.         Speech: Speech normal.         Behavior: Behavior normal. Behavior is cooperative.       Last Recorded Vitals  Visit Vitals  /67   Pulse 65   Temp 36.9 °C (98.5 °F) (Oral)   Resp 16   Ht 1.626 m (5' 4\")   Wt 125 kg (275 lb)   SpO2 100%   BMI 47.20 kg/m²   OB Status Postmenopausal   Smoking Status Never   BSA 2.38 m²        Relevant " Results  Results for orders placed or performed during the hospital encounter of 07/24/25 (from the past 24 hours)   CBC and Auto Differential   Result Value Ref Range    WBC 13.0 (H) 4.4 - 11.3 x10*3/uL    nRBC 0.0 0.0 - 0.0 /100 WBCs    RBC 3.69 (L) 4.00 - 5.20 x10*6/uL    Hemoglobin 9.4 (L) 12.0 - 16.0 g/dL    Hematocrit 31.1 (L) 36.0 - 46.0 %    MCV 84 80 - 100 fL    MCH 25.5 (L) 26.0 - 34.0 pg    MCHC 30.2 (L) 32.0 - 36.0 g/dL    RDW 14.7 (H) 11.5 - 14.5 %    Platelets 239 150 - 450 x10*3/uL    Neutrophils % 77.5 40.0 - 80.0 %    Immature Granulocytes %, Automated 0.8 0.0 - 0.9 %    Lymphocytes % 14.3 13.0 - 44.0 %    Monocytes % 4.5 2.0 - 10.0 %    Eosinophils % 2.6 0.0 - 6.0 %    Basophils % 0.3 0.0 - 2.0 %    Neutrophils Absolute 10.10 (H) 1.20 - 7.70 x10*3/uL    Immature Granulocytes Absolute, Automated 0.10 0.00 - 0.70 x10*3/uL    Lymphocytes Absolute 1.86 1.20 - 4.80 x10*3/uL    Monocytes Absolute 0.58 0.10 - 1.00 x10*3/uL    Eosinophils Absolute 0.34 0.00 - 0.70 x10*3/uL    Basophils Absolute 0.04 0.00 - 0.10 x10*3/uL   Comprehensive Metabolic Panel   Result Value Ref Range    Glucose 184 (H) 74 - 99 mg/dL    Sodium 140 136 - 145 mmol/L    Potassium 3.9 3.5 - 5.3 mmol/L    Chloride 97 (L) 98 - 107 mmol/L    Bicarbonate 36 (H) 21 - 32 mmol/L    Anion Gap 11 10 - 20 mmol/L    Urea Nitrogen 24 (H) 6 - 23 mg/dL    Creatinine 1.14 (H) 0.50 - 1.05 mg/dL    eGFR 52 (L) >60 mL/min/1.73m*2    Calcium 9.4 8.6 - 10.3 mg/dL    Albumin 4.2 3.4 - 5.0 g/dL    Alkaline Phosphatase 96 33 - 136 U/L    Total Protein 7.5 6.4 - 8.2 g/dL    AST 10 9 - 39 U/L    Bilirubin, Total 0.7 0.0 - 1.2 mg/dL    ALT 7 7 - 45 U/L   Magnesium   Result Value Ref Range    Magnesium 2.31 1.60 - 2.40 mg/dL   Protime-INR   Result Value Ref Range    Protime 13.6 (H) 9.8 - 12.4 seconds    INR 1.2 (H) 0.9 - 1.1   B-type natriuretic peptide   Result Value Ref Range     (H) 0 - 99 pg/mL   Troponin I, High Sensitivity, Initial   Result Value  Ref Range    Troponin I, High Sensitivity 9 0 - 13 ng/L   Sars-CoV-2 and Influenza A/B PCR   Result Value Ref Range    Flu A Result Not Detected Not Detected    Flu B Result Not Detected Not Detected    Coronavirus 2019, PCR Not Detected Not Detected   BLOOD GAS VENOUS FULL PANEL   Result Value Ref Range    POCT pH, Venous 7.33 7.33 - 7.43 pH    POCT pCO2, Venous 68 (H) 41 - 51 mm Hg    POCT pO2, Venous 40 35 - 45 mm Hg    POCT SO2, Venous 63 45 - 75 %    POCT Oxy Hemoglobin, Venous 61.7 45.0 - 75.0 %    POCT Hematocrit Calculated, Venous 59.0 (H) 36.0 - 46.0 %    POCT Sodium, Venous 126 (L) 136 - 145 mmol/L    POCT Potassium, Venous 3.6 3.5 - 5.3 mmol/L    POCT Chloride, Venous 96 (L) 98 - 107 mmol/L    POCT Ionized Calicum, Venous 1.10 1.10 - 1.33 mmol/L    POCT Glucose, Venous 176 (H) 74 - 99 mg/dL    POCT Lactate, Venous 1.1 0.4 - 2.0 mmol/L    POCT Base Excess, Venous 6.3 (H) -2.0 - 3.0 mmol/L    POCT HCO3 Calculated, Venous 35.9 (H) 22.0 - 26.0 mmol/L    POCT Hemoglobin, Venous 19.5 (H) 12.0 - 16.0 g/dL    POCT Anion Gap, Venous -2.0 (L) 10.0 - 25.0 mmol/L    Patient Temperature      FiO2 30 %   Troponin, High Sensitivity, 1 Hour   Result Value Ref Range    Troponin I, High Sensitivity 7 0 - 13 ng/L   MRSA Surveillance for Vancomycin De-escalation, PCR    Specimen: Anterior Nares; Swab   Result Value Ref Range    MRSA PCR Not Detected Not Detected   Urinalysis with Reflex Culture and Microscopic   Result Value Ref Range    Color, Urine Light-Yellow Light-Yellow, Yellow, Dark-Yellow    Appearance, Urine Clear Clear    Specific Gravity, Urine 1.009 1.005 - 1.035    pH, Urine 5.5 5.0, 5.5, 6.0, 6.5, 7.0, 7.5, 8.0    Protein, Urine 10 (TRACE) NEGATIVE, 10 (TRACE), 20 (TRACE) mg/dL    Glucose, Urine Normal Normal mg/dL    Blood, Urine 0.2 (2+) (A) NEGATIVE mg/dL    Ketones, Urine NEGATIVE NEGATIVE mg/dL    Bilirubin, Urine NEGATIVE NEGATIVE mg/dL    Urobilinogen, Urine Normal Normal mg/dL    Nitrite, Urine NEGATIVE  NEGATIVE    Leukocyte Esterase, Urine NEGATIVE NEGATIVE   Urinalysis Microscopic   Result Value Ref Range    WBC, Urine NONE 1-5, NONE /HPF    RBC, Urine 6-10 (A) NONE, 1-2, 3-5 /HPF    Squamous Epithelial Cells, Urine 1-9 (SPARSE) Reference range not established. /HPF    Mucus, Urine FEW Reference range not established. /LPF    Hyaline Casts, Urine 1+ (A) NONE /LPF        Home Medications  Prior to Admission medications   Medication Sig Start Date End Date Taking? Authorizing Provider   albuterol 2.5 mg /3 mL (0.083 %) nebulizer solution Take 3 mL (2.5 mg) by nebulization every 4 hours if needed for shortness of breath or wheezing.    Historical Provider, MD   amLODIPine (Norvasc) 5 mg tablet Take 2 tablets (10 mg) by mouth once daily. 7/8/24   Cesia Vizcarra MD   apple cider vinegar 300 mg tablet Take 1 capsule by mouth once daily.    Historical Provider, MD   aspirin 81 mg chewable tablet Chew 1 tablet (81 mg) once daily.    Historical Provider, MD   atorvastatin (Lipitor) 10 mg tablet Take 1 tablet (10 mg) by mouth once daily at bedtime.    Historical Provider, MD   citalopram (CeleXA) 40 mg tablet Take 1 tablet (40 mg) by mouth once daily.    Historical Provider, MD   ergocalciferol (Vitamin D-2) 1250 mcg (50,000 units) capsule Take 1 capsule (1.25 mg) by mouth 1 (one) time per week. 7/3/25 10/1/25  MELQUIADES Lucero-CNS   furosemide (Lasix) 40 mg tablet Take 1 tablet (40 mg) by mouth once daily. 6/18/25   Genny Holly APRN-CNP   gabapentin (Neurontin) 300 mg capsule Take 1 capsule (300 mg) by mouth 2 times a day.    Historical Provider, MD   ibuprofen 800 mg tablet Take 1 tablet (800 mg) by mouth 3 times a day for 5 days. 7/15/25 7/20/25  MELQUIADES Lucero-CNS   insulin glargine (Lantus Solostar U-100 Insulin) 100 unit/mL (3 mL) pen Inject 42 Units under the skin 2 times a day. Take as directed per insulin instructions. 7/3/25   Kathryn L Hitchcock, APRN-CNS   insulin lispro (HumaLOG) 100 unit/mL pen  Inject 0-15 Units under the skin 3 times a day. For blood sugar of 111-150, Give 1 unit. For 151-200, Give 3 units. For 201-250, Give 6 units. For 251-300, Give 9 units. Fot 301-350, Give 12 units. For 351-400, Give 15 units 7/2/25 7/2/26  MELQIUADES Lucero-CNS   menthol-zinc oxide (Calmoseptine - Risamine) 0.44-20.6 % ointment Apply 1 Application topically 4 times a day as needed (moisture associated skin damage). 7/8/24   Cesia Vizcarra MD   metoprolol succinate XL (Toprol-XL) 25 mg 24 hr tablet Take 1 tablet (25 mg) by mouth 2 times a day. Hold for SBP<110 or HR <70 7/3/25 7/3/26  RYENA Lucero   nystatin (Mycostatin) 100,000 unit/gram powder Apply 1 Application topically 2 times a day. Apply to neck, breast and groin fold as well as left abdomen 6/18/25   MELQUIADES Varner-CNP   oxygen (O2) gas therapy Inhale 1 each once every 24 hours. 7/8/24   Cesia Vizcarra MD   pancrelipase, Lip-Prot-Amyl, (Creon) 6,000-19,000 -30,000 unit capsule Take 2 capsules by mouth 3 times a day before meals.    Historical Provider, MD       Medications  Scheduled medications  Scheduled Medications[1]  Continuous medications  Continuous Medications[2]  PRN medications  acetaminophen, 650 mg, q6h PRN   Or  acetaminophen, 650 mg, q6h PRN   Or  acetaminophen, 650 mg, q6h PRN  dextrose, 12.5 g, q15 min PRN  dextrose, 25 g, q15 min PRN  glucagon, 1 mg, q15 min PRN  glucagon, 1 mg, q15 min PRN  ipratropium-albuteroL, 3 mL, q4h PRN  melatonin, 3 mg, Nightly PRN  oxygen, , Continuous - O2/gases  oxygen, , Continuous - O2/gases  polyethylene glycol, 17 g, Daily PRN        Imaging  Imaging  XR chest 1 view  Result Date: 7/24/2025  Compared to 06/13/2025:   Cardiomegaly with increased interstitial prominence and vascular congestion suggestive of worsening pulmonary edema.   Small left pleural effusion persists. Left lower lobe consolidation is unchanged and may represent pneumonia, aspiration, or atelectasis.   MACRO:  None   Signed by: Phill Helton 7/24/2025 11:43 PM Dictation workstation:   [id number]      Cardiology, Vascular, and Other Imaging  No other imaging results found for the past 7 days         Assessment/Plan   Assessment & Plan  Pulmonary edema with congestive heart failure with preserved left ventricular function  - CXR: Cardiomegaly with increased interstitial prominence and vascular congestion suggestive of worsening pulmonary edema.  BNP up to 383, previously 266 in 6/2025  - Last echocardiogram done 6/14/2025: EF 55-60%, normal right ventricular global systolic function. Will hold off on ordering repeat echo at this time  - Continue lasix 40mg IV BID  - Telemetry monitoring  - Q4H vital signs  - Defer cardiology consult to attending per preference   Acute on chronic respiratory failure with hypoxia and hypercapnia  Shortness of breath  -Shortness of breath and increased BiPAP requirements are likely secondary to worsening pulmonary edema in the setting of heart failure and left lower lobe pneumonia  - Continue BiPAP nightly and intermittently as needed for the time being. Goal to wean down patient to baseline of 4L O2 via nasal cannula and only nightly biPAP  - Will obtain VQ scan to rule out PE as patient has limited mobility and is largely bed bound   - Continuous pulse oximetry  - Continue breathing treatments  Pneumonia of left lower lobe due to infectious organism  Neutrophilic leukocytosis  - CXR: left lower lobe consolidation that is unchanged and may represent pneumonia, aspiration or atelectasis  - Given IV azithromycin/Rocephin in the ER.  Continue Rocephin, but azithromycin has been switched to doxycycline due to interaction with patient's home dose of citalopram  - Pulmicort nebulizer twice daily, DuoNeb Q6H scheduled and Q4H PRN for sob/wheezing  - Blood cultures x 2, procalcitonin, urine antigens, MRSA swab  - Monitor WBC on daily CBC   CKD stage 3a, GFR 45-59 ml/min (Multi)  - Cr 1.14, recent  baseline between 1.12-1.20  - Patient is receiving IV diuresis, therefore renal function will need to be carefully monitored on daily BMP  Type 2 diabetes mellitus, with long-term current use of insulin  - Sliding scale insulin #2  - PRN hypoglycemia treatment      VTE prophylaxis:   DVT prophylaxis: subcutaneous Heparin and SCDs    Code Status  Full code      Plan to resume home medications as appropriate when list is available from pharmacy, see orders for additional plan elements, management deferred to attending and consult physicians.     I spent a total of 75 minutes on the date of the service which included preparing to see the patient, face-to-face patient care, completing clinical documentation, obtaining and/or reviewing separately obtained history, performing a medically appropriate examination, counseling and educating the patient/family/caregiver, ordering medications, tests, or procedures, communicating with other HCPs (not separately reported), independently interpreting results (not separately reported), communicating results to the patient/family/caregiver, and care coordination (not separately reported).      Kayla Mcclelland [facility] [phone]  Attending physician : ANDREW Mccloud MD     This note has been transcribed using Dragon voice recognition system and there is a possibility of unintentional typing misprints.  Any information found to be copied from previous providers is done in the best interest of the patient to provide accurate, quality, and continuity of care.            [1] budesonide, 0.5 mg, nebulization, BID  cefTRIAXone, 2 g, intravenous, q24h  doxycycline, 100 mg, intravenous, q12h  furosemide, 40 mg, intravenous, BID  heparin (porcine), 7,500 Units, subcutaneous, q8h YARELI  insulin lispro, 0-10 Units, subcutaneous, Before meals & nightly  ipratropium-albuteroL, 3 mL, nebulization, q6h     [2]

## 2025-07-25 NOTE — CARE PLAN
The patient's goals for the shift include      The clinical goals for the shift include Patient will remain HDS throughout shift    Over the shift, the patient did make progress toward the following goals.

## 2025-07-25 NOTE — DISCHARGE INSTR - DIET
Strict Low sodium diet - no more than 700 mg per meal (2000 mg per day).  No more than 8 cups (2 liters) or less than 6 cups fluids daily.

## 2025-07-25 NOTE — ASSESSMENT & PLAN NOTE
- Cr 1.14, recent baseline between 1.12-1.20  - Patient is receiving IV diuresis, therefore renal function will need to be carefully monitored on daily BMP

## 2025-07-25 NOTE — H&P
History Of Present Illness  Kaylene Feliciano is a 70 y.o. female presenting with past medical history of chronic respiratory failure on 4 L of oxygen history of obstructive sleep apnea on BiPAP hypertension diastolic congestive heart failure admitted to the hospital with increased shortness of breath coughing congestion for the past few days patient has been using her BiPAP during the day at home patient seen in emergency room chest x-ray consistent with pneumonia and acute pulmonary edema patient admitted to telemetry started on IV antibiotic and IV Lasix.     Past Medical History  She has a past medical history of Adnexal cyst, Ambulatory dysfunction, Anxiety and depression, Bipolar disorder, Chronic back pain, Chronic kidney disease, stage II (mild), Chronic respiratory failure with hypoxia and hypercapnia, Diabetic peripheral neuropathy (Multi), Diastolic heart failure, Diverticulosis, Gastroesophageal reflux disease, Hepatic steatosis, Hepatomegaly, History of femur fracture, Hyperlipidemia, Hypertension, Insulin dependent type 2 diabetes mellitus (Multi), Iron deficiency anemia, Kidney stones, Long term current use of aspirin, Multiple lung nodules, Obesity hypoventilation syndrome (Multi), Obesity, morbid, BMI 50 or higher (Multi), Obstructive sleep apnea treated with BiPAP, Osteoarthritis, Osteoporosis, Pulmonary edema with congestive heart failure with preserved left ventricular function (2025), Pulmonary hypertension (Multi), Renal cyst, right, Secondary pancreatic insufficiency (HHS-HCC), Spinal stenosis, Vitamin D deficiency, and Wheelchair dependence.    Surgical History  She has a past surgical history that includes Lithotripsy; Hysterectomy; Cholecystectomy; Breast biopsy;  section, low transverse; Deep brain stimulator placement; ORIF femur fracture (Right); Ovarian cyst removal; Knee surgery; Tonsillectomy; Adenoidectomy; and Bladder suspension.     Social History  She reports that she has  never smoked. She has never used smokeless tobacco. She reports that she does not drink alcohol and does not use drugs.    Family History  Family History[1]     Allergies  Sulfa (sulfonamide antibiotics), Adhesive tape-silicones, Belladonna, Benzoin, Ciprofloxacin, Iodinated contrast media, and Tegaderm ag mesh [silver]    Review of Systems   Constitutional:  Negative for diaphoresis and fatigue.   HENT:  Negative for ear pain, facial swelling, tinnitus and trouble swallowing.    Eyes:  Negative for photophobia and visual disturbance.   Respiratory: Positive shortness of breath cardiovascular:  Negative for chest pain and palpitations.   Gastrointestinal:  Negative for abdominal pain, blood in stool and diarrhea.   Endocrine: Negative for cold intolerance, heat intolerance, polydipsia and polyuria.   Musculoskeletal:  Negative for back pain and joint swelling.   Skin:  Negative for color change and rash.   Allergic/Immunologic: Negative for food allergies.   Neurological:  Negative for tremors, facial asymmetry and weakness.   Psychiatric/Behavioral:  The patient is not hyperactive.       Physical Exam  HENT:      Right Ear: External ear normal.      Left Ear: External ear normal.      Mouth/Throat:      Mouth: Mucous membranes are moist.   Cardiovascular:      Rate and Rhythm: Normal rate and regular rhythm.      Heart sounds: No murmur heard.     No friction rub. No gallop.   Pulmonary:      Effort: No accessory muscle usage or respiratory distress.      Breath sounds: No stridor. No wheezing or rhonchi.   Chest:      Chest wall: No tenderness.   Abdominal:      General: There is no distension.      Palpations: There is no mass.      Tenderness: There is no abdominal tenderness. There is no guarding or rebound.   Musculoskeletal:         General: No deformity or signs of injury.      Cervical back: No rigidity or tenderness. Normal range of motion.      Right lower leg: No edema.      Left lower leg: No edema.  "  Skin:     Coloration: Skin is not jaundiced or pale.      Findings: No lesion.   Neurological:      General: No focal deficit present.      Mental Status: He is alert, oriented to person, place, and time and easily aroused.      Cranial Nerves: No cranial nerve deficit.      Sensory: No sensory deficit.      Motor: No weakness.        Last Recorded Vitals  Blood pressure 134/58, pulse 62, temperature 36 °C (96.8 °F), temperature source Temporal, resp. rate 18, height 1.676 m (5' 6\"), weight 140 kg (309 lb 4.9 oz), SpO2 97%.    Labs    Admission on 07/24/2025   Component Date Value Ref Range Status    WBC 07/24/2025 13.0 (H)  4.4 - 11.3 x10*3/uL Final    nRBC 07/24/2025 0.0  0.0 - 0.0 /100 WBCs Final    RBC 07/24/2025 3.69 (L)  4.00 - 5.20 x10*6/uL Final    Hemoglobin 07/24/2025 9.4 (L)  12.0 - 16.0 g/dL Final    Hematocrit 07/24/2025 31.1 (L)  36.0 - 46.0 % Final    MCV 07/24/2025 84  80 - 100 fL Final    MCH 07/24/2025 25.5 (L)  26.0 - 34.0 pg Final    MCHC 07/24/2025 30.2 (L)  32.0 - 36.0 g/dL Final    RDW 07/24/2025 14.7 (H)  11.5 - 14.5 % Final    Platelets 07/24/2025 239  150 - 450 x10*3/uL Final    Neutrophils % 07/24/2025 77.5  40.0 - 80.0 % Final    Immature Granulocytes %, Automated 07/24/2025 0.8  0.0 - 0.9 % Final    Immature Granulocyte Count (IG) includes promyelocytes, myelocytes and metamyelocytes but does not include bands. Percent differential counts (%) should be interpreted in the context of the absolute cell counts (cells/UL).    Lymphocytes % 07/24/2025 14.3  13.0 - 44.0 % Final    Monocytes % 07/24/2025 4.5  2.0 - 10.0 % Final    Eosinophils % 07/24/2025 2.6  0.0 - 6.0 % Final    Basophils % 07/24/2025 0.3  0.0 - 2.0 % Final    Neutrophils Absolute 07/24/2025 10.10 (H)  1.20 - 7.70 x10*3/uL Final    Percent differential counts (%) should be interpreted in the context of the absolute cell counts (cells/uL).    Immature Granulocytes Absolute, Au* 07/24/2025 0.10  0.00 - 0.70 x10*3/uL Final    " Lymphocytes Absolute 07/24/2025 1.86  1.20 - 4.80 x10*3/uL Final    Monocytes Absolute 07/24/2025 0.58  0.10 - 1.00 x10*3/uL Final    Eosinophils Absolute 07/24/2025 0.34  0.00 - 0.70 x10*3/uL Final    Basophils Absolute 07/24/2025 0.04  0.00 - 0.10 x10*3/uL Final    Glucose 07/24/2025 184 (H)  74 - 99 mg/dL Final    Sodium 07/24/2025 140  136 - 145 mmol/L Final    Potassium 07/24/2025 3.9  3.5 - 5.3 mmol/L Final    Chloride 07/24/2025 97 (L)  98 - 107 mmol/L Final    Bicarbonate 07/24/2025 36 (H)  21 - 32 mmol/L Final    Anion Gap 07/24/2025 11  10 - 20 mmol/L Final    Urea Nitrogen 07/24/2025 24 (H)  6 - 23 mg/dL Final    Creatinine 07/24/2025 1.14 (H)  0.50 - 1.05 mg/dL Final    eGFR 07/24/2025 52 (L)  >60 mL/min/1.73m*2 Final    Calculations of estimated GFR are performed using the 2021 CKD-EPI Study Refit equation without the race variable for the IDMS-Traceable creatinine methods.  https://jasn.asnjournals.org/content/early/2021/09/22/ASN.3369016645    Calcium 07/24/2025 9.4  8.6 - 10.3 mg/dL Final    Albumin 07/24/2025 4.2  3.4 - 5.0 g/dL Final    Alkaline Phosphatase 07/24/2025 96  33 - 136 U/L Final    Total Protein 07/24/2025 7.5  6.4 - 8.2 g/dL Final    AST 07/24/2025 10  9 - 39 U/L Final    Bilirubin, Total 07/24/2025 0.7  0.0 - 1.2 mg/dL Final    ALT 07/24/2025 7  7 - 45 U/L Final    Patients treated with Sulfasalazine may generate falsely decreased results for ALT.    Magnesium 07/24/2025 2.31  1.60 - 2.40 mg/dL Final    Protime 07/24/2025 13.6 (H)  9.8 - 12.4 seconds Final    INR 07/24/2025 1.2 (H)  0.9 - 1.1 Final    BNP 07/24/2025 383 (H)  0 - 99 pg/mL Final    Troponin I, High Sensitivity 07/24/2025 9  0 - 13 ng/L Final    Flu A Result 07/24/2025 Not Detected  Not Detected Final    Flu B Result 07/24/2025 Not Detected  Not Detected Final    Coronavirus 2019, PCR 07/24/2025 Not Detected  Not Detected Final    Color, Urine 07/25/2025 Light-Yellow  Light-Yellow, Yellow, Dark-Yellow Final     Appearance, Urine 07/25/2025 Clear  Clear Final    Specific Gravity, Urine 07/25/2025 1.009  1.005 - 1.035 Final    pH, Urine 07/25/2025 5.5  5.0, 5.5, 6.0, 6.5, 7.0, 7.5, 8.0 Final    Protein, Urine 07/25/2025 10 (TRACE)  NEGATIVE, 10 (TRACE), 20 (TRACE) mg/dL Final    Glucose, Urine 07/25/2025 Normal  Normal mg/dL Final    Blood, Urine 07/25/2025 0.2 (2+) (A)  NEGATIVE mg/dL Final    Ketones, Urine 07/25/2025 NEGATIVE  NEGATIVE mg/dL Final    Bilirubin, Urine 07/25/2025 NEGATIVE  NEGATIVE mg/dL Final    Urobilinogen, Urine 07/25/2025 Normal  Normal mg/dL Final    Nitrite, Urine 07/25/2025 NEGATIVE  NEGATIVE Final    Leukocyte Esterase, Urine 07/25/2025 NEGATIVE  NEGATIVE Final    Troponin I, High Sensitivity 07/24/2025 7  0 - 13 ng/L Final    POCT pH, Venous 07/24/2025 7.33  7.33 - 7.43 pH Final    POCT pCO2, Venous 07/24/2025 68 (H)  41 - 51 mm Hg Final    POCT pO2, Venous 07/24/2025 40  35 - 45 mm Hg Final    POCT SO2, Venous 07/24/2025 63  45 - 75 % Final    POCT Oxy Hemoglobin, Venous 07/24/2025 61.7  45.0 - 75.0 % Final    POCT Hematocrit Calculated, Venous 07/24/2025 59.0 (H)  36.0 - 46.0 % Final    POCT Sodium, Venous 07/24/2025 126 (L)  136 - 145 mmol/L Final    POCT Potassium, Venous 07/24/2025 3.6  3.5 - 5.3 mmol/L Final    POCT Chloride, Venous 07/24/2025 96 (L)  98 - 107 mmol/L Final    POCT Ionized Calicum, Venous 07/24/2025 1.10  1.10 - 1.33 mmol/L Final    POCT Glucose, Venous 07/24/2025 176 (H)  74 - 99 mg/dL Final    POCT Lactate, Venous 07/24/2025 1.1  0.4 - 2.0 mmol/L Final    POCT Base Excess, Venous 07/24/2025 6.3 (H)  -2.0 - 3.0 mmol/L Final    POCT HCO3 Calculated, Venous 07/24/2025 35.9 (H)  22.0 - 26.0 mmol/L Final    POCT Hemoglobin, Venous 07/24/2025 19.5 (H)  12.0 - 16.0 g/dL Final    POCT Anion Gap, Venous 07/24/2025 -2.0 (L)  10.0 - 25.0 mmol/L Final    Patient Temperature 07/24/2025    Final    NOTE: Patient Results are Not Corrected for Temperature    FiO2 07/24/2025 30  % Final     MRSA PCR 07/25/2025 Not Detected  Not Detected Final    Glucose 07/25/2025 183 (H)  74 - 99 mg/dL Final    Sodium 07/25/2025 140  136 - 145 mmol/L Final    Potassium 07/25/2025 3.8  3.5 - 5.3 mmol/L Final    Chloride 07/25/2025 97 (L)  98 - 107 mmol/L Final    Bicarbonate 07/25/2025 35 (H)  21 - 32 mmol/L Final    Anion Gap 07/25/2025 12  10 - 20 mmol/L Final    Urea Nitrogen 07/25/2025 23  6 - 23 mg/dL Final    Creatinine 07/25/2025 1.11 (H)  0.50 - 1.05 mg/dL Final    eGFR 07/25/2025 54 (L)  >60 mL/min/1.73m*2 Final    Calculations of estimated GFR are performed using the 2021 CKD-EPI Study Refit equation without the race variable for the IDMS-Traceable creatinine methods.  https://jasn.asnjournals.org/content/early/2021/09/22/ASN.4585881481    Calcium 07/25/2025 8.7  8.6 - 10.3 mg/dL Final    WBC 07/25/2025 12.1 (H)  4.4 - 11.3 x10*3/uL Final    nRBC 07/25/2025 0.0  0.0 - 0.0 /100 WBCs Final    RBC 07/25/2025 3.25 (L)  4.00 - 5.20 x10*6/uL Final    Hemoglobin 07/25/2025 8.2 (L)  12.0 - 16.0 g/dL Final    Hematocrit 07/25/2025 27.7 (L)  36.0 - 46.0 % Final    MCV 07/25/2025 85  80 - 100 fL Final    MCH 07/25/2025 25.2 (L)  26.0 - 34.0 pg Final    MCHC 07/25/2025 29.6 (L)  32.0 - 36.0 g/dL Final    RDW 07/25/2025 14.6 (H)  11.5 - 14.5 % Final    Platelets 07/25/2025 214  150 - 450 x10*3/uL Final    Magnesium 07/25/2025 2.06  1.60 - 2.40 mg/dL Final    WBC, Urine 07/25/2025 NONE  1-5, NONE /HPF Final    RBC, Urine 07/25/2025 6-10 (A)  NONE, 1-2, 3-5 /HPF Final    Squamous Epithelial Cells, Urine 07/25/2025 1-9 (SPARSE)  Reference range not established. /HPF Final    Mucus, Urine 07/25/2025 FEW  Reference range not established. /LPF Final    Hyaline Casts, Urine 07/25/2025 1+ (A)  NONE /LPF Final    POCT Glucose 07/25/2025 142 (H)  74 - 99 mg/dL Final         Imaging     XR chest 1 view  Narrative: Interpreted By:  Phill Helton,   STUDY:  XR CHEST 1 VIEW;  7/24/2025 11:05 pm      INDICATION:  Signs/Symptoms:Chest  Pain.      COMPARISON:  CXR 06/13/2025      ACCESSION NUMBER(S):  [id number]      ORDERING CLINICIAN:  FAHAD OLIVO      FINDINGS:  DBS generators in the upper chest, similar to prior.      Similar cardiomegaly.      Interval increase in interstitial prominence and vascular congestion.  Left lower lobe consolidation persists. Similar small left pleural  effusion. No large pneumothoraces.      No acute osseous abnormalities. No acute findings in the upper  abdomen.      Impression: Compared to 06/13/2025:      Cardiomegaly with increased interstitial prominence and vascular  congestion suggestive of worsening pulmonary edema.      Small left pleural effusion persists. Left lower lobe consolidation  is unchanged and may represent pneumonia, aspiration, or atelectasis.      MACRO:  None      Signed by: Phill Helton 7/24/2025 11:43 PM  Dictation workstation:   [id number]       Problem List[2]      Assessment/Plan   Assessment & Plan  Pulmonary edema with congestive heart failure with preserved left ventricular function    Acute on chronic respiratory failure with hypoxia and hypercapnia  Shortness of breath    Pneumonia of left lower lobe due to infectious organism  Neutrophilic leukocytosis  -  CKD stage 3a, GFR 45-59 ml/min (Multi)    Type 2 diabetes mellitus, with long-term current use of insulin      Acute pulmonary edema related to acute diastolic congestive heart failure continue with Lasix monitor symptoms acute respiratory failure requiring continuous BiPAP  Will wean BiPAP for today try to get her back to her 4 L of oxygen  Pneumonia of the left lower lobe continue with antibiotic  Diabetes keep patient on sliding scale  Chronic kidney disease stage III monitor       I spent 55 minutes in the professional and overall care of this patient.      ANDREW Mccloud MD         [1]   Family History  Problem Relation Name Age of Onset    Diabetes Mother      Hypertension Mother      Thyroid cancer Mother      Heart  attack Mother      Osteoporosis Mother      Stroke Father      Hypertension Father      Diabetes Brother      Hypertension Brother      Cancer Brother     [2]   Patient Active Problem List  Diagnosis    Essential hypertension, benign    Morbid obesity (Multi)    Type 2 diabetes mellitus with stage 3a chronic kidney disease, with long-term current use of insulin (Multi)    Pancreatic insufficiency (HHS-HCC)    Spinal stenosis of lumbar region with neurogenic claudication    Vitamin D deficiency    Shortness of breath    Acute on chronic respiratory failure with hypoxia and hypercapnia    Neutrophilic leukocytosis    Skin candidiasis    Iron deficiency anemia    Pulmonary edema with congestive heart failure with preserved left ventricular function    Type 2 diabetes mellitus with autonomic neuropathy    Hypertensive heart disease with congestive heart failure with preserved left ventricular function    CKD stage 3a, GFR 45-59 ml/min (Multi)    Gastroesophageal reflux disease without esophagitis    Bipolar disorder, in full remission, most recent episode depressed (Multi)    Hiatal hernia without gangrene and obstruction    Pneumonia of left lower lobe due to infectious organism    Type 2 diabetes mellitus, with long-term current use of insulin

## 2025-07-25 NOTE — ASSESSMENT & PLAN NOTE
- CXR: Cardiomegaly with increased interstitial prominence and vascular congestion suggestive of worsening pulmonary edema.  BNP up to 383, previously 266 in 6/2025  - Last echocardiogram done 6/14/2025: EF 55-60%, normal right ventricular global systolic function. Will hold off on ordering repeat echo at this time  - Continue lasix 40mg IV BID  - Telemetry monitoring  - Q4H vital signs  - Defer cardiology consult to attending per preference

## 2025-07-26 LAB
ANION GAP SERPL CALC-SCNC: 11 MMOL/L (ref 10–20)
BUN SERPL-MCNC: 23 MG/DL (ref 6–23)
CALCIUM SERPL-MCNC: 8.6 MG/DL (ref 8.6–10.3)
CHLORIDE SERPL-SCNC: 95 MMOL/L (ref 98–107)
CO2 SERPL-SCNC: 39 MMOL/L (ref 21–32)
CREAT SERPL-MCNC: 1.12 MG/DL (ref 0.5–1.05)
EGFRCR SERPLBLD CKD-EPI 2021: 53 ML/MIN/1.73M*2
ERYTHROCYTE [DISTWIDTH] IN BLOOD BY AUTOMATED COUNT: 14.7 % (ref 11.5–14.5)
GLUCOSE BLD MANUAL STRIP-MCNC: 100 MG/DL (ref 74–99)
GLUCOSE BLD MANUAL STRIP-MCNC: 149 MG/DL (ref 74–99)
GLUCOSE BLD MANUAL STRIP-MCNC: 199 MG/DL (ref 74–99)
GLUCOSE BLD MANUAL STRIP-MCNC: 208 MG/DL (ref 74–99)
GLUCOSE SERPL-MCNC: 114 MG/DL (ref 74–99)
HCT VFR BLD AUTO: 28.1 % (ref 36–46)
HGB BLD-MCNC: 8.5 G/DL (ref 12–16)
MAGNESIUM SERPL-MCNC: 1.92 MG/DL (ref 1.6–2.4)
MCH RBC QN AUTO: 25.4 PG (ref 26–34)
MCHC RBC AUTO-ENTMCNC: 30.2 G/DL (ref 32–36)
MCV RBC AUTO: 84 FL (ref 80–100)
NRBC BLD-RTO: 0 /100 WBCS (ref 0–0)
PLATELET # BLD AUTO: 213 X10*3/UL (ref 150–450)
POTASSIUM SERPL-SCNC: 3.5 MMOL/L (ref 3.5–5.3)
RBC # BLD AUTO: 3.34 X10*6/UL (ref 4–5.2)
SODIUM SERPL-SCNC: 141 MMOL/L (ref 136–145)
WBC # BLD AUTO: 10.5 X10*3/UL (ref 4.4–11.3)

## 2025-07-26 PROCEDURE — 94660 CPAP INITIATION&MGMT: CPT

## 2025-07-26 PROCEDURE — 2500000002 HC RX 250 W HCPCS SELF ADMINISTERED DRUGS (ALT 637 FOR MEDICARE OP, ALT 636 FOR OP/ED): Performed by: INTERNAL MEDICINE

## 2025-07-26 PROCEDURE — 80048 BASIC METABOLIC PNL TOTAL CA: CPT

## 2025-07-26 PROCEDURE — 2500000005 HC RX 250 GENERAL PHARMACY W/O HCPCS: Performed by: INTERNAL MEDICINE

## 2025-07-26 PROCEDURE — 2500000004 HC RX 250 GENERAL PHARMACY W/ HCPCS (ALT 636 FOR OP/ED)

## 2025-07-26 PROCEDURE — 2500000004 HC RX 250 GENERAL PHARMACY W/ HCPCS (ALT 636 FOR OP/ED): Performed by: NURSE PRACTITIONER

## 2025-07-26 PROCEDURE — 2500000001 HC RX 250 WO HCPCS SELF ADMINISTERED DRUGS (ALT 637 FOR MEDICARE OP): Performed by: NURSE PRACTITIONER

## 2025-07-26 PROCEDURE — 1200000002 HC GENERAL ROOM WITH TELEMETRY DAILY

## 2025-07-26 PROCEDURE — 83735 ASSAY OF MAGNESIUM: CPT

## 2025-07-26 PROCEDURE — 2500000002 HC RX 250 W HCPCS SELF ADMINISTERED DRUGS (ALT 637 FOR MEDICARE OP, ALT 636 FOR OP/ED): Performed by: NURSE PRACTITIONER

## 2025-07-26 PROCEDURE — 94640 AIRWAY INHALATION TREATMENT: CPT

## 2025-07-26 PROCEDURE — 2500000002 HC RX 250 W HCPCS SELF ADMINISTERED DRUGS (ALT 637 FOR MEDICARE OP, ALT 636 FOR OP/ED)

## 2025-07-26 PROCEDURE — 36415 COLL VENOUS BLD VENIPUNCTURE: CPT

## 2025-07-26 PROCEDURE — 85027 COMPLETE CBC AUTOMATED: CPT

## 2025-07-26 PROCEDURE — 82947 ASSAY GLUCOSE BLOOD QUANT: CPT

## 2025-07-26 RX ORDER — POTASSIUM CHLORIDE 20 MEQ/1
20 TABLET, EXTENDED RELEASE ORAL ONCE
Status: COMPLETED | OUTPATIENT
Start: 2025-07-26 | End: 2025-07-26

## 2025-07-26 RX ORDER — ACETAMINOPHEN 325 MG/1
975 TABLET ORAL EVERY 8 HOURS PRN
Status: DISCONTINUED | OUTPATIENT
Start: 2025-07-26 | End: 2025-07-28 | Stop reason: HOSPADM

## 2025-07-26 RX ADMIN — CEFTRIAXONE 2 G: 2 INJECTION, SOLUTION INTRAVENOUS at 01:33

## 2025-07-26 RX ADMIN — HEPARIN SODIUM 7500 UNITS: 5000 INJECTION INTRAVENOUS; SUBCUTANEOUS at 06:09

## 2025-07-26 RX ADMIN — INSULIN LISPRO 4 UNITS: 100 INJECTION, SOLUTION INTRAVENOUS; SUBCUTANEOUS at 17:31

## 2025-07-26 RX ADMIN — ASPIRIN 81 MG CHEWABLE TABLET 81 MG: 81 TABLET CHEWABLE at 10:00

## 2025-07-26 RX ADMIN — INSULIN GLARGINE 42 UNITS: 100 INJECTION, SOLUTION SUBCUTANEOUS at 10:00

## 2025-07-26 RX ADMIN — IPRATROPIUM BROMIDE AND ALBUTEROL SULFATE 3 ML: 2.5; .5 SOLUTION RESPIRATORY (INHALATION) at 16:24

## 2025-07-26 RX ADMIN — FUROSEMIDE 40 MG: 10 INJECTION, SOLUTION INTRAMUSCULAR; INTRAVENOUS at 10:00

## 2025-07-26 RX ADMIN — DOXYCYCLINE HYCLATE 100 MG: 100 CAPSULE ORAL at 21:19

## 2025-07-26 RX ADMIN — ATORVASTATIN CALCIUM 10 MG: 10 TABLET, FILM COATED ORAL at 21:20

## 2025-07-26 RX ADMIN — PANCRELIPASE 1 CAPSULE: 60000; 12000; 38000 CAPSULE, DELAYED RELEASE PELLETS ORAL at 17:31

## 2025-07-26 RX ADMIN — INSULIN GLARGINE 42 UNITS: 100 INJECTION, SOLUTION SUBCUTANEOUS at 21:19

## 2025-07-26 RX ADMIN — IPRATROPIUM BROMIDE AND ALBUTEROL SULFATE 3 ML: 2.5; .5 SOLUTION RESPIRATORY (INHALATION) at 11:16

## 2025-07-26 RX ADMIN — AMLODIPINE BESYLATE 10 MG: 10 TABLET ORAL at 10:00

## 2025-07-26 RX ADMIN — ACETAMINOPHEN 975 MG: 325 TABLET ORAL at 21:20

## 2025-07-26 RX ADMIN — CEFTRIAXONE 2 G: 2 INJECTION, SOLUTION INTRAVENOUS at 23:01

## 2025-07-26 RX ADMIN — BUDESONIDE 0.5 MG: 0.5 INHALANT RESPIRATORY (INHALATION) at 20:14

## 2025-07-26 RX ADMIN — FUROSEMIDE 40 MG: 10 INJECTION, SOLUTION INTRAMUSCULAR; INTRAVENOUS at 21:19

## 2025-07-26 RX ADMIN — Medication 1 DOSE: at 22:20

## 2025-07-26 RX ADMIN — Medication: at 16:24

## 2025-07-26 RX ADMIN — METOPROLOL SUCCINATE 25 MG: 25 TABLET, EXTENDED RELEASE ORAL at 10:00

## 2025-07-26 RX ADMIN — IPRATROPIUM BROMIDE AND ALBUTEROL SULFATE 3 ML: 2.5; .5 SOLUTION RESPIRATORY (INHALATION) at 20:14

## 2025-07-26 RX ADMIN — GABAPENTIN 300 MG: 300 CAPSULE ORAL at 10:00

## 2025-07-26 RX ADMIN — CITALOPRAM HYDROBROMIDE 40 MG: 20 TABLET ORAL at 10:00

## 2025-07-26 RX ADMIN — DOXYCYCLINE HYCLATE 100 MG: 100 CAPSULE ORAL at 10:00

## 2025-07-26 RX ADMIN — PANCRELIPASE 1 CAPSULE: 60000; 12000; 38000 CAPSULE, DELAYED RELEASE PELLETS ORAL at 10:03

## 2025-07-26 RX ADMIN — Medication: at 02:10

## 2025-07-26 RX ADMIN — INSULIN LISPRO 2 UNITS: 100 INJECTION, SOLUTION INTRAVENOUS; SUBCUTANEOUS at 21:18

## 2025-07-26 RX ADMIN — HEPARIN SODIUM 7500 UNITS: 5000 INJECTION INTRAVENOUS; SUBCUTANEOUS at 21:19

## 2025-07-26 RX ADMIN — HEPARIN SODIUM 7500 UNITS: 5000 INJECTION INTRAVENOUS; SUBCUTANEOUS at 15:00

## 2025-07-26 RX ADMIN — Medication: at 20:14

## 2025-07-26 RX ADMIN — Medication: at 14:00

## 2025-07-26 RX ADMIN — METOPROLOL SUCCINATE 25 MG: 25 TABLET, EXTENDED RELEASE ORAL at 21:20

## 2025-07-26 RX ADMIN — GABAPENTIN 300 MG: 300 CAPSULE ORAL at 21:19

## 2025-07-26 RX ADMIN — PANCRELIPASE 1 CAPSULE: 60000; 12000; 38000 CAPSULE, DELAYED RELEASE PELLETS ORAL at 06:09

## 2025-07-26 RX ADMIN — Medication: at 05:40

## 2025-07-26 RX ADMIN — POTASSIUM CHLORIDE 20 MEQ: 1500 TABLET, EXTENDED RELEASE ORAL at 10:00

## 2025-07-26 ASSESSMENT — PAIN SCALES - GENERAL
PAINLEVEL_OUTOF10: 0 - NO PAIN
PAINLEVEL_OUTOF10: 0 - NO PAIN

## 2025-07-26 ASSESSMENT — PAIN - FUNCTIONAL ASSESSMENT
PAIN_FUNCTIONAL_ASSESSMENT: 0-10
PAIN_FUNCTIONAL_ASSESSMENT: 0-10

## 2025-07-26 NOTE — CARE PLAN
The patient's goals for the shift include      The clinical goals for the shift include see POC      Problem: Skin  Goal: Decreased wound size/increased tissue granulation at next dressing change  Flowsheets (Taken 7/26/2025 0032)  Decreased wound size/increased tissue granulation at next dressing change: Promote sleep for wound healing

## 2025-07-26 NOTE — NURSING NOTE
Pt alert and oriented x4, pt pleasant. Pt denies pain. Pt BIPAP while asleep, pt on 4L while awake NC. Pt VSS. No acute issues on RN shift. Pt ax2 turns.

## 2025-07-26 NOTE — CARE PLAN
Problem: Heart Failure  Goal: Improved gas exchange this shift  Outcome: Progressing  Goal: Improved urinary output this shift  Outcome: Progressing  Goal: Reduction in peripheral edema within 24 hours  Outcome: Progressing  Goal: Report improvement of dyspnea/breathlessness this shift  Outcome: Progressing  Goal: Weight from fluid excess reduced over 2-3 days, then stabilize  Outcome: Progressing  Goal: Increase self care and/or family involvement in 24 hours  Outcome: Progressing     Problem: Pain - Adult  Goal: Verbalizes/displays adequate comfort level or baseline comfort level  Outcome: Progressing     Problem: Safety - Adult  Goal: Free from fall injury  Outcome: Progressing     Problem: Discharge Planning  Goal: Discharge to home or other facility with appropriate resources  Outcome: Progressing     Problem: Chronic Conditions and Co-morbidities  Goal: Patient's chronic conditions and co-morbidity symptoms are monitored and maintained or improved  Outcome: Progressing     Problem: Nutrition  Goal: Nutrient intake appropriate for maintaining nutritional needs  Outcome: Progressing     Problem: Skin  Goal: Decreased wound size/increased tissue granulation at next dressing change  Outcome: Progressing  Goal: Participates in plan/prevention/treatment measures  Outcome: Progressing  Goal: Prevent/manage excess moisture  Outcome: Progressing  Goal: Prevent/minimize sheer/friction injuries  Outcome: Progressing  Goal: Promote/optimize nutrition  Outcome: Progressing  Goal: Promote skin healing  Outcome: Progressing   The patient's goals for the shift include      The clinical goals for the shift include see POC

## 2025-07-26 NOTE — PROGRESS NOTES
"Subjective  Patient had uneventful night does not complain about any chest pain  patient back to 4 L of oxygen  Nursing staff was interviewed  Objectives    Last Recorded Vitals  Blood pressure 134/63, pulse 56, temperature 36.5 °C (97.7 °F), temperature source Temporal, resp. rate 19, height 1.676 m (5' 6\"), weight 139 kg (305 lb 12.5 oz), SpO2 96%.    Physical Exam  HENT:      Right Ear: External ear normal.      Left Ear: External ear normal.      Mouth/Throat:      Mouth: Mucous membranes are moist.   Cardiovascular:      Rate and Rhythm: Normal rate and regular rhythm.      Heart sounds: No murmur heard.     No friction rub. No gallop.   Pulmonary:      Effort: No accessory muscle usage or respiratory distress.      Breath sounds: No stridor. No wheezing or rhonchi.   Chest:      Chest wall: No tenderness.   Abdominal:      General: There is no distension.      Palpations: There is no mass.      Tenderness: There is no abdominal tenderness. There is no guarding or rebound.   Musculoskeletal:         General: No deformity or signs of injury.      Cervical back: No rigidity or tenderness. Normal range of motion.      Right lower leg: No edema.      Left lower leg: No edema.   Skin:     Coloration: Skin is not jaundiced or pale.      Findings: No lesion.   Neurological:      General: No focal deficit present.      Mental Status: He is alert, oriented to person, place, and time and easily aroused.      Cranial Nerves: No cranial nerve deficit.      Sensory: No sensory deficit.      Motor: No weakness.                 Labs    Admission on 07/24/2025   Component Date Value Ref Range Status    WBC 07/24/2025 13.0 (H)  4.4 - 11.3 x10*3/uL Final    nRBC 07/24/2025 0.0  0.0 - 0.0 /100 WBCs Final    RBC 07/24/2025 3.69 (L)  4.00 - 5.20 x10*6/uL Final    Hemoglobin 07/24/2025 9.4 (L)  12.0 - 16.0 g/dL Final    Hematocrit 07/24/2025 31.1 (L)  36.0 - 46.0 % Final    MCV 07/24/2025 84  80 - 100 fL Final    MCH 07/24/2025 25.5 " (L)  26.0 - 34.0 pg Final    MCHC 07/24/2025 30.2 (L)  32.0 - 36.0 g/dL Final    RDW 07/24/2025 14.7 (H)  11.5 - 14.5 % Final    Platelets 07/24/2025 239  150 - 450 x10*3/uL Final    Neutrophils % 07/24/2025 77.5  40.0 - 80.0 % Final    Immature Granulocytes %, Automated 07/24/2025 0.8  0.0 - 0.9 % Final    Immature Granulocyte Count (IG) includes promyelocytes, myelocytes and metamyelocytes but does not include bands. Percent differential counts (%) should be interpreted in the context of the absolute cell counts (cells/UL).    Lymphocytes % 07/24/2025 14.3  13.0 - 44.0 % Final    Monocytes % 07/24/2025 4.5  2.0 - 10.0 % Final    Eosinophils % 07/24/2025 2.6  0.0 - 6.0 % Final    Basophils % 07/24/2025 0.3  0.0 - 2.0 % Final    Neutrophils Absolute 07/24/2025 10.10 (H)  1.20 - 7.70 x10*3/uL Final    Percent differential counts (%) should be interpreted in the context of the absolute cell counts (cells/uL).    Immature Granulocytes Absolute, Au* 07/24/2025 0.10  0.00 - 0.70 x10*3/uL Final    Lymphocytes Absolute 07/24/2025 1.86  1.20 - 4.80 x10*3/uL Final    Monocytes Absolute 07/24/2025 0.58  0.10 - 1.00 x10*3/uL Final    Eosinophils Absolute 07/24/2025 0.34  0.00 - 0.70 x10*3/uL Final    Basophils Absolute 07/24/2025 0.04  0.00 - 0.10 x10*3/uL Final    Glucose 07/24/2025 184 (H)  74 - 99 mg/dL Final    Sodium 07/24/2025 140  136 - 145 mmol/L Final    Potassium 07/24/2025 3.9  3.5 - 5.3 mmol/L Final    Chloride 07/24/2025 97 (L)  98 - 107 mmol/L Final    Bicarbonate 07/24/2025 36 (H)  21 - 32 mmol/L Final    Anion Gap 07/24/2025 11  10 - 20 mmol/L Final    Urea Nitrogen 07/24/2025 24 (H)  6 - 23 mg/dL Final    Creatinine 07/24/2025 1.14 (H)  0.50 - 1.05 mg/dL Final    eGFR 07/24/2025 52 (L)  >60 mL/min/1.73m*2 Final    Calculations of estimated GFR are performed using the 2021 CKD-EPI Study Refit equation without the race variable for the IDMS-Traceable creatinine  methods.  https://jasn.asnjournals.org/content/early/2021/09/22/ASN.5837614013    Calcium 07/24/2025 9.4  8.6 - 10.3 mg/dL Final    Albumin 07/24/2025 4.2  3.4 - 5.0 g/dL Final    Alkaline Phosphatase 07/24/2025 96  33 - 136 U/L Final    Total Protein 07/24/2025 7.5  6.4 - 8.2 g/dL Final    AST 07/24/2025 10  9 - 39 U/L Final    Bilirubin, Total 07/24/2025 0.7  0.0 - 1.2 mg/dL Final    ALT 07/24/2025 7  7 - 45 U/L Final    Patients treated with Sulfasalazine may generate falsely decreased results for ALT.    Magnesium 07/24/2025 2.31  1.60 - 2.40 mg/dL Final    Protime 07/24/2025 13.6 (H)  9.8 - 12.4 seconds Final    INR 07/24/2025 1.2 (H)  0.9 - 1.1 Final    Ventricular Rate 07/24/2025 78  BPM Preliminary    Atrial Rate 07/24/2025 78  BPM Preliminary    MA Interval 07/24/2025 234  ms Preliminary    QRS Duration 07/24/2025 118  ms Preliminary    QT Interval 07/24/2025 396  ms Preliminary    QTC Calculation(Bazett) 07/24/2025 451  ms Preliminary    P Axis 07/24/2025 79  degrees Preliminary    R Axis 07/24/2025 -57  degrees Preliminary    T Axis 07/24/2025 102  degrees Preliminary    QRS Count 07/24/2025 13  beats Preliminary    Q Onset 07/24/2025 205  ms Preliminary    P Onset 07/24/2025 88  ms Preliminary    P Offset 07/24/2025 139  ms Preliminary    T Offset 07/24/2025 403  ms Preliminary    QTC [place] 07/24/2025 432  ms Preliminary    Ventricular Rate 07/24/2025 90  BPM Preliminary    Atrial Rate 07/24/2025 90  BPM Preliminary    MA Interval 07/24/2025 210  ms Preliminary    QRS Duration 07/24/2025 114  ms Preliminary    QT Interval 07/24/2025 370  ms Preliminary    QTC Calculation(Bazett) 07/24/2025 452  ms Preliminary    P [place] 07/24/2025 94  degrees Preliminary    R Axis 07/24/2025 -51  degrees Preliminary    T Axis 07/24/2025 85  degrees Preliminary    QRS Count 07/24/2025 15  beats Preliminary    Q Onset 07/24/2025 221  ms Preliminary    P Onset 07/24/2025 116  ms Preliminary    P Offset 07/24/2025 165   ms Preliminary    T Offset 07/24/2025 406  ms Preliminary    QTC Fred[place]ia 07/24/2025 423  ms Preliminary    BNP 07/24/2025 383 (H)  0 - 99 pg/mL Final    Troponin I, High Sensitivity 07/24/2025 9  0 - 13 ng/L Final    Flu A Result 07/24/2025 Not Detected  Not Detected Final    Flu B Result 07/24/2025 Not Detected  Not Detected Final    Coronavirus 2019, PCR 07/24/2025 Not Detected  Not Detected Final    Color, Urine 07/25/2025 Light-Yellow  Light-Yellow, Yellow, Dark-Yellow Final    Appearance, Urine 07/25/2025 Clear  Clear Final    Specific Gravity, Urine 07/25/2025 1.009  1.005 - 1.035 Final    pH, Urine 07/25/2025 5.5  5.0, 5.5, 6.0, 6.5, 7.0, 7.5, 8.0 Final    Protein, Urine 07/25/2025 10 (TRACE)  NEGATIVE, 10 (TRACE), 20 (TRACE) mg/dL Final    Glucose, Urine 07/25/2025 Normal  Normal mg/dL Final    Blood, Urine 07/25/2025 0.2 (2+) (A)  NEGATIVE mg/dL Final    Ketones, Urine 07/25/2025 NEGATIVE  NEGATIVE mg/dL Final    Bilirubin, Urine 07/25/2025 NEGATIVE  NEGATIVE mg/dL Final    Urobilinogen, Urine 07/25/2025 Normal  Normal mg/dL Final    Nitrite, Urine 07/25/2025 NEGATIVE  NEGATIVE Final    Leukocyte Esterase, Urine 07/25/2025 NEGATIVE  NEGATIVE Final    Troponin I, High Sensitivity 07/24/2025 7  0 - 13 ng/L Final    POCT pH, Venous 07/24/2025 7.33  7.33 - 7.43 pH Final    POCT pCO2, Venous 07/24/2025 68 (H)  41 - 51 mm Hg Final    POCT pO2, Venous 07/24/2025 40  35 - 45 mm Hg Final    POCT SO2, Venous 07/24/2025 63  45 - 75 % Final    POCT Oxy Hemoglobin, Venous 07/24/2025 61.7  45.0 - 75.0 % Final    POCT Hematocrit Calculated, Venous 07/24/2025 59.0 (H)  36.0 - 46.0 % Final    POCT Sodium, Venous 07/24/2025 126 (L)  136 - 145 mmol/L Final    POCT Potassium, Venous 07/24/2025 3.6  3.5 - 5.3 mmol/L Final    POCT Chloride, Venous 07/24/2025 96 (L)  98 - 107 mmol/L Final    POCT Ionized [place], Venous 07/24/2025 1.10  1.10 - 1.33 mmol/L Final    POCT Glucose, Venous 07/24/2025 176 (H)  74 - 99 mg/dL Final     POCT Lactate, Venous 07/24/2025 1.1  0.4 - 2.0 mmol/L Final    POCT Base Excess, Venous 07/24/2025 6.3 (H)  -2.0 - 3.0 mmol/L Final    POCT HCO3 Calculated, Venous 07/24/2025 35.9 (H)  22.0 - 26.0 mmol/L Final    POCT Hemoglobin, Venous 07/24/2025 19.5 (H)  12.0 - 16.0 g/dL Final    POCT Anion Gap, Venous 07/24/2025 -2.0 (L)  10.0 - 25.0 mmol/L Final    Patient Temperature 07/24/2025    Final    NOTE: Patient Results are Not Corrected for Temperature    FiO2 07/24/2025 30  % Final    Blood Culture 07/25/2025 Loaded on Instrument - Culture in progress   Preliminary    Blood Culture 07/25/2025 Loaded on Instrument - Culture in progress   Preliminary    MRSA PCR 07/25/2025 Not Detected  Not Detected Final    Procalcitonin 07/25/2025 0.12 (H)  <=0.07 ng/mL Final    L. pneumophila Urine Ag 07/25/2025 Negative  Negative Final    Streptococcus pneumoniae Ag, Urine 07/25/2025 Negative  Negative Final    Glucose 07/25/2025 183 (H)  74 - 99 mg/dL Final    Sodium 07/25/2025 140  136 - 145 mmol/L Final    Potassium 07/25/2025 3.8  3.5 - 5.3 mmol/L Final    Chloride 07/25/2025 97 (L)  98 - 107 mmol/L Final    Bicarbonate 07/25/2025 35 (H)  21 - 32 mmol/L Final    Anion Gap 07/25/2025 12  10 - 20 mmol/L Final    Urea Nitrogen 07/25/2025 23  6 - 23 mg/dL Final    Creatinine 07/25/2025 1.11 (H)  0.50 - 1.05 mg/dL Final    eGFR 07/25/2025 54 (L)  >60 mL/min/1.73m*2 Final    Calculations of estimated GFR are performed using the 2021 CKD-EPI Study Refit equation without the race variable for the IDMS-Traceable creatinine methods.  https://jasn.asnjournals.org/content/early/2021/09/22/ASN.5814972375    Calcium 07/25/2025 8.7  8.6 - 10.3 mg/dL Final    WBC 07/25/2025 12.1 (H)  4.4 - 11.3 x10*3/uL Final    nRBC 07/25/2025 0.0  0.0 - 0.0 /100 WBCs Final    RBC 07/25/2025 3.25 (L)  4.00 - 5.20 x10*6/uL Final    Hemoglobin 07/25/2025 8.2 (L)  12.0 - 16.0 g/dL Final    Hematocrit 07/25/2025 27.7 (L)  36.0 - 46.0 % Final    MCV 07/25/2025 85   80 - 100 fL Final    MCH 07/25/2025 25.2 (L)  26.0 - 34.0 pg Final    MCHC 07/25/2025 29.6 (L)  32.0 - 36.0 g/dL Final    RDW 07/25/2025 14.6 (H)  11.5 - 14.5 % Final    Platelets 07/25/2025 214  150 - 450 x10*3/uL Final    Magnesium 07/25/2025 2.06  1.60 - 2.40 mg/dL Final    WBC, Urine 07/25/2025 NONE  1-5, NONE /HPF Final    RBC, Urine 07/25/2025 6-10 (A)  NONE, 1-2, 3-5 /HPF Final    Squamous Epithelial Cells, Urine 07/25/2025 1-9 (SPARSE)  Reference range not established. /HPF Final    Mucus, Urine 07/25/2025 FEW  Reference range not established. /LPF Final    Hyaline Casts, Urine 07/25/2025 1+ (A)  NONE /LPF Final    POCT Glucose 07/25/2025 142 (H)  74 - 99 mg/dL Final    POCT Glucose 07/25/2025 179 (H)  74 - 99 mg/dL Final    POCT Glucose 07/25/2025 212 (H)  74 - 99 mg/dL Final    Glucose 07/26/2025 114 (H)  74 - 99 mg/dL Final    Sodium 07/26/2025 141  136 - 145 mmol/L Final    Potassium 07/26/2025 3.5  3.5 - 5.3 mmol/L Final    Chloride 07/26/2025 95 (L)  98 - 107 mmol/L Final    Bicarbonate 07/26/2025 39 (H)  21 - 32 mmol/L Final    Anion Gap 07/26/2025 11  10 - 20 mmol/L Final    Urea Nitrogen 07/26/2025 23  6 - 23 mg/dL Final    Creatinine 07/26/2025 1.12 (H)  0.50 - 1.05 mg/dL Final    eGFR 07/26/2025 53 (L)  >60 mL/min/1.73m*2 Final    Calculations of estimated GFR are performed using the 2021 CKD-EPI Study Refit equation without the race variable for the IDMS-Traceable creatinine methods.  https://jasn.asnjournals.org/content/early/2021/09/22/ASN.1113705527    Calcium 07/26/2025 8.6  8.6 - 10.3 mg/dL Final    WBC 07/26/2025 10.5  4.4 - 11.3 x10*3/uL Final    nRBC 07/26/2025 0.0  0.0 - 0.0 /100 WBCs Final    RBC 07/26/2025 3.34 (L)  4.00 - 5.20 x10*6/uL Final    Hemoglobin 07/26/2025 8.5 (L)  12.0 - 16.0 g/dL Final    Hematocrit 07/26/2025 28.1 (L)  36.0 - 46.0 % Final    MCV 07/26/2025 84  80 - 100 fL Final    MCH 07/26/2025 25.4 (L)  26.0 - 34.0 pg Final    MCHC 07/26/2025 30.2 (L)  32.0 - 36.0 g/dL  Final    RDW 07/26/2025 14.7 (H)  11.5 - 14.5 % Final    Platelets 07/26/2025 213  150 - 450 x10*3/uL Final    Magnesium 07/26/2025 1.92  1.60 - 2.40 mg/dL Final    POCT Glucose 07/25/2025 254 (H)  74 - 99 mg/dL Final         Imaging          Problem List[1]      Assessment/Plan   Assessment & Plan  Pulmonary edema with congestive heart failure with preserved left ventricular function    Acute on chronic respiratory failure with hypoxia and hypercapnia  Shortness of breath  -  Pneumonia of left lower lobe due to infectious organism  Neutrophilic leukocytosis  -  CKD stage 3a, GFR 45-59 ml/min (Multi)    Type 2 diabetes mellitus, with long-term current use of insulin      Acute respiratory failure related to pulmonary edema from diastolic congestive heart failure and pneumonia continue Lasix continue with antibiotics  Patient slowly improving  I will cancel VQ scan low suspicious for pulmonary embolus       I spent 25 minutes in the professional and overall care of this patient.      ANDREW Mccloud MD         [1]   Patient Active Problem List  Diagnosis    Essential hypertension, benign    Morbid obesity (Multi)    Type 2 diabetes mellitus with stage 3a chronic kidney disease, with long-term current use of insulin (Multi)    Pancreatic insufficiency ([facility])    Spinal stenosis of lumbar region with neurogenic claudication    Vitamin D deficiency    Shortness of breath    Acute on chronic respiratory failure with hypoxia and hypercapnia    Neutrophilic leukocytosis    Skin candidiasis    Iron deficiency anemia    Pulmonary edema with congestive heart failure with preserved left ventricular function    Type 2 diabetes mellitus with autonomic neuropathy    Hypertensive heart disease with congestive heart failure with preserved left ventricular function    CKD stage 3a, GFR 45-59 ml/min (Multi)    Gastroesophageal reflux disease without esophagitis    Bipolar disorder, in full remission, most recent episode depressed  (Multi)    Hiatal hernia without gangrene and obstruction    Pneumonia of left lower lobe due to infectious organism    Type 2 diabetes mellitus, with long-term current use of insulin

## 2025-07-27 VITALS
OXYGEN SATURATION: 97 % | WEIGHT: 293 LBS | RESPIRATION RATE: 20 BRPM | SYSTOLIC BLOOD PRESSURE: 148 MMHG | HEIGHT: 66 IN | TEMPERATURE: 97.9 F | BODY MASS INDEX: 47.09 KG/M2 | HEART RATE: 67 BPM | DIASTOLIC BLOOD PRESSURE: 71 MMHG

## 2025-07-27 LAB
ANION GAP SERPL CALC-SCNC: 12 MMOL/L (ref 10–20)
BACTERIA BLD CULT: NORMAL
BACTERIA BLD CULT: NORMAL
BUN SERPL-MCNC: 25 MG/DL (ref 6–23)
CALCIUM SERPL-MCNC: 9.1 MG/DL (ref 8.6–10.3)
CHLORIDE SERPL-SCNC: 94 MMOL/L (ref 98–107)
CO2 SERPL-SCNC: 38 MMOL/L (ref 21–32)
CREAT SERPL-MCNC: 1.11 MG/DL (ref 0.5–1.05)
EGFRCR SERPLBLD CKD-EPI 2021: 54 ML/MIN/1.73M*2
ERYTHROCYTE [DISTWIDTH] IN BLOOD BY AUTOMATED COUNT: 14.8 % (ref 11.5–14.5)
GLUCOSE BLD MANUAL STRIP-MCNC: 163 MG/DL (ref 74–99)
GLUCOSE BLD MANUAL STRIP-MCNC: 177 MG/DL (ref 74–99)
GLUCOSE BLD MANUAL STRIP-MCNC: 279 MG/DL (ref 74–99)
GLUCOSE BLD MANUAL STRIP-MCNC: 80 MG/DL (ref 74–99)
GLUCOSE SERPL-MCNC: 83 MG/DL (ref 74–99)
HCT VFR BLD AUTO: 29.6 % (ref 36–46)
HGB BLD-MCNC: 9 G/DL (ref 12–16)
MAGNESIUM SERPL-MCNC: 1.89 MG/DL (ref 1.6–2.4)
MCH RBC QN AUTO: 25.3 PG (ref 26–34)
MCHC RBC AUTO-ENTMCNC: 30.4 G/DL (ref 32–36)
MCV RBC AUTO: 83 FL (ref 80–100)
NRBC BLD-RTO: 0 /100 WBCS (ref 0–0)
PLATELET # BLD AUTO: 221 X10*3/UL (ref 150–450)
POTASSIUM SERPL-SCNC: 3.7 MMOL/L (ref 3.5–5.3)
RBC # BLD AUTO: 3.56 X10*6/UL (ref 4–5.2)
SODIUM SERPL-SCNC: 140 MMOL/L (ref 136–145)
WBC # BLD AUTO: 11.3 X10*3/UL (ref 4.4–11.3)

## 2025-07-27 PROCEDURE — 94660 CPAP INITIATION&MGMT: CPT

## 2025-07-27 PROCEDURE — 80048 BASIC METABOLIC PNL TOTAL CA: CPT

## 2025-07-27 PROCEDURE — 2500000001 HC RX 250 WO HCPCS SELF ADMINISTERED DRUGS (ALT 637 FOR MEDICARE OP): Performed by: NURSE PRACTITIONER

## 2025-07-27 PROCEDURE — 85027 COMPLETE CBC AUTOMATED: CPT

## 2025-07-27 PROCEDURE — 1200000002 HC GENERAL ROOM WITH TELEMETRY DAILY

## 2025-07-27 PROCEDURE — 2500000002 HC RX 250 W HCPCS SELF ADMINISTERED DRUGS (ALT 637 FOR MEDICARE OP, ALT 636 FOR OP/ED): Performed by: INTERNAL MEDICINE

## 2025-07-27 PROCEDURE — 36415 COLL VENOUS BLD VENIPUNCTURE: CPT

## 2025-07-27 PROCEDURE — 2500000002 HC RX 250 W HCPCS SELF ADMINISTERED DRUGS (ALT 637 FOR MEDICARE OP, ALT 636 FOR OP/ED)

## 2025-07-27 PROCEDURE — 2500000002 HC RX 250 W HCPCS SELF ADMINISTERED DRUGS (ALT 637 FOR MEDICARE OP, ALT 636 FOR OP/ED): Performed by: NURSE PRACTITIONER

## 2025-07-27 PROCEDURE — 82947 ASSAY GLUCOSE BLOOD QUANT: CPT

## 2025-07-27 PROCEDURE — 94640 AIRWAY INHALATION TREATMENT: CPT

## 2025-07-27 PROCEDURE — 2500000004 HC RX 250 GENERAL PHARMACY W/ HCPCS (ALT 636 FOR OP/ED): Performed by: NURSE PRACTITIONER

## 2025-07-27 PROCEDURE — 2500000004 HC RX 250 GENERAL PHARMACY W/ HCPCS (ALT 636 FOR OP/ED)

## 2025-07-27 PROCEDURE — 83735 ASSAY OF MAGNESIUM: CPT

## 2025-07-27 RX ORDER — IPRATROPIUM BROMIDE AND ALBUTEROL SULFATE 2.5; .5 MG/3ML; MG/3ML
3 SOLUTION RESPIRATORY (INHALATION)
Status: DISCONTINUED | OUTPATIENT
Start: 2025-07-27 | End: 2025-07-28 | Stop reason: HOSPADM

## 2025-07-27 RX ADMIN — NYSTATIN 1 APPLICATION: 100000 POWDER TOPICAL at 08:27

## 2025-07-27 RX ADMIN — PANCRELIPASE 1 CAPSULE: 60000; 12000; 38000 CAPSULE, DELAYED RELEASE PELLETS ORAL at 08:27

## 2025-07-27 RX ADMIN — FUROSEMIDE 40 MG: 40 TABLET ORAL at 08:26

## 2025-07-27 RX ADMIN — HEPARIN SODIUM 7500 UNITS: 5000 INJECTION INTRAVENOUS; SUBCUTANEOUS at 14:32

## 2025-07-27 RX ADMIN — ATORVASTATIN CALCIUM 10 MG: 10 TABLET, FILM COATED ORAL at 20:36

## 2025-07-27 RX ADMIN — INSULIN GLARGINE 42 UNITS: 100 INJECTION, SOLUTION SUBCUTANEOUS at 08:27

## 2025-07-27 RX ADMIN — PANCRELIPASE 1 CAPSULE: 60000; 12000; 38000 CAPSULE, DELAYED RELEASE PELLETS ORAL at 16:52

## 2025-07-27 RX ADMIN — GABAPENTIN 300 MG: 300 CAPSULE ORAL at 08:27

## 2025-07-27 RX ADMIN — INSULIN GLARGINE 42 UNITS: 100 INJECTION, SOLUTION SUBCUTANEOUS at 20:36

## 2025-07-27 RX ADMIN — GABAPENTIN 300 MG: 300 CAPSULE ORAL at 20:36

## 2025-07-27 RX ADMIN — METOPROLOL SUCCINATE 25 MG: 25 TABLET, EXTENDED RELEASE ORAL at 08:27

## 2025-07-27 RX ADMIN — Medication: at 08:07

## 2025-07-27 RX ADMIN — HEPARIN SODIUM 7500 UNITS: 5000 INJECTION INTRAVENOUS; SUBCUTANEOUS at 06:47

## 2025-07-27 RX ADMIN — Medication: at 20:36

## 2025-07-27 RX ADMIN — ASPIRIN 81 MG CHEWABLE TABLET 81 MG: 81 TABLET CHEWABLE at 08:27

## 2025-07-27 RX ADMIN — IPRATROPIUM BROMIDE AND ALBUTEROL SULFATE 3 ML: 2.5; .5 SOLUTION RESPIRATORY (INHALATION) at 08:07

## 2025-07-27 RX ADMIN — BUDESONIDE 0.5 MG: 0.5 INHALANT RESPIRATORY (INHALATION) at 19:30

## 2025-07-27 RX ADMIN — BUDESONIDE 0.5 MG: 0.5 INHALANT RESPIRATORY (INHALATION) at 08:07

## 2025-07-27 RX ADMIN — INSULIN LISPRO 6 UNITS: 100 INJECTION, SOLUTION INTRAVENOUS; SUBCUTANEOUS at 20:36

## 2025-07-27 RX ADMIN — DOXYCYCLINE HYCLATE 100 MG: 100 CAPSULE ORAL at 20:36

## 2025-07-27 RX ADMIN — PANCRELIPASE 1 CAPSULE: 60000; 12000; 38000 CAPSULE, DELAYED RELEASE PELLETS ORAL at 11:35

## 2025-07-27 RX ADMIN — AMLODIPINE BESYLATE 10 MG: 10 TABLET ORAL at 08:27

## 2025-07-27 RX ADMIN — HEPARIN SODIUM 7500 UNITS: 5000 INJECTION INTRAVENOUS; SUBCUTANEOUS at 22:34

## 2025-07-27 RX ADMIN — DOXYCYCLINE HYCLATE 100 MG: 100 CAPSULE ORAL at 08:27

## 2025-07-27 RX ADMIN — CEFTRIAXONE 2 G: 2 INJECTION, SOLUTION INTRAVENOUS at 22:34

## 2025-07-27 RX ADMIN — IPRATROPIUM BROMIDE AND ALBUTEROL SULFATE 3 ML: 2.5; .5 SOLUTION RESPIRATORY (INHALATION) at 19:30

## 2025-07-27 RX ADMIN — CITALOPRAM HYDROBROMIDE 40 MG: 20 TABLET ORAL at 08:27

## 2025-07-27 RX ADMIN — METOPROLOL SUCCINATE 25 MG: 25 TABLET, EXTENDED RELEASE ORAL at 20:36

## 2025-07-27 ASSESSMENT — COGNITIVE AND FUNCTIONAL STATUS - GENERAL
TOILETING: TOTAL
STANDING UP FROM CHAIR USING ARMS: A LOT
CLIMB 3 TO 5 STEPS WITH RAILING: A LOT
MOBILITY SCORE: 12
TURNING FROM BACK TO SIDE WHILE IN FLAT BAD: A LOT
DRESSING REGULAR LOWER BODY CLOTHING: TOTAL
MOBILITY SCORE: 12
DRESSING REGULAR LOWER BODY CLOTHING: TOTAL
HELP NEEDED FOR BATHING: TOTAL
MOVING FROM LYING ON BACK TO SITTING ON SIDE OF FLAT BED WITH BEDRAILS: A LOT
MOVING TO AND FROM BED TO CHAIR: A LOT
PERSONAL GROOMING: A LOT
WALKING IN HOSPITAL ROOM: A LOT
DRESSING REGULAR UPPER BODY CLOTHING: TOTAL
CLIMB 3 TO 5 STEPS WITH RAILING: A LOT
DAILY ACTIVITIY SCORE: 10
DRESSING REGULAR UPPER BODY CLOTHING: TOTAL
MOVING TO AND FROM BED TO CHAIR: A LOT
HELP NEEDED FOR BATHING: TOTAL
PERSONAL GROOMING: TOTAL
TOILETING: TOTAL
MOVING FROM LYING ON BACK TO SITTING ON SIDE OF FLAT BED WITH BEDRAILS: A LOT
DAILY ACTIVITIY SCORE: 9
STANDING UP FROM CHAIR USING ARMS: A LOT
TURNING FROM BACK TO SIDE WHILE IN FLAT BAD: A LOT
WALKING IN HOSPITAL ROOM: A LOT

## 2025-07-27 ASSESSMENT — PAIN SCALES - GENERAL
PAINLEVEL_OUTOF10: 0 - NO PAIN
PAINLEVEL_OUTOF10: 1

## 2025-07-27 NOTE — PROGRESS NOTES
"Subjective  Patient had uneventful night does not complain about any chest pain shortness of breath patient is doing better back to her 4 L baseline continue to be on BiPAP at night  Nursing staff was interviewed  Objectives    Last Recorded Vitals  Blood pressure 154/69, pulse 59, temperature 36.1 °C (97 °F), temperature source Temporal, resp. rate 20, height 1.676 m (5' 6\"), weight 140 kg (307 lb 15.7 oz), SpO2 94%.    Physical Exam  HENT:      Right Ear: External ear normal.      Left Ear: External ear normal.      Mouth/Throat:      Mouth: Mucous membranes are moist.   Cardiovascular:      Rate and Rhythm: Normal rate and regular rhythm.      Heart sounds: No murmur heard.     No friction rub. No gallop.   Pulmonary:      Effort: No accessory muscle usage or respiratory distress.      Breath sounds: No stridor. No wheezing or rhonchi.   Chest:      Chest wall: No tenderness.   Abdominal:      General: There is no distension.      Palpations: There is no mass.      Tenderness: There is no abdominal tenderness. There is no guarding or rebound.   Musculoskeletal:         General: No deformity or signs of injury.      Cervical back: No rigidity or tenderness. Normal range of motion.      Right lower leg: No edema.      Left lower leg: No edema.   Skin:     Coloration: Skin is not jaundiced or pale.      Findings: No lesion.   Neurological:      General: No focal deficit present.      Mental Status: He is alert, oriented to person, place, and time and easily aroused.      Cranial Nerves: No cranial nerve deficit.      Sensory: No sensory deficit.      Motor: No weakness.                 Labs    Admission on 07/24/2025   Component Date Value Ref Range Status    WBC 07/24/2025 13.0 (H)  4.4 - 11.3 x10*3/uL Final    nRBC 07/24/2025 0.0  0.0 - 0.0 /100 WBCs Final    RBC 07/24/2025 3.69 (L)  4.00 - 5.20 x10*6/uL Final    Hemoglobin 07/24/2025 9.4 (L)  12.0 - 16.0 g/dL Final    Hematocrit 07/24/2025 31.1 (L)  36.0 - 46.0 % " Final    MCV 07/24/2025 84  80 - 100 fL Final    MCH 07/24/2025 25.5 (L)  26.0 - 34.0 pg Final    MCHC 07/24/2025 30.2 (L)  32.0 - 36.0 g/dL Final    RDW 07/24/2025 14.7 (H)  11.5 - 14.5 % Final    Platelets 07/24/2025 239  150 - 450 x10*3/uL Final    Neutrophils % 07/24/2025 77.5  40.0 - 80.0 % Final    Immature Granulocytes %, Automated 07/24/2025 0.8  0.0 - 0.9 % Final    Immature Granulocyte Count (IG) includes promyelocytes, myelocytes and metamyelocytes but does not include bands. Percent differential counts (%) should be interpreted in the context of the absolute cell counts (cells/UL).    Lymphocytes % 07/24/2025 14.3  13.0 - 44.0 % Final    Monocytes % 07/24/2025 4.5  2.0 - 10.0 % Final    Eosinophils % 07/24/2025 2.6  0.0 - 6.0 % Final    Basophils % 07/24/2025 0.3  0.0 - 2.0 % Final    Neutrophils Absolute 07/24/2025 10.10 (H)  1.20 - 7.70 x10*3/uL Final    Percent differential counts (%) should be interpreted in the context of the absolute cell counts (cells/uL).    Immature Granulocytes Absolute, Au* 07/24/2025 0.10  0.00 - 0.70 x10*3/uL Final    Lymphocytes Absolute 07/24/2025 1.86  1.20 - 4.80 x10*3/uL Final    Monocytes Absolute 07/24/2025 0.58  0.10 - 1.00 x10*3/uL Final    Eosinophils Absolute 07/24/2025 0.34  0.00 - 0.70 x10*3/uL Final    Basophils Absolute 07/24/2025 0.04  0.00 - 0.10 x10*3/uL Final    Glucose 07/24/2025 184 (H)  74 - 99 mg/dL Final    Sodium 07/24/2025 140  136 - 145 mmol/L Final    Potassium 07/24/2025 3.9  3.5 - 5.3 mmol/L Final    Chloride 07/24/2025 97 (L)  98 - 107 mmol/L Final    Bicarbonate 07/24/2025 36 (H)  21 - 32 mmol/L Final    Anion Gap 07/24/2025 11  10 - 20 mmol/L Final    Urea Nitrogen 07/24/2025 24 (H)  6 - 23 mg/dL Final    Creatinine 07/24/2025 1.14 (H)  0.50 - 1.05 mg/dL Final    eGFR 07/24/2025 52 (L)  >60 mL/min/1.73m*2 Final    Calculations of estimated GFR are performed using the 2021 CKD-EPI Study Refit equation without the race variable for the  IDMS-Traceable creatinine methods.  https://jasn.asnjournals.org/content/early/2021/09/22/ASN.6594212349    Calcium 07/24/2025 9.4  8.6 - 10.3 mg/dL Final    Albumin 07/24/2025 4.2  3.4 - 5.0 g/dL Final    Alkaline Phosphatase 07/24/2025 96  33 - 136 U/L Final    Total Protein 07/24/2025 7.5  6.4 - 8.2 g/dL Final    AST 07/24/2025 10  9 - 39 U/L Final    Bilirubin, Total 07/24/2025 0.7  0.0 - 1.2 mg/dL Final    ALT 07/24/2025 7  7 - 45 U/L Final    Patients treated with Sulfasalazine may generate falsely decreased results for ALT.    Magnesium 07/24/2025 2.31  1.60 - 2.40 mg/dL Final    Protime 07/24/2025 13.6 (H)  9.8 - 12.4 seconds Final    INR 07/24/2025 1.2 (H)  0.9 - 1.1 Final    Ventricular Rate 07/24/2025 78  BPM Preliminary    Atrial Rate 07/24/2025 78  BPM Preliminary    ND Interval 07/24/2025 234  ms Preliminary    QRS Duration 07/24/2025 118  ms Preliminary    QT Interval 07/24/2025 396  ms Preliminary    QTC Calculation(Bazett) 07/24/2025 451  ms Preliminary    P Axis 07/24/2025 79  degrees Preliminary    R Axis 07/24/2025 -57  degrees Preliminary    T Axis 07/24/2025 102  degrees Preliminary    QRS Count 07/24/2025 13  beats Preliminary    Q Onset 07/24/2025 205  ms Preliminary    P Onset 07/24/2025 88  ms Preliminary    P Offset 07/24/2025 139  ms Preliminary    T Offset 07/24/2025 403  ms Preliminary    QTC Fredericia 07/24/2025 432  ms Preliminary    Ventricular Rate 07/24/2025 90  BPM Preliminary    Atrial Rate 07/24/2025 90  BPM Preliminary    ND Interval 07/24/2025 210  ms Preliminary    QRS Duration 07/24/2025 114  ms Preliminary    QT Interval 07/24/2025 370  ms Preliminary    QTC Calculation(Bazett) 07/24/2025 452  ms Preliminary    P [place] 07/24/2025 94  degrees Preliminary    R Axis 07/24/2025 -51  degrees Preliminary    T Axis 07/24/2025 85  degrees Preliminary    QRS Count 07/24/2025 15  beats Preliminary    Q Onset 07/24/2025 221  ms Preliminary    P Onset 07/24/2025 116  ms Preliminary     P Offset 07/24/2025 165  ms Preliminary    T Offset 07/24/2025 406  ms Preliminary    QTC Fredericia 07/24/2025 423  ms Preliminary    BNP 07/24/2025 383 (H)  0 - 99 pg/mL Final    Troponin I, High Sensitivity 07/24/2025 9  0 - 13 ng/L Final    Flu A Result 07/24/2025 Not Detected  Not Detected Final    Flu B Result 07/24/2025 Not Detected  Not Detected Final    Coronavirus 2019, PCR 07/24/2025 Not Detected  Not Detected Final    Color, Urine 07/25/2025 Light-Yellow  Light-Yellow, Yellow, Dark-Yellow Final    Appearance, Urine 07/25/2025 Clear  Clear Final    Specific Gravity, Urine 07/25/2025 1.009  1.005 - 1.035 Final    pH, Urine 07/25/2025 5.5  5.0, 5.5, 6.0, 6.5, 7.0, 7.5, 8.0 Final    Protein, Urine 07/25/2025 10 (TRACE)  NEGATIVE, 10 (TRACE), 20 (TRACE) mg/dL Final    Glucose, Urine 07/25/2025 Normal  Normal mg/dL Final    Blood, Urine 07/25/2025 0.2 (2+) (A)  NEGATIVE mg/dL Final    Ketones, Urine 07/25/2025 NEGATIVE  NEGATIVE mg/dL Final    Bilirubin, Urine 07/25/2025 NEGATIVE  NEGATIVE mg/dL Final    Urobilinogen, Urine 07/25/2025 Normal  Normal mg/dL Final    Nitrite, Urine 07/25/2025 NEGATIVE  NEGATIVE Final    Leukocyte Esterase, Urine 07/25/2025 NEGATIVE  NEGATIVE Final    Troponin I, High Sensitivity 07/24/2025 7  0 - 13 ng/L Final    POCT pH, Venous 07/24/2025 7.33  7.33 - 7.43 pH Final    POCT pCO2, Venous 07/24/2025 68 (H)  41 - 51 mm Hg Final    POCT pO2, Venous 07/24/2025 40  35 - 45 mm Hg Final    POCT SO2, Venous 07/24/2025 63  45 - 75 % Final    POCT Oxy Hemoglobin, Venous 07/24/2025 61.7  45.0 - 75.0 % Final    POCT Hematocrit Calculated, Venous 07/24/2025 59.0 (H)  36.0 - 46.0 % Final    POCT Sodium, Venous 07/24/2025 126 (L)  136 - 145 mmol/L Final    POCT Potassium, Venous 07/24/2025 3.6  3.5 - 5.3 mmol/L Final    POCT Chloride, Venous 07/24/2025 96 (L)  98 - 107 mmol/L Final    POCT Ionized Calicum, Venous 07/24/2025 1.10  1.10 - 1.33 mmol/L Final    POCT Glucose, Venous 07/24/2025 176  (H)  74 - 99 mg/dL Final    POCT Lactate, Venous 07/24/2025 1.1  0.4 - 2.0 mmol/L Final    POCT Base Excess, Venous 07/24/2025 6.3 (H)  -2.0 - 3.0 mmol/L Final    POCT HCO3 Calculated, Venous 07/24/2025 35.9 (H)  22.0 - 26.0 mmol/L Final    POCT Hemoglobin, Venous 07/24/2025 19.5 (H)  12.0 - 16.0 g/dL Final    POCT Anion Gap, Venous 07/24/2025 -2.0 (L)  10.0 - 25.0 mmol/L Final    Patient Temperature 07/24/2025    Final    NOTE: Patient Results are Not Corrected for Temperature    FiO2 07/24/2025 30  % Final    Blood Culture 07/25/2025 No growth at 1 day   Preliminary    Blood Culture 07/25/2025 No growth at 1 day   Preliminary    MRSA PCR 07/25/2025 Not Detected  Not Detected Final    Procalcitonin 07/25/2025 0.12 (H)  <=0.07 ng/mL Final    L. pneumophila Urine Ag 07/25/2025 Negative  Negative Final    Streptococcus pneumoniae Ag, Urine 07/25/2025 Negative  Negative Final    Glucose 07/25/2025 183 (H)  74 - 99 mg/dL Final    Sodium 07/25/2025 140  136 - 145 mmol/L Final    Potassium 07/25/2025 3.8  3.5 - 5.3 mmol/L Final    Chloride 07/25/2025 97 (L)  98 - 107 mmol/L Final    Bicarbonate 07/25/2025 35 (H)  21 - 32 mmol/L Final    Anion Gap 07/25/2025 12  10 - 20 mmol/L Final    Urea Nitrogen 07/25/2025 23  6 - 23 mg/dL Final    Creatinine 07/25/2025 1.11 (H)  0.50 - 1.05 mg/dL Final    eGFR 07/25/2025 54 (L)  >60 mL/min/1.73m*2 Final    Calculations of estimated GFR are performed using the 2021 CKD-EPI Study Refit equation without the race variable for the IDMS-Traceable creatinine methods.  https://jasn.asnjournals.org/content/early/2021/09/22/ASN.0797801746    Calcium 07/25/2025 8.7  8.6 - 10.3 mg/dL Final    WBC 07/25/2025 12.1 (H)  4.4 - 11.3 x10*3/uL Final    nRBC 07/25/2025 0.0  0.0 - 0.0 /100 WBCs Final    RBC 07/25/2025 3.25 (L)  4.00 - 5.20 x10*6/uL Final    Hemoglobin 07/25/2025 8.2 (L)  12.0 - 16.0 g/dL Final    Hematocrit 07/25/2025 27.7 (L)  36.0 - 46.0 % Final    MCV 07/25/2025 85  80 - 100 fL Final     MCH 07/25/2025 25.2 (L)  26.0 - 34.0 pg Final    MCHC 07/25/2025 29.6 (L)  32.0 - 36.0 g/dL Final    RDW 07/25/2025 14.6 (H)  11.5 - 14.5 % Final    Platelets 07/25/2025 214  150 - 450 x10*3/uL Final    Magnesium 07/25/2025 2.06  1.60 - 2.40 mg/dL Final    WBC, Urine 07/25/2025 NONE  1-5, NONE /HPF Final    RBC, Urine 07/25/2025 6-10 (A)  NONE, 1-2, 3-5 /HPF Final    Squamous Epithelial Cells, Urine 07/25/2025 1-9 (SPARSE)  Reference range not established. /HPF Final    Mucus, Urine 07/25/2025 FEW  Reference range not established. /LPF Final    Hyaline Casts, Urine 07/25/2025 1+ (A)  NONE /LPF Final    POCT Glucose 07/25/2025 142 (H)  74 - 99 mg/dL Final    POCT Glucose 07/25/2025 179 (H)  74 - 99 mg/dL Final    POCT Glucose 07/25/2025 212 (H)  74 - 99 mg/dL Final    Glucose 07/26/2025 114 (H)  74 - 99 mg/dL Final    Sodium 07/26/2025 141  136 - 145 mmol/L Final    Potassium 07/26/2025 3.5  3.5 - 5.3 mmol/L Final    Chloride 07/26/2025 95 (L)  98 - 107 mmol/L Final    Bicarbonate 07/26/2025 39 (H)  21 - 32 mmol/L Final    Anion Gap 07/26/2025 11  10 - 20 mmol/L Final    Urea Nitrogen 07/26/2025 23  6 - 23 mg/dL Final    Creatinine 07/26/2025 1.12 (H)  0.50 - 1.05 mg/dL Final    eGFR 07/26/2025 53 (L)  >60 mL/min/1.73m*2 Final    Calculations of estimated GFR are performed using the 2021 CKD-EPI Study Refit equation without the race variable for the IDMS-Traceable creatinine methods.  https://jasn.asnjournals.org/content/early/2021/09/22/ASN.2288671377    Calcium 07/26/2025 8.6  8.6 - 10.3 mg/dL Final    WBC 07/26/2025 10.5  4.4 - 11.3 x10*3/uL Final    nRBC 07/26/2025 0.0  0.0 - 0.0 /100 WBCs Final    RBC 07/26/2025 3.34 (L)  4.00 - 5.20 x10*6/uL Final    Hemoglobin 07/26/2025 8.5 (L)  12.0 - 16.0 g/dL Final    Hematocrit 07/26/2025 28.1 (L)  36.0 - 46.0 % Final    MCV 07/26/2025 84  80 - 100 fL Final    MCH 07/26/2025 25.4 (L)  26.0 - 34.0 pg Final    MCHC 07/26/2025 30.2 (L)  32.0 - 36.0 g/dL Final    RDW  07/26/2025 14.7 (H)  11.5 - 14.5 % Final    Platelets 07/26/2025 213  150 - 450 x10*3/uL Final    Magnesium 07/26/2025 1.92  1.60 - 2.40 mg/dL Final    POCT Glucose 07/25/2025 254 (H)  74 - 99 mg/dL Final    POCT Glucose 07/26/2025 100 (H)  74 - 99 mg/dL Final    POCT Glucose 07/26/2025 149 (H)  74 - 99 mg/dL Final    POCT Glucose 07/26/2025 208 (H)  74 - 99 mg/dL Final    Glucose 07/27/2025 83  74 - 99 mg/dL Final    Sodium 07/27/2025 140  136 - 145 mmol/L Final    Potassium 07/27/2025 3.7  3.5 - 5.3 mmol/L Final    Chloride 07/27/2025 94 (L)  98 - 107 mmol/L Final    Bicarbonate 07/27/2025 38 (H)  21 - 32 mmol/L Final    Anion Gap 07/27/2025 12  10 - 20 mmol/L Final    Urea Nitrogen 07/27/2025 25 (H)  6 - 23 mg/dL Final    Creatinine 07/27/2025 1.11 (H)  0.50 - 1.05 mg/dL Final    eGFR 07/27/2025 54 (L)  >60 mL/min/1.73m*2 Final    Calculations of estimated GFR are performed using the 2021 CKD-EPI Study Refit equation without the race variable for the IDMS-Traceable creatinine methods.  https://jasn.asnjournals.org/content/early/2021/09/22/ASN.9749875584    Calcium 07/27/2025 9.1  8.6 - 10.3 mg/dL Final    WBC 07/27/2025 11.3  4.4 - 11.3 x10*3/uL Final    nRBC 07/27/2025 0.0  0.0 - 0.0 /100 WBCs Final    RBC 07/27/2025 3.56 (L)  4.00 - 5.20 x10*6/uL Final    Hemoglobin 07/27/2025 9.0 (L)  12.0 - 16.0 g/dL Final    Hematocrit 07/27/2025 29.6 (L)  36.0 - 46.0 % Final    MCV 07/27/2025 83  80 - 100 fL Final    MCH 07/27/2025 25.3 (L)  26.0 - 34.0 pg Final    MCHC 07/27/2025 30.4 (L)  32.0 - 36.0 g/dL Final    RDW 07/27/2025 14.8 (H)  11.5 - 14.5 % Final    Platelets 07/27/2025 221  150 - 450 x10*3/uL Final    Magnesium 07/27/2025 1.89  1.60 - 2.40 mg/dL Final    POCT Glucose 07/26/2025 199 (H)  74 - 99 mg/dL Final    POCT Glucose 07/27/2025 80  74 - 99 mg/dL Final         Imaging          Problem List[1]      Assessment/Plan   Assessment & Plan  Pulmonary edema with congestive heart failure with preserved left  ventricular function    Acute on chronic respiratory failure with hypoxia and hypercapnia  Shortness of breath  -  Pneumonia of left lower lobe due to infectious organism  Neutrophilic leukocytosis  -  CKD stage 3a, GFR 45-59 ml/min (Multi)    Type 2 diabetes mellitus, with long-term current use of insulin      Acute diastolic congestive heart failure patient improved I will switch her IV Lasix to oral Lasix continue to monitor respiratory status  Pneumonia continue with antibiotics  Diabetes continue with the sliding scale anticipate possible discharge back home  Tomorrow patient has been in rehab right now he will be available to help her tomorrow       I spent 25 minutes in the professional and overall care of this patient.      ANDREW Mccloud MD         [1]   Patient Active Problem List  Diagnosis    Essential hypertension, benign    Morbid obesity (Multi)    Type 2 diabetes mellitus with stage 3a chronic kidney disease, with long-term current use of insulin (Multi)    Pancreatic insufficiency ([facility])    Spinal stenosis of lumbar region with neurogenic claudication    Vitamin D deficiency    Shortness of breath    Acute on chronic respiratory failure with hypoxia and hypercapnia    Neutrophilic leukocytosis    Skin candidiasis    Iron deficiency anemia    Pulmonary edema with congestive heart failure with preserved left ventricular function    Type 2 diabetes mellitus with autonomic neuropathy    Hypertensive heart disease with congestive heart failure with preserved left ventricular function    CKD stage 3a, GFR 45-59 ml/min (Multi)    Gastroesophageal reflux disease without esophagitis    Bipolar disorder, in full remission, most recent episode depressed (Multi)    Hiatal hernia without gangrene and obstruction    Pneumonia of left lower lobe due to infectious organism    Type 2 diabetes mellitus, with long-term current use of insulin

## 2025-07-27 NOTE — PROGRESS NOTES
Met with pt to verify her dc plan. Pt plans to return home with [facility] and resume her private duty CG. Caregivers are in the home MWF 10-2p. Family stays overnight with the pt and go to work in the morning. They return at appx noon to make pt lunch and check on her. Pt's  is in SNF/rehab until Tuesday morning when he should be returning. Pt states she feels safe with this plan and that her family is in agreement.

## 2025-07-28 ENCOUNTER — HOME HEALTH ADMISSION (OUTPATIENT)
Dept: HOME HEALTH SERVICES | Facility: HOME HEALTH | Age: 70
End: 2025-07-28
Payer: MEDICARE

## 2025-07-28 ENCOUNTER — DOCUMENTATION (OUTPATIENT)
Dept: HOME HEALTH SERVICES | Facility: HOME HEALTH | Age: 70
End: 2025-07-28
Payer: MEDICARE

## 2025-07-28 VITALS
SYSTOLIC BLOOD PRESSURE: 136 MMHG | RESPIRATION RATE: 22 BRPM | WEIGHT: 293 LBS | HEART RATE: 66 BPM | DIASTOLIC BLOOD PRESSURE: 77 MMHG | TEMPERATURE: 97.7 F | BODY MASS INDEX: 47.09 KG/M2 | HEIGHT: 66 IN | OXYGEN SATURATION: 92 %

## 2025-07-28 LAB
ANION GAP SERPL CALC-SCNC: 10 MMOL/L (ref 10–20)
BUN SERPL-MCNC: 25 MG/DL (ref 6–23)
CALCIUM SERPL-MCNC: 9.2 MG/DL (ref 8.6–10.3)
CHLORIDE SERPL-SCNC: 94 MMOL/L (ref 98–107)
CO2 SERPL-SCNC: 39 MMOL/L (ref 21–32)
CREAT SERPL-MCNC: 1.02 MG/DL (ref 0.5–1.05)
EGFRCR SERPLBLD CKD-EPI 2021: 59 ML/MIN/1.73M*2
ERYTHROCYTE [DISTWIDTH] IN BLOOD BY AUTOMATED COUNT: 14.8 % (ref 11.5–14.5)
GLUCOSE BLD MANUAL STRIP-MCNC: 102 MG/DL (ref 74–99)
GLUCOSE BLD MANUAL STRIP-MCNC: 144 MG/DL (ref 74–99)
GLUCOSE SERPL-MCNC: 100 MG/DL (ref 74–99)
HCT VFR BLD AUTO: 31.8 % (ref 36–46)
HGB BLD-MCNC: 9.6 G/DL (ref 12–16)
HOLD SPECIMEN: NORMAL
MAGNESIUM SERPL-MCNC: 1.88 MG/DL (ref 1.6–2.4)
MCH RBC QN AUTO: 25.3 PG (ref 26–34)
MCHC RBC AUTO-ENTMCNC: 30.2 G/DL (ref 32–36)
MCV RBC AUTO: 84 FL (ref 80–100)
NRBC BLD-RTO: 0 /100 WBCS (ref 0–0)
PLATELET # BLD AUTO: 244 X10*3/UL (ref 150–450)
POTASSIUM SERPL-SCNC: 3.9 MMOL/L (ref 3.5–5.3)
RBC # BLD AUTO: 3.8 X10*6/UL (ref 4–5.2)
SODIUM SERPL-SCNC: 139 MMOL/L (ref 136–145)
WBC # BLD AUTO: 11.6 X10*3/UL (ref 4.4–11.3)

## 2025-07-28 PROCEDURE — 2500000002 HC RX 250 W HCPCS SELF ADMINISTERED DRUGS (ALT 637 FOR MEDICARE OP, ALT 636 FOR OP/ED)

## 2025-07-28 PROCEDURE — 94640 AIRWAY INHALATION TREATMENT: CPT

## 2025-07-28 PROCEDURE — 82947 ASSAY GLUCOSE BLOOD QUANT: CPT

## 2025-07-28 PROCEDURE — 80048 BASIC METABOLIC PNL TOTAL CA: CPT

## 2025-07-28 PROCEDURE — 36415 COLL VENOUS BLD VENIPUNCTURE: CPT

## 2025-07-28 PROCEDURE — 2500000004 HC RX 250 GENERAL PHARMACY W/ HCPCS (ALT 636 FOR OP/ED): Performed by: NURSE PRACTITIONER

## 2025-07-28 PROCEDURE — 2500000002 HC RX 250 W HCPCS SELF ADMINISTERED DRUGS (ALT 637 FOR MEDICARE OP, ALT 636 FOR OP/ED): Performed by: NURSE PRACTITIONER

## 2025-07-28 PROCEDURE — 2500000002 HC RX 250 W HCPCS SELF ADMINISTERED DRUGS (ALT 637 FOR MEDICARE OP, ALT 636 FOR OP/ED): Performed by: INTERNAL MEDICINE

## 2025-07-28 PROCEDURE — 94660 CPAP INITIATION&MGMT: CPT

## 2025-07-28 PROCEDURE — 83735 ASSAY OF MAGNESIUM: CPT

## 2025-07-28 PROCEDURE — 2500000004 HC RX 250 GENERAL PHARMACY W/ HCPCS (ALT 636 FOR OP/ED)

## 2025-07-28 PROCEDURE — 85027 COMPLETE CBC AUTOMATED: CPT

## 2025-07-28 PROCEDURE — 2500000001 HC RX 250 WO HCPCS SELF ADMINISTERED DRUGS (ALT 637 FOR MEDICARE OP): Performed by: NURSE PRACTITIONER

## 2025-07-28 RX ORDER — AMOXICILLIN AND CLAVULANATE POTASSIUM 875; 125 MG/1; MG/1
1 TABLET, FILM COATED ORAL 2 TIMES DAILY
Qty: 10 TABLET | Refills: 0 | Status: SHIPPED | OUTPATIENT
Start: 2025-07-28 | End: 2025-08-03 | Stop reason: ALTCHOICE

## 2025-07-28 RX ORDER — LANOLIN ALCOHOL/MO/W.PET/CERES
800 CREAM (GRAM) TOPICAL DAILY
Status: COMPLETED | OUTPATIENT
Start: 2025-07-28 | End: 2025-07-28

## 2025-07-28 RX ORDER — POTASSIUM CHLORIDE 20 MEQ/1
20 TABLET, EXTENDED RELEASE ORAL ONCE
Status: COMPLETED | OUTPATIENT
Start: 2025-07-28 | End: 2025-07-28

## 2025-07-28 RX ORDER — FUROSEMIDE 40 MG/1
40 TABLET ORAL DAILY
Qty: 30 TABLET | Refills: 0 | Status: SHIPPED | OUTPATIENT
Start: 2025-07-28 | End: 2025-08-27

## 2025-07-28 RX ADMIN — FUROSEMIDE 40 MG: 40 TABLET ORAL at 10:08

## 2025-07-28 RX ADMIN — IPRATROPIUM BROMIDE AND ALBUTEROL SULFATE 3 ML: 2.5; .5 SOLUTION RESPIRATORY (INHALATION) at 07:56

## 2025-07-28 RX ADMIN — AMLODIPINE BESYLATE 10 MG: 10 TABLET ORAL at 10:09

## 2025-07-28 RX ADMIN — GABAPENTIN 300 MG: 300 CAPSULE ORAL at 10:08

## 2025-07-28 RX ADMIN — DOXYCYCLINE HYCLATE 100 MG: 100 CAPSULE ORAL at 10:08

## 2025-07-28 RX ADMIN — Medication: at 07:59

## 2025-07-28 RX ADMIN — PANCRELIPASE 1 CAPSULE: 60000; 12000; 38000 CAPSULE, DELAYED RELEASE PELLETS ORAL at 06:29

## 2025-07-28 RX ADMIN — Medication 2 TABLET: at 10:08

## 2025-07-28 RX ADMIN — PANCRELIPASE 1 CAPSULE: 60000; 12000; 38000 CAPSULE, DELAYED RELEASE PELLETS ORAL at 12:03

## 2025-07-28 RX ADMIN — INSULIN GLARGINE 42 UNITS: 100 INJECTION, SOLUTION SUBCUTANEOUS at 10:14

## 2025-07-28 RX ADMIN — Medication: at 13:21

## 2025-07-28 RX ADMIN — CITALOPRAM HYDROBROMIDE 40 MG: 20 TABLET ORAL at 10:14

## 2025-07-28 RX ADMIN — IPRATROPIUM BROMIDE AND ALBUTEROL SULFATE 3 ML: 2.5; .5 SOLUTION RESPIRATORY (INHALATION) at 13:20

## 2025-07-28 RX ADMIN — BUDESONIDE 0.5 MG: 0.5 INHALANT RESPIRATORY (INHALATION) at 07:56

## 2025-07-28 RX ADMIN — HEPARIN SODIUM 7500 UNITS: 5000 INJECTION INTRAVENOUS; SUBCUTANEOUS at 06:29

## 2025-07-28 RX ADMIN — ASPIRIN 81 MG CHEWABLE TABLET 81 MG: 81 TABLET CHEWABLE at 10:08

## 2025-07-28 RX ADMIN — METOPROLOL SUCCINATE 25 MG: 25 TABLET, EXTENDED RELEASE ORAL at 10:08

## 2025-07-28 RX ADMIN — POTASSIUM CHLORIDE EXTENDED-RELEASE 20 MEQ: 1500 TABLET ORAL at 10:08

## 2025-07-28 ASSESSMENT — COGNITIVE AND FUNCTIONAL STATUS - GENERAL
CLIMB 3 TO 5 STEPS WITH RAILING: TOTAL
WALKING IN HOSPITAL ROOM: TOTAL
TURNING FROM BACK TO SIDE WHILE IN FLAT BAD: A LOT
MOBILITY SCORE: 9
STANDING UP FROM CHAIR USING ARMS: TOTAL
MOVING TO AND FROM BED TO CHAIR: A LOT
MOVING FROM LYING ON BACK TO SITTING ON SIDE OF FLAT BED WITH BEDRAILS: A LOT

## 2025-07-28 ASSESSMENT — PAIN - FUNCTIONAL ASSESSMENT: PAIN_FUNCTIONAL_ASSESSMENT: 0-10

## 2025-07-28 ASSESSMENT — PAIN SCALES - GENERAL: PAINLEVEL_OUTOF10: 0 - NO PAIN

## 2025-07-28 NOTE — CARE PLAN
Problem: Heart Failure  Goal: Improved gas exchange this shift  Outcome: Adequate for Discharge  Goal: Improved urinary output this shift  Outcome: Adequate for Discharge  Goal: Reduction in peripheral edema within 24 hours  Outcome: Adequate for Discharge  Goal: Report improvement of dyspnea/breathlessness this shift  Outcome: Adequate for Discharge  Goal: Weight from fluid excess reduced over 2-3 days, then stabilize  Outcome: Adequate for Discharge  Goal: Increase self care and/or family involvement in 24 hours  Outcome: Adequate for Discharge     Problem: Pain - Adult  Goal: Verbalizes/displays adequate comfort level or baseline comfort level  Outcome: Adequate for Discharge     Problem: Safety - Adult  Goal: Free from fall injury  Outcome: Adequate for Discharge     Problem: Chronic Conditions and Co-morbidities  Goal: Patient's chronic conditions and co-morbidity symptoms are monitored and maintained or improved  Outcome: Adequate for Discharge     Problem: Nutrition  Goal: Nutrient intake appropriate for maintaining nutritional needs  Outcome: Adequate for Discharge     Problem: Skin  Goal: Decreased wound size/increased tissue granulation at next dressing change  Outcome: Adequate for Discharge  Goal: Participates in plan/prevention/treatment measures  Outcome: Adequate for Discharge  Goal: Prevent/manage excess moisture  7/28/2025 1422 by Chely Salazar RN  Outcome: Adequate for Discharge  7/28/2025 1421 by Chely Salazar RN  Outcome: Progressing  Flowsheets (Taken 7/28/2025 1421)  Prevent/manage excess moisture: Cleanse incontinence/protect with barrier cream  Note: External catheter in use.  Goal: Prevent/minimize sheer/friction injuries  Outcome: Adequate for Discharge  Goal: Promote/optimize nutrition  Outcome: Adequate for Discharge  Goal: Promote skin healing  Outcome: Adequate for Discharge     Problem: Diabetes  Goal: Achieve decreasing blood glucose levels by end of shift  Outcome: Adequate  for Discharge  Goal: Increase stability of blood glucose readings by end of shift  Outcome: Adequate for Discharge  Goal: Decrease in ketones present in urine by end of shift  Outcome: Adequate for Discharge  Goal: Maintain electrolyte levels within acceptable range throughout shift  Outcome: Adequate for Discharge  Goal: Maintain glucose levels >70mg/dl to <250mg/dl throughout shift  Outcome: Adequate for Discharge  Goal: No changes in neurological exam by end of shift  Outcome: Adequate for Discharge  Goal: Learn about and adhere to nutrition recommendations by end of shift  Outcome: Adequate for Discharge  Goal: Vital signs within normal range for age by end of shift  Outcome: Adequate for Discharge  Goal: Increase self care and/or family involovement by end of shift  Outcome: Adequate for Discharge  Goal: Receive DSME education by end of shift  Outcome: Adequate for Discharge     Problem: Fall/Injury  Goal: Not fall by end of shift  Outcome: Adequate for Discharge  Goal: Be free from injury by end of the shift  Outcome: Adequate for Discharge  Goal: Verbalize understanding of personal risk factors for fall in the hospital  Outcome: Adequate for Discharge  Goal: Verbalize understanding of risk factor reduction measures to prevent injury from fall in the home  Outcome: Adequate for Discharge  Goal: Use assistive devices by end of the shift  Outcome: Adequate for Discharge  Goal: Pace activities to prevent fatigue by end of the shift  Outcome: Adequate for Discharge   The patient's goals for the shift include sleep    1420 Patient hep lock and tele removed, purewick removed. Discharged instructions reviewed & patient verbalizing understanding.  Patient getting discharged to home with  care to follow. Patient's belongings packed and sent with patient.

## 2025-07-28 NOTE — DISCHARGE SUMMARY
Discharge Diagnosis  Pulmonary edema with congestive heart failure with preserved left ventricular function           Issues Requiring Follow-Up  Discharge patient home    Discharge Meds     Medication List      ASK your doctor about these medications     albuterol 2.5 mg /3 mL (0.083 %) nebulizer solution   amLODIPine 5 mg tablet; Commonly known as: Norvasc; Take 2 tablets (10   mg) by mouth once daily.   apple cider vinegar 300 mg tablet   aspirin 81 mg chewable tablet   atorvastatin 10 mg tablet; Commonly known as: Lipitor   citalopram 40 mg tablet; Commonly known as: CeleXA   Creon 6,000-19,000 -30,000 unit capsule; Generic drug:   lipase-protease-amylase   ergocalciferol 1250 mcg (50,000 units) capsule; Commonly known as:   Vitamin D-2; Take 1 capsule (1.25 mg) by mouth 1 (one) time per week.   furosemide 40 mg tablet; Commonly known as: Lasix; Take 1 tablet (40 mg)   by mouth once daily.   gabapentin 300 mg capsule; Commonly known as: Neurontin   ibuprofen 800 mg tablet; Take 1 tablet (800 mg) by mouth 3 times a day   for 5 days.; Ask about: Should I take this medication?   insulin lispro 100 unit/mL pen; Commonly known as: HumaLOG; Inject 0-15   Units under the skin 3 times a day. For blood sugar of 111-150, Give 1   unit. For 151-200, Give 3 units. For 201-250, Give 6 units. For 251-300,   Give 9 units. Fot 301-350, Give 12 units. For 351-400, Give 15 units   Lantus Solostar U-100 Insulin 100 unit/mL (3 mL) pen; Generic drug:   insulin glargine; Inject 42 Units under the skin 2 times a day. Take as   directed per insulin instructions.   menthol-zinc oxide 0.44-20.6 % ointment; Commonly known as: Calmoseptine   - Risamine; Apply 1 Application topically 4 times a day as needed   (moisture associated skin damage).   metoprolol succinate XL 25 mg 24 hr tablet; Commonly known as:   Toprol-XL; Take 1 tablet (25 mg) by mouth 2 times a day. Hold for SBP<110   or HR <70   Nystop 100,000 unit/gram powder; Generic  drug: nystatin; Apply 1   Application topically 2 times a day. Apply to neck, breast and groin fold   as well as left abdomen   oxygen gas therapy; Commonly known as: O2; Inhale 1 each once every 24   hours.       Test Results Pending At Discharge  Pending Labs       Order Current Status    Extra Urine Gray Tube In process    Urinalysis with Reflex Culture and Microscopic In process    Blood Culture Preliminary result    Blood Culture Preliminary result            Hospital Course   Patient was admitted with increased shortness of breath patient on 4 L of oxygen related to chronic respiratory failure and she was using BiPAP even during the day patient chest x-ray consistent with pneumonia and pulmonary edema patient started on Lasix and antibiotic with improvement of her symptoms patient wanted to go back home does not want to go to nursing home patient will be discharged on Lasix orally and oral antibiotic follow-up with PCP in 1 week    Pertinent Physical Exam At Time of Discharge  Physical Exam  HENT:      Right Ear: External ear normal.      Left Ear: External ear normal.      Mouth/Throat:      Mouth: Mucous membranes are moist.     Cardiovascular:      Rate and Rhythm: Normal rate and regular rhythm.      Heart sounds: No murmur heard.     No friction rub. No gallop.   Pulmonary:      Effort: No accessory muscle usage or respiratory distress.      Breath sounds: No stridor. No wheezing or rhonchi.   Chest:      Chest wall: No tenderness.   Abdominal:      General: There is no distension.      Palpations: There is no mass.      Tenderness: There is no abdominal tenderness. There is no guarding or rebound.     Musculoskeletal:         General: No deformity or signs of injury.      Cervical back: No rigidity or tenderness. Normal range of motion.      Right lower leg: No edema.      Left lower leg: No edema.     Skin:     Coloration: Skin is not jaundiced or pale.      Findings: No lesion.     Neurological:       General: No focal deficit present.      Mental Status: She is alert, oriented to person, place, and time and easily aroused.      Cranial Nerves: No cranial nerve deficit.      Sensory: No sensory deficit.      Motor: No weakness.         Outpatient Follow-Up  No future appointments.      ANDREW Mccloud MD

## 2025-07-28 NOTE — HH CARE COORDINATION
Home Care received a Referral for Physical Therapy and Occupational Therapy. We have processed the referral for a Start of Care within 48 hours of 07-29-25.     If you have any questions or concerns, please feel free to contact us at [phone]. Follow the prompts, enter your five digit zip code, and you will be directed to your care team on [facility].

## 2025-07-28 NOTE — CARE PLAN
Problem: Pain - Adult  Goal: Verbalizes/displays adequate comfort level or baseline comfort level  Outcome: Progressing     Problem: Safety - Adult  Goal: Free from fall injury  Outcome: Progressing     Problem: Discharge Planning  Goal: Discharge to home or other facility with appropriate resources  Outcome: Progressing     Problem: Chronic Conditions and Co-morbidities  Goal: Patient's chronic conditions and co-morbidity symptoms are monitored and maintained or improved  Outcome: Progressing     Problem: Nutrition  Goal: Nutrient intake appropriate for maintaining nutritional needs  Outcome: Progressing       The clinical goals for the shift include Pt will remain HDS throughout the shift

## 2025-07-28 NOTE — PROGRESS NOTES
07/28/25 1030   Discharge Planning   Living Arrangements Spouse/significant other   Support Systems Spouse/significant other   Type of Residence Private residence   Home or Post Acute Services In home services   Type of Home Care Services Home OT;Home PT   Expected Discharge Disposition Home H     Medically ready for discharge with [facility]. Internal referral sent. PCP Kathryn Bingham to follow. [facility] intake notified.     1255 SOC verified within 24-48 hours.

## 2025-07-28 NOTE — PROGRESS NOTES
Physical Therapy                 Therapy Communication Note    Patient Name: Kaylene Feliciano  MRN: [id number]  Department: [facility]  Room: 3007/3007-A  Today's Date: 7/28/2025     Discipline: Physical Therapy    Missed Visit: Yes    Missed Visit Reason: Orders received and chart review completed. Consulted with pt at 1114 and pt politely refusing therapy and will be discharging home today with home care.    Missed Time: Attempt    Comment:

## 2025-07-29 LAB
BACTERIA BLD CULT: NORMAL
BACTERIA BLD CULT: NORMAL

## 2025-07-30 PROCEDURE — G0180 MD CERTIFICATION HHA PATIENT: HCPCS | Performed by: CLINICAL NURSE SPECIALIST

## 2025-07-31 ENCOUNTER — HOME CARE VISIT (OUTPATIENT)
Dept: HOME HEALTH SERVICES | Facility: HOME HEALTH | Age: 70
End: 2025-07-31
Payer: MEDICARE

## 2025-07-31 VITALS
TEMPERATURE: 97.4 F | OXYGEN SATURATION: 93 % | HEART RATE: 60 BPM | BODY MASS INDEX: 49.61 KG/M2 | WEIGHT: 280 LBS | SYSTOLIC BLOOD PRESSURE: 122 MMHG | DIASTOLIC BLOOD PRESSURE: 68 MMHG | HEIGHT: 63 IN

## 2025-07-31 PROCEDURE — G0152 HHCP-SERV OF OT,EA 15 MIN: HCPCS | Mod: HHH

## 2025-07-31 SDOH — ECONOMIC STABILITY: HOUSING INSECURITY: ELEVATOR PRESENT: 1

## 2025-07-31 SDOH — ECONOMIC STABILITY: HOUSING INSECURITY: EVIDENCE OF SMOKING MATERIAL: 0

## 2025-07-31 SDOH — HEALTH STABILITY: MENTAL HEALTH: SMOKING IN HOME: 0

## 2025-07-31 ASSESSMENT — ACTIVITIES OF DAILY LIVING (ADL)
ENTERING_EXITING_HOME: TOTAL DEPENDENCE
OASIS_M1830: 06
DRINKING_FROM_CUP_SUPERVISION: 1

## 2025-07-31 ASSESSMENT — ENCOUNTER SYMPTOMS
DYSPNEA ACTIVITY LEVEL: AT REST
SHORTNESS OF BREATH: 1
DEPRESSION: 0
DENIES PAIN: 1
LOSS OF SENSATION IN FEET: 1
OCCASIONAL FEELINGS OF UNSTEADINESS: 1
PERSON REPORTING PAIN: PATIENT

## 2025-08-01 ENCOUNTER — OFFICE VISIT (OUTPATIENT)
Dept: GERIATRIC MEDICINE | Facility: CLINIC | Age: 70
End: 2025-08-01
Payer: MEDICARE

## 2025-08-01 DIAGNOSIS — N18.31 CKD STAGE 3A, GFR 45-59 ML/MIN (MULTI): ICD-10-CM

## 2025-08-01 DIAGNOSIS — K86.89 PANCREATIC INSUFFICIENCY (HHS-HCC): ICD-10-CM

## 2025-08-01 DIAGNOSIS — F31.76 BIPOLAR DISORDER, IN FULL REMISSION, MOST RECENT EPISODE DEPRESSED (MULTI): ICD-10-CM

## 2025-08-01 DIAGNOSIS — I50.30 HYPERTENSIVE HEART DISEASE WITH CONGESTIVE HEART FAILURE WITH PRESERVED LEFT VENTRICULAR FUNCTION: ICD-10-CM

## 2025-08-01 DIAGNOSIS — J96.12 CHRONIC RESPIRATORY FAILURE WITH HYPOXIA AND HYPERCAPNIA: Primary | ICD-10-CM

## 2025-08-01 DIAGNOSIS — I11.0 HYPERTENSIVE HEART DISEASE WITH CONGESTIVE HEART FAILURE WITH PRESERVED LEFT VENTRICULAR FUNCTION: ICD-10-CM

## 2025-08-01 DIAGNOSIS — M18.11 PRIMARY OSTEOARTHRITIS OF FIRST CARPOMETACARPAL JOINT OF RIGHT HAND: ICD-10-CM

## 2025-08-01 DIAGNOSIS — D50.8 IRON DEFICIENCY ANEMIA SECONDARY TO INADEQUATE DIETARY IRON INTAKE: ICD-10-CM

## 2025-08-01 DIAGNOSIS — E55.9 VITAMIN D DEFICIENCY: ICD-10-CM

## 2025-08-01 DIAGNOSIS — E11.43 TYPE 2 DIABETES MELLITUS WITH DIABETIC AUTONOMIC NEUROPATHY, WITH LONG-TERM CURRENT USE OF INSULIN: ICD-10-CM

## 2025-08-01 DIAGNOSIS — Z79.4 TYPE 2 DIABETES MELLITUS WITH DIABETIC AUTONOMIC NEUROPATHY, WITH LONG-TERM CURRENT USE OF INSULIN: ICD-10-CM

## 2025-08-01 DIAGNOSIS — J96.11 CHRONIC RESPIRATORY FAILURE WITH HYPOXIA AND HYPERCAPNIA: Primary | ICD-10-CM

## 2025-08-01 PROCEDURE — 3078F DIAST BP <80 MM HG: CPT | Performed by: CLINICAL NURSE SPECIALIST

## 2025-08-01 PROCEDURE — 1111F DSCHRG MED/CURRENT MED MERGE: CPT | Performed by: CLINICAL NURSE SPECIALIST

## 2025-08-01 PROCEDURE — 1159F MED LIST DOCD IN RCRD: CPT | Performed by: CLINICAL NURSE SPECIALIST

## 2025-08-01 PROCEDURE — 3074F SYST BP LT 130 MM HG: CPT | Performed by: CLINICAL NURSE SPECIALIST

## 2025-08-01 PROCEDURE — 99349 HOME/RES VST EST MOD MDM 40: CPT | Performed by: CLINICAL NURSE SPECIALIST

## 2025-08-02 RX ORDER — DICLOFENAC SODIUM 10 MG/G
2 GEL TOPICAL 3 TIMES DAILY PRN
Qty: 100 G | Refills: 0 | Status: SHIPPED | OUTPATIENT
Start: 2025-08-02 | End: 2025-09-01

## 2025-08-02 RX ORDER — PANCRELIPASE 30000; 6000; 19000 [USP'U]/1; [USP'U]/1; [USP'U]/1
2 CAPSULE, DELAYED RELEASE PELLETS ORAL
Qty: 180 CAPSULE | Refills: 11 | Status: SHIPPED | OUTPATIENT
Start: 2025-08-02 | End: 2026-08-02

## 2025-08-02 NOTE — PROGRESS NOTES
"Reason for visit: hospital follow up    Reason for homebound status: oxygen dependence and generalized weakness with wheelchair dependence which is taxing on family to transport    HPI: Kaylene is a 69yo female with hx of several episodes of urosepsis, respiratory failure with hypoxia and hypercapnea, chf with preserved EF, ckd 3a, and spinal stenosis with right leg weakness.    She was seen today to follow up on recent hospital stay again for chf. It was not noted in the hospital records nor does Kaylene feel it was brought up while there, but I suspect the acute chf was brought on by me starting motrin for her severe thumb pain. She took 2 days of it then called while I was on vacation and stated her lips and face were swollen. She was instructed to go to the ED where she was dx with acute on chronic chf. She was given IV lasix and discharged back home on her usual medications.     Chief Complaint: \"I am doing ok now, but my thumb is still hurting a lot\"      Code Status: Full Code    Review of Systems   General: feels fine today, no fever or chills, sleeping off and on from about 2am til 9am and then takes an afternoon nap; appetite is good  Skin: no lesions, bruising, itching; no rashes or irritation of skin  EENT:  vision is adequate with no recent vision changes, no eye pain or drainage; hearing is adequate and no ear pain or drainage; no nasal drainage or congestion; no soreness of throat  Mouth: no oral pain, no lesions; teeth without problems; no trouble chewing or swallowing  Respiratory: no coughing, no congestion currently; gets shortness of breath with rolling in bed or sitting up; wears oxygen 4L at all times and at times when breathing is worse will wear bipap during day; wears it at night at all times; breathing seems to be at baseline currently  Cardiac: no chest pain or palpitations  Gastrointestinal: no abdominal pain, nausea, vomiting, heartburn, diarrhea or constipation; BM every day without " straining, uses bedpan; no rectal pain or bleeding; no hemorrhoids  Genitourinary: continent, uses bedpan; no discomfort with urination; voids about every 2 hours; normal color of urine; no discharge or itching  Musculoskeletal: chronic low back pain and bilateral knee pain; cannot stand anymore; does not sit up on side of bed very often, too much of a struggle; eats in bed with head raised; decent ROM of arms; right thumb painful and pops; is righthanded; no swelling  Neurologic: long and short term memory intact; no headaches, dizziness or lightheadedness; no tremors or involuntary movements; right foot is numb; no unilateral weakness  Psychiatric: hasn't felt depressed, anxious, frustrated or lonely; luisito well    Physical Examination   Blood pressure (!) 120/46, pulse 58, resp. rate 20.  Pulse ox 96% on 4L  General: resting in hospital bed in living room, appears comfortable, oxygen on; good conversationalist  Neurologic: alert, oriented x4 with  good executive function; speech clear and fluent; facial features symmetrical and tongue midline; equal peripheral strength; positive sensation in both feet and hands  Skin: no abnormalities of hair or nails; no lesions, discolorations, or excessive dryness  HEENT: no trauma, bruises, swelling; adequate hearing of close conversation; adequate vision for ADLs; no eye/ear/nasal drainage or irritation; oral mucosa moist and pink w/o lesions or discolorations; no coughing while swallowing; natural teeth appear in good condition  Respiratory: unlabored at rest, a little winded when talking a lot; chest symmetrical; no coughing, breath sounds equal and clear throughout but diminished  Heart: regular rhythm, rate controlled; normal heart sounds w/ no murmurs, rubs and gallops  Abdomen: nondistended but obese, nontender, bowel sounds active x4, no masses/hernias  Musculoskeletal: adequate ROM of arms and neck, no tenderness, effusion, erythema or deformity, moves legs but seems  legs are weak; no kyphosis or scoliosis but hard to assess in bed; not able to sit up; right thumb   PV: skin warm, dry, no cyanosis, no clubbing of nails; no edema; palpable radial and pedal pulses  : deferred  Psych: pleasant and cooperative; good conversationalist; appropriate behaviors      Diagnoses and Plan:   1. Chronic respiratory failure with hypoxia and hypercapnia (Primary)  Questionable recent hospital stay for acute chf could be due to nsaid; add it as allergy to avoid use; discussed with Kaylene; she has spironolactone from previously so told her if she has a day when she feels she is more sob, puffy, or feels she may have had too much fluid, she can take the spironolactone for 2-3 days max to get a little extra fluid off, can call me for guidance as well of course; uses oxygen 4L during day and bipap at night and prn; has albuterol neb she uses prn but not every day    2. Hypertensive heart disease with congestive heart failure with preserved left ventricular function  As noted above; noted she is also following a fluid restriction at home now; says she is measuring,  and aide help    3. Type 2 diabetes mellitus with stage 3a chronic kidney disease, with long-term current use of insulin (Multi)  States BS readings 120-200, improved with lantus increase to 42un bid   - insulin lispro (HumaLOG) 100 unit/mL pen; Inject 0-15 Units under the skin 3 times a day. For blood sugar of 111-150, Give 1 unit. For 151-200, Give 3 units. For 201-250, Give 6 units. For 251-300, Give 9 units. Fot 301-350, Give 12 units. For 351-400, Give 15 units  Dispense: 40.5 mL; Refill: 3    4. Type 2 diabetes mellitus with diabetic autonomic neuropathy, with long-term current use of insulin  Takes gabapentin bid; says it is effective    5. Essential hypertension, benign  Metoprolol succinate bid, amlodipine and lasix    6. CKD stage 3a, GFR 45-59 ml/min (Multi)  Stable; balance fluid, diuretics, avoid nephrotoxic  drugs    7. Spinal stenosis of lumbar region with neurogenic claudication  Takes gabapentin; quite debilitated from it    8. Fe deficiency anemia   Needs to continue Fe supplement even if every other day due to hgb around 9    9. Vitamin D deficiency  Level in the teens; weekly high dose    10. Gastroesophageal reflux disease without esophagitis w/ hiatal hernia and pancreatic insufficiency   Says she is good with just taking the Creon    11. Bipolar disorder, in full remission, most recent episode depressed (Multi)  Takes citalopram but has been very stable since brain stimulator put in more than a decade ago    12. Skin candidiasis  Prn nystatin powder; no current issues    13. Morbid obesity   Did not discuss wt loss    will follow up in 2 months

## 2025-08-03 VITALS — RESPIRATION RATE: 20 BRPM | HEART RATE: 58 BPM | SYSTOLIC BLOOD PRESSURE: 120 MMHG | DIASTOLIC BLOOD PRESSURE: 46 MMHG

## 2025-08-03 PROBLEM — J96.11 CHRONIC RESPIRATORY FAILURE WITH HYPOXIA AND HYPERCAPNIA: Status: ACTIVE | Noted: 2025-08-03

## 2025-08-03 PROBLEM — J96.12 CHRONIC RESPIRATORY FAILURE WITH HYPOXIA AND HYPERCAPNIA: Status: ACTIVE | Noted: 2025-08-03

## 2025-08-03 RX ORDER — SPIRONOLACTONE 25 MG/1
25 TABLET ORAL DAILY PRN
Qty: 30 TABLET | Refills: 11 | Status: SHIPPED | OUTPATIENT
Start: 2025-08-03 | End: 2026-08-03

## 2025-08-03 RX ORDER — IRON POLYSACCHARIDE COMPLEX 150 MG
150 CAPSULE ORAL DAILY
Qty: 90 CAPSULE | Refills: 3 | Status: SHIPPED | OUTPATIENT
Start: 2025-08-03

## 2025-08-03 NOTE — PATIENT INSTRUCTIONS
Recent hemoglobin low so need to take IRON supplement once a day or at least every other day; will send in Rx for Ferrex  No pneumonia vaccines documented; did you say you want one? If so let Ce in office know for next visit  Use spironolactone 25mg daily for 2 or 3 days if you feel fluid overloaded or more short of breath. Dont hesitate to call for advice   See you in September but call earlier if needed

## 2025-08-04 ENCOUNTER — HOME CARE VISIT (OUTPATIENT)
Dept: HOME HEALTH SERVICES | Facility: HOME HEALTH | Age: 70
End: 2025-08-04
Payer: MEDICARE

## 2025-08-07 ENCOUNTER — HOME CARE VISIT (OUTPATIENT)
Dept: HOME HEALTH SERVICES | Facility: HOME HEALTH | Age: 70
End: 2025-08-07
Payer: MEDICARE

## 2025-08-26 ENCOUNTER — TELEPHONE (OUTPATIENT)
Dept: GERIATRIC MEDICINE | Facility: CLINIC | Age: 70
End: 2025-08-26
Payer: MEDICARE

## 2025-08-27 ENCOUNTER — TELEPHONE (OUTPATIENT)
Facility: CLINIC | Age: 70
End: 2025-08-27
Payer: MEDICARE

## 2025-08-28 DIAGNOSIS — E55.9 VITAMIN D DEFICIENCY: ICD-10-CM

## 2025-08-29 RX ORDER — ERGOCALCIFEROL 1.25 MG/1
1.25 CAPSULE ORAL
Qty: 4 CAPSULE | Refills: 1 | Status: SHIPPED | OUTPATIENT
Start: 2025-08-29

## 2025-08-31 LAB
ATRIAL RATE: 78 BPM
ATRIAL RATE: 90 BPM
P AXIS: 79 DEGREES
P AXIS: 94 DEGREES
P OFFSET: 139 MS
P OFFSET: 165 MS
P ONSET: 116 MS
P ONSET: 88 MS
PR INTERVAL: 210 MS
PR INTERVAL: 234 MS
Q ONSET: 205 MS
Q ONSET: 221 MS
QRS COUNT: 13 BEATS
QRS COUNT: 15 BEATS
QRS DURATION: 114 MS
QRS DURATION: 118 MS
QT INTERVAL: 370 MS
QT INTERVAL: 396 MS
QTC CALCULATION(BAZETT): 451 MS
QTC CALCULATION(BAZETT): 452 MS
QTC FREDERICIA: 423 MS
QTC FREDERICIA: 432 MS
R AXIS: -51 DEGREES
R AXIS: -57 DEGREES
T AXIS: 102 DEGREES
T AXIS: 85 DEGREES
T OFFSET: 403 MS
T OFFSET: 406 MS
VENTRICULAR RATE: 78 BPM
VENTRICULAR RATE: 90 BPM

## 2025-09-04 ENCOUNTER — OFFICE VISIT (OUTPATIENT)
Dept: GERIATRIC MEDICINE | Facility: CLINIC | Age: 70
End: 2025-09-04
Payer: MEDICARE

## 2025-09-04 DIAGNOSIS — E11.22 TYPE 2 DIABETES MELLITUS WITH STAGE 3A CHRONIC KIDNEY DISEASE, WITH LONG-TERM CURRENT USE OF INSULIN (MULTI): ICD-10-CM

## 2025-09-04 DIAGNOSIS — E66.01 MORBID OBESITY (MULTI): Chronic | ICD-10-CM

## 2025-09-04 DIAGNOSIS — I10 ESSENTIAL HYPERTENSION, BENIGN: Chronic | ICD-10-CM

## 2025-09-04 DIAGNOSIS — N18.31 CKD STAGE 3A, GFR 45-59 ML/MIN (MULTI): ICD-10-CM

## 2025-09-04 DIAGNOSIS — J96.12 CHRONIC RESPIRATORY FAILURE WITH HYPOXIA AND HYPERCAPNIA: Primary | ICD-10-CM

## 2025-09-04 DIAGNOSIS — D50.9 IRON DEFICIENCY ANEMIA, UNSPECIFIED IRON DEFICIENCY ANEMIA TYPE: ICD-10-CM

## 2025-09-04 DIAGNOSIS — J96.11 CHRONIC RESPIRATORY FAILURE WITH HYPOXIA AND HYPERCAPNIA: Primary | ICD-10-CM

## 2025-09-04 DIAGNOSIS — E55.9 VITAMIN D DEFICIENCY: ICD-10-CM

## 2025-09-04 DIAGNOSIS — Z79.4 TYPE 2 DIABETES MELLITUS WITH STAGE 3A CHRONIC KIDNEY DISEASE, WITH LONG-TERM CURRENT USE OF INSULIN (MULTI): ICD-10-CM

## 2025-09-04 DIAGNOSIS — K21.9 GASTROESOPHAGEAL REFLUX DISEASE WITHOUT ESOPHAGITIS: ICD-10-CM

## 2025-09-04 DIAGNOSIS — M48.062 SPINAL STENOSIS OF LUMBAR REGION WITH NEUROGENIC CLAUDICATION: ICD-10-CM

## 2025-09-04 DIAGNOSIS — N18.31 TYPE 2 DIABETES MELLITUS WITH STAGE 3A CHRONIC KIDNEY DISEASE, WITH LONG-TERM CURRENT USE OF INSULIN (MULTI): ICD-10-CM

## 2025-09-04 PROCEDURE — 3078F DIAST BP <80 MM HG: CPT | Performed by: CLINICAL NURSE SPECIALIST

## 2025-09-04 PROCEDURE — 99349 HOME/RES VST EST MOD MDM 40: CPT | Performed by: CLINICAL NURSE SPECIALIST

## 2025-09-04 PROCEDURE — 3074F SYST BP LT 130 MM HG: CPT | Performed by: CLINICAL NURSE SPECIALIST

## 2025-09-04 PROCEDURE — 1159F MED LIST DOCD IN RCRD: CPT | Performed by: CLINICAL NURSE SPECIALIST

## 2025-09-05 VITALS
DIASTOLIC BLOOD PRESSURE: 44 MMHG | RESPIRATION RATE: 20 BRPM | SYSTOLIC BLOOD PRESSURE: 120 MMHG | HEART RATE: 68 BPM | TEMPERATURE: 97.6 F

## (undated) PROCEDURE — 5A09357 ASSISTANCE WITH RESPIRATORY VENTILATION, LESS THAN 24 CONSECUTIVE HOURS, CONTINUOUS POSITIVE AIRWAY PRESSURE: ICD-10-PCS